# Patient Record
Sex: MALE | Race: WHITE | NOT HISPANIC OR LATINO | Employment: OTHER | URBAN - METROPOLITAN AREA
[De-identification: names, ages, dates, MRNs, and addresses within clinical notes are randomized per-mention and may not be internally consistent; named-entity substitution may affect disease eponyms.]

---

## 2017-06-22 NOTE — TELEPHONE ENCOUNTER
----- Message from Michelle Rosenthal sent at 6/22/2017 11:58 AM CDT -----  Patient is requesting a medication refill.     RX name: simvastatin (ZOCOR) 20 MG tablet  Strength:   Quantity: 90 day with refills   Directions: Take 1 tablet (20 mg total) by mouth every evening    RX name: lisinopril (PRINIVIL,ZESTRIL) 20 MG tablet  Strength:   Quantity: 90 day with refills  Directions: Take 1 tablet (20 mg total) by mouth once daily     RX name: triamterene-hydrochlorothiazide 37.5-25 mg (DYAZIDE) 37.5-25 mg per capsule  Strength:   Quantity: 90 day with refills   Directions:     RX name: triamterene-hydrochlorothiazide 37.5-25 mg (DYAZIDE) 37.5-25 mg per capsule  Strength:   Quantity: 90 day with refills   Directions: Take 1 capsule by mouth every morning. - Oral    RX name: diltiazem (CARDIZEM CD) 300 MG 24 hr capsule  Strength:   Quantity: 90 day supply  Directions:Take 1 capsule (300 mg total) by mouth once daily. - Oral      Pharmacy name: [facility]

## 2017-06-25 RX ORDER — SIMVASTATIN 20 MG/1
20 TABLET, FILM COATED ORAL NIGHTLY
Qty: 90 TABLET | Refills: 3 | Status: SHIPPED | OUTPATIENT
Start: 2017-06-25 | End: 2017-11-07 | Stop reason: SDUPTHER

## 2017-06-25 RX ORDER — LISINOPRIL 20 MG/1
20 TABLET ORAL DAILY
Qty: 90 TABLET | Refills: 3 | Status: SHIPPED | OUTPATIENT
Start: 2017-06-25 | End: 2017-11-07 | Stop reason: SDUPTHER

## 2017-06-25 RX ORDER — DILTIAZEM HYDROCHLORIDE 300 MG/1
300 CAPSULE, COATED, EXTENDED RELEASE ORAL DAILY
Qty: 90 CAPSULE | Refills: 3 | Status: SHIPPED | OUTPATIENT
Start: 2017-06-25 | End: 2017-11-07 | Stop reason: SDUPTHER

## 2017-06-25 RX ORDER — TRIAMTERENE AND HYDROCHLOROTHIAZIDE 37.5; 25 MG/1; MG/1
1 CAPSULE ORAL EVERY MORNING
Qty: 90 CAPSULE | Refills: 3 | Status: SHIPPED | OUTPATIENT
Start: 2017-06-25 | End: 2017-11-07 | Stop reason: SDUPTHER

## 2017-11-13 RX ORDER — TRIAMTERENE AND HYDROCHLOROTHIAZIDE 37.5; 25 MG/1; MG/1
1 CAPSULE ORAL EVERY MORNING
Qty: 90 CAPSULE | Refills: 3 | Status: SHIPPED | OUTPATIENT
Start: 2017-11-13 | End: 2018-05-06 | Stop reason: SDUPTHER

## 2017-11-13 RX ORDER — OMEGA-3-ACID ETHYL ESTERS 1 G/1
2 CAPSULE, LIQUID FILLED ORAL 2 TIMES DAILY
Qty: 360 CAPSULE | Refills: 3 | Status: SHIPPED | OUTPATIENT
Start: 2017-11-13 | End: 2018-12-06 | Stop reason: SDUPTHER

## 2017-11-13 RX ORDER — DILTIAZEM HYDROCHLORIDE 300 MG/1
300 CAPSULE, COATED, EXTENDED RELEASE ORAL DAILY
Qty: 90 CAPSULE | Refills: 3 | Status: SHIPPED | OUTPATIENT
Start: 2017-11-13 | End: 2018-12-03 | Stop reason: SDUPTHER

## 2017-11-13 RX ORDER — SIMVASTATIN 20 MG/1
20 TABLET, FILM COATED ORAL NIGHTLY
Qty: 90 TABLET | Refills: 3 | Status: SHIPPED | OUTPATIENT
Start: 2017-11-13 | End: 2018-05-18 | Stop reason: ALTCHOICE

## 2017-11-13 RX ORDER — LISINOPRIL 20 MG/1
20 TABLET ORAL DAILY
Qty: 90 TABLET | Refills: 3 | Status: SHIPPED | OUTPATIENT
Start: 2017-11-13 | End: 2018-05-06 | Stop reason: SDUPTHER

## 2017-11-15 ENCOUNTER — TELEPHONE (OUTPATIENT)
Dept: FAMILY MEDICINE | Facility: CLINIC | Age: 72
End: 2017-11-15

## 2017-11-15 NOTE — TELEPHONE ENCOUNTER
----- Message from Michelle Rosenthal sent at 11/15/2017 11:03 AM CST -----  Contact: NEL Booy  Needs okay from  Physician to fill Simvastatin. Has interaction alert with Diltizem      Call [phone]

## 2018-04-02 ENCOUNTER — TELEPHONE (OUTPATIENT)
Dept: FAMILY MEDICINE | Facility: CLINIC | Age: 73
End: 2018-04-02

## 2018-04-02 DIAGNOSIS — M54.9 BACK PAIN, UNSPECIFIED BACK LOCATION, UNSPECIFIED BACK PAIN LATERALITY, UNSPECIFIED CHRONICITY: Primary | ICD-10-CM

## 2018-04-06 DIAGNOSIS — Z12.11 COLON CANCER SCREENING: ICD-10-CM

## 2018-04-12 ENCOUNTER — HOSPITAL ENCOUNTER (OUTPATIENT)
Dept: RADIOLOGY | Facility: HOSPITAL | Age: 73
Discharge: HOME | End: 2018-04-12
Attending: FAMILY MEDICINE
Payer: MEDICARE

## 2018-04-12 PROCEDURE — 72148 MRI LUMBAR SPINE W/O DYE: CPT | Mod: TC,PO

## 2018-04-15 ENCOUNTER — TELEPHONE (OUTPATIENT)
Dept: FAMILY MEDICINE | Facility: CLINIC | Age: 73
End: 2018-04-15

## 2018-04-15 DIAGNOSIS — R93.7 ABNORMAL MRI, LUMBAR SPINE: Primary | ICD-10-CM

## 2018-04-17 ENCOUNTER — TELEPHONE (OUTPATIENT)
Dept: FAMILY MEDICINE | Facility: CLINIC | Age: 73
End: 2018-04-17

## 2018-04-17 NOTE — TELEPHONE ENCOUNTER
----- Message from Anthony De La Torre MD sent at 4/15/2018  9:04 PM CDT -----  Arthritis, prior surgery, worn discs, pinched nerves and spinal stenosis; needs to see neurosurgeon; referral placed

## 2018-04-17 NOTE — TELEPHONE ENCOUNTER
Mailbox was Full no answer on the other line      ----- Message from Anthony De La Torre MD sent at 4/15/2018  9:04 PM CDT -----  Arthritis, prior surgery, worn discs, pinched nerves and spinal stenosis; needs to see neurosurgeon; referral placed

## 2018-04-18 NOTE — TELEPHONE ENCOUNTER
----- Message from Kathy Schmidt sent at 4/18/2018  2:11 PM CDT -----  Contact: Self / [phone]  Patient would like to get test results.    Name of test:  MRI  Date of test: 4-12-18    The pt can be reached at [phone]

## 2018-04-23 ENCOUNTER — TELEPHONE (OUTPATIENT)
Dept: FAMILY MEDICINE | Facility: CLINIC | Age: 73
End: 2018-04-23

## 2018-04-23 NOTE — TELEPHONE ENCOUNTER
----- Message from Kathy Schmidt sent at 4/23/2018  9:53 AM CDT -----  Contact: Self  The pt is calling to get a referral to Neurosurgery. Has spoken to Dr De La Torre regarding this and he was given the MRI results.  Please refer him to a good neurosurgeon.  He has [employer].  He can be reached at [phone]

## 2018-04-27 ENCOUNTER — TELEPHONE (OUTPATIENT)
Dept: NEUROSURGERY | Facility: CLINIC | Age: 73
End: 2018-04-27

## 2018-05-06 DIAGNOSIS — Z00.00 WELLNESS EXAMINATION: ICD-10-CM

## 2018-05-06 DIAGNOSIS — I10 ESSENTIAL HYPERTENSION: Primary | ICD-10-CM

## 2018-05-07 RX ORDER — TRIAMTERENE AND HYDROCHLOROTHIAZIDE 37.5; 25 MG/1; MG/1
1 CAPSULE ORAL EVERY MORNING
Qty: 90 CAPSULE | Refills: 1 | Status: SHIPPED | OUTPATIENT
Start: 2018-05-07 | End: 2018-12-03 | Stop reason: SDUPTHER

## 2018-05-07 RX ORDER — LISINOPRIL 20 MG/1
20 TABLET ORAL DAILY
Qty: 90 TABLET | Refills: 1 | Status: SHIPPED | OUTPATIENT
Start: 2018-05-07 | End: 2018-12-03 | Stop reason: SDUPTHER

## 2018-05-07 NOTE — TELEPHONE ENCOUNTER
Several items need updated; call pt;      Hep C blood test  U/s for AAA screen,   Colonoscopy  Vaccines    Orders placed for first 3    Office visit for BP, he is on my uncontrolled list

## 2018-05-15 ENCOUNTER — TELEPHONE (OUTPATIENT)
Dept: FAMILY MEDICINE | Facility: CLINIC | Age: 73
End: 2018-05-15

## 2018-05-15 NOTE — TELEPHONE ENCOUNTER
----- Message from Krysta Melo sent at 5/15/2018  2:37 PM CDT -----  Contact: self/ [phone]  Patient has a referral for [place] AAA SCREENING patient states he has not seen Dr. De La Torre in [facility]. Patient wants to know why does he need to be seen?

## 2018-05-16 DIAGNOSIS — Z13.6 ENCOUNTER FOR ABDOMINAL AORTIC ANEURYSM (AAA) SCREENING: Primary | ICD-10-CM

## 2018-05-16 DIAGNOSIS — Z00.00 WELLNESS EXAMINATION: ICD-10-CM

## 2018-05-16 DIAGNOSIS — Z12.11 COLON CANCER SCREENING: ICD-10-CM

## 2018-05-18 RX ORDER — SIMVASTATIN 20 MG/1
20 TABLET, FILM COATED ORAL NIGHTLY
Qty: 90 TABLET | Refills: 0 | OUTPATIENT
Start: 2018-05-18

## 2018-05-18 RX ORDER — ATORVASTATIN CALCIUM 20 MG/1
20 TABLET, FILM COATED ORAL DAILY
Qty: 90 TABLET | Refills: 3 | Status: SHIPPED | OUTPATIENT
Start: 2018-05-18 | End: 2018-10-01

## 2018-05-18 NOTE — TELEPHONE ENCOUNTER
Switching to atrovastatin sdue to drug interaction with diltiazem; call pt    Needs vaccines, AAA u/s, colon, hep C

## 2018-05-22 ENCOUNTER — INITIAL CONSULT (OUTPATIENT)
Dept: NEUROSURGERY | Facility: CLINIC | Age: 73
End: 2018-05-22
Payer: MEDICARE

## 2018-05-22 ENCOUNTER — OFFICE VISIT (OUTPATIENT)
Dept: NEUROSURGERY | Facility: CLINIC | Age: 73
End: 2018-05-22
Payer: MEDICARE

## 2018-05-22 VITALS
HEART RATE: 78 BPM | WEIGHT: 262.13 LBS | SYSTOLIC BLOOD PRESSURE: 128 MMHG | BODY MASS INDEX: 37.61 KG/M2 | DIASTOLIC BLOOD PRESSURE: 76 MMHG

## 2018-05-22 DIAGNOSIS — M43.16 SPONDYLOLISTHESIS AT L4-L5 LEVEL: ICD-10-CM

## 2018-05-22 DIAGNOSIS — S33.5XXA LUMBAR SPRAIN, INITIAL ENCOUNTER: Primary | ICD-10-CM

## 2018-05-22 PROCEDURE — 3074F SYST BP LT 130 MM HG: CPT | Mod: CPTII,S$GLB,, | Performed by: NEUROLOGICAL SURGERY

## 2018-05-22 PROCEDURE — 3078F DIAST BP <80 MM HG: CPT | Mod: CPTII,S$GLB,, | Performed by: NEUROLOGICAL SURGERY

## 2018-05-22 PROCEDURE — 99204 OFFICE O/P NEW MOD 45 MIN: CPT | Mod: S$GLB,,, | Performed by: NEUROLOGICAL SURGERY

## 2018-05-22 PROCEDURE — 99999 PR PBB SHADOW E&M-EST. PATIENT-LVL III: CPT | Mod: PBBFAC,,, | Performed by: NEUROLOGICAL SURGERY

## 2018-05-22 PROCEDURE — 99999 PR PBB SHADOW E&M-EST. PATIENT-LVL II: CPT | Mod: PBBFAC,,, | Performed by: NEUROLOGICAL SURGERY

## 2018-05-22 NOTE — LETTER
May 22, 2018      Anthony De La Torre MD  [address] [facility] [address]           [place] - Neurosurgery  [address], [address]  [place] [address]  Phone: [phone]          Patient: Noah Lopez   MR Number: [id number]   YOB: 1945   Date of Visit: 5/22/2018       Dear Dr. Anthony De La Torre:    Thank you for referring Noah Lopez to me for evaluation. Attached you will find relevant portions of my assessment and plan of care.    If you have questions, please do not hesitate to call me. I look forward to following Noah Lopez along with you.    Sincerely,    Rakesh Wolfe MD    Enclosure  CC:  No Recipients    If you would like to receive this communication electronically, please contact [email or web address] or [phone] to request more information on [employer] Link access.    For providers and/or their staff who would like to refer a patient to [facility], please contact us through our one-stop-shop provider referral line, Christiano Yates, at [phone].    If you feel you have received this communication in error or would no longer like to receive these types of communications, please e-mail [email or web address]
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
## 2018-05-22 NOTE — LETTER
May 24, 2018      Anthony De La Torre MD  [address] [facility] [address]           [place] - Neurosurgery  [address], [address]  [facility] [address]  Phone: [phone]          Patient: Noah Lopez   MR Number: [id number]   YOB: 1945   Date of Visit: 5/22/2018       Dear Dr. Anthony De La Torre:    Thank you for referring Noah Lopez to me for evaluation. Attached you will find relevant portions of my assessment and plan of care.    If you have questions, please do not hesitate to call me. I look forward to following Noah Lopez along with you.    Sincerely,    Rakesh Wolfe MD    Enclosure  CC:  No Recipients    If you would like to receive this communication electronically, please contact [email or web address] or [phone] to request more information on [employer] Link access.    For providers and/or their staff who would like to refer a patient to [facility], please contact us through our one-stop-shop provider referral line, Christiano Yates, at [phone].    If you feel you have received this communication in error or would no longer like to receive these types of communications, please e-mail [email or web address]

## 2018-05-23 ENCOUNTER — TELEPHONE (OUTPATIENT)
Dept: GASTROENTEROLOGY | Facility: CLINIC | Age: 73
End: 2018-05-23

## 2018-05-23 NOTE — TELEPHONE ENCOUNTER
Referral was sent from Dr. De La Torre to schedule an Colonoscopy, tried to leave an voicemail for patient to call the office back in regards to scheduling procedure. Patient voicemail is full.

## 2018-05-30 ENCOUNTER — TELEPHONE (OUTPATIENT)
Dept: GASTROENTEROLOGY | Facility: CLINIC | Age: 73
End: 2018-05-30

## 2018-05-30 DIAGNOSIS — Z00.00 WELLNESS EXAMINATION: Primary | ICD-10-CM

## 2018-05-30 NOTE — TELEPHONE ENCOUNTER
Referral was sent from Dr. De La Torre to schedule patient for an Colonoscopy, patient declines. Patient states that he does not want to have this procedure done.

## 2018-06-21 RX ORDER — SIMVASTATIN 20 MG/1
TABLET, FILM COATED ORAL
Qty: 90 TABLET | Refills: 0 | Status: SHIPPED | OUTPATIENT
Start: 2018-06-21 | End: 2018-09-08 | Stop reason: SDUPTHER

## 2018-06-27 ENCOUNTER — OFFICE VISIT (OUTPATIENT)
Dept: FAMILY MEDICINE | Facility: CLINIC | Age: 73
End: 2018-06-27
Payer: MEDICARE

## 2018-06-27 VITALS
DIASTOLIC BLOOD PRESSURE: 90 MMHG | HEART RATE: 101 BPM | TEMPERATURE: 98 F | SYSTOLIC BLOOD PRESSURE: 140 MMHG | HEIGHT: 70 IN | WEIGHT: 256.63 LBS | OXYGEN SATURATION: 97 % | BODY MASS INDEX: 36.74 KG/M2

## 2018-06-27 DIAGNOSIS — Z12.11 COLON CANCER SCREENING: ICD-10-CM

## 2018-06-27 DIAGNOSIS — E78.5 HYPERLIPIDEMIA, UNSPECIFIED HYPERLIPIDEMIA TYPE: ICD-10-CM

## 2018-06-27 DIAGNOSIS — Z13.6 ENCOUNTER FOR ABDOMINAL AORTIC ANEURYSM (AAA) SCREENING: ICD-10-CM

## 2018-06-27 DIAGNOSIS — Z12.5 ENCOUNTER FOR SCREENING FOR MALIGNANT NEOPLASM OF PROSTATE: ICD-10-CM

## 2018-06-27 DIAGNOSIS — L60.8 TOENAIL DEFORMITY: ICD-10-CM

## 2018-06-27 DIAGNOSIS — I10 ESSENTIAL HYPERTENSION: ICD-10-CM

## 2018-06-27 DIAGNOSIS — Z00.00 WELLNESS EXAMINATION: Primary | ICD-10-CM

## 2018-06-27 PROCEDURE — 3077F SYST BP >= 140 MM HG: CPT | Mod: CPTII,S$GLB,, | Performed by: FAMILY MEDICINE

## 2018-06-27 PROCEDURE — 99214 OFFICE O/P EST MOD 30 MIN: CPT | Mod: S$GLB,,, | Performed by: FAMILY MEDICINE

## 2018-06-27 PROCEDURE — 99499 UNLISTED E&M SERVICE: CPT | Mod: S$GLB,,, | Performed by: FAMILY MEDICINE

## 2018-06-27 PROCEDURE — 3080F DIAST BP >= 90 MM HG: CPT | Mod: CPTII,S$GLB,, | Performed by: FAMILY MEDICINE

## 2018-06-27 NOTE — PROGRESS NOTES
Subjective:      Patient ID: Noah Lopez is a 73 y.o. male.    Chief Complaint: Follow-up (ck/up)      HPI   Wellness exam; will never take flu vaccine; declines shingles vaccine; needs labs; deforemed toenails, wants removed; referral placed to Dr Cevallos    Review of Systems   Constitutional: Negative for appetite change, fatigue, fever and unexpected weight change.   HENT: Negative for congestion, ear pain, sinus pressure and sore throat.    Eyes: Negative for pain and visual disturbance.   Respiratory: Negative for shortness of breath.    Cardiovascular: Negative for chest pain.   Gastrointestinal: Negative for abdominal pain, constipation and diarrhea.   Endocrine: Negative for polyuria.   Genitourinary: Negative for difficulty urinating and frequency.   Musculoskeletal: Negative for arthralgias, back pain and myalgias.   Skin: Negative for color change.   Allergic/Immunologic: Negative.    Neurological: Negative for syncope, weakness and headaches.   Hematological: Does not bruise/bleed easily.   Psychiatric/Behavioral: Negative for dysphoric mood and suicidal ideas. The patient is not nervous/anxious.    All other systems reviewed and are negative.    Objective:     Physical Exam   Constitutional: He is oriented to person, place, and time. He appears well-developed and well-nourished. No distress.   HENT:   Head: Normocephalic and atraumatic.   Right Ear: External ear normal.   Left Ear: External ear normal.   Mouth/Throat: Oropharynx is clear and moist. No oropharyngeal exudate.   Eyes: Conjunctivae and EOM are normal. Pupils are equal, round, and reactive to light. Right eye exhibits no discharge. Left eye exhibits no discharge. No scleral icterus.   Neck: Normal range of motion. Neck supple. No JVD present. No tracheal deviation present. No thyromegaly present.   Cardiovascular: Normal rate and regular rhythm.  Exam reveals no gallop and no friction rub.    No murmur heard.  Pulmonary/Chest: Effort normal  and breath sounds normal. No stridor. No respiratory distress. He has no wheezes. He has no rales. He exhibits no tenderness.   Abdominal: Soft. He exhibits no distension and no mass. There is no tenderness. There is no rebound and no guarding.   Musculoskeletal: Normal range of motion. He exhibits no edema or tenderness.   Lymphadenopathy:     He has no cervical adenopathy.   Neurological: He is alert and oriented to person, place, and time. He has normal reflexes. He displays normal reflexes. No cranial nerve deficit. He exhibits normal muscle tone. Coordination normal.   Skin: Skin is warm and dry. No rash noted. He is not diaphoretic. No erythema. No pallor.   Psychiatric: He has a normal mood and affect. His behavior is normal. Judgment and thought content normal.   Nursing note and vitals reviewed.    Assessment:     1. Wellness examination    2. Essential hypertension    3. Hyperlipidemia, unspecified hyperlipidemia type    4. Encounter for screening for malignant neoplasm of prostate     5. Body mass index (BMI) of 36.0-36.9 in adult     6. Toenail deformity    7. Colon cancer screening    8. Encounter for abdominal aortic aneurysm (AAA) screening      Plan:     New Prescriptions    No medications on file     Discontinued Medications    VARDENAFIL (LEVITRA) 20 MG TABLET    Take 1 tablet (20 mg total) by mouth daily as needed for Erectile Dysfunction.     Modified Medications    No medications on file       Wellness examination  -     CBC auto differential; Future; Expected date: 06/27/2018  -     Comprehensive metabolic panel; Future; Expected date: 06/27/2018  -     Lipid panel; Future  -     PSA, Screening; Future  -     Urinalysis; Future  -     Vitamin D; Future  -     Hepatitis C antibody; Future; Expected date: 06/27/2018    Essential hypertension  -     CBC auto differential; Future; Expected date: 06/27/2018  -     Comprehensive metabolic panel; Future; Expected date: 06/27/2018  -     Lipid panel;  Future  -     PSA, Screening; Future  -     Urinalysis; Future  -     Vitamin D; Future  -     Hepatitis C antibody; Future; Expected date: 06/27/2018    Hyperlipidemia, unspecified hyperlipidemia type  -     CBC auto differential; Future; Expected date: 06/27/2018  -     Comprehensive metabolic panel; Future; Expected date: 06/27/2018  -     Lipid panel; Future  -     PSA, Screening; Future  -     Urinalysis; Future  -     Vitamin D; Future  -     Hepatitis C antibody; Future; Expected date: 06/27/2018    Encounter for screening for malignant neoplasm of prostate   -     PSA, Screening; Future    Body mass index (BMI) of 36.0-36.9 in adult   -     Vitamin D; Future    Toenail deformity  -     Ambulatory referral to Podiatry    Colon cancer screening  -     Case request GI: COLONOSCOPY    Encounter for abdominal aortic aneurysm (AAA) screening  -      AAA Screening; Future; Expected date: 06/27/2018    Other orders  -     Cancel: Tdap Vaccine      Check bp at home over next 2 weeks and call the numbers to us

## 2018-07-02 NOTE — PROGRESS NOTES
Patient, Noah Lopez (MRN #[id number]), presented with a recorded BMI of 36.82 kg/m^2 and a documented comorbidity(s):  - Hypertension  - Hyperlipidemia  to which the severe obesity is a contributing factor. This is consistent with the definition of severe obesity (BMI 35.0-35.9) with comorbidity (ICD-10 E66.01, Z68.35). The patient's severe obesity was monitored, evaluated, addressed and/or treated. This addendum to the medical record is made on 07/02/2018.

## 2018-07-06 ENCOUNTER — HOSPITAL ENCOUNTER (OUTPATIENT)
Dept: RADIOLOGY | Facility: HOSPITAL | Age: 73
Discharge: HOME | End: 2018-07-06
Attending: FAMILY MEDICINE
Payer: MEDICARE

## 2018-07-06 DIAGNOSIS — Z13.6 ENCOUNTER FOR ABDOMINAL AORTIC ANEURYSM (AAA) SCREENING: ICD-10-CM

## 2018-07-06 PROCEDURE — 76706 US ABDL AORTA SCREEN AAA: CPT | Mod: TC,PO

## 2018-07-23 ENCOUNTER — TELEPHONE (OUTPATIENT)
Dept: FAMILY MEDICINE | Facility: CLINIC | Age: 73
End: 2018-07-23

## 2018-07-23 NOTE — TELEPHONE ENCOUNTER
Patient advised to contact scheduling dept to schedule colonoscopy. Patient also requesting a letter stating his mother (Rose Lopez MRN:[id number]) has dementia and can no longer handle her finances. Please advise           ----- Message from Ermias Almanza sent at 7/23/2018 12:38 PM CDT -----  Contact: [phone]  Patient requested to speak with the nurse about a referral to get a colonoscopy since Dr. Jean Baptiste is no longer with [facility]. Please call and advise.

## 2018-08-06 ENCOUNTER — TELEPHONE (OUTPATIENT)
Dept: GASTROENTEROLOGY | Facility: CLINIC | Age: 73
End: 2018-08-06

## 2018-08-06 NOTE — TELEPHONE ENCOUNTER
Colonoscopy Referral  Referring Physician: Dr. De La Torre    Reason for Referral:Screening   Family History of:   Colon polyp: No  Relationship/Age of Onset:   Colon cancer: No  Relationship/Age of Onset:   Patient with:   Hemoccults Done: No  Iron deficient: No  On Blood Thinner: No  Valvular heart disease/valve replacement: No  Anemia Present: No  On NSAID: No  Lung disease: No  Kidney disease: No  Hx of polyps: No  Hx of colon cancer: No  Previous colon evalations: Yes   Colonoscopy  When: Unknown  Where:   Pertinent symptoms:   Current Outpatient Prescriptions:  loperamide (IMODIUM) 1 mg/5 mL solution, Take by mouth every 6 (six) hours as needed for Diarrhea., Disp: , Rfl:   No current facility-administered medications for this visit.   ?  Review of patient's allergies indicates:  No Known Allergies  Patient was scheduled for colonoscopy on 8/28/18 with Dr. Arce at [facility]. Suprep instructions were reviewed with patient.

## 2018-08-13 ENCOUNTER — TELEPHONE (OUTPATIENT)
Dept: ENDOSCOPY | Facility: HOSPITAL | Age: 73
End: 2018-08-13

## 2018-08-14 ENCOUNTER — TELEPHONE (OUTPATIENT)
Dept: ENDOSCOPY | Facility: HOSPITAL | Age: 73
End: 2018-08-14

## 2018-08-27 ENCOUNTER — LAB VISIT (OUTPATIENT)
Dept: LAB | Facility: HOSPITAL | Age: 73
End: 2018-08-27
Attending: FAMILY MEDICINE
Payer: MEDICARE

## 2018-08-27 ENCOUNTER — OFFICE VISIT (OUTPATIENT)
Dept: PODIATRY | Facility: CLINIC | Age: 73
End: 2018-08-27
Payer: MEDICARE

## 2018-08-27 VITALS
HEART RATE: 85 BPM | SYSTOLIC BLOOD PRESSURE: 147 MMHG | HEIGHT: 70 IN | WEIGHT: 256 LBS | DIASTOLIC BLOOD PRESSURE: 85 MMHG | BODY MASS INDEX: 36.65 KG/M2

## 2018-08-27 DIAGNOSIS — M79.676 GREAT TOE PAIN, UNSPECIFIED LATERALITY: ICD-10-CM

## 2018-08-27 DIAGNOSIS — I10 ESSENTIAL HYPERTENSION: ICD-10-CM

## 2018-08-27 DIAGNOSIS — Z00.00 WELLNESS EXAMINATION: ICD-10-CM

## 2018-08-27 DIAGNOSIS — E78.5 HYPERLIPIDEMIA, UNSPECIFIED HYPERLIPIDEMIA TYPE: ICD-10-CM

## 2018-08-27 DIAGNOSIS — L60.8 PINCER NAIL DEFORMITY: Primary | ICD-10-CM

## 2018-08-27 LAB
BILIRUB UR QL STRIP: NEGATIVE
CLARITY UR REFRACT.AUTO: CLEAR
COLOR UR AUTO: YELLOW
GLUCOSE UR QL STRIP: NEGATIVE
HGB UR QL STRIP: NEGATIVE
KETONES UR QL STRIP: NEGATIVE
LEUKOCYTE ESTERASE UR QL STRIP: NEGATIVE
NITRITE UR QL STRIP: NEGATIVE
PH UR STRIP: 6 [PH] (ref 5–8)
PROT UR QL STRIP: NEGATIVE
SP GR UR STRIP: 1.01 (ref 1–1.03)
URN SPEC COLLECT METH UR: NORMAL
UROBILINOGEN UR STRIP-ACNC: NEGATIVE EU/DL

## 2018-08-27 PROCEDURE — 81003 URINALYSIS AUTO W/O SCOPE: CPT

## 2018-08-27 PROCEDURE — 3077F SYST BP >= 140 MM HG: CPT | Mod: CPTII,S$GLB,, | Performed by: PODIATRIST

## 2018-08-27 PROCEDURE — 99203 OFFICE O/P NEW LOW 30 MIN: CPT | Mod: S$GLB,,, | Performed by: PODIATRIST

## 2018-08-27 PROCEDURE — 3079F DIAST BP 80-89 MM HG: CPT | Mod: CPTII,S$GLB,, | Performed by: PODIATRIST

## 2018-08-27 PROCEDURE — 99999 PR PBB SHADOW E&M-EST. PATIENT-LVL III: CPT | Mod: PBBFAC,,, | Performed by: PODIATRIST

## 2018-08-27 NOTE — LETTER
August 28, 2018      Anthony De La Torre MD  [address] [facility] [address]           [facility]iat[facility]  [address] [address]  Phone: [phone]          Patient: Noah Lopez   MR Number: [id number]   YOB: 1945   Date of Visit: 8/27/2018       Dear Dr. Anthony De La Torre:    Thank you for referring Noah Lopez to me for evaluation. Attached you will find relevant portions of my assessment and plan of care.    If you have questions, please do not hesitate to call me. I look forward to following Noah Lopez along with you.    Sincerely,    Don Cevallos, KANDY    Enclosure  CC:  No Recipients    If you would like to receive this communication electronically, please contact [email or web address] or [phone] to request more information on [employer] Link access.    For providers and/or their staff who would like to refer a patient to [facility], please contact us through our one-stop-shop provider referral line, Christiano Yates, at [phone].    If you feel you have received this communication in error or would no longer like to receive these types of communications, please e-mail [email or web address]

## 2018-08-28 ENCOUNTER — TELEPHONE (OUTPATIENT)
Dept: FAMILY MEDICINE | Facility: CLINIC | Age: 73
End: 2018-08-28

## 2018-08-28 DIAGNOSIS — R73.9 HYPERGLYCEMIA: ICD-10-CM

## 2018-08-28 DIAGNOSIS — Z12.11 COLON CANCER SCREENING: Primary | ICD-10-CM

## 2018-08-28 PROBLEM — L60.8 PINCER NAIL DEFORMITY: Status: ACTIVE | Noted: 2018-08-28

## 2018-08-28 PROBLEM — M79.676: Status: ACTIVE | Noted: 2018-08-28

## 2018-08-28 NOTE — PROGRESS NOTES
"Subjective:      Patient ID: Noah Lopez is a 73 y.o. male.    Chief Complaint: Nail Problem (big toe on right and left foot )    Noah is a 73 y.o. male who presents to the clinic complaining of painful ingrown toenail on both feet. Patient has history of aphantasia.    Vitals:    08/27/18 1345   BP: (!) 147/85   Pulse: 85   Weight: 116.1 kg (256 lb)   Height: 5' 10" (1.778 m)   PainSc:   3      Past Medical History:   Diagnosis Date    Hypertension        Past Surgical History:   Procedure Laterality Date    COLONOSCOPY  2010    SPINE SURGERY      l-spine       Family History   Problem Relation Age of Onset    Hypertension Mother     Heart attack Father        Social History     Socioeconomic History    Marital status:      Spouse name: Not on file    Number of children: Not on file    Years of education: Not on file    Highest education level: Not on file   Social Needs    Financial resource strain: Not on file    Food insecurity - worry: Not on file    Food insecurity - inability: Not on file    Transportation needs - medical: Not on file    Transportation needs - non-medical: Not on file   Occupational History    Not on file   Tobacco Use    Smoking status: Former Smoker   Substance and Sexual Activity    Alcohol use: Yes     Comment: occ    Drug use: Not on file    Sexual activity: Not on file   Other Topics Concern    Not on file   Social History Narrative    Not on file       Current Outpatient Medications   Medication Sig Dispense Refill    aspirin (ECOTRIN) 81 MG EC tablet Take 81 mg by mouth once daily.      atorvastatin (LIPITOR) 20 MG tablet Take 1 tablet (20 mg total) by mouth once daily. 90 tablet 3    diltiaZEM (CARDIZEM CD) 300 MG 24 hr capsule Take 1 capsule (300 mg total) by mouth once daily. 90 capsule 3    lisinopril (PRINIVIL,ZESTRIL) 20 MG tablet TAKE 1 TABLET (20 MG TOTAL) BY MOUTH ONCE DAILY. 90 tablet 1    ofloxacin (OCUFLOX) 0.3 % ophthalmic solution 1 " drop once.      omega-3 acid ethyl esters (LOVAZA) 1 gram capsule Take 2 capsules (2 g total) by mouth 2 (two) times daily. 360 capsule 3    simvastatin (ZOCOR) 20 MG tablet TAKE 1 TABLET (20 MG TOTAL) BY MOUTH EVERY EVENING. 90 tablet 0    triamterene-hydrochlorothiazide 37.5-25 mg (DYAZIDE) 37.5-25 mg per capsule TAKE 1 CAPSULE BY MOUTH EVERY MORNING. 90 capsule 1     No current facility-administered medications for this visit.        Review of patient's allergies indicates:  No Known Allergies      Review of Systems   Constitution: Negative for chills, fever, weakness and malaise/fatigue.   Cardiovascular: Negative for chest pain, claudication and leg swelling.   Respiratory: Negative for cough and shortness of breath.    Skin: Positive for color change and nail changes. Negative for itching and rash.   Musculoskeletal: Negative for back pain, joint pain, muscle cramps and muscle weakness.   Gastrointestinal: Negative for nausea and vomiting.   Neurological: Negative for numbness and paresthesias.   Psychiatric/Behavioral: Positive for memory loss. Negative for altered mental status.           Objective:      Physical Exam   Constitutional: He is oriented to person, place, and time. He appears well-developed and well-nourished. No distress.   Cardiovascular: Intact distal pulses.   Pulses:       Dorsalis pedis pulses are 2+ on the right side, and 2+ on the left side.        Posterior tibial pulses are 2+ on the right side, and 2+ on the left side.   CFT< 3 secs all toes bilateral foot, skin temp warm bilateral foot, diminished digital hair growth bilateral foot, mild lower extremity edema with telangiectasias bilateral.     Musculoskeletal:   Non-track bound hallux valgus with mild under lap of second toe bilateral.   Neurological: He is alert and oriented to person, place, and time. He has normal strength. No sensory deficit.   Skin: Skin is warm, dry and intact. Capillary refill takes less than 2 seconds. No  ecchymosis, no lesion, no petechiae and no rash noted. He is not diaphoretic. No cyanosis or erythema. No pallor. Nails show no clubbing.   Hallux with severely incurvated lateral nail border pinching the nail bed and moderate incurvated medial nail border, thickened with yellow discoloration. Remaining toenails normal in appearance bilateral foot.    No open lesions or macerations bilateral lower extremity.    Skin turgor and elasticity WNLs bilateral lower extremity               Assessment:       Encounter Diagnoses   Name Primary?    Pincer nail deformity Yes    Great toe pain, unspecified laterality          Plan:       Noah was seen today for nail problem.    Diagnoses and all orders for this visit:    Pincer nail deformity    Great toe pain, unspecified laterality      I counseled the patient on his conditions, their implications and medical management.    Discussed conservative treatment options for pincer nail deformity such as wearing shoes with wider toe box, toe spacer, cutting nails proximally vs surgical intervention consisting of nail avulsion vs matrixectomy (partial vs total) in detail.    Patient elected to return for total nail P&A matrixectomy after his vacation.    RTC prn.    .

## 2018-09-04 ENCOUNTER — TELEPHONE (OUTPATIENT)
Dept: PODIATRY | Facility: CLINIC | Age: 73
End: 2018-09-04

## 2018-09-04 ENCOUNTER — TELEPHONE (OUTPATIENT)
Dept: ENDOSCOPY | Facility: HOSPITAL | Age: 73
End: 2018-09-04

## 2018-09-04 NOTE — TELEPHONE ENCOUNTER
----- Message from Eden Aguirre sent at 9/4/2018 10:33 AM CDT -----  Contact: [phone]/self  Patient called in requesting to speak with you. Patient prefers to speak with a nurse. Please advise.

## 2018-09-06 ENCOUNTER — HOSPITAL ENCOUNTER (OUTPATIENT)
Facility: HOSPITAL | Age: 73
Discharge: HOME | End: 2018-09-06
Attending: INTERNAL MEDICINE | Admitting: INTERNAL MEDICINE
Payer: MEDICARE

## 2018-09-06 ENCOUNTER — ANESTHESIA EVENT (OUTPATIENT)
Dept: ENDOSCOPY | Facility: HOSPITAL | Age: 73
End: 2018-09-06
Payer: MEDICARE

## 2018-09-06 ENCOUNTER — ANESTHESIA (OUTPATIENT)
Dept: ENDOSCOPY | Facility: HOSPITAL | Age: 73
End: 2018-09-06
Payer: MEDICARE

## 2018-09-06 VITALS
HEIGHT: 73 IN | BODY MASS INDEX: 32.47 KG/M2 | RESPIRATION RATE: 20 BRPM | SYSTOLIC BLOOD PRESSURE: 126 MMHG | WEIGHT: 245 LBS | DIASTOLIC BLOOD PRESSURE: 95 MMHG | HEART RATE: 71 BPM | TEMPERATURE: 98 F | OXYGEN SATURATION: 97 %

## 2018-09-06 DIAGNOSIS — Z12.11 SCREENING FOR MALIGNANT NEOPLASM OF COLON: ICD-10-CM

## 2018-09-06 PROCEDURE — 45385 COLONOSCOPY W/LESION REMOVAL: CPT | Mod: PT,,, | Performed by: INTERNAL MEDICINE

## 2018-09-06 PROCEDURE — 45385 COLONOSCOPY W/LESION REMOVAL: CPT | Performed by: INTERNAL MEDICINE

## 2018-09-06 PROCEDURE — 88305 TISSUE EXAM BY PATHOLOGIST: CPT | Mod: 26,,, | Performed by: PATHOLOGY

## 2018-09-06 PROCEDURE — 88305 TISSUE EXAM BY PATHOLOGIST: CPT | Performed by: PATHOLOGY

## 2018-09-06 PROCEDURE — 63600175 PHARM REV CODE 636 W HCPCS: Performed by: NURSE ANESTHETIST, CERTIFIED REGISTERED

## 2018-09-06 PROCEDURE — 27201089 HC SNARE, DISP (ANY): Performed by: INTERNAL MEDICINE

## 2018-09-06 PROCEDURE — 25000003 PHARM REV CODE 250: Performed by: INTERNAL MEDICINE

## 2018-09-06 PROCEDURE — 37000009 HC ANESTHESIA EA ADD 15 MINS: Performed by: INTERNAL MEDICINE

## 2018-09-06 PROCEDURE — 37000008 HC ANESTHESIA 1ST 15 MINUTES: Performed by: INTERNAL MEDICINE

## 2018-09-06 RX ORDER — SODIUM CHLORIDE 9 MG/ML
INJECTION, SOLUTION INTRAVENOUS CONTINUOUS
Status: DISCONTINUED | OUTPATIENT
Start: 2018-09-06 | End: 2018-09-06 | Stop reason: HOSPADM

## 2018-09-06 RX ORDER — PROPOFOL 10 MG/ML
VIAL (ML) INTRAVENOUS CONTINUOUS PRN
Status: DISCONTINUED | OUTPATIENT
Start: 2018-09-06 | End: 2018-09-06

## 2018-09-06 RX ORDER — PROPOFOL 10 MG/ML
VIAL (ML) INTRAVENOUS
Status: DISCONTINUED | OUTPATIENT
Start: 2018-09-06 | End: 2018-09-06

## 2018-09-06 RX ORDER — SODIUM CHLORIDE 0.9 % (FLUSH) 0.9 %
3 SYRINGE (ML) INJECTION
Status: DISCONTINUED | OUTPATIENT
Start: 2018-09-06 | End: 2018-09-06 | Stop reason: HOSPADM

## 2018-09-06 RX ORDER — LIDOCAINE HCL/PF 100 MG/5ML
SYRINGE (ML) INTRAVENOUS
Status: DISCONTINUED | OUTPATIENT
Start: 2018-09-06 | End: 2018-09-06

## 2018-09-06 RX ADMIN — PROPOFOL 50 MG: 10 INJECTION, EMULSION INTRAVENOUS at 08:09

## 2018-09-06 RX ADMIN — PROPOFOL 150 MCG/KG/MIN: 10 INJECTION, EMULSION INTRAVENOUS at 08:09

## 2018-09-06 RX ADMIN — LIDOCAINE HYDROCHLORIDE 100 MG: 20 INJECTION, SOLUTION INTRAVENOUS at 08:09

## 2018-09-06 RX ADMIN — SODIUM CHLORIDE: 0.9 INJECTION, SOLUTION INTRAVENOUS at 08:09

## 2018-09-06 NOTE — TRANSFER OF CARE
"Anesthesia Transfer of Care Note    Patient: Noah Lopez    Procedure(s) Performed: Procedure(s) (LRB):  COLONOSCOPY Suprep PLEASE TEXT PATIENT WITH ARRIVAL TIME (N/A)    Patient location: PACU    Anesthesia Type: MAC    Transport from OR: Transported from OR on 2-3 L/min O2 by NC with adequate spontaneous ventilation    Post pain: adequate analgesia    Post assessment: no apparent anesthetic complications and tolerated procedure well    Post vital signs: stable    Level of consciousness: awake, alert and oriented    Nausea/Vomiting: no nausea/vomiting    Complications: none    Transfer of care protocol was followed      Last vitals:   Visit Vitals  BP (!) 148/71 (BP Location: Left arm, Patient Position: Lying)   Pulse 79   Temp 36.7 °C (98.1 °F) (Oral)   Resp 16   Ht 6' 1" (1.854 m)   Wt 111.1 kg (245 lb)   SpO2 96%   BMI 32.32 kg/m²     "

## 2018-09-06 NOTE — ANESTHESIA PREPROCEDURE EVALUATION
09/06/2018  Noah Lopez is a 73 y.o., male presents for EGD under MAC.    Anesthesia Evaluation    I have reviewed the Patient Summary Reports.    I have reviewed the Nursing Notes.   I have reviewed the Medications.     Review of Systems  Anesthesia Hx:  No problems with previous Anesthesia    Social:  Former Smoker    Hematology/Oncology:  Hematology Normal   Oncology Normal     EENT/Dental:EENT/Dental Normal   Cardiovascular:   Hypertension    Pulmonary:  Pulmonary Normal    Renal/:  Renal/ Normal     Hepatic/GI:  Hepatic/GI Normal    Musculoskeletal:  Musculoskeletal Normal    Neurological:  Neurology Normal        Physical Exam  General:  Well nourished    Airway/Jaw/Neck:  Airway Findings: Mouth Opening: Normal Tongue: Normal  General Airway Assessment: Adult  Mallampati: II  TM Distance: Normal, at least 6 cm       Chest/Lungs:  Chest/Lungs Findings: Clear to auscultation, Normal Respiratory Rate     Heart/Vascular:  Heart Findings: Rate: Normal  Rhythm: Regular Rhythm  Sounds: Normal        Mental Status:  Mental Status Findings:  Cooperative, Alert and Oriented         Anesthesia Plan  Type of Anesthesia, risks & benefits discussed:  Anesthesia Type:  MAC  Patient's Preference:   Intra-op Monitoring Plan:   Intra-op Monitoring Plan Comments:   Post Op Pain Control Plan: multimodal analgesia  Post Op Pain Control Plan Comments:   Induction:   IV  Beta Blocker:  Patient is not currently on a Beta-Blocker (No further documentation required).       Informed Consent: Patient understands risks and agrees with Anesthesia plan.  Questions answered. Anesthesia consent signed with patient.  ASA Score: 3     Day of Surgery Review of History & Physical:  There are no significant changes.          Ready For Surgery From Anesthesia Perspective.

## 2018-09-06 NOTE — PLAN OF CARE
Past Medical History:   Diagnosis Date    Colon polyp 09/06/2018    Hyperlipidemia     Hypertension      Colonoscopy completed, one poly, diverticulosis and hemorrhoids noted on exam. Dr. Arce visited at bedside, discussed findings and recommendations with patient and family member; all questions asked and answered. Verbalized understanding of information give. Handout provided at time of discharge.

## 2018-09-06 NOTE — [employer] PATIENT INSTRUCTIONS
Discharge Summary/Instructions after an Endoscopic Procedure  Patient Name: Noah Lopez  Patient MRN: [id number]  Patient YOB: 1945 Thursday, September 06, 2018  Niharika Arce MD  RESTRICTIONS:  During your procedure today, you received medications for sedation.  These   medications may affect your judgment, balance and coordination.  Therefore,   for 24 hours, you have the following restrictions:   - DO NOT drive a car, operate machinery, make legal/financial decisions,   sign important papers or drink alcohol.    ACTIVITY:  Today: no heavy lifting, straining or running due to procedural   sedation/anesthesia.  The following day: return to full activity including work.  DIET:  Eat and drink normally unless instructed otherwise.     TREATMENT FOR COMMON SIDE EFFECTS:  - Mild abdominal pain, nausea, belching, bloating or excessive gas:  rest,   eat lightly and use a heating pad.  - Sore Throat: treat with throat lozenges and/or gargle with warm salt   water.  - Because air was used during the procedure, expelling large amounts of air   from your rectum or belching is normal.  - If a bowel prep was taken, you may not have a bowel movement for 1-3 days.    This is normal.  SYMPTOMS TO WATCH FOR AND REPORT TO YOUR PHYSICIAN:  1. Abdominal pain or bloating, other than gas cramps.  2. Chest pain.  3. Back pain.  4. Signs of infection such as: chills or fever occurring within 24 hours   after the procedure.  5. Rectal bleeding, which would show as bright red, maroon, or black stools.   (A tablespoon of blood from the rectum is not serious, especially if   hemorrhoids are present.)  6. Vomiting.  7. Weakness or dizziness.  GO DIRECTLY TO THE NEAREST EMERGENCY ROOM IF YOU HAVE ANY OF THE FOLLOWING:      Difficulty breathing              Chills and/or fever over 101 F   Persistent vomiting and/or vomiting blood   Severe abdominal pain   Severe chest pain   Black, tarry stools   Bleeding- more than one  tablespoon   Any other symptom or condition that you feel may need urgent attention  Your doctor recommends these additional instructions:  If any biopsies were taken, your doctors clinic will contact you in 1 to 2   weeks with any results.  - Discharge patient to home (via wheelchair).   - Patient has a contact number available for emergencies.  The signs and   symptoms of potential delayed complications were discussed with the   patient.  Return to normal activities tomorrow.  Written discharge   instructions were provided to the patient.   - Resume previous diet.   - Continue present medications.   - Await pathology results.   - Repeat colonoscopy in 5 years for surveillance.  For questions, problems or results please call your physician - Niharika Arce MD at Work:  ( [phone].  EMERGENCY PHONE NUMBER: [phone],  LAB RESULTS: [phone]  IF A COMPLICATION OR EMERGENCY SITUATION ARISES AND YOU ARE UNABLE TO REACH   YOUR PHYSICIAN - GO DIRECTLY TO THE EMERGENCY ROOM.  Niharika Arce MD  9/6/2018 9:29:21 AM  This report has been verified and signed electronically.  [employer]

## 2018-09-06 NOTE — DISCHARGE INSTRUCTIONS
D  Diverticulosis    Diverticulosis means that small pouches have formed in the wall of your large intestine (colon). Most often, this problem causes no symptoms and is common as people age. But the pouches in the colon are at risk of becoming infected. When this happens, the condition is called diverticulitis. Although most people with diverticulosis never develop diverticulitis, it is still not uncommon. Rectal bleeding can also occur and in less common situations, a type of colon inflammation called colitis.  While most people do not have symptoms, some people with diverticulosis may have:  · Abdominal cramps and pain  · Bloating  · Constipation  · Change in bowel habits  Causes  The exact cause of diverticulosis (and diverticulitis) has not been proved, but a few things are associated with the condition:  · Low-fiber diet  · Constipation  · Lack of exercise  Your healthcare provider will talk with you about how to manage your condition. Diet changes may be all that are needed to help control diverticulosis and prevent progression to diverticulitis. If you develop diverticulitis, you will likely need other treatments.  Home care  You may be told to take fiber supplements daily. Fiber adds bulk to the stool so that it passes through the colon more easily. Stool softeners may be recommended. You may also be given medications for pain relief. Be sure to take all medications as directed.  In the past, people were told to avoid corn, nuts, and seeds. This is no longer necessary.  Follow these guidelines when caring for yourself at home:  · Eat unprocessed foods that are high in fiber. Whole grains, fruits, and vegetables are good choices.  · Drink 6 to 8 glasses of water every day unless your healthcare provider has you limit how much fluid you should have.  · Watch for changes in your bowel movements. Tell your provider if you notice any changes.  · Begin an exercise program. Ask your provider how to get started.  Generally, walking is the best.  · Get plenty of rest and sleep.  Follow-up care  Follow up with your healthcare provider, or as advised. Regular visits may be needed to check on your health. Sometimes special procedures such as colonoscopy, are needed after an episode of diverticulitis or blooding. Be sure to keep all your appointments.  If a stool sample was taken, or cultures were done, you should be told if they are positive, or if your treatment needs to be changed. You can call as directed for the results.  If X-rays were done, a radiologist will look at them. You will be told if there is a change in your treatment.  If antibiotics were prescribed, be sure to finish them all.  When to seek medical advice  Call your healthcare provider right away if any of these occur:  · Fever of 100.4°F (38°C) or higher, or as directed by your healthcare provider  · Severe cramps in the lower left side of the abdomen or pain that is getting worse  · Tenderness in the lower left side of the abdomen or worsening pain throughout the abdomen  · Diarrhea or constipation that doesn't get better within 24 hours  · Nausea and vomiting  · Bleeding from the rectum  Call 911  Call emergency services if any of the following occur:  · Trouble breathing  · Confusion  · Very drowsy or trouble awakening  · Fainting or loss of consciousness  · Rapid heart rate  · Chest pain  Date Last Reviewed: 12/30/2015 © 2000-2017 [employer]. [address] [place] [address]. All rights reserved. This information is not intended as a substitute for professional medical care. Always follow your healthcare professional's instructions.        Understanding Colon and Rectal Polyps    The colon (also called the large intestine) is a muscular tube that forms the last part of the digestive tract. It absorbs water and stores food waste. The colon is about 4 to 6 feet long. The rectum is the last 6 inches of the colon. The colon and rectum  have a smooth lining composed of millions of cells. Changes in these cells can lead to growths in the colon that can become cancerous and should be removed. Multiple tests are available to screen for colon cancer, but the colonoscopy is the most recommended test. During colonoscopy, these polyps can be removed. How often you need this test depends on many things including your condition, your family history, symptoms, and what the findings were at the previous colonoscopy.   When the colon lining changes  Changes that happen in the cells that line the colon or rectum can lead to growths called polyps. Over a period of years, polyps can turn cancerous. Removing polyps early may prevent cancer from ever forming.  Polyps  Polyps are fleshy clumps of tissue that form on the lining of the colon or rectum. Small polyps are usually benign (not cancerous). However, over time, cells in a polyp can change and become cancerous. Certain types of polyps known as adenomatous polyps are premalignant. The risk for invasive cancer increases with the size of the polyp and certain cell and gene features. This means that they can become cancerous if they're not removed. Hyperplastic polyps are benign. They can grow quite large and not turn cancerous.   Cancer  Almost all colorectal cancers start when polyp cells begin growing abnormally. As a cancerous tumor grows, it may involve more and more of the colon or rectum. In time, cancer can also grow beyond the colon or rectum and spread to nearby organs or to glands called lymph nodes. The cells can also travel to other parts of the body. This is known as metastasis. The earlier a cancerous tumor is removed, the better the chance of preventing its spread.    Date Last Reviewed: 8/1/2016  © 7635-8424 [employer]. [address] [place] [address], [place] [address]. All rights reserved. This information is not intended as a substitute for professional medical care. Always follow your  healthcare professional's instructions.

## 2018-09-06 NOTE — H&P
[facility]  Gastroenterology  H&P    Patient Name: Noah Lopez  MRN: [id number]  Admission Date: 9/6/2018  Code Status: Full Code    Attending Provider: Niharika Arce MD   Primary Care Physician: Anthony De La Torre MD  Principal Problem:<principal problem not specified>    Subjective:     History of Present Illness: Colon cancer screening    Past Medical History:   Diagnosis Date    Hyperlipidemia     Hypertension        Past Surgical History:   Procedure Laterality Date    COLONOSCOPY  2010    KNEE ARTHROSCOPY      SPINE SURGERY      l-spine       Review of patient's allergies indicates:  No Known Allergies  Family History     Problem Relation (Age of Onset)    Heart attack Father    Hypertension Mother        Tobacco Use    Smoking status: Former Smoker    Smokeless tobacco: Never Used   Substance and Sexual Activity    Alcohol use: Yes     Comment: occ    Drug use: Not on file    Sexual activity: Not on file     Review of Systems   Constitutional: Negative for appetite change and unexpected weight change.   Cardiovascular: Negative for chest pain and leg swelling.   Gastrointestinal: Negative for abdominal distention and abdominal pain.     Objective:     Vital Signs (Most Recent):  Temp: 98.1 °F (36.7 °C) (09/06/18 0828)  Pulse: 79 (09/06/18 0828)  Resp: 16 (09/06/18 0828)  BP: (!) 148/71 (09/06/18 0828)  SpO2: 96 % (09/06/18 0828) Vital Signs (24h Range):  Temp:  [98.1 °F (36.7 °C)] 98.1 °F (36.7 °C)  Pulse:  [79] 79  Resp:  [16] 16  SpO2:  [96 %] 96 %  BP: (148)/(71) 148/71     Weight: 111.1 kg (245 lb) (09/06/18 0828)  Body mass index is 32.32 kg/m².    No intake or output data in the 24 hours ending 09/06/18 0844    Lines/Drains/Airways     Peripheral Intravenous Line                 Peripheral IV - Single Lumen 09/06/18 0832 Right Hand less than 1 day                Physical Exam   Constitutional: He is oriented to person, place, and time. He appears well-developed and  well-nourished. No distress.   HENT:   Head: Normocephalic.   Eyes: Conjunctivae are normal. No scleral icterus.   Neck: No tracheal deviation present. No thyromegaly present.   Cardiovascular: Normal rate, regular rhythm and normal heart sounds. Exam reveals no gallop and no friction rub.   No murmur heard.  Pulmonary/Chest: Effort normal and breath sounds normal. He has no wheezes. He has no rales.   Abdominal: Soft. Bowel sounds are normal. He exhibits no distension. There is no tenderness. There is no rebound and no guarding.   Musculoskeletal: Normal range of motion. He exhibits no edema or tenderness.   Neurological: He is alert and oriented to person, place, and time.   Skin: He is not diaphoretic.   Psychiatric: He has a normal mood and affect. His behavior is normal.         Assessment/Plan:     Active Diagnoses:    Diagnosis Date Noted POA    Screening for malignant neoplasm of colon [Z12.11] 09/06/2018 Not Applicable      Problems Resolved During this Admission:       Plan  1. Colonoscopy, risks/benefits explained in detail    Niharika Arce MD  Gastroenterology  [facility]

## 2018-09-06 NOTE — ANESTHESIA POSTPROCEDURE EVALUATION
"Anesthesia Post Evaluation    Patient: Noah Lopez    Procedure(s) Performed: Procedure(s) (LRB):  COLONOSCOPY Suprep PLEASE TEXT PATIENT WITH ARRIVAL TIME (N/A)    Final Anesthesia Type: MAC  Patient location during evaluation: PACU  Patient participation: Yes- Able to Participate  Level of consciousness: awake and alert  Post-procedure vital signs: reviewed and stable  Pain management: adequate  Airway patency: patent  PONV status at discharge: No PONV  Anesthetic complications: no      Cardiovascular status: blood pressure returned to baseline  Respiratory status: unassisted  Hydration status: euvolemic  Follow-up not needed.        Visit Vitals  BP (!) 148/71 (BP Location: Left arm, Patient Position: Lying)   Pulse 79   Temp 36.7 °C (98.1 °F) (Oral)   Resp 16   Ht 6' 1" (1.854 m)   Wt 111.1 kg (245 lb)   SpO2 96%   BMI 32.32 kg/m²       Pain/Barbara Score: Pain Assessment Performed: Yes (9/6/2018  8:32 AM)  Presence of Pain: denies (9/6/2018  8:32 AM)        "

## 2018-09-09 RX ORDER — SIMVASTATIN 20 MG/1
TABLET, FILM COATED ORAL
Qty: 90 TABLET | Refills: 0 | Status: SHIPPED | OUTPATIENT
Start: 2018-09-09 | End: 2018-10-01

## 2018-09-18 ENCOUNTER — TELEPHONE (OUTPATIENT)
Dept: GASTROENTEROLOGY | Facility: CLINIC | Age: 73
End: 2018-09-18

## 2018-09-18 NOTE — TELEPHONE ENCOUNTER
----- Message from Niharika Arce MD sent at 9/16/2018 12:51 PM CDT -----  One tubular adenoma, 5 year colonoscopy recall

## 2018-09-26 ENCOUNTER — TELEPHONE (OUTPATIENT)
Dept: FAMILY MEDICINE | Facility: CLINIC | Age: 73
End: 2018-09-26

## 2018-09-26 NOTE — TELEPHONE ENCOUNTER
----- Message from Ermias Almanza sent at 9/26/2018 12:42 PM CDT -----  Contact: [phone]  Patient called in requesting to speak with you. Patient prefers to speak with a nurse. Please call.

## 2018-09-27 NOTE — TELEPHONE ENCOUNTER
Called pt back and he states that he came by the office and everything is taken care of. He said that he does not like the new system that Dr De La Torre is a small town Dr and now he is with a Big time organization and he can not get in touch with his

## 2018-10-01 ENCOUNTER — OFFICE VISIT (OUTPATIENT)
Dept: FAMILY MEDICINE | Facility: CLINIC | Age: 73
End: 2018-10-01
Payer: MEDICARE

## 2018-10-01 VITALS
HEIGHT: 73 IN | BODY MASS INDEX: 34.24 KG/M2 | SYSTOLIC BLOOD PRESSURE: 104 MMHG | WEIGHT: 258.38 LBS | HEART RATE: 103 BPM | DIASTOLIC BLOOD PRESSURE: 56 MMHG | OXYGEN SATURATION: 95 % | TEMPERATURE: 99 F

## 2018-10-01 DIAGNOSIS — R30.0 DYSURIA: Primary | ICD-10-CM

## 2018-10-01 PROCEDURE — 3074F SYST BP LT 130 MM HG: CPT | Mod: CPTII,S$GLB,, | Performed by: FAMILY MEDICINE

## 2018-10-01 PROCEDURE — 1101F PT FALLS ASSESS-DOCD LE1/YR: CPT | Mod: CPTII,S$GLB,, | Performed by: FAMILY MEDICINE

## 2018-10-01 PROCEDURE — 81002 URINALYSIS NONAUTO W/O SCOPE: CPT | Mod: S$GLB,,, | Performed by: FAMILY MEDICINE

## 2018-10-01 PROCEDURE — 3078F DIAST BP <80 MM HG: CPT | Mod: CPTII,S$GLB,, | Performed by: FAMILY MEDICINE

## 2018-10-01 PROCEDURE — 99213 OFFICE O/P EST LOW 20 MIN: CPT | Mod: S$GLB,,, | Performed by: FAMILY MEDICINE

## 2018-10-01 RX ORDER — CIPROFLOXACIN 250 MG/1
TABLET, FILM COATED ORAL
Qty: 12 TABLET | Refills: 0 | Status: SHIPPED | OUTPATIENT
Start: 2018-10-01 | End: 2019-08-21

## 2018-10-01 RX ORDER — ATORVASTATIN CALCIUM 20 MG/1
20 TABLET, FILM COATED ORAL DAILY
COMMUNITY
End: 2018-10-08 | Stop reason: SDUPTHER

## 2018-10-01 NOTE — PROGRESS NOTES
Patient ID: Noah Lopez is a 73 y.o. male.    Chief Complaint: abdominal pressure (Constant)    HPI      Noah Lopez is a 73 y.o. male with co of bladder infection.  Has decreased sleep. Dysuria with more frequency.  Change in color BC azo and malodorous.  Not drinking BC he does not want to urinate.  Azo is not helping.  No fever or chills.  Feels like he should be running temp.             Review of Symptoms    Constitutional  Neg activity change, No chills /fever   Resp  Neg hemoptysis, stridor, choking  CVS  Neg chest pain, palpitations    Physical Exam    Constitutional:  Oriented to person, place, and time.appears well-developed and well-nourished.  No distress.      HENT  Head: Normocephalic and atraumatic  Right Ear: External ear normal.   Left Ear: External ear normal.   Nose: External nose normal.   Mouth:  Moist mucus membranes.    Eyes:  Conjunctivae are normal. Right eye exhibits no discharge.  Left eye exhibits no discharge. No scleral icterus.  No periorbital edema    Cardiovascular:  Regular rate and rhythm with normal S1 and S2     Pulmonary/Chest:   Clear to auscultation bilaterally without wheezes, rhonchi or rales      Musculoskeletal:  No edema. No obvious deformity No wasting       Neurological:  Alert and oriented to person, place, and time.   Coordination normal.     Skin:   Skin is warm and dry.  No diaphoresis.   No rash noted.     Psychiatric: Normal mood and affect. Behavior is normal.  Judgment and thought content normal.     Complete Blood Count  Lab Results   Component Value Date    RBC 5.21 08/27/2018    HGB 16.0 08/27/2018    HCT 48.8 08/27/2018    MCV 94 08/27/2018    MCH 30.7 08/27/2018    MCHC 32.8 08/27/2018    RDW 13.2 08/27/2018     08/27/2018    MPV 10.0 08/27/2018    GRAN 4.7 08/27/2018    GRAN 67.8 08/27/2018    LYMPH 1.3 08/27/2018    LYMPH 18.1 08/27/2018    MONO 0.8 08/27/2018    MONO 12.0 08/27/2018    EOS 0.1 08/27/2018    BASO 0.03 08/27/2018     EOSINOPHIL 1.6 08/27/2018    BASOPHIL 0.4 08/27/2018    DIFFMETHOD Automated 08/27/2018       Comprehensive Metabolic Panel  Lab Results   Component Value Date    GLU 86 08/27/2018    BUN 22 08/27/2018    CREATININE 1.3 08/27/2018     08/27/2018    K 4.7 08/27/2018     08/27/2018    PROT 7.8 08/27/2018    ALBUMIN 3.9 08/27/2018    BILITOT 0.5 08/27/2018    AST 40 08/27/2018    ALKPHOS 99 08/27/2018    CO2 25 08/27/2018    ALT 71 (H) 08/27/2018    ANIONGAP 9 08/27/2018    EGFRNONAA 54.5 (A) 08/27/2018    ESTGFRAFRICA >60.0 08/27/2018       TSH  No results found for: TSH    Assessment / Plan:      ICD-10-CM ICD-9-CM   1. Dysuria R30.0 788.1     Dysuria  -     POCT URINE DIPSTICK WITHOUT MICROSCOPE      tx with abx assumed uti

## 2018-10-02 LAB
BILIRUB SERPL-MCNC: ABNORMAL MG/DL
BLOOD URINE, POC: 250
COLOR, POC UA: ABNORMAL
GLUCOSE UR QL STRIP: NORMAL
KETONES UR QL STRIP: ABNORMAL
LEUKOCYTE ESTERASE URINE, POC: ABNORMAL
NITRITE, POC UA: ABNORMAL
PH, POC UA: 7
PROTEIN, POC: ABNORMAL
SPECIFIC GRAVITY, POC UA: 1.01
UROBILINOGEN, POC UA: NORMAL

## 2018-10-05 ENCOUNTER — OFFICE VISIT (OUTPATIENT)
Dept: FAMILY MEDICINE | Facility: CLINIC | Age: 73
End: 2018-10-05
Payer: MEDICARE

## 2018-10-05 VITALS
OXYGEN SATURATION: 95 % | SYSTOLIC BLOOD PRESSURE: 130 MMHG | TEMPERATURE: 99 F | HEIGHT: 73 IN | BODY MASS INDEX: 34.52 KG/M2 | WEIGHT: 260.5 LBS | HEART RATE: 106 BPM | DIASTOLIC BLOOD PRESSURE: 60 MMHG

## 2018-10-05 DIAGNOSIS — R30.0 DYSURIA: Primary | ICD-10-CM

## 2018-10-05 DIAGNOSIS — N39.0 URINARY TRACT INFECTION WITHOUT HEMATURIA, SITE UNSPECIFIED: ICD-10-CM

## 2018-10-05 PROCEDURE — 96372 THER/PROPH/DIAG INJ SC/IM: CPT | Mod: S$GLB,,, | Performed by: FAMILY MEDICINE

## 2018-10-05 PROCEDURE — 99213 OFFICE O/P EST LOW 20 MIN: CPT | Mod: 25,S$GLB,, | Performed by: FAMILY MEDICINE

## 2018-10-05 PROCEDURE — 3075F SYST BP GE 130 - 139MM HG: CPT | Mod: CPTII,S$GLB,, | Performed by: FAMILY MEDICINE

## 2018-10-05 PROCEDURE — 1101F PT FALLS ASSESS-DOCD LE1/YR: CPT | Mod: CPTII,S$GLB,, | Performed by: FAMILY MEDICINE

## 2018-10-05 PROCEDURE — 3078F DIAST BP <80 MM HG: CPT | Mod: CPTII,S$GLB,, | Performed by: FAMILY MEDICINE

## 2018-10-05 PROCEDURE — 87086 URINE CULTURE/COLONY COUNT: CPT

## 2018-10-05 RX ORDER — MINOCYCLINE HYDROCHLORIDE 100 MG/1
100 CAPSULE ORAL EVERY 12 HOURS
Qty: 28 CAPSULE | Refills: 0 | Status: SHIPPED | OUTPATIENT
Start: 2018-10-05 | End: 2019-08-21

## 2018-10-05 RX ORDER — CEFTRIAXONE 500 MG/1
500 INJECTION, POWDER, FOR SOLUTION INTRAMUSCULAR; INTRAVENOUS ONCE
Status: COMPLETED | OUTPATIENT
Start: 2018-10-05 | End: 2018-10-05

## 2018-10-05 RX ORDER — TAMSULOSIN HYDROCHLORIDE 0.4 MG/1
0.4 CAPSULE ORAL DAILY
Qty: 30 CAPSULE | Refills: 0 | Status: SHIPPED | OUTPATIENT
Start: 2018-10-05 | End: 2021-12-14

## 2018-10-05 RX ADMIN — CEFTRIAXONE 500 MG: 500 INJECTION, POWDER, FOR SOLUTION INTRAMUSCULAR; INTRAVENOUS at 04:10

## 2018-10-07 LAB — BACTERIA UR CULT: NORMAL

## 2018-10-08 ENCOUNTER — TELEPHONE (OUTPATIENT)
Dept: FAMILY MEDICINE | Facility: CLINIC | Age: 73
End: 2018-10-08

## 2018-10-08 RX ORDER — ATORVASTATIN CALCIUM 20 MG/1
20 TABLET, FILM COATED ORAL DAILY
Qty: 90 TABLET | Refills: 1 | Status: SHIPPED | OUTPATIENT
Start: 2018-10-08 | End: 2018-10-10

## 2018-10-08 NOTE — PROGRESS NOTES
Patient ID: Noah Lopez is a 73 y.o. male.    Chief Complaint: Bladder infection    HPI      Noah Lopez is a 73 y.o. male patient with continued urinary problems. A little hesitancy before he starts but not consistent.  a feels that there may be little outlet obstruction with may be the 1st urine of the day.  Once urinating a few times does not have the same experience  No recent fever chills nausea vomiting diarrhea.  No constipation            Review of Symptoms    Constitutional  Neg activity change, No chills /fever   Resp  Neg hemoptysis, stridor, choking  CVS  Neg chest pain, palpitations    Physical Exam    Constitutional:  Oriented to person, place, and time.appears well-developed and well-nourished.  No distress.      HENT  Head: Normocephalic and atraumatic  Right Ear: External ear normal.   Left Ear: External ear normal.   Nose: External nose normal.   Mouth:  Moist mucus membranes.    Eyes:  Conjunctivae are normal. Right eye exhibits no discharge.  Left eye exhibits no discharge. No scleral icterus.  No periorbital edema    Cardiovascular:  Regular rate and rhythm with normal S1 and S2     Pulmonary/Chest:   Clear to auscultation bilaterally without wheezes, rhonchi or rales      Musculoskeletal:  No edema. No obvious deformity No wasting  No CVA tenderness        Neurological:  Alert and oriented to person, place, and time.   Coordination normal.     Skin:   Skin is warm and dry.  No diaphoresis.   No rash noted.     Psychiatric: Normal mood and affect. Behavior is normal.  Judgment and thought content normal.     Complete Blood Count  Lab Results   Component Value Date    RBC 5.21 08/27/2018    HGB 16.0 08/27/2018    HCT 48.8 08/27/2018    MCV 94 08/27/2018    MCH 30.7 08/27/2018    MCHC 32.8 08/27/2018    RDW 13.2 08/27/2018     08/27/2018    MPV 10.0 08/27/2018    GRAN 4.7 08/27/2018    GRAN 67.8 08/27/2018    LYMPH 1.3 08/27/2018    LYMPH 18.1 08/27/2018    MONO 0.8  08/27/2018    MONO 12.0 08/27/2018    EOS 0.1 08/27/2018    BASO 0.03 08/27/2018    EOSINOPHIL 1.6 08/27/2018    BASOPHIL 0.4 08/27/2018    DIFFMETHOD Automated 08/27/2018       Comprehensive Metabolic Panel  Lab Results   Component Value Date    GLU 86 08/27/2018    BUN 22 08/27/2018    CREATININE 1.3 08/27/2018     08/27/2018    K 4.7 08/27/2018     08/27/2018    PROT 7.8 08/27/2018    ALBUMIN 3.9 08/27/2018    BILITOT 0.5 08/27/2018    AST 40 08/27/2018    ALKPHOS 99 08/27/2018    CO2 25 08/27/2018    ALT 71 (H) 08/27/2018    ANIONGAP 9 08/27/2018    EGFRNONAA 54.5 (A) 08/27/2018    ESTGFRAFRICA >60.0 08/27/2018       TSH  No results found for: TSH    Assessment / Plan:      ICD-10-CM ICD-9-CM   1. Dysuria R30.0 788.1   2. Urinary tract infection without hematuria, site unspecified N39.0 599.0     Dysuria  -     Cancel: POCT URINE DIPSTICK WITHOUT MICROSCOPE  -     Urine culture; Future; Expected date: 10/05/2019    Urinary tract infection without hematuria, site unspecified  -     Urine culture; Future; Expected date: 10/05/2019    Other orders  -     cefTRIAXone injection 500 mg; Inject 0.5 g (500 mg total) into the muscle once.  -     minocycline (MINOCIN,DYNACIN) 100 MG capsule; Take 1 capsule (100 mg total) by mouth every 12 (twelve) hours.  Dispense: 28 capsule; Refill: 0  -     tamsulosin (FLOMAX) 0.4 mg Cap; Take 1 capsule (0.4 mg total) by mouth once daily.  Dispense: 30 capsule; Refill: 0

## 2018-10-10 RX ORDER — SIMVASTATIN 20 MG/1
TABLET, FILM COATED ORAL
Qty: 90 TABLET | Refills: 3 | Status: SHIPPED | OUTPATIENT
Start: 2018-10-10 | End: 2018-12-06 | Stop reason: SDUPTHER

## 2018-10-10 NOTE — TELEPHONE ENCOUNTER
----- Message from Joyce Iqbal sent at 10/10/2018  1:31 PM CDT -----  Contact: [phone]/self  Patient requesting to speak with you regarding medication simvastatin (ZOCOR) 20 MG tablet.

## 2018-10-10 NOTE — TELEPHONE ENCOUNTER
----- Message from Joyce Iqbal sent at 10/10/2018  1:33 PM CDT -----  Contact: [phone]/self  Patient states everything is fine with him.

## 2018-12-04 RX ORDER — TRIAMTERENE AND HYDROCHLOROTHIAZIDE 37.5; 25 MG/1; MG/1
1 CAPSULE ORAL EVERY MORNING
Qty: 90 CAPSULE | Refills: 1 | Status: SHIPPED | OUTPATIENT
Start: 2018-12-04 | End: 2018-12-06 | Stop reason: SDUPTHER

## 2018-12-04 RX ORDER — LISINOPRIL 20 MG/1
20 TABLET ORAL DAILY
Qty: 90 TABLET | Refills: 1 | Status: SHIPPED | OUTPATIENT
Start: 2018-12-04 | End: 2018-12-06 | Stop reason: SDUPTHER

## 2018-12-04 RX ORDER — DILTIAZEM HYDROCHLORIDE 300 MG/1
300 CAPSULE, COATED, EXTENDED RELEASE ORAL DAILY
Qty: 90 CAPSULE | Refills: 3 | Status: SHIPPED | OUTPATIENT
Start: 2018-12-04 | End: 2018-12-06 | Stop reason: SDUPTHER

## 2018-12-06 NOTE — TELEPHONE ENCOUNTER
----- Message from Michelle Rosenthal sent at 12/6/2018  2:22 PM CST -----  Patient stopped by office. Is requesting all medications (4) to go to [facility] mail order.   90 day supply with refills  Wants to keep the [place] 3 at local pharmacy.    diltiaZEM (CARDIZEM CD) 300 MG 24 hr capsule  lisinopril (PRINIVIL,ZESTRIL) 20 MG tablet  simvastatin (ZOCOR) 20 MG tablet  triamterene-hydrochlorothiazide 37.5-25 mg (DYAZIDE)

## 2018-12-07 RX ORDER — SIMVASTATIN 20 MG/1
20 TABLET, FILM COATED ORAL NIGHTLY
Qty: 90 TABLET | Refills: 1 | Status: SHIPPED | OUTPATIENT
Start: 2018-12-07 | End: 2019-07-04 | Stop reason: SDUPTHER

## 2018-12-07 RX ORDER — TRIAMTERENE AND HYDROCHLOROTHIAZIDE 37.5; 25 MG/1; MG/1
1 CAPSULE ORAL EVERY MORNING
Qty: 90 CAPSULE | Refills: 1 | Status: SHIPPED | OUTPATIENT
Start: 2018-12-07 | End: 2019-08-11 | Stop reason: SDUPTHER

## 2018-12-07 RX ORDER — LISINOPRIL 20 MG/1
20 TABLET ORAL DAILY
Qty: 90 TABLET | Refills: 1 | Status: SHIPPED | OUTPATIENT
Start: 2018-12-07 | End: 2019-08-11 | Stop reason: SDUPTHER

## 2018-12-07 RX ORDER — DILTIAZEM HYDROCHLORIDE 300 MG/1
300 CAPSULE, COATED, EXTENDED RELEASE ORAL DAILY
Qty: 90 CAPSULE | Refills: 1 | Status: SHIPPED | OUTPATIENT
Start: 2018-12-07 | End: 2018-12-15 | Stop reason: SDUPTHER

## 2018-12-07 RX ORDER — OMEGA-3-ACID ETHYL ESTERS 1 G/1
2 CAPSULE, LIQUID FILLED ORAL 2 TIMES DAILY
Qty: 360 CAPSULE | Refills: 3 | Status: SHIPPED | OUTPATIENT
Start: 2018-12-07 | End: 2018-12-21 | Stop reason: SDUPTHER

## 2018-12-15 RX ORDER — DILTIAZEM HYDROCHLORIDE 300 MG/1
300 CAPSULE, COATED, EXTENDED RELEASE ORAL DAILY
Qty: 90 CAPSULE | Refills: 1 | Status: SHIPPED | OUTPATIENT
Start: 2018-12-15 | End: 2019-10-10 | Stop reason: SDUPTHER

## 2018-12-22 RX ORDER — OMEGA-3-ACID ETHYL ESTERS 1 G/1
2 CAPSULE, LIQUID FILLED ORAL 2 TIMES DAILY
Qty: 360 CAPSULE | Refills: 2 | Status: SHIPPED | OUTPATIENT
Start: 2018-12-22 | End: 2019-10-10 | Stop reason: SDUPTHER

## 2019-07-06 RX ORDER — SIMVASTATIN 20 MG/1
TABLET, FILM COATED ORAL
Qty: 90 TABLET | Refills: 1 | Status: SHIPPED | OUTPATIENT
Start: 2019-07-06 | End: 2019-10-10 | Stop reason: SDUPTHER

## 2019-08-11 RX ORDER — DILTIAZEM HYDROCHLORIDE 300 MG/1
CAPSULE, COATED, EXTENDED RELEASE ORAL
Qty: 90 CAPSULE | Refills: 0 | Status: SHIPPED | OUTPATIENT
Start: 2019-08-11 | End: 2019-10-10

## 2019-08-11 RX ORDER — LISINOPRIL 20 MG/1
TABLET ORAL
Qty: 90 TABLET | Refills: 0 | Status: SHIPPED | OUTPATIENT
Start: 2019-08-11 | End: 2019-10-10 | Stop reason: SDUPTHER

## 2019-08-11 RX ORDER — TRIAMTERENE AND HYDROCHLOROTHIAZIDE 37.5; 25 MG/1; MG/1
CAPSULE ORAL
Qty: 90 CAPSULE | Refills: 0 | Status: SHIPPED | OUTPATIENT
Start: 2019-08-11 | End: 2019-10-10 | Stop reason: SDUPTHER

## 2019-08-21 ENCOUNTER — TELEPHONE (OUTPATIENT)
Dept: FAMILY MEDICINE | Facility: CLINIC | Age: 74
End: 2019-08-21

## 2019-08-21 DIAGNOSIS — F32.9 CURRENT EPISODE OF MAJOR DEPRESSIVE DISORDER WITHOUT PRIOR EPISODE, UNSPECIFIED DEPRESSION EPISODE SEVERITY: Primary | ICD-10-CM

## 2019-08-21 NOTE — TELEPHONE ENCOUNTER
Spoke to pt and he would like to know if you could diagnosis Post Traumatic Stress disorder if not can you refer him to someone?

## 2019-08-21 NOTE — TELEPHONE ENCOUNTER
----- Message from Bhavani Conde sent at 8/21/2019  8:18 AM CDT -----  Contact: pt  Pt would like to be called back regarding about pst    Pt can be reached at [phone]
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
## 2019-09-30 ENCOUNTER — TELEPHONE (OUTPATIENT)
Dept: FAMILY MEDICINE | Facility: CLINIC | Age: 74
End: 2019-09-30

## 2019-09-30 NOTE — TELEPHONE ENCOUNTER
----- Message from Mihir Martins sent at 9/30/2019  9:30 AM CDT -----  Contact: Pt   Pt would like to be called back regarding health concerns.     Pt can be reached at [phone]

## 2019-09-30 NOTE — TELEPHONE ENCOUNTER
Spoke to pt and he was scheduled an appointment with  for some health concerns. The appointment slip was mailed to pt home. Pt was pleased.

## 2019-10-09 ENCOUNTER — OFFICE VISIT (OUTPATIENT)
Dept: FAMILY MEDICINE | Facility: CLINIC | Age: 74
End: 2019-10-09
Payer: MEDICARE

## 2019-10-09 VITALS
HEART RATE: 87 BPM | BODY MASS INDEX: 34.01 KG/M2 | DIASTOLIC BLOOD PRESSURE: 62 MMHG | HEIGHT: 74 IN | SYSTOLIC BLOOD PRESSURE: 120 MMHG | WEIGHT: 265 LBS | TEMPERATURE: 99 F | OXYGEN SATURATION: 95 %

## 2019-10-09 DIAGNOSIS — E78.5 HYPERLIPIDEMIA, UNSPECIFIED HYPERLIPIDEMIA TYPE: ICD-10-CM

## 2019-10-09 DIAGNOSIS — Z13.6 ENCOUNTER FOR ABDOMINAL AORTIC ANEURYSM (AAA) SCREENING: ICD-10-CM

## 2019-10-09 DIAGNOSIS — R73.9 HYPERGLYCEMIA: ICD-10-CM

## 2019-10-09 DIAGNOSIS — M54.9 BACK PAIN, UNSPECIFIED BACK LOCATION, UNSPECIFIED BACK PAIN LATERALITY, UNSPECIFIED CHRONICITY: ICD-10-CM

## 2019-10-09 DIAGNOSIS — R93.7 ABNORMAL MRI, LUMBAR SPINE: ICD-10-CM

## 2019-10-09 DIAGNOSIS — Z12.5 ENCOUNTER FOR SCREENING FOR MALIGNANT NEOPLASM OF PROSTATE: ICD-10-CM

## 2019-10-09 DIAGNOSIS — I10 ESSENTIAL HYPERTENSION: ICD-10-CM

## 2019-10-09 DIAGNOSIS — Z12.11 COLON CANCER SCREENING: ICD-10-CM

## 2019-10-09 DIAGNOSIS — Z12.11 SCREENING FOR MALIGNANT NEOPLASM OF COLON: ICD-10-CM

## 2019-10-09 DIAGNOSIS — Z86.010 HISTORY OF COLON POLYPS: ICD-10-CM

## 2019-10-09 DIAGNOSIS — Z00.00 WELLNESS EXAMINATION: Primary | ICD-10-CM

## 2019-10-09 PROCEDURE — 99214 OFFICE O/P EST MOD 30 MIN: CPT | Mod: S$GLB,,, | Performed by: FAMILY MEDICINE

## 2019-10-09 PROCEDURE — 3074F SYST BP LT 130 MM HG: CPT | Mod: CPTII,S$GLB,, | Performed by: FAMILY MEDICINE

## 2019-10-09 PROCEDURE — 99499 UNLISTED E&M SERVICE: CPT | Mod: S$GLB,,, | Performed by: FAMILY MEDICINE

## 2019-10-09 PROCEDURE — 99214 PR OFFICE/OUTPT VISIT, EST, LEVL IV, 30-39 MIN: ICD-10-PCS | Mod: S$GLB,,, | Performed by: FAMILY MEDICINE

## 2019-10-09 PROCEDURE — 3074F PR MOST RECENT SYSTOLIC BLOOD PRESSURE < 130 MM HG: ICD-10-PCS | Mod: CPTII,S$GLB,, | Performed by: FAMILY MEDICINE

## 2019-10-09 PROCEDURE — 99499 RISK ADDL DX/OHS AUDIT: ICD-10-PCS | Mod: S$GLB,,, | Performed by: FAMILY MEDICINE

## 2019-10-09 PROCEDURE — 3078F PR MOST RECENT DIASTOLIC BLOOD PRESSURE < 80 MM HG: ICD-10-PCS | Mod: CPTII,S$GLB,, | Performed by: FAMILY MEDICINE

## 2019-10-09 PROCEDURE — 3078F DIAST BP <80 MM HG: CPT | Mod: CPTII,S$GLB,, | Performed by: FAMILY MEDICINE

## 2019-10-09 RX ORDER — OFLOXACIN 3 MG/ML
SOLUTION AURICULAR (OTIC)
COMMUNITY
Start: 2016-06-20

## 2019-10-09 NOTE — PROGRESS NOTES
"Subjective:      Patient ID: Noah Lopez is a 74 y.o. male.    Chief Complaint: health concerns; cancer screening; and melanoma check      Vitals:    10/09/19 1638   BP: 120/62   Pulse: 87   Temp: 98.8 °F (37.1 °C)   SpO2: 95%   Weight: 120.2 kg (264 lb 15.9 oz)   Height: 6' 2" (1.88 m)        HPI   Not sure why he is here, that is screening for cancer at insurance request; girlfriend took call; Anju    Review of Systems   Constitutional: Negative for appetite change, fatigue, fever and unexpected weight change.   HENT: Negative for congestion, ear pain, sinus pressure and sore throat.    Eyes: Negative for pain and visual disturbance.   Respiratory: Negative for shortness of breath.    Cardiovascular: Negative for chest pain.   Gastrointestinal: Negative for abdominal pain, constipation and diarrhea.   Endocrine: Negative for polyuria.   Genitourinary: Negative for difficulty urinating and frequency.   Musculoskeletal: Negative for arthralgias, back pain and myalgias.   Skin: Negative for color change.   Allergic/Immunologic: Negative.    Neurological: Negative for syncope, weakness and headaches.   Hematological: Does not bruise/bleed easily.   Psychiatric/Behavioral: Negative for dysphoric mood and suicidal ideas. The patient is not nervous/anxious.    All other systems reviewed and are negative.    Objective:     Physical Exam   Constitutional: He is oriented to person, place, and time. He appears well-developed and well-nourished. No distress.   obese   HENT:   Head: Normocephalic and atraumatic.   Right Ear: External ear normal.   Left Ear: External ear normal.   Mouth/Throat: Oropharynx is clear and moist. No oropharyngeal exudate.   Eyes: Pupils are equal, round, and reactive to light. Conjunctivae and EOM are normal. Right eye exhibits no discharge. Left eye exhibits no discharge. No scleral icterus.   Neck: Normal range of motion. Neck supple. No JVD present. No tracheal deviation present. No " thyromegaly present.   Cardiovascular: Normal rate and regular rhythm. Exam reveals no gallop and no friction rub.   No murmur heard.  Pulmonary/Chest: Effort normal and breath sounds normal. No stridor. No respiratory distress. He has no wheezes. He has no rales. He exhibits no tenderness.   Abdominal: Soft. He exhibits no distension and no mass. There is no tenderness. There is no rebound and no guarding.   Musculoskeletal: Normal range of motion. He exhibits no edema or tenderness.   Lymphadenopathy:     He has no cervical adenopathy.   Neurological: He is alert and oriented to person, place, and time. He has normal reflexes. He displays normal reflexes. No cranial nerve deficit. He exhibits normal muscle tone. Coordination normal.   Skin: Skin is warm and dry. No rash noted. He is not diaphoretic. No erythema. No pallor.   No melanoma   Psychiatric: He has a normal mood and affect. His behavior is normal. Judgment and thought content normal.   Nursing note and vitals reviewed.    Assessment:     1. Wellness examination    2. Screening for malignant neoplasm of colon    3. Essential hypertension    4. Colon cancer screening    5. Hyperglycemia    6. Hyperlipidemia, unspecified hyperlipidemia type    7. Encounter for screening for malignant neoplasm of prostate     8. Body mass index (BMI) of 36.0-36.9 in adult     9. Encounter for abdominal aortic aneurysm (AAA) screening    10. Abnormal MRI, lumbar spine    11. Back pain, unspecified back location, unspecified back pain laterality, unspecified chronicity    12. History of colon polyps      Plan:        Medication List           Accurate as of October 9, 2019 11:59 PM. If you have any questions, ask your nurse or doctor.               CONTINUE taking these medications    * diltiaZEM 300 MG 24 hr capsule  Commonly known as:  CARDIZEM CD  TAKE 1 CAPSULE (300 MG TOTAL) BY MOUTH ONCE DAILY.     * diltiaZEM 300 MG 24 hr capsule  Commonly known as:  CARDIZEM CD  TAKE 1  CAPSULE ONCE DAILY     lisinopril 20 MG tablet  Commonly known as:  PRINIVIL,ZESTRIL  TAKE 1 TABLET ONCE DAILY     ofloxacin 0.3 % otic solution  Commonly known as:  FLOXIN     omega-3 acid ethyl esters 1 gram capsule  Commonly known as:  LOVAZA  TAKE 2 CAPSULES (2 G TOTAL) BY MOUTH 2 (TWO) TIMES DAILY.     simvastatin 20 MG tablet  Commonly known as:  ZOCOR  TAKE 1 TABLET EVERY EVENING     tamsulosin 0.4 mg Cap  Commonly known as:  FLOMAX  Take 1 capsule (0.4 mg total) by mouth once daily.     triamterene-hydrochlorothiazide 37.5-25 mg 37.5-25 mg per capsule  Commonly known as:  DYAZIDE  TAKE 1 CAPSULE EVERY       MORNING         * This list has 2 medication(s) that are the same as other medications prescribed for you. Read the directions carefully, and ask your doctor or other care provider to review them with you.              Wellness examination  -     CBC auto differential; Future; Expected date: 10/09/2019  -     Comprehensive metabolic panel; Future; Expected date: 10/09/2019  -     Lipid panel; Future  -     Urinalysis; Future  -     Vitamin D; Future  -     TSH; Future  -     PSA, Screening; Future    Screening for malignant neoplasm of colon  -     CBC auto differential; Future; Expected date: 10/09/2019  -     Comprehensive metabolic panel; Future; Expected date: 10/09/2019  -     Lipid panel; Future  -     Urinalysis; Future  -     Vitamin D; Future  -     TSH; Future  -     PSA, Screening; Future    Essential hypertension  -     CBC auto differential; Future; Expected date: 10/09/2019  -     Comprehensive metabolic panel; Future; Expected date: 10/09/2019  -     Lipid panel; Future  -     Urinalysis; Future  -     Vitamin D; Future  -     TSH; Future  -     PSA, Screening; Future    Colon cancer screening  -     CBC auto differential; Future; Expected date: 10/09/2019  -     Comprehensive metabolic panel; Future; Expected date: 10/09/2019  -     Lipid panel; Future  -     Urinalysis; Future  -      Vitamin D; Future  -     TSH; Future  -     PSA, Screening; Future    Hyperglycemia  -     CBC auto differential; Future; Expected date: 10/09/2019  -     Comprehensive metabolic panel; Future; Expected date: 10/09/2019  -     Lipid panel; Future  -     Urinalysis; Future  -     Vitamin D; Future  -     TSH; Future  -     PSA, Screening; Future    Hyperlipidemia, unspecified hyperlipidemia type  -     CBC auto differential; Future; Expected date: 10/09/2019  -     Comprehensive metabolic panel; Future; Expected date: 10/09/2019  -     Lipid panel; Future  -     Urinalysis; Future  -     Vitamin D; Future  -     TSH; Future  -     PSA, Screening; Future    Encounter for screening for malignant neoplasm of prostate   -     CBC auto differential; Future; Expected date: 10/09/2019  -     Comprehensive metabolic panel; Future; Expected date: 10/09/2019  -     Lipid panel; Future  -     Urinalysis; Future  -     Vitamin D; Future  -     TSH; Future  -     PSA, Screening; Future    Body mass index (BMI) of 36.0-36.9 in adult   -     CBC auto differential; Future; Expected date: 10/09/2019  -     Comprehensive metabolic panel; Future; Expected date: 10/09/2019  -     Lipid panel; Future  -     Urinalysis; Future  -     Vitamin D; Future  -     TSH; Future  -     PSA, Screening; Future    Encounter for abdominal aortic aneurysm (AAA) screening  -     CBC auto differential; Future; Expected date: 10/09/2019  -     Comprehensive metabolic panel; Future; Expected date: 10/09/2019  -     Lipid panel; Future  -     Urinalysis; Future  -     Vitamin D; Future  -     TSH; Future  -     PSA, Screening; Future    Abnormal MRI, lumbar spine  -     CBC auto differential; Future; Expected date: 10/09/2019  -     Comprehensive metabolic panel; Future; Expected date: 10/09/2019  -     Lipid panel; Future  -     Urinalysis; Future  -     Vitamin D; Future  -     TSH; Future  -     PSA, Screening; Future    Back pain, unspecified back  location, unspecified back pain laterality, unspecified chronicity  -     CBC auto differential; Future; Expected date: 10/09/2019  -     Comprehensive metabolic panel; Future; Expected date: 10/09/2019  -     Lipid panel; Future  -     Urinalysis; Future  -     Vitamin D; Future  -     TSH; Future  -     PSA, Screening; Future    History of colon polyps

## 2019-10-10 ENCOUNTER — TELEPHONE (OUTPATIENT)
Dept: FAMILY MEDICINE | Facility: CLINIC | Age: 74
End: 2019-10-10

## 2019-10-10 RX ORDER — LISINOPRIL 20 MG/1
20 TABLET ORAL DAILY
Qty: 90 TABLET | Refills: 3 | Status: SHIPPED | OUTPATIENT
Start: 2019-10-10 | End: 2020-03-13 | Stop reason: SDUPTHER

## 2019-10-10 RX ORDER — DILTIAZEM HYDROCHLORIDE 300 MG/1
300 CAPSULE, COATED, EXTENDED RELEASE ORAL DAILY
Qty: 90 CAPSULE | Refills: 3 | Status: SHIPPED | OUTPATIENT
Start: 2019-10-10 | End: 2020-03-13 | Stop reason: SDUPTHER

## 2019-10-10 RX ORDER — TRIAMTERENE AND HYDROCHLOROTHIAZIDE 37.5; 25 MG/1; MG/1
1 CAPSULE ORAL EVERY MORNING
Qty: 90 CAPSULE | Refills: 3 | Status: SHIPPED | OUTPATIENT
Start: 2019-10-10 | End: 2020-03-13 | Stop reason: SDUPTHER

## 2019-10-10 RX ORDER — OMEGA-3-ACID ETHYL ESTERS 1 G/1
2 CAPSULE, LIQUID FILLED ORAL 2 TIMES DAILY
Qty: 360 CAPSULE | Refills: 3 | Status: SHIPPED | OUTPATIENT
Start: 2019-10-10 | End: 2019-10-14

## 2019-10-10 RX ORDER — SIMVASTATIN 20 MG/1
20 TABLET, FILM COATED ORAL NIGHTLY
Qty: 90 TABLET | Refills: 3 | Status: SHIPPED | OUTPATIENT
Start: 2019-10-10 | End: 2020-03-13 | Stop reason: SDUPTHER

## 2019-10-14 ENCOUNTER — TELEPHONE (OUTPATIENT)
Dept: FAMILY MEDICINE | Facility: CLINIC | Age: 74
End: 2019-10-14

## 2019-10-14 RX ORDER — ICOSAPENT ETHYL 1000 MG/1
2 CAPSULE ORAL 2 TIMES DAILY
Qty: 360 CAPSULE | Refills: 3 | Status: SHIPPED | OUTPATIENT
Start: 2019-10-14 | End: 2020-08-21

## 2019-10-14 NOTE — TELEPHONE ENCOUNTER
[facility] is requesting that you change pt's Lovaza to something that is on his formulary. Vascepa 1 gm is recommended.

## 2020-03-13 NOTE — TELEPHONE ENCOUNTER
----- Message from Genet Whelan sent at 3/13/2020 10:08 AM CDT -----  Contact: [phone]  Type:  RX Refill Request    Who Called:  [facility]  Refill or New Rx: NEW  RX Name and Strength:  lisinopril (PRINIVIL,ZESTRIL) 20 MG tablet  How is the patient currently taking it? (ex. 1XDay): Take 1 tablet (20 mg total) by mouth once daily. - Oral  Is this a 30 day or 90 day RX: 90/3 refills    RX Name and Strength: triamterene-hydrochlorothiazide 37.5-25 mg (DYAZIDE) 37.5-25 mg per capsule  How is the patient currently taking it? (ex. 1XDay):  Take 1 capsule by mouth every morning. - Oral  Is this a 30 day or 90 day RX: 90/3 refills    RX Name and Strength: diltiaZEM (CARDIZEM CD) 300 MG 24 hr capsule  How is the patient currently taking it? (ex. 1XDay): Take 1 capsule (300 mg total) by mouth once daily. - Oral  Is this a 30 day or 90 day RX: Take 1 capsule (300 mg total) by mouth once daily. - Oral    RX Name and Strength: simvastatin (ZOCOR) 20 MG tablet  How is the patient currently taking it? (ex. 1XDay): Take 1 tablet (20 mg total) by mouth every evening. - Oral  Is this a 30 day or 90 day RX: 90/3 refills    Preferred Pharmacy with phone number: [facility] - [address]  Local or Mail Order: mo  Ordering Provider:   Would the patient rather a call back or a response via [facility]?  call  Best Call Back Number: [phone]  Additional Information:

## 2020-03-15 RX ORDER — SIMVASTATIN 20 MG/1
20 TABLET, FILM COATED ORAL NIGHTLY
Qty: 90 TABLET | Refills: 3 | Status: SHIPPED | OUTPATIENT
Start: 2020-03-15 | End: 2020-05-21 | Stop reason: SDUPTHER

## 2020-03-15 RX ORDER — DILTIAZEM HYDROCHLORIDE 300 MG/1
300 CAPSULE, COATED, EXTENDED RELEASE ORAL DAILY
Qty: 90 CAPSULE | Refills: 3 | Status: SHIPPED | OUTPATIENT
Start: 2020-03-15 | End: 2020-05-21 | Stop reason: SDUPTHER

## 2020-03-15 RX ORDER — LISINOPRIL 20 MG/1
20 TABLET ORAL DAILY
Qty: 90 TABLET | Refills: 3 | Status: SHIPPED | OUTPATIENT
Start: 2020-03-15 | End: 2020-05-21 | Stop reason: SDUPTHER

## 2020-03-15 RX ORDER — TRIAMTERENE AND HYDROCHLOROTHIAZIDE 37.5; 25 MG/1; MG/1
1 CAPSULE ORAL EVERY MORNING
Qty: 90 CAPSULE | Refills: 3 | Status: SHIPPED | OUTPATIENT
Start: 2020-03-15 | End: 2020-05-21 | Stop reason: SDUPTHER

## 2020-05-12 ENCOUNTER — OFFICE VISIT (OUTPATIENT)
Dept: FAMILY MEDICINE | Facility: CLINIC | Age: 75
End: 2020-05-12
Payer: MEDICARE

## 2020-05-12 VITALS
TEMPERATURE: 99 F | BODY MASS INDEX: 34.69 KG/M2 | WEIGHT: 270.31 LBS | HEIGHT: 74 IN | SYSTOLIC BLOOD PRESSURE: 138 MMHG | OXYGEN SATURATION: 96 % | DIASTOLIC BLOOD PRESSURE: 68 MMHG | HEART RATE: 97 BPM

## 2020-05-12 DIAGNOSIS — E66.09 OBESITY DUE TO EXCESS CALORIES, UNSPECIFIED CLASSIFICATION, UNSPECIFIED WHETHER SERIOUS COMORBIDITY PRESENT: ICD-10-CM

## 2020-05-12 DIAGNOSIS — I78.1 HEMANGIOMA, CAPILLARY: ICD-10-CM

## 2020-05-12 DIAGNOSIS — E78.5 HYPERLIPIDEMIA, UNSPECIFIED HYPERLIPIDEMIA TYPE: ICD-10-CM

## 2020-05-12 DIAGNOSIS — I10 ESSENTIAL HYPERTENSION: ICD-10-CM

## 2020-05-12 DIAGNOSIS — L82.1 SEBORRHEIC KERATOSIS: ICD-10-CM

## 2020-05-12 DIAGNOSIS — R27.0 ATAXIA: Primary | ICD-10-CM

## 2020-05-12 DIAGNOSIS — M48.061 SPINAL STENOSIS OF LUMBAR REGION, UNSPECIFIED WHETHER NEUROGENIC CLAUDICATION PRESENT: ICD-10-CM

## 2020-05-12 DIAGNOSIS — F43.10 PTSD (POST-TRAUMATIC STRESS DISORDER): ICD-10-CM

## 2020-05-12 DIAGNOSIS — M54.9 BACK PAIN, UNSPECIFIED BACK LOCATION, UNSPECIFIED BACK PAIN LATERALITY, UNSPECIFIED CHRONICITY: ICD-10-CM

## 2020-05-12 PROCEDURE — 3075F SYST BP GE 130 - 139MM HG: CPT | Mod: CPTII,S$GLB,, | Performed by: FAMILY MEDICINE

## 2020-05-12 PROCEDURE — 3078F DIAST BP <80 MM HG: CPT | Mod: CPTII,S$GLB,, | Performed by: FAMILY MEDICINE

## 2020-05-12 PROCEDURE — 3078F PR MOST RECENT DIASTOLIC BLOOD PRESSURE < 80 MM HG: ICD-10-PCS | Mod: CPTII,S$GLB,, | Performed by: FAMILY MEDICINE

## 2020-05-12 PROCEDURE — 99214 PR OFFICE/OUTPT VISIT, EST, LEVL IV, 30-39 MIN: ICD-10-PCS | Mod: S$GLB,,, | Performed by: FAMILY MEDICINE

## 2020-05-12 PROCEDURE — 99214 OFFICE O/P EST MOD 30 MIN: CPT | Mod: S$GLB,,, | Performed by: FAMILY MEDICINE

## 2020-05-12 PROCEDURE — 3075F PR MOST RECENT SYSTOLIC BLOOD PRESS GE 130-139MM HG: ICD-10-PCS | Mod: CPTII,S$GLB,, | Performed by: FAMILY MEDICINE

## 2020-05-12 PROCEDURE — 99499 RISK ADDL DX/OHS AUDIT: ICD-10-PCS | Mod: S$GLB,,, | Performed by: FAMILY MEDICINE

## 2020-05-12 PROCEDURE — 99499 UNLISTED E&M SERVICE: CPT | Mod: S$GLB,,, | Performed by: FAMILY MEDICINE

## 2020-05-12 RX ORDER — TRAZODONE HYDROCHLORIDE 100 MG/1
TABLET ORAL
COMMUNITY
Start: 2020-05-03 | End: 2020-07-15 | Stop reason: SDUPTHER

## 2020-05-12 NOTE — PROGRESS NOTES
"Subjective:      Patient ID: Noah Lopez is a 75 y.o. male.    Chief Complaint: Rash (skin lesion (left side) )      Vitals:    05/12/20 1607   BP: 138/68   Pulse: 97   Temp: 98.5 °F (36.9 °C)   TempSrc: Oral   SpO2: 96%   Weight: 122.6 kg (270 lb 4.5 oz)   Height: 6' 2" (1.88 m)        HPI   Skin lesion, dark spot to recehck;  Also, balance is bad, not steady, fell in shower, no injuries, close to falling caught by shower curtain  Feels wike;  Back hurts and has hisotry of gback surgery  Voids ok except for overactive   Able to stand up unaided  No exercises  Gaining weight  Goes to [place] for PTSD      Problem List  Patient Active Problem List   Diagnosis    Hypertension    Pincer nail deformity    Great toe pain, unspecified laterality    Screening for malignant neoplasm of colon    Hyperlipidemia        ALLERGIES: Review of patient's allergies indicates:  No Known Allergies    MEDS:   Current Outpatient Medications:     diltiaZEM (CARDIZEM CD) 300 MG 24 hr capsule, Take 1 capsule (300 mg total) by mouth once daily., Disp: 90 capsule, Rfl: 3    lisinopriL (PRINIVIL,ZESTRIL) 20 MG tablet, Take 1 tablet (20 mg total) by mouth once daily., Disp: 90 tablet, Rfl: 3    simvastatin (ZOCOR) 20 MG tablet, Take 1 tablet (20 mg total) by mouth every evening., Disp: 90 tablet, Rfl: 3    traZODone (DESYREL) 100 MG tablet, TAKE 2 TABLETS BY MOUTH EVERY DAY AT BEDTIME AS NEEDED FOR INSOMNIA, Disp: , Rfl:     triamterene-hydrochlorothiazide 37.5-25 mg (DYAZIDE) 37.5-25 mg per capsule, Take 1 capsule by mouth every morning., Disp: 90 capsule, Rfl: 3    ofloxacin (FLOXIN) 0.3 % otic solution, Apply 10 drops into right ear once a day for 10 days.  Please dispense generic., Disp: , Rfl:     tamsulosin (FLOMAX) 0.4 mg Cap, Take 1 capsule (0.4 mg total) by mouth once daily., Disp: 30 capsule, Rfl: 0    VASCEPA 1 gram Cap, Take 2 g by mouth 2 (two) times daily. (Patient not taking: Reported on 5/12/2020), Disp: " 360 capsule, Rfl: 3      History:  Current Providers as of 5/12/2020  PCP: Anthony De La Torre MD  Care Team Provider: Richard García LPN  Care Team Provider: Aiden Wilson MD  Encounter Provider: Anthony De La Torre MD, starting on Tue May 12, 2020 12:00 AM  Referring Provider: not found, starting on Tue May 12, 2020 12:00 AM  Consulting Physician: Anthony De La Torre MD, starting on Tue May 12, 2020  4:04 PM (Active)   Past Medical History:   Diagnosis Date    Colon polyp 09/06/2018    Hyperlipidemia     Hypertension      Past Surgical History:   Procedure Laterality Date    COLONOSCOPY  2010    COLONOSCOPY N/A 9/6/2018    Procedure: COLONOSCOPY Suprep PLEASE TEXT PATIENT WITH ARRIVAL TIME;  Surgeon: Niharika Arce MD;  Location[facility];  Service: Endoscopy;  Laterality: N/A;    KNEE ARTHROSCOPY      SPINE SURGERY      l-spine     Social History     Tobacco Use    Smoking status: Former Smoker    Smokeless tobacco: Never Used   Substance Use Topics    Alcohol use: Yes     Comment: occ    Drug use: Not on file         Review of Systems   Constitutional: Positive for unexpected weight change. Negative for appetite change, fatigue and fever.   HENT: Negative for congestion, ear pain, sinus pressure and sore throat.    Eyes: Negative for pain and visual disturbance.   Respiratory: Negative for shortness of breath.    Cardiovascular: Negative for chest pain.   Gastrointestinal: Negative for abdominal pain, constipation and diarrhea.   Endocrine: Negative for polyuria.   Genitourinary: Positive for urgency. Negative for difficulty urinating and frequency.   Musculoskeletal: Positive for back pain, gait problem and neck pain. Negative for arthralgias and myalgias.   Skin: Negative for color change.   Allergic/Immunologic: Negative.    Neurological: Positive for headaches. Negative for syncope and weakness.   Hematological: Does not bruise/bleed easily.   Psychiatric/Behavioral: Negative for dysphoric mood and  suicidal ideas. The patient is not nervous/anxious.    All other systems reviewed and are negative.    Objective:     Physical Exam   Constitutional: He is oriented to person, place, and time. He appears well-developed and well-nourished. No distress.   obese   HENT:   Head: Normocephalic and atraumatic.   Right Ear: External ear normal.   Left Ear: External ear normal.   Mouth/Throat: Oropharynx is clear and moist. No oropharyngeal exudate.   Eyes: Pupils are equal, round, and reactive to light. Conjunctivae and EOM are normal. Right eye exhibits no discharge. Left eye exhibits no discharge. No scleral icterus.   Neck: Normal range of motion. Neck supple. No JVD present. No tracheal deviation present. No thyromegaly present.   Cardiovascular: Normal rate and regular rhythm. Exam reveals no gallop and no friction rub.   No murmur heard.  Pulmonary/Chest: Effort normal and breath sounds normal. No stridor. No respiratory distress. He has no wheezes. He has no rales. He exhibits no tenderness.   Abdominal: Soft. He exhibits no distension and no mass. There is no tenderness. There is no rebound and no guarding.   Musculoskeletal: Normal range of motion. He exhibits no edema or tenderness.   Lymphadenopathy:     He has no cervical adenopathy.   Neurological: He is alert and oriented to person, place, and time. He has normal reflexes. He displays normal reflexes. No cranial nerve deficit. He exhibits normal muscle tone. Coordination normal.   Skin: Skin is warm and dry. No rash noted. He is not diaphoretic. No erythema. No pallor.   Capillary hemangiomas  seb keratoses   Psychiatric: He has a normal mood and affect. His behavior is normal. Judgment and thought content normal.   Nursing note and vitals reviewed.          Assessment:     1. Ataxia    2. Obesity due to excess calories, unspecified classification, unspecified whether serious comorbidity present    3. PTSD (post-traumatic stress disorder)    4. Seborrheic  keratosis    5. Hemangioma, capillary    6. Essential hypertension    7. Hyperlipidemia, unspecified hyperlipidemia type    8. Spinal stenosis of lumbar region, unspecified whether neurogenic claudication present    9. Back pain, unspecified back location, unspecified back pain laterality, unspecified chronicity      Plan:        Medication List           Accurate as of May 12, 2020  4:36 PM. If you have any questions, ask your nurse or doctor.               CONTINUE taking these medications    diltiaZEM 300 MG 24 hr capsule  Commonly known as:  CARDIZEM CD  Take 1 capsule (300 mg total) by mouth once daily.     lisinopriL 20 MG tablet  Commonly known as:  PRINIVIL,ZESTRIL  Take 1 tablet (20 mg total) by mouth once daily.     ofloxacin 0.3 % otic solution  Commonly known as:  FLOXIN     simvastatin 20 MG tablet  Commonly known as:  ZOCOR  Take 1 tablet (20 mg total) by mouth every evening.     tamsulosin 0.4 mg Cap  Commonly known as:  FLOMAX  Take 1 capsule (0.4 mg total) by mouth once daily.     traZODone 100 MG tablet  Commonly known as:  DESYREL     triamterene-hydrochlorothiazide 37.5-25 mg 37.5-25 mg per capsule  Commonly known as:  DYAZIDE  Take 1 capsule by mouth every morning.     VASCEPA 1 gram Cap  Generic drug:  icosapent ethyL  Take 2 g by mouth 2 (two) times daily.          Ataxia    Obesity due to excess calories, unspecified classification, unspecified whether serious comorbidity present    PTSD (post-traumatic stress disorder)    Seborrheic keratosis    Hemangioma, capillary    Essential hypertension    Hyperlipidemia, unspecified hyperlipidemia type    Spinal stenosis of lumbar region, unspecified whether neurogenic claudication present    Back pain, unspecified back location, unspecified back pain laterality, unspecified chronicity

## 2020-05-22 RX ORDER — DILTIAZEM HYDROCHLORIDE 300 MG/1
300 CAPSULE, COATED, EXTENDED RELEASE ORAL DAILY
Qty: 90 CAPSULE | Refills: 3 | Status: SHIPPED | OUTPATIENT
Start: 2020-05-22 | End: 2021-03-21

## 2020-05-22 RX ORDER — LISINOPRIL 20 MG/1
20 TABLET ORAL DAILY
Qty: 90 TABLET | Refills: 3 | Status: SHIPPED | OUTPATIENT
Start: 2020-05-22 | End: 2021-03-21

## 2020-05-22 RX ORDER — TRIAMTERENE AND HYDROCHLOROTHIAZIDE 37.5; 25 MG/1; MG/1
1 CAPSULE ORAL EVERY MORNING
Qty: 90 CAPSULE | Refills: 3 | Status: SHIPPED | OUTPATIENT
Start: 2020-05-22 | End: 2021-03-21

## 2020-05-22 RX ORDER — SIMVASTATIN 20 MG/1
20 TABLET, FILM COATED ORAL NIGHTLY
Qty: 90 TABLET | Refills: 3 | Status: SHIPPED | OUTPATIENT
Start: 2020-05-22 | End: 2021-03-21

## 2020-07-14 ENCOUNTER — TELEPHONE (OUTPATIENT)
Dept: FAMILY MEDICINE | Facility: CLINIC | Age: 75
End: 2020-07-14

## 2020-07-14 NOTE — TELEPHONE ENCOUNTER
Pt called to see if you could refill his Trazodone rx. He is scheduled to see you on 8/21/2020. Pt stated that he also wanted to speak with you directly about prescribing THC as of 8/1/2020. If you agree to refill Trazodone, pt is requesting a 90 day supply to be sent to [facility].

## 2020-07-14 NOTE — TELEPHONE ENCOUNTER
----- Message from Leeann Santoro sent at 7/14/2020  8:31 AM CDT -----  Contact: [phone]/Self  Type:  Sooner Appointment Request     Caller is requesting a sooner appointment.  Caller declined first available appointment listed below.  Caller will not accept being placed on the waitlist and is requesting a message be sent to doctor.  Name of Caller: pt  When is the first available appointment? 08-21-20  Symptoms or Reason: Check up , medication refills   Would the patient rather a call back or a response via [facility]? Call back  Best Call Back Number: [phone]  Additional Information:

## 2020-07-15 RX ORDER — TRAZODONE HYDROCHLORIDE 100 MG/1
TABLET ORAL
Qty: 180 TABLET | Refills: 3 | Status: SHIPPED | OUTPATIENT
Start: 2020-07-15 | End: 2021-12-14

## 2020-07-20 ENCOUNTER — NURSE TRIAGE (OUTPATIENT)
Dept: ADMINISTRATIVE | Facility: CLINIC | Age: 75
End: 2020-07-20

## 2020-07-20 NOTE — TELEPHONE ENCOUNTER
Spoke with patient he states that he would like to get information in regards to getting a prescription for medical Marijuana.  He states that on August 1st all PCPs should be able to write this type of prescription and he would like to know if Dr. De La Torre will be one of them.  He states that if Dr. De La Torre is not going to write those prescriptions can he be referred to one that will.    He reports that he has PTSD. Will send message to PCPs office for follow up.      Reason for Disposition   [1] Caller requesting NON-URGENT health information AND [2] PCP's office is the best resource    Protocols used: INFORMATION ONLY CALL-A-AH

## 2020-09-30 ENCOUNTER — TELEPHONE (OUTPATIENT)
Dept: FAMILY MEDICINE | Facility: CLINIC | Age: 75
End: 2020-09-30

## 2020-09-30 NOTE — TELEPHONE ENCOUNTER
----- Message from Talya Sheffield sent at 9/30/2020  9:30 AM CDT -----  Regarding: call back  Contact: [phone]  Patient is calling to talk to nurse in regards to see if he can be seen tomorrow due to a bone spur on his left foot and is having severe pain.

## 2020-10-01 ENCOUNTER — OFFICE VISIT (OUTPATIENT)
Dept: FAMILY MEDICINE | Facility: CLINIC | Age: 75
End: 2020-10-01
Payer: MEDICARE

## 2020-10-01 VITALS
DIASTOLIC BLOOD PRESSURE: 60 MMHG | OXYGEN SATURATION: 95 % | TEMPERATURE: 98 F | HEIGHT: 73 IN | SYSTOLIC BLOOD PRESSURE: 130 MMHG | WEIGHT: 262.88 LBS | HEART RATE: 99 BPM | BODY MASS INDEX: 34.84 KG/M2

## 2020-10-01 DIAGNOSIS — E66.09 OBESITY DUE TO EXCESS CALORIES, UNSPECIFIED CLASSIFICATION, UNSPECIFIED WHETHER SERIOUS COMORBIDITY PRESENT: ICD-10-CM

## 2020-10-01 DIAGNOSIS — M72.2 PLANTAR FASCIITIS OF LEFT FOOT: Primary | ICD-10-CM

## 2020-10-01 DIAGNOSIS — E78.5 HYPERLIPIDEMIA, UNSPECIFIED HYPERLIPIDEMIA TYPE: ICD-10-CM

## 2020-10-01 DIAGNOSIS — I10 ESSENTIAL HYPERTENSION: ICD-10-CM

## 2020-10-01 PROCEDURE — 1159F MED LIST DOCD IN RCRD: CPT | Mod: S$GLB,,, | Performed by: FAMILY MEDICINE

## 2020-10-01 PROCEDURE — 99213 PR OFFICE/OUTPT VISIT, EST, LEVL III, 20-29 MIN: ICD-10-PCS | Mod: S$GLB,,, | Performed by: FAMILY MEDICINE

## 2020-10-01 PROCEDURE — 3008F BODY MASS INDEX DOCD: CPT | Mod: CPTII,S$GLB,, | Performed by: FAMILY MEDICINE

## 2020-10-01 PROCEDURE — 1101F PR PT FALLS ASSESS DOC 0-1 FALLS W/OUT INJ PAST YR: ICD-10-PCS | Mod: CPTII,S$GLB,, | Performed by: FAMILY MEDICINE

## 2020-10-01 PROCEDURE — 1101F PT FALLS ASSESS-DOCD LE1/YR: CPT | Mod: CPTII,S$GLB,, | Performed by: FAMILY MEDICINE

## 2020-10-01 PROCEDURE — 1126F AMNT PAIN NOTED NONE PRSNT: CPT | Mod: S$GLB,,, | Performed by: FAMILY MEDICINE

## 2020-10-01 PROCEDURE — 3008F PR BODY MASS INDEX (BMI) DOCUMENTED: ICD-10-PCS | Mod: CPTII,S$GLB,, | Performed by: FAMILY MEDICINE

## 2020-10-01 PROCEDURE — 3078F PR MOST RECENT DIASTOLIC BLOOD PRESSURE < 80 MM HG: ICD-10-PCS | Mod: CPTII,S$GLB,, | Performed by: FAMILY MEDICINE

## 2020-10-01 PROCEDURE — 99213 OFFICE O/P EST LOW 20 MIN: CPT | Mod: S$GLB,,, | Performed by: FAMILY MEDICINE

## 2020-10-01 PROCEDURE — 3075F PR MOST RECENT SYSTOLIC BLOOD PRESS GE 130-139MM HG: ICD-10-PCS | Mod: CPTII,S$GLB,, | Performed by: FAMILY MEDICINE

## 2020-10-01 PROCEDURE — 1159F PR MEDICATION LIST DOCUMENTED IN MEDICAL RECORD: ICD-10-PCS | Mod: S$GLB,,, | Performed by: FAMILY MEDICINE

## 2020-10-01 PROCEDURE — 3078F DIAST BP <80 MM HG: CPT | Mod: CPTII,S$GLB,, | Performed by: FAMILY MEDICINE

## 2020-10-01 PROCEDURE — 1126F PR PAIN SEVERITY QUANTIFIED, NO PAIN PRESENT: ICD-10-PCS | Mod: S$GLB,,, | Performed by: FAMILY MEDICINE

## 2020-10-01 PROCEDURE — 3075F SYST BP GE 130 - 139MM HG: CPT | Mod: CPTII,S$GLB,, | Performed by: FAMILY MEDICINE

## 2020-10-01 RX ORDER — ZINC GLUCONATE 50 MG
50 TABLET ORAL DAILY
COMMUNITY
End: 2023-03-16 | Stop reason: ALTCHOICE

## 2020-10-01 RX ORDER — IBUPROFEN 100 MG/5ML
1000 SUSPENSION, ORAL (FINAL DOSE FORM) ORAL DAILY
COMMUNITY

## 2020-10-01 NOTE — PROGRESS NOTES
"Subjective:      Patient ID: Noah Lopez is a 75 y.o. male.    Chief Complaint: Foot Pain      Vitals:    10/01/20 1518   BP: 130/60   Pulse: 99   Temp: 97.8 °F (36.6 °C)   TempSrc: Oral   SpO2: 95%   Weight: 119.3 kg (262 lb 14.4 oz)   Height: 6' 1" (1.854 m)        HPI   Left heel pain for 2 months, right OK; he has discussed with his daughter, NP; wants me to write marijuana, or recommend it.  Told him I can't    Problem List  Patient Active Problem List   Diagnosis    Hypertension    Pincer nail deformity    Great toe pain, unspecified laterality    Screening for malignant neoplasm of colon    Hyperlipidemia        ALLERGIES: Review of patient's allergies indicates:  No Known Allergies    MEDS:   Current Outpatient Medications:     ascorbic acid, vitamin C, (VITAMIN C) 1000 MG tablet, Take 1,000 mg by mouth once daily., Disp: , Rfl:     cholecalciferol, vitamin D3, (VITAMIN D3 ORAL), Take 125 mcg by mouth., Disp: , Rfl:     diltiaZEM (CARDIZEM CD) 300 MG 24 hr capsule, Take 1 capsule (300 mg total) by mouth once daily., Disp: 90 capsule, Rfl: 3    lisinopriL (PRINIVIL,ZESTRIL) 20 MG tablet, Take 1 tablet (20 mg total) by mouth once daily., Disp: 90 tablet, Rfl: 3    simvastatin (ZOCOR) 20 MG tablet, Take 1 tablet (20 mg total) by mouth every evening., Disp: 90 tablet, Rfl: 3    traZODone (DESYREL) 100 MG tablet, TAKE 2 TABLETS BY MOUTH EVERY DAY AT BEDTIME AS NEEDED FOR INSOMNIA, Disp: 180 tablet, Rfl: 3    triamterene-hydrochlorothiazide 37.5-25 mg (DYAZIDE) 37.5-25 mg per capsule, Take 1 capsule by mouth every morning., Disp: 90 capsule, Rfl: 3    TURMERIC ORAL, Take by mouth., Disp: , Rfl:     zinc gluconate 50 mg tablet, Take 50 mg by mouth once daily., Disp: , Rfl:     ofloxacin (FLOXIN) 0.3 % otic solution, Apply 10 drops into right ear once a day for 10 days.  Please dispense generic., Disp: , Rfl:     tamsulosin (FLOMAX) 0.4 mg Cap, Take 1 capsule (0.4 mg total) by mouth once " daily., Disp: 30 capsule, Rfl: 0      History:  Current Providers as of 10/1/2020  PCP: Anthony De La Torre MD  Care Team Provider: Richard García LPN  Care Team Provider: Aiden Wilson MD  Encounter Provider: Anthony De La Torre MD, starting on Thu Oct 1, 2020 12:00 AM  Referring Provider: not found, starting on Thu Oct 1, 2020 12:00 AM  Consulting Physician: Anthony De La Torre MD, starting on Thu Oct 1, 2020  3:13 PM (Active)   Past Medical History:   Diagnosis Date    Colon polyp 09/06/2018    Hyperlipidemia     Hypertension      Past Surgical History:   Procedure Laterality Date    COLONOSCOPY  2010    COLONOSCOPY N/A 9/6/2018    Procedure: COLONOSCOPY Suprep PLEASE TEXT PATIENT WITH ARRIVAL TIME;  Surgeon: Niharika Arce MD;  Location[facility];  Service: Endoscopy;  Laterality: N/A;    KNEE ARTHROSCOPY      SPINE SURGERY      l-spine     Social History     Tobacco Use    Smoking status: Former Smoker    Smokeless tobacco: Never Used   Substance Use Topics    Alcohol use: Yes     Comment: occ    Drug use: Not on file         Review of Systems   Constitutional: Negative for appetite change, fatigue, fever and unexpected weight change.   HENT: Negative for congestion, ear pain, sinus pressure and sore throat.    Eyes: Negative for pain and visual disturbance.   Respiratory: Negative for shortness of breath.    Cardiovascular: Negative for chest pain.   Gastrointestinal: Negative for abdominal pain, constipation and diarrhea.   Endocrine: Negative for polyuria.   Genitourinary: Negative for difficulty urinating and frequency.   Musculoskeletal: Negative for arthralgias, back pain and myalgias.   Skin: Negative for color change.   Allergic/Immunologic: Negative.    Neurological: Negative for syncope, weakness and headaches.   Hematological: Does not bruise/bleed easily.   Psychiatric/Behavioral: Negative for dysphoric mood and suicidal ideas. The patient is not nervous/anxious.    All other systems  reviewed and are negative.    Objective:     Physical Exam  Vitals signs and nursing note reviewed.   Constitutional:       General: He is not in acute distress.     Appearance: He is well-developed. He is obese. He is not diaphoretic.   HENT:      Head: Normocephalic and atraumatic.      Right Ear: External ear normal.      Left Ear: External ear normal.      Mouth/Throat:      Pharynx: No oropharyngeal exudate.   Eyes:      General: No scleral icterus.        Right eye: No discharge.         Left eye: No discharge.      Conjunctiva/sclera: Conjunctivae normal.      Pupils: Pupils are equal, round, and reactive to light.   Neck:      Musculoskeletal: Normal range of motion and neck supple.      Thyroid: No thyromegaly.      Vascular: No JVD.      Trachea: No tracheal deviation.   Cardiovascular:      Rate and Rhythm: Normal rate and regular rhythm.      Heart sounds: No murmur. No friction rub. No gallop.    Pulmonary:      Effort: Pulmonary effort is normal. No respiratory distress.      Breath sounds: Normal breath sounds. No stridor. No wheezing or rales.   Chest:      Chest wall: No tenderness.   Abdominal:      General: There is no distension.      Palpations: Abdomen is soft. There is no mass.      Tenderness: There is no abdominal tenderness. There is no guarding or rebound.   Musculoskeletal: Normal range of motion.         General: No tenderness.   Lymphadenopathy:      Cervical: No cervical adenopathy.   Skin:     General: Skin is warm and dry.      Coloration: Skin is not pale.      Findings: No erythema or rash.   Neurological:      Mental Status: He is alert and oriented to person, place, and time.      Cranial Nerves: No cranial nerve deficit.      Motor: No abnormal muscle tone.      Coordination: Coordination normal.      Deep Tendon Reflexes: Reflexes are normal and symmetric. Reflexes normal.   Psychiatric:         Behavior: Behavior normal.         Thought Content: Thought content normal.          Judgment: Judgment normal.             Assessment:     1. Plantar fasciitis of left foot    2. Hyperlipidemia, unspecified hyperlipidemia type    3. Essential hypertension    4. Obesity due to excess calories, unspecified classification, unspecified whether serious comorbidity present      Plan:        Medication List          Accurate as of October 1, 2020 11:59 PM. If you have any questions, ask your nurse or doctor.            CONTINUE taking these medications    diltiaZEM 300 MG 24 hr capsule  Commonly known as: CARDIZEM CD  Take 1 capsule (300 mg total) by mouth once daily.     lisinopriL 20 MG tablet  Commonly known as: PRINIVIL,ZESTRIL  Take 1 tablet (20 mg total) by mouth once daily.     ofloxacin 0.3 % otic solution  Commonly known as: FLOXIN     simvastatin 20 MG tablet  Commonly known as: ZOCOR  Take 1 tablet (20 mg total) by mouth every evening.     tamsulosin 0.4 mg Cap  Commonly known as: FLOMAX  Take 1 capsule (0.4 mg total) by mouth once daily.     traZODone 100 MG tablet  Commonly known as: DESYREL  TAKE 2 TABLETS BY MOUTH EVERY DAY AT BEDTIME AS NEEDED FOR INSOMNIA     triamterene-hydrochlorothiazide 37.5-25 mg 37.5-25 mg per capsule  Commonly known as: DYAZIDE  Take 1 capsule by mouth every morning.     TURMERIC ORAL     VITAMIN C 1000 MG tablet  Generic drug: ascorbic acid (vitamin C)     VITAMIN D3 ORAL     zinc gluconate 50 mg tablet          Plantar fasciitis of left foot    Hyperlipidemia, unspecified hyperlipidemia type    Essential hypertension    Obesity due to excess calories, unspecified classification, unspecified whether serious comorbidity present      Taught to stretch calf

## 2020-10-14 ENCOUNTER — TELEPHONE (OUTPATIENT)
Dept: FAMILY MEDICINE | Facility: CLINIC | Age: 75
End: 2020-10-14

## 2020-10-14 NOTE — TELEPHONE ENCOUNTER
----- Message from Marla Elkins sent at 10/14/2020 11:30 AM CDT -----  Contact: [phone]  Pt  Noah Lopez calling regarding needing to follow-up on previous conversation with the Doctor regarding referring the pt to a Doctor that prescribe Medical Marijuana>  pt would like to know the name of the Doctor.      Please call and advise

## 2020-10-26 ENCOUNTER — TELEPHONE (OUTPATIENT)
Dept: FAMILY MEDICINE | Facility: CLINIC | Age: 75
End: 2020-10-26

## 2020-10-26 NOTE — TELEPHONE ENCOUNTER
Patient has been scheduled   ----- Message from Dayanara Dietrich sent at 10/26/2020  3:38 PM CDT -----  Contact: SELF [phone]  Patient would like to speak with you about being seen today for heel pain and can not walk

## 2020-10-27 ENCOUNTER — OFFICE VISIT (OUTPATIENT)
Dept: FAMILY MEDICINE | Facility: CLINIC | Age: 75
End: 2020-10-27
Payer: MEDICARE

## 2020-10-27 VITALS
BODY MASS INDEX: 34.68 KG/M2 | SYSTOLIC BLOOD PRESSURE: 134 MMHG | OXYGEN SATURATION: 95 % | HEART RATE: 97 BPM | TEMPERATURE: 97 F | HEIGHT: 73 IN | DIASTOLIC BLOOD PRESSURE: 62 MMHG | WEIGHT: 261.69 LBS

## 2020-10-27 DIAGNOSIS — M77.32 HEEL SPUR, LEFT: Primary | ICD-10-CM

## 2020-10-27 PROCEDURE — 3075F PR MOST RECENT SYSTOLIC BLOOD PRESS GE 130-139MM HG: ICD-10-PCS | Mod: CPTII,S$GLB,, | Performed by: FAMILY MEDICINE

## 2020-10-27 PROCEDURE — 99213 PR OFFICE/OUTPT VISIT, EST, LEVL III, 20-29 MIN: ICD-10-PCS | Mod: S$GLB,,, | Performed by: FAMILY MEDICINE

## 2020-10-27 PROCEDURE — 3078F PR MOST RECENT DIASTOLIC BLOOD PRESSURE < 80 MM HG: ICD-10-PCS | Mod: CPTII,S$GLB,, | Performed by: FAMILY MEDICINE

## 2020-10-27 PROCEDURE — 1159F MED LIST DOCD IN RCRD: CPT | Mod: S$GLB,,, | Performed by: FAMILY MEDICINE

## 2020-10-27 PROCEDURE — 1101F PT FALLS ASSESS-DOCD LE1/YR: CPT | Mod: CPTII,S$GLB,, | Performed by: FAMILY MEDICINE

## 2020-10-27 PROCEDURE — 1101F PR PT FALLS ASSESS DOC 0-1 FALLS W/OUT INJ PAST YR: ICD-10-PCS | Mod: CPTII,S$GLB,, | Performed by: FAMILY MEDICINE

## 2020-10-27 PROCEDURE — 3008F PR BODY MASS INDEX (BMI) DOCUMENTED: ICD-10-PCS | Mod: CPTII,S$GLB,, | Performed by: FAMILY MEDICINE

## 2020-10-27 PROCEDURE — 1159F PR MEDICATION LIST DOCUMENTED IN MEDICAL RECORD: ICD-10-PCS | Mod: S$GLB,,, | Performed by: FAMILY MEDICINE

## 2020-10-27 PROCEDURE — 3008F BODY MASS INDEX DOCD: CPT | Mod: CPTII,S$GLB,, | Performed by: FAMILY MEDICINE

## 2020-10-27 PROCEDURE — 3075F SYST BP GE 130 - 139MM HG: CPT | Mod: CPTII,S$GLB,, | Performed by: FAMILY MEDICINE

## 2020-10-27 PROCEDURE — 3078F DIAST BP <80 MM HG: CPT | Mod: CPTII,S$GLB,, | Performed by: FAMILY MEDICINE

## 2020-10-27 PROCEDURE — 99213 OFFICE O/P EST LOW 20 MIN: CPT | Mod: S$GLB,,, | Performed by: FAMILY MEDICINE

## 2020-10-27 NOTE — PROGRESS NOTES
Chief Complaint  Chief Complaint   Patient presents with    Foot Pain     left heel       HPI  Noah Lopez is a 75 y.o. male with multiple medical diagnoses as listed in the medical history and problem list that presents for left heel pain.  He was diagnosed with plantar fasciitis and given exercises to do.  This helped with the plantar fasciitis, but now he has developed pain in the heel.  No fevers.  No redness.  No open sores.  Painful to walk on.  Improves with rest.       PAST MEDICAL HISTORY:  Past Medical History:   Diagnosis Date    Colon polyp 09/06/2018    Hyperlipidemia     Hypertension        PAST SURGICAL HISTORY:  Past Surgical History:   Procedure Laterality Date    COLONOSCOPY  2010    COLONOSCOPY N/A 9/6/2018    Procedure: COLONOSCOPY Suprep PLEASE TEXT PATIENT WITH ARRIVAL TIME;  Surgeon: Niharika Arce MD;  Location: [facility];  Service: Endoscopy;  Laterality: N/A;    KNEE ARTHROSCOPY      SPINE SURGERY      l-spine       SOCIAL HISTORY:  Social History     Socioeconomic History    Marital status:      Spouse name: Not on file    Number of children: Not on file    Years of education: Not on file    Highest education level: Not on file   Occupational History     Comment: retried    Social Needs    Financial resource strain: Not on file    Food insecurity     Worry: Not on file     Inability: Not on file    Transportation needs     Medical: Not on file     Non-medical: Not on file   Tobacco Use    Smoking status: Former Smoker    Smokeless tobacco: Never Used   Substance and Sexual Activity    Alcohol use: Yes     Comment: occ    Drug use: Not on file    Sexual activity: Not on file   Lifestyle    Physical activity     Days per week: Not on file     Minutes per session: Not on file    Stress: Not on file   Relationships    Social connections     Talks on phone: Not on file     Gets together: Not on file     Attends Restoration service: Not on file     Active  member of club or organization: Not on file     Attends meetings of clubs or organizations: Not on file     Relationship status: Not on file   Other Topics Concern    Not on file   Social History Narrative    Not on file       FAMILY HISTORY:  Family History   Problem Relation Age of Onset    Hypertension Mother     Heart attack Father     Cirrhosis Father     No Known Problems Brother     Seizures Daughter     No Known Problems Son     No Known Problems Brother     Liver disease Daughter        ALLERGIES AND MEDICATIONS: updated and reviewed.  Review of patient's allergies indicates:  No Known Allergies  Current Outpatient Medications   Medication Sig Dispense Refill    ascorbic acid, vitamin C, (VITAMIN C) 1000 MG tablet Take 1,000 mg by mouth once daily.      cholecalciferol, vitamin D3, (VITAMIN D3 ORAL) Take 125 mcg by mouth.      diltiaZEM (CARDIZEM CD) 300 MG 24 hr capsule Take 1 capsule (300 mg total) by mouth once daily. 90 capsule 3    lisinopriL (PRINIVIL,ZESTRIL) 20 MG tablet Take 1 tablet (20 mg total) by mouth once daily. 90 tablet 3    ofloxacin (FLOXIN) 0.3 % otic solution Apply 10 drops into right ear once a day for 10 days.  Please dispense generic.      simvastatin (ZOCOR) 20 MG tablet Take 1 tablet (20 mg total) by mouth every evening. 90 tablet 3    tamsulosin (FLOMAX) 0.4 mg Cap Take 1 capsule (0.4 mg total) by mouth once daily. 30 capsule 0    traZODone (DESYREL) 100 MG tablet TAKE 2 TABLETS BY MOUTH EVERY DAY AT BEDTIME AS NEEDED FOR INSOMNIA 180 tablet 3    triamterene-hydrochlorothiazide 37.5-25 mg (DYAZIDE) 37.5-25 mg per capsule Take 1 capsule by mouth every morning. 90 capsule 3    TURMERIC ORAL Take by mouth.      zinc gluconate 50 mg tablet Take 50 mg by mouth once daily.       No current facility-administered medications for this visit.          ROS  Review of Systems   Constitutional: Negative for fatigue and fever.   HENT: Negative for congestion, ear pain,  "postnasal drip, rhinorrhea, sinus pressure, sore throat and voice change.    Eyes: Negative for discharge.   Respiratory: Negative for cough, chest tightness, shortness of breath and wheezing.    Cardiovascular: Negative for chest pain.   Gastrointestinal: Negative for abdominal pain and diarrhea.   Skin: Negative for rash.           PHYSICAL EXAM  Vitals:    10/27/20 1602   BP: 134/62   BP Location: Left arm   Patient Position: Sitting   Pulse: 97   Temp: 97.3 °F (36.3 °C)   TempSrc: Temporal   SpO2: 95%   Weight: 118.7 kg (261 lb 11 oz)   Height: 6' 1" (1.854 m)    Body mass index is 34.53 kg/m².  Weight: 118.7 kg (261 lb 11 oz)   Height: 6' 1" (185.4 cm)     Physical Exam  Vitals signs and nursing note reviewed.   Constitutional:       Appearance: He is well-developed.   HENT:      Head: Normocephalic.   Eyes:      Conjunctiva/sclera: Conjunctivae normal.   Neck:      Musculoskeletal: Normal range of motion and neck supple.   Cardiovascular:      Rate and Rhythm: Normal rate and regular rhythm.      Pulses:           Dorsalis pedis pulses are 2+ on the right side and 2+ on the left side.        Posterior tibial pulses are 2+ on the right side and 2+ on the left side.   Pulmonary:      Effort: Pulmonary effort is normal.      Breath sounds: Normal breath sounds.   Musculoskeletal:      Right foot: Normal range of motion. No deformity, bunion, Charcot foot, foot drop or prominent metatarsal heads.      Left foot: Normal range of motion. No deformity, bunion, Charcot foot, foot drop or prominent metatarsal heads.        Feet:    Feet:      Right foot:      Skin integrity: Skin integrity normal.      Left foot:      Skin integrity: Skin integrity normal.   Skin:     General: Skin is warm.   Neurological:      Mental Status: He is alert.           Health Maintenance       Date Due Completion Date    Shingles Vaccine (1 of 2) 01/20/1996 ---    Influenza Vaccine (1) 08/01/2020 ---    Pneumococcal Vaccine (65+ Low/Medium " Risk) (1 of 2 - PCV13) 05/12/2021 (Originally 1/20/2011) ---    Colorectal Cancer Screening 09/06/2023 9/6/2018    Lipid Panel 02/28/2025 2/28/2020    Override on 4/15/2016: Done    TETANUS VACCINE 08/21/2030 8/21/2020    Override on 6/27/2018: Done            Assessment & Plan      Noah was seen today for foot pain.    Diagnoses and all orders for this visit:    Heel spur, left  -     X-Ray Foot Complete Left; Future  - Heel spur injected with 20mg of kenalog          Follow-up: No follow-ups on file.

## 2020-10-28 ENCOUNTER — HOSPITAL ENCOUNTER (OUTPATIENT)
Dept: RADIOLOGY | Facility: HOSPITAL | Age: 75
Discharge: HOME | End: 2020-10-28
Attending: FAMILY MEDICINE
Payer: MEDICARE

## 2020-10-28 DIAGNOSIS — M77.32 HEEL SPUR, LEFT: ICD-10-CM

## 2020-10-28 PROCEDURE — 73630 X-RAY EXAM OF FOOT: CPT | Mod: TC,FY,PO,LT

## 2021-11-03 ENCOUNTER — TELEPHONE (OUTPATIENT)
Dept: FAMILY MEDICINE | Facility: CLINIC | Age: 76
End: 2021-11-03
Payer: MEDICARE

## 2021-12-06 ENCOUNTER — PATIENT OUTREACH (OUTPATIENT)
Dept: ADMINISTRATIVE | Facility: HOSPITAL | Age: 76
End: 2021-12-06
Payer: MEDICARE

## 2021-12-06 ENCOUNTER — TELEPHONE (OUTPATIENT)
Dept: FAMILY MEDICINE | Facility: CLINIC | Age: 76
End: 2021-12-06
Payer: MEDICARE

## 2021-12-06 DIAGNOSIS — M94.9 DISORDER OF CARTILAGE, UNSPECIFIED: ICD-10-CM

## 2021-12-06 DIAGNOSIS — E78.5 HYPERLIPIDEMIA, UNSPECIFIED HYPERLIPIDEMIA TYPE: ICD-10-CM

## 2021-12-06 DIAGNOSIS — Z00.00 WELLNESS EXAMINATION: Primary | ICD-10-CM

## 2021-12-06 DIAGNOSIS — Z12.5 ENCOUNTER FOR SCREENING FOR MALIGNANT NEOPLASM OF PROSTATE: ICD-10-CM

## 2021-12-06 DIAGNOSIS — I10 ESSENTIAL HYPERTENSION: ICD-10-CM

## 2021-12-08 ENCOUNTER — LAB VISIT (OUTPATIENT)
Dept: LAB | Facility: HOSPITAL | Age: 76
End: 2021-12-08
Attending: FAMILY MEDICINE
Payer: MEDICARE

## 2021-12-08 DIAGNOSIS — I10 ESSENTIAL HYPERTENSION: ICD-10-CM

## 2021-12-08 DIAGNOSIS — E78.5 HYPERLIPIDEMIA, UNSPECIFIED HYPERLIPIDEMIA TYPE: ICD-10-CM

## 2021-12-08 DIAGNOSIS — Z12.5 ENCOUNTER FOR SCREENING FOR MALIGNANT NEOPLASM OF PROSTATE: ICD-10-CM

## 2021-12-08 DIAGNOSIS — Z00.00 WELLNESS EXAMINATION: ICD-10-CM

## 2021-12-08 DIAGNOSIS — M94.9 DISORDER OF CARTILAGE, UNSPECIFIED: ICD-10-CM

## 2021-12-08 LAB
25(OH)D3+25(OH)D2 SERPL-MCNC: 90 NG/ML (ref 30–96)
ALBUMIN SERPL BCP-MCNC: 4.6 G/DL (ref 3.5–5.2)
ALP SERPL-CCNC: 110 U/L (ref 38–126)
ALT SERPL W/O P-5'-P-CCNC: 28 U/L (ref 10–44)
ANION GAP SERPL CALC-SCNC: 14 MMOL/L (ref 8–16)
AST SERPL-CCNC: 28 U/L (ref 15–46)
BASOPHILS # BLD AUTO: 0.05 K/UL (ref 0–0.2)
BASOPHILS NFR BLD: 0.5 % (ref 0–1.9)
BILIRUB SERPL-MCNC: 0.6 MG/DL (ref 0.1–1)
CALCIUM SERPL-MCNC: 9.3 MG/DL (ref 8.7–10.5)
CHLORIDE SERPL-SCNC: 103 MMOL/L (ref 95–110)
CHOLEST SERPL-MCNC: 145 MG/DL (ref 120–199)
CHOLEST/HDLC SERPL: 3.2 {RATIO} (ref 2–5)
CO2 SERPL-SCNC: 27 MMOL/L (ref 23–29)
COMPLEXED PSA SERPL-MCNC: 11.7 NG/ML (ref 0–4)
CREAT SERPL-MCNC: 1.85 MG/DL (ref 0.5–1.4)
DIFFERENTIAL METHOD: ABNORMAL
EOSINOPHIL # BLD AUTO: 0.1 K/UL (ref 0–0.5)
EOSINOPHIL NFR BLD: 0.7 % (ref 0–8)
ERYTHROCYTE [DISTWIDTH] IN BLOOD BY AUTOMATED COUNT: 13.3 % (ref 11.5–14.5)
EST. GFR  (AFRICAN AMERICAN): 40.3 ML/MIN/1.73 M^2
EST. GFR  (NON AFRICAN AMERICAN): 34.8 ML/MIN/1.73 M^2
GLUCOSE SERPL-MCNC: 113 MG/DL (ref 70–110)
HCT VFR BLD AUTO: 44.8 % (ref 40–54)
HDLC SERPL-MCNC: 45 MG/DL (ref 40–75)
HDLC SERPL: 31 % (ref 20–50)
HGB BLD-MCNC: 14.7 G/DL (ref 14–18)
IMM GRANULOCYTES # BLD AUTO: 0.03 K/UL (ref 0–0.04)
IMM GRANULOCYTES NFR BLD AUTO: 0.3 % (ref 0–0.5)
LDLC SERPL CALC-MCNC: 83.2 MG/DL (ref 63–159)
LYMPHOCYTES # BLD AUTO: 1.2 K/UL (ref 1–4.8)
LYMPHOCYTES NFR BLD: 12.7 % (ref 18–48)
MCH RBC QN AUTO: 30.9 PG (ref 27–31)
MCHC RBC AUTO-ENTMCNC: 32.8 G/DL (ref 32–36)
MCV RBC AUTO: 94 FL (ref 82–98)
MONOCYTES # BLD AUTO: 0.6 K/UL (ref 0.3–1)
MONOCYTES NFR BLD: 6.6 % (ref 4–15)
NEUTROPHILS # BLD AUTO: 7.3 K/UL (ref 1.8–7.7)
NEUTROPHILS NFR BLD: 79.2 % (ref 38–73)
NONHDLC SERPL-MCNC: 100 MG/DL
NRBC BLD-RTO: 0 /100 WBC
PLATELET # BLD AUTO: 338 K/UL (ref 150–450)
PMV BLD AUTO: 9.4 FL (ref 9.2–12.9)
POTASSIUM SERPL-SCNC: 4.1 MMOL/L (ref 3.5–5.1)
PROT SERPL-MCNC: 8.4 G/DL (ref 6–8.4)
RBC # BLD AUTO: 4.76 M/UL (ref 4.6–6.2)
SODIUM SERPL-SCNC: 144 MMOL/L (ref 136–145)
TRIGL SERPL-MCNC: 84 MG/DL (ref 30–150)
TSH SERPL DL<=0.005 MIU/L-ACNC: 1.53 UIU/ML (ref 0.4–4)
UUN UR-MCNC: 28 MG/DL (ref 2–20)
WBC # BLD AUTO: 9.2 K/UL (ref 3.9–12.7)

## 2021-12-08 PROCEDURE — 80053 COMPREHEN METABOLIC PANEL: CPT | Mod: PO | Performed by: FAMILY MEDICINE

## 2021-12-08 PROCEDURE — 80061 LIPID PANEL: CPT | Performed by: FAMILY MEDICINE

## 2021-12-08 PROCEDURE — 84153 ASSAY OF PSA TOTAL: CPT | Performed by: FAMILY MEDICINE

## 2021-12-08 PROCEDURE — 84443 ASSAY THYROID STIM HORMONE: CPT | Mod: PO | Performed by: FAMILY MEDICINE

## 2021-12-08 PROCEDURE — 82306 VITAMIN D 25 HYDROXY: CPT | Mod: PO | Performed by: FAMILY MEDICINE

## 2021-12-08 PROCEDURE — 36415 COLL VENOUS BLD VENIPUNCTURE: CPT | Mod: PO | Performed by: FAMILY MEDICINE

## 2021-12-08 PROCEDURE — 85025 COMPLETE CBC W/AUTO DIFF WBC: CPT | Mod: PO | Performed by: FAMILY MEDICINE

## 2021-12-14 ENCOUNTER — TELEPHONE (OUTPATIENT)
Dept: FAMILY MEDICINE | Facility: CLINIC | Age: 76
End: 2021-12-14
Payer: MEDICARE

## 2021-12-14 ENCOUNTER — OFFICE VISIT (OUTPATIENT)
Dept: FAMILY MEDICINE | Facility: CLINIC | Age: 76
End: 2021-12-14
Payer: MEDICARE

## 2021-12-14 ENCOUNTER — TELEPHONE (OUTPATIENT)
Dept: FAMILY MEDICINE | Facility: CLINIC | Age: 76
End: 2021-12-14

## 2021-12-14 VITALS
OXYGEN SATURATION: 96 % | SYSTOLIC BLOOD PRESSURE: 128 MMHG | HEART RATE: 103 BPM | WEIGHT: 250.69 LBS | DIASTOLIC BLOOD PRESSURE: 60 MMHG | TEMPERATURE: 98 F | BODY MASS INDEX: 33.22 KG/M2 | HEIGHT: 73 IN

## 2021-12-14 DIAGNOSIS — K59.00 CONSTIPATION, UNSPECIFIED CONSTIPATION TYPE: ICD-10-CM

## 2021-12-14 DIAGNOSIS — D12.6 ADENOMATOUS POLYP OF COLON, UNSPECIFIED PART OF COLON: ICD-10-CM

## 2021-12-14 DIAGNOSIS — R79.89 CREATININE ELEVATION: ICD-10-CM

## 2021-12-14 DIAGNOSIS — R97.20 ELEVATED PSA: ICD-10-CM

## 2021-12-14 DIAGNOSIS — Z00.00 WELLNESS EXAMINATION: Primary | ICD-10-CM

## 2021-12-14 PROBLEM — K63.5 COLON POLYP: Status: ACTIVE | Noted: 2018-09-06

## 2021-12-14 PROCEDURE — 99499 UNLISTED E&M SERVICE: CPT | Mod: S$GLB,,, | Performed by: FAMILY MEDICINE

## 2021-12-14 PROCEDURE — 4010F PR ACE/ARB THEARPY RXD/TAKEN: ICD-10-PCS | Mod: CPTII,S$GLB,, | Performed by: FAMILY MEDICINE

## 2021-12-14 PROCEDURE — 4010F ACE/ARB THERAPY RXD/TAKEN: CPT | Mod: CPTII,S$GLB,, | Performed by: FAMILY MEDICINE

## 2021-12-14 PROCEDURE — 99214 PR OFFICE/OUTPT VISIT, EST, LEVL IV, 30-39 MIN: ICD-10-PCS | Mod: S$GLB,,, | Performed by: FAMILY MEDICINE

## 2021-12-14 PROCEDURE — 99214 OFFICE O/P EST MOD 30 MIN: CPT | Mod: S$GLB,,, | Performed by: FAMILY MEDICINE

## 2021-12-14 PROCEDURE — 99499 RISK ADDL DX/OHS AUDIT: ICD-10-PCS | Mod: S$GLB,,, | Performed by: FAMILY MEDICINE

## 2021-12-14 RX ORDER — CLOTRIMAZOLE AND BETAMETHASONE DIPROPIONATE 10; .64 MG/G; MG/G
CREAM TOPICAL 2 TIMES DAILY
COMMUNITY
Start: 2021-08-25 | End: 2022-04-04 | Stop reason: SDUPTHER

## 2022-01-04 ENCOUNTER — TELEPHONE (OUTPATIENT)
Dept: FAMILY MEDICINE | Facility: CLINIC | Age: 77
End: 2022-01-04
Payer: MEDICARE

## 2022-01-04 ENCOUNTER — HOSPITAL ENCOUNTER (EMERGENCY)
Facility: HOSPITAL | Age: 77
Discharge: HOME | End: 2022-01-04
Attending: EMERGENCY MEDICINE
Payer: MEDICARE

## 2022-01-04 ENCOUNTER — HOSPITAL ENCOUNTER (OUTPATIENT)
Dept: RADIOLOGY | Facility: HOSPITAL | Age: 77
Discharge: HOME | End: 2022-01-04
Attending: FAMILY MEDICINE
Payer: MEDICARE

## 2022-01-04 VITALS
OXYGEN SATURATION: 97 % | TEMPERATURE: 98 F | DIASTOLIC BLOOD PRESSURE: 64 MMHG | SYSTOLIC BLOOD PRESSURE: 128 MMHG | HEIGHT: 73 IN | HEART RATE: 93 BPM | WEIGHT: 250 LBS | BODY MASS INDEX: 33.13 KG/M2 | RESPIRATION RATE: 20 BRPM

## 2022-01-04 DIAGNOSIS — R79.89 CREATININE ELEVATION: Primary | ICD-10-CM

## 2022-01-04 DIAGNOSIS — R33.9 URINARY RETENTION: Primary | ICD-10-CM

## 2022-01-04 DIAGNOSIS — R79.89 CREATININE ELEVATION: ICD-10-CM

## 2022-01-04 LAB
BACTERIA #/AREA URNS AUTO: NORMAL /HPF
BILIRUB UR QL STRIP: NEGATIVE
CLARITY UR REFRACT.AUTO: CLEAR
COLOR UR AUTO: ABNORMAL
GLUCOSE UR QL STRIP: NEGATIVE
HGB UR QL STRIP: NEGATIVE
HYALINE CASTS UR QL AUTO: 0 /LPF
KETONES UR QL STRIP: NEGATIVE
LEUKOCYTE ESTERASE UR QL STRIP: NEGATIVE
MICROSCOPIC COMMENT: NORMAL
NITRITE UR QL STRIP: NEGATIVE
PH UR STRIP: 7 [PH] (ref 5–8)
PROT UR QL STRIP: ABNORMAL
RBC #/AREA URNS AUTO: 3 /HPF (ref 0–4)
SP GR UR STRIP: 1.01 (ref 1–1.03)
URN SPEC COLLECT METH UR: ABNORMAL
UROBILINOGEN UR STRIP-ACNC: NEGATIVE EU/DL
WBC #/AREA URNS AUTO: 2 /HPF (ref 0–5)

## 2022-01-04 PROCEDURE — 76770 US EXAM ABDO BACK WALL COMP: CPT | Mod: TC,PO

## 2022-01-04 PROCEDURE — 99282 EMERGENCY DEPT VISIT SF MDM: CPT | Mod: 25,ER

## 2022-01-04 PROCEDURE — 81000 URINALYSIS NONAUTO W/SCOPE: CPT | Mod: ER | Performed by: EMERGENCY MEDICINE

## 2022-01-04 RX ORDER — SODIUM CHLORIDE 9 MG/ML
INJECTION, SOLUTION INTRAVENOUS
Status: DISCONTINUED | OUTPATIENT
Start: 2022-01-04 | End: 2022-01-04 | Stop reason: HOSPADM

## 2022-01-04 NOTE — PROGRESS NOTES
Pt notified of result and need for Jones; will go [place] [facility] to [place] [facility], left in and we will help get sooner appt wioth urology; has one with Dr Luis March 4th.

## 2022-01-04 NOTE — TELEPHONE ENCOUNTER
Dr. De La Torre would like for pt to be seen by Dr. Luis sooner than March. Can you assist in scheduling a sooner appt? If so, please contact pt to arrange. Thanks

## 2022-01-04 NOTE — TELEPHONE ENCOUNTER
Spoke with patient, informed of appointment (date/time convenient for him) and location.  He mentioned he was a  and held urine for long periods of time.  He said he was surprised he had that amount of urine in his bladder because his urinary pattern had not changed in years.   Informed Dr Luis will discuss his concerns with him on next week.  Mailing appointment to residence and he will receive text notification for appointment.  He voiced understanding.

## 2022-01-04 NOTE — TELEPHONE ENCOUNTER
----- Message from Anthony De La Torre MD sent at 1/4/2022 10:09 AM CST -----  Pt notified of result and need for Jones; will [facility] to [facility] it inserted, left in and we will help get sooner appt wioth urology; has one with Dr Luis March 4th.

## 2022-01-04 NOTE — ED PROVIDER NOTES
Encounter Date: 1/4/2022 76-year-old male presents for Jones placement.  Patient states he was sent here by his PCP as he was told he an extremely large bladder.  Patient states always have a large bladder I used to be a .  I explained to the him that it was dangerous to have urinary retention, that he could permanently damaged his bladder, as well as cause permanent kidney damage.  He denies any weakness fever chills cough or sputum production denies chest pain shortness of breath.  Denies recent illness.       History     Chief Complaint   Patient presents with    Urinary Retention     Pt reports urinary retention. States he had an ultrasound done today and was told to come to ED for catheter placement.      HPI  Review of patient's allergies indicates:  No Known Allergies  Past Medical History:   Diagnosis Date    Colon polyp 09/06/2018    Hyperlipidemia     Hypertension      Past Surgical History:   Procedure Laterality Date    COLONOSCOPY  2010    COLONOSCOPY N/A 9/6/2018    Procedure: COLONOSCOPY Suprep PLEASE TEXT PATIENT WITH ARRIVAL TIME;  Surgeon: Niharika Arce MD;  Location: [facility];  Service: Endoscopy;  Laterality: N/A;    KNEE ARTHROSCOPY      SPINE SURGERY      l-spine     Family History   Problem Relation Age of Onset    Hypertension Mother     Heart attack Father     Cirrhosis Father     No Known Problems Brother     Seizures Daughter     No Known Problems Son     No Known Problems Brother     Liver disease Daughter      Social History     Tobacco Use    Smoking status: Former Smoker    Smokeless tobacco: Never Used   Substance Use Topics    Alcohol use: Yes     Comment: occ    Drug use: Yes     Types: Marijuana     Review of Systems   Constitutional: Negative for fever.   HENT: Negative for sore throat.    Respiratory: Negative for shortness of breath.    Cardiovascular: Negative for chest pain.   Gastrointestinal: Positive for abdominal distention and abdominal  pain. Negative for nausea.   Genitourinary: Negative for dysuria.   Musculoskeletal: Negative for back pain.   Skin: Negative for rash.   Neurological: Negative for weakness.   Hematological: Does not bruise/bleed easily.       Physical Exam     Initial Vitals [01/04/22 1119]   BP Pulse Resp Temp SpO2   128/64 93 20 98 °F (36.7 °C) 97 %      MAP       --         Physical Exam    Nursing note and vitals reviewed.  Constitutional: He appears well-developed and well-nourished. He is not diaphoretic. No distress.   HENT:   Head: Normocephalic and atraumatic.   Mouth/Throat: Oropharynx is clear and moist.   Eyes: Conjunctivae and EOM are normal. Pupils are equal, round, and reactive to light.   Neck: Neck supple.   Normal range of motion.  Cardiovascular: Normal rate, regular rhythm, normal heart sounds and intact distal pulses. Exam reveals no gallop and no friction rub.    No murmur heard.  Pulmonary/Chest: Breath sounds normal. He has no wheezes. He has no rhonchi. He has no rales.   Abdominal: Abdomen is soft. He exhibits no distension. There is no abdominal tenderness.   Urinary retention There is no rebound and no guarding.   Musculoskeletal:         General: Normal range of motion.      Cervical back: Normal range of motion and neck supple.     Neurological: He is alert and oriented to person, place, and time. He has normal strength.   Skin: Skin is warm and dry. No rash noted. No erythema.   Psychiatric: He has a normal mood and affect.         ED Course   Procedures  Labs Reviewed   CBC W/ AUTO DIFFERENTIAL   COMPREHENSIVE METABOLIC PANEL   URINALYSIS, REFLEX TO URINE CULTURE          Imaging Results    None          Medications   0.9%  NaCl infusion (has no administration in time range)     Medical Decision Making:   ED Management:  76-year-old male presents for evaluation of urinary retention.  The patient refused any further evaluation after having his Jones placed.  The patient at 3600 cc of urine from his  Jones.  He elected to sign out AMA and will follow-up with his PCP.  He states he already has a urologist appointment.                      Clinical Impression:   Final diagnoses:  [R33.9] Urinary retention (Primary)          ED Disposition Condition    TERESE Calderón MD  01/04/22 [id number]

## 2022-01-11 ENCOUNTER — TELEPHONE (OUTPATIENT)
Dept: FAMILY MEDICINE | Facility: CLINIC | Age: 77
End: 2022-01-11
Payer: MEDICARE

## 2022-01-11 ENCOUNTER — TELEPHONE (OUTPATIENT)
Dept: FAMILY MEDICINE | Facility: CLINIC | Age: 77
End: 2022-01-11

## 2022-01-11 ENCOUNTER — LAB VISIT (OUTPATIENT)
Dept: LAB | Facility: HOSPITAL | Age: 77
End: 2022-01-11
Attending: FAMILY MEDICINE
Payer: MEDICARE

## 2022-01-11 ENCOUNTER — CLINICAL SUPPORT (OUTPATIENT)
Dept: FAMILY MEDICINE | Facility: CLINIC | Age: 77
End: 2022-01-11
Payer: MEDICARE

## 2022-01-11 DIAGNOSIS — N39.0 URINARY TRACT INFECTION ASSOCIATED WITH INDWELLING URETHRAL CATHETER, INITIAL ENCOUNTER: ICD-10-CM

## 2022-01-11 DIAGNOSIS — N39.0 URINARY TRACT INFECTION ASSOCIATED WITH INDWELLING URETHRAL CATHETER, INITIAL ENCOUNTER: Primary | ICD-10-CM

## 2022-01-11 DIAGNOSIS — T83.511A URINARY TRACT INFECTION ASSOCIATED WITH INDWELLING URETHRAL CATHETER, INITIAL ENCOUNTER: Primary | ICD-10-CM

## 2022-01-11 DIAGNOSIS — R79.89 CREATININE ELEVATION: ICD-10-CM

## 2022-01-11 DIAGNOSIS — T83.511A URINARY TRACT INFECTION ASSOCIATED WITH INDWELLING URETHRAL CATHETER, INITIAL ENCOUNTER: ICD-10-CM

## 2022-01-11 LAB
ANION GAP SERPL CALC-SCNC: 10 MMOL/L (ref 8–16)
BACTERIA #/AREA URNS AUTO: ABNORMAL /HPF
BILIRUB UR QL STRIP: NEGATIVE
CALCIUM SERPL-MCNC: 9 MG/DL (ref 8.7–10.5)
CHLORIDE SERPL-SCNC: 103 MMOL/L (ref 95–110)
CLARITY UR REFRACT.AUTO: ABNORMAL
CO2 SERPL-SCNC: 29 MMOL/L (ref 23–29)
COLOR UR AUTO: YELLOW
CREAT SERPL-MCNC: 1.21 MG/DL (ref 0.5–1.4)
EST. GFR  (AFRICAN AMERICAN): >60 ML/MIN/1.73 M^2
EST. GFR  (NON AFRICAN AMERICAN): 58.2 ML/MIN/1.73 M^2
GLUCOSE SERPL-MCNC: 167 MG/DL (ref 70–110)
GLUCOSE UR QL STRIP: NEGATIVE
HGB UR QL STRIP: ABNORMAL
HYALINE CASTS UR QL AUTO: 0 /LPF
KETONES UR QL STRIP: NEGATIVE
LEUKOCYTE ESTERASE UR QL STRIP: ABNORMAL
MICROSCOPIC COMMENT: ABNORMAL
NITRITE UR QL STRIP: NEGATIVE
PH UR STRIP: 5 [PH] (ref 5–8)
POTASSIUM SERPL-SCNC: 4 MMOL/L (ref 3.5–5.1)
PROT UR QL STRIP: ABNORMAL
RBC #/AREA URNS AUTO: >100 /HPF (ref 0–4)
SODIUM SERPL-SCNC: 142 MMOL/L (ref 136–145)
SP GR UR STRIP: 1.01 (ref 1–1.03)
URN SPEC COLLECT METH UR: ABNORMAL
UUN UR-MCNC: 17 MG/DL (ref 2–20)
WBC #/AREA URNS AUTO: >100 /HPF (ref 0–5)
WBC CLUMPS UR QL AUTO: ABNORMAL

## 2022-01-11 PROCEDURE — 87086 URINE CULTURE/COLONY COUNT: CPT | Performed by: FAMILY MEDICINE

## 2022-01-11 PROCEDURE — 80048 BASIC METABOLIC PNL TOTAL CA: CPT | Mod: PO | Performed by: FAMILY MEDICINE

## 2022-01-11 PROCEDURE — 81001 URINALYSIS AUTO W/SCOPE: CPT | Performed by: FAMILY MEDICINE

## 2022-01-11 PROCEDURE — 36415 COLL VENOUS BLD VENIPUNCTURE: CPT | Mod: PO | Performed by: FAMILY MEDICINE

## 2022-01-11 RX ORDER — CIPROFLOXACIN 500 MG/1
500 TABLET ORAL 2 TIMES DAILY
Qty: 20 TABLET | Refills: 0 | Status: SHIPPED | OUTPATIENT
Start: 2022-01-11 | End: 2022-01-21

## 2022-01-11 NOTE — TELEPHONE ENCOUNTER
----- Message from Audelia Lackey sent at 1/11/2022  9:56 AM CST -----  Contact: [phone]  Who Called: PT  Regarding: would like something called in for bladder infection (cloudy urine, has a smell,in a little pain )   Would the patient rather a call back or a response via [facility]? Call back  Best Call Back Number: [phone]  Additional Information:

## 2022-01-11 NOTE — TELEPHONE ENCOUNTER
Pt coming in soon for us to get urine sample from his Jones for probably uti since it was inserted; orders placed.

## 2022-01-12 ENCOUNTER — TELEPHONE (OUTPATIENT)
Dept: FAMILY MEDICINE | Facility: CLINIC | Age: 77
End: 2022-01-12
Payer: MEDICARE

## 2022-01-12 DIAGNOSIS — R73.9 HYPERGLYCEMIA: Primary | ICD-10-CM

## 2022-01-12 LAB
BACTERIA UR CULT: NORMAL
BACTERIA UR CULT: NORMAL

## 2022-01-14 ENCOUNTER — OFFICE VISIT (OUTPATIENT)
Dept: UROLOGY | Facility: CLINIC | Age: 77
End: 2022-01-14
Payer: MEDICARE

## 2022-01-14 ENCOUNTER — TELEPHONE (OUTPATIENT)
Dept: FAMILY MEDICINE | Facility: CLINIC | Age: 77
End: 2022-01-14
Payer: MEDICARE

## 2022-01-14 VITALS
DIASTOLIC BLOOD PRESSURE: 72 MMHG | HEART RATE: 91 BPM | BODY MASS INDEX: 32.17 KG/M2 | SYSTOLIC BLOOD PRESSURE: 139 MMHG | WEIGHT: 243.81 LBS

## 2022-01-14 DIAGNOSIS — N13.8 BPH WITH OBSTRUCTION/LOWER URINARY TRACT SYMPTOMS: ICD-10-CM

## 2022-01-14 DIAGNOSIS — R33.9 URINARY RETENTION: Primary | ICD-10-CM

## 2022-01-14 DIAGNOSIS — R97.20 ELEVATED PSA: ICD-10-CM

## 2022-01-14 DIAGNOSIS — N40.1 BPH WITH OBSTRUCTION/LOWER URINARY TRACT SYMPTOMS: ICD-10-CM

## 2022-01-14 PROCEDURE — 99204 PR OFFICE/OUTPT VISIT, NEW, LEVL IV, 45-59 MIN: ICD-10-PCS | Mod: S$GLB,,, | Performed by: STUDENT IN AN ORGANIZED HEALTH CARE EDUCATION/TRAINING PROGRAM

## 2022-01-14 PROCEDURE — 1159F PR MEDICATION LIST DOCUMENTED IN MEDICAL RECORD: ICD-10-PCS | Mod: CPTII,S$GLB,, | Performed by: STUDENT IN AN ORGANIZED HEALTH CARE EDUCATION/TRAINING PROGRAM

## 2022-01-14 PROCEDURE — 1160F PR REVIEW ALL MEDS BY PRESCRIBER/CLIN PHARMACIST DOCUMENTED: ICD-10-PCS | Mod: CPTII,S$GLB,, | Performed by: STUDENT IN AN ORGANIZED HEALTH CARE EDUCATION/TRAINING PROGRAM

## 2022-01-14 PROCEDURE — 99999 PR PBB SHADOW E&M-EST. PATIENT-LVL IV: ICD-10-PCS | Mod: PBBFAC,,, | Performed by: STUDENT IN AN ORGANIZED HEALTH CARE EDUCATION/TRAINING PROGRAM

## 2022-01-14 PROCEDURE — 1126F PR PAIN SEVERITY QUANTIFIED, NO PAIN PRESENT: ICD-10-PCS | Mod: CPTII,S$GLB,, | Performed by: STUDENT IN AN ORGANIZED HEALTH CARE EDUCATION/TRAINING PROGRAM

## 2022-01-14 PROCEDURE — 3075F PR MOST RECENT SYSTOLIC BLOOD PRESS GE 130-139MM HG: ICD-10-PCS | Mod: CPTII,S$GLB,, | Performed by: STUDENT IN AN ORGANIZED HEALTH CARE EDUCATION/TRAINING PROGRAM

## 2022-01-14 PROCEDURE — 1159F MED LIST DOCD IN RCRD: CPT | Mod: CPTII,S$GLB,, | Performed by: STUDENT IN AN ORGANIZED HEALTH CARE EDUCATION/TRAINING PROGRAM

## 2022-01-14 PROCEDURE — 3078F PR MOST RECENT DIASTOLIC BLOOD PRESSURE < 80 MM HG: ICD-10-PCS | Mod: CPTII,S$GLB,, | Performed by: STUDENT IN AN ORGANIZED HEALTH CARE EDUCATION/TRAINING PROGRAM

## 2022-01-14 PROCEDURE — 99204 OFFICE O/P NEW MOD 45 MIN: CPT | Mod: S$GLB,,, | Performed by: STUDENT IN AN ORGANIZED HEALTH CARE EDUCATION/TRAINING PROGRAM

## 2022-01-14 PROCEDURE — 3078F DIAST BP <80 MM HG: CPT | Mod: CPTII,S$GLB,, | Performed by: STUDENT IN AN ORGANIZED HEALTH CARE EDUCATION/TRAINING PROGRAM

## 2022-01-14 PROCEDURE — 99999 PR PBB SHADOW E&M-EST. PATIENT-LVL IV: CPT | Mod: PBBFAC,,, | Performed by: STUDENT IN AN ORGANIZED HEALTH CARE EDUCATION/TRAINING PROGRAM

## 2022-01-14 PROCEDURE — 1126F AMNT PAIN NOTED NONE PRSNT: CPT | Mod: CPTII,S$GLB,, | Performed by: STUDENT IN AN ORGANIZED HEALTH CARE EDUCATION/TRAINING PROGRAM

## 2022-01-14 PROCEDURE — 3075F SYST BP GE 130 - 139MM HG: CPT | Mod: CPTII,S$GLB,, | Performed by: STUDENT IN AN ORGANIZED HEALTH CARE EDUCATION/TRAINING PROGRAM

## 2022-01-14 PROCEDURE — 1160F RVW MEDS BY RX/DR IN RCRD: CPT | Mod: CPTII,S$GLB,, | Performed by: STUDENT IN AN ORGANIZED HEALTH CARE EDUCATION/TRAINING PROGRAM

## 2022-01-14 RX ORDER — TAMSULOSIN HYDROCHLORIDE 0.4 MG/1
0.4 CAPSULE ORAL DAILY
Qty: 90 CAPSULE | Refills: 3 | Status: SHIPPED | OUTPATIENT
Start: 2022-01-14 | End: 2022-12-27

## 2022-01-14 RX ORDER — FINASTERIDE 5 MG/1
5 TABLET, FILM COATED ORAL DAILY
Qty: 90 TABLET | Refills: 3 | Status: SHIPPED | OUTPATIENT
Start: 2022-01-14 | End: 2023-03-27 | Stop reason: SDUPTHER

## 2022-01-14 NOTE — TELEPHONE ENCOUNTER
----- Message from Anthony De La Torre MD sent at 1/12/2022  6:19 AM CST -----  Kidneys back to normal

## 2022-01-14 NOTE — PROGRESS NOTES
"Subjective:       Patient ID: Noah Lopez is a 76 y.o. male.    Chief Complaint: Elevated PSA (Urinary retention/bedoya in place )  This is a 76 y.o.  male patient that is new to me.  The patient was referred to me by Dr. De La Torre for urinary retention. Dr. De La Torre obtained routine bloodwork in 12/2021. His Cr was elevated from baseline. He then ordered a renal US 1/4/22 - marked distention of bladder, no hydronephrosis bilaterally, enlarged prostate vol 197g, and pt presented to ER per his recommendation on for a bedoya placement.   ER visit 1/4/22 - 3600 cc of urine from his Bedoya. Was not started on medications.      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 He feels that his "sphincter" has relaxed more.       LAST PSA  Lab Results   Component Value Date    PSA 11.7 (H) 12/08/2021       Lab Results   Component Value Date    CREATININE 1.21 01/11/2022       ---  Past Medical History:   Diagnosis Date    Colon polyp 09/06/2018    Hyperlipidemia     Hypertension        Past Surgical History:   Procedure Laterality Date    COLONOSCOPY  2010    COLONOSCOPY N/A 9/6/2018    Procedure: COLONOSCOPY Suprep PLEASE TEXT PATIENT WITH ARRIVAL TIME;  Surgeon: Niharika Arce MD;  Location: [facility];  Service: Endoscopy;  Laterality: N/A;    KNEE ARTHROSCOPY      SPINE SURGERY      l-spine       Family History   Problem Relation Age of Onset    Hypertension Mother     Heart attack Father     Cirrhosis Father     No Known Problems Brother     Seizures Daughter     No Known Problems Son     No Known Problems Brother     Liver disease Daughter        Social History     Tobacco Use    Smoking status: Former Smoker    Smokeless tobacco: Never Used   Substance Use Topics    Alcohol use: Yes     Comment: occ    Drug use: Yes     Types: Marijuana       Current Outpatient Medications on File Prior to Visit   Medication Sig Dispense Refill    " ascorbic acid, vitamin C, (VITAMIN C) 1000 MG tablet Take 1,000 mg by mouth once daily.      cholecalciferol, vitamin D3, (VITAMIN D3 ORAL) Take 125 mcg by mouth.      ciprofloxacin HCl (CIPRO) 500 MG tablet Take 1 tablet (500 mg total) by mouth 2 (two) times daily. for 10 days 20 tablet 0    clotrimazole-betamethasone 1-0.05% (LOTRISONE) cream Apply topically 2 (two) times daily.      diltiaZEM (CARDIZEM CD) 300 MG 24 hr capsule TAKE 1 CAPSULE BY MOUTH  ONCE DAILY 90 capsule 3    lisinopriL (PRINIVIL,ZESTRIL) 20 MG tablet TAKE 1 TABLET BY MOUTH ONCE DAILY 90 tablet 3    ofloxacin (FLOXIN) 0.3 % otic solution Apply 10 drops into right ear once a day for 10 days.  Please dispense generic.      simvastatin (ZOCOR) 20 MG tablet TAKE 1 TABLET BY MOUTH IN  THE EVENING 90 tablet 3    TURMERIC ORAL Take by mouth.      zinc gluconate 50 mg tablet Take 50 mg by mouth once daily.       No current facility-administered medications on file prior to visit.       Review of patient's allergies indicates:  No Known Allergies    Review of Systems   Constitutional: Negative for chills.   HENT: Negative for congestion.    Eyes: Negative for visual disturbance.   Respiratory: Negative for shortness of breath.    Cardiovascular: Negative for chest pain.   Gastrointestinal: Negative for abdominal distention.   Musculoskeletal: Negative for gait problem.   Skin: Negative for color change.   Neurological: Negative for dizziness.   Psychiatric/Behavioral: Negative for agitation.       Objective:      Physical Exam  Constitutional:       Appearance: He is well-developed and well-nourished.   HENT:      Head: Normocephalic.   Eyes:      Pupils: Pupils are equal, round, and reactive to light.   Cardiovascular:      Pulses: Intact distal pulses.   Pulmonary:      Effort: Pulmonary effort is normal.   Abdominal:      Palpations: Abdomen is soft.   Musculoskeletal:         General: Normal range of motion.      Cervical back: Normal range  of motion.   Skin:     General: Skin is warm and dry.   Neurological:      Mental Status: He is alert.   Psychiatric:         Mood and Affect: Mood and affect normal.         Assessment:       1. Urinary retention    2. Elevated PSA    3. BPH with obstruction/lower urinary tract symptoms        Plan:         1. Pt understands there is a possibility of a neurogenic bladder and that he might fail a voiding trial. His UOP from the bedoya was very high 3600cc.   2. Patient will start flomax/finasteride.   3. RTC in 1 week for voiding trial. If passes, f/u in 1 week for PVR check. If fails, cystoscopy and UDS scheduling will be needed.   4. Will recheck PSA in a few months once we determine bedoya disposition.  5. Provided new leg bag and statlock today.      Urinary retention    Elevated PSA  -     Ambulatory referral/consult to Urology  -     tamsulosin (FLOMAX) 0.4 mg Cap; Take 1 capsule (0.4 mg total) by mouth once daily.  Dispense: 90 capsule; Refill: 3  -     finasteride (PROSCAR) 5 mg tablet; Take 1 tablet (5 mg total) by mouth once daily.  Dispense: 90 tablet; Refill: 3    BPH with obstruction/lower urinary tract symptoms

## 2022-01-15 ENCOUNTER — TELEPHONE (OUTPATIENT)
Dept: FAMILY MEDICINE | Facility: CLINIC | Age: 77
End: 2022-01-15
Payer: MEDICARE

## 2022-01-15 NOTE — TELEPHONE ENCOUNTER
Attempted to call pt.  No answer.  Unable to leave a message.     ----- Message from Anthony De La Torre MD sent at 1/12/2022  6:20 AM CST -----  Needs a1c due to high glucose    Urine is infected; take antibiotic; culture pending

## 2022-01-20 ENCOUNTER — OFFICE VISIT (OUTPATIENT)
Dept: UROLOGY | Facility: CLINIC | Age: 77
End: 2022-01-20
Payer: MEDICARE

## 2022-01-20 VITALS
SYSTOLIC BLOOD PRESSURE: 144 MMHG | HEIGHT: 73 IN | BODY MASS INDEX: 32.17 KG/M2 | HEART RATE: 87 BPM | DIASTOLIC BLOOD PRESSURE: 77 MMHG

## 2022-01-20 DIAGNOSIS — R33.9 URINARY RETENTION: Primary | ICD-10-CM

## 2022-01-20 DIAGNOSIS — N17.9 AKI (ACUTE KIDNEY INJURY): ICD-10-CM

## 2022-01-20 DIAGNOSIS — R33.9 INCOMPLETE EMPTYING OF BLADDER: ICD-10-CM

## 2022-01-20 PROCEDURE — 99499 NO LOS: ICD-10-PCS | Mod: S$GLB,,, | Performed by: STUDENT IN AN ORGANIZED HEALTH CARE EDUCATION/TRAINING PROGRAM

## 2022-01-20 PROCEDURE — 51700 IRRIGATION OF BLADDER: CPT | Mod: S$GLB,,, | Performed by: STUDENT IN AN ORGANIZED HEALTH CARE EDUCATION/TRAINING PROGRAM

## 2022-01-20 PROCEDURE — 1126F AMNT PAIN NOTED NONE PRSNT: CPT | Mod: CPTII,S$GLB,, | Performed by: STUDENT IN AN ORGANIZED HEALTH CARE EDUCATION/TRAINING PROGRAM

## 2022-01-20 PROCEDURE — 1101F PT FALLS ASSESS-DOCD LE1/YR: CPT | Mod: CPTII,S$GLB,, | Performed by: STUDENT IN AN ORGANIZED HEALTH CARE EDUCATION/TRAINING PROGRAM

## 2022-01-20 PROCEDURE — 1159F PR MEDICATION LIST DOCUMENTED IN MEDICAL RECORD: ICD-10-PCS | Mod: CPTII,S$GLB,, | Performed by: STUDENT IN AN ORGANIZED HEALTH CARE EDUCATION/TRAINING PROGRAM

## 2022-01-20 PROCEDURE — 99999 PR PBB SHADOW E&M-EST. PATIENT-LVL III: CPT | Mod: PBBFAC,,, | Performed by: STUDENT IN AN ORGANIZED HEALTH CARE EDUCATION/TRAINING PROGRAM

## 2022-01-20 PROCEDURE — 3077F SYST BP >= 140 MM HG: CPT | Mod: CPTII,S$GLB,, | Performed by: STUDENT IN AN ORGANIZED HEALTH CARE EDUCATION/TRAINING PROGRAM

## 2022-01-20 PROCEDURE — 3078F DIAST BP <80 MM HG: CPT | Mod: CPTII,S$GLB,, | Performed by: STUDENT IN AN ORGANIZED HEALTH CARE EDUCATION/TRAINING PROGRAM

## 2022-01-20 PROCEDURE — 99499 UNLISTED E&M SERVICE: CPT | Mod: S$GLB,,, | Performed by: STUDENT IN AN ORGANIZED HEALTH CARE EDUCATION/TRAINING PROGRAM

## 2022-01-20 PROCEDURE — 99999 PR PBB SHADOW E&M-EST. PATIENT-LVL III: ICD-10-PCS | Mod: PBBFAC,,, | Performed by: STUDENT IN AN ORGANIZED HEALTH CARE EDUCATION/TRAINING PROGRAM

## 2022-01-20 PROCEDURE — 1126F PR PAIN SEVERITY QUANTIFIED, NO PAIN PRESENT: ICD-10-PCS | Mod: CPTII,S$GLB,, | Performed by: STUDENT IN AN ORGANIZED HEALTH CARE EDUCATION/TRAINING PROGRAM

## 2022-01-20 PROCEDURE — 1101F PR PT FALLS ASSESS DOC 0-1 FALLS W/OUT INJ PAST YR: ICD-10-PCS | Mod: CPTII,S$GLB,, | Performed by: STUDENT IN AN ORGANIZED HEALTH CARE EDUCATION/TRAINING PROGRAM

## 2022-01-20 PROCEDURE — 1159F MED LIST DOCD IN RCRD: CPT | Mod: CPTII,S$GLB,, | Performed by: STUDENT IN AN ORGANIZED HEALTH CARE EDUCATION/TRAINING PROGRAM

## 2022-01-20 PROCEDURE — 1160F RVW MEDS BY RX/DR IN RCRD: CPT | Mod: CPTII,S$GLB,, | Performed by: STUDENT IN AN ORGANIZED HEALTH CARE EDUCATION/TRAINING PROGRAM

## 2022-01-20 PROCEDURE — 51700 PR IRRIGATION, BLADDER: ICD-10-PCS | Mod: S$GLB,,, | Performed by: STUDENT IN AN ORGANIZED HEALTH CARE EDUCATION/TRAINING PROGRAM

## 2022-01-20 PROCEDURE — 1160F PR REVIEW ALL MEDS BY PRESCRIBER/CLIN PHARMACIST DOCUMENTED: ICD-10-PCS | Mod: CPTII,S$GLB,, | Performed by: STUDENT IN AN ORGANIZED HEALTH CARE EDUCATION/TRAINING PROGRAM

## 2022-01-20 PROCEDURE — 3288F PR FALLS RISK ASSESSMENT DOCUMENTED: ICD-10-PCS | Mod: CPTII,S$GLB,, | Performed by: STUDENT IN AN ORGANIZED HEALTH CARE EDUCATION/TRAINING PROGRAM

## 2022-01-20 PROCEDURE — 3077F PR MOST RECENT SYSTOLIC BLOOD PRESSURE >= 140 MM HG: ICD-10-PCS | Mod: CPTII,S$GLB,, | Performed by: STUDENT IN AN ORGANIZED HEALTH CARE EDUCATION/TRAINING PROGRAM

## 2022-01-20 PROCEDURE — 3078F PR MOST RECENT DIASTOLIC BLOOD PRESSURE < 80 MM HG: ICD-10-PCS | Mod: CPTII,S$GLB,, | Performed by: STUDENT IN AN ORGANIZED HEALTH CARE EDUCATION/TRAINING PROGRAM

## 2022-01-20 PROCEDURE — 3288F FALL RISK ASSESSMENT DOCD: CPT | Mod: CPTII,S$GLB,, | Performed by: STUDENT IN AN ORGANIZED HEALTH CARE EDUCATION/TRAINING PROGRAM

## 2022-01-20 RX ORDER — TRIAMTERENE AND HYDROCHLOROTHIAZIDE 37.5; 25 MG/1; MG/1
CAPSULE ORAL
Status: ON HOLD | COMMUNITY
Start: 2021-12-23 | End: 2022-01-31

## 2022-01-20 NOTE — PROGRESS NOTES
Under sterile technique, 16 Fr inserted into urinary bladder with yellow, clear urine output.  Balloon inflated with 10 cc of NS.  Stat-lock applied to right thigh, catheter connected to leg bag.  Urine scmglz=3675 ml.  Patient teaching provided. Large urinary bag given.  Tolerated well.

## 2022-01-20 NOTE — PROGRESS NOTES
"Subjective:       Patient ID: Noah Lopze is a 77 y.o. male.    Chief Complaint:  Voiding trial  This is a 77 y.o.  male patient that is an established patient of mine.      The patient was referred to me by Dr. De La Torre for urinary retention. Dr. De La Torre obtained routine bloodwork in 12/2021. His Cr was elevated from baseline. He then ordered a renal US 1/4/22 - marked distention of bladder, no hydronephrosis bilaterally, enlarged prostate vol 197g, and pt presented to ER per his recommendation on for a bedoya placement.   ER visit 1/4/22 - 3600 cc of urine from his Bedoya. Was not started on medications.      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 He feels that his "sphincter" has relaxed more.     Started him on flomax and finasteride 1 week ago. Pt wanted a voiding trial this week.     1/20/22  Instilled 1000cc in bladder, removed bedoya - 0850. Pt will try to urinate at home.     LAST PSA  Lab Results   Component Value Date    PSA 11.7 (H) 12/08/2021       Lab Results   Component Value Date    CREATININE 1.21 01/11/2022       ---  Past Medical History:   Diagnosis Date    Colon polyp 09/06/2018    Hyperlipidemia     Hypertension        Past Surgical History:   Procedure Laterality Date    COLONOSCOPY  2010    COLONOSCOPY N/A 9/6/2018    Procedure: COLONOSCOPY Suprep PLEASE TEXT PATIENT WITH ARRIVAL TIME;  Surgeon: Niharika Arce MD;  Location: [facility];  Service: Endoscopy;  Laterality: N/A;    KNEE ARTHROSCOPY      SPINE SURGERY      l-spine       Family History   Problem Relation Age of Onset    Hypertension Mother     Heart attack Father     Cirrhosis Father     No Known Problems Brother     Seizures Daughter     No Known Problems Son     No Known Problems Brother     Liver disease Daughter        Social History     Tobacco Use    Smoking status: Former Smoker    Smokeless tobacco: Never Used   Substance Use " Topics    Alcohol use: Yes     Comment: occ    Drug use: Yes     Types: Marijuana       Current Outpatient Medications on File Prior to Visit   Medication Sig Dispense Refill    ascorbic acid, vitamin C, (VITAMIN C) 1000 MG tablet Take 1,000 mg by mouth once daily.      cholecalciferol, vitamin D3, (VITAMIN D3 ORAL) Take 125 mcg by mouth.      ciprofloxacin HCl (CIPRO) 500 MG tablet Take 1 tablet (500 mg total) by mouth 2 (two) times daily. for 10 days 20 tablet 0    clotrimazole-betamethasone 1-0.05% (LOTRISONE) cream Apply topically 2 (two) times daily.      diltiaZEM (CARDIZEM CD) 300 MG 24 hr capsule TAKE 1 CAPSULE BY MOUTH  ONCE DAILY 90 capsule 3    finasteride (PROSCAR) 5 mg tablet Take 1 tablet (5 mg total) by mouth once daily. 90 tablet 3    lisinopriL (PRINIVIL,ZESTRIL) 20 MG tablet TAKE 1 TABLET BY MOUTH ONCE DAILY 90 tablet 3    ofloxacin (FLOXIN) 0.3 % otic solution Apply 10 drops into right ear once a day for 10 days.  Please dispense generic.      simvastatin (ZOCOR) 20 MG tablet TAKE 1 TABLET BY MOUTH IN  THE EVENING 90 tablet 3    tamsulosin (FLOMAX) 0.4 mg Cap Take 1 capsule (0.4 mg total) by mouth once daily. 90 capsule 3    TURMERIC ORAL Take by mouth.      zinc gluconate 50 mg tablet Take 50 mg by mouth once daily.      triamterene-hydrochlorothiazide 37.5-25 mg (DYAZIDE) 37.5-25 mg per capsule        No current facility-administered medications on file prior to visit.       Review of patient's allergies indicates:  No Known Allergies    Review of Systems   Constitutional: Negative for chills.   HENT: Negative for congestion.    Eyes: Negative for visual disturbance.   Respiratory: Negative for shortness of breath.    Cardiovascular: Negative for chest pain.   Gastrointestinal: Negative for abdominal distention.   Musculoskeletal: Negative for gait problem.   Skin: Negative for color change.   Neurological: Negative for dizziness.   Psychiatric/Behavioral: Negative for agitation.        Objective:      Physical Exam  Constitutional:       Appearance: He is well-developed and well-nourished.   HENT:      Head: Normocephalic.   Eyes:      Pupils: Pupils are equal, round, and reactive to light.   Cardiovascular:      Pulses: Intact distal pulses.   Pulmonary:      Effort: Pulmonary effort is normal.   Abdominal:      Palpations: Abdomen is soft.   Genitourinary:     Comments: Instilled 1000cc of irrigation via bedoya catheter, removed bedoya at 0850  Musculoskeletal:         General: Normal range of motion.      Cervical back: Normal range of motion.   Skin:     General: Skin is warm and dry.   Neurological:      Mental Status: He is alert.   Psychiatric:         Mood and Affect: Mood and affect normal.         Assessment:       1. Urinary retention    2. Incomplete emptying of bladder    3. KIKE (acute kidney injury)        Plan:         1. Patient will hydrate.   2. If no urination in 6 hours he will start making his way back to clinic for reinsertion of a 16 [place]ian straight catheter and will refer for UDS and schedule cystoscopy.      ADDENDUM  It has been over 6 hours and patient has not urinated at all. He returned to clinic as instructed for bedoya reinsertion. 16 Lithuanian bedoya inserted by my staff.  Will schedule cystoscopy with me.  I will reach out to Dr. 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    Urinary retention    Incomplete emptying of bladder    KIKE (acute kidney injury)

## 2022-01-20 NOTE — H&P (VIEW-ONLY)
"Subjective:       Patient ID: Noah Lopez is a 77 y.o. male.    Chief Complaint:  Voiding trial  This is a 77 y.o.  male patient that is an established patient of mine.      The patient was referred to me by Dr. De La Torre for urinary retention. Dr. De La Torre obtained routine bloodwork in 12/2021. His Cr was elevated from baseline. He then ordered a renal US 1/4/22 - marked distention of bladder, no hydronephrosis bilaterally, enlarged prostate vol 197g, and pt presented to ER per his recommendation on for a bedoya placement.   ER visit 1/4/22 - 3600 cc of urine from his Bedoya. Was not started on medications.      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 He feels that his "sphincter" has relaxed more.     Started him on flomax and finasteride 1 week ago. Pt wanted a voiding trial this week.     1/20/22  Instilled 1000cc in bladder, removed bedoya - 0850. Pt will try to urinate at home.     LAST PSA  Lab Results   Component Value Date    PSA 11.7 (H) 12/08/2021       Lab Results   Component Value Date    CREATININE 1.21 01/11/2022       ---  Past Medical History:   Diagnosis Date    Colon polyp 09/06/2018    Hyperlipidemia     Hypertension        Past Surgical History:   Procedure Laterality Date    COLONOSCOPY  2010    COLONOSCOPY N/A 9/6/2018    Procedure: COLONOSCOPY Suprep PLEASE TEXT PATIENT WITH ARRIVAL TIME;  Surgeon: Niharika Arce MD;  Location: [facility];  Service: Endoscopy;  Laterality: N/A;    KNEE ARTHROSCOPY      SPINE SURGERY      l-spine       Family History   Problem Relation Age of Onset    Hypertension Mother     Heart attack Father     Cirrhosis Father     No Known Problems Brother     Seizures Daughter     No Known Problems Son     No Known Problems Brother     Liver disease Daughter        Social History     Tobacco Use    Smoking status: Former Smoker    Smokeless tobacco: Never Used   Substance Use " Topics    Alcohol use: Yes     Comment: occ    Drug use: Yes     Types: Marijuana       Current Outpatient Medications on File Prior to Visit   Medication Sig Dispense Refill    ascorbic acid, vitamin C, (VITAMIN C) 1000 MG tablet Take 1,000 mg by mouth once daily.      cholecalciferol, vitamin D3, (VITAMIN D3 ORAL) Take 125 mcg by mouth.      ciprofloxacin HCl (CIPRO) 500 MG tablet Take 1 tablet (500 mg total) by mouth 2 (two) times daily. for 10 days 20 tablet 0    clotrimazole-betamethasone 1-0.05% (LOTRISONE) cream Apply topically 2 (two) times daily.      diltiaZEM (CARDIZEM CD) 300 MG 24 hr capsule TAKE 1 CAPSULE BY MOUTH  ONCE DAILY 90 capsule 3    finasteride (PROSCAR) 5 mg tablet Take 1 tablet (5 mg total) by mouth once daily. 90 tablet 3    lisinopriL (PRINIVIL,ZESTRIL) 20 MG tablet TAKE 1 TABLET BY MOUTH ONCE DAILY 90 tablet 3    ofloxacin (FLOXIN) 0.3 % otic solution Apply 10 drops into right ear once a day for 10 days.  Please dispense generic.      simvastatin (ZOCOR) 20 MG tablet TAKE 1 TABLET BY MOUTH IN  THE EVENING 90 tablet 3    tamsulosin (FLOMAX) 0.4 mg Cap Take 1 capsule (0.4 mg total) by mouth once daily. 90 capsule 3    TURMERIC ORAL Take by mouth.      zinc gluconate 50 mg tablet Take 50 mg by mouth once daily.      triamterene-hydrochlorothiazide 37.5-25 mg (DYAZIDE) 37.5-25 mg per capsule        No current facility-administered medications on file prior to visit.       Review of patient's allergies indicates:  No Known Allergies    Review of Systems   Constitutional: Negative for chills.   HENT: Negative for congestion.    Eyes: Negative for visual disturbance.   Respiratory: Negative for shortness of breath.    Cardiovascular: Negative for chest pain.   Gastrointestinal: Negative for abdominal distention.   Musculoskeletal: Negative for gait problem.   Skin: Negative for color change.   Neurological: Negative for dizziness.   Psychiatric/Behavioral: Negative for agitation.        Objective:      Physical Exam  Constitutional:       Appearance: He is well-developed and well-nourished.   HENT:      Head: Normocephalic.   Eyes:      Pupils: Pupils are equal, round, and reactive to light.   Cardiovascular:      Pulses: Intact distal pulses.   Pulmonary:      Effort: Pulmonary effort is normal.   Abdominal:      Palpations: Abdomen is soft.   Genitourinary:     Comments: Instilled 1000cc of irrigation via bedoya catheter, removed bedoya at 0850  Musculoskeletal:         General: Normal range of motion.      Cervical back: Normal range of motion.   Skin:     General: Skin is warm and dry.   Neurological:      Mental Status: He is alert.   Psychiatric:         Mood and Affect: Mood and affect normal.         Assessment:       1. Urinary retention    2. Incomplete emptying of bladder    3. KIKE (acute kidney injury)        Plan:         1. Patient will hydrate.   2. If no urination in 6 hours he will start making his way back to clinic for reinsertion of a 16 Upper [place] straight catheter and will refer for UDS and schedule cystoscopy.      ADDENDUM  It has been over 6 hours and patient has not urinated at all. He returned to clinic as instructed for bedoya reinsertion. 16 Upper sor[place] bedoya inserted by my staff.  Will schedule cystoscopy with me.  I will reach out to Dr. 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    Urinary retention    Incomplete emptying of bladder    KIKE (acute kidney injury)

## 2022-01-25 ENCOUNTER — TELEPHONE (OUTPATIENT)
Dept: UROLOGY | Facility: CLINIC | Age: 77
End: 2022-01-25
Payer: MEDICARE

## 2022-01-25 DIAGNOSIS — R33.9 URINARY RETENTION: Primary | ICD-10-CM

## 2022-01-25 NOTE — TELEPHONE ENCOUNTER
I spoke with the pt   He will have a1c drawn this week      He is feeling better and completed abx

## 2022-01-26 ENCOUNTER — LAB VISIT (OUTPATIENT)
Dept: LAB | Facility: HOSPITAL | Age: 77
End: 2022-01-26
Attending: FAMILY MEDICINE
Payer: MEDICARE

## 2022-01-26 DIAGNOSIS — R79.89 CREATININE ELEVATION: ICD-10-CM

## 2022-01-26 DIAGNOSIS — R73.9 HYPERGLYCEMIA: ICD-10-CM

## 2022-01-26 LAB
ANION GAP SERPL CALC-SCNC: 7 MMOL/L (ref 8–16)
CALCIUM SERPL-MCNC: 9.3 MG/DL (ref 8.7–10.5)
CHLORIDE SERPL-SCNC: 102 MMOL/L (ref 95–110)
CO2 SERPL-SCNC: 34 MMOL/L (ref 23–29)
CREAT SERPL-MCNC: 1.36 MG/DL (ref 0.5–1.4)
EST. GFR  (AFRICAN AMERICAN): 58 ML/MIN/1.73 M^2
EST. GFR  (NON AFRICAN AMERICAN): 50.2 ML/MIN/1.73 M^2
ESTIMATED AVG GLUCOSE: 114 MG/DL (ref 68–131)
GLUCOSE SERPL-MCNC: 117 MG/DL (ref 70–110)
HBA1C MFR BLD: 5.6 % (ref 4–5.6)
POTASSIUM SERPL-SCNC: 4.2 MMOL/L (ref 3.5–5.1)
SODIUM SERPL-SCNC: 143 MMOL/L (ref 136–145)
UUN UR-MCNC: 17 MG/DL (ref 2–20)

## 2022-01-26 PROCEDURE — 83036 HEMOGLOBIN GLYCOSYLATED A1C: CPT | Performed by: FAMILY MEDICINE

## 2022-01-26 PROCEDURE — 36415 COLL VENOUS BLD VENIPUNCTURE: CPT | Mod: PO | Performed by: FAMILY MEDICINE

## 2022-01-26 PROCEDURE — 80048 BASIC METABOLIC PNL TOTAL CA: CPT | Mod: PO | Performed by: FAMILY MEDICINE

## 2022-01-28 ENCOUNTER — TELEPHONE (OUTPATIENT)
Dept: UROLOGY | Facility: CLINIC | Age: 77
End: 2022-01-28
Payer: MEDICARE

## 2022-01-28 ENCOUNTER — PROCEDURE VISIT (OUTPATIENT)
Dept: UROLOGY | Facility: CLINIC | Age: 77
End: 2022-01-28
Payer: MEDICARE

## 2022-01-28 VITALS
DIASTOLIC BLOOD PRESSURE: 69 MMHG | BODY MASS INDEX: 32.87 KG/M2 | WEIGHT: 249.13 LBS | SYSTOLIC BLOOD PRESSURE: 147 MMHG | HEART RATE: 101 BPM

## 2022-01-28 DIAGNOSIS — R33.9 URINARY RETENTION: ICD-10-CM

## 2022-01-28 PROCEDURE — 87086 URINE CULTURE/COLONY COUNT: CPT | Performed by: STUDENT IN AN ORGANIZED HEALTH CARE EDUCATION/TRAINING PROGRAM

## 2022-01-28 PROCEDURE — 52000 CYSTOURETHROSCOPY: CPT | Mod: S$GLB,,, | Performed by: STUDENT IN AN ORGANIZED HEALTH CARE EDUCATION/TRAINING PROGRAM

## 2022-01-28 PROCEDURE — 52000 PR CYSTOURETHROSCOPY: ICD-10-PCS | Mod: S$GLB,,, | Performed by: STUDENT IN AN ORGANIZED HEALTH CARE EDUCATION/TRAINING PROGRAM

## 2022-01-28 NOTE — PROCEDURES
Procedures   Procedure:   1. Flexible cysto-uretheroscopy    Pre Procedure Diagnosis:  1. Urinary retention  2. Enlarged prostate    Post Procedure Diagnosis:  1. Urinary retention  2. Enlarged prostate    Surgeon: Helena Luis MD    Anesthesia: 2% uro-jet lidocaine jelly for local analgesia    Procedure note:  A flexible cysto-urethroscopy was performed after consent was obtained.  The risks and benefits were explained. 2% lidocaine urojet was used for local analgesia. The genitalia was prepped and draped in the sterile fashion.     The flexible scope was advanced into the urethra and into the bladder. A urethral stricture was not seen during scope passage.     Once the cystoscope was in the bladder, we systematically surveyed the entire bladder. I first obtained urine via the cystoscope with a 10cc luer lock syringe.  The bilateral ureteral orifices were identified in their normal orthotopic locations. clear bilateral ureteral efflux was identified.     The bladder was completely surveyed in a systematic fashion. No bladder tumors or lesions were seen.  Posterior bladder wall irritation noted - likely from bedoya catheter. Upon retroflexion a median lobe was mildly enlarged.     As the flexible cystoscope was being withdrawn, the prostate was evaluated carefully. The lateral lobes of the prostate were noted to be significant enlarged - almost in contact with each other but not quite kissing lobes.    The patient tolerated the procedure well without complication.     Findings in summary:  Trilobar hypertrophy of the prostate, mildly enlarged median lobe, significantly enlarged lateral lobes      Assessment: 77 y.o. male with urinary retention, enlarged prostate.     Plan:  1. Catheterized urine for culture.  2. Cystoscopy demonstrated enlarged lobes and there is definitely tissue that I could remove with a TURP but I would like the data from the UDS specifically a detrusor pressure to determine if a TURP would be  reasonable to offer.

## 2022-01-31 ENCOUNTER — HOSPITAL ENCOUNTER (OUTPATIENT)
Facility: HOSPITAL | Age: 77
Discharge: HOME | End: 2022-01-31
Attending: UROLOGY | Admitting: UROLOGY
Payer: MEDICARE

## 2022-01-31 VITALS
RESPIRATION RATE: 18 BRPM | OXYGEN SATURATION: 96 % | DIASTOLIC BLOOD PRESSURE: 74 MMHG | SYSTOLIC BLOOD PRESSURE: 171 MMHG | TEMPERATURE: 98 F | HEART RATE: 88 BPM

## 2022-01-31 DIAGNOSIS — N32.9 BLADDER DISORDER: ICD-10-CM

## 2022-01-31 LAB
BACTERIA UR CULT: NORMAL
BACTERIA UR CULT: NORMAL

## 2022-01-31 PROCEDURE — 36000706: Performed by: UROLOGY

## 2022-01-31 PROCEDURE — 51728 CYSTOMETROGRAM W/VP: CPT | Mod: 26,,, | Performed by: UROLOGY

## 2022-01-31 PROCEDURE — 74430 CONTRAST X-RAY BLADDER: CPT | Mod: 26,,, | Performed by: UROLOGY

## 2022-01-31 PROCEDURE — 51784 ANAL/URINARY MUSCLE STUDY: CPT | Mod: 26,51,, | Performed by: UROLOGY

## 2022-01-31 PROCEDURE — 74430 PR  X-RAY CYSTOGRAM, MIN 3 VIEW: ICD-10-PCS | Mod: 26,,, | Performed by: UROLOGY

## 2022-01-31 PROCEDURE — 51600 INJECTION FOR BLADDER X-RAY: CPT | Mod: 51,,, | Performed by: UROLOGY

## 2022-01-31 PROCEDURE — 51728 PR COMPLEX CYSTOMETROGRAM VOIDING PRESSURE STUDIES: ICD-10-PCS | Mod: 26,,, | Performed by: UROLOGY

## 2022-01-31 PROCEDURE — 51600 PR INJECTION FOR BLADDER X-RAY: ICD-10-PCS | Mod: 51,,, | Performed by: UROLOGY

## 2022-01-31 PROCEDURE — 27200973 HC CYSTO SUPPLY IV (URODYNAMICS): Performed by: UROLOGY

## 2022-01-31 PROCEDURE — 36000707: Performed by: UROLOGY

## 2022-01-31 PROCEDURE — 51797 PR VOIDING PRESS STUDY INTRA-ABDOMINAL VOID: ICD-10-PCS | Mod: 26,,, | Performed by: UROLOGY

## 2022-01-31 PROCEDURE — 51784 PR ANAL/URINARY MUSCLE STUDY: ICD-10-PCS | Mod: 26,51,, | Performed by: UROLOGY

## 2022-01-31 PROCEDURE — 51797 INTRAABDOMINAL PRESSURE TEST: CPT | Mod: 26,,, | Performed by: UROLOGY

## 2022-01-31 NOTE — OP NOTE
"Urodynamic Report    [facility]       Referring Physician:  Anthony Maloney MD    YOB: 1945  Date of Exam: 1/31/2022    This is a 77 y.o.  male patient that is an established patient of mine.       The patient was referred for urodynamic evaluation for urinary retention. Dr. De La Torre obtained routine bloodwork in 12/2021. His Cr was elevated from baseline. He then ordered a renal US 1/4/22 - marked distention of bladder, no hydronephrosis bilaterally, enlarged prostate vol 197g, and pt presented to ER per his recommendation on for a bedoya placement.   ER visit 1/4/22 - 3600 cc of urine from his Bedoya. Was not started on medications.      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 He feels that his "sphincter" has relaxed more.      Started him on flomax and finasteride 1 week ago. Pt wanted a voiding trial this week.      Cystometrogram:    Position: Sitting  Filling Rate: 60   Catheter: 7F  Fluid:Conray       Cath PVR prior: N/A Voiding Diary Capacity: Not Applicable   First Sensation: 15 mL First Desire: 85 mL   Strong Desire: 165 mL Cystometric Capacity: 750 mL       Pdet at FPC: 0 cm H2O Compliance: Normal       Detrusor Overactivity (DO): Absent  Volume first DO: No DO   Max DO Pressure: No DO Urgency Incontinence: Absent        Leak Point Pressure Testing:        Volume Tested: 460 mL  Abdominal Leak Point Pressure: No Leak   Stress Induced DO: Absent          Voiding Study:        Voided Volume: 0 mL PVR: 750 mL   Maximum Flow: 0 mL/sec Average Flow 0 mL/sec   Max Pdet: 0 cmH2O  Pdet at Max Flow: 0 cmH2O   EMG Storage: Normal Recruitment  EMG Voiding: Flaring with valsalva       Fluroscopic Imaging:        Bladder Contour: Smooth Vesicoureteral Reflux:No VUR   Bladder Neck at Rest: closed Bladder Neck Voiding:Poorly seen/low flow       Impression:        Sensation:Normal    Capacity: Large  "   Compliance:Normal    Detrusor Overactivity:Absent    Continence: No Incontinence    Contractility: Hypocontractility    Emptying:Unsatisfactory - Hypocontractility    Coordination:Dysfunctional (Valsalva) Voiding          Summary:  This study was performed in accordance with the current AUA/SUFU Guideline on Adult Urodynamics. On filling phase he demonstrates normal first and strong desire with a large bladder capacity. There is no detrusor overactivity (DO) demonstrated on this exam (though absence of DO on urodynamic evaluation does not exclude it as causative agent of urgency symptoms). Bladder compliance at capacity is normal. On fluoroscopy, the bladder contour is smooth. There is no VUR demonstrated on this exam.     On stress testing, with adequate cough and valsalva effort, there is no MERLINE demonstrated and patient reports no clinical history of MERLINE.    On voiding phase, dysfunctional voiding is present with no underlying detrusor contraction demonstrated. Voiding is with valsalva voiding only. Even with valsalva voiding, he is unable to generate substantial flow. The patient is unable to void to completion during the study or on independent flow rate which is consistent with the history of voiding difficulty. The patient reports this is the normal voiding pattern.     With detrusor hypocontractility, the definitive evaluation of bladder outlet obstruction is very difficult.  However, the patient demonstrates evidence of anatomic obstruction on previous cystourethroscopy by Dr. Luis.  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  If this fails to result in improvement in emptying, consideration may then be made for an adjunctive measures including sacral neuromodulation for nonobstructive urinary retention.

## 2022-02-02 ENCOUNTER — TELEPHONE (OUTPATIENT)
Dept: FAMILY MEDICINE | Facility: CLINIC | Age: 77
End: 2022-02-02
Payer: MEDICARE

## 2022-02-02 DIAGNOSIS — T83.511A URINARY TRACT INFECTION ASSOCIATED WITH INDWELLING URETHRAL CATHETER, INITIAL ENCOUNTER: Primary | ICD-10-CM

## 2022-02-02 DIAGNOSIS — N39.0 URINARY TRACT INFECTION ASSOCIATED WITH INDWELLING URETHRAL CATHETER, INITIAL ENCOUNTER: Primary | ICD-10-CM

## 2022-02-02 NOTE — TELEPHONE ENCOUNTER
----- Message from Catrina Cherry sent at 2/2/2022  4:40 PM CST -----  Contact: Noah [phone]  Type:  Needs Medical Advice    Who Called:  Noah   Symptoms (please be specific):  bladder infection, cloudy urine     How long has patient had these symptoms:   2 days   Pharmacy name and phone #:   [facility] [address] [place] [address] [facility]  Phone:  [phone]  Fax:  [phone]  Would the patient rather a call back or a response via [facility]?  Call back   Best Call Back Number:  [phone]  Additional Information:  does not want to be prescribed same antibiotic as last time, pt does not know the name

## 2022-02-03 ENCOUNTER — TELEPHONE (OUTPATIENT)
Dept: FAMILY MEDICINE | Facility: CLINIC | Age: 77
End: 2022-02-03
Payer: MEDICARE

## 2022-02-03 RX ORDER — SULFAMETHOXAZOLE AND TRIMETHOPRIM 800; 160 MG/1; MG/1
1 TABLET ORAL 2 TIMES DAILY
Qty: 20 TABLET | Refills: 0 | Status: SHIPPED | OUTPATIENT
Start: 2022-02-03 | End: 2022-02-13

## 2022-02-03 RX ORDER — SULFAMETHOXAZOLE AND TRIMETHOPRIM 800; 160 MG/1; MG/1
1 TABLET ORAL 2 TIMES DAILY
Qty: 20 TABLET | Refills: 0 | Status: SHIPPED | OUTPATIENT
Start: 2022-02-03 | End: 2022-02-03 | Stop reason: SDUPTHER

## 2022-02-03 NOTE — TELEPHONE ENCOUNTER
----- Message from Genet Whelan sent at 2/3/2022  9:22 AM CST -----  Regarding: returning a call  Contact: [phone]  Type:  Patient Returning Call    Who Called: self   Who Left Message for Patient: Keysha Martins MA   Does the patient know what this is regarding?: UTI   Would the patient rather a call back or a response via [facility]?  call  Best Call Back Number: [phone]  Additional Information:

## 2022-02-03 NOTE — TELEPHONE ENCOUNTER
Pt will provide urine specimen today. He's requesting that you send antibiotic to [facility] pharmacy instead of mail order pharmacy.

## 2022-02-04 ENCOUNTER — LAB VISIT (OUTPATIENT)
Dept: LAB | Facility: HOSPITAL | Age: 77
End: 2022-02-04
Attending: FAMILY MEDICINE
Payer: MEDICARE

## 2022-02-04 DIAGNOSIS — T83.511A URINARY TRACT INFECTION ASSOCIATED WITH INDWELLING URETHRAL CATHETER, INITIAL ENCOUNTER: ICD-10-CM

## 2022-02-04 DIAGNOSIS — N39.0 URINARY TRACT INFECTION ASSOCIATED WITH INDWELLING URETHRAL CATHETER, INITIAL ENCOUNTER: ICD-10-CM

## 2022-02-04 LAB
BACTERIA #/AREA URNS AUTO: ABNORMAL /HPF
BILIRUB UR QL STRIP: NEGATIVE
CLARITY UR REFRACT.AUTO: CLEAR
COLOR UR AUTO: YELLOW
GLUCOSE UR QL STRIP: NEGATIVE
HGB UR QL STRIP: ABNORMAL
HYALINE CASTS UR QL AUTO: 0 /LPF
KETONES UR QL STRIP: NEGATIVE
LEUKOCYTE ESTERASE UR QL STRIP: ABNORMAL
MICROSCOPIC COMMENT: ABNORMAL
NITRITE UR QL STRIP: POSITIVE
PH UR STRIP: 7 [PH] (ref 5–8)
PROT UR QL STRIP: ABNORMAL
RBC #/AREA URNS AUTO: 25 /HPF (ref 0–4)
SP GR UR STRIP: 1.01 (ref 1–1.03)
URN SPEC COLLECT METH UR: ABNORMAL
UROBILINOGEN UR STRIP-ACNC: NEGATIVE EU/DL
WBC #/AREA URNS AUTO: 50 /HPF (ref 0–5)
WBC CLUMPS UR QL AUTO: ABNORMAL

## 2022-02-04 PROCEDURE — 87077 CULTURE AEROBIC IDENTIFY: CPT | Mod: PO | Performed by: FAMILY MEDICINE

## 2022-02-04 PROCEDURE — 87088 URINE BACTERIA CULTURE: CPT | Mod: PO | Performed by: FAMILY MEDICINE

## 2022-02-04 PROCEDURE — 87186 SC STD MICRODIL/AGAR DIL: CPT | Mod: PO | Performed by: FAMILY MEDICINE

## 2022-02-04 PROCEDURE — 81000 URINALYSIS NONAUTO W/SCOPE: CPT | Mod: PO | Performed by: FAMILY MEDICINE

## 2022-02-04 PROCEDURE — 87086 URINE CULTURE/COLONY COUNT: CPT | Mod: PO | Performed by: FAMILY MEDICINE

## 2022-02-06 LAB — BACTERIA UR CULT: ABNORMAL

## 2022-02-08 ENCOUNTER — TELEPHONE (OUTPATIENT)
Dept: FAMILY MEDICINE | Facility: CLINIC | Age: 77
End: 2022-02-08
Payer: MEDICARE

## 2022-02-08 NOTE — TELEPHONE ENCOUNTER
----- Message from Ruth Whitfield sent at 2/8/2022  9:21 AM CST -----  Needs advice from nurse:      Who Called:pt  Regarding:patient just started on Bactrim and stated he is not sleeping and stomach pain and vomiting/diarrhea at first and now can't go to the bathroom  Would the patient rather a call back or VIA [facility]?  Best Call Back number:[phone]  Additional Info:[facility] - [place], [place] - [address] (Ph: [phone])

## 2022-02-09 ENCOUNTER — TELEPHONE (OUTPATIENT)
Dept: FAMILY MEDICINE | Facility: CLINIC | Age: 77
End: 2022-02-09
Payer: MEDICARE

## 2022-02-09 NOTE — TELEPHONE ENCOUNTER
Spoke with pt in regards to result note. Pt states that Dr. De La Torre called him this morning. Pt verbalized understanding

## 2022-02-09 NOTE — TELEPHONE ENCOUNTER
----- Message from Anthony De La Torre MD sent at 2/8/2022  8:39 AM CST -----  Current antibiotic should clear infection

## 2022-02-15 ENCOUNTER — OFFICE VISIT (OUTPATIENT)
Dept: UROLOGY | Facility: CLINIC | Age: 77
End: 2022-02-15
Payer: MEDICARE

## 2022-02-15 VITALS
DIASTOLIC BLOOD PRESSURE: 66 MMHG | HEART RATE: 99 BPM | SYSTOLIC BLOOD PRESSURE: 124 MMHG | BODY MASS INDEX: 32.73 KG/M2 | HEIGHT: 73 IN | WEIGHT: 246.94 LBS

## 2022-02-15 DIAGNOSIS — N39.0 URINARY TRACT INFECTION WITHOUT HEMATURIA, SITE UNSPECIFIED: ICD-10-CM

## 2022-02-15 DIAGNOSIS — R33.9 URINARY RETENTION: Primary | ICD-10-CM

## 2022-02-15 PROCEDURE — 87086 URINE CULTURE/COLONY COUNT: CPT | Performed by: STUDENT IN AN ORGANIZED HEALTH CARE EDUCATION/TRAINING PROGRAM

## 2022-02-15 PROCEDURE — 99999 PR PBB SHADOW E&M-EST. PATIENT-LVL III: CPT | Mod: PBBFAC,,, | Performed by: STUDENT IN AN ORGANIZED HEALTH CARE EDUCATION/TRAINING PROGRAM

## 2022-02-15 PROCEDURE — 1160F PR REVIEW ALL MEDS BY PRESCRIBER/CLIN PHARMACIST DOCUMENTED: ICD-10-PCS | Mod: CPTII,S$GLB,, | Performed by: STUDENT IN AN ORGANIZED HEALTH CARE EDUCATION/TRAINING PROGRAM

## 2022-02-15 PROCEDURE — 1126F PR PAIN SEVERITY QUANTIFIED, NO PAIN PRESENT: ICD-10-PCS | Mod: CPTII,S$GLB,, | Performed by: STUDENT IN AN ORGANIZED HEALTH CARE EDUCATION/TRAINING PROGRAM

## 2022-02-15 PROCEDURE — 1126F AMNT PAIN NOTED NONE PRSNT: CPT | Mod: CPTII,S$GLB,, | Performed by: STUDENT IN AN ORGANIZED HEALTH CARE EDUCATION/TRAINING PROGRAM

## 2022-02-15 PROCEDURE — 1159F PR MEDICATION LIST DOCUMENTED IN MEDICAL RECORD: ICD-10-PCS | Mod: CPTII,S$GLB,, | Performed by: STUDENT IN AN ORGANIZED HEALTH CARE EDUCATION/TRAINING PROGRAM

## 2022-02-15 PROCEDURE — 3078F PR MOST RECENT DIASTOLIC BLOOD PRESSURE < 80 MM HG: ICD-10-PCS | Mod: CPTII,S$GLB,, | Performed by: STUDENT IN AN ORGANIZED HEALTH CARE EDUCATION/TRAINING PROGRAM

## 2022-02-15 PROCEDURE — 1159F MED LIST DOCD IN RCRD: CPT | Mod: CPTII,S$GLB,, | Performed by: STUDENT IN AN ORGANIZED HEALTH CARE EDUCATION/TRAINING PROGRAM

## 2022-02-15 PROCEDURE — 3074F PR MOST RECENT SYSTOLIC BLOOD PRESSURE < 130 MM HG: ICD-10-PCS | Mod: CPTII,S$GLB,, | Performed by: STUDENT IN AN ORGANIZED HEALTH CARE EDUCATION/TRAINING PROGRAM

## 2022-02-15 PROCEDURE — 99999 PR PBB SHADOW E&M-EST. PATIENT-LVL III: ICD-10-PCS | Mod: PBBFAC,,, | Performed by: STUDENT IN AN ORGANIZED HEALTH CARE EDUCATION/TRAINING PROGRAM

## 2022-02-15 PROCEDURE — 87077 CULTURE AEROBIC IDENTIFY: CPT | Performed by: STUDENT IN AN ORGANIZED HEALTH CARE EDUCATION/TRAINING PROGRAM

## 2022-02-15 PROCEDURE — 3074F SYST BP LT 130 MM HG: CPT | Mod: CPTII,S$GLB,, | Performed by: STUDENT IN AN ORGANIZED HEALTH CARE EDUCATION/TRAINING PROGRAM

## 2022-02-15 PROCEDURE — 3078F DIAST BP <80 MM HG: CPT | Mod: CPTII,S$GLB,, | Performed by: STUDENT IN AN ORGANIZED HEALTH CARE EDUCATION/TRAINING PROGRAM

## 2022-02-15 PROCEDURE — 99215 PR OFFICE/OUTPT VISIT, EST, LEVL V, 40-54 MIN: ICD-10-PCS | Mod: S$GLB,,, | Performed by: STUDENT IN AN ORGANIZED HEALTH CARE EDUCATION/TRAINING PROGRAM

## 2022-02-15 PROCEDURE — 87088 URINE BACTERIA CULTURE: CPT | Performed by: STUDENT IN AN ORGANIZED HEALTH CARE EDUCATION/TRAINING PROGRAM

## 2022-02-15 PROCEDURE — 87186 SC STD MICRODIL/AGAR DIL: CPT | Performed by: STUDENT IN AN ORGANIZED HEALTH CARE EDUCATION/TRAINING PROGRAM

## 2022-02-15 PROCEDURE — 1160F RVW MEDS BY RX/DR IN RCRD: CPT | Mod: CPTII,S$GLB,, | Performed by: STUDENT IN AN ORGANIZED HEALTH CARE EDUCATION/TRAINING PROGRAM

## 2022-02-15 PROCEDURE — 99215 OFFICE O/P EST HI 40 MIN: CPT | Mod: S$GLB,,, | Performed by: STUDENT IN AN ORGANIZED HEALTH CARE EDUCATION/TRAINING PROGRAM

## 2022-02-15 NOTE — PROGRESS NOTES
"Subjective:       Patient ID: Noah Lopez is a 77 y.o. male.    Chief Complaint: Follow-up (Urodynamic study)  This is a 77 y.o.  male patient that is an established patient of mine.  The patient was referred to me by Dr. De La Torre for urinary retention. Dr. De La Torre obtained routine bloodwork in 12/2021. His Cr was elevated from baseline. He then ordered a renal US 1/4/22 - marked distention of bladder, no hydronephrosis bilaterally, enlarged prostate vol 197g, and pt presented to ER per his recommendation on for a bedoya placement.   ER visit 1/4/22 - 3600 cc of urine from his Bedoya. Was not started on medications.      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 He feels that his "sphincter" has relaxed more.     Started him on flomax and finasteride 1 week ago. Pt wanted a voiding trial this week.     He failed a voiding trial. Worked up with cysto and UDS.  Cysto -Trilobar hypertrophy of the prostate, mildly enlarged median lobe, significantly enlarged lateral lobes  UDs with Dr. Maloney on 1/31/22 - see his report for full details -   Pdet at Max Flow: 0 cmH2O    EMG Storage: Normal Recruitment             EMG Voiding: Flaring with valsalva     Impression:           Sensation:Normal     Capacity: Large     Compliance:Normal     Detrusor Overactivity:Absent     Continence: No Incontinence     Contractility: Hypocontractility     Emptying:Unsatisfactory - Hypocontractility     Coordination:Dysfunctional (Valsalva) Voiding      He was having a bad reaction to Bactrim sent by Dr. De La Torre. Did not finish course.  Pt was not interested in a surgery. He felt that he "did this to myself." and with the results of the urodynamics with no detrusor pressure and valsalva voiding, an outlet reducing procedure would have a low chance of success. The patient states he is mostly interested in bedoya management and he does not want a surgery at this time. "     LAST PSA  Lab Results   Component Value Date    PSA 11.7 (H) 12/08/2021       Lab Results   Component Value Date    CREATININE 1.36 01/26/2022       ---  Past Medical History:   Diagnosis Date    Colon polyp 09/06/2018    Hyperlipidemia     Hypertension        Past Surgical History:   Procedure Laterality Date    COLONOSCOPY  2010    COLONOSCOPY N/A 9/6/2018    Procedure: COLONOSCOPY Suprep PLEASE TEXT PATIENT WITH ARRIVAL TIME;  Surgeon: Niharika Arce MD;  Location: [facility];  Service: Endoscopy;  Laterality: N/A;    CYSTOSCOPY WITH URODYNAMIC TESTING N/A 1/31/2022    Procedure: CYSTOSCOPY, WITH URODYNAMIC TESTING FLOUROSCOPIC;  Surgeon: Anthony Maloney MD;  Location: [facility] OR [address] [place] [address];  Service: Urology;  Laterality: N/A;  1hr    KNEE ARTHROSCOPY      SPINE SURGERY      l-spine       Family History   Problem Relation Age of Onset    Hypertension Mother     Heart attack Father     Cirrhosis Father     No Known Problems Brother     Seizures Daughter     No Known Problems Son     No Known Problems Brother     Liver disease Daughter        Social History     Tobacco Use    Smoking status: Former Smoker    Smokeless tobacco: Never Used   Substance Use Topics    Alcohol use: Yes     Comment: occ    Drug use: Yes     Types: Marijuana       Current Outpatient Medications on File Prior to Visit   Medication Sig Dispense Refill    ascorbic acid, vitamin C, (VITAMIN C) 1000 MG tablet Take 1,000 mg by mouth once daily.      clotrimazole-betamethasone 1-0.05% (LOTRISONE) cream Apply topically 2 (two) times daily.      diltiaZEM (CARDIZEM CD) 300 MG 24 hr capsule TAKE 1 CAPSULE BY MOUTH  ONCE DAILY 90 capsule 3    finasteride (PROSCAR) 5 mg tablet Take 1 tablet (5 mg total) by mouth once daily. 90 tablet 3    lisinopriL (PRINIVIL,ZESTRIL) 20 MG tablet TAKE 1 TABLET BY MOUTH ONCE DAILY 90 tablet 3    ofloxacin (FLOXIN) 0.3 % otic solution Apply 10 drops into right ear once a day for 10 days.   Please dispense generic.      simvastatin (ZOCOR) 20 MG tablet TAKE 1 TABLET BY MOUTH IN  THE EVENING 90 tablet 3    tamsulosin (FLOMAX) 0.4 mg Cap Take 1 capsule (0.4 mg total) by mouth once daily. 90 capsule 3    TURMERIC ORAL Take by mouth.      cholecalciferol, vitamin D3, (VITAMIN D3 ORAL) Take 125 mcg by mouth.      triamterene-hydrochlorothiazide 37.5-25 mg (DYAZIDE) 37.5-25 mg per capsule TAKE 1 CAPSULE BY MOUTH IN  THE MORNING 90 capsule 3    zinc gluconate 50 mg tablet Take 50 mg by mouth once daily.      [DISCONTINUED] diltiaZEM (CARDIZEM CD) 300 MG 24 hr capsule TAKE 1 CAPSULE BY MOUTH  ONCE DAILY 90 capsule 3    [DISCONTINUED] lisinopriL (PRINIVIL,ZESTRIL) 20 MG tablet TAKE 1 TABLET BY MOUTH ONCE DAILY 90 tablet 3    [DISCONTINUED] simvastatin (ZOCOR) 20 MG tablet TAKE 1 TABLET BY MOUTH IN  THE EVENING 90 tablet 3     No current facility-administered medications on file prior to visit.       Review of patient's allergies indicates:   Allergen Reactions    Ciprofloxacin Other (See Comments)     tendonitis    Sulfa (sulfonamide antibiotics) Nausea And Vomiting       Review of Systems   Constitutional: Negative for chills.   HENT: Negative for congestion.    Eyes: Negative for visual disturbance.   Respiratory: Negative for shortness of breath.    Cardiovascular: Negative for chest pain.   Gastrointestinal: Negative for abdominal distention.   Musculoskeletal: Negative for gait problem.   Skin: Negative for color change.   Neurological: Negative for dizziness.   Psychiatric/Behavioral: Negative for agitation.       Objective:      Physical Exam  Constitutional:       Appearance: He is well-developed and well-nourished.   HENT:      Head: Normocephalic.   Eyes:      Pupils: Pupils are equal, round, and reactive to light.   Cardiovascular:      Pulses: Intact distal pulses.   Pulmonary:      Effort: Pulmonary effort is normal.   Abdominal:      General: There is no distension.      Palpations:  Abdomen is soft.   Genitourinary:     Comments: 16 fr bedoya  Musculoskeletal:         General: Normal range of motion.      Cervical back: Normal range of motion.   Skin:     General: Skin is warm and dry.   Neurological:      Mental Status: He is alert.   Psychiatric:         Mood and Affect: Mood and affect normal.         Assessment:       1. Urinary retention    2. Urinary tract infection without hematuria, site unspecified        Plan:         1. Attempted to discuss TURP surgery but patient after being counseled by Dr. mccracken at UDS visit is not interested as it would have a low chance of rendering him catheter free.  2. At this time patient maintained his interests were to try to limit visits to my clinic (cited co-pay costs), and to be taught how to maintain and care for the catheter.  3. My nurse taught him how to change the bedoya catheter himself per his request, and he will perform this monthly. Will fill out catheter order form for 16 Citizen of [place] bedoya catheter (straight), to be inflated with 10cc of fluid, and to be connected to a bag. Also provided bedoya caps that he can reuse with alcohol wipes to plug the catheter during the day.   4. Urine for culture, if still +UTI, will call in abx. Pt does not tolerate bactrim or cipro.    Visit encompassed 60 min    Urinary retention  -     Urine culture    Urinary tract infection without hematuria, site unspecified  -     Urine culture

## 2022-02-15 NOTE — PROGRESS NOTES
Patient teaching provided on removal of catheter as well as placement of catheter.  Deflation process understood.  Removal of catheter understood.  Changing gloves after removal understood.  Sterile technique understood.  Under sterile technique, 16 fr inserted into urinary bladder until you cannot see tubing and visible urine expelled.  Inflate balloon with 10cc of NS, disconnect syringe from catheter.  Catheter will gently slide out into position.  Capping process explained to patient.  Voiced understanding.  Connect with cap, leg bag, or large urinary bag.  Advised to contact office if needed.  [facility] for DME supplies.  Patient had no further questions.  Leg/Large urinary bag provided with caps to patient.

## 2022-02-17 LAB — BACTERIA UR CULT: ABNORMAL

## 2022-02-21 ENCOUNTER — TELEPHONE (OUTPATIENT)
Dept: UROLOGY | Facility: CLINIC | Age: 77
End: 2022-02-21
Payer: MEDICARE

## 2022-02-21 DIAGNOSIS — N39.0 URINARY TRACT INFECTION WITHOUT HEMATURIA, SITE UNSPECIFIED: Primary | ICD-10-CM

## 2022-02-21 RX ORDER — AMOXICILLIN AND CLAVULANATE POTASSIUM 875; 125 MG/1; MG/1
1 TABLET, FILM COATED ORAL EVERY 12 HOURS
Qty: 14 TABLET | Refills: 0 | Status: SHIPPED | OUTPATIENT
Start: 2022-02-21 | End: 2022-02-28

## 2022-02-21 NOTE — TELEPHONE ENCOUNTER
----- Message from Helena Luis MD sent at 2/21/2022  2:49 PM CST -----   The urine culture from the new catheter grew out bacteria concerning for a urinary tract infection. Will call in a new antibiotic - augmentin for him to take twice a day for 7 days. Sent to [facility].

## 2022-02-23 ENCOUNTER — TELEPHONE (OUTPATIENT)
Dept: UROLOGY | Facility: CLINIC | Age: 77
End: 2022-02-23
Payer: MEDICARE

## 2022-02-23 NOTE — TELEPHONE ENCOUNTER
----- Message from Lisette Sethi sent at 2/23/2022  2:12 PM CST -----  Name Of Caller: Noah     Provider Name: Helena Luis,    Does patient feel the need to be seen today? No     Relationship to the Pt?: pt    Contact Preference?: [phone]    What is the nature of the call?:Pt called and would like to speak to office regarding Bacteria he has and was told that if they don't change the capture out the bacteria will come back. So wanted to know about that like should he be replacing it please call back for medical advice

## 2022-02-23 NOTE — TELEPHONE ENCOUNTER
Spoke with patient he would like to come back to the office for an monthly bedoya exchange, he don't feel comfortable changing the bedoya. He don't want to chance getting an infection again.

## 2022-02-24 ENCOUNTER — PES CALL (OUTPATIENT)
Dept: ADMINISTRATIVE | Facility: CLINIC | Age: 77
End: 2022-02-24
Payer: MEDICARE

## 2022-03-07 ENCOUNTER — TELEPHONE (OUTPATIENT)
Dept: UROLOGY | Facility: CLINIC | Age: 77
End: 2022-03-07
Payer: MEDICARE

## 2022-03-07 NOTE — TELEPHONE ENCOUNTER
----- Message from Any Langston sent at 3/7/2022  8:54 AM CST -----  Type:  Same Day Appointment Request    Caller is requesting a same day appointment.  Caller declined first available appointment listed below.    Name of Caller: Pt   When is the first available appointment? 04/05   Symptoms: bladder infection   Best Call Back Number: [phone]  Additional Information:

## 2022-03-08 ENCOUNTER — CLINICAL SUPPORT (OUTPATIENT)
Dept: UROLOGY | Facility: CLINIC | Age: 77
End: 2022-03-08
Payer: MEDICARE

## 2022-03-08 ENCOUNTER — TELEPHONE (OUTPATIENT)
Dept: UROLOGY | Facility: CLINIC | Age: 77
End: 2022-03-08

## 2022-03-08 DIAGNOSIS — R82.90 URINE FINDINGS ABNORMAL: Primary | ICD-10-CM

## 2022-03-08 PROCEDURE — 87086 URINE CULTURE/COLONY COUNT: CPT | Performed by: STUDENT IN AN ORGANIZED HEALTH CARE EDUCATION/TRAINING PROGRAM

## 2022-03-08 PROCEDURE — 87088 URINE BACTERIA CULTURE: CPT | Performed by: STUDENT IN AN ORGANIZED HEALTH CARE EDUCATION/TRAINING PROGRAM

## 2022-03-08 PROCEDURE — 99999 PR PBB SHADOW E&M-EST. PATIENT-LVL II: CPT | Mod: PBBFAC,,,

## 2022-03-08 PROCEDURE — 87186 SC STD MICRODIL/AGAR DIL: CPT | Performed by: STUDENT IN AN ORGANIZED HEALTH CARE EDUCATION/TRAINING PROGRAM

## 2022-03-08 PROCEDURE — 99999 PR PBB SHADOW E&M-EST. PATIENT-LVL II: ICD-10-PCS | Mod: PBBFAC,,,

## 2022-03-08 PROCEDURE — 87077 CULTURE AEROBIC IDENTIFY: CPT | Performed by: STUDENT IN AN ORGANIZED HEALTH CARE EDUCATION/TRAINING PROGRAM

## 2022-03-08 NOTE — TELEPHONE ENCOUNTER
----- Message from Leeann Santoro sent at 3/8/2022  4:00 PM CST -----  Contact: [phone]/ Self  Type: Requesting to speak with nurse    Who Called: Pt   Regarding: update on antibiotics being called in to  [facility]   Would the patient rather a call back or a response via [facility]? Call back  Best Call Back Number: [phone]  Additional Information: n/a

## 2022-03-08 NOTE — TELEPHONE ENCOUNTER
Confirmed with patient, he would now like to come into office for catheter changes, but would like extra supplies to have on hand (bags, leg straps) he does not want stat-lock stickers.  Informed a prescription can be written for extra bags, leg straps.

## 2022-03-08 NOTE — PROGRESS NOTES
"Bedoya exchanged.  Under sterile technique, 16 fr [place] cath inserted into urinary bladder.  Balloon inflated with 10 cc NS.  Urine output yellow and cloudy.  Sample obtained and labeled in front of patient.   Patient requested catheter be "capped off" Large urinary bag given as he requested to use at night.  Patient tolerated well.   Patient understands he is to up cap periodically to release urine.  Next bedoya change in one month.      "

## 2022-03-08 NOTE — TELEPHONE ENCOUNTER
Obtained verbal from Dr Luis for prescription.  Allergies and previous culture reviewed.   Augmentin 875/125 mg one tablet every 12 hours, disp # 20 with 0 refills.  Called in prescription to [facility] pharmacy and notified patient.

## 2022-03-10 LAB — BACTERIA UR CULT: ABNORMAL

## 2022-03-14 ENCOUNTER — TELEPHONE (OUTPATIENT)
Dept: UROLOGY | Facility: CLINIC | Age: 77
End: 2022-03-14
Payer: MEDICARE

## 2022-03-14 NOTE — TELEPHONE ENCOUNTER
----- Message from Helena Luis MD sent at 3/14/2022  2:43 PM CDT -----  Please let pt know he did have a UTI and it should be treated appropriately with the Augmentin that was called in.

## 2022-03-22 ENCOUNTER — HOSPITAL ENCOUNTER (EMERGENCY)
Facility: HOSPITAL | Age: 77
Discharge: HOME | End: 2022-03-22
Attending: EMERGENCY MEDICINE
Payer: MEDICARE

## 2022-03-22 ENCOUNTER — TELEPHONE (OUTPATIENT)
Dept: UROLOGY | Facility: CLINIC | Age: 77
End: 2022-03-22
Payer: MEDICARE

## 2022-03-22 ENCOUNTER — NURSE TRIAGE (OUTPATIENT)
Dept: ADMINISTRATIVE | Facility: CLINIC | Age: 77
End: 2022-03-22
Payer: MEDICARE

## 2022-03-22 VITALS
HEART RATE: 85 BPM | SYSTOLIC BLOOD PRESSURE: 165 MMHG | RESPIRATION RATE: 16 BRPM | OXYGEN SATURATION: 96 % | HEIGHT: 73 IN | DIASTOLIC BLOOD PRESSURE: 74 MMHG | BODY MASS INDEX: 33.13 KG/M2 | WEIGHT: 250 LBS | TEMPERATURE: 98 F

## 2022-03-22 DIAGNOSIS — T83.511D URINARY TRACT INFECTION ASSOCIATED WITH INDWELLING URETHRAL CATHETER, SUBSEQUENT ENCOUNTER: Primary | ICD-10-CM

## 2022-03-22 DIAGNOSIS — N39.0 URINARY TRACT INFECTION ASSOCIATED WITH INDWELLING URETHRAL CATHETER, SUBSEQUENT ENCOUNTER: Primary | ICD-10-CM

## 2022-03-22 LAB
BACTERIA #/AREA URNS AUTO: ABNORMAL /HPF
BILIRUB UR QL STRIP: NEGATIVE
CLARITY UR REFRACT.AUTO: ABNORMAL
COLOR UR AUTO: ABNORMAL
GLUCOSE UR QL STRIP: NEGATIVE
HGB UR QL STRIP: ABNORMAL
HYALINE CASTS UR QL AUTO: 0 /LPF
KETONES UR QL STRIP: NEGATIVE
LEUKOCYTE ESTERASE UR QL STRIP: ABNORMAL
MICROSCOPIC COMMENT: ABNORMAL
NITRITE UR QL STRIP: POSITIVE
PH UR STRIP: 6 [PH] (ref 5–8)
PROT UR QL STRIP: ABNORMAL
RBC #/AREA URNS AUTO: 50 /HPF (ref 0–4)
SP GR UR STRIP: 1.01 (ref 1–1.03)
URN SPEC COLLECT METH UR: ABNORMAL
UROBILINOGEN UR STRIP-ACNC: NEGATIVE EU/DL
WBC #/AREA URNS AUTO: >100 /HPF (ref 0–5)
WBC CLUMPS UR QL AUTO: ABNORMAL

## 2022-03-22 PROCEDURE — 87077 CULTURE AEROBIC IDENTIFY: CPT | Mod: ER | Performed by: EMERGENCY MEDICINE

## 2022-03-22 PROCEDURE — 63600175 PHARM REV CODE 636 W HCPCS: Mod: ER | Performed by: EMERGENCY MEDICINE

## 2022-03-22 PROCEDURE — 51702 INSERT TEMP BLADDER CATH: CPT | Mod: ER

## 2022-03-22 PROCEDURE — 99284 EMERGENCY DEPT VISIT MOD MDM: CPT | Mod: 25,ER

## 2022-03-22 PROCEDURE — 87088 URINE BACTERIA CULTURE: CPT | Mod: ER | Performed by: EMERGENCY MEDICINE

## 2022-03-22 PROCEDURE — 87086 URINE CULTURE/COLONY COUNT: CPT | Mod: ER | Performed by: EMERGENCY MEDICINE

## 2022-03-22 PROCEDURE — 96372 THER/PROPH/DIAG INJ SC/IM: CPT | Mod: 59 | Performed by: EMERGENCY MEDICINE

## 2022-03-22 PROCEDURE — 87186 SC STD MICRODIL/AGAR DIL: CPT | Mod: ER | Performed by: EMERGENCY MEDICINE

## 2022-03-22 PROCEDURE — 81000 URINALYSIS NONAUTO W/SCOPE: CPT | Mod: ER | Performed by: EMERGENCY MEDICINE

## 2022-03-22 RX ORDER — AMOXICILLIN AND CLAVULANATE POTASSIUM 875; 125 MG/1; MG/1
TABLET, FILM COATED ORAL
COMMUNITY
Start: 2022-03-08 | End: 2022-03-22

## 2022-03-22 RX ORDER — CEPHALEXIN 500 MG/1
500 CAPSULE ORAL EVERY 12 HOURS
Qty: 20 CAPSULE | Refills: 0 | Status: SHIPPED | OUTPATIENT
Start: 2022-03-22 | End: 2022-03-22 | Stop reason: SDUPTHER

## 2022-03-22 RX ORDER — CEFTRIAXONE 1 G/1
1 INJECTION, POWDER, FOR SOLUTION INTRAMUSCULAR; INTRAVENOUS
Status: COMPLETED | OUTPATIENT
Start: 2022-03-22 | End: 2022-03-22

## 2022-03-22 RX ORDER — CEPHALEXIN 500 MG/1
500 CAPSULE ORAL EVERY 12 HOURS
Qty: 20 CAPSULE | Refills: 0 | Status: SHIPPED | OUTPATIENT
Start: 2022-03-22 | End: 2022-04-01

## 2022-03-22 RX ADMIN — CEFTRIAXONE SODIUM 1 G: 1 INJECTION, POWDER, FOR SOLUTION INTRAMUSCULAR; INTRAVENOUS at 03:03

## 2022-03-22 NOTE — TELEPHONE ENCOUNTER
"Pt calling with complaint of lower abdominal pain he rates as a 4-5 out of 10. States that he has a high pain threshold. Reports has an indwelling catheter and notes urine is darker than normal and looks " thicker than normal and cloudy" pt also says " I just don't feel myself"Protocol reviewed and care advice given. Pt agrees with advice and verbalizes understanding. Instructed to call for questions, concerns or changes.  Reason for Disposition   SEVERE abdominal pain    Additional Information   Negative: Shock suspected (e.g., cold/pale/clammy skin, too weak to stand, low BP, rapid pulse)   Negative: Sounds like a life-threatening emergency to the triager   Negative: [1] Catheter was accidentally pulled-out AND [2] bright red continuous bleeding    Protocols used: ST URINARY CATHETER SYMPTOMS AND [id number]    "

## 2022-03-22 NOTE — TELEPHONE ENCOUNTER
----- Message from Johan Hawthorne sent at 3/22/2022  9:06 AM CDT -----  Contact: [phone]/self  Who Called: PT    Regarding: infection came back, concerns about catheter    Would the patient rather a call back or a response via [facility]? Call back    Best Call Back Number:[phone]    Additional Information: call back requested

## 2022-03-22 NOTE — ED NOTES
Pts bedoya catheter changed per order.   TRU Mercer and PAVAN Darnell at bedside to change catheter.  Pt tolerated well.

## 2022-03-22 NOTE — TELEPHONE ENCOUNTER
Spoke with patient.  He stated he has a catheter that was placed by ED and the bag cannot be changed unless catheter changed.  Patient stated he cannot live with this bag for one month or until next bedoya exchange.  Please advise.

## 2022-03-23 NOTE — ED PROVIDER NOTES
"Encounter Date: 3/22/2022       History     Chief Complaint   Patient presents with    Jones Catheter Problem     "I feel like I'm bloated, my output doesn't equal my input, and my urine is cloudy." Pt just finished antibiotics 3 days ago to tx previous UTI. Pt first noticed symptoms 1-2 days ago.     The patient currently presents with concern regarding suspected urinary tract infection.  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  Patient notes that he has had a number of urinary tract infections over the past several months and only recently completed a course of Augmentin.  He notes that the urine in his catheter has become somewhat cloudy.  He denies associated fever or chills at this time.        Review of patient's allergies indicates:   Allergen Reactions    Ciprofloxacin Other (See Comments)     tendonitis    Sulfa (sulfonamide antibiotics) Nausea And Vomiting     Past Medical History:   Diagnosis Date    Colon polyp 09/06/2018    Hyperlipidemia     Hypertension      Past Surgical History:   Procedure Laterality Date    COLONOSCOPY  2010    COLONOSCOPY N/A 9/6/2018    Procedure: COLONOSCOPY Suprep PLEASE TEXT PATIENT WITH ARRIVAL TIME;  Surgeon: Niharika Arce MD;  Location: [facility];  Service: Endoscopy;  Laterality: N/A;    CYSTOSCOPY WITH URODYNAMIC TESTING N/A 1/31/2022    Procedure: CYSTOSCOPY, WITH URODYNAMIC TESTING FLOUROSCOPIC;  Surgeon: Anthony Maloney MD;  Location: [address];  Service: Urology;  Laterality: N/A;  1hr    KNEE ARTHROSCOPY      SPINE SURGERY      l-spine     Family History   Problem Relation Age of Onset    Hypertension Mother     Heart attack Father     Cirrhosis Father     No Known Problems Brother     Seizures Daughter     No Known Problems Son     No Known Problems Brother     Liver disease Daughter      Social History     Tobacco Use    Smoking status: Former Smoker    Smokeless " "tobacco: Never Used   Substance Use Topics    Alcohol use: Yes     Comment: occ    Drug use: Yes     Types: Marijuana     Review of Systems   Constitutional: Negative for chills and fever.   HENT: Negative for congestion and sore throat.    Respiratory: Negative for chest tightness and shortness of breath.    Cardiovascular: Negative for chest pain and palpitations.   Gastrointestinal: Negative for abdominal pain and vomiting.   Genitourinary: Negative for hematuria and scrotal swelling.   Skin: Negative for color change and rash.   Neurological: Negative for weakness and numbness.   Hematological: Negative for adenopathy.   All other systems reviewed and are negative.    Physical Exam     Initial Vitals [03/22/22 0141]   BP Pulse Resp Temp SpO2   (!) 173/73 85 16 98.3 °F (36.8 °C) 96 %      MAP       --         Vitals:    03/22/22 0141 03/22/22 0320   BP: (!) 173/73 (!) 165/74   Pulse: 85    Resp: 16    Temp: 98.3 °F (36.8 °C)    TempSrc: Oral    SpO2: 96%    Weight: 113.4 kg (250 lb)    Height: 6' 1" (1.854 m)          Physical Exam    Constitutional: He appears well-developed and well-nourished. He is not diaphoretic. No distress.   HENT:   Head: Normocephalic and atraumatic.   Cardiovascular: Normal rate, regular rhythm, normal heart sounds and intact distal pulses.   Pulmonary/Chest: Breath sounds normal. No respiratory distress.   Abdominal: Abdomen is soft. He exhibits no distension. There is no abdominal tenderness.     Neurological: He is alert and oriented to person, place, and time.   Skin: Skin is warm and dry.         ED Course   Procedures  Labs Reviewed   URINALYSIS, REFLEX TO URINE CULTURE - Abnormal; Notable for the following components:       Result Value    Appearance, UA Cloudy (*)     Protein, UA 3+ (*)     Occult Blood UA 3+ (*)     Nitrite, UA Positive (*)     Leukocytes, UA 3+ (*)     All other components within normal limits    Narrative:     Preferred Collection Type->Urine, Clean " Catch  Specimen Source->Urine  Collection Type->Urine, Clean Catch   URINALYSIS MICROSCOPIC - Abnormal; Notable for the following components:    RBC, UA 50 (*)     WBC, UA >100 (*)     WBC Clumps, UA Moderate (*)     Bacteria Few (*)     All other components within normal limits    Narrative:     Preferred Collection Type->Urine, Clean Catch  Specimen Source->Urine  Collection Type->Urine, Clean Catch   CULTURE, URINE          Imaging Results    None          Medications   cefTRIAXone injection 1 g (1 g Intramuscular Given 3/22/22 0312)     Medical Decision Making:   ED Management:  All findings were reviewed with the patient/family in detail.  We will place the patient on a different course of antibiotics as it appears he continues to grow out a Klebsiella over the past few months.  The patient's catheter was exchanged during the encounter here today.  I see no indication of an emergent process beyond that addressed during our encounter but have duly counseled the patient/family regarding the need for prompt follow-up as well as the indications that should prompt immediate return to the emergency room should new or worrisome developments occur.  The patient has additionally been provided with printed information regarding diagnosis as well as instructions regarding follow up and any medications intended to manage the patient's aforementioned conditions.  The patient/family communicates understanding of all this information and all remaining questions and concerns were addressed at this time.                            Clinical Impression:   Final diagnoses:  [T83.511D, N39.0] Urinary tract infection associated with indwelling urethral catheter, subsequent encounter (Primary)          ED Disposition Condition    Discharge Stable        ED Prescriptions     Medication Sig Dispense Start Date End Date Auth. Provider    cephALEXin (KEFLEX) 500 MG capsule  (Status: Discontinued) Take 1 capsule (500 mg total) by mouth  every 12 (twelve) hours. for 10 days 20 capsule 3/22/2022 3/22/2022 Shayne Mercado MD    cephALEXin (KEFLEX) 500 MG capsule Take 1 capsule (500 mg total) by mouth every 12 (twelve) hours. for 10 days 20 capsule 3/22/2022 4/1/2022 Shayne Mercado MD        Follow-up Information     Follow up With Specialties Details Why Contact [facility] - Emergency Dept Emergency Medicine Go to  As needed, If symptoms worsen [address] [facility] [facility] [address] [phone]    Helena Luis MD Urology Schedule an appointment as soon as possible for a visit  for reassessment [address]  [address]  [facility] [address] [phone]             Shayne Mercado MD  03/22/22 5762

## 2022-03-24 ENCOUNTER — OFFICE VISIT (OUTPATIENT)
Dept: UROLOGY | Facility: CLINIC | Age: 77
End: 2022-03-24
Payer: MEDICARE

## 2022-03-24 VITALS
DIASTOLIC BLOOD PRESSURE: 80 MMHG | HEART RATE: 87 BPM | WEIGHT: 250.75 LBS | HEIGHT: 73 IN | SYSTOLIC BLOOD PRESSURE: 145 MMHG | BODY MASS INDEX: 33.23 KG/M2

## 2022-03-24 DIAGNOSIS — R33.9 URINARY RETENTION: Primary | ICD-10-CM

## 2022-03-24 DIAGNOSIS — N31.2 HYPOCONTRACTILE BLADDER: ICD-10-CM

## 2022-03-24 LAB — BACTERIA UR CULT: ABNORMAL

## 2022-03-24 PROCEDURE — 3288F FALL RISK ASSESSMENT DOCD: CPT | Mod: CPTII,S$GLB,, | Performed by: STUDENT IN AN ORGANIZED HEALTH CARE EDUCATION/TRAINING PROGRAM

## 2022-03-24 PROCEDURE — 1160F RVW MEDS BY RX/DR IN RCRD: CPT | Mod: CPTII,S$GLB,, | Performed by: STUDENT IN AN ORGANIZED HEALTH CARE EDUCATION/TRAINING PROGRAM

## 2022-03-24 PROCEDURE — 1126F AMNT PAIN NOTED NONE PRSNT: CPT | Mod: CPTII,S$GLB,, | Performed by: STUDENT IN AN ORGANIZED HEALTH CARE EDUCATION/TRAINING PROGRAM

## 2022-03-24 PROCEDURE — 99214 OFFICE O/P EST MOD 30 MIN: CPT | Mod: S$GLB,,, | Performed by: STUDENT IN AN ORGANIZED HEALTH CARE EDUCATION/TRAINING PROGRAM

## 2022-03-24 PROCEDURE — 3077F SYST BP >= 140 MM HG: CPT | Mod: CPTII,S$GLB,, | Performed by: STUDENT IN AN ORGANIZED HEALTH CARE EDUCATION/TRAINING PROGRAM

## 2022-03-24 PROCEDURE — 99999 PR PBB SHADOW E&M-EST. PATIENT-LVL III: CPT | Mod: PBBFAC,,, | Performed by: STUDENT IN AN ORGANIZED HEALTH CARE EDUCATION/TRAINING PROGRAM

## 2022-03-24 PROCEDURE — 99999 PR PBB SHADOW E&M-EST. PATIENT-LVL III: ICD-10-PCS | Mod: PBBFAC,,, | Performed by: STUDENT IN AN ORGANIZED HEALTH CARE EDUCATION/TRAINING PROGRAM

## 2022-03-24 PROCEDURE — 3077F PR MOST RECENT SYSTOLIC BLOOD PRESSURE >= 140 MM HG: ICD-10-PCS | Mod: CPTII,S$GLB,, | Performed by: STUDENT IN AN ORGANIZED HEALTH CARE EDUCATION/TRAINING PROGRAM

## 2022-03-24 PROCEDURE — 1101F PR PT FALLS ASSESS DOC 0-1 FALLS W/OUT INJ PAST YR: ICD-10-PCS | Mod: CPTII,S$GLB,, | Performed by: STUDENT IN AN ORGANIZED HEALTH CARE EDUCATION/TRAINING PROGRAM

## 2022-03-24 PROCEDURE — 3079F PR MOST RECENT DIASTOLIC BLOOD PRESSURE 80-89 MM HG: ICD-10-PCS | Mod: CPTII,S$GLB,, | Performed by: STUDENT IN AN ORGANIZED HEALTH CARE EDUCATION/TRAINING PROGRAM

## 2022-03-24 PROCEDURE — 3079F DIAST BP 80-89 MM HG: CPT | Mod: CPTII,S$GLB,, | Performed by: STUDENT IN AN ORGANIZED HEALTH CARE EDUCATION/TRAINING PROGRAM

## 2022-03-24 PROCEDURE — 1160F PR REVIEW ALL MEDS BY PRESCRIBER/CLIN PHARMACIST DOCUMENTED: ICD-10-PCS | Mod: CPTII,S$GLB,, | Performed by: STUDENT IN AN ORGANIZED HEALTH CARE EDUCATION/TRAINING PROGRAM

## 2022-03-24 PROCEDURE — 3288F PR FALLS RISK ASSESSMENT DOCUMENTED: ICD-10-PCS | Mod: CPTII,S$GLB,, | Performed by: STUDENT IN AN ORGANIZED HEALTH CARE EDUCATION/TRAINING PROGRAM

## 2022-03-24 PROCEDURE — 1126F PR PAIN SEVERITY QUANTIFIED, NO PAIN PRESENT: ICD-10-PCS | Mod: CPTII,S$GLB,, | Performed by: STUDENT IN AN ORGANIZED HEALTH CARE EDUCATION/TRAINING PROGRAM

## 2022-03-24 PROCEDURE — 1101F PT FALLS ASSESS-DOCD LE1/YR: CPT | Mod: CPTII,S$GLB,, | Performed by: STUDENT IN AN ORGANIZED HEALTH CARE EDUCATION/TRAINING PROGRAM

## 2022-03-24 PROCEDURE — 99214 PR OFFICE/OUTPT VISIT, EST, LEVL IV, 30-39 MIN: ICD-10-PCS | Mod: S$GLB,,, | Performed by: STUDENT IN AN ORGANIZED HEALTH CARE EDUCATION/TRAINING PROGRAM

## 2022-03-24 PROCEDURE — 1159F PR MEDICATION LIST DOCUMENTED IN MEDICAL RECORD: ICD-10-PCS | Mod: CPTII,S$GLB,, | Performed by: STUDENT IN AN ORGANIZED HEALTH CARE EDUCATION/TRAINING PROGRAM

## 2022-03-24 PROCEDURE — 1159F MED LIST DOCD IN RCRD: CPT | Mod: CPTII,S$GLB,, | Performed by: STUDENT IN AN ORGANIZED HEALTH CARE EDUCATION/TRAINING PROGRAM

## 2022-03-24 NOTE — PROGRESS NOTES
"Subjective:       Patient ID: Noah Lopez is a 77 y.o. male.    Chief Complaint: Other (Cath tubing)  This is a 77 y.o.  male patient that is an established patient of mine.    The patient was referred to me by Dr. De La Torre for urinary retention. Dr. De La Torre obtained routine bloodwork in 12/2021. His Cr was elevated from baseline. He then ordered a renal US 1/4/22 - marked distention of bladder, no hydronephrosis bilaterally, enlarged prostate vol 197g, and pt presented to ER per his recommendation on for a bedoya placement.   ER visit 1/4/22 - 3600 cc of urine from his Bedoya. Was not started on medications.      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 He feels that his "sphincter" has relaxed more.     Started him on flomax and finasteride 1 week ago. Pt wanted a voiding trial this week.     He failed a voiding trial. Worked up with cysto and UDS.  Cysto -Trilobar hypertrophy of the prostate, mildly enlarged median lobe, significantly enlarged lateral lobes  UDs with Dr. Maloney on 1/31/22 - see his report for full details -   Pdet at Max Flow: 0 cmH2O    EMG Storage: Normal Recruitment             EMG Voiding: Flaring with valsalva     Impression:           Sensation:Normal     Capacity: Large     Compliance:Normal     Detrusor Overactivity:Absent     Continence: No Incontinence     Contractility: Hypocontractility     Emptying:Unsatisfactory - Hypocontractility     Coordination:Dysfunctional (Valsalva) Voiding      He was having a bad reaction to Bactrim sent by Dr. De La Torre. Did not finish course.  Pt was not interested in a surgery. He felt that he "did this to myself." and with the results of the urodynamics with no detrusor pressure and valsalva voiding, an outlet reducing procedure would have a low chance of success. The patient states he is mostly interested in bedoya management and he does not want a surgery at this time. "     3/24/22  Here today for bedoya assistance. Pt went to ER and did not know how to disconnect bag    LAST PSA  Lab Results   Component Value Date    PSA 11.7 (H) 12/08/2021       Lab Results   Component Value Date    CREATININE 1.36 01/26/2022       ---  Past Medical History:   Diagnosis Date    Colon polyp 09/06/2018    Hyperlipidemia     Hypertension        Past Surgical History:   Procedure Laterality Date    COLONOSCOPY  2010    COLONOSCOPY N/A 9/6/2018    Procedure: COLONOSCOPY Suprep PLEASE TEXT PATIENT WITH ARRIVAL TIME;  Surgeon: Niharika Arce MD;  Location: [facility];  Service: Endoscopy;  Laterality: N/A;    CYSTOSCOPY WITH URODYNAMIC TESTING N/A 1/31/2022    Procedure: CYSTOSCOPY, WITH URODYNAMIC TESTING FLOUROSCOPIC;  Surgeon: Atnhony Mlaoney MD;  Location: [facility] OR [address] [place] [address];  Service: Urology;  Laterality: N/A;  1hr    KNEE ARTHROSCOPY      SPINE SURGERY      l-spine       Family History   Problem Relation Age of Onset    Hypertension Mother     Heart attack Father     Cirrhosis Father     No Known Problems Brother     Seizures Daughter     No Known Problems Son     No Known Problems Brother     Liver disease Daughter        Social History     Tobacco Use    Smoking status: Former Smoker    Smokeless tobacco: Never Used   Substance Use Topics    Alcohol use: Yes     Comment: occ    Drug use: Yes     Types: Marijuana       Current Outpatient Medications on File Prior to Visit   Medication Sig Dispense Refill    ascorbic acid, vitamin C, (VITAMIN C) 1000 MG tablet Take 1,000 mg by mouth once daily.      cephALEXin (KEFLEX) 500 MG capsule Take 1 capsule (500 mg total) by mouth every 12 (twelve) hours. for 10 days 20 capsule 0    cholecalciferol, vitamin D3, (VITAMIN D3 ORAL) Take 125 mcg by mouth.      clotrimazole-betamethasone 1-0.05% (LOTRISONE) cream Apply topically 2 (two) times daily.      diltiaZEM (CARDIZEM CD) 300 MG 24 hr capsule TAKE 1 CAPSULE BY MOUTH  ONCE DAILY  90 capsule 3    finasteride (PROSCAR) 5 mg tablet Take 1 tablet (5 mg total) by mouth once daily. 90 tablet 3    lisinopriL (PRINIVIL,ZESTRIL) 20 MG tablet TAKE 1 TABLET BY MOUTH ONCE DAILY 90 tablet 3    ofloxacin (FLOXIN) 0.3 % otic solution Apply 10 drops into right ear once a day for 10 days.  Please dispense generic.      simvastatin (ZOCOR) 20 MG tablet TAKE 1 TABLET BY MOUTH IN  THE EVENING 90 tablet 3    tamsulosin (FLOMAX) 0.4 mg Cap Take 1 capsule (0.4 mg total) by mouth once daily. 90 capsule 3    triamterene-hydrochlorothiazide 37.5-25 mg (DYAZIDE) 37.5-25 mg per capsule TAKE 1 CAPSULE BY MOUTH IN  THE MORNING 90 capsule 3    TURMERIC ORAL Take by mouth.      zinc gluconate 50 mg tablet Take 50 mg by mouth once daily.       No current facility-administered medications on file prior to visit.       Review of patient's allergies indicates:   Allergen Reactions    Ciprofloxacin Other (See Comments)     tendonitis    Sulfa (sulfonamide antibiotics) Nausea And Vomiting       Review of Systems   Constitutional: Negative for chills.   HENT: Negative for congestion.    Eyes: Negative for visual disturbance.   Respiratory: Negative for shortness of breath.    Cardiovascular: Negative for chest pain.   Gastrointestinal: Negative for abdominal distention.   Musculoskeletal: Negative for gait problem.   Skin: Negative for color change.   Neurological: Negative for dizziness.   Psychiatric/Behavioral: Negative for agitation.       Objective:      Physical Exam  Constitutional:       Appearance: He is well-developed.   HENT:      Head: Normocephalic.   Eyes:      Pupils: Pupils are equal, round, and reactive to light.   Pulmonary:      Effort: Pulmonary effort is normal.   Abdominal:      Palpations: Abdomen is soft.   Genitourinary:     Comments: Removed tape and disconnected bag and applied bedoya plug per his request  Musculoskeletal:         General: Normal range of motion.      Cervical back: Normal  "range of motion.   Skin:     General: Skin is warm and dry.   Neurological:      Mental Status: He is alert.         Assessment:       1. Urinary retention    2. Hypocontractile bladder        Plan:       1. Pt informed me at last visit that he does not want any procedures and that "I did this to myself." he only wanted bedoya catheter instructions.   2. Applied plug to bedoya today after I disconnected bag per his request.   3. F/u for monthly nursing bedoya exchanges as he does not qualify for  with his insurance plan.    Urinary retention    Hypocontractile bladder      "

## 2022-03-28 ENCOUNTER — HOSPITAL ENCOUNTER (EMERGENCY)
Facility: HOSPITAL | Age: 77
Discharge: HOME | End: 2022-03-28
Attending: EMERGENCY MEDICINE
Payer: MEDICARE

## 2022-03-28 VITALS
DIASTOLIC BLOOD PRESSURE: 77 MMHG | RESPIRATION RATE: 18 BRPM | WEIGHT: 250 LBS | HEART RATE: 86 BPM | OXYGEN SATURATION: 98 % | BODY MASS INDEX: 33.13 KG/M2 | HEIGHT: 73 IN | SYSTOLIC BLOOD PRESSURE: 161 MMHG | TEMPERATURE: 98 F

## 2022-03-28 DIAGNOSIS — R93.89 ABNORMAL CT SCAN: Primary | ICD-10-CM
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
## 2022-03-28 DIAGNOSIS — S01.01XA LACERATION OF SCALP, INITIAL ENCOUNTER: ICD-10-CM

## 2022-03-28 PROCEDURE — 25000003 PHARM REV CODE 250: Mod: ER | Performed by: PHYSICIAN ASSISTANT

## 2022-03-28 PROCEDURE — 12001 RPR S/N/AX/GEN/TRNK 2.5CM/<: CPT | Mod: ER

## 2022-03-28 PROCEDURE — 99284 EMERGENCY DEPT VISIT MOD MDM: CPT | Mod: 25,ER

## 2022-03-28 RX ORDER — ACETAMINOPHEN 500 MG
1000 TABLET ORAL
Status: COMPLETED | OUTPATIENT
Start: 2022-03-28 | End: 2022-03-28

## 2022-03-28 RX ORDER — LIDOCAINE HYDROCHLORIDE 10 MG/ML
10 INJECTION INFILTRATION; PERINEURAL
Status: COMPLETED | OUTPATIENT
Start: 2022-03-28 | End: 2022-03-28

## 2022-03-28 RX ADMIN — ACETAMINOPHEN 1000 MG: 500 TABLET ORAL at 08:03

## 2022-03-28 RX ADMIN — LIDOCAINE HYDROCHLORIDE 10 ML: 10 INJECTION, SOLUTION INFILTRATION; PERINEURAL at 08:03

## 2022-03-29 ENCOUNTER — TELEPHONE (OUTPATIENT)
Dept: FAMILY MEDICINE | Facility: CLINIC | Age: 77
End: 2022-03-29
Payer: MEDICARE

## 2022-03-29 NOTE — TELEPHONE ENCOUNTER
----- Message from Ruth Whitfield sent at 3/29/2022 11:23 AM CDT -----  Needs advice from nurse:      Who Called:pt  Regarding:needs appt follow up from fall/remove staples/found spot on xray on neck for 4/5  Would the patient rather a call back or [facility]?  Best Call Back number:[phone]  Additional Info:

## 2022-03-29 NOTE — ED PROVIDER NOTES
Encounter Date: 3/28/2022       History     Chief Complaint   Patient presents with    Head Injury     Loss balance in bathtub, fell over, hit back of head on toilet, shower rub struck him in the forehead, denies loc or blood thinner use      HPI: Noah Lopez, a 77 y.o. male  has a past medical history of Colon polyp (09/06/2018), Hyperlipidemia, and Hypertension.     He presents to the ED for evaluation after slip and fall while trying to exit the bathtub.  Denies any dizziness or prodrome prior.  States that he reached for his shower curtain judith, that he brought down with him during the fall.  Has an abrasion to his forehead as well as a laceration to the posterior aspect of his head.  Denies any loss of consciousness.  No nausea or vomiting.  Unsure of tetanus status.  Event happened about 30 minutes prior to arrival.      The history is provided by the patient.     Review of patient's allergies indicates:   Allergen Reactions    Ciprofloxacin Other (See Comments)     tendonitis    Sulfa (sulfonamide antibiotics) Nausea And Vomiting     Past Medical History:   Diagnosis Date    Colon polyp 09/06/2018    Hyperlipidemia     Hypertension      Past Surgical History:   Procedure Laterality Date    COLONOSCOPY  2010    COLONOSCOPY N/A 9/6/2018    Procedure: COLONOSCOPY Suprep PLEASE TEXT PATIENT WITH ARRIVAL TIME;  Surgeon: Niharika Arce MD;  Location: [facility];  Service: Endoscopy;  Laterality: N/A;    CYSTOSCOPY WITH URODYNAMIC TESTING N/A 1/31/2022    Procedure: CYSTOSCOPY, WITH URODYNAMIC TESTING FLOUROSCOPIC;  Surgeon: Anthony Maloney MD;  Location: [address];  Service: Urology;  Laterality: N/A;  1hr    KNEE ARTHROSCOPY      SPINE SURGERY      l-spine     Family History   Problem Relation Age of Onset    Hypertension Mother     Heart attack Father     Cirrhosis Father     No Known Problems Brother     Seizures Daughter     No Known Problems Son     No Known Problems  Brother     Liver disease Daughter      Social History     Tobacco Use    Smoking status: Former Smoker    Smokeless tobacco: Never Used   Substance Use Topics    Alcohol use: Yes     Comment: occ    Drug use: Yes     Types: Marijuana     Review of Systems   Constitutional: Negative for fever.   Gastrointestinal: Negative for nausea and vomiting.   Musculoskeletal: Negative for neck pain and neck stiffness.   Skin: Positive for wound. Negative for color change.   Allergic/Immunologic: Negative for immunocompromised state.   Neurological: Negative for weakness and numbness.   Hematological: Negative for adenopathy.   Psychiatric/Behavioral: Negative for agitation.   All other systems reviewed and are negative.      Physical Exam     Initial Vitals [03/28/22 1834]   BP Pulse Resp Temp SpO2   (!) 165/75 86 18 98 °F (36.7 °C) 98 %      MAP       --         Physical Exam    Nursing note and vitals reviewed.  Constitutional: He appears well-developed and well-nourished. He is not diaphoretic. No distress.   HENT:   Head: Normocephalic. Head is with laceration.       Right Ear: External ear normal.   Left Ear: External ear normal.   Eyes: Conjunctivae are normal.   Cardiovascular: Normal rate and regular rhythm.   Pulmonary/Chest: No respiratory distress.   Musculoskeletal:         General: Normal range of motion.     Neurological: He is alert and oriented to person, place, and time. He has normal strength. No cranial nerve deficit or sensory deficit. GCS score is 15. GCS eye subscore is 4. GCS verbal subscore is 5. GCS motor subscore is 6.   Skin: Capillary refill takes less than 2 seconds.   Psychiatric: He has a normal mood and affect. Thought content normal.         ED Course   Lac Repair    Date/Time: 3/28/2022 9:33 PM  Performed by: Maki Park PA-C  Authorized by: Rasheed Nieves III, MD     Consent:     Consent obtained:  Verbal    Consent given by:  Patient    Risks, benefits, and alternatives were  discussed: yes      Risks discussed:  Infection  Universal protocol:     Patient identity confirmed:  Verbally with patient  Anesthesia:     Anesthesia method:  Local infiltration    Local anesthetic:  Lidocaine 1% w/o epi  Laceration details:     Length (cm):  2    Depth (mm):  2  Exploration:     Hemostasis achieved with:  Direct pressure    Wound exploration: wound explored through full range of motion and entire depth of wound visualized    Treatment:     Irrigation volume:  250    Irrigation method:  Syringe    Visualized foreign bodies/material removed: no      Debridement:  None    Undermining:  None    Scar revision: no    Skin repair:     Repair method:  Staples    Number of staples:  4  Approximation:     Approximation:  Close  Repair type:     Repair type:  Simple  Post-procedure details:     Dressing:  Open (no dressing)    Procedure completion:  Tolerated      Labs Reviewed - No data to display       Imaging Results          CT Cervical Spine Without Contrast (Final result)  Result time 03/28/22 19:52:12    Final result by Jaida Venegas MD (03/28/22 19:52:12)                 Impression:      No acute fracture or dislocation.    Moderate multilevel degenerative disc disease    2.2 x 2.4 cm hypodense lesion inferior to the left thyroid gland may relate to a thyroid lesion.  Recommend follow-up non emergent thyroid ultrasound for further evaluation.    All CT scans   are performed using dose optimization techniques including the following: automated exposure control; adjustment of the mA and/or kV; use of iterative reconstruction technique.  Dose modulation was employed for ALARA by means of: Automated exposure control; adjustment of the mA and/or kV according to patient size (this includes techniques or standardized protocols for targeted exams where dose is matched to indication/reason for exam; i.e. extremities or head); and/or use of iterative reconstructive technique.      Electronically signed  by: Theo Sena  Date:    03/28/2022  Time:    19:52             Narrative:    EXAMINATION:  CT CERVICAL SPINE WITHOUT CONTRAST    CLINICAL HISTORY:  Neck trauma (Age >= 65y);    TECHNIQUE:  Low dose axial images, sagittal and coronal reformations were performed though the cervical spine.  Contrast was not administered.    COMPARISON:  None    FINDINGS:  Normal vertebral body heights without evidence for spondylolisthesis.  Moderate multilevel degenerative disc disease.  No prevertebral soft tissue swelling.  Facet joints are congruent.  Normal bone mineral density.    Sub sternal hypodense lesion involving the presumed left thyroid gland measuring 2.2 x 2.4 cm                               CT Head Without Contrast (Final result)  Result time 03/28/22 19:43:00    Final result by Jaida Venegas MD (03/28/22 19:43:00)                 Impression:      No acute abnormality.    Atrophy and chronic white matter changes    All CT scans   are performed using dose optimization techniques including the following: automated exposure control; adjustment of the mA and/or kV; use of iterative reconstruction technique.  Dose modulation was employed for ALARA by means of: Automated exposure control; adjustment of the mA and/or kV according to patient size (this includes techniques or standardized protocols for targeted exams where dose is matched to indication/reason for exam; i.e. extremities or head); and/or use of iterative reconstructive technique.      Electronically signed by: Theo Sena  Date:    03/28/2022  Time:    19:43             Narrative:    EXAMINATION:  CT HEAD WITHOUT CONTRAST    CLINICAL HISTORY:  Head trauma, minor (Age >= 65y);    TECHNIQUE:  Low dose axial CT images obtained throughout the head without intravenous contrast. Sagittal and coronal reconstructions were performed.    COMPARISON:  None.    FINDINGS:  Atrophy and chronic white matter changes    Ventricles and sulci are normal in size for age  without evidence of hydrocephalus. No extra-axial blood or fluid collections.    No parenchymal mass, hemorrhage, edema or major vascular distribution infarct.    Skull/extracranial contents (limited evaluation): No fracture. Mastoid air cells and paranasal sinuses are essentially clear.                                 Medications   LIDOcaine HCL 10 mg/ml (1%) injection 10 mL (10 mLs Infiltration Given by Other 3/28/22 2003)   acetaminophen tablet 1,000 mg (1,000 mg Oral Given 3/28/22 2003)     Medical Decision Making:   Initial Assessment:   Fall with scalp laceration   Differential Diagnosis:   Laceration, simple versus complex, fracture, SDH  Clinical Tests:   Radiological Study: Ordered and Reviewed  ED Management:  Patient presents to the ER for evaluation of fall after slip in bathroom.  Abrasion noted to forehead and irregular, jagged laceration to posterior scalp.  No acute findings on CT of head and neck.  Patient informed of abnormal finding of thyroid and instructed to have ultrasound done on an outpatient basis.  Staples placed to posterior scalp.  Information given on wound care reasons to return were given to patient.  He verbalized understanding and agreement plan.                      Clinical Impression:   Final diagnoses:  [R93.89] Abnormal CT scan (Primary)  [W19.XXXA] Fall, initial encounter  [S01.01XA] Laceration of scalp, initial encounter          ED Disposition Condition    Discharge Stable        ED Prescriptions     None        Follow-up Information     Follow up With Specialties Details Why Contact Info    Anthony De La Torre MD Family Medicine In 1 week For suture removal [address] [place] [id number]  [phone]             Maki Park PA-C  03/28/22 [id number]

## 2022-03-30 NOTE — TELEPHONE ENCOUNTER
Spoke with pt. Informed him that appt is scheduled for Monday 4/4 at 2 pm with Dr. De La Torre. Pt verbalized understanding of appt details.

## 2022-04-04 ENCOUNTER — OFFICE VISIT (OUTPATIENT)
Dept: FAMILY MEDICINE | Facility: CLINIC | Age: 77
End: 2022-04-04
Payer: MEDICARE

## 2022-04-04 VITALS
TEMPERATURE: 98 F | OXYGEN SATURATION: 95 % | SYSTOLIC BLOOD PRESSURE: 130 MMHG | HEART RATE: 94 BPM | WEIGHT: 244.94 LBS | BODY MASS INDEX: 32.46 KG/M2 | RESPIRATION RATE: 16 BRPM | HEIGHT: 73 IN | DIASTOLIC BLOOD PRESSURE: 68 MMHG

## 2022-04-04 DIAGNOSIS — G47.00 INSOMNIA, UNSPECIFIED TYPE: ICD-10-CM

## 2022-04-04 DIAGNOSIS — I10 PRIMARY HYPERTENSION: ICD-10-CM

## 2022-04-04 DIAGNOSIS — E78.5 HYPERLIPIDEMIA, UNSPECIFIED HYPERLIPIDEMIA TYPE: ICD-10-CM

## 2022-04-04 DIAGNOSIS — Z91.81 HISTORY OF RECENT FALL: Primary | ICD-10-CM

## 2022-04-04 DIAGNOSIS — S01.01XS SCALP LACERATION, SEQUELA: ICD-10-CM

## 2022-04-04 DIAGNOSIS — M47.812 NECK ARTHRITIS: ICD-10-CM

## 2022-04-04 DIAGNOSIS — E04.1 THYROID NODULE: ICD-10-CM

## 2022-04-04 DIAGNOSIS — N31.2 HYPOCONTRACTILE BLADDER: ICD-10-CM

## 2022-04-04 PROCEDURE — 3075F PR MOST RECENT SYSTOLIC BLOOD PRESS GE 130-139MM HG: ICD-10-PCS | Mod: CPTII,S$GLB,, | Performed by: FAMILY MEDICINE

## 2022-04-04 PROCEDURE — 3288F FALL RISK ASSESSMENT DOCD: CPT | Mod: CPTII,S$GLB,, | Performed by: FAMILY MEDICINE

## 2022-04-04 PROCEDURE — 99499 RISK ADDL DX/OHS AUDIT: ICD-10-PCS | Mod: S$GLB,,, | Performed by: FAMILY MEDICINE

## 2022-04-04 PROCEDURE — 3078F DIAST BP <80 MM HG: CPT | Mod: CPTII,S$GLB,, | Performed by: FAMILY MEDICINE

## 2022-04-04 PROCEDURE — 1125F PR PAIN SEVERITY QUANTIFIED, PAIN PRESENT: ICD-10-PCS | Mod: CPTII,S$GLB,, | Performed by: FAMILY MEDICINE

## 2022-04-04 PROCEDURE — 1159F MED LIST DOCD IN RCRD: CPT | Mod: CPTII,S$GLB,, | Performed by: FAMILY MEDICINE

## 2022-04-04 PROCEDURE — 99214 OFFICE O/P EST MOD 30 MIN: CPT | Mod: S$GLB,,, | Performed by: FAMILY MEDICINE

## 2022-04-04 PROCEDURE — 3288F PR FALLS RISK ASSESSMENT DOCUMENTED: ICD-10-PCS | Mod: CPTII,S$GLB,, | Performed by: FAMILY MEDICINE

## 2022-04-04 PROCEDURE — 99499 UNLISTED E&M SERVICE: CPT | Mod: S$GLB,,, | Performed by: FAMILY MEDICINE

## 2022-04-04 PROCEDURE — 1100F PTFALLS ASSESS-DOCD GE2>/YR: CPT | Mod: CPTII,S$GLB,, | Performed by: FAMILY MEDICINE

## 2022-04-04 PROCEDURE — 1159F PR MEDICATION LIST DOCUMENTED IN MEDICAL RECORD: ICD-10-PCS | Mod: CPTII,S$GLB,, | Performed by: FAMILY MEDICINE

## 2022-04-04 PROCEDURE — 3075F SYST BP GE 130 - 139MM HG: CPT | Mod: CPTII,S$GLB,, | Performed by: FAMILY MEDICINE

## 2022-04-04 PROCEDURE — 3078F PR MOST RECENT DIASTOLIC BLOOD PRESSURE < 80 MM HG: ICD-10-PCS | Mod: CPTII,S$GLB,, | Performed by: FAMILY MEDICINE

## 2022-04-04 PROCEDURE — 99214 PR OFFICE/OUTPT VISIT, EST, LEVL IV, 30-39 MIN: ICD-10-PCS | Mod: S$GLB,,, | Performed by: FAMILY MEDICINE

## 2022-04-04 PROCEDURE — 1100F PR PT FALLS ASSESS DOC 2+ FALLS/FALL W/INJURY/YR: ICD-10-PCS | Mod: CPTII,S$GLB,, | Performed by: FAMILY MEDICINE

## 2022-04-04 PROCEDURE — 1125F AMNT PAIN NOTED PAIN PRSNT: CPT | Mod: CPTII,S$GLB,, | Performed by: FAMILY MEDICINE

## 2022-04-04 RX ORDER — TRAZODONE HYDROCHLORIDE 100 MG/1
TABLET ORAL
Qty: 180 TABLET | Refills: 3 | Status: SHIPPED | OUTPATIENT
Start: 2022-04-04 | End: 2023-06-09 | Stop reason: SDUPTHER

## 2022-04-04 RX ORDER — CALCIUM CARBONATE 300MG(750)
TABLET,CHEWABLE ORAL
COMMUNITY
End: 2023-12-22

## 2022-04-04 RX ORDER — CLOTRIMAZOLE AND BETAMETHASONE DIPROPIONATE 10; .64 MG/G; MG/G
CREAM TOPICAL 2 TIMES DAILY
Qty: 60 G | Refills: 3 | Status: SHIPPED | OUTPATIENT
Start: 2022-04-04

## 2022-04-04 NOTE — PROGRESS NOTES
"Subjective:      Patient ID: Noah Lopez is a 77 y.o. male.    Chief Complaint: ER follow up (Visit encounter 03/28/2022, patient fell in bathroom. Staples in scalp;back of head.)      Vitals:    04/04/22 1407 04/04/22 1447   BP: (!) 142/62 130/68   Pulse: 94    Resp: 16    Temp: 98.2 °F (36.8 °C)    TempSrc: Oral    SpO2: 95%    Weight: 111.1 kg (244 lb 15.3 oz)    Height: 6' 1" (1.854 m)         HPI   F/u ER for head trauma and staple removal  Uses a catheter for LANDA  Staples back of scalp to remove from ER  Hit toilet back of head    Problem List  Patient Active Problem List   Diagnosis    Hypertension    Pincer nail deformity    Great toe pain, unspecified laterality    Screening for malignant neoplasm of colon    Hyperlipidemia    Colon polyp    Hypocontractile bladder    Neck arthritis    History of recent fall    Thyroid nodule    Insomnia        ALLERGIES:   Review of patient's allergies indicates:   Allergen Reactions    Ciprofloxacin Other (See Comments)     tendonitis    Sulfa (sulfonamide antibiotics) Nausea And Vomiting       MEDS:   Current Outpatient Medications:     ascorbic acid, vitamin C, (VITAMIN C) 1000 MG tablet, Take 1,000 mg by mouth once daily., Disp: , Rfl:     cholecalciferol, vitamin D3, (VITAMIN D3 ORAL), Take 125 mcg by mouth., Disp: , Rfl:     diltiaZEM (CARDIZEM CD) 300 MG 24 hr capsule, TAKE 1 CAPSULE BY MOUTH  ONCE DAILY, Disp: 90 capsule, Rfl: 3    diphenhydramine HCl (BENADRYL ALLERGY ORAL), Take by mouth., Disp: , Rfl:     finasteride (PROSCAR) 5 mg tablet, Take 1 tablet (5 mg total) by mouth once daily., Disp: 90 tablet, Rfl: 3    GAS RELIEF 80, SIMETHICONE, ORAL, Take by mouth., Disp: , Rfl:     lisinopriL (PRINIVIL,ZESTRIL) 20 MG tablet, TAKE 1 TABLET BY MOUTH ONCE DAILY, Disp: 90 tablet, Rfl: 3    melatonin 10 mg TbDL, Take by mouth., Disp: , Rfl:     ofloxacin (FLOXIN) 0.3 % otic solution, Apply 10 drops into right ear once a day for 10 days.  " Please dispense generic., Disp: , Rfl:     simvastatin (ZOCOR) 20 MG tablet, TAKE 1 TABLET BY MOUTH IN  THE EVENING, Disp: 90 tablet, Rfl: 3    tamsulosin (FLOMAX) 0.4 mg Cap, Take 1 capsule (0.4 mg total) by mouth once daily., Disp: 90 capsule, Rfl: 3    TURMERIC ORAL, Take by mouth., Disp: , Rfl:     zinc gluconate 50 mg tablet, Take 50 mg by mouth once daily., Disp: , Rfl:     clotrimazole-betamethasone 1-0.05% (LOTRISONE) cream, Apply topically 2 (two) times daily., Disp: 60 g, Rfl: 3    traZODone (DESYREL) 100 MG tablet, TAKE 2 TABLETS BY MOUTH EVERY DAY AT BEDTIME AS NEEDED FOR INSOMNIA, Disp: 180 tablet, Rfl: 3    triamterene-hydrochlorothiazide 37.5-25 mg (DYAZIDE) 37.5-25 mg per capsule, TAKE 1 CAPSULE BY MOUTH IN  THE MORNING (Patient not taking: Reported on 4/4/2022), Disp: 90 capsule, Rfl: 3      History:  Current Providers as of 4/4/2022  PCP: Anthony De La Torre MD  Care Team Provider: Yasmeen Hubbard LPN  Encounter Provider: Anthony De La Torre MD, starting on Mon Apr 4, 2022 12:00 AM  Referring Provider: not found, starting on Mon Apr 4, 2022 12:00 AM  Consulting Physician: Anthony De La Torre MD, starting on Mon Apr 4, 2022  2:07 PM (Active)   Past Medical History:   Diagnosis Date    Colon polyp 09/06/2018    Hyperlipidemia     Hypertension      Past Surgical History:   Procedure Laterality Date    COLONOSCOPY  2010    COLONOSCOPY N/A 9/6/2018    Procedure: COLONOSCOPY Suprep PLEASE TEXT PATIENT WITH ARRIVAL TIME;  Surgeon: Niharika Arce MD;  Location: [facility];  Service: Endoscopy;  Laterality: N/A;    CYSTOSCOPY WITH URODYNAMIC TESTING N/A 1/31/2022    Procedure: CYSTOSCOPY, WITH URODYNAMIC TESTING FLOUROSCOPIC;  Surgeon: Anthony Maloney MD;  Location: [facility] OR [address] [place] [address];  Service: Urology;  Laterality: N/A;  1hr    KNEE ARTHROSCOPY      SPINE SURGERY      l-spine     Social History     Tobacco Use    Smoking status: Former Smoker    Smokeless tobacco: Never Used   Substance Use Topics     Alcohol use: Yes     Comment: occ    Drug use: Yes     Types: Marijuana         Review of Systems   Constitutional: Negative for appetite change, fatigue, fever and unexpected weight change.   HENT: Negative for congestion, ear pain, sinus pressure and sore throat.    Eyes: Negative for pain and visual disturbance.   Respiratory: Negative for shortness of breath.    Cardiovascular: Negative for chest pain.   Gastrointestinal: Negative for abdominal pain, constipation and diarrhea.   Endocrine: Negative for polyuria.   Genitourinary: Negative for difficulty urinating and frequency.   Musculoskeletal: Negative for arthralgias, back pain and myalgias.   Skin: Positive for wound. Negative for color change.   Allergic/Immunologic: Negative.    Neurological: Negative for syncope, weakness and headaches.   Hematological: Does not bruise/bleed easily.   Psychiatric/Behavioral: Negative for dysphoric mood and suicidal ideas. The patient is not nervous/anxious.    All other systems reviewed and are negative.    Objective:     Physical Exam  Vitals and nursing note reviewed.   Constitutional:       General: He is not in acute distress.     Appearance: He is well-developed. He is obese. He is not diaphoretic.   HENT:      Head: Normocephalic.      Right Ear: External ear normal.      Left Ear: External ear normal.      Mouth/Throat:      Pharynx: No oropharyngeal exudate.   Eyes:      General: No scleral icterus.        Right eye: No discharge.         Left eye: No discharge.      Conjunctiva/sclera: Conjunctivae normal.      Pupils: Pupils are equal, round, and reactive to light.   Neck:      Thyroid: No thyromegaly.      Vascular: No JVD.      Trachea: No tracheal deviation.   Cardiovascular:      Rate and Rhythm: Normal rate and regular rhythm.      Heart sounds: No murmur heard.    No friction rub. No gallop.   Pulmonary:      Effort: Pulmonary effort is normal. No respiratory distress.      Breath sounds: Normal  breath sounds. No stridor. No wheezing or rales.   Chest:      Chest wall: No tenderness.   Abdominal:      General: There is no distension.      Palpations: Abdomen is soft. There is no mass.      Tenderness: There is no abdominal tenderness. There is no guarding or rebound.   Musculoskeletal:         General: No tenderness. Normal range of motion.      Cervical back: Normal range of motion and neck supple.   Lymphadenopathy:      Cervical: No cervical adenopathy.   Skin:     General: Skin is warm and dry.      Coloration: Skin is not pale.      Findings: No erythema or rash.      Comments: Scalp lac healed, stapled removed   Neurological:      Mental Status: He is alert and oriented to person, place, and time.      Cranial Nerves: No cranial nerve deficit.      Motor: No abnormal muscle tone.      Coordination: Coordination normal.      Deep Tendon Reflexes: Reflexes are normal and symmetric. Reflexes normal.   Psychiatric:         Behavior: Behavior normal.         Thought Content: Thought content normal.         Judgment: Judgment normal.             Assessment:     1. History of recent fall    2. Scalp laceration, sequela    3. Neck arthritis    4. Primary hypertension    5. Hyperlipidemia, unspecified hyperlipidemia type    6. Hypocontractile bladder    7. Insomnia, unspecified type    8. Thyroid nodule      Plan:        Medication List          Accurate as of April 4, 2022 11:59 PM. If you have any questions, ask your nurse or doctor.            START taking these medications    traZODone 100 MG tablet  Commonly known as: DESYREL  TAKE 2 TABLETS BY MOUTH EVERY DAY AT BEDTIME AS NEEDED FOR INSOMNIA  Started by: Anthony De La Torre MD        CONTINUE taking these medications    ascorbic acid (vitamin C) 1000 MG tablet  Commonly known as: VITAMIN C     BENADRYL ALLERGY ORAL     clotrimazole-betamethasone 1-0.05% cream  Commonly known as: LOTRISONE  Apply topically 2 (two) times daily.     diltiaZEM 300 MG 24 hr  capsule  Commonly known as: CARDIZEM CD  TAKE 1 CAPSULE BY MOUTH  ONCE DAILY     finasteride 5 mg tablet  Commonly known as: PROSCAR  Take 1 tablet (5 mg total) by mouth once daily.     GAS RELIEF 80 (SIMETHICONE) ORAL     lisinopriL 20 MG tablet  Commonly known as: PRINIVIL,ZESTRIL  TAKE 1 TABLET BY MOUTH ONCE DAILY     melatonin 10 mg Tbdl     ofloxacin 0.3 % otic solution  Commonly known as: FLOXIN     simvastatin 20 MG tablet  Commonly known as: ZOCOR  TAKE 1 TABLET BY MOUTH IN  THE EVENING     tamsulosin 0.4 mg Cap  Commonly known as: FLOMAX  Take 1 capsule (0.4 mg total) by mouth once daily.     triamterene-hydrochlorothiazide 37.5-25 mg 37.5-25 mg per capsule  Commonly known as: DYAZIDE  TAKE 1 CAPSULE BY MOUTH IN  THE MORNING     TURMERIC ORAL     VITAMIN D3 ORAL     zinc gluconate 50 mg tablet           Where to Get Your Medications      These medications were sent to [facility] [address] [place] [address] [place] [place] [id number]  [facility] [address]    Phone: [phone]   · clotrimazole-betamethasone 1-0.05% cream  · traZODone 100 MG tablet       History of recent fall    Scalp laceration, sequela    Neck arthritis    Primary hypertension    Hyperlipidemia, unspecified hyperlipidemia type    Hypocontractile bladder    Insomnia, unspecified type    Thyroid nodule  -     US Soft Tissue Head Neck Thyroid; Future; Expected date: 04/04/2022    Other orders  -     traZODone (DESYREL) 100 MG tablet; TAKE 2 TABLETS BY MOUTH EVERY DAY AT BEDTIME AS NEEDED FOR INSOMNIA  Dispense: 180 tablet; Refill: 3  -     clotrimazole-betamethasone 1-0.05% (LOTRISONE) cream; Apply topically 2 (two) times daily.  Dispense: 60 g; Refill: 3        Staples removed from scalp

## 2022-04-04 NOTE — TELEPHONE ENCOUNTER
No new care gaps identified.  Powered by [employer]. Reference number: [id number].   4/04/2022 5:31:24 AM CDT

## 2022-04-06 RX ORDER — TRAZODONE HYDROCHLORIDE 100 MG/1
TABLET ORAL
Qty: 180 TABLET | Refills: 0 | OUTPATIENT
Start: 2022-04-06

## 2022-04-06 NOTE — TELEPHONE ENCOUNTER
Jody DC. Request already responded to by other means (e.g. phone or fax)   Refill Authorization Note   Noah Jessica  is requesting a refill authorization.  Brief Assessment and Rationale for Refill:  Quick Discontinue  Medication Therapy Plan:       Medication Reconciliation Completed:  No        Note composed:6:56 AM 04/06/2022

## 2022-04-08 ENCOUNTER — HOSPITAL ENCOUNTER (OUTPATIENT)
Dept: RADIOLOGY | Facility: HOSPITAL | Age: 77
Discharge: HOME | End: 2022-04-08
Attending: FAMILY MEDICINE
Payer: MEDICARE

## 2022-04-08 DIAGNOSIS — E04.1 THYROID NODULE: ICD-10-CM

## 2022-04-08 PROCEDURE — 76536 US EXAM OF HEAD AND NECK: CPT | Mod: TC,PO

## 2022-04-11 ENCOUNTER — TELEPHONE (OUTPATIENT)
Dept: FAMILY MEDICINE | Facility: CLINIC | Age: 77
End: 2022-04-11
Payer: MEDICARE

## 2022-04-11 DIAGNOSIS — E04.1 THYROID NODULE: Primary | ICD-10-CM

## 2022-04-12 ENCOUNTER — TELEPHONE (OUTPATIENT)
Dept: FAMILY MEDICINE | Facility: CLINIC | Age: 77
End: 2022-04-12
Payer: MEDICARE

## 2022-04-12 NOTE — TELEPHONE ENCOUNTER
----- Message from Victor Manuel Harrell sent at 4/12/2022  8:27 AM CDT -----  Contact: Pt  Type:  Test Results    Who Called: Pt  Name of Test (Lab/Mammo/Etc): ultrasound  Date of Test: 04/04  Ordering Provider: Britt  Where the test was performed: [facility]  Would the patient rather a call back or a response via [facility]? Call   Best Call Back Number: [phone]  Additional Information:      Pt would like a call back   He stated he is worried

## 2022-04-12 NOTE — TELEPHONE ENCOUNTER
"----- Message from Carola Gurrola sent at 4/12/2022  9:12 AM CDT -----  [facility] Team,    Please see the below.               [9:11 AM] Saima Villafana GM can you you please place a referral [id number] "ambulatory referral consult to Interventional RAD" Thanks once the providers review a  will call and schedule pt. [facility] campus does not do FNA "[facility]"        Thank you,  Carola              "

## 2022-04-12 NOTE — TELEPHONE ENCOUNTER
Pt has been notified and verbalized understanding that a referral has been placed for bx and that referral coordinator will be contacting him to schedule appt.

## 2022-04-19 ENCOUNTER — TELEPHONE (OUTPATIENT)
Dept: UROLOGY | Facility: CLINIC | Age: 77
End: 2022-04-19
Payer: MEDICARE

## 2022-04-19 DIAGNOSIS — N31.2 HYPOCONTRACTILE BLADDER: Primary | ICD-10-CM

## 2022-04-19 NOTE — TELEPHONE ENCOUNTER
----- Message from Any Hawthorne sent at 4/19/2022  4:19 PM CDT -----  Regarding: RX medical  Name of Who is Calling: Noah           What is the request in detail: Patient is requesting to have a Rx sent over to [facility] so he can get his Hilary Lian Reflex Chamber and Cathter Leg strap. He stated that they need to know why he need it and to send his last visit summary. The office number is [phone].           Can the clinic reply by [facility]: No           What Number to Call Back if not in [facility]: [phone]

## 2022-04-19 NOTE — TELEPHONE ENCOUNTER
Contacted [facility] for clarification.  Send prescription for drainage bag and leg strap to [facility] with 12 refills (if he need a supply at anytime)  Spoke with patient to inform script to be sent in tomorrow.  He voiced understanding.

## 2022-04-20 ENCOUNTER — HOSPITAL ENCOUNTER (OUTPATIENT)
Dept: RADIOLOGY | Facility: OTHER | Age: 77
Discharge: HOME | End: 2022-04-20
Attending: FAMILY MEDICINE
Payer: MEDICARE

## 2022-04-20 ENCOUNTER — TELEPHONE (OUTPATIENT)
Dept: FAMILY MEDICINE | Facility: CLINIC | Age: 77
End: 2022-04-20
Payer: MEDICARE

## 2022-04-20 DIAGNOSIS — E04.1 THYROID NODULE: ICD-10-CM

## 2022-04-20 DIAGNOSIS — R91.1 SOLITARY PULMONARY NODULE: ICD-10-CM

## 2022-04-20 PROCEDURE — 10005 FNA BX W/US GDN 1ST LES: CPT | Mod: ,,, | Performed by: RADIOLOGY

## 2022-04-20 PROCEDURE — 88177 PR  EVALUATION OF FNA SMEAR TO DETERMINE ADEQUACY, EA ADD EVAL: ICD-10-PCS | Mod: 26,,, | Performed by: PATHOLOGY

## 2022-04-20 PROCEDURE — 10005 FNA BX W/US GDN 1ST LES: CPT

## 2022-04-20 PROCEDURE — 88173 CYTOPATH EVAL FNA REPORT: CPT | Performed by: PATHOLOGY

## 2022-04-20 PROCEDURE — 88173 PR  INTERPRETATION OF FNA SMEAR: ICD-10-PCS | Mod: 26,,, | Performed by: PATHOLOGY

## 2022-04-20 PROCEDURE — 88172 CYTP DX EVAL FNA 1ST EA SITE: CPT | Mod: 26,,, | Performed by: PATHOLOGY

## 2022-04-20 PROCEDURE — 88177 CYTP FNA EVAL EA ADDL: CPT | Performed by: PATHOLOGY

## 2022-04-20 PROCEDURE — 88177 CYTP FNA EVAL EA ADDL: CPT | Mod: 26,,, | Performed by: PATHOLOGY

## 2022-04-20 PROCEDURE — 88173 CYTOPATH EVAL FNA REPORT: CPT | Mod: 26,,, | Performed by: PATHOLOGY

## 2022-04-20 PROCEDURE — 10005 IR US FINE NEEDLE ASPIRATION BIOPSY, FIRST LESION: ICD-10-PCS | Mod: ,,, | Performed by: RADIOLOGY

## 2022-04-20 PROCEDURE — 25000003 PHARM REV CODE 250: Performed by: FAMILY MEDICINE

## 2022-04-20 PROCEDURE — 88172 CYTP DX EVAL FNA 1ST EA SITE: CPT | Performed by: PATHOLOGY

## 2022-04-20 PROCEDURE — 88172 PR  EVALUATION OF FNA SMEAR TO DETERMINE ADEQUACY, FIRST EVAL: ICD-10-PCS | Mod: 26,,, | Performed by: PATHOLOGY

## 2022-04-20 RX ORDER — LIDOCAINE HYDROCHLORIDE 10 MG/ML
5 INJECTION INFILTRATION; PERINEURAL ONCE
Status: COMPLETED | OUTPATIENT
Start: 2022-04-20 | End: 2022-04-20

## 2022-04-20 RX ADMIN — LIDOCAINE HYDROCHLORIDE 5 ML: 10 INJECTION, SOLUTION EPIDURAL; INFILTRATION; INTRACAUDAL; PERINEURAL at 09:04

## 2022-04-20 NOTE — PROCEDURES
Radiology Post-Procedure Note    Pre Op Diagnosis: L neck lesion  Post Op Diagnosis: Same    Procedure: FNA    Procedure performed by: Kareem Busch MD    Written Informed Consent Obtained: Yes  Specimen Removed: YES 1 cc serosanguinous fluid, 3 FNA specimens  Estimated Blood Loss: Minimal    Findings:   Aspiration of L neck nodule    Patient tolerated procedure well.    @SIG@

## 2022-04-21 NOTE — TELEPHONE ENCOUNTER
Patient informed order faxed.  Will call him back on tomorrow to set up bedoya exchange (coordinating with other appointment in [place])  Call by 1500 hours.

## 2022-04-22 ENCOUNTER — TELEPHONE (OUTPATIENT)
Dept: FAMILY MEDICINE | Facility: CLINIC | Age: 77
End: 2022-04-22
Payer: MEDICARE

## 2022-04-22 ENCOUNTER — HOSPITAL ENCOUNTER (OUTPATIENT)
Dept: RADIOLOGY | Facility: HOSPITAL | Age: 77
Discharge: HOME | End: 2022-04-22
Attending: FAMILY MEDICINE
Payer: MEDICARE

## 2022-04-22 DIAGNOSIS — R91.8 LUNG NODULES: Primary | ICD-10-CM

## 2022-04-22 DIAGNOSIS — R91.1 SOLITARY PULMONARY NODULE: ICD-10-CM

## 2022-04-22 PROCEDURE — 71250 CT THORAX DX C-: CPT | Mod: TC,PO

## 2022-04-22 NOTE — TELEPHONE ENCOUNTER
Call pt to wait on endocrinology appt until pathology comes back from his bx of his thyroid    He called yesterday and didn't know about endo referral I placed.    No answer when I called back this AM.

## 2022-04-22 NOTE — TELEPHONE ENCOUNTER
Follow up CT on lung ordered for 3 months due to findings of multiple small left lung nodules; pt notified;  Still await thyroid bx FNA results

## 2022-04-22 NOTE — TELEPHONE ENCOUNTER
Victor Manuel Cruz Staff  Caller: Unspecified (Today, 12:31 PM)  Type:  Patient Returning Call     Who Called:pt   Who Left Message for Patient:sademelodie   Does the patient know what this is regarding?:no   Would the patient rather a call back or a response via [facility]? Call   Best Call Back Number: [phone]   Additional Information:     Returning a call

## 2022-04-25 ENCOUNTER — CLINICAL SUPPORT (OUTPATIENT)
Dept: UROLOGY | Facility: CLINIC | Age: 77
End: 2022-04-25
Payer: MEDICARE

## 2022-04-25 VITALS
WEIGHT: 242.31 LBS | DIASTOLIC BLOOD PRESSURE: 72 MMHG | HEIGHT: 73 IN | HEART RATE: 88 BPM | SYSTOLIC BLOOD PRESSURE: 125 MMHG | BODY MASS INDEX: 32.11 KG/M2

## 2022-04-25 LAB
ADEQUACY: ABNORMAL
FINAL PATHOLOGIC DIAGNOSIS: ABNORMAL
Lab: ABNORMAL

## 2022-04-25 PROCEDURE — 99999 PR PBB SHADOW E&M-EST. PATIENT-LVL III: CPT | Mod: PBBFAC,,,

## 2022-04-25 PROCEDURE — 99999 PR PBB SHADOW E&M-EST. PATIENT-LVL III: ICD-10-PCS | Mod: PBBFAC,,,

## 2022-04-25 NOTE — PROGRESS NOTES
Jones exchange.  Catheter deflated and removed.  Under sterile technique, 16 Fr [place] inserted into urinary bladder.  Balloon inflated with 10 cc of NaCl.  No urine return (patient had minimal amount of liquid-he consumed cereal this morning with milk-nothing thereafter)  Supplied patient with water, clear yellow urine returned.  Patient requested capping as he has some errands to run (applied after urine return)  Large urinary/leg bag supplied to patient.   Jones exchange scheduled for one month.

## 2022-04-29 ENCOUNTER — TELEPHONE (OUTPATIENT)
Dept: FAMILY MEDICINE | Facility: CLINIC | Age: 77
End: 2022-04-29
Payer: MEDICARE

## 2022-04-29 DIAGNOSIS — R89.9 ABNORMAL THYROID BIOPSY: ICD-10-CM

## 2022-04-29 DIAGNOSIS — E04.1 THYROID NODULE: Primary | ICD-10-CM

## 2022-05-02 ENCOUNTER — TELEPHONE (OUTPATIENT)
Dept: FAMILY MEDICINE | Facility: CLINIC | Age: 77
End: 2022-05-02
Payer: MEDICARE

## 2022-05-02 DIAGNOSIS — R91.8 LUNG NODULES: Primary | ICD-10-CM

## 2022-05-04 ENCOUNTER — TELEPHONE (OUTPATIENT)
Dept: UROLOGY | Facility: CLINIC | Age: 77
End: 2022-05-04
Payer: MEDICARE

## 2022-05-04 ENCOUNTER — TELEPHONE (OUTPATIENT)
Dept: FAMILY MEDICINE | Facility: CLINIC | Age: 77
End: 2022-05-04
Payer: MEDICARE

## 2022-05-04 DIAGNOSIS — R89.9 ABNORMAL THYROID BIOPSY: Primary | ICD-10-CM

## 2022-05-04 DIAGNOSIS — R30.0 DYSURIA: Primary | ICD-10-CM

## 2022-05-04 NOTE — TELEPHONE ENCOUNTER
----- Message from Genet Whelan sent at 5/4/2022  9:16 AM CDT -----  Regarding: advice  Contact: [phone]  Type:  Needs Medical Advice    Who Called:  self   Symptoms (please be specific):  UTI   How long has patient had these symptoms:   a week   Pharmacy name and phone #:    [facility] - [place], [place] - [address] [facility];  Would the patient rather a call back or a response via [facility]?  call  Best Call Back Number:   [phone]  Additional Information:

## 2022-05-04 NOTE — TELEPHONE ENCOUNTER
----- Message from Genet Whelan sent at 5/4/2022  9:23 AM CDT -----  Regarding: test results  Contact: [phone]  Type:  Test Results    Who Called: self   Name of Test (Lab/Mammo/Etc):  biopsy of Thyroid  Date of Test:  04/20  Ordering Provider:    Where the test was performed:  [facility]  Would the patient rather a call back or a response via [facility]?  call  Best Call Back Number:  [phone]  Additional Information:

## 2022-05-05 RX ORDER — AMOXICILLIN AND CLAVULANATE POTASSIUM 875; 125 MG/1; MG/1
1 TABLET, FILM COATED ORAL EVERY 12 HOURS
Qty: 20 TABLET | Refills: 0 | Status: SHIPPED | OUTPATIENT
Start: 2022-05-05 | End: 2022-05-15

## 2022-05-05 NOTE — TELEPHONE ENCOUNTER
Not diagnostic, not normal, but no definite cancer, so needs to see endocrinologist; referral place.

## 2022-05-05 NOTE — TELEPHONE ENCOUNTER
Spoke with pt and informed him of lab results pt was upset due to how long he had to wait for response and wanted to see  in office to discuss scheduled pt appointment next week to see

## 2022-05-06 ENCOUNTER — TELEPHONE (OUTPATIENT)
Dept: FAMILY MEDICINE | Facility: CLINIC | Age: 77
End: 2022-05-06
Payer: MEDICARE

## 2022-05-06 NOTE — TELEPHONE ENCOUNTER
----- Message from Anthony De La Torre MD sent at 5/2/2022 10:35 AM CDT -----  Left upper lung nodules; needs to see oncologist

## 2022-05-06 NOTE — TELEPHONE ENCOUNTER
----- Message from Anthony De La Torre MD sent at 4/29/2022  8:09 AM CDT -----  Abnormal appearing cells of thyroid nodule; needs to see endocrinologist; referral placed again

## 2022-05-10 ENCOUNTER — OFFICE VISIT (OUTPATIENT)
Dept: FAMILY MEDICINE | Facility: CLINIC | Age: 77
End: 2022-05-10
Payer: MEDICARE

## 2022-05-10 VITALS
OXYGEN SATURATION: 96 % | WEIGHT: 241.88 LBS | HEIGHT: 73 IN | SYSTOLIC BLOOD PRESSURE: 138 MMHG | HEART RATE: 100 BPM | DIASTOLIC BLOOD PRESSURE: 66 MMHG | TEMPERATURE: 98 F | BODY MASS INDEX: 32.06 KG/M2

## 2022-05-10 DIAGNOSIS — R91.1 SOLITARY PULMONARY NODULE: ICD-10-CM

## 2022-05-10 DIAGNOSIS — E04.1 THYROID NODULE: Primary | ICD-10-CM

## 2022-05-10 PROCEDURE — 1159F MED LIST DOCD IN RCRD: CPT | Mod: CPTII,S$GLB,, | Performed by: FAMILY MEDICINE

## 2022-05-10 PROCEDURE — 1160F PR REVIEW ALL MEDS BY PRESCRIBER/CLIN PHARMACIST DOCUMENTED: ICD-10-PCS | Mod: CPTII,S$GLB,, | Performed by: FAMILY MEDICINE

## 2022-05-10 PROCEDURE — 1159F PR MEDICATION LIST DOCUMENTED IN MEDICAL RECORD: ICD-10-PCS | Mod: CPTII,S$GLB,, | Performed by: FAMILY MEDICINE

## 2022-05-10 PROCEDURE — 3075F SYST BP GE 130 - 139MM HG: CPT | Mod: CPTII,S$GLB,, | Performed by: FAMILY MEDICINE

## 2022-05-10 PROCEDURE — 1160F RVW MEDS BY RX/DR IN RCRD: CPT | Mod: CPTII,S$GLB,, | Performed by: FAMILY MEDICINE

## 2022-05-10 PROCEDURE — 99214 OFFICE O/P EST MOD 30 MIN: CPT | Mod: S$GLB,,, | Performed by: FAMILY MEDICINE

## 2022-05-10 PROCEDURE — 1126F AMNT PAIN NOTED NONE PRSNT: CPT | Mod: CPTII,S$GLB,, | Performed by: FAMILY MEDICINE

## 2022-05-10 PROCEDURE — 3288F FALL RISK ASSESSMENT DOCD: CPT | Mod: CPTII,S$GLB,, | Performed by: FAMILY MEDICINE

## 2022-05-10 PROCEDURE — 99214 PR OFFICE/OUTPT VISIT, EST, LEVL IV, 30-39 MIN: ICD-10-PCS | Mod: S$GLB,,, | Performed by: FAMILY MEDICINE

## 2022-05-10 PROCEDURE — 1101F PT FALLS ASSESS-DOCD LE1/YR: CPT | Mod: CPTII,S$GLB,, | Performed by: FAMILY MEDICINE

## 2022-05-10 PROCEDURE — 3078F PR MOST RECENT DIASTOLIC BLOOD PRESSURE < 80 MM HG: ICD-10-PCS | Mod: CPTII,S$GLB,, | Performed by: FAMILY MEDICINE

## 2022-05-10 PROCEDURE — 1126F PR PAIN SEVERITY QUANTIFIED, NO PAIN PRESENT: ICD-10-PCS | Mod: CPTII,S$GLB,, | Performed by: FAMILY MEDICINE

## 2022-05-10 PROCEDURE — 3288F PR FALLS RISK ASSESSMENT DOCUMENTED: ICD-10-PCS | Mod: CPTII,S$GLB,, | Performed by: FAMILY MEDICINE

## 2022-05-10 PROCEDURE — 3075F PR MOST RECENT SYSTOLIC BLOOD PRESS GE 130-139MM HG: ICD-10-PCS | Mod: CPTII,S$GLB,, | Performed by: FAMILY MEDICINE

## 2022-05-10 PROCEDURE — 1101F PR PT FALLS ASSESS DOC 0-1 FALLS W/OUT INJ PAST YR: ICD-10-PCS | Mod: CPTII,S$GLB,, | Performed by: FAMILY MEDICINE

## 2022-05-10 PROCEDURE — 3078F DIAST BP <80 MM HG: CPT | Mod: CPTII,S$GLB,, | Performed by: FAMILY MEDICINE

## 2022-05-10 RX ORDER — DOCUSATE SODIUM 250 MG
250 CAPSULE ORAL DAILY
COMMUNITY

## 2022-05-10 RX ORDER — AMOXICILLIN 500 MG
1 CAPSULE ORAL DAILY
COMMUNITY

## 2022-05-10 NOTE — PROGRESS NOTES
"Subjective:      Patient ID: Noah Lopez is a 77 y.o. male.    Chief Complaint: Results (Discuss test results)      Vitals:    05/10/22 1603   BP: 138/66   Pulse: 100   Temp: 98.3 °F (36.8 °C)   TempSrc: Oral   SpO2: 96%   Weight: 109.7 kg (241 lb 13.5 oz)   Height: 6' 1" (1.854 m)        HPI   Follow up thyroid; needle bx atypical cells  Also, lung nodules  Wants a PET scan    Has lung nodule  Problem List  Patient Active Problem List   Diagnosis    Hypertension    Pincer nail deformity    Great toe pain, unspecified laterality    Screening for malignant neoplasm of colon    Hyperlipidemia    Colon polyp    Hypocontractile bladder    Neck arthritis    History of recent fall    Thyroid nodule    Insomnia    Solitary pulmonary nodule        ALLERGIES:   Review of patient's allergies indicates:   Allergen Reactions    Ciprofloxacin Other (See Comments)     tendonitis    Sulfa (sulfonamide antibiotics) Nausea And Vomiting       MEDS:   Current Outpatient Medications:     amoxicillin-clavulanate 875-125mg (AUGMENTIN) 875-125 mg per tablet, Take 1 tablet by mouth every 12 (twelve) hours. for 10 days, Disp: 20 tablet, Rfl: 0    ascorbic acid, vitamin C, (VITAMIN C) 1000 MG tablet, Take 1,000 mg by mouth once daily., Disp: , Rfl:     clotrimazole-betamethasone 1-0.05% (LOTRISONE) cream, Apply topically 2 (two) times daily., Disp: 60 g, Rfl: 3    diltiaZEM (CARDIZEM CD) 300 MG 24 hr capsule, TAKE 1 CAPSULE BY MOUTH  ONCE DAILY, Disp: 90 capsule, Rfl: 3    diphenhydramine HCl (BENADRYL ALLERGY ORAL), Take by mouth., Disp: , Rfl:     docusate sodium (COLACE) 250 MG capsule, Take 250 mg by mouth once daily., Disp: , Rfl:     finasteride (PROSCAR) 5 mg tablet, Take 1 tablet (5 mg total) by mouth once daily., Disp: 90 tablet, Rfl: 3    lisinopriL (PRINIVIL,ZESTRIL) 20 MG tablet, TAKE 1 TABLET BY MOUTH ONCE DAILY, Disp: 90 tablet, Rfl: 3    melatonin 10 mg TbDL, Take by mouth., Disp: , Rfl:     " ofloxacin (FLOXIN) 0.3 % otic solution, Apply 10 drops into right ear once a day for 10 days.  Please dispense generic., Disp: , Rfl:     omega-3 fatty acids/fish oil (FISH OIL-OMEGA-3 FATTY ACIDS) 300-1,000 mg capsule, Take 1 capsule by mouth once daily., Disp: , Rfl:     simvastatin (ZOCOR) 20 MG tablet, TAKE 1 TABLET BY MOUTH IN  THE EVENING, Disp: 90 tablet, Rfl: 3    tamsulosin (FLOMAX) 0.4 mg Cap, Take 1 capsule (0.4 mg total) by mouth once daily., Disp: 90 capsule, Rfl: 3    traZODone (DESYREL) 100 MG tablet, TAKE 2 TABLETS BY MOUTH EVERY DAY AT BEDTIME AS NEEDED FOR INSOMNIA, Disp: 180 tablet, Rfl: 3    triamterene-hydrochlorothiazide 37.5-25 mg (DYAZIDE) 37.5-25 mg per capsule, TAKE 1 CAPSULE BY MOUTH IN  THE MORNING, Disp: 90 capsule, Rfl: 3    TURMERIC ORAL, Take by mouth., Disp: , Rfl:     cholecalciferol, vitamin D3, (VITAMIN D3 ORAL), Take 125 mcg by mouth., Disp: , Rfl:     GAS RELIEF 80, SIMETHICONE, ORAL, Take by mouth., Disp: , Rfl:     zinc gluconate 50 mg tablet, Take 50 mg by mouth once daily., Disp: , Rfl:       History:  Current Providers as of 5/10/2022  PCP: Anthony De La Torre MD  Care Team Provider: Yasmeen Hubbard LPN  Encounter Provider: Anthony De La Torre MD, starting on Tue May 10, 2022 12:00 AM  Referring Provider: not found, starting on Tue May 10, 2022 12:00 AM  Consulting Physician: Anthony De La Torre MD, starting on Tue May 10, 2022  3:50 PM (Active)   Past Medical History:   Diagnosis Date    Colon polyp 09/06/2018    Hyperlipidemia     Hypertension      Past Surgical History:   Procedure Laterality Date    COLONOSCOPY  2010    COLONOSCOPY N/A 9/6/2018    Procedure: COLONOSCOPY Suprep PLEASE TEXT PATIENT WITH ARRIVAL TIME;  Surgeon: Niharika Arce MD;  Location: [facility];  Service: Endoscopy;  Laterality: N/A;    CYSTOSCOPY WITH URODYNAMIC TESTING N/A 1/31/2022    Procedure: CYSTOSCOPY, WITH URODYNAMIC TESTING FLOUROSCOPIC;  Surgeon: Anthony Maloney MD;  Location:  [facility] OR 1ST FLR;  Service: Urology;  Laterality: N/A;  1hr    KNEE ARTHROSCOPY      SPINE SURGERY      l-spine     Social History     Tobacco Use    Smoking status: Former Smoker    Smokeless tobacco: Never Used   Substance Use Topics    Alcohol use: Yes     Comment: occ    Drug use: Yes     Types: Marijuana         Review of Systems   Constitutional: Negative for appetite change, fatigue, fever and unexpected weight change.   HENT: Negative for congestion, ear pain, sinus pressure and sore throat.    Eyes: Negative for pain and visual disturbance.   Respiratory: Negative for shortness of breath.    Cardiovascular: Negative for chest pain.   Gastrointestinal: Negative for abdominal pain, constipation and diarrhea.   Endocrine: Negative for polyuria.   Genitourinary: Negative for difficulty urinating and frequency.   Musculoskeletal: Negative for arthralgias, back pain and myalgias.   Skin: Negative for color change.   Allergic/Immunologic: Negative.    Neurological: Negative for syncope, weakness and headaches.   Hematological: Does not bruise/bleed easily.   Psychiatric/Behavioral: Negative for dysphoric mood and suicidal ideas. The patient is not nervous/anxious.    All other systems reviewed and are negative.    Objective:     Physical Exam  Vitals and nursing note reviewed.   Constitutional:       General: He is not in acute distress.     Appearance: He is well-developed. He is not diaphoretic.   HENT:      Head: Normocephalic and atraumatic.      Right Ear: External ear normal.      Left Ear: External ear normal.      Mouth/Throat:      Pharynx: No oropharyngeal exudate.   Eyes:      General: No scleral icterus.        Right eye: No discharge.         Left eye: No discharge.      Conjunctiva/sclera: Conjunctivae normal.      Pupils: Pupils are equal, round, and reactive to light.   Neck:      Thyroid: No thyromegaly.      Vascular: No JVD.      Trachea: No tracheal deviation.   Cardiovascular:      Rate  and Rhythm: Normal rate and regular rhythm.      Heart sounds: No murmur heard.    No friction rub. No gallop.   Pulmonary:      Effort: Pulmonary effort is normal. No respiratory distress.      Breath sounds: Normal breath sounds. No stridor. No wheezing or rales.   Chest:      Chest wall: No tenderness.   Abdominal:      General: There is no distension.      Palpations: Abdomen is soft. There is no mass.      Tenderness: There is no abdominal tenderness. There is no guarding or rebound.   Musculoskeletal:         General: No tenderness. Normal range of motion.      Cervical back: Normal range of motion and neck supple.   Lymphadenopathy:      Cervical: No cervical adenopathy.   Skin:     General: Skin is warm and dry.      Coloration: Skin is not pale.      Findings: No erythema or rash.   Neurological:      Mental Status: He is alert and oriented to person, place, and time.      Cranial Nerves: No cranial nerve deficit.      Motor: No abnormal muscle tone.      Coordination: Coordination normal.      Deep Tendon Reflexes: Reflexes are normal and symmetric. Reflexes normal.   Psychiatric:         Behavior: Behavior normal.         Thought Content: Thought content normal.         Judgment: Judgment normal.             Assessment:     1. Thyroid nodule    2. Solitary pulmonary nodule      Plan:        Medication List          Accurate as of May 10, 2022  4:51 PM. If you have any questions, ask your nurse or doctor.            CONTINUE taking these medications    amoxicillin-clavulanate 875-125mg 875-125 mg per tablet  Commonly known as: AUGMENTIN  Take 1 tablet by mouth every 12 (twelve) hours. for 10 days     ascorbic acid (vitamin C) 1000 MG tablet  Commonly known as: VITAMIN C     BENADRYL ALLERGY ORAL     clotrimazole-betamethasone 1-0.05% cream  Commonly known as: LOTRISONE  Apply topically 2 (two) times daily.     diltiaZEM 300 MG 24 hr capsule  Commonly known as: CARDIZEM CD  TAKE 1 CAPSULE BY MOUTH  ONCE  DAILY     docusate sodium 250 MG capsule  Commonly known as: COLACE     finasteride 5 mg tablet  Commonly known as: PROSCAR  Take 1 tablet (5 mg total) by mouth once daily.     fish oil-omega-3 fatty acids 300-1,000 mg capsule     GAS RELIEF 80 (SIMETHICONE) ORAL     lisinopriL 20 MG tablet  Commonly known as: PRINIVIL,ZESTRIL  TAKE 1 TABLET BY MOUTH ONCE DAILY     melatonin 10 mg Tbdl     ofloxacin 0.3 % otic solution  Commonly known as: FLOXIN     simvastatin 20 MG tablet  Commonly known as: ZOCOR  TAKE 1 TABLET BY MOUTH IN  THE EVENING     tamsulosin 0.4 mg Cap  Commonly known as: FLOMAX  Take 1 capsule (0.4 mg total) by mouth once daily.     traZODone 100 MG tablet  Commonly known as: DESYREL  TAKE 2 TABLETS BY MOUTH EVERY DAY AT BEDTIME AS NEEDED FOR INSOMNIA     triamterene-hydrochlorothiazide 37.5-25 mg 37.5-25 mg per capsule  Commonly known as: DYAZIDE  TAKE 1 CAPSULE BY MOUTH IN  THE MORNING     TURMERIC ORAL     VITAMIN D3 ORAL     zinc gluconate 50 mg tablet          Thyroid nodule    Solitary pulmonary nodule  -     NM PET CT Whole Body; Future; Expected date: 05/10/2022

## 2022-05-12 ENCOUNTER — PATIENT OUTREACH (OUTPATIENT)
Dept: ADMINISTRATIVE | Facility: OTHER | Age: 77
End: 2022-05-12
Payer: MEDICARE

## 2022-05-12 NOTE — PROGRESS NOTES
Health Maintenance Due   Topic Date Due    COVID-19 Vaccine (4 - Booster for Moderna series) 04/08/2022     Updates were requested from care everywhere.  Chart was reviewed for overdue [facility]active [facility] Encounters (JOHN) topics (CRS, Breast Cancer Screening, Eye exam)  Health Maintenance has been updated.  LINKS immunization registry triggered.  Immunizations were reconciled.

## 2022-05-13 ENCOUNTER — OFFICE VISIT (OUTPATIENT)
Dept: ENDOCRINOLOGY | Facility: CLINIC | Age: 77
End: 2022-05-13
Payer: MEDICARE

## 2022-05-13 VITALS
WEIGHT: 242.94 LBS | SYSTOLIC BLOOD PRESSURE: 150 MMHG | HEART RATE: 72 BPM | HEIGHT: 73 IN | BODY MASS INDEX: 32.2 KG/M2 | DIASTOLIC BLOOD PRESSURE: 77 MMHG

## 2022-05-13 DIAGNOSIS — E04.1 THYROID NODULE: Primary | ICD-10-CM

## 2022-05-13 DIAGNOSIS — R89.9 ABNORMAL THYROID BIOPSY: ICD-10-CM

## 2022-05-13 PROCEDURE — 99204 PR OFFICE/OUTPT VISIT, NEW, LEVL IV, 45-59 MIN: ICD-10-PCS | Mod: S$GLB,,, | Performed by: GENERAL ACUTE CARE HOSPITAL

## 2022-05-13 PROCEDURE — 3077F PR MOST RECENT SYSTOLIC BLOOD PRESSURE >= 140 MM HG: ICD-10-PCS | Mod: CPTII,S$GLB,, | Performed by: GENERAL ACUTE CARE HOSPITAL

## 2022-05-13 PROCEDURE — 1126F AMNT PAIN NOTED NONE PRSNT: CPT | Mod: CPTII,S$GLB,, | Performed by: GENERAL ACUTE CARE HOSPITAL

## 2022-05-13 PROCEDURE — 1160F PR REVIEW ALL MEDS BY PRESCRIBER/CLIN PHARMACIST DOCUMENTED: ICD-10-PCS | Mod: CPTII,S$GLB,, | Performed by: GENERAL ACUTE CARE HOSPITAL

## 2022-05-13 PROCEDURE — 3288F FALL RISK ASSESSMENT DOCD: CPT | Mod: CPTII,S$GLB,, | Performed by: GENERAL ACUTE CARE HOSPITAL

## 2022-05-13 PROCEDURE — 99999 PR PBB SHADOW E&M-EST. PATIENT-LVL V: CPT | Mod: PBBFAC,,, | Performed by: GENERAL ACUTE CARE HOSPITAL

## 2022-05-13 PROCEDURE — 1126F PR PAIN SEVERITY QUANTIFIED, NO PAIN PRESENT: ICD-10-PCS | Mod: CPTII,S$GLB,, | Performed by: GENERAL ACUTE CARE HOSPITAL

## 2022-05-13 PROCEDURE — 3078F PR MOST RECENT DIASTOLIC BLOOD PRESSURE < 80 MM HG: ICD-10-PCS | Mod: CPTII,S$GLB,, | Performed by: GENERAL ACUTE CARE HOSPITAL

## 2022-05-13 PROCEDURE — 1159F MED LIST DOCD IN RCRD: CPT | Mod: CPTII,S$GLB,, | Performed by: GENERAL ACUTE CARE HOSPITAL

## 2022-05-13 PROCEDURE — 99204 OFFICE O/P NEW MOD 45 MIN: CPT | Mod: S$GLB,,, | Performed by: GENERAL ACUTE CARE HOSPITAL

## 2022-05-13 PROCEDURE — 3078F DIAST BP <80 MM HG: CPT | Mod: CPTII,S$GLB,, | Performed by: GENERAL ACUTE CARE HOSPITAL

## 2022-05-13 PROCEDURE — 1100F PTFALLS ASSESS-DOCD GE2>/YR: CPT | Mod: CPTII,S$GLB,, | Performed by: GENERAL ACUTE CARE HOSPITAL

## 2022-05-13 PROCEDURE — 1159F PR MEDICATION LIST DOCUMENTED IN MEDICAL RECORD: ICD-10-PCS | Mod: CPTII,S$GLB,, | Performed by: GENERAL ACUTE CARE HOSPITAL

## 2022-05-13 PROCEDURE — 3077F SYST BP >= 140 MM HG: CPT | Mod: CPTII,S$GLB,, | Performed by: GENERAL ACUTE CARE HOSPITAL

## 2022-05-13 PROCEDURE — 1100F PR PT FALLS ASSESS DOC 2+ FALLS/FALL W/INJURY/YR: ICD-10-PCS | Mod: CPTII,S$GLB,, | Performed by: GENERAL ACUTE CARE HOSPITAL

## 2022-05-13 PROCEDURE — 1160F RVW MEDS BY RX/DR IN RCRD: CPT | Mod: CPTII,S$GLB,, | Performed by: GENERAL ACUTE CARE HOSPITAL

## 2022-05-13 PROCEDURE — 99999 PR PBB SHADOW E&M-EST. PATIENT-LVL V: ICD-10-PCS | Mod: PBBFAC,,, | Performed by: GENERAL ACUTE CARE HOSPITAL

## 2022-05-13 PROCEDURE — 3288F PR FALLS RISK ASSESSMENT DOCUMENTED: ICD-10-PCS | Mod: CPTII,S$GLB,, | Performed by: GENERAL ACUTE CARE HOSPITAL

## 2022-05-13 NOTE — PROGRESS NOTES
Subjective:      Patient ID: Noah Lopez is a 77 y.o. male.    Chief Complaint:  Thyroid Nodule; Initial visit     Patient Active Problem List   Diagnosis    Hypertension    Pincer nail deformity    Great toe pain, unspecified laterality    Screening for malignant neoplasm of colon    Hyperlipidemia    Colon polyp    Hypocontractile bladder    Neck arthritis    History of recent fall    Thyroid nodule    Insomnia    Solitary pulmonary nodule       History of Present Illness  78 YO Male w/ above PMH that presents to the endocrine clinic for initial evaluation of thyroid nodule with Atypia noted on FNA.  Pt today reports feels well and denies any complaints.        With regards to thyroid nodules:   -TSH  WNL       Thyroid nodule was incidentally found on CT scan along with b/l lung nodules for evaluation after a slip and fall with head trauma       -Thyroid US 4/2022:  Impression:     There is a 22 mm hypoechoic nodule in the inferior pole of the left lobe of the thyroid.  This is characteristic of a TI-RADS Level 5, highly suspicious.  Hence, I recommend consideration of a fine-needle aspiration.     -FNA of the Left 2.2 cm nodule on 4/2022:   Atypical cells present. There are atypical cells present in a background of anucleate squamous cells and foamy macrophages. While these may represent reactive changes in a cyst, a more significant process cannot be entirely excluded. Clinical and radiographic correlation are recommended    -Compressive symptoms?  DENIES     -History of radiation?  DENIES   -Family history of thyroid CA?  DENIES     ROS:   As above    Objective:     There were no vitals taken for this visit.  BP Readings from Last 3 Encounters:   05/10/22 138/66   04/25/22 125/72   04/04/22 130/68     Wt Readings from Last 1 Encounters:   05/10/22 1603 109.7 kg (241 lb 13.5 oz)     There is no height or weight on file to calculate BMI.      Physical Exam  Vitals reviewed.   Constitutional:        General: He is not in acute distress.     Appearance: Normal appearance. He is well-developed. He is not ill-appearing.   HENT:      Nose: Nose normal. No rhinorrhea.      Mouth/Throat:      Mouth: Mucous membranes are moist.   Eyes:      Extraocular Movements: Extraocular movements intact.      Pupils: Pupils are equal, round, and reactive to light.      Comments: No proptosis, No lid lag, No conjunctival erythema    Neck:      Thyroid: No thyromegaly.      Trachea: No tracheal deviation.      Comments: Left thyroid nodule palpated on exam   Cardiovascular:      Rate and Rhythm: Normal rate and regular rhythm.      Pulses: Normal pulses.   Pulmonary:      Effort: Pulmonary effort is normal.      Breath sounds: Normal breath sounds.   Abdominal:      Palpations: Abdomen is soft. There is no mass.      Tenderness: There is no abdominal tenderness.      Hernia: No hernia is present.      Comments: No scar tissue, No Violaceous striae    Musculoskeletal:         General: No swelling.      Cervical back: Neck supple. No tenderness.      Right lower leg: No edema.      Left lower leg: No edema.   Skin:     General: Skin is warm.      Findings: No rash.   Neurological:      General: No focal deficit present.      Mental Status: He is alert and oriented to person, place, and time.   Psychiatric:         Mood and Affect: Mood normal.         Judgment: Judgment normal.                Lab Review:   Lab Results   Component Value Date    HGBA1C 5.6 01/26/2022     Lab Results   Component Value Date    CHOL 145 12/08/2021    HDL 45 12/08/2021    LDLCALC 83.2 12/08/2021    TRIG 84 12/08/2021    CHOLHDL 31.0 12/08/2021     Lab Results   Component Value Date     01/26/2022    K 4.2 01/26/2022     01/26/2022    CO2 34 (H) 01/26/2022     (H) 01/26/2022    BUN 17 01/26/2022    CREATININE 1.36 01/26/2022    CALCIUM 9.3 01/26/2022    PROT 8.4 12/08/2021    ALBUMIN 4.6 12/08/2021    BILITOT 0.6 12/08/2021     ALKPHOS 110 12/08/2021    AST 28 12/08/2021    ALT 28 12/08/2021    ANIONGAP 7 (L) 01/26/2022    ESTGFRAFRICA 58.0 (A) 01/26/2022    EGFRNONAA 50.2 (A) 01/26/2022    TSH 1.535 12/08/2021     Vit D, 25-Hydroxy   Date Value Ref Range Status   12/08/2021 90 30 - 96 ng/mL Final     Comment:     Vitamin D deficiency.........<10 ng/mL                              Vitamin D insufficiency......10-29 ng/mL       Vitamin D sufficiency........> or equal to 30 ng/mL  Vitamin D toxicity............>100 ng/mL         Assessment and Plan       Thyroid nodule    Left 2.2 cm thyroid nodule showing Atypia on FNA but no evidence of cancer     Pt does have b/l Lung nodules on CT scan and going for PET CT scan on 5/16/2022     Will repeat FNA and save samples for molecular markers if similar results of FNA     Will follow PET CT scan results     Will monitor and treat accordingly

## 2022-05-13 NOTE — PATIENT INSTRUCTIONS
Due to your recently found thyroid nodule that was biopsied and showed atypical cells.  I will recommend the following.     We will repeat your thyroid biopsy in 3 weeks from today for further investigation.     When we repeat the biopsy we will save samples to send for genetic testing in case we can not get a definitive results.     I will follow along your PET scan results as it will give me more information.      Once I have results I will contact you to go over next steps to follow.      If any questions feel free to contact me.     Have a nice day.     Sincerely,       Brayan Van MD   Endocrinology   5/13/2022 4:04 PM

## 2022-05-16 ENCOUNTER — HOSPITAL ENCOUNTER (OUTPATIENT)
Dept: RADIOLOGY | Facility: HOSPITAL | Age: 77
Discharge: HOME | End: 2022-05-16
Attending: FAMILY MEDICINE
Payer: MEDICARE

## 2022-05-16 DIAGNOSIS — R91.1 SOLITARY PULMONARY NODULE: ICD-10-CM

## 2022-05-16 PROCEDURE — 78815 PET IMAGE W/CT SKULL-THIGH: CPT | Mod: 26,PI,, | Performed by: RADIOLOGY

## 2022-05-16 PROCEDURE — 78815 PET IMAGE W/CT SKULL-THIGH: CPT | Mod: TC,PI

## 2022-05-16 PROCEDURE — 78815 NM PET CT ROUTINE: ICD-10-PCS | Mod: 26,PI,, | Performed by: RADIOLOGY

## 2022-05-18 ENCOUNTER — TELEPHONE (OUTPATIENT)
Dept: FAMILY MEDICINE | Facility: CLINIC | Age: 77
End: 2022-05-18
Payer: MEDICARE

## 2022-05-18 ENCOUNTER — PES CALL (OUTPATIENT)
Dept: ADMINISTRATIVE | Facility: CLINIC | Age: 77
End: 2022-05-18
Payer: MEDICARE

## 2022-05-18 DIAGNOSIS — S09.90XA HEAD TRAUMA, INITIAL ENCOUNTER: ICD-10-CM

## 2022-05-18 DIAGNOSIS — R91.1 SOLITARY PULMONARY NODULE: ICD-10-CM

## 2022-05-18 NOTE — TELEPHONE ENCOUNTER
----- Message from Any Langston sent at 5/18/2022  8:49 AM CDT -----  Type:  Test Results    Who Called:  Pt   Name of Test (Lab/Mammo/Etc):  Pet Scan   Date of Test:  05/16   Ordering Provider:  Britt   Where the test was performed:  [facility]   Would the patient rather a call back or a response via [facility]? Call back   Best Call Back Number:  [phone]  Additional Information:

## 2022-05-23 ENCOUNTER — CLINICAL SUPPORT (OUTPATIENT)
Dept: UROLOGY | Facility: CLINIC | Age: 77
End: 2022-05-23
Payer: MEDICARE

## 2022-05-23 VITALS — SYSTOLIC BLOOD PRESSURE: 137 MMHG | HEART RATE: 76 BPM | DIASTOLIC BLOOD PRESSURE: 81 MMHG

## 2022-05-23 DIAGNOSIS — R39.9 UTI SYMPTOMS: Primary | ICD-10-CM

## 2022-05-23 DIAGNOSIS — R82.90 CLOUDY URINE: ICD-10-CM

## 2022-05-23 PROCEDURE — 87088 URINE BACTERIA CULTURE: CPT | Performed by: STUDENT IN AN ORGANIZED HEALTH CARE EDUCATION/TRAINING PROGRAM

## 2022-05-23 PROCEDURE — 87077 CULTURE AEROBIC IDENTIFY: CPT | Performed by: STUDENT IN AN ORGANIZED HEALTH CARE EDUCATION/TRAINING PROGRAM

## 2022-05-23 PROCEDURE — 87186 SC STD MICRODIL/AGAR DIL: CPT | Performed by: STUDENT IN AN ORGANIZED HEALTH CARE EDUCATION/TRAINING PROGRAM

## 2022-05-23 PROCEDURE — 99999 PR PBB SHADOW E&M-EST. PATIENT-LVL III: CPT | Mod: PBBFAC,,,

## 2022-05-23 PROCEDURE — 87086 URINE CULTURE/COLONY COUNT: CPT | Performed by: STUDENT IN AN ORGANIZED HEALTH CARE EDUCATION/TRAINING PROGRAM

## 2022-05-23 PROCEDURE — 99999 PR PBB SHADOW E&M-EST. PATIENT-LVL III: ICD-10-PCS | Mod: PBBFAC,,,

## 2022-05-23 NOTE — PROGRESS NOTES
Bedoya exchanged.  Under sterile technique, 16 Fr bedoya inserted into urinary bladder, drained yellow, cloudy urine.  Placed without difficulty.  Inflated balloon with 10 cc of NaCl.  Catheter capped at patient's request.  He will be going home to uncap and drain urine as he hydrated in office with water.  Informed I will contact him with further direction of appointments and recommendations after speaking with Dr Luis in regards to new information.  He voiced understanding.

## 2022-05-24 ENCOUNTER — TELEPHONE (OUTPATIENT)
Dept: UROLOGY | Facility: CLINIC | Age: 77
End: 2022-05-24
Payer: MEDICARE

## 2022-05-24 DIAGNOSIS — Z12.5 ENCOUNTER FOR PROSTATE CANCER SCREENING: ICD-10-CM

## 2022-05-24 DIAGNOSIS — R97.20 ELEVATED PSA: Primary | ICD-10-CM

## 2022-05-25 ENCOUNTER — TELEPHONE (OUTPATIENT)
Dept: UROLOGY | Facility: CLINIC | Age: 77
End: 2022-05-25
Payer: MEDICARE

## 2022-05-25 ENCOUNTER — LAB VISIT (OUTPATIENT)
Dept: LAB | Facility: HOSPITAL | Age: 77
End: 2022-05-25
Attending: STUDENT IN AN ORGANIZED HEALTH CARE EDUCATION/TRAINING PROGRAM
Payer: MEDICARE

## 2022-05-25 DIAGNOSIS — R97.20 ELEVATED PSA: ICD-10-CM

## 2022-05-25 DIAGNOSIS — Z12.5 ENCOUNTER FOR PROSTATE CANCER SCREENING: ICD-10-CM

## 2022-05-25 LAB — COMPLEXED PSA SERPL-MCNC: 17.3 NG/ML (ref 0–4)

## 2022-05-25 PROCEDURE — 84153 ASSAY OF PSA TOTAL: CPT | Performed by: STUDENT IN AN ORGANIZED HEALTH CARE EDUCATION/TRAINING PROGRAM

## 2022-05-25 PROCEDURE — 36415 COLL VENOUS BLD VENIPUNCTURE: CPT | Mod: PO | Performed by: STUDENT IN AN ORGANIZED HEALTH CARE EDUCATION/TRAINING PROGRAM

## 2022-05-25 NOTE — TELEPHONE ENCOUNTER
Spoke with patient.      MD Orin Villalba, LPN  Caller: Unspecified (2 days ago, 12:22 PM)  Urine culture and 30 min visit in about 2-3 weeks. Have him obtain a PSA blood test a few days prior to the 30 min visit thank you.     Apologized for contacting him so late with recommendations.  He did PSA this morning.  Informed may need to be repeated.  Scheduled appointment with Dr Luis 6/16/22 for next step or plan regarding PSA.  He is ok with repeating if needed.  Informed we'll contact him with urine culture results by tomorrow.  He voiced understanding.    Patient didn't have any issues, he called as he stated he was contacted to have lab done.

## 2022-05-25 NOTE — TELEPHONE ENCOUNTER
----- Message from Maki Ramos sent at 5/25/2022  9:09 AM CDT -----  Type: Requesting to speak with nurse         Who Called: PT  Regarding: requesting a call back please advise   Would the patient rather a call back or a response via [facility]? Call back  Best Call Back Number: [phone]  Additional Information: n/a

## 2022-05-26 ENCOUNTER — PATIENT MESSAGE (OUTPATIENT)
Dept: UROLOGY | Facility: CLINIC | Age: 77
End: 2022-05-26
Payer: MEDICARE

## 2022-05-26 LAB — BACTERIA UR CULT: ABNORMAL

## 2022-05-27 DIAGNOSIS — R39.9 UTI SYMPTOMS: Primary | ICD-10-CM

## 2022-05-27 RX ORDER — AMOXICILLIN AND CLAVULANATE POTASSIUM 875; 125 MG/1; MG/1
1 TABLET, FILM COATED ORAL 2 TIMES DAILY
Qty: 14 TABLET | Refills: 0 | Status: SHIPPED | OUTPATIENT
Start: 2022-05-27 | End: 2022-06-03

## 2022-05-30 ENCOUNTER — TELEPHONE (OUTPATIENT)
Dept: UROLOGY | Facility: CLINIC | Age: 77
End: 2022-05-30
Payer: MEDICARE

## 2022-05-30 DIAGNOSIS — R30.0 DYSURIA: Primary | ICD-10-CM

## 2022-05-30 RX ORDER — AMOXICILLIN AND CLAVULANATE POTASSIUM 875; 125 MG/1; MG/1
1 TABLET, FILM COATED ORAL EVERY 12 HOURS
Qty: 6 TABLET | Refills: 11 | Status: SHIPPED | OUTPATIENT
Start: 2022-05-30 | End: 2022-06-02

## 2022-05-30 NOTE — TELEPHONE ENCOUNTER
Do you want to proceed with Augmentin RX sent in today as Dr Campbell sent in last week.  Please advise.

## 2022-05-30 NOTE — TELEPHONE ENCOUNTER
----- Message from Taran Caballero sent at 5/30/2022 12:41 PM CDT -----  Type:  Needs Medical Advice    Who Called: [facility] pharmacy   Reason:dr sullivan sent in amoxicillin-clavulanate 875-125mg (AUGMENTIN) 875-125 mg per tablet on 5/2. Were you aware, he should still have some left   Would the patient rather a call back or a response via [facility]? ca  Best Call Back Number:[facility] [address] [place] [address] [address] [facility]   Phone:  [phone]  Fax:  [phone]        Additional Information: aaliyah

## 2022-05-30 NOTE — TELEPHONE ENCOUNTER
Pharmacy notified.  Dr Luis will reply to portal message with instruction for future usage of ABX.

## 2022-05-31 ENCOUNTER — OFFICE VISIT (OUTPATIENT)
Dept: HEMATOLOGY/ONCOLOGY | Facility: CLINIC | Age: 77
End: 2022-05-31
Payer: MEDICARE

## 2022-05-31 VITALS
OXYGEN SATURATION: 97 % | BODY MASS INDEX: 31.04 KG/M2 | HEIGHT: 74 IN | SYSTOLIC BLOOD PRESSURE: 134 MMHG | DIASTOLIC BLOOD PRESSURE: 73 MMHG | TEMPERATURE: 98 F | WEIGHT: 241.88 LBS | HEART RATE: 83 BPM | RESPIRATION RATE: 19 BRPM

## 2022-05-31 DIAGNOSIS — R97.20 ELEVATED PSA: Primary | ICD-10-CM

## 2022-05-31 DIAGNOSIS — R91.8 LUNG NODULES: ICD-10-CM

## 2022-05-31 DIAGNOSIS — Q04.9: ICD-10-CM

## 2022-05-31 PROCEDURE — 3075F PR MOST RECENT SYSTOLIC BLOOD PRESS GE 130-139MM HG: ICD-10-PCS | Mod: CPTII,S$GLB,, | Performed by: INTERNAL MEDICINE

## 2022-05-31 PROCEDURE — 99999 PR PBB SHADOW E&M-EST. PATIENT-LVL III: CPT | Mod: PBBFAC,,, | Performed by: INTERNAL MEDICINE

## 2022-05-31 PROCEDURE — 1126F AMNT PAIN NOTED NONE PRSNT: CPT | Mod: CPTII,S$GLB,, | Performed by: INTERNAL MEDICINE

## 2022-05-31 PROCEDURE — 99999 PR PBB SHADOW E&M-EST. PATIENT-LVL III: ICD-10-PCS | Mod: PBBFAC,,, | Performed by: INTERNAL MEDICINE

## 2022-05-31 PROCEDURE — 3288F FALL RISK ASSESSMENT DOCD: CPT | Mod: CPTII,S$GLB,, | Performed by: INTERNAL MEDICINE

## 2022-05-31 PROCEDURE — 1126F PR PAIN SEVERITY QUANTIFIED, NO PAIN PRESENT: ICD-10-PCS | Mod: CPTII,S$GLB,, | Performed by: INTERNAL MEDICINE

## 2022-05-31 PROCEDURE — 1159F MED LIST DOCD IN RCRD: CPT | Mod: CPTII,S$GLB,, | Performed by: INTERNAL MEDICINE

## 2022-05-31 PROCEDURE — 3075F SYST BP GE 130 - 139MM HG: CPT | Mod: CPTII,S$GLB,, | Performed by: INTERNAL MEDICINE

## 2022-05-31 PROCEDURE — 1160F RVW MEDS BY RX/DR IN RCRD: CPT | Mod: CPTII,S$GLB,, | Performed by: INTERNAL MEDICINE

## 2022-05-31 PROCEDURE — 1100F PR PT FALLS ASSESS DOC 2+ FALLS/FALL W/INJURY/YR: ICD-10-PCS | Mod: CPTII,S$GLB,, | Performed by: INTERNAL MEDICINE

## 2022-05-31 PROCEDURE — 3078F PR MOST RECENT DIASTOLIC BLOOD PRESSURE < 80 MM HG: ICD-10-PCS | Mod: CPTII,S$GLB,, | Performed by: INTERNAL MEDICINE

## 2022-05-31 PROCEDURE — 3078F DIAST BP <80 MM HG: CPT | Mod: CPTII,S$GLB,, | Performed by: INTERNAL MEDICINE

## 2022-05-31 PROCEDURE — 1159F PR MEDICATION LIST DOCUMENTED IN MEDICAL RECORD: ICD-10-PCS | Mod: CPTII,S$GLB,, | Performed by: INTERNAL MEDICINE

## 2022-05-31 PROCEDURE — 1100F PTFALLS ASSESS-DOCD GE2>/YR: CPT | Mod: CPTII,S$GLB,, | Performed by: INTERNAL MEDICINE

## 2022-05-31 PROCEDURE — 99205 OFFICE O/P NEW HI 60 MIN: CPT | Mod: S$GLB,,, | Performed by: INTERNAL MEDICINE

## 2022-05-31 PROCEDURE — 1160F PR REVIEW ALL MEDS BY PRESCRIBER/CLIN PHARMACIST DOCUMENTED: ICD-10-PCS | Mod: CPTII,S$GLB,, | Performed by: INTERNAL MEDICINE

## 2022-05-31 PROCEDURE — 3288F PR FALLS RISK ASSESSMENT DOCUMENTED: ICD-10-PCS | Mod: CPTII,S$GLB,, | Performed by: INTERNAL MEDICINE

## 2022-05-31 PROCEDURE — 99205 PR OFFICE/OUTPT VISIT, NEW, LEVL V, 60-74 MIN: ICD-10-PCS | Mod: S$GLB,,, | Performed by: INTERNAL MEDICINE

## 2022-06-02 NOTE — PROGRESS NOTES
"PATIENT: Noah Lopez  MRN: [id number]  DATE: 6/2/2022    Chief Complaint: lung nodules    Subjective:     History of Present Illness:     3/28/22-ER visit after a fall in the bathroom.  CT C-spine showed 2.4 cm hypodense lesion inferior to the left thyroid.    04/22/2022, CT chest without contrast-several noncalcified pulmonary nodules noted in the left lung.  Largest 10 mm, left upper lobe.    05/16/2022, PET scan-2 solid left upper lobe pulmonary nodules, no FDG uptake, malignancy not excluded, larger nodule amenable to percutaneous biopsy.  Focal FDG uptake in the left prostate gland with extracapsular extension, findings concerning for malignancy.  Incidental focal uptake in the right cerebellum, recommend brain MRI.  2.2 cm left inferior thyroid lobe nodule.    12/08/2021, PSA 11.7  05/25/2022, PSA 17.3    His daughter is a nurse and so is his granddaughter.    Past Medical, Surgical, Family and Social History Reviewed.    Medications and Allergies reviewed    Review of Systems   Constitutional: Negative for fever and unexpected weight change.   Musculoskeletal: Positive for arthralgias.       Objective:     Vitals:    05/31/22 1032   BP: 134/73   BP Location: Right arm   Patient Position: Sitting   BP Method: Medium (Automatic)   Pulse: 83   Resp: 19   Temp: 98.3 °F (36.8 °C)   TempSrc: Oral   SpO2: 97%   Weight: 109.7 kg (241 lb 13.5 oz)   Height: 6' 1.5" (1.867 m)       BMI: Body mass index is 31.47 kg/m².    Physical Exam  Vitals and nursing note reviewed.   Constitutional:       General: He is not in acute distress.  Pulmonary:      Effort: Pulmonary effort is normal.      Breath sounds: Normal breath sounds.   Neurological:      Mental Status: He is alert. Mental status is at baseline.         Assessment:       1. Elevated PSA    2. Lung nodules    3. Anomaly of cerebellum        Plan:   New pulmonary nodules in the setting of elevated PSA  -even though they are not FDG avid on PET scan, malignancy " not excluded  -will ask IR to review and pursue biopsy    Right cerebellum, focal uptake  -further evaluation wanted  -scheduled for MRI brain, 06/03/2022    Elevated PSA  -follows with Dr. Luis

## 2022-06-03 ENCOUNTER — HOSPITAL ENCOUNTER (OUTPATIENT)
Dept: RADIOLOGY | Facility: HOSPITAL | Age: 77
Discharge: HOME | End: 2022-06-03
Attending: FAMILY MEDICINE
Payer: MEDICARE

## 2022-06-03 DIAGNOSIS — S09.90XA HEAD TRAUMA, INITIAL ENCOUNTER: ICD-10-CM

## 2022-06-03 PROCEDURE — 70551 MRI BRAIN STEM W/O DYE: CPT | Mod: TC,PO

## 2022-06-09 ENCOUNTER — TELEPHONE (OUTPATIENT)
Dept: UROLOGY | Facility: CLINIC | Age: 77
End: 2022-06-09
Payer: MEDICARE

## 2022-06-09 NOTE — TELEPHONE ENCOUNTER
----- Message from Ruth Whitfield sent at 6/9/2022  9:21 AM CDT -----  Needs advice from nurse:      Who Called:pt  Regarding:states infection came back again  Would the patient rather a call back or [facility]IA [facility]?  Best Call Back number:[phone]  Additional Info:[facility] - [place], [place] - [address] (Ph: [phone])

## 2022-06-09 NOTE — TELEPHONE ENCOUNTER
Spoke with patient, he believes he has another UTI.  His urine is cloudy and thick.  Patient also has a jock rash that has flared up.  His cultures consistently grows out bacteria- KLEBSIELLA PNEUMONIAE.  Patient due for bedoya exchange on 6/16/22.  Dr Luis has also prescribed ABX pro phylactically the day of exchange and the next day.   Please advise.

## 2022-06-09 NOTE — TELEPHONE ENCOUNTER
Spoke with patient, appointment moved up to Monday.  He will bring ABX with him (not to take before)  He voiced understanding.

## 2022-06-13 ENCOUNTER — OFFICE VISIT (OUTPATIENT)
Dept: UROLOGY | Facility: CLINIC | Age: 77
End: 2022-06-13
Payer: MEDICARE

## 2022-06-13 VITALS
SYSTOLIC BLOOD PRESSURE: 137 MMHG | HEART RATE: 80 BPM | DIASTOLIC BLOOD PRESSURE: 69 MMHG | HEIGHT: 74 IN | WEIGHT: 240.5 LBS | BODY MASS INDEX: 30.87 KG/M2

## 2022-06-13 DIAGNOSIS — N39.0 RECURRENT UTI: ICD-10-CM

## 2022-06-13 DIAGNOSIS — R33.9 URINARY RETENTION: ICD-10-CM

## 2022-06-13 DIAGNOSIS — R97.20 ELEVATED PSA: Primary | ICD-10-CM

## 2022-06-13 PROCEDURE — 1159F MED LIST DOCD IN RCRD: CPT | Mod: CPTII,S$GLB,, | Performed by: STUDENT IN AN ORGANIZED HEALTH CARE EDUCATION/TRAINING PROGRAM

## 2022-06-13 PROCEDURE — 87077 CULTURE AEROBIC IDENTIFY: CPT | Performed by: STUDENT IN AN ORGANIZED HEALTH CARE EDUCATION/TRAINING PROGRAM

## 2022-06-13 PROCEDURE — 3078F DIAST BP <80 MM HG: CPT | Mod: CPTII,S$GLB,, | Performed by: STUDENT IN AN ORGANIZED HEALTH CARE EDUCATION/TRAINING PROGRAM

## 2022-06-13 PROCEDURE — 51702 PR INSERTION OF TEMPORARY INDWELLING BLADDER CATHETER, SIMPLE: ICD-10-PCS | Mod: S$GLB,,, | Performed by: STUDENT IN AN ORGANIZED HEALTH CARE EDUCATION/TRAINING PROGRAM

## 2022-06-13 PROCEDURE — 3078F PR MOST RECENT DIASTOLIC BLOOD PRESSURE < 80 MM HG: ICD-10-PCS | Mod: CPTII,S$GLB,, | Performed by: STUDENT IN AN ORGANIZED HEALTH CARE EDUCATION/TRAINING PROGRAM

## 2022-06-13 PROCEDURE — 1160F PR REVIEW ALL MEDS BY PRESCRIBER/CLIN PHARMACIST DOCUMENTED: ICD-10-PCS | Mod: CPTII,S$GLB,, | Performed by: STUDENT IN AN ORGANIZED HEALTH CARE EDUCATION/TRAINING PROGRAM

## 2022-06-13 PROCEDURE — 87186 SC STD MICRODIL/AGAR DIL: CPT | Performed by: STUDENT IN AN ORGANIZED HEALTH CARE EDUCATION/TRAINING PROGRAM

## 2022-06-13 PROCEDURE — 87088 URINE BACTERIA CULTURE: CPT | Performed by: STUDENT IN AN ORGANIZED HEALTH CARE EDUCATION/TRAINING PROGRAM

## 2022-06-13 PROCEDURE — 1159F PR MEDICATION LIST DOCUMENTED IN MEDICAL RECORD: ICD-10-PCS | Mod: CPTII,S$GLB,, | Performed by: STUDENT IN AN ORGANIZED HEALTH CARE EDUCATION/TRAINING PROGRAM

## 2022-06-13 PROCEDURE — 99215 PR OFFICE/OUTPT VISIT, EST, LEVL V, 40-54 MIN: ICD-10-PCS | Mod: 25,S$GLB,, | Performed by: STUDENT IN AN ORGANIZED HEALTH CARE EDUCATION/TRAINING PROGRAM

## 2022-06-13 PROCEDURE — 99999 PR PBB SHADOW E&M-EST. PATIENT-LVL IV: ICD-10-PCS | Mod: PBBFAC,,, | Performed by: STUDENT IN AN ORGANIZED HEALTH CARE EDUCATION/TRAINING PROGRAM

## 2022-06-13 PROCEDURE — 87086 URINE CULTURE/COLONY COUNT: CPT | Performed by: STUDENT IN AN ORGANIZED HEALTH CARE EDUCATION/TRAINING PROGRAM

## 2022-06-13 PROCEDURE — 1160F RVW MEDS BY RX/DR IN RCRD: CPT | Mod: CPTII,S$GLB,, | Performed by: STUDENT IN AN ORGANIZED HEALTH CARE EDUCATION/TRAINING PROGRAM

## 2022-06-13 PROCEDURE — 3075F PR MOST RECENT SYSTOLIC BLOOD PRESS GE 130-139MM HG: ICD-10-PCS | Mod: CPTII,S$GLB,, | Performed by: STUDENT IN AN ORGANIZED HEALTH CARE EDUCATION/TRAINING PROGRAM

## 2022-06-13 PROCEDURE — 99999 PR PBB SHADOW E&M-EST. PATIENT-LVL IV: CPT | Mod: PBBFAC,,, | Performed by: STUDENT IN AN ORGANIZED HEALTH CARE EDUCATION/TRAINING PROGRAM

## 2022-06-13 PROCEDURE — 1126F PR PAIN SEVERITY QUANTIFIED, NO PAIN PRESENT: ICD-10-PCS | Mod: CPTII,S$GLB,, | Performed by: STUDENT IN AN ORGANIZED HEALTH CARE EDUCATION/TRAINING PROGRAM

## 2022-06-13 PROCEDURE — 3075F SYST BP GE 130 - 139MM HG: CPT | Mod: CPTII,S$GLB,, | Performed by: STUDENT IN AN ORGANIZED HEALTH CARE EDUCATION/TRAINING PROGRAM

## 2022-06-13 PROCEDURE — 1126F AMNT PAIN NOTED NONE PRSNT: CPT | Mod: CPTII,S$GLB,, | Performed by: STUDENT IN AN ORGANIZED HEALTH CARE EDUCATION/TRAINING PROGRAM

## 2022-06-13 PROCEDURE — 99215 OFFICE O/P EST HI 40 MIN: CPT | Mod: 25,S$GLB,, | Performed by: STUDENT IN AN ORGANIZED HEALTH CARE EDUCATION/TRAINING PROGRAM

## 2022-06-13 PROCEDURE — 51702 INSERT TEMP BLADDER CATH: CPT | Mod: S$GLB,,, | Performed by: STUDENT IN AN ORGANIZED HEALTH CARE EDUCATION/TRAINING PROGRAM

## 2022-06-13 NOTE — PATIENT INSTRUCTIONS
Bedoya catheter will be exchanged today in clinic.  New urine from the new catheter will be collected and sent for culture.  If +UTI then will call in antibiotic course.  Will time out prostate biopsy likely in about 3-4 weeks as he will need a bedoya catheter exchange at the same visit for the prostate biopsy.

## 2022-06-13 NOTE — PROGRESS NOTES
"Subjective:       Patient ID: Noah Lopez is a 77 y.o. male.    Chief Complaint: f/u PSA / concerns about bedoya  This is a 77 y.o.  male patient that is an established patient of mine.    The patient was referred to me by Dr. De La Torre for urinary retention. Dr. De La Torre obtained routine bloodwork in 12/2021. His Cr was elevated from baseline. He then ordered a renal US 1/4/22 - marked distention of bladder, no hydronephrosis bilaterally, enlarged prostate vol 197g, and pt presented to ER per his recommendation on for a bedoya placement.   ER visit 1/4/22 - 3600 cc of urine from his Bedoya. Was not started on medications.      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 He feels that his "sphincter" has relaxed more.     Started him on flomax and finasteride 1 week ago. Pt wanted a voiding trial this week.     He failed a voiding trial. Worked up with cysto and UDS.  Cysto -Trilobar hypertrophy of the prostate, mildly enlarged median lobe, significantly enlarged lateral lobes  UDs with Dr. Maloney on 1/31/22 - see his report for full details -   Pdet at Max Flow: 0 cmH2O    EMG Storage: Normal Recruitment             EMG Voiding: Flaring with valsalva     Impression:           Sensation:Normal     Capacity: Large     Compliance:Normal     Detrusor Overactivity:Absent     Continence: No Incontinence     Contractility: Hypocontractility     Emptying:Unsatisfactory - Hypocontractility     Coordination:Dysfunctional (Valsalva) Voiding      He was having a bad reaction to Bactrim sent by Dr. De La Torre. Did not finish course.  Pt was not interested in a surgery. He felt that he "did this to myself." and with the results of the urodynamics with no detrusor pressure and valsalva voiding, an outlet reducing procedure would have a low chance of success. The patient states he is mostly interested in bedoya management and he does not want a surgery at this " time.     3/24/22  Here today for bedoya assistance. Pt went to ER and did not know how to disconnect bag    6/13/22  Pt notes cloudy urine and suprapubic discomfort. Also constipated. PSA was 17 5/25/22 and checked 2 days after a bedoya exchange. Patient notes he does not really drink many fluids from 9am-9pm.        LAST PSA  Lab Results   Component Value Date    PSA 11.7 (H) 12/08/2021    PSADIAG 17.3 (H) 05/25/2022       Lab Results   Component Value Date    CREATININE 1.36 01/26/2022       ---  Past Medical History:   Diagnosis Date    Colon polyp 09/06/2018    Hyperlipidemia     Hypertension        Past Surgical History:   Procedure Laterality Date    COLONOSCOPY  2010    COLONOSCOPY N/A 9/6/2018    Procedure: COLONOSCOPY Suprep PLEASE TEXT PATIENT WITH ARRIVAL TIME;  Surgeon: Niharika Arce MD;  Location: [facility];  Service: Endoscopy;  Laterality: N/A;    CYSTOSCOPY WITH URODYNAMIC TESTING N/A 1/31/2022    Procedure: CYSTOSCOPY, WITH URODYNAMIC TESTING FLOUROSCOPIC;  Surgeon: Anthony Maloney MD;  [facility] OR [address] [place] [address];  Service: Urology;  Laterality: N/A;  1hr    KNEE ARTHROSCOPY      SPINE SURGERY      l-spine       Family History   Problem Relation Age of Onset    Hypertension Mother     Heart attack Father     Cirrhosis Father     No Known Problems Brother     Seizures Daughter     No Known Problems Son     No Known Problems Brother     Liver disease Daughter        Social History     Tobacco Use    Smoking status: Former Smoker    Smokeless tobacco: Never Used   Substance Use Topics    Alcohol use: Yes     Comment: occ    Drug use: Yes     Types: Marijuana       Current Outpatient Medications on File Prior to Visit   Medication Sig Dispense Refill    ascorbic acid, vitamin C, (VITAMIN C) 1000 MG tablet Take 1,000 mg by mouth once daily.      clotrimazole-betamethasone 1-0.05% (LOTRISONE) cream Apply topically 2 (two) times daily. 60 g 3    diltiaZEM (CARDIZEM CD) 300 MG  24 hr capsule TAKE 1 CAPSULE BY MOUTH  ONCE DAILY 90 capsule 3    diphenhydramine HCl (BENADRYL ALLERGY ORAL) Take by mouth.      docusate sodium (COLACE) 250 MG capsule Take 250 mg by mouth once daily.      finasteride (PROSCAR) 5 mg tablet Take 1 tablet (5 mg total) by mouth once daily. 90 tablet 3    lisinopriL (PRINIVIL,ZESTRIL) 20 MG tablet TAKE 1 TABLET BY MOUTH ONCE DAILY 90 tablet 3    melatonin 10 mg TbDL Take by mouth.      ofloxacin (FLOXIN) 0.3 % otic solution Apply 10 drops into right ear once a day for 10 days.  Please dispense generic.      omega-3 fatty acids/fish oil (FISH OIL-OMEGA-3 FATTY ACIDS) 300-1,000 mg capsule Take 1 capsule by mouth once daily.      simvastatin (ZOCOR) 20 MG tablet TAKE 1 TABLET BY MOUTH IN  THE EVENING 90 tablet 3    tamsulosin (FLOMAX) 0.4 mg Cap Take 1 capsule (0.4 mg total) by mouth once daily. 90 capsule 3    traZODone (DESYREL) 100 MG tablet TAKE 2 TABLETS BY MOUTH EVERY DAY AT BEDTIME AS NEEDED FOR INSOMNIA 180 tablet 3    TURMERIC ORAL Take by mouth.      cholecalciferol, vitamin D3, (VITAMIN D3 ORAL) Take 125 mcg by mouth.      GAS RELIEF 80, SIMETHICONE, ORAL Take by mouth.      triamterene-hydrochlorothiazide 37.5-25 mg (DYAZIDE) 37.5-25 mg per capsule TAKE 1 CAPSULE BY MOUTH IN  THE MORNING 90 capsule 3    zinc gluconate 50 mg tablet Take 50 mg by mouth once daily.       No current facility-administered medications on file prior to visit.       Review of patient's allergies indicates:   Allergen Reactions    Ciprofloxacin Other (See Comments)     tendonitis    Sulfa (sulfonamide antibiotics) Nausea And Vomiting       Review of Systems   Constitutional: Negative for chills.   HENT: Negative for congestion.    Eyes: Negative for visual disturbance.   Respiratory: Negative for shortness of breath.    Cardiovascular: Negative for chest pain.   Gastrointestinal: Negative for abdominal distention.   Musculoskeletal: Negative for gait problem.   Skin:  Negative for color change.   Neurological: Negative for dizziness.   Psychiatric/Behavioral: Negative for agitation.       Objective:      Physical Exam  Constitutional:       Appearance: He is well-developed.   HENT:      Head: Normocephalic.   Eyes:      Pupils: Pupils are equal, round, and reactive to light.   Pulmonary:      Effort: Pulmonary effort is normal.   Abdominal:      Palpations: Abdomen is soft.   Musculoskeletal:         General: Normal range of motion.      Cervical back: Normal range of motion.   Skin:     General: Skin is warm and dry.   Neurological:      Mental Status: He is alert.         Assessment:       1. Elevated PSA    2. Urinary retention    3. Recurrent UTI        Plan:           Plan:  1. Offered to recheck a PSA in 4 weeks as his last PSA was checked 2 days after a bedoya exchange. He does not want to recheck.   2. Bedoya exchange today and sent urine for culture. Patient will hold off on abx. When urine culture results, I need to call in the full course of abx for the pt.  3. Discussed that he does not hydrate appropriate - does not drink fluids (or drinks very little fluids) per the pt from 9am-9pm. This definitely could contribute to cloudy urine. I do not recommend routine checking of urine culture as patients with indwelling foleys often could be colonized. He will work on improving his hydrating habits.  4. Pt would like to proceed with prostate bx. Discussed the psa on 5/25/22 was checked 2 days after a bedoya change and this could falsely drive a psa high but he would like to proceed with a biopsy to further investigate as he states he had an elevated PSA in 12/2021. After we appropriately treat his presumed UTI, will send in keflex, fleets and have staff schedule prostate biopsy.     Visit 40 min  Elevated PSA    Urinary retention    Recurrent UTI

## 2022-06-16 ENCOUNTER — TELEPHONE (OUTPATIENT)
Dept: UROLOGY | Facility: CLINIC | Age: 77
End: 2022-06-16
Payer: MEDICARE

## 2022-06-16 ENCOUNTER — PATIENT MESSAGE (OUTPATIENT)
Dept: UROLOGY | Facility: CLINIC | Age: 77
End: 2022-06-16
Payer: MEDICARE

## 2022-06-16 DIAGNOSIS — N39.0 URINARY TRACT INFECTION WITHOUT HEMATURIA, SITE UNSPECIFIED: Primary | ICD-10-CM

## 2022-06-16 LAB — BACTERIA UR CULT: ABNORMAL

## 2022-06-16 RX ORDER — CEFUROXIME AXETIL 500 MG/1
500 TABLET ORAL EVERY 12 HOURS
Qty: 20 TABLET | Refills: 0 | Status: SHIPPED | OUTPATIENT
Start: 2022-06-16 | End: 2022-06-26

## 2022-06-16 NOTE — TELEPHONE ENCOUNTER
Spoke with patient's daughter, Lorena.  Pleasant conversation, she wants to speak with Dr Luis as the text or emails sent was using the wrong verbiage.  Informed Dr Luis is unfortunately gone for the day, but she will contact her at some point tomorrow.  She voiced understanding stating she is off from work so anytime is fine.  Daughter is listed as alternate contact, who patient has given permission to use portal.  Will have patient sign an involvement of care in the future.  To note, urine sensitivities aren't listed in portal as patient reports.

## 2022-06-16 NOTE — TELEPHONE ENCOUNTER
----- Message from Alicia Jade sent at 6/16/2022  4:17 PM CDT -----  Type:  Needs Medical Advice    Who Called: pt's Lorena Ortez  Symptoms (please be specific):    How long has patient had these symptoms:    Pharmacy name and phone #:    Would the patient rather a call back or a response via [facility]? call  Best Call Back Number: [phone]  Additional Information: wants to speak to Dr. Luis in person to introduce herself and any misunderstand in the communication.  She doesn't want to talk about dad.

## 2022-06-27 ENCOUNTER — TELEPHONE (OUTPATIENT)
Dept: HEMATOLOGY/ONCOLOGY | Facility: CLINIC | Age: 77
End: 2022-06-27
Payer: MEDICARE

## 2022-06-27 NOTE — TELEPHONE ENCOUNTER
----- Message from Any Langston sent at 6/27/2022 10:26 AM CDT -----  Type:  Patient  Call    Who Called: Pt   Would the patient rather a call back or a response via [facility]? Call back   Best Call Back Number: [phone]  Additional Information:  pt states he was waiting on a call back from the nurse @ Dr Dinero office to see if he can have both biopsy done on the same day at the same location ... lung and Thyroid

## 2022-06-27 NOTE — TELEPHONE ENCOUNTER
Notified the patient that lung biopsy and thyroid biopsy are scheduled with different providers and different locations.

## 2022-06-28 DIAGNOSIS — R97.20 ELEVATED PSA: Primary | ICD-10-CM

## 2022-06-29 DIAGNOSIS — R91.8 LUNG NODULES: Primary | ICD-10-CM

## 2022-07-05 ENCOUNTER — OFFICE VISIT (OUTPATIENT)
Dept: FAMILY MEDICINE | Facility: CLINIC | Age: 77
End: 2022-07-05
Payer: MEDICARE

## 2022-07-05 ENCOUNTER — LAB VISIT (OUTPATIENT)
Dept: LAB | Facility: HOSPITAL | Age: 77
End: 2022-07-05
Attending: FAMILY MEDICINE
Payer: MEDICARE

## 2022-07-05 ENCOUNTER — TELEPHONE (OUTPATIENT)
Dept: INTERVENTIONAL RADIOLOGY/VASCULAR | Facility: HOSPITAL | Age: 77
End: 2022-07-05
Payer: MEDICARE

## 2022-07-05 VITALS
TEMPERATURE: 98 F | HEART RATE: 82 BPM | DIASTOLIC BLOOD PRESSURE: 62 MMHG | HEIGHT: 74 IN | WEIGHT: 240.44 LBS | BODY MASS INDEX: 30.86 KG/M2 | OXYGEN SATURATION: 95 % | SYSTOLIC BLOOD PRESSURE: 136 MMHG

## 2022-07-05 DIAGNOSIS — K59.04 CHRONIC IDIOPATHIC CONSTIPATION: ICD-10-CM

## 2022-07-05 DIAGNOSIS — E04.1 THYROID NODULE: ICD-10-CM

## 2022-07-05 DIAGNOSIS — R97.20 ELEVATED PSA: Primary | ICD-10-CM

## 2022-07-05 DIAGNOSIS — R97.20 ELEVATED PSA: ICD-10-CM

## 2022-07-05 DIAGNOSIS — R91.1 SOLITARY PULMONARY NODULE: ICD-10-CM

## 2022-07-05 PROCEDURE — 1126F AMNT PAIN NOTED NONE PRSNT: CPT | Mod: CPTII,S$GLB,, | Performed by: FAMILY MEDICINE

## 2022-07-05 PROCEDURE — 3075F PR MOST RECENT SYSTOLIC BLOOD PRESS GE 130-139MM HG: ICD-10-PCS | Mod: CPTII,S$GLB,, | Performed by: FAMILY MEDICINE

## 2022-07-05 PROCEDURE — 1159F MED LIST DOCD IN RCRD: CPT | Mod: CPTII,S$GLB,, | Performed by: FAMILY MEDICINE

## 2022-07-05 PROCEDURE — 1101F PT FALLS ASSESS-DOCD LE1/YR: CPT | Mod: CPTII,S$GLB,, | Performed by: FAMILY MEDICINE

## 2022-07-05 PROCEDURE — 3075F SYST BP GE 130 - 139MM HG: CPT | Mod: CPTII,S$GLB,, | Performed by: FAMILY MEDICINE

## 2022-07-05 PROCEDURE — 1126F PR PAIN SEVERITY QUANTIFIED, NO PAIN PRESENT: ICD-10-PCS | Mod: CPTII,S$GLB,, | Performed by: FAMILY MEDICINE

## 2022-07-05 PROCEDURE — 1159F PR MEDICATION LIST DOCUMENTED IN MEDICAL RECORD: ICD-10-PCS | Mod: CPTII,S$GLB,, | Performed by: FAMILY MEDICINE

## 2022-07-05 PROCEDURE — 3078F DIAST BP <80 MM HG: CPT | Mod: CPTII,S$GLB,, | Performed by: FAMILY MEDICINE

## 2022-07-05 PROCEDURE — 3078F PR MOST RECENT DIASTOLIC BLOOD PRESSURE < 80 MM HG: ICD-10-PCS | Mod: CPTII,S$GLB,, | Performed by: FAMILY MEDICINE

## 2022-07-05 PROCEDURE — 1101F PR PT FALLS ASSESS DOC 0-1 FALLS W/OUT INJ PAST YR: ICD-10-PCS | Mod: CPTII,S$GLB,, | Performed by: FAMILY MEDICINE

## 2022-07-05 PROCEDURE — 36415 COLL VENOUS BLD VENIPUNCTURE: CPT | Mod: PO | Performed by: FAMILY MEDICINE

## 2022-07-05 PROCEDURE — 3288F FALL RISK ASSESSMENT DOCD: CPT | Mod: CPTII,S$GLB,, | Performed by: FAMILY MEDICINE

## 2022-07-05 PROCEDURE — 84153 ASSAY OF PSA TOTAL: CPT | Mod: PO | Performed by: FAMILY MEDICINE

## 2022-07-05 PROCEDURE — 3288F PR FALLS RISK ASSESSMENT DOCUMENTED: ICD-10-PCS | Mod: CPTII,S$GLB,, | Performed by: FAMILY MEDICINE

## 2022-07-05 PROCEDURE — 99214 PR OFFICE/OUTPT VISIT, EST, LEVL IV, 30-39 MIN: ICD-10-PCS | Mod: S$GLB,,, | Performed by: FAMILY MEDICINE

## 2022-07-05 PROCEDURE — 84154 ASSAY OF PSA FREE: CPT | Mod: 59,PO | Performed by: FAMILY MEDICINE

## 2022-07-05 PROCEDURE — 99214 OFFICE O/P EST MOD 30 MIN: CPT | Mod: S$GLB,,, | Performed by: FAMILY MEDICINE

## 2022-07-05 NOTE — NURSING
Patient advised to arrive at 8:30, to have nothing to eat/drink after midnight, to have a ride to/from procedure, and to NOT take fish oil tonight (last dose 7/4/22). Allergies in chart verified with patient. Patient given direct number to IR desk.

## 2022-07-05 NOTE — PROGRESS NOTES
"Subjective:      Patient ID: Noah Lopez is a 77 y.o. male.    Chief Complaint: Follow-up (3 mon)      Vitals:    07/05/22 1416   BP: 136/62   Pulse: 82   Temp: 97.9 °F (36.6 °C)   TempSrc: Oral   SpO2: 95%   Weight: 109.1 kg (240 lb 6.6 oz)   Height: 6' 1.5" (1.867 m)        HPI   He is not sure why he is here; is getting lung and thyroid bx; wants a prostate bx  Still has a bedoya  Clamps it off  Has a high PSA  Lung bx tomorrow  Thyroid next week    Problem List  Patient Active Problem List   Diagnosis    Hypertension    Pincer nail deformity    Great toe pain, unspecified laterality    Screening for malignant neoplasm of colon    Hyperlipidemia    Colon polyp    Hypocontractile bladder    Neck arthritis    History of recent fall    Thyroid nodule    Insomnia    Solitary pulmonary nodule        ALLERGIES:   Review of patient's allergies indicates:   Allergen Reactions    Ciprofloxacin Other (See Comments)     tendonitis    Sulfa (sulfonamide antibiotics) Nausea And Vomiting       MEDS:   Current Outpatient Medications:     ascorbic acid, vitamin C, (VITAMIN C) 1000 MG tablet, Take 1,000 mg by mouth once daily., Disp: , Rfl:     cholecalciferol, vitamin D3, (VITAMIN D3 ORAL), Take 125 mcg by mouth., Disp: , Rfl:     clotrimazole-betamethasone 1-0.05% (LOTRISONE) cream, Apply topically 2 (two) times daily., Disp: 60 g, Rfl: 3    diltiaZEM (CARDIZEM CD) 300 MG 24 hr capsule, TAKE 1 CAPSULE BY MOUTH  ONCE DAILY, Disp: 90 capsule, Rfl: 3    diphenhydramine HCl (BENADRYL ALLERGY ORAL), Take by mouth., Disp: , Rfl:     docusate sodium (COLACE) 250 MG capsule, Take 250 mg by mouth once daily., Disp: , Rfl:     finasteride (PROSCAR) 5 mg tablet, Take 1 tablet (5 mg total) by mouth once daily., Disp: 90 tablet, Rfl: 3    GAS RELIEF 80, SIMETHICONE, ORAL, Take by mouth., Disp: , Rfl:     lisinopriL (PRINIVIL,ZESTRIL) 20 MG tablet, TAKE 1 TABLET BY MOUTH ONCE DAILY, Disp: 90 tablet, Rfl: 3    " melatonin 10 mg TbDL, Take by mouth., Disp: , Rfl:     ofloxacin (FLOXIN) 0.3 % otic solution, Apply 10 drops into right ear once a day for 10 days.  Please dispense generic., Disp: , Rfl:     omega-3 fatty acids/fish oil (FISH OIL-OMEGA-3 FATTY ACIDS) 300-1,000 mg capsule, Take 1 capsule by mouth once daily., Disp: , Rfl:     simvastatin (ZOCOR) 20 MG tablet, TAKE 1 TABLET BY MOUTH IN  THE EVENING, Disp: 90 tablet, Rfl: 3    tamsulosin (FLOMAX) 0.4 mg Cap, Take 1 capsule (0.4 mg total) by mouth once daily., Disp: 90 capsule, Rfl: 3    traZODone (DESYREL) 100 MG tablet, TAKE 2 TABLETS BY MOUTH EVERY DAY AT BEDTIME AS NEEDED FOR INSOMNIA, Disp: 180 tablet, Rfl: 3    triamterene-hydrochlorothiazide 37.5-25 mg (DYAZIDE) 37.5-25 mg per capsule, TAKE 1 CAPSULE BY MOUTH IN  THE MORNING, Disp: 90 capsule, Rfl: 3    TURMERIC ORAL, Take by mouth., Disp: , Rfl:     zinc gluconate 50 mg tablet, Take 50 mg by mouth once daily., Disp: , Rfl:       History:  Current Providers as of 7/5/2022  PCP: Anthony De La Torre MD  Care Team Provider: Yasmeen Hubbard LPN  Care Team Provider: Helena Luis MD  Encounter Provider: Anthony De La Torre MD, starting on Tue Jul 5, 2022 12:00 AM  Referring Provider: not found, starting on Tue Jul 5, 2022 12:00 AM  Consulting Physician: Anthony De La Torre MD, starting on Tue Jul 5, 2022  2:09 PM (Active)   Past Medical History:   Diagnosis Date    Colon polyp 09/06/2018    Hyperlipidemia     Hypertension      Past Surgical History:   Procedure Laterality Date    COLONOSCOPY  2010    COLONOSCOPY N/A 9/6/2018    Procedure: COLONOSCOPY Suprep PLEASE TEXT PATIENT WITH ARRIVAL TIME;  Surgeon: Niharika Arce MD;  Location: [facility];  Service: Endoscopy;  Laterality: N/A;    CYSTOSCOPY WITH URODYNAMIC TESTING N/A 1/31/2022    Procedure: CYSTOSCOPY, WITH URODYNAMIC TESTING FLOUROSCOPIC;  Surgeon: Anthony Maloney MD;  Location: [address] [facility];  Service: Urology;  Laterality: N/A;  1hr    KNEE  ARTHROSCOPY      SPINE SURGERY      l-spine     Social History     Tobacco Use    Smoking status: Former Smoker    Smokeless tobacco: Never Used   Substance Use Topics    Alcohol use: Yes     Comment: occ    Drug use: Yes     Types: Marijuana         Review of Systems   Constitutional: Negative for appetite change, fatigue, fever and unexpected weight change.   HENT: Negative for congestion, ear pain, sinus pressure and sore throat.    Eyes: Negative for pain and visual disturbance.   Respiratory: Negative for shortness of breath.    Cardiovascular: Negative for chest pain.   Gastrointestinal: Negative for abdominal pain, constipation and diarrhea.   Endocrine: Negative for polyuria.   Genitourinary: Negative for difficulty urinating and frequency.   Musculoskeletal: Negative for arthralgias, back pain and myalgias.   Skin: Negative for color change.   Allergic/Immunologic: Negative.    Neurological: Negative for syncope, weakness and headaches.   Hematological: Does not bruise/bleed easily.   Psychiatric/Behavioral: Negative for dysphoric mood and suicidal ideas. The patient is not nervous/anxious.    All other systems reviewed and are negative.    Objective:     Physical Exam  Vitals and nursing note reviewed.   Constitutional:       General: He is not in acute distress.     Appearance: He is well-developed. He is not diaphoretic.   HENT:      Head: Normocephalic and atraumatic.      Right Ear: External ear normal.      Left Ear: External ear normal.      Mouth/Throat:      Pharynx: No oropharyngeal exudate.   Eyes:      General: No scleral icterus.        Right eye: No discharge.         Left eye: No discharge.      Conjunctiva/sclera: Conjunctivae normal.      Pupils: Pupils are equal, round, and reactive to light.   Neck:      Thyroid: No thyromegaly.      Vascular: No JVD.      Trachea: No tracheal deviation.   Cardiovascular:      Rate and Rhythm: Normal rate and regular rhythm.      Heart sounds: No  murmur heard.    No friction rub. No gallop.   Pulmonary:      Effort: Pulmonary effort is normal. No respiratory distress.      Breath sounds: Normal breath sounds. No stridor. No wheezing or rales.   Chest:      Chest wall: No tenderness.   Abdominal:      General: There is no distension.      Palpations: Abdomen is soft. There is no mass.      Tenderness: There is no abdominal tenderness. There is no guarding or rebound.   Musculoskeletal:         General: No tenderness. Normal range of motion.      Cervical back: Normal range of motion and neck supple.   Lymphadenopathy:      Cervical: No cervical adenopathy.   Skin:     General: Skin is warm and dry.      Coloration: Skin is not pale.      Findings: No erythema or rash.   Neurological:      Mental Status: He is alert and oriented to person, place, and time.      Cranial Nerves: No cranial nerve deficit.      Motor: No abnormal muscle tone.      Coordination: Coordination normal.      Deep Tendon Reflexes: Reflexes are normal and symmetric. Reflexes normal.   Psychiatric:         Behavior: Behavior normal.         Thought Content: Thought content normal.         Judgment: Judgment normal.             Assessment:     1. Elevated PSA    2. Solitary pulmonary nodule    3. Thyroid nodule    4. Chronic idiopathic constipation      Plan:        Medication List          Accurate as of July 5, 2022  3:02 PM. If you have any questions, ask your nurse or doctor.            CONTINUE taking these medications    ascorbic acid (vitamin C) 1000 MG tablet  Commonly known as: VITAMIN C     BENADRYL ALLERGY ORAL     clotrimazole-betamethasone 1-0.05% cream  Commonly known as: LOTRISONE  Apply topically 2 (two) times daily.     diltiaZEM 300 MG 24 hr capsule  Commonly known as: CARDIZEM CD  TAKE 1 CAPSULE BY MOUTH  ONCE DAILY     docusate sodium 250 MG capsule  Commonly known as: COLACE     finasteride 5 mg tablet  Commonly known as: PROSCAR  Take 1 tablet (5 mg total) by mouth  once daily.     fish oil-omega-3 fatty acids 300-1,000 mg capsule     GAS RELIEF 80 (SIMETHICONE) ORAL     lisinopriL 20 MG tablet  Commonly known as: PRINIVIL,ZESTRIL  TAKE 1 TABLET BY MOUTH ONCE DAILY     melatonin 10 mg Tbdl     ofloxacin 0.3 % otic solution  Commonly known as: FLOXIN     simvastatin 20 MG tablet  Commonly known as: ZOCOR  TAKE 1 TABLET BY MOUTH IN  THE EVENING     tamsulosin 0.4 mg Cap  Commonly known as: FLOMAX  Take 1 capsule (0.4 mg total) by mouth once daily.     traZODone 100 MG tablet  Commonly known as: DESYREL  TAKE 2 TABLETS BY MOUTH EVERY DAY AT BEDTIME AS NEEDED FOR INSOMNIA     triamterene-hydrochlorothiazide 37.5-25 mg 37.5-25 mg per capsule  Commonly known as: DYAZIDE  TAKE 1 CAPSULE BY MOUTH IN  THE MORNING     TURMERIC ORAL     VITAMIN D3 ORAL     zinc gluconate 50 mg tablet          Elevated PSA  -     PSA, TOTAL AND FREE; Future; Expected date: 07/05/2022    Solitary pulmonary nodule    Thyroid nodule    Chronic idiopathic constipation

## 2022-07-06 ENCOUNTER — HOSPITAL ENCOUNTER (OUTPATIENT)
Dept: RADIOLOGY | Facility: HOSPITAL | Age: 77
Discharge: HOME | End: 2022-07-06
Attending: INTERNAL MEDICINE
Payer: MEDICARE

## 2022-07-06 VITALS
WEIGHT: 240 LBS | DIASTOLIC BLOOD PRESSURE: 80 MMHG | BODY MASS INDEX: 31.81 KG/M2 | SYSTOLIC BLOOD PRESSURE: 165 MMHG | HEART RATE: 80 BPM | OXYGEN SATURATION: 95 % | HEIGHT: 73 IN | TEMPERATURE: 97 F | RESPIRATION RATE: 18 BRPM

## 2022-07-06 DIAGNOSIS — R97.20 ELEVATED PSA: ICD-10-CM

## 2022-07-06 DIAGNOSIS — R91.1 LUNG NODULE: ICD-10-CM

## 2022-07-06 DIAGNOSIS — R91.8 LUNG NODULES: ICD-10-CM

## 2022-07-06 LAB
ALBUMIN SERPL BCP-MCNC: 3.8 G/DL (ref 3.5–5.2)
ALP SERPL-CCNC: 80 U/L (ref 55–135)
ALT SERPL W/O P-5'-P-CCNC: 22 U/L (ref 10–44)
ANION GAP SERPL CALC-SCNC: 13 MMOL/L (ref 8–16)
AST SERPL-CCNC: 20 U/L (ref 10–40)
BASOPHILS # BLD AUTO: 0.03 K/UL (ref 0–0.2)
BASOPHILS NFR BLD: 0.4 % (ref 0–1.9)
BILIRUB SERPL-MCNC: 0.4 MG/DL (ref 0.1–1)
BUN SERPL-MCNC: 21 MG/DL (ref 8–23)
CALCIUM SERPL-MCNC: 9.8 MG/DL (ref 8.7–10.5)
CHLORIDE SERPL-SCNC: 105 MMOL/L (ref 95–110)
CO2 SERPL-SCNC: 23 MMOL/L (ref 23–29)
CREAT SERPL-MCNC: 1.3 MG/DL (ref 0.5–1.4)
DIFFERENTIAL METHOD: NORMAL
EOSINOPHIL # BLD AUTO: 0.2 K/UL (ref 0–0.5)
EOSINOPHIL NFR BLD: 2.1 % (ref 0–8)
ERYTHROCYTE [DISTWIDTH] IN BLOOD BY AUTOMATED COUNT: 13.5 % (ref 11.5–14.5)
EST. GFR  (AFRICAN AMERICAN): >60 ML/MIN/1.73 M^2
EST. GFR  (NON AFRICAN AMERICAN): 53 ML/MIN/1.73 M^2
GLUCOSE SERPL-MCNC: 114 MG/DL (ref 70–110)
HCT VFR BLD AUTO: 46.3 % (ref 40–54)
HGB BLD-MCNC: 15.3 G/DL (ref 14–18)
IMM GRANULOCYTES # BLD AUTO: 0.02 K/UL (ref 0–0.04)
IMM GRANULOCYTES NFR BLD AUTO: 0.3 % (ref 0–0.5)
INR PPP: 0.9 (ref 0.8–1.2)
LYMPHOCYTES # BLD AUTO: 1.4 K/UL (ref 1–4.8)
LYMPHOCYTES NFR BLD: 19.4 % (ref 18–48)
MCH RBC QN AUTO: 30.7 PG (ref 27–31)
MCHC RBC AUTO-ENTMCNC: 33 G/DL (ref 32–36)
MCV RBC AUTO: 93 FL (ref 82–98)
MONOCYTES # BLD AUTO: 0.6 K/UL (ref 0.3–1)
MONOCYTES NFR BLD: 8.4 % (ref 4–15)
NEUTROPHILS # BLD AUTO: 5 K/UL (ref 1.8–7.7)
NEUTROPHILS NFR BLD: 69.4 % (ref 38–73)
NRBC BLD-RTO: 0 /100 WBC
PLATELET # BLD AUTO: 273 K/UL (ref 150–450)
PMV BLD AUTO: 9.5 FL (ref 9.2–12.9)
POTASSIUM SERPL-SCNC: 3.9 MMOL/L (ref 3.5–5.1)
PROSTATE SPECIFIC ANTIGEN, TOTAL: 15.8 NG/ML (ref 0–4)
PROT SERPL-MCNC: 7.6 G/DL (ref 6–8.4)
PROTHROMBIN TIME: 9.8 SEC (ref 9–12.5)
PSA FREE MFR SERPL: 17.91 %
PSA FREE SERPL-MCNC: 2.83 NG/ML (ref 0–1.5)
RBC # BLD AUTO: 4.98 M/UL (ref 4.6–6.2)
SODIUM SERPL-SCNC: 141 MMOL/L (ref 136–145)
WBC # BLD AUTO: 7.13 K/UL (ref 3.9–12.7)

## 2022-07-06 PROCEDURE — 80053 COMPREHEN METABOLIC PANEL: CPT | Performed by: INTERNAL MEDICINE

## 2022-07-06 PROCEDURE — 76380 CT LIMITED: ICD-10-PCS | Mod: 26,,, | Performed by: RADIOLOGY

## 2022-07-06 PROCEDURE — 76380 CAT SCAN FOLLOW-UP STUDY: CPT | Mod: 26,,, | Performed by: RADIOLOGY

## 2022-07-06 PROCEDURE — 36415 COLL VENOUS BLD VENIPUNCTURE: CPT | Performed by: INTERNAL MEDICINE

## 2022-07-06 PROCEDURE — 76380 CAT SCAN FOLLOW-UP STUDY: CPT | Mod: TC

## 2022-07-06 PROCEDURE — 85025 COMPLETE CBC W/AUTO DIFF WBC: CPT | Performed by: INTERNAL MEDICINE

## 2022-07-06 PROCEDURE — 85610 PROTHROMBIN TIME: CPT | Performed by: INTERNAL MEDICINE

## 2022-07-06 NOTE — H&P
Interventional Radiology Pre-Procedure History & Physical      Chief Complaint/Reason for Referral: Lung nodules    History of Present Illness:  Noah Lopez is a 77 y.o. male who presents with multiple SHANNAN nodules measuring up to 0.9 cm, new since 2011, in the setting of rising PSA. Has not been formally diagnosed with prostate ca. Prostate bx is planned. Nodules were not FDG-avid on recent PET/CT. Referred to IR for perc lung nodule bx.    Past Medical History:   Diagnosis Date    Colon polyp 09/06/2018    Hyperlipidemia     Hypertension      Past Surgical History:   Procedure Laterality Date    COLONOSCOPY  2010    COLONOSCOPY N/A 9/6/2018    Procedure: COLONOSCOPY Suprep PLEASE TEXT PATIENT WITH ARRIVAL TIME;  Surgeon: Niharika Arce MD;  Location: [facility];  Service: Endoscopy;  Laterality: N/A;    CYSTOSCOPY WITH URODYNAMIC TESTING N/A 1/31/2022    Procedure: CYSTOSCOPY, WITH URODYNAMIC TESTING FLOUROSCOPIC;  Surgeon: Anthony Maloney MD;  Location: [facility] OR [address] [place] [address];  Service: Urology;  Laterality: N/A;  1hr    KNEE ARTHROSCOPY      SPINE SURGERY      l-spine       Allergies:   Review of patient's allergies indicates:   Allergen Reactions    Ciprofloxacin Other (See Comments)     tendonitis    Sulfa (sulfonamide antibiotics) Nausea And Vomiting        Home Meds:   Prior to Admission medications    Medication Sig Start Date End Date Taking? Authorizing Provider   ascorbic acid, vitamin C, (VITAMIN C) 1000 MG tablet Take 1,000 mg by mouth once daily.   Yes Historical Provider   clotrimazole-betamethasone 1-0.05% (LOTRISONE) cream Apply topically 2 (two) times daily. 4/4/22  Yes Anthony De La Torre MD   diltiaZEM (CARDIZEM CD) 300 MG 24 hr capsule TAKE 1 CAPSULE BY MOUTH  ONCE DAILY 2/15/22  Yes Anthony De La Torre MD   diphenhydramine HCl (BENADRYL ALLERGY ORAL) Take by mouth.   Yes Historical Provider   finasteride (PROSCAR) 5 mg tablet Take 1 tablet (5 mg total) by mouth once daily. 1/14/22  1/14/23 Yes Helena Luis MD   lisinopriL (PRINIVIL,ZESTRIL) 20 MG tablet TAKE 1 TABLET BY MOUTH ONCE DAILY 2/15/22  Yes Anthony De La Torre MD   melatonin 10 mg TbDL Take by mouth.   Yes Historical Provider   omega-3 fatty acids/fish oil (FISH OIL-OMEGA-3 FATTY ACIDS) 300-1,000 mg capsule Take 1 capsule by mouth once daily.   Yes Historical Provider   simvastatin (ZOCOR) 20 MG tablet TAKE 1 TABLET BY MOUTH IN  THE EVENING 2/15/22  Yes Anthony De La Torre MD   tamsulosin (FLOMAX) 0.4 mg Cap Take 1 capsule (0.4 mg total) by mouth once daily. 1/14/22 1/14/23 Yes Helena Luis MD   traZODone (DESYREL) 100 MG tablet TAKE 2 TABLETS BY MOUTH EVERY DAY AT BEDTIME AS NEEDED FOR INSOMNIA 4/4/22  Yes Anthony De La Torre MD   TURMERIC ORAL Take by mouth.   Yes Historical Provider   cholecalciferol, vitamin D3, (VITAMIN D3 ORAL) Take 125 mcg by mouth.    Historical Provider   docusate sodium (COLACE) 250 MG capsule Take 250 mg by mouth once daily.    Historical Provider   GAS RELIEF 80, SIMETHICONE, ORAL Take by mouth.    Historical Provider   ofloxacin (FLOXIN) 0.3 % otic solution Apply 10 drops into right ear once a day for 10 days.  Please dispense generic. 6/20/16   Historical Provider   triamterene-hydrochlorothiazide 37.5-25 mg (DYAZIDE) 37.5-25 mg per capsule TAKE 1 CAPSULE BY MOUTH IN  THE MORNING 2/15/22   Anthony De La Torre MD   zinc gluconate 50 mg tablet Take 50 mg by mouth once daily.    Historical Provider       Anticoagulation/Antiplatelet Meds: no anticoagulation    Review of Systems:   Hematological: no known coagulopathies  Respiratory: no shortness of breath  Cardiovascular: no chest pain  Gastrointestinal: no abdominal pain  Genitourinary: no dysuria  Musculoskeletal: negative  Neurological: no TIA or stroke symptoms     Physical Exam:  Temp: 97.2 °F (36.2 °C) (07/06/22 0801)  Pulse: 80 (07/06/22 0801)  Resp: 18 (07/06/22 0801)  BP: (!) 165/80 (07/06/22 0801)  SpO2: 95 % (07/06/22 0801)    General: NAD  HEENT:  Normocephalic, sclera anicteric, oropharynx clear  Neck: Supple, no palpable lymphadenopathy  Heart: RRR  Lungs: Symmetric excursions, breathing unlabored  Abd: NTND, soft  Extremities: ARMANDO  Neuro: Gross nonfocal    Laboratory:  Lab Results   Component Value Date    INR 0.9 07/06/2022       Lab Results   Component Value Date    WBC 7.13 07/06/2022    HGB 15.3 07/06/2022    HCT 46.3 07/06/2022    MCV 93 07/06/2022     07/06/2022      Lab Results   Component Value Date     (H) 01/26/2022     01/26/2022    K 4.2 01/26/2022     01/26/2022    CO2 34 (H) 01/26/2022    BUN 17 01/26/2022    CREATININE 1.36 01/26/2022    CALCIUM 9.3 01/26/2022    ALT 28 12/08/2021    AST 28 12/08/2021    ALBUMIN 4.6 12/08/2021    BILITOT 0.6 12/08/2021       Imaging:  CT chest 4/22/22 and 4/15/22, PET/CT 5/16/22 reviewed.    Assessment/Plan:  77 y.o. male with lung nodules and a rising PSA. Will re-scan chest today and will biopsy if felt indicated. If stable, a case can be made to continue watching these nodules instead bx.    Sedation:  Sedation history: have not been any systemic reactions  ASA: 3 / Mallampati: 3  Sedation plan: Up to moderate (Versed, fentanyl)     Risks (including, but not limited to, pain, bleeding, infection, damage to nearby structures, procedure failture, the need for additional procedures, air embolus, cardiac arrest, stroke, and death), potential benefits, and alternatives were discussed with the patient. All questions were answered to the best of my abilities. The patient wishes to proceed. Written informed consent was obtained.      [facility]  Pager [phone]

## 2022-07-06 NOTE — SEDATION DOCUMENTATION
Per Dr. De Leon, procedure will not be done. Patient will return to pre post to discuss with Dr. De Leon

## 2022-07-06 NOTE — PROCEDURES
Two solid SHANNAN nodules re-identified on limited CT chest performed today. These are unchanged in size over approximately 3 months. Given their small size, negative appearance on PET/CT and anticipated low yield, recommend continued observation with repeat CT in 3-6 months and, if stable, 18 months from now.      James De Leon MD  [facility]  Pager [phone]

## 2022-07-06 NOTE — DISCHARGE SUMMARY
Interventional Radiology Short Stay Discharge Summary      Admit Date: 7/6/2022  Discharge Date: 07/06/2022     Hospital Course: Uneventful; see procedure note.    Discharge Diagnosis: Lung nodules    Discharge Condition: Stable    Discharge Disposition: Home    Diet: Resume prior diet    Activity: Activity as tolerated    Follow-up: With referring provider      James De Leon MD  [facility]  Pager [phone]

## 2022-07-12 ENCOUNTER — TELEPHONE (OUTPATIENT)
Dept: UROLOGY | Facility: CLINIC | Age: 77
End: 2022-07-12

## 2022-07-12 ENCOUNTER — OFFICE VISIT (OUTPATIENT)
Dept: UROLOGY | Facility: CLINIC | Age: 77
End: 2022-07-12
Payer: MEDICARE

## 2022-07-12 VITALS
DIASTOLIC BLOOD PRESSURE: 73 MMHG | SYSTOLIC BLOOD PRESSURE: 149 MMHG | WEIGHT: 241.31 LBS | HEIGHT: 73 IN | HEART RATE: 86 BPM | BODY MASS INDEX: 31.98 KG/M2

## 2022-07-12 DIAGNOSIS — N39.0 RECURRENT UTI: Primary | ICD-10-CM

## 2022-07-12 PROCEDURE — 87077 CULTURE AEROBIC IDENTIFY: CPT | Mod: 59 | Performed by: STUDENT IN AN ORGANIZED HEALTH CARE EDUCATION/TRAINING PROGRAM

## 2022-07-12 PROCEDURE — 3077F PR MOST RECENT SYSTOLIC BLOOD PRESSURE >= 140 MM HG: ICD-10-PCS | Mod: CPTII,S$GLB,, | Performed by: STUDENT IN AN ORGANIZED HEALTH CARE EDUCATION/TRAINING PROGRAM

## 2022-07-12 PROCEDURE — 1159F PR MEDICATION LIST DOCUMENTED IN MEDICAL RECORD: ICD-10-PCS | Mod: CPTII,S$GLB,, | Performed by: STUDENT IN AN ORGANIZED HEALTH CARE EDUCATION/TRAINING PROGRAM

## 2022-07-12 PROCEDURE — 99999 PR PBB SHADOW E&M-EST. PATIENT-LVL IV: ICD-10-PCS | Mod: PBBFAC,,, | Performed by: STUDENT IN AN ORGANIZED HEALTH CARE EDUCATION/TRAINING PROGRAM

## 2022-07-12 PROCEDURE — 1126F PR PAIN SEVERITY QUANTIFIED, NO PAIN PRESENT: ICD-10-PCS | Mod: CPTII,S$GLB,, | Performed by: STUDENT IN AN ORGANIZED HEALTH CARE EDUCATION/TRAINING PROGRAM

## 2022-07-12 PROCEDURE — 99214 OFFICE O/P EST MOD 30 MIN: CPT | Mod: S$GLB,,, | Performed by: STUDENT IN AN ORGANIZED HEALTH CARE EDUCATION/TRAINING PROGRAM

## 2022-07-12 PROCEDURE — 3078F DIAST BP <80 MM HG: CPT | Mod: CPTII,S$GLB,, | Performed by: STUDENT IN AN ORGANIZED HEALTH CARE EDUCATION/TRAINING PROGRAM

## 2022-07-12 PROCEDURE — 3077F SYST BP >= 140 MM HG: CPT | Mod: CPTII,S$GLB,, | Performed by: STUDENT IN AN ORGANIZED HEALTH CARE EDUCATION/TRAINING PROGRAM

## 2022-07-12 PROCEDURE — 1126F AMNT PAIN NOTED NONE PRSNT: CPT | Mod: CPTII,S$GLB,, | Performed by: STUDENT IN AN ORGANIZED HEALTH CARE EDUCATION/TRAINING PROGRAM

## 2022-07-12 PROCEDURE — 3078F PR MOST RECENT DIASTOLIC BLOOD PRESSURE < 80 MM HG: ICD-10-PCS | Mod: CPTII,S$GLB,, | Performed by: STUDENT IN AN ORGANIZED HEALTH CARE EDUCATION/TRAINING PROGRAM

## 2022-07-12 PROCEDURE — 99214 PR OFFICE/OUTPT VISIT, EST, LEVL IV, 30-39 MIN: ICD-10-PCS | Mod: S$GLB,,, | Performed by: STUDENT IN AN ORGANIZED HEALTH CARE EDUCATION/TRAINING PROGRAM

## 2022-07-12 PROCEDURE — 99999 PR PBB SHADOW E&M-EST. PATIENT-LVL IV: CPT | Mod: PBBFAC,,, | Performed by: STUDENT IN AN ORGANIZED HEALTH CARE EDUCATION/TRAINING PROGRAM

## 2022-07-12 PROCEDURE — 1160F RVW MEDS BY RX/DR IN RCRD: CPT | Mod: CPTII,S$GLB,, | Performed by: STUDENT IN AN ORGANIZED HEALTH CARE EDUCATION/TRAINING PROGRAM

## 2022-07-12 PROCEDURE — 87088 URINE BACTERIA CULTURE: CPT | Performed by: STUDENT IN AN ORGANIZED HEALTH CARE EDUCATION/TRAINING PROGRAM

## 2022-07-12 PROCEDURE — 87086 URINE CULTURE/COLONY COUNT: CPT | Performed by: STUDENT IN AN ORGANIZED HEALTH CARE EDUCATION/TRAINING PROGRAM

## 2022-07-12 PROCEDURE — 87186 SC STD MICRODIL/AGAR DIL: CPT | Performed by: STUDENT IN AN ORGANIZED HEALTH CARE EDUCATION/TRAINING PROGRAM

## 2022-07-12 PROCEDURE — 1159F MED LIST DOCD IN RCRD: CPT | Mod: CPTII,S$GLB,, | Performed by: STUDENT IN AN ORGANIZED HEALTH CARE EDUCATION/TRAINING PROGRAM

## 2022-07-12 PROCEDURE — 1160F PR REVIEW ALL MEDS BY PRESCRIBER/CLIN PHARMACIST DOCUMENTED: ICD-10-PCS | Mod: CPTII,S$GLB,, | Performed by: STUDENT IN AN ORGANIZED HEALTH CARE EDUCATION/TRAINING PROGRAM

## 2022-07-12 NOTE — TELEPHONE ENCOUNTER
----- Message from Kalyani Connor sent at 7/12/2022  3:25 PM CDT -----  Contact: /[phone]  .Type:  Needs Medical Advice    Who Called: Patient  Would the patient rather a call back or a response via [employer]? Call back  Best Call Back Number: [phone]   Additional Information: Patient needs to speak with doctor urgently.

## 2022-07-12 NOTE — PROGRESS NOTES
Jones exchange.  Existing catheter deflated and expelled from urinary bladder.  Under sterile technique, 16 Fr [place] inserted into urinary bladder, emmie, cloudy urine output.  Sample obtained.  Balloon inflated with 10 cc of normal saline.  Per patient request, catheter capped off.  Patient understands he is to uncap catheter periodically during the day.  No further questions.

## 2022-07-13 ENCOUNTER — CLINICAL SUPPORT (OUTPATIENT)
Dept: UROLOGY | Facility: CLINIC | Age: 77
End: 2022-07-13
Payer: MEDICARE

## 2022-07-13 NOTE — PROGRESS NOTES
"Patient here for bedoya inspection and exchange as he complains of leakage yesterday evening.  Due to defect of "pin hole" in catheter, bedoya exchanged.  Under sterile technique, 16 Fr bedoya catheter inserted into urinary bladder with dark yellow, cloudy urine output.  Balloon inflated with 10 cc of NS.  Catheter was capped as requested.  Patient tolerated well.  Follow up appointment scheduled with patient on yesterday.  "

## 2022-07-15 ENCOUNTER — TELEPHONE (OUTPATIENT)
Dept: UROLOGY | Facility: CLINIC | Age: 77
End: 2022-07-15
Payer: MEDICARE

## 2022-07-15 DIAGNOSIS — R97.20 ELEVATED PSA: ICD-10-CM

## 2022-07-15 DIAGNOSIS — N39.0 URINARY TRACT INFECTION WITHOUT HEMATURIA, SITE UNSPECIFIED: Primary | ICD-10-CM

## 2022-07-15 RX ORDER — AMOXICILLIN AND CLAVULANATE POTASSIUM 875; 125 MG/1; MG/1
1 TABLET, FILM COATED ORAL EVERY 12 HOURS
Qty: 28 TABLET | Refills: 0 | Status: SHIPPED | OUTPATIENT
Start: 2022-07-15 | End: 2022-07-29 | Stop reason: SDUPTHER

## 2022-07-15 NOTE — TELEPHONE ENCOUNTER
Spoke with patient, informed of results and recommendations. Informed of the plan moving forward, he voiced understanding.  He is currently working on his home and asked that if I can mail to him.  Will mail out biopsy instructions and enclose Dr Luis recommendations.  Informed patient of fish oil interruption, he stated he will stop taking now as he started taking it on his own.  He will  ABX in the morning from pharmacy.

## 2022-07-15 NOTE — TELEPHONE ENCOUNTER
----- Message from Taran Caballero sent at 7/15/2022  3:13 PM CDT -----  Type:  Needs Medical Advice    Who Called: self  Reason:returnig call  Would the patient rather a call back or a response via [employer]? call  Best Call Back Number: [phone]  Additional Information: maegan

## 2022-07-15 NOTE — TELEPHONE ENCOUNTER
----- Message from Helena Luis MD sent at 7/15/2022  8:20 AM CDT -----  Please let patient know that there is a large growth of bacteria on the urine culture. I am going to call in augmentin for the patient to take for 2 weeks. I would like to schedule the prostate biopsy within that 2 weeks while he is on augmentin. He needs to stop taking fish oil which is the only blood thinner that I see on his list 7 days before the biopsy.   He needs to  the fleets enema also while he is picking up the augmentin and follow the prostate biopsy instructions which he needs to review.

## 2022-07-16 LAB — BACTERIA UR CULT: ABNORMAL

## 2022-07-25 ENCOUNTER — TELEPHONE (OUTPATIENT)
Dept: FAMILY MEDICINE | Facility: CLINIC | Age: 77
End: 2022-07-25
Payer: MEDICARE

## 2022-07-26 NOTE — TELEPHONE ENCOUNTER
Mr Jessica called me this pm BC he is having fever and sinus congestion.  I told him to call first thing in the AM to let you and I would not expect him to have the Bx done tomorrow.

## 2022-07-28 ENCOUNTER — TELEPHONE (OUTPATIENT)
Dept: UROLOGY | Facility: CLINIC | Age: 77
End: 2022-07-28
Payer: MEDICARE

## 2022-07-28 NOTE — TELEPHONE ENCOUNTER
----- Message from Zehra Jimenez sent at 7/28/2022  8:08 AM CDT -----  Contact: [phone]  Type:  Needs Medical Advice    Who Called: Pt   Reason for call; regarding pt has Covid and would like to reschedule his Procedure on Tomorrow 7/29  Would the patient rather a call back or a response via [facility]? Call back  Best Call Back Number:[phone]

## 2022-07-28 NOTE — TELEPHONE ENCOUNTER
Return call no answer and  voicemail full, was calling to reschedule patient procedure. I went and reschedule and will mail appointment letter out.

## 2022-07-29 ENCOUNTER — TELEPHONE (OUTPATIENT)
Dept: UROLOGY | Facility: CLINIC | Age: 77
End: 2022-07-29
Payer: MEDICARE

## 2022-07-29 DIAGNOSIS — N39.0 URINARY TRACT INFECTION WITHOUT HEMATURIA, SITE UNSPECIFIED: ICD-10-CM

## 2022-07-29 DIAGNOSIS — R97.20 ELEVATED PSA: ICD-10-CM

## 2022-07-29 RX ORDER — AMOXICILLIN AND CLAVULANATE POTASSIUM 875; 125 MG/1; MG/1
1 TABLET, FILM COATED ORAL EVERY 12 HOURS
Qty: 28 TABLET | Refills: 0 | Status: SHIPPED | OUTPATIENT
Start: 2022-07-29 | End: 2022-08-12

## 2022-07-29 NOTE — TELEPHONE ENCOUNTER
If patient still on ABX, now with COVID dx, when should we reschedule biopsy.  I think you wanted him on extended ABX pro phylactically.  Can you provide recommendations on or off ABX?  Thanks

## 2022-07-29 NOTE — TELEPHONE ENCOUNTER
----- Message from Taran Caballero sent at 7/29/2022 10:28 AM CDT -----  Type:  Needs Medical Advice    Who Called: self  Reason:returning call   Would the patient rather a call back or a response via [employer]? call  Best Call Back Number: [phone]  Additional Information: nonw

## 2022-08-11 ENCOUNTER — PROCEDURE VISIT (OUTPATIENT)
Dept: UROLOGY | Facility: CLINIC | Age: 77
End: 2022-08-11
Payer: MEDICARE

## 2022-08-11 VITALS — DIASTOLIC BLOOD PRESSURE: 81 MMHG | HEART RATE: 80 BPM | SYSTOLIC BLOOD PRESSURE: 144 MMHG

## 2022-08-11 DIAGNOSIS — N39.0 URINARY TRACT INFECTION WITHOUT HEMATURIA, SITE UNSPECIFIED: ICD-10-CM

## 2022-08-11 DIAGNOSIS — R97.20 ELEVATED PSA: ICD-10-CM

## 2022-08-11 PROCEDURE — 88305 TISSUE EXAM BY PATHOLOGIST: ICD-10-PCS | Mod: 26,,, | Performed by: PATHOLOGY

## 2022-08-11 PROCEDURE — 55700 PR BIOPSY OF PROSTATE,NEEDLE/PUNCH: CPT | Mod: S$GLB,,, | Performed by: STUDENT IN AN ORGANIZED HEALTH CARE EDUCATION/TRAINING PROGRAM

## 2022-08-11 PROCEDURE — 55700 PR BIOPSY OF PROSTATE,NEEDLE/PUNCH: ICD-10-PCS | Mod: S$GLB,,, | Performed by: STUDENT IN AN ORGANIZED HEALTH CARE EDUCATION/TRAINING PROGRAM

## 2022-08-11 PROCEDURE — 76872 US TRANSRECTAL: CPT | Mod: 26,S$GLB,, | Performed by: STUDENT IN AN ORGANIZED HEALTH CARE EDUCATION/TRAINING PROGRAM

## 2022-08-11 PROCEDURE — 88344 IMHCHEM/IMCYTCHM EA MLT ANTB: CPT | Performed by: PATHOLOGY

## 2022-08-11 PROCEDURE — 88344 IMHCHEM/IMCYTCHM EA MLT ANTB: CPT | Mod: 26,,, | Performed by: PATHOLOGY

## 2022-08-11 PROCEDURE — 88305 TISSUE EXAM BY PATHOLOGIST: CPT | Mod: 26,,, | Performed by: PATHOLOGY

## 2022-08-11 PROCEDURE — 76872 PR US TRANSRECTAL: ICD-10-PCS | Mod: 26,S$GLB,, | Performed by: STUDENT IN AN ORGANIZED HEALTH CARE EDUCATION/TRAINING PROGRAM

## 2022-08-11 PROCEDURE — 88344 PR IHC OR ICC EACH MULTIPLEX ANTIBODY STAIN PROCEDURE: ICD-10-PCS | Mod: 26,,, | Performed by: PATHOLOGY

## 2022-08-11 PROCEDURE — 88305 TISSUE EXAM BY PATHOLOGIST: CPT | Mod: 59 | Performed by: PATHOLOGY

## 2022-08-11 NOTE — PROCEDURES
Procedures     Prostate Biopsy Procedure Note  Date of procedure:  08/11/2022    Pre-procedure diagnosis: Elevated PSA  Lab Results   Component Value Date    PSA 11.7 (H) 12/08/2021    PSADIAG 17.3 (H) 05/25/2022    PSATOTAL 15.8 (H) 07/05/2022    PSAFREE 2.83 (H) 07/05/2022       Post-procedure diagnosis: same    Procedure: Transrectal ultrasound of the prostate, pudendal nerve injection for numbing, ultrasound guided needle biopsy of prostate      Complications: none    Blood loss: minimal    Surgeon: Helena Luis MD    Anesthesia: 10cc lidocaine jelly, 10cc 1% lidocaine for prostatic block    AKASH findings: enlarged gland, no hard nodules  TRUS size: 60.4cc    Procedure: The risks and benefits of the procedure were explained to the patient and consent was obtained.  The patient laid in the lateral decubitus position.  10cc of lidocaine jelly was used for local analgesia in the rectum.  To perform the transrectal ultrasound, the ultrasound probe was advanced into the rectum. The bladder was noted to be not completely empty.  The prostate was visualized. A median lobe was present.     10cc of 1% lidocaine without epi was used for a prostatic nerve block by injecting the numbing medication into the pudendal nerve. Using the ultrasound image as guidance, 14 biopsies were then taken, 2 from the lateral and mid apex, mid, base, and transition zones from the right and left side using a needle punch to perform the biopsies. Only 1 from each transition zone, 2 from the usual sextant biopsy zones.    The patient tolerated the procedure well. He will finish up the antibiotics today and tomorrow. He will follow-up in 2 weeks for a discussion of the pathology results.     Nurse to reinsert 16 Yi bedoya

## 2022-08-23 LAB
FINAL PATHOLOGIC DIAGNOSIS: NORMAL
Lab: NORMAL

## 2022-08-25 ENCOUNTER — TELEPHONE (OUTPATIENT)
Dept: UROLOGY | Facility: CLINIC | Age: 77
End: 2022-08-25

## 2022-08-25 ENCOUNTER — PATIENT MESSAGE (OUTPATIENT)
Dept: UROLOGY | Facility: CLINIC | Age: 77
End: 2022-08-25

## 2022-08-25 ENCOUNTER — OFFICE VISIT (OUTPATIENT)
Dept: UROLOGY | Facility: CLINIC | Age: 77
End: 2022-08-25
Payer: MEDICARE

## 2022-08-25 VITALS
DIASTOLIC BLOOD PRESSURE: 83 MMHG | HEART RATE: 77 BPM | BODY MASS INDEX: 30.53 KG/M2 | SYSTOLIC BLOOD PRESSURE: 133 MMHG | WEIGHT: 231.38 LBS

## 2022-08-25 DIAGNOSIS — C61 MALIGNANT NEOPLASM OF PROSTATE: ICD-10-CM

## 2022-08-25 DIAGNOSIS — N39.0 URINARY TRACT INFECTION WITHOUT HEMATURIA, SITE UNSPECIFIED: Primary | ICD-10-CM

## 2022-08-25 PROCEDURE — 99215 OFFICE O/P EST HI 40 MIN: CPT | Mod: S$GLB,,, | Performed by: STUDENT IN AN ORGANIZED HEALTH CARE EDUCATION/TRAINING PROGRAM

## 2022-08-25 PROCEDURE — 3079F DIAST BP 80-89 MM HG: CPT | Mod: CPTII,S$GLB,, | Performed by: STUDENT IN AN ORGANIZED HEALTH CARE EDUCATION/TRAINING PROGRAM

## 2022-08-25 PROCEDURE — 1159F MED LIST DOCD IN RCRD: CPT | Mod: CPTII,S$GLB,, | Performed by: STUDENT IN AN ORGANIZED HEALTH CARE EDUCATION/TRAINING PROGRAM

## 2022-08-25 PROCEDURE — 99215 PR OFFICE/OUTPT VISIT, EST, LEVL V, 40-54 MIN: ICD-10-PCS | Mod: S$GLB,,, | Performed by: STUDENT IN AN ORGANIZED HEALTH CARE EDUCATION/TRAINING PROGRAM

## 2022-08-25 PROCEDURE — 1159F PR MEDICATION LIST DOCUMENTED IN MEDICAL RECORD: ICD-10-PCS | Mod: CPTII,S$GLB,, | Performed by: STUDENT IN AN ORGANIZED HEALTH CARE EDUCATION/TRAINING PROGRAM

## 2022-08-25 PROCEDURE — 1160F RVW MEDS BY RX/DR IN RCRD: CPT | Mod: CPTII,S$GLB,, | Performed by: STUDENT IN AN ORGANIZED HEALTH CARE EDUCATION/TRAINING PROGRAM

## 2022-08-25 PROCEDURE — 1101F PR PT FALLS ASSESS DOC 0-1 FALLS W/OUT INJ PAST YR: ICD-10-PCS | Mod: CPTII,S$GLB,, | Performed by: STUDENT IN AN ORGANIZED HEALTH CARE EDUCATION/TRAINING PROGRAM

## 2022-08-25 PROCEDURE — 3288F PR FALLS RISK ASSESSMENT DOCUMENTED: ICD-10-PCS | Mod: CPTII,S$GLB,, | Performed by: STUDENT IN AN ORGANIZED HEALTH CARE EDUCATION/TRAINING PROGRAM

## 2022-08-25 PROCEDURE — 3075F PR MOST RECENT SYSTOLIC BLOOD PRESS GE 130-139MM HG: ICD-10-PCS | Mod: CPTII,S$GLB,, | Performed by: STUDENT IN AN ORGANIZED HEALTH CARE EDUCATION/TRAINING PROGRAM

## 2022-08-25 PROCEDURE — 3075F SYST BP GE 130 - 139MM HG: CPT | Mod: CPTII,S$GLB,, | Performed by: STUDENT IN AN ORGANIZED HEALTH CARE EDUCATION/TRAINING PROGRAM

## 2022-08-25 PROCEDURE — 99499 UNLISTED E&M SERVICE: CPT | Mod: S$GLB,,, | Performed by: STUDENT IN AN ORGANIZED HEALTH CARE EDUCATION/TRAINING PROGRAM

## 2022-08-25 PROCEDURE — 3079F PR MOST RECENT DIASTOLIC BLOOD PRESSURE 80-89 MM HG: ICD-10-PCS | Mod: CPTII,S$GLB,, | Performed by: STUDENT IN AN ORGANIZED HEALTH CARE EDUCATION/TRAINING PROGRAM

## 2022-08-25 PROCEDURE — 99499 RISK ADDL DX/OHS AUDIT: ICD-10-PCS | Mod: S$GLB,,, | Performed by: STUDENT IN AN ORGANIZED HEALTH CARE EDUCATION/TRAINING PROGRAM

## 2022-08-25 PROCEDURE — 1101F PT FALLS ASSESS-DOCD LE1/YR: CPT | Mod: CPTII,S$GLB,, | Performed by: STUDENT IN AN ORGANIZED HEALTH CARE EDUCATION/TRAINING PROGRAM

## 2022-08-25 PROCEDURE — 1126F AMNT PAIN NOTED NONE PRSNT: CPT | Mod: CPTII,S$GLB,, | Performed by: STUDENT IN AN ORGANIZED HEALTH CARE EDUCATION/TRAINING PROGRAM

## 2022-08-25 PROCEDURE — 3288F FALL RISK ASSESSMENT DOCD: CPT | Mod: CPTII,S$GLB,, | Performed by: STUDENT IN AN ORGANIZED HEALTH CARE EDUCATION/TRAINING PROGRAM

## 2022-08-25 PROCEDURE — 1160F PR REVIEW ALL MEDS BY PRESCRIBER/CLIN PHARMACIST DOCUMENTED: ICD-10-PCS | Mod: CPTII,S$GLB,, | Performed by: STUDENT IN AN ORGANIZED HEALTH CARE EDUCATION/TRAINING PROGRAM

## 2022-08-25 PROCEDURE — 99999 PR PBB SHADOW E&M-EST. PATIENT-LVL V: ICD-10-PCS | Mod: PBBFAC,,, | Performed by: STUDENT IN AN ORGANIZED HEALTH CARE EDUCATION/TRAINING PROGRAM

## 2022-08-25 PROCEDURE — 99999 PR PBB SHADOW E&M-EST. PATIENT-LVL V: CPT | Mod: PBBFAC,,, | Performed by: STUDENT IN AN ORGANIZED HEALTH CARE EDUCATION/TRAINING PROGRAM

## 2022-08-25 PROCEDURE — 1126F PR PAIN SEVERITY QUANTIFIED, NO PAIN PRESENT: ICD-10-PCS | Mod: CPTII,S$GLB,, | Performed by: STUDENT IN AN ORGANIZED HEALTH CARE EDUCATION/TRAINING PROGRAM

## 2022-08-25 NOTE — TELEPHONE ENCOUNTER
Spoke with patient. He stated he forgot to mention:   Need RX for large urinary bags to be sent to [facility].    Also complains of bladder spasms, cloudy urine, and foul smell.  Last bedoya change was 8/11/22.  Please advise.

## 2022-08-25 NOTE — TELEPHONE ENCOUNTER
----- Message from Alicia Jade sent at 8/25/2022  4:11 PM CDT -----  Type:  Needs Medical Advice    Who Called: pt  Symptoms (please be specific):    How long has patient had these symptoms:    Pharmacy name and phone #:    Would the patient rather a call back or a response via [facility]? call  Best Call Back Number: [phone]  Additional Information: pt was there this afternoon and forgot to ask 2 questions

## 2022-08-25 NOTE — PROGRESS NOTES
"Subjective:       Patient ID: Noah Lopez is a 77 y.o. male.    Chief Complaint:  Prostate biopsy results  This is a 77 y.o.  male patient that is an established patient of mine.      The patient was referred to me by Dr. De La Torre for urinary retention. Dr. De La Torre obtained routine bloodwork in 12/2021. His Cr was elevated from baseline. He then ordered a renal US 1/4/22 - marked distention of bladder, no hydronephrosis bilaterally, enlarged prostate vol 197g, and pt presented to ER per his recommendation on for a bedoya placement.   ER visit 1/4/22 - 3600 cc of urine from his Bedoya. Was not started on medications.      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 He feels that his "sphincter" has relaxed more.     Started him on flomax and finasteride 1 week ago. Pt wanted a voiding trial this week.     He failed a voiding trial. Worked up with cysto and UDS.  Cysto -Trilobar hypertrophy of the prostate, mildly enlarged median lobe, significantly enlarged lateral lobes  UDs with Dr. Maloney on 1/31/22 - see his report for full details -   Pdet at Max Flow: 0 cmH2O    EMG Storage: Normal Recruitment             EMG Voiding: Flaring with valsalva     Impression:           Sensation:Normal     Capacity: Large     Compliance:Normal     Detrusor Overactivity:Absent     Continence: No Incontinence     Contractility: Hypocontractility     Emptying:Unsatisfactory - Hypocontractility     Coordination:Dysfunctional (Valsalva) Voiding      He was having a bad reaction to Bactrim sent by Dr. De La Torre. Did not finish course.  Pt was not interested in a surgery. He felt that he "did this to myself." and with the results of the urodynamics with no detrusor pressure and valsalva voiding, an outlet reducing procedure would have a low chance of success. The patient states he is mostly interested in bedoya management and he does not want a surgery at this time. "     3/24/22  Here today for bedoya assistance. Pt went to ER and did not know how to disconnect bag    6/13/22  Pt notes cloudy urine and suprapubic discomfort. Also constipated. PSA was 17 5/25/22 and checked 2 days after a bedoya exchange. Patient notes he does not really drink many fluids from 9am-9pm.     7/12/22  I treated last UTI with ceftin x 10 days. I also recommended that he start augmentin the day before, the day of and the day after bedoya exchanges to guard against infections. He informed me that he did not do that.     8/25/22  Had patient try to call his daughter Lorena to include her in the conversation. She did not , pt stated likely is working. I told the patient I will type up a detailed note so she can follow along remotely.     He is s/p TRUS bx on 8/11/22; TRUS bx 60.4g. PSA 15.8 7/5/22. Full path report below.  Prostate biopsy demonstrated prostate cancer - Baraga 4+3, grade group 3 present in 10/18 cores. Prostate biopsy cores were seen in all cores taken from left side.    RELIAPATH DIAGNOSIS:   A.   PROSTATE, LEFT APEX, NEEDLE BIOPSY:          HISTOLOGIC TYPE:  Acinar adenocarcinoma.          HISTOLOGIC GRADE:          Grade Group 3 (Vikash Score 4+3=7)            Percentage of Pattern 4:  70%          CRIBRIFORM GLANDS:  Present.          TUMOR QUANTITATION:            Number of Cores Positive:  2            Total Number of Cores:  2            Measurement of Tumor:  19 mm.            Measurement of Core Tissue:  31 mm.            Percentage of Prostatic Tissue Involved by Tumor:  60%          LYMPHOVASCULAR INVASION:  Not identified.          PERINEURAL INVASION:  Present.          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    PROSTATE, LEFT MID, NEEDLE BIOPSY:          HISTOLOGIC TYPE:  Acinar adenocarcinoma.          HISTOLOGIC GRADE:          Grade Group 3 (Baraga Score 4+3=7)            Percentage of Pattern 4:  70%           CRIBRIFORM GLANDS:  Present.          TUMOR QUANTITATION:            Number of Cores Positive:  2            Total Number of Cores:  2            Measurement of Tumor:  16 mm.            Measurement of Core Tissue:  24 mm.            Percentage of Prostatic Tissue Involved by Tumor:  70%          LYMPHOVASCULAR INVASION:  Not identified.          PERINEURAL INVASION:  Present.  (C61.)   C.    PROSTATE, LEFT BASE, NEEDLE BIOPSY:          HISTOLOGIC TYPE:  Acinar adenocarcinoma.          HISTOLOGIC GRADE:          Grade Group 3 (Vikash Score 4+3=7)            Percentage of Pattern 4:  90%          CRIBRIFORM GLANDS:  Present.          TUMOR QUANTITATION:            Number of Cores Positive:  4            Total Number of Cores:  4            Measurement of Tumor:  17 mm.            Measurement of Core Tissue:  27 mm.            Percentage of Prostatic Tissue Involved by Tumor:  80%          LYMPHOVASCULAR INVASION:  Not identified.          PERINEURAL INVASION:  Present.  (C61.)   D.   TISSUE1           ROSTATE, LEFT TRANSITION ZONE,  BIOPSY:          HISTOLOGIC TYPE:  Acinar adenocarcinoma.          HISTOLOGIC GRADE:          Grade Group 3 (Vikash Score 4+3=7)            Percentage of Pattern 4:  70%          CRIBRIFORM GLANDS:  Present.          TUMOR QUANTITATION:            Number of Cores Positive:  2            Total Number of Cores:  2            Measurement of Tumor:  7 mm.            Measurement of Core Tissue:  18 mm.            Percentage of Prostatic Tissue Involved by Tumor:  40%          LYMPHOVASCULAR INVASION:  Not identified.          PERINEURAL INVASION:  Not identified.  (C61.)   E.    PROSTATE, RIGHT APEX, NEEDLE BIOPSY:          Benign prostatic tissue with atrophy.  (R97.20)   F.   PROSTATE, RIGHT MID, NEEDLE BIOPSY:          Benign prostatic tissue with chronic inflammation and atrophy, see   comment.  (R97.20)   G.   PROSTATE, RIGHT BASE, NEEDLE BIOPSY:          Benign prostatic tissue with  chronic inflammation with atrophy.   (R97.20)   H.   TISSUE2          PROSTATE, RIGHT TRANSITION ZONE, BIOPSY:          -Benign prostatic tissue with atrophy.  (R97.20)       LAST PSA  Lab Results   Component Value Date    PSA 11.7 (H) 12/08/2021    PSADIAG 17.3 (H) 05/25/2022    PSATOTAL 15.8 (H) 07/05/2022    PSAFREE 2.83 (H) 07/05/2022       Lab Results   Component Value Date    CREATININE 1.3 07/06/2022       ---  Past Medical History:   Diagnosis Date    Colon polyp 09/06/2018    Hyperlipidemia     Hypertension        Past Surgical History:   Procedure Laterality Date    COLONOSCOPY  2010    COLONOSCOPY N/A 9/6/2018    Procedure: COLONOSCOPY Suprep PLEASE TEXT PATIENT WITH ARRIVAL TIME;  Surgeon: Niharika Arce MD;  Location: [facility];  Service: Endoscopy;  Laterality: N/A;    CYSTOSCOPY WITH URODYNAMIC TESTING N/A 1/31/2022    Procedure: CYSTOSCOPY, WITH URODYNAMIC TESTING FLOUROSCOPIC;  Surgeon: Anthony Maloney MD;  Location: [address];  Service: Urology;  Laterality: N/A;  1hr    KNEE ARTHROSCOPY      SPINE SURGERY      l-spine       Family History   Problem Relation Age of Onset    Hypertension Mother     Heart attack Father     Cirrhosis Father     No Known Problems Brother     Seizures Daughter     No Known Problems Son     No Known Problems Brother     Liver disease Daughter        Social History     Tobacco Use    Smoking status: Former Smoker    Smokeless tobacco: Never Used   Substance Use Topics    Alcohol use: Yes     Comment: occ    Drug use: Yes     Types: Marijuana       Current Outpatient Medications on File Prior to Visit   Medication Sig Dispense Refill    ascorbic acid, vitamin C, (VITAMIN C) 1000 MG tablet Take 1,000 mg by mouth once daily.      clotrimazole-betamethasone 1-0.05% (LOTRISONE) cream Apply topically 2 (two) times daily. 60 g 3    diltiaZEM (CARDIZEM CD) 300 MG 24 hr capsule TAKE 1 CAPSULE BY MOUTH  ONCE DAILY 90 capsule 3    diphenhydramine  HCl (BENADRYL ALLERGY ORAL) Take by mouth.      finasteride (PROSCAR) 5 mg tablet Take 1 tablet (5 mg total) by mouth once daily. 90 tablet 3    GAS RELIEF 80, SIMETHICONE, ORAL Take by mouth.      lisinopriL (PRINIVIL,ZESTRIL) 20 MG tablet TAKE 1 TABLET BY MOUTH ONCE DAILY 90 tablet 3    melatonin 10 mg TbDL Take by mouth.      multivitamin capsule Take 1 capsule by mouth once daily.      ofloxacin (FLOXIN) 0.3 % otic solution Apply 10 drops into right ear once a day for 10 days.  Please dispense generic.      omega-3 fatty acids/fish oil (FISH OIL-OMEGA-3 FATTY ACIDS) 300-1,000 mg capsule Take 1 capsule by mouth once daily.      simvastatin (ZOCOR) 20 MG tablet TAKE 1 TABLET BY MOUTH IN  THE EVENING 90 tablet 3    tamsulosin (FLOMAX) 0.4 mg Cap Take 1 capsule (0.4 mg total) by mouth once daily. 90 capsule 3    traZODone (DESYREL) 100 MG tablet TAKE 2 TABLETS BY MOUTH EVERY DAY AT BEDTIME AS NEEDED FOR INSOMNIA 180 tablet 3    TURMERIC ORAL Take by mouth.      wheat dextrin (BENEFIBER CLEAR SF, DEXTRIN, ORAL) Take by mouth.      cholecalciferol, vitamin D3, (VITAMIN D3 ORAL) Take 125 mcg by mouth.      docusate sodium (COLACE) 250 MG capsule Take 250 mg by mouth once daily.      triamterene-hydrochlorothiazide 37.5-25 mg (DYAZIDE) 37.5-25 mg per capsule TAKE 1 CAPSULE BY MOUTH IN  THE MORNING (Patient not taking: Reported on 8/25/2022) 90 capsule 3    zinc gluconate 50 mg tablet Take 50 mg by mouth once daily.       No current facility-administered medications on file prior to visit.       Review of patient's allergies indicates:   Allergen Reactions    Ciprofloxacin Other (See Comments)     tendonitis    Sulfa (sulfonamide antibiotics) Nausea And Vomiting       Review of Systems    Objective:      Physical Exam    Assessment:       1. Malignant neoplasm of prostate        Plan:         Today's visit was spent almost entirely on prostate cancer counseling. 30 minutes of counseling time was spent.   We reviewed his diagnosis, stage, grade, and prognosis.          We reviewed the [facility] nomograms.  Primary treatment outcomes:  His probability of cancer specific survival after radical prostatectomy is  94% at 15 years.  His progression free probability after radical prostatectomy is  40% at 5 years and   26% at 10 years.      Extent of disease probability:  His likelihood of organ confined disease is 15%, extracapsular extension is 83%, lymph node involvement is 26%, and seminal vesicle invasion is 29%.     We discussed the concept of risk stratification: low risk, intermediate risk, and high risk and very high risk disease. The patient falls into the category of unfavorable intermediate risk based off of clinical/pathologic features. He has a Vikahs 4+ 3 prostate cancer, Grade group 3 in 10/18 cores, all on left side.       Per the guidelines for unfavorable intermediate prostate cancer it is recommended a bone scan and CT scan be performed. He already had a PET CT scan for other reasons (5/16/22) and it did not detect any enlarged pelvic lymph nodes.   I will put in an order for a bone scan to rule out metastatic disease to the bones.     The patient as well as I am leaning towards offering a robotic assisted radical prostatectomy as treatment for his cancer. Removal of the prostate may also possibly get rid of his need for the catheter.  He has my surgery handout with full details and understands that it is major surgery. I believe working towards a radical prostatectomy would be a good treatment for his prostate cancer (given the above of high cancer specific survival). He understands based off of the progression free probabilities if he does have a recurrence of his prostate cancer either early or later on in life, he could undergo salvage radiation. He also understands that based off of the pathology report from the surgery if there is the presence of adverse features he could be offered adjuvant  radiation therapy. Given the above information from the prostate cancer [facility] nomograms, there is a high survival rate. He would like to work towards a robotic assisted radical prostatectomy. If no metastatic disease seen on MRI and bone scan, patient would like to aim for beginning of October 2022.   Will place a cardiology referral for preoperative clearance.   Patient states if no metastatic disease, no visit needed and he would like me to set up the surgery if that is the case.    Malignant neoplasm of prostate  -     Comprehensive Metabolic Panel; Future; Expected date: 08/25/2022  -     NM Bone Scan Whole Body; Future; Expected date: 08/25/2022  -     MRI Prostate W W/O Contrast; Future; Expected date: 08/25/2022  -     Ambulatory referral/consult to Cardiology; Future; Expected date: 09/01/2022

## 2022-08-26 RX ORDER — AMOXICILLIN AND CLAVULANATE POTASSIUM 875; 125 MG/1; MG/1
1 TABLET, FILM COATED ORAL EVERY 12 HOURS
Qty: 20 TABLET | Refills: 0 | Status: SHIPPED | OUTPATIENT
Start: 2022-08-26 | End: 2022-09-05

## 2022-08-26 NOTE — TELEPHONE ENCOUNTER
Patient informed and voiced understanding.  Nurse visit scheduled for bedoya exchange 9/6/22 at 1045 hours.  Will send script to [facility].

## 2022-08-30 ENCOUNTER — LAB VISIT (OUTPATIENT)
Dept: LAB | Facility: HOSPITAL | Age: 77
End: 2022-08-30
Attending: STUDENT IN AN ORGANIZED HEALTH CARE EDUCATION/TRAINING PROGRAM
Payer: MEDICARE

## 2022-08-30 DIAGNOSIS — C61 MALIGNANT NEOPLASM OF PROSTATE: ICD-10-CM

## 2022-08-30 LAB
ALBUMIN SERPL BCP-MCNC: 4.2 G/DL (ref 3.5–5.2)
ALP SERPL-CCNC: 96 U/L (ref 38–126)
ALT SERPL W/O P-5'-P-CCNC: 27 U/L (ref 10–44)
ANION GAP SERPL CALC-SCNC: 7 MMOL/L (ref 8–16)
AST SERPL-CCNC: 31 U/L (ref 15–46)
BILIRUB SERPL-MCNC: 0.5 MG/DL (ref 0.1–1)
CALCIUM SERPL-MCNC: 9.2 MG/DL (ref 8.7–10.5)
CHLORIDE SERPL-SCNC: 104 MMOL/L (ref 95–110)
CO2 SERPL-SCNC: 30 MMOL/L (ref 23–29)
CREAT SERPL-MCNC: 1.25 MG/DL (ref 0.5–1.4)
EST. GFR  (NO RACE VARIABLE): 59.7 ML/MIN/1.73 M^2
GLUCOSE SERPL-MCNC: 97 MG/DL (ref 70–110)
POTASSIUM SERPL-SCNC: 4.4 MMOL/L (ref 3.5–5.1)
PROT SERPL-MCNC: 7.4 G/DL (ref 6–8.4)
SODIUM SERPL-SCNC: 141 MMOL/L (ref 136–145)
UUN UR-MCNC: 16 MG/DL (ref 2–20)

## 2022-08-30 PROCEDURE — 80053 COMPREHEN METABOLIC PANEL: CPT | Mod: PO | Performed by: STUDENT IN AN ORGANIZED HEALTH CARE EDUCATION/TRAINING PROGRAM

## 2022-08-30 PROCEDURE — 36415 COLL VENOUS BLD VENIPUNCTURE: CPT | Mod: PO | Performed by: STUDENT IN AN ORGANIZED HEALTH CARE EDUCATION/TRAINING PROGRAM

## 2022-08-31 ENCOUNTER — HOSPITAL ENCOUNTER (OUTPATIENT)
Dept: RADIOLOGY | Facility: HOSPITAL | Age: 77
Discharge: HOME | End: 2022-08-31
Attending: STUDENT IN AN ORGANIZED HEALTH CARE EDUCATION/TRAINING PROGRAM
Payer: MEDICARE

## 2022-08-31 ENCOUNTER — PATIENT MESSAGE (OUTPATIENT)
Dept: UROLOGY | Facility: CLINIC | Age: 77
End: 2022-08-31
Payer: MEDICARE

## 2022-08-31 DIAGNOSIS — C61 MALIGNANT NEOPLASM OF PROSTATE: ICD-10-CM

## 2022-08-31 PROCEDURE — 78306 BONE IMAGING WHOLE BODY: CPT | Mod: TC,PO

## 2022-08-31 PROCEDURE — A9503 TC99M MEDRONATE: HCPCS | Mod: PO

## 2022-09-01 ENCOUNTER — TELEPHONE (OUTPATIENT)
Dept: UROLOGY | Facility: CLINIC | Age: 77
End: 2022-09-01
Payer: MEDICARE

## 2022-09-01 DIAGNOSIS — R94.8 ABNORMAL RADIONUCLIDE BONE SCAN: ICD-10-CM

## 2022-09-01 DIAGNOSIS — C61 PROSTATE CANCER: Primary | ICD-10-CM

## 2022-09-02 ENCOUNTER — TELEPHONE (OUTPATIENT)
Dept: UROLOGY | Facility: CLINIC | Age: 77
End: 2022-09-02
Payer: MEDICARE

## 2022-09-02 DIAGNOSIS — C61 PROSTATE CANCER: Primary | ICD-10-CM

## 2022-09-02 DIAGNOSIS — R33.9 URINARY RETENTION: ICD-10-CM

## 2022-09-02 RX ORDER — SODIUM CHLORIDE 9 MG/ML
INJECTION, SOLUTION INTRAVENOUS CONTINUOUS
Status: CANCELLED | OUTPATIENT
Start: 2022-09-02

## 2022-09-02 RX ORDER — CEFAZOLIN SODIUM 2 G/50ML
2 SOLUTION INTRAVENOUS
Status: CANCELLED | OUTPATIENT
Start: 2022-09-02

## 2022-09-02 NOTE — PROGRESS NOTES
RARP 10/26/22  Can change to TURP if MRI imaging demonstrated RARP not able to be accomplished.   Being scheduled for bone biopsy, per the patient's discussion with his daughter, even if metastatic disease they would like to proceed with RARP.

## 2022-09-02 NOTE — TELEPHONE ENCOUNTER
Call patient no answer, and voice mail is full couldn't leave a message, will try to call patient later.

## 2022-09-02 NOTE — TELEPHONE ENCOUNTER
----- Message from Helena Luis MD sent at 9/2/2022 10:01 AM CDT -----  Please let pt know that his daughter Lorena had requested a time frame for me to schedule his surgery so I am going to put in a case request for 10/26/22 which is within that time frame.

## 2022-09-06 ENCOUNTER — CLINICAL SUPPORT (OUTPATIENT)
Dept: UROLOGY | Facility: CLINIC | Age: 77
End: 2022-09-06
Payer: MEDICARE

## 2022-09-06 VITALS
SYSTOLIC BLOOD PRESSURE: 122 MMHG | HEART RATE: 80 BPM | DIASTOLIC BLOOD PRESSURE: 71 MMHG | HEIGHT: 73 IN | WEIGHT: 233.44 LBS | BODY MASS INDEX: 30.94 KG/M2

## 2022-09-06 PROCEDURE — 99999 PR PBB SHADOW E&M-EST. PATIENT-LVL IV: CPT | Mod: PBBFAC,,,

## 2022-09-06 PROCEDURE — 99999 PR PBB SHADOW E&M-EST. PATIENT-LVL IV: ICD-10-PCS | Mod: PBBFAC,,,

## 2022-09-07 NOTE — PROGRESS NOTES
Late entry.  Bedoya exchange.  Under sterile technique, 16 Fr bedoya inserted into urinary bladder, balloon inflated with 10 cc of NS.  No urine output.  Patient informed me he didn't drink liquids prior to appointment.  Monitored and ultimately yellow urine drops expelled from catheter tube.  Connected to plug per patient request.  Leg bag and large bag given.  Encouraged to drink liquids.  Patient tolerated well.  Will contact office if needed.

## 2022-09-12 ENCOUNTER — HOSPITAL ENCOUNTER (EMERGENCY)
Facility: HOSPITAL | Age: 77
Discharge: HOME | End: 2022-09-12
Attending: EMERGENCY MEDICINE
Payer: MEDICARE

## 2022-09-12 ENCOUNTER — TELEPHONE (OUTPATIENT)
Dept: UROLOGY | Facility: CLINIC | Age: 77
End: 2022-09-12
Payer: MEDICARE

## 2022-09-12 VITALS
TEMPERATURE: 98 F | SYSTOLIC BLOOD PRESSURE: 140 MMHG | DIASTOLIC BLOOD PRESSURE: 64 MMHG | OXYGEN SATURATION: 98 % | BODY MASS INDEX: 32.62 KG/M2 | RESPIRATION RATE: 18 BRPM | WEIGHT: 233 LBS | HEART RATE: 95 BPM | HEIGHT: 71 IN

## 2022-09-12 DIAGNOSIS — N39.0 URINARY TRACT INFECTION ASSOCIATED WITH INDWELLING URETHRAL CATHETER, SUBSEQUENT ENCOUNTER: Primary | ICD-10-CM

## 2022-09-12 DIAGNOSIS — R53.1 WEAKNESS: ICD-10-CM

## 2022-09-12 DIAGNOSIS — R30.0 DYSURIA: Primary | ICD-10-CM

## 2022-09-12 DIAGNOSIS — T83.511D URINARY TRACT INFECTION ASSOCIATED WITH INDWELLING URETHRAL CATHETER, SUBSEQUENT ENCOUNTER: Primary | ICD-10-CM

## 2022-09-12 LAB
ALBUMIN SERPL BCP-MCNC: 3.9 G/DL (ref 3.5–5.2)
ALP SERPL-CCNC: 96 U/L (ref 38–126)
ALT SERPL W/O P-5'-P-CCNC: 55 U/L (ref 10–44)
ANION GAP SERPL CALC-SCNC: 10 MMOL/L (ref 8–16)
AST SERPL-CCNC: 72 U/L (ref 15–46)
BACTERIA #/AREA URNS AUTO: ABNORMAL /HPF
BASOPHILS # BLD AUTO: 0.03 K/UL (ref 0–0.2)
BASOPHILS NFR BLD: 0.5 % (ref 0–1.9)
BILIRUB SERPL-MCNC: 0.5 MG/DL (ref 0.1–1)
BILIRUB UR QL STRIP: NEGATIVE
CALCIUM SERPL-MCNC: 8.5 MG/DL (ref 8.7–10.5)
CHLORIDE SERPL-SCNC: 101 MMOL/L (ref 95–110)
CLARITY UR REFRACT.AUTO: ABNORMAL
CO2 SERPL-SCNC: 29 MMOL/L (ref 23–29)
COLOR UR AUTO: YELLOW
CREAT SERPL-MCNC: 1.31 MG/DL (ref 0.5–1.4)
DIFFERENTIAL METHOD: ABNORMAL
EOSINOPHIL # BLD AUTO: 0.1 K/UL (ref 0–0.5)
EOSINOPHIL NFR BLD: 0.8 % (ref 0–8)
ERYTHROCYTE [DISTWIDTH] IN BLOOD BY AUTOMATED COUNT: 13.4 % (ref 11.5–14.5)
EST. GFR  (NO RACE VARIABLE): 56.4 ML/MIN/1.73 M^2
GLUCOSE SERPL-MCNC: 140 MG/DL (ref 70–110)
GLUCOSE UR QL STRIP: NEGATIVE
HCT VFR BLD AUTO: 45.6 % (ref 40–54)
HGB BLD-MCNC: 15.4 G/DL (ref 14–18)
HGB UR QL STRIP: ABNORMAL
HYALINE CASTS UR QL AUTO: 0 /LPF
IMM GRANULOCYTES # BLD AUTO: 0.02 K/UL (ref 0–0.04)
IMM GRANULOCYTES NFR BLD AUTO: 0.3 % (ref 0–0.5)
INFLUENZA A, MOLECULAR: NEGATIVE
INFLUENZA B, MOLECULAR: NEGATIVE
KETONES UR QL STRIP: NEGATIVE
LACTATE SERPL-SCNC: 0.9 MMOL/L (ref 0.5–2.2)
LEUKOCYTE ESTERASE UR QL STRIP: ABNORMAL
LYMPHOCYTES # BLD AUTO: 0.8 K/UL (ref 1–4.8)
LYMPHOCYTES NFR BLD: 13 % (ref 18–48)
MCH RBC QN AUTO: 31.2 PG (ref 27–31)
MCHC RBC AUTO-ENTMCNC: 33.8 G/DL (ref 32–36)
MCV RBC AUTO: 92 FL (ref 82–98)
MICROSCOPIC COMMENT: ABNORMAL
MONOCYTES # BLD AUTO: 0.7 K/UL (ref 0.3–1)
MONOCYTES NFR BLD: 11.3 % (ref 4–15)
NEUTROPHILS # BLD AUTO: 4.6 K/UL (ref 1.8–7.7)
NEUTROPHILS NFR BLD: 74.1 % (ref 38–73)
NITRITE UR QL STRIP: NEGATIVE
NRBC BLD-RTO: 0 /100 WBC
PH UR STRIP: 6 [PH] (ref 5–8)
PLATELET # BLD AUTO: 247 K/UL (ref 150–450)
PMV BLD AUTO: 9.3 FL (ref 9.2–12.9)
POTASSIUM SERPL-SCNC: 3.8 MMOL/L (ref 3.5–5.1)
PROT SERPL-MCNC: 7.2 G/DL (ref 6–8.4)
PROT UR QL STRIP: ABNORMAL
RBC # BLD AUTO: 4.94 M/UL (ref 4.6–6.2)
RBC #/AREA URNS AUTO: 5 /HPF (ref 0–4)
SARS-COV-2 RDRP RESP QL NAA+PROBE: NEGATIVE
SODIUM SERPL-SCNC: 140 MMOL/L (ref 136–145)
SP GR UR STRIP: 1.02 (ref 1–1.03)
SPECIMEN SOURCE: NORMAL
URN SPEC COLLECT METH UR: ABNORMAL
UROBILINOGEN UR STRIP-ACNC: 1 EU/DL
UUN UR-MCNC: 18 MG/DL (ref 2–20)
WBC # BLD AUTO: 6.21 K/UL (ref 3.9–12.7)
WBC #/AREA URNS AUTO: 50 /HPF (ref 0–5)

## 2022-09-12 PROCEDURE — 99285 EMERGENCY DEPT VISIT HI MDM: CPT | Mod: 25,ER

## 2022-09-12 PROCEDURE — 93010 EKG 12-LEAD: ICD-10-PCS | Mod: ,,, | Performed by: INTERNAL MEDICINE

## 2022-09-12 PROCEDURE — 81000 URINALYSIS NONAUTO W/SCOPE: CPT | Mod: ER | Performed by: EMERGENCY MEDICINE

## 2022-09-12 PROCEDURE — 93010 ELECTROCARDIOGRAM REPORT: CPT | Mod: ,,, | Performed by: INTERNAL MEDICINE

## 2022-09-12 PROCEDURE — 87088 URINE BACTERIA CULTURE: CPT | Mod: ER | Performed by: EMERGENCY MEDICINE

## 2022-09-12 PROCEDURE — 83605 ASSAY OF LACTIC ACID: CPT | Mod: ER | Performed by: EMERGENCY MEDICINE

## 2022-09-12 PROCEDURE — 99900035 HC TECH TIME PER 15 MIN (STAT): Mod: ER

## 2022-09-12 PROCEDURE — 87502 INFLUENZA DNA AMP PROBE: CPT | Mod: ER

## 2022-09-12 PROCEDURE — 87186 SC STD MICRODIL/AGAR DIL: CPT | Mod: ER | Performed by: EMERGENCY MEDICINE

## 2022-09-12 PROCEDURE — 96365 THER/PROPH/DIAG IV INF INIT: CPT | Mod: ER

## 2022-09-12 PROCEDURE — 94760 N-INVAS EAR/PLS OXIMETRY 1: CPT | Mod: ER

## 2022-09-12 PROCEDURE — 96361 HYDRATE IV INFUSION ADD-ON: CPT | Mod: ER

## 2022-09-12 PROCEDURE — 87077 CULTURE AEROBIC IDENTIFY: CPT | Mod: ER | Performed by: EMERGENCY MEDICINE

## 2022-09-12 PROCEDURE — U0002 COVID-19 LAB TEST NON-CDC: HCPCS | Mod: ER | Performed by: EMERGENCY MEDICINE

## 2022-09-12 PROCEDURE — 87086 URINE CULTURE/COLONY COUNT: CPT | Mod: ER | Performed by: EMERGENCY MEDICINE

## 2022-09-12 PROCEDURE — 87502 INFLUENZA DNA AMP PROBE: CPT | Mod: ER | Performed by: EMERGENCY MEDICINE

## 2022-09-12 PROCEDURE — 80053 COMPREHEN METABOLIC PANEL: CPT | Mod: ER | Performed by: EMERGENCY MEDICINE

## 2022-09-12 PROCEDURE — 63600175 PHARM REV CODE 636 W HCPCS: Mod: ER | Performed by: EMERGENCY MEDICINE

## 2022-09-12 PROCEDURE — 93005 ELECTROCARDIOGRAM TRACING: CPT | Mod: ER

## 2022-09-12 PROCEDURE — 85025 COMPLETE CBC W/AUTO DIFF WBC: CPT | Mod: ER | Performed by: EMERGENCY MEDICINE

## 2022-09-12 PROCEDURE — 87040 BLOOD CULTURE FOR BACTERIA: CPT | Mod: ER | Performed by: EMERGENCY MEDICINE

## 2022-09-12 RX ORDER — CEPHALEXIN 500 MG/1
500 CAPSULE ORAL EVERY 8 HOURS
Qty: 42 CAPSULE | Refills: 0 | Status: SHIPPED | OUTPATIENT
Start: 2022-09-12 | End: 2022-09-26

## 2022-09-12 RX ORDER — AMOXICILLIN AND CLAVULANATE POTASSIUM 875; 125 MG/1; MG/1
1 TABLET, FILM COATED ORAL EVERY 12 HOURS
Qty: 20 TABLET | Refills: 0 | Status: SHIPPED | OUTPATIENT
Start: 2022-09-12 | End: 2022-09-22

## 2022-09-12 RX ORDER — CEFAZOLIN SODIUM 2 G/50ML
2 SOLUTION INTRAVENOUS
Status: COMPLETED | OUTPATIENT
Start: 2022-09-12 | End: 2022-09-12

## 2022-09-12 RX ORDER — CEPHALEXIN 500 MG/1
500 CAPSULE ORAL EVERY 8 HOURS
Qty: 42 CAPSULE | Refills: 0 | Status: SHIPPED | OUTPATIENT
Start: 2022-09-12 | End: 2022-09-12 | Stop reason: SDUPTHER

## 2022-09-12 RX ADMIN — CEFAZOLIN SODIUM 2 G: 2 SOLUTION INTRAVENOUS at 06:09

## 2022-09-12 RX ADMIN — SODIUM CHLORIDE, SODIUM LACTATE, POTASSIUM CHLORIDE, AND CALCIUM CHLORIDE 2259 ML: .6; .31; .03; .02 INJECTION, SOLUTION INTRAVENOUS at 06:09

## 2022-09-12 NOTE — TELEPHONE ENCOUNTER
----- Message from Ruth Whitfield sent at 9/12/2022  8:52 AM CDT -----  Needs advice from nurse:      Who Called:pt  Regarding:patient has another bladder infection and needs antibiotics  Would the patient rather a call back or [facility]IA [facility]?  Best Call Back number:[phone]  Additional Info:[facility] - [place], [place] - [address] (Ph: [phone])

## 2022-09-12 NOTE — TELEPHONE ENCOUNTER
Contacted patient, on his way to ED due to fever greater than 101.4, spiked to 102, then down to low grade at 99.  Cloudy, murky urine.  Patient apologized as he stated he started experiencing the cloudiness and feeling that he may have had a UTI prior to bedoya exchange (thanked him for being honest) Although Dr Luis sent Augmentin prescription, agreed with his decision.  Nurse visit to be scheduled for bedoya exchange (pending ED visit today) and MD visit for pre-op later in October.  Patient voiced understanding.

## 2022-09-12 NOTE — ED PROVIDER NOTES
"Encounter Date: 9/12/2022       History     Chief Complaint   Patient presents with    Dizziness     Pt states has been running fever since yesterday and had "swaying" from side to side.  States "I have a bladder infection."  Pt states currently has bedoya catheter in place.  Pt states generalized weakness, denies weakness to extremities.       77-year-old male history hypertension hyperlipidemia chronic indwelling Bedoya with recurrent UTIs presents for suspected UTI.  Patient states he has had thhick cloudy malodorous urine for at least a week, also experiencing disequilibrium balance issues.  Febrile with T-max of 102° today.  No back pain.  No nausea vomiting.  No cough shortness of breath or sore throat.    The history is provided by the patient and medical records.   Review of patient's allergies indicates:   Allergen Reactions    Ciprofloxacin Other (See Comments)     tendonitis    Sulfa (sulfonamide antibiotics) Nausea And Vomiting     Past Medical History:   Diagnosis Date    Colon polyp 09/06/2018    Hyperlipidemia     Hypertension      Past Surgical History:   Procedure Laterality Date    COLONOSCOPY  2010    COLONOSCOPY N/A 9/6/2018    Procedure: COLONOSCOPY Suprep PLEASE TEXT PATIENT WITH ARRIVAL TIME;  Surgeon: Niharika Arce MD;  Location: [facility];  Service: Endoscopy;  Laterality: N/A;    CYSTOSCOPY WITH URODYNAMIC TESTING N/A 1/31/2022    Procedure: CYSTOSCOPY, WITH URODYNAMIC TESTING FLOUROSCOPIC;  Surgeon: Anthony Maloney MD;  Location: [facility] OR [address] [place] [address];  Service: Urology;  Laterality: N/A;  1hr    KNEE ARTHROSCOPY      SPINE SURGERY      l-spine     Family History   Problem Relation Age of Onset    Hypertension Mother     Heart attack Father     Cirrhosis Father     No Known Problems Brother     Seizures Daughter     No Known Problems Son     No Known Problems Brother     Liver disease Daughter      Social History     Tobacco Use    Smoking status: Former    Smokeless tobacco: Never "   Substance Use Topics    Alcohol use: Yes     Comment: occ    Drug use: Yes     Types: Marijuana     Review of Systems   Constitutional:  Positive for fever. Negative for chills.   HENT:  Negative for congestion, rhinorrhea and sore throat.    Eyes:  Negative for visual disturbance.   Respiratory:  Negative for cough and shortness of breath.    Cardiovascular:  Negative for chest pain.   Gastrointestinal:  Negative for abdominal pain, diarrhea, nausea and vomiting.   Genitourinary:  Negative for dysuria and hematuria.   Musculoskeletal:  Negative for back pain and myalgias.   Skin:  Negative for pallor and rash.   Neurological:  Positive for dizziness. Negative for weakness.   All other systems reviewed and are negative.    Physical Exam     Initial Vitals [09/12/22 1654]   BP Pulse Resp Temp SpO2   (!) 140/64 95 18 98.3 °F (36.8 °C) 96 %      MAP       --         Physical Exam    Nursing note and vitals reviewed.  Constitutional: He appears well-developed and well-nourished. No distress.   HENT:   Head: Normocephalic and atraumatic.   Eyes: Conjunctivae and EOM are normal. Pupils are equal, round, and reactive to light.   Neck: Neck supple.   Normal range of motion.  Cardiovascular:  Normal rate, regular rhythm and intact distal pulses.           Distal pulses equal bilaterally   Pulmonary/Chest: Breath sounds normal. No stridor. No respiratory distress.   Abdominal: Abdomen is soft. He exhibits no distension. There is abdominal tenderness.   Mild suprapubic tenderness   Musculoskeletal:         General: Normal range of motion.      Cervical back: Normal range of motion and neck supple.      Comments: Moving all extremities with grossly normal strength     Neurological: He is alert and oriented to person, place, and time.   CN 2-12 appear grossly intact.  Normal finger to nose and heel to shin bilaterally   Skin: Skin is warm and dry.   Psychiatric: He has a normal mood and affect.       ED Course    Procedures  Labs Reviewed   CBC W/ AUTO DIFFERENTIAL - Abnormal; Notable for the following components:       Result Value    MCH 31.2 (*)     Lymph # 0.8 (*)     Gran % 74.1 (*)     Lymph % 13.0 (*)     All other components within normal limits   COMPREHENSIVE METABOLIC PANEL - Abnormal; Notable for the following components:    Glucose 140 (*)     Calcium 8.5 (*)     AST 72 (*)     ALT 55 (*)     eGFR 56.4 (*)     All other components within normal limits   URINALYSIS, REFLEX TO URINE CULTURE - Abnormal; Notable for the following components:    Appearance, UA Cloudy (*)     Protein, UA 2+ (*)     Occult Blood UA 2+ (*)     Leukocytes, UA 3+ (*)     All other components within normal limits    Narrative:     Preferred Collection Type->Urine, Clean Catch  Specimen Source->Urine  Collection Type->Urine, Catheterized   URINALYSIS MICROSCOPIC - Abnormal; Notable for the following components:    RBC, UA 5 (*)     WBC, UA 50 (*)     Bacteria Moderate (*)     All other components within normal limits    Narrative:     Preferred Collection Type->Urine, Clean Catch  Specimen Source->Urine  Collection Type->Urine, Catheterized   INFLUENZA A & B BY MOLECULAR   CULTURE, BLOOD   CULTURE, BLOOD   CULTURE, URINE   LACTIC ACID, PLASMA   SARS-COV-2 RNA AMPLIFICATION, QUAL    Narrative:     Is the patient symptomatic?->Yes   LACTIC ACID, PLASMA     EKG Readings: (Independently Interpreted)   Initial Reading: No STEMI. Rhythm: Normal Sinus Rhythm. Heart Rate: 90. Ectopy: No Ectopy. Conduction: Normal. ST Segments: Normal ST Segments. T Waves: Normal. Axis: Normal. Clinical Impression: Normal Sinus Rhythm     Imaging Results              CT Head Without Contrast (Final result)  Result time 09/12/22 19:14:48      Final result by Ricky Zaldivar MD (09/12/22 19:14:48)                   Impression:      Chronic microvascular ischemic changes. If there is additional clinical concern for acute infarct, MRI with diffusion weighted imaging  recommended.    All CT scans at this facility use dose modulation, iterative reconstruction, and/or weight based dosing when appropriate to reduce radiation dose to as low as reasonable achievable.      Electronically signed by: Ricky Zaldivar MD  Date:    09/12/2022  Time:    19:14               Narrative:    EXAMINATION:  CT HEAD WITHOUT CONTRAST    CLINICAL HISTORY:  Dizziness, persistent/recurrent, cardiac or vascular cause suspected;    TECHNIQUE:  Low dose axial CT images obtained throughout the head without intravenous contrast. Sagittal and coronal reconstructions were performed.    COMPARISON:  03/28/2022    FINDINGS:  Intracranial compartment:    The brain parenchyma demonstrates areas of decreased attenuation with mild to moderate periventricular white matter consistent with chronic microvascular ischemic changes..  No parenchymal mass, hemorrhage, edema or major vascular distribution infarct.  Vascular calcifications are noted.    Mild prominence of the sulci and ventricles are consistent with age-related involutional changes.    No extra-axial blood or fluid collections.    Skull/extracranial contents (limited evaluation): No fracture.  Polyp or retention cyst with mucosal thickening inferior maxillary antra.  Mastoid air cells and paranasal sinuses are essentially clear.                                       X-Ray Chest AP Portable (Final result)  Result time 09/12/22 19:12:36      Final result by Ricky Zaldivar MD (09/12/22 19:12:36)                   Impression:      No acute process seen.      Electronically signed by: Ricky Zaldivar MD  Date:    09/12/2022  Time:    19:12               Narrative:    EXAMINATION:  XR CHEST AP PORTABLE    CLINICAL HISTORY:  Sepsis;    FINDINGS:  Single view of the chest.  No comparison    Cardiac silhouette is normal.  The lungs demonstrate no evidence of active disease.  No evidence of pleural effusion or pneumothorax.  Bones appear intact.  Aorta demonstrates  atherosclerotic disease.                                    X-Rays:   Independently Interpreted Readings:   Other Readings:  Imaging Results          CT Head Without Contrast (Final result)  Result time 09/12/22 19:14:48    Final result by Ricky Zaldivar MD (09/12/22 19:14:48)                 Impression:      Chronic microvascular ischemic changes. If there is additional clinical   concern for acute infarct, MRI with diffusion weighted imaging   recommended.    All CT scans at this facility use dose modulation, iterative   reconstruction, and/or weight based dosing when appropriate to reduce   radiation dose to as low as reasonable achievable.      Electronically signed by: Ricky Zaldivar MD  Date:    09/12/2022  Time:    19:14             Narrative:    EXAMINATION:  CT HEAD WITHOUT CONTRAST    CLINICAL HISTORY:  Dizziness, persistent/recurrent, cardiac or vascular cause suspected;    TECHNIQUE:  Low dose axial CT images obtained throughout the head without intravenous   contrast. Sagittal and coronal reconstructions were performed.    COMPARISON:  03/28/2022    FINDINGS:  Intracranial compartment:    The brain parenchyma demonstrates areas of decreased attenuation with mild   to moderate periventricular white matter consistent with chronic   microvascular ischemic changes..  No parenchymal mass, hemorrhage, edema   or major vascular distribution infarct.  Vascular calcifications are   noted.    Mild prominence of the sulci and ventricles are consistent with   age-related involutional changes.    No extra-axial blood or fluid collections.    Skull/extracranial contents (limited evaluation): No fracture.  Polyp or   retention cyst with mucosal thickening inferior maxillary antra.  Mastoid   air cells and paranasal sinuses are essentially clear.                               X-Ray Chest AP Portable (Final result)  Result time 09/12/22 19:12:36    Final result by Ricky Zaldivar MD (09/12/22 19:12:36)                  Impression:      No acute process seen.      Electronically signed by: Ricky Zaldivar MD  Date:    09/12/2022  Time:    19:12             Narrative:    EXAMINATION:  XR CHEST AP PORTABLE    CLINICAL HISTORY:  Sepsis;    FINDINGS:  Single view of the chest.  No comparison    Cardiac silhouette is normal.  The lungs demonstrate no evidence of active   disease.  No evidence of pleural effusion or pneumothorax.  Bones appear   intact.  Aorta demonstrates atherosclerotic disease.                              Medications   lactated ringers bolus 2,259 mL (2,259 mLs Intravenous New Bag 9/12/22 1818)   cefazolin (ANCEF) 2 gram in dextrose 5% 50 mL IVPB (premix) (2 g Intravenous New Bag 9/12/22 1826)     Medical Decision Making:   History:   Old Medical Records: I decided to obtain old medical records.  Old Records Summarized: other records.       <> Summary of Records: Recent urine culture from July of this year shows near pan-sensitive Klebsiella.  Initial Assessment:   Well-appearing nontoxic normal vitals  Differential Diagnosis:   UTI, sepsis.  Disequilibrium likely due to infectious process.  Normal neuro exam.  Low suspicion for central cause  Clinical Tests:   Lab Tests: Ordered and Reviewed  The following lab test(s) were unremarkable: CBC and CMP       <> Summary of Lab: Urinalysis suspicious for UTI although likely chronically colonized  Radiological Study: Ordered and Reviewed  Medical Tests: Ordered and Reviewed  ED Management:  Patient's recent micro shows sensitivity to Ancef.  Patient received 2 g IV.  Vital signs stable no evidence of sepsis.  Patient ambulatory without difficulty or assistance.  No evidence of ataxia.  CT negative for stroke or mass.  Patient concerned about mildly elevated LFTs.  No right upper quadrant pain.  I possibly elevated due to his UTI or due to dietary or lifestyle factors.  No concern for cholecystitis or other emergent biliary pathology.  Recommend follow-up with PCP for repeat  labs and possible right upper quadrant ultrasound as needed.  Patient instructed to follow-up in the next few days with his urologist for culture results and further management.                        Clinical Impression:   Final diagnoses:  [R53.1] Weakness  [T83.511D, N39.0] Urinary tract infection associated with indwelling urethral catheter, subsequent encounter (Primary)        ED Disposition Condition    Discharge Stable          ED Prescriptions       Medication Sig Dispense Start Date End Date Auth. Provider    cephALEXin (KEFLEX) 500 MG capsule Take 1 capsule (500 mg total) by mouth every 8 (eight) hours. for 14 days 42 capsule 9/12/2022 9/26/2022 Wilian Manley,           Follow-up Information       Follow up With Specialties Details Why Contact Info    Helena Luis MD Urology Call in 1 day  [address] [facility]  [address]  [facility] [address] [phone]      [facility] - Emergency Dept Emergency Medicine  If symptoms worsen [address] [facility] [address] [phone]             Wilian Manley DO  09/12/22 1933

## 2022-09-15 LAB — BACTERIA UR CULT: ABNORMAL

## 2022-09-17 LAB
BACTERIA BLD CULT: NORMAL
BACTERIA BLD CULT: NORMAL

## 2022-09-26 ENCOUNTER — TELEPHONE (OUTPATIENT)
Dept: INTERVENTIONAL RADIOLOGY/VASCULAR | Facility: HOSPITAL | Age: 77
End: 2022-09-26
Payer: MEDICARE

## 2022-09-26 NOTE — NURSING
Called patient to discuss scheduled procedure today. He was unaware of the appointment and will need to reschedule. IR  will reachout to patient for new appointment time.

## 2022-09-29 ENCOUNTER — HOSPITAL ENCOUNTER (OUTPATIENT)
Dept: RADIOLOGY | Facility: HOSPITAL | Age: 77
Discharge: HOME | End: 2022-09-29
Attending: STUDENT IN AN ORGANIZED HEALTH CARE EDUCATION/TRAINING PROGRAM
Payer: MEDICARE

## 2022-09-29 DIAGNOSIS — C61 MALIGNANT NEOPLASM OF PROSTATE: ICD-10-CM

## 2022-09-29 PROCEDURE — 72197 MRI PELVIS W/O & W/DYE: CPT | Mod: TC

## 2022-09-29 PROCEDURE — 72197 MRI PELVIS W/O & W/DYE: CPT | Mod: 26,,, | Performed by: RADIOLOGY

## 2022-09-29 PROCEDURE — A9585 GADOBUTROL INJECTION: HCPCS | Performed by: STUDENT IN AN ORGANIZED HEALTH CARE EDUCATION/TRAINING PROGRAM

## 2022-09-29 PROCEDURE — 72197 MRI PROSTATE W W/O CONTRAST: ICD-10-PCS | Mod: 26,,, | Performed by: RADIOLOGY

## 2022-09-29 PROCEDURE — 25500020 PHARM REV CODE 255: Performed by: STUDENT IN AN ORGANIZED HEALTH CARE EDUCATION/TRAINING PROGRAM

## 2022-09-29 RX ORDER — GADOBUTROL 604.72 MG/ML
10 INJECTION INTRAVENOUS
Status: COMPLETED | OUTPATIENT
Start: 2022-09-29 | End: 2022-09-29

## 2022-09-29 RX ADMIN — GADOBUTROL 10 ML: 604.72 INJECTION INTRAVENOUS at 04:09

## 2022-09-30 ENCOUNTER — PATIENT MESSAGE (OUTPATIENT)
Dept: UROLOGY | Facility: CLINIC | Age: 77
End: 2022-09-30
Payer: MEDICARE

## 2022-09-30 DIAGNOSIS — C61 PROSTATE CANCER: Primary | ICD-10-CM

## 2022-10-03 ENCOUNTER — TELEPHONE (OUTPATIENT)
Dept: UROLOGY | Facility: CLINIC | Age: 77
End: 2022-10-03
Payer: MEDICARE

## 2022-10-03 DIAGNOSIS — R33.9 URINARY RETENTION: Primary | ICD-10-CM

## 2022-10-03 DIAGNOSIS — C61 PROSTATE CANCER: ICD-10-CM

## 2022-10-03 RX ORDER — SODIUM CHLORIDE 9 MG/ML
INJECTION, SOLUTION INTRAVENOUS CONTINUOUS
Status: CANCELLED | OUTPATIENT
Start: 2022-10-03

## 2022-10-03 RX ORDER — CEFAZOLIN SODIUM 2 G/50ML
2 SOLUTION INTRAVENOUS
Status: CANCELLED | OUTPATIENT
Start: 2022-10-03

## 2022-10-03 NOTE — TELEPHONE ENCOUNTER
Pt with bone scan and MRI demonstrating findings very suspicious for metastatic disease to pubic bone. Pt would like to cancel bone biopsy and just go ahead and treat like it is metastatic disease. Agree with that.

## 2022-10-03 NOTE — TELEPHONE ENCOUNTER
----- Message from Mina Clarke sent at 10/3/2022  8:54 AM CDT -----  Contact: pt  .Type:  Needs Medical Advice    Who Called: pt  Would the patient rather a call back or a response via [facility]? Call back  Best Call Back Number: [phone]  Additional Information: Pt is requesting a call back from Dr. Luis regarding his upcoming bone biospy  appt.

## 2022-10-03 NOTE — PROGRESS NOTES
Due to MRI findings of locally advanced disease, will change to a TURP on 10/26/22 instead of RARP.

## 2022-10-04 ENCOUNTER — OFFICE VISIT (OUTPATIENT)
Dept: CARDIOLOGY | Facility: CLINIC | Age: 77
End: 2022-10-04
Payer: MEDICARE

## 2022-10-04 VITALS
SYSTOLIC BLOOD PRESSURE: 132 MMHG | BODY MASS INDEX: 31.67 KG/M2 | OXYGEN SATURATION: 96 % | HEIGHT: 71 IN | DIASTOLIC BLOOD PRESSURE: 78 MMHG | HEART RATE: 85 BPM | WEIGHT: 226.19 LBS

## 2022-10-04 DIAGNOSIS — I10 HYPERTENSION, UNSPECIFIED TYPE: ICD-10-CM

## 2022-10-04 DIAGNOSIS — C61 MALIGNANT NEOPLASM OF PROSTATE: ICD-10-CM

## 2022-10-04 DIAGNOSIS — Z01.810 ENCOUNTER FOR PRE-OPERATIVE CARDIOVASCULAR CLEARANCE: Primary | ICD-10-CM

## 2022-10-04 DIAGNOSIS — E78.5 HYPERLIPIDEMIA, UNSPECIFIED HYPERLIPIDEMIA TYPE: ICD-10-CM

## 2022-10-04 PROCEDURE — 3078F DIAST BP <80 MM HG: CPT | Mod: CPTII,S$GLB,, | Performed by: INTERNAL MEDICINE

## 2022-10-04 PROCEDURE — 1126F PR PAIN SEVERITY QUANTIFIED, NO PAIN PRESENT: ICD-10-PCS | Mod: CPTII,S$GLB,, | Performed by: INTERNAL MEDICINE

## 2022-10-04 PROCEDURE — 1159F PR MEDICATION LIST DOCUMENTED IN MEDICAL RECORD: ICD-10-PCS | Mod: CPTII,S$GLB,, | Performed by: INTERNAL MEDICINE

## 2022-10-04 PROCEDURE — 1126F AMNT PAIN NOTED NONE PRSNT: CPT | Mod: CPTII,S$GLB,, | Performed by: INTERNAL MEDICINE

## 2022-10-04 PROCEDURE — 99999 PR PBB SHADOW E&M-EST. PATIENT-LVL IV: ICD-10-PCS | Mod: PBBFAC,,, | Performed by: INTERNAL MEDICINE

## 2022-10-04 PROCEDURE — 3075F PR MOST RECENT SYSTOLIC BLOOD PRESS GE 130-139MM HG: ICD-10-PCS | Mod: CPTII,S$GLB,, | Performed by: INTERNAL MEDICINE

## 2022-10-04 PROCEDURE — 3078F PR MOST RECENT DIASTOLIC BLOOD PRESSURE < 80 MM HG: ICD-10-PCS | Mod: CPTII,S$GLB,, | Performed by: INTERNAL MEDICINE

## 2022-10-04 PROCEDURE — 99499 UNLISTED E&M SERVICE: CPT | Mod: S$GLB,,, | Performed by: INTERNAL MEDICINE

## 2022-10-04 PROCEDURE — 1101F PT FALLS ASSESS-DOCD LE1/YR: CPT | Mod: CPTII,S$GLB,, | Performed by: INTERNAL MEDICINE

## 2022-10-04 PROCEDURE — 1159F MED LIST DOCD IN RCRD: CPT | Mod: CPTII,S$GLB,, | Performed by: INTERNAL MEDICINE

## 2022-10-04 PROCEDURE — 99204 PR OFFICE/OUTPT VISIT, NEW, LEVL IV, 45-59 MIN: ICD-10-PCS | Mod: S$GLB,,, | Performed by: INTERNAL MEDICINE

## 2022-10-04 PROCEDURE — 3075F SYST BP GE 130 - 139MM HG: CPT | Mod: CPTII,S$GLB,, | Performed by: INTERNAL MEDICINE

## 2022-10-04 PROCEDURE — 1101F PR PT FALLS ASSESS DOC 0-1 FALLS W/OUT INJ PAST YR: ICD-10-PCS | Mod: CPTII,S$GLB,, | Performed by: INTERNAL MEDICINE

## 2022-10-04 PROCEDURE — 99999 PR PBB SHADOW E&M-EST. PATIENT-LVL IV: CPT | Mod: PBBFAC,,, | Performed by: INTERNAL MEDICINE

## 2022-10-04 PROCEDURE — 99499 RISK ADDL DX/OHS AUDIT: ICD-10-PCS | Mod: S$GLB,,, | Performed by: INTERNAL MEDICINE

## 2022-10-04 PROCEDURE — 1160F RVW MEDS BY RX/DR IN RCRD: CPT | Mod: CPTII,S$GLB,, | Performed by: INTERNAL MEDICINE

## 2022-10-04 PROCEDURE — 3288F PR FALLS RISK ASSESSMENT DOCUMENTED: ICD-10-PCS | Mod: CPTII,S$GLB,, | Performed by: INTERNAL MEDICINE

## 2022-10-04 PROCEDURE — 99204 OFFICE O/P NEW MOD 45 MIN: CPT | Mod: S$GLB,,, | Performed by: INTERNAL MEDICINE

## 2022-10-04 PROCEDURE — 93000 ELECTROCARDIOGRAM COMPLETE: CPT | Mod: S$GLB,,, | Performed by: INTERNAL MEDICINE

## 2022-10-04 PROCEDURE — 1160F PR REVIEW ALL MEDS BY PRESCRIBER/CLIN PHARMACIST DOCUMENTED: ICD-10-PCS | Mod: CPTII,S$GLB,, | Performed by: INTERNAL MEDICINE

## 2022-10-04 PROCEDURE — 3288F FALL RISK ASSESSMENT DOCD: CPT | Mod: CPTII,S$GLB,, | Performed by: INTERNAL MEDICINE

## 2022-10-04 PROCEDURE — 93000 EKG 12-LEAD: ICD-10-PCS | Mod: S$GLB,,, | Performed by: INTERNAL MEDICINE

## 2022-10-04 NOTE — PROGRESS NOTES
Cardiology Clinic note    Subjective:   Patient ID:  Noah Lopez is a 77 y.o. 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    CHOLECALCIFEROL, VITAMIN D3, (VITAMIN D3 ORAL)    Take 125 mcg by mouth.    CLOTRIMAZOLE-BETAMETHASONE 1-0.05% (LOTRISONE) CREAM    Apply topically 2 (two) times daily.    DILTIAZEM (CARDIZEM CD) 300 MG 24 HR CAPSULE    TAKE 1 CAPSULE BY MOUTH  ONCE DAILY    DIPHENHYDRAMINE HCL (BENADRYL ALLERGY ORAL)    Take by mouth.    DOCUSATE SODIUM (COLACE) 250 MG CAPSULE    Take 250 mg by mouth once daily.    FINASTERIDE (PROSCAR) 5 MG TABLET    Take 1 tablet (5 mg total) by mouth once daily.    GAS RELIEF 80, SIMETHICONE, ORAL    Take by mouth.    LISINOPRIL (PRINIVIL,ZESTRIL) 20 MG TABLET    TAKE 1 TABLET BY MOUTH ONCE DAILY    MELATONIN 10 MG TBDL    Take by mouth.    MULTIVITAMIN CAPSULE    Take 1 capsule by mouth once daily.    OFLOXACIN (FLOXIN) 0.3 % OTIC SOLUTION    Apply 10 drops into right ear once a day for 10 days.  Please dispense generic.    OMEGA-3 FATTY ACIDS/FISH OIL (FISH  "OIL-OMEGA-3 FATTY ACIDS) 300-1,000 MG CAPSULE    Take 1 capsule by mouth once daily.    SIMVASTATIN (ZOCOR) 20 MG TABLET    TAKE 1 TABLET BY MOUTH IN  THE EVENING    TAMSULOSIN (FLOMAX) 0.4 MG CAP    Take 1 capsule (0.4 mg total) by mouth once daily.    TRAZODONE (DESYREL) 100 MG TABLET    TAKE 2 TABLETS BY MOUTH EVERY DAY AT BEDTIME AS NEEDED FOR INSOMNIA    TRIAMTERENE-HYDROCHLOROTHIAZIDE 37.5-25 MG (DYAZIDE) 37.5-25 MG PER CAPSULE    TAKE 1 CAPSULE BY MOUTH IN  THE MORNING    TURMERIC ORAL    Take by mouth.    WHEAT DEXTRIN (BENEFIBER CLEAR SF, DEXTRIN, ORAL)    Take by mouth.    ZINC GLUCONATE 50 MG TABLET    Take 50 mg by mouth once daily.   Modified Medications    No medications on file   Discontinued Medications    No medications on file        Review of Systems   Constitutional: Negative for fever.   HENT:  Negative for nosebleeds.    Cardiovascular:  Negative for chest pain, dyspnea on exertion, irregular heartbeat, leg swelling, near-syncope, orthopnea, palpitations, paroxysmal nocturnal dyspnea and syncope.        As above   Respiratory:  Negative for hemoptysis.    Hematologic/Lymphatic: Negative for bleeding problem.   Skin:  Negative for poor wound healing.   Gastrointestinal:  Negative for hematochezia.   Genitourinary:  Negative for hematuria.   Neurological:  Negative for light-headedness.   Allergic/Immunologic: Negative for persistent infections.       Objective:   Vitals  Vitals:    10/04/22 1325   BP: 132/78   Pulse: 85   SpO2: 96%   Weight: 102.6 kg (226 lb 3.2 oz)   Height: 5' 11" (1.803 m)          Physical Exam  Constitutional:       General: He is not in acute distress.     Appearance: He is well-developed. He is not diaphoretic.   HENT:      Head: Normocephalic.   Neck:      Vascular: No JVD.   Cardiovascular:      Rate and Rhythm: Normal rate and regular rhythm.      Heart sounds: No murmur heard.    No friction rub. No gallop.   Pulmonary:      Effort: Pulmonary effort is normal. No " respiratory distress.      Breath sounds: Normal breath sounds.   Abdominal:      Palpations: Abdomen is soft.      Tenderness: There is no abdominal tenderness.   Musculoskeletal:         General: No swelling.      Cervical back: Normal range of motion.   Skin:     General: Skin is warm.   Neurological:      Mental Status: He is alert.   Psychiatric:         Mood and Affect: Mood normal.           Assessment:     1. Encounter for pre-operative cardiovascular clearance    2. Malignant neoplasm of prostate    3. Hypertension, unspecified type    4. Hyperlipidemia, unspecified hyperlipidemia type        Plan:   Noah Lopez is a 77 y.o. male h/o HTN, HLD    - EKG personally reviewed. My interpretation:  10/4/22: SR 80s, normal axis. Normal EKG. Qtc 435    Pre-Op Cardiac Risk Assessment  Procedure: TURP (malignant neoplasm of prostate)  RCRI 0  METs >4  Low ash-operative risk for cardiac events    HTN  BP well controlled  Diltiazem, lisinopril, triamterene-HCTZ      Continue with current medical plan and lifestyle changes.    Orders Placed This Encounter   Procedures    EKG 12-lead         Follow up as scheduled  Return sooner for concerns or questions. If symptoms persist go to the ED    He expressed verbal understanding and agreed with the plan      Gracy Kovacs MD  Interventional Cardiology  [facility]  Phone: [phone]

## 2022-10-17 ENCOUNTER — OFFICE VISIT (OUTPATIENT)
Dept: UROLOGY | Facility: CLINIC | Age: 77
End: 2022-10-17
Payer: MEDICARE

## 2022-10-17 VITALS
HEART RATE: 87 BPM | SYSTOLIC BLOOD PRESSURE: 148 MMHG | WEIGHT: 231.06 LBS | DIASTOLIC BLOOD PRESSURE: 75 MMHG | BODY MASS INDEX: 32.35 KG/M2 | HEIGHT: 71 IN

## 2022-10-17 DIAGNOSIS — R33.9 URINARY RETENTION: Primary | ICD-10-CM

## 2022-10-17 DIAGNOSIS — N31.2 HYPOCONTRACTILE BLADDER: ICD-10-CM

## 2022-10-17 DIAGNOSIS — C61 PROSTATE CANCER: ICD-10-CM

## 2022-10-17 PROCEDURE — 51702 PR INSERTION OF TEMPORARY INDWELLING BLADDER CATHETER, SIMPLE: ICD-10-PCS | Mod: S$GLB,,, | Performed by: STUDENT IN AN ORGANIZED HEALTH CARE EDUCATION/TRAINING PROGRAM

## 2022-10-17 PROCEDURE — 1160F RVW MEDS BY RX/DR IN RCRD: CPT | Mod: CPTII,S$GLB,, | Performed by: STUDENT IN AN ORGANIZED HEALTH CARE EDUCATION/TRAINING PROGRAM

## 2022-10-17 PROCEDURE — 87077 CULTURE AEROBIC IDENTIFY: CPT | Performed by: STUDENT IN AN ORGANIZED HEALTH CARE EDUCATION/TRAINING PROGRAM

## 2022-10-17 PROCEDURE — 1126F PR PAIN SEVERITY QUANTIFIED, NO PAIN PRESENT: ICD-10-PCS | Mod: CPTII,S$GLB,, | Performed by: STUDENT IN AN ORGANIZED HEALTH CARE EDUCATION/TRAINING PROGRAM

## 2022-10-17 PROCEDURE — 3077F PR MOST RECENT SYSTOLIC BLOOD PRESSURE >= 140 MM HG: ICD-10-PCS | Mod: CPTII,S$GLB,, | Performed by: STUDENT IN AN ORGANIZED HEALTH CARE EDUCATION/TRAINING PROGRAM

## 2022-10-17 PROCEDURE — 87088 URINE BACTERIA CULTURE: CPT | Performed by: STUDENT IN AN ORGANIZED HEALTH CARE EDUCATION/TRAINING PROGRAM

## 2022-10-17 PROCEDURE — 1159F MED LIST DOCD IN RCRD: CPT | Mod: CPTII,S$GLB,, | Performed by: STUDENT IN AN ORGANIZED HEALTH CARE EDUCATION/TRAINING PROGRAM

## 2022-10-17 PROCEDURE — 1160F PR REVIEW ALL MEDS BY PRESCRIBER/CLIN PHARMACIST DOCUMENTED: ICD-10-PCS | Mod: CPTII,S$GLB,, | Performed by: STUDENT IN AN ORGANIZED HEALTH CARE EDUCATION/TRAINING PROGRAM

## 2022-10-17 PROCEDURE — 99499 UNLISTED E&M SERVICE: CPT | Mod: S$GLB,,, | Performed by: STUDENT IN AN ORGANIZED HEALTH CARE EDUCATION/TRAINING PROGRAM

## 2022-10-17 PROCEDURE — 99499 RISK ADDL DX/OHS AUDIT: ICD-10-PCS | Mod: S$GLB,,, | Performed by: STUDENT IN AN ORGANIZED HEALTH CARE EDUCATION/TRAINING PROGRAM

## 2022-10-17 PROCEDURE — 99215 OFFICE O/P EST HI 40 MIN: CPT | Mod: 25,S$GLB,, | Performed by: STUDENT IN AN ORGANIZED HEALTH CARE EDUCATION/TRAINING PROGRAM

## 2022-10-17 PROCEDURE — 87086 URINE CULTURE/COLONY COUNT: CPT | Performed by: STUDENT IN AN ORGANIZED HEALTH CARE EDUCATION/TRAINING PROGRAM

## 2022-10-17 PROCEDURE — 1159F PR MEDICATION LIST DOCUMENTED IN MEDICAL RECORD: ICD-10-PCS | Mod: CPTII,S$GLB,, | Performed by: STUDENT IN AN ORGANIZED HEALTH CARE EDUCATION/TRAINING PROGRAM

## 2022-10-17 PROCEDURE — 51702 INSERT TEMP BLADDER CATH: CPT | Mod: S$GLB,,, | Performed by: STUDENT IN AN ORGANIZED HEALTH CARE EDUCATION/TRAINING PROGRAM

## 2022-10-17 PROCEDURE — 3078F DIAST BP <80 MM HG: CPT | Mod: CPTII,S$GLB,, | Performed by: STUDENT IN AN ORGANIZED HEALTH CARE EDUCATION/TRAINING PROGRAM

## 2022-10-17 PROCEDURE — 3077F SYST BP >= 140 MM HG: CPT | Mod: CPTII,S$GLB,, | Performed by: STUDENT IN AN ORGANIZED HEALTH CARE EDUCATION/TRAINING PROGRAM

## 2022-10-17 PROCEDURE — 3078F PR MOST RECENT DIASTOLIC BLOOD PRESSURE < 80 MM HG: ICD-10-PCS | Mod: CPTII,S$GLB,, | Performed by: STUDENT IN AN ORGANIZED HEALTH CARE EDUCATION/TRAINING PROGRAM

## 2022-10-17 PROCEDURE — 87186 SC STD MICRODIL/AGAR DIL: CPT | Performed by: STUDENT IN AN ORGANIZED HEALTH CARE EDUCATION/TRAINING PROGRAM

## 2022-10-17 PROCEDURE — 99999 PR PBB SHADOW E&M-EST. PATIENT-LVL IV: CPT | Mod: PBBFAC,,, | Performed by: STUDENT IN AN ORGANIZED HEALTH CARE EDUCATION/TRAINING PROGRAM

## 2022-10-17 PROCEDURE — 1126F AMNT PAIN NOTED NONE PRSNT: CPT | Mod: CPTII,S$GLB,, | Performed by: STUDENT IN AN ORGANIZED HEALTH CARE EDUCATION/TRAINING PROGRAM

## 2022-10-17 PROCEDURE — 99999 PR PBB SHADOW E&M-EST. PATIENT-LVL IV: ICD-10-PCS | Mod: PBBFAC,,, | Performed by: STUDENT IN AN ORGANIZED HEALTH CARE EDUCATION/TRAINING PROGRAM

## 2022-10-17 PROCEDURE — 99215 PR OFFICE/OUTPT VISIT, EST, LEVL V, 40-54 MIN: ICD-10-PCS | Mod: 25,S$GLB,, | Performed by: STUDENT IN AN ORGANIZED HEALTH CARE EDUCATION/TRAINING PROGRAM

## 2022-10-17 RX ORDER — METHOCARBAMOL 750 MG/1
750-1500 TABLET, FILM COATED ORAL 3 TIMES DAILY PRN
COMMUNITY
Start: 2022-10-04

## 2022-10-17 RX ORDER — FLUCONAZOLE 150 MG/1
TABLET ORAL
COMMUNITY
Start: 2022-10-13 | End: 2022-10-27

## 2022-10-17 NOTE — PROGRESS NOTES
"Bedoya exchange.  Under sterile technique, 16 Fr bedoya inserted into urinary bladder with straw color urine output and one small blood clot.  Collected urine sample with specimen cup using two patient identifiers.  Inflated balloon with 10 cc of NS.  Patient request catheter "capped"  Patient tolerated well.  Patient given large urinary bag.  AVS printed and handed to patient.  No further questions.  "

## 2022-10-17 NOTE — PROGRESS NOTES
"Subjective:       Patient ID: Noah Lopez is a 77 y.o. male.    Chief Complaint:  consents/bedoya exchange to obtain urine for culture  This is a 77 y.o.  male patient that is an established patient of mine.  The patient was referred to me by Dr. De La Torre for urinary retention. Dr. De La Torre obtained routine bloodwork in 12/2021. His Cr was elevated from baseline. He then ordered a renal US 1/4/22 - marked distention of bladder, no hydronephrosis bilaterally, enlarged prostate vol 197g, and pt presented to ER per his recommendation on for a bedoya placement.   ER visit 1/4/22 - 3600 cc of urine from his Bedoya. Was not started on medications.      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 He feels that his "sphincter" has relaxed more.     Started him on flomax and finasteride 1 week ago. Pt wanted a voiding trial this week.     He failed a voiding trial. Worked up with cysto and UDS.  Cysto -Trilobar hypertrophy of the prostate, mildly enlarged median lobe, significantly enlarged lateral lobes  UDs with Dr. Maloney on 1/31/22 - see his report for full details -   Pdet at Max Flow: 0 cmH2O    EMG Storage: Normal Recruitment             EMG Voiding: Flaring with valsalva     Impression:           Sensation:Normal     Capacity: Large     Compliance:Normal     Detrusor Overactivity:Absent     Continence: No Incontinence     Contractility: Hypocontractility     Emptying:Unsatisfactory - Hypocontractility     Coordination:Dysfunctional (Valsalva) Voiding      He was having a bad reaction to Bactrim sent by Dr. De La Torre. Did not finish course.  Pt was not interested in a surgery. He felt that he "did this to myself." and with the results of the urodynamics with no detrusor pressure and valsalva voiding, an outlet reducing procedure would have a low chance of success. The patient states he is mostly interested in bedoya management and he does not want " a surgery at this time.     3/24/22  Here today for bedoya assistance. Pt went to ER and did not know how to disconnect bag    6/13/22  Pt notes cloudy urine and suprapubic discomfort. Also constipated. PSA was 17 5/25/22 and checked 2 days after a bedoya exchange. Patient notes he does not really drink many fluids from 9am-9pm.     7/12/22  I treated last UTI with ceftin x 10 days. I also recommended that he start augmentin the day before, the day of and the day after bedoya exchanges to guard against infections. He informed me that he did not do that.     8/25/22  Had patient try to call his daughter Lorena to include her in the conversation. She did not , pt stated likely is working. I told the patient I will type up a detailed note so she can follow along remotely.     He is s/p TRUS bx on 8/11/22; TRUS bx 60.4g. PSA 15.8 7/5/22. Full path report below.  Prostate biopsy demonstrated prostate cancer - Vikash 4+3, grade group 3 present in 10/18 cores. Prostate biopsy cores were seen in all cores taken from left side.    RELIAPATH DIAGNOSIS:   A.   PROSTATE, LEFT APEX, NEEDLE BIOPSY:          HISTOLOGIC TYPE:  Acinar adenocarcinoma.          HISTOLOGIC GRADE:          Grade Group 3 (Vikash Score 4+3=7)            Percentage of Pattern 4:  70%          CRIBRIFORM GLANDS:  Present.          TUMOR QUANTITATION:            Number of Cores Positive:  2            Total Number of Cores:  2            Measurement of Tumor:  19 mm.            Measurement of Core Tissue:  31 mm.            Percentage of Prostatic Tissue Involved by Tumor:  60%          LYMPHOVASCULAR INVASION:  Not identified.          PERINEURAL INVASION:  Present.          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    PROSTATE, LEFT MID, NEEDLE BIOPSY:          HISTOLOGIC TYPE:  Acinar adenocarcinoma.          HISTOLOGIC GRADE:          Grade Group 3 (Vikash Score 4+3=7)            Percentage of  Pattern 4:  70%          CRIBRIFORM GLANDS:  Present.          TUMOR QUANTITATION:            Number of Cores Positive:  2            Total Number of Cores:  2            Measurement of Tumor:  16 mm.            Measurement of Core Tissue:  24 mm.            Percentage of Prostatic Tissue Involved by Tumor:  70%          LYMPHOVASCULAR INVASION:  Not identified.          PERINEURAL INVASION:  Present.  (C61.)   C.    PROSTATE, LEFT BASE, NEEDLE BIOPSY:          HISTOLOGIC TYPE:  Acinar adenocarcinoma.          HISTOLOGIC GRADE:          Grade Group 3 (Vikash Score 4+3=7)            Percentage of Pattern 4:  90%          CRIBRIFORM GLANDS:  Present.          TUMOR QUANTITATION:            Number of Cores Positive:  4            Total Number of Cores:  4            Measurement of Tumor:  17 mm.            Measurement of Core Tissue:  27 mm.            Percentage of Prostatic Tissue Involved by Tumor:  80%          LYMPHOVASCULAR INVASION:  Not identified.          PERINEURAL INVASION:  Present.  (C61.)   D.   TISSUE1           ROSTATE, LEFT TRANSITION ZONE,  BIOPSY:          HISTOLOGIC TYPE:  Acinar adenocarcinoma.          HISTOLOGIC GRADE:          Grade Group 3 ([place] Score 4+3=7)            Percentage of Pattern 4:  70%          CRIBRIFORM GLANDS:  Present.          TUMOR QUANTITATION:            Number of Cores Positive:  2            Total Number of Cores:  2            Measurement of Tumor:  7 mm.            Measurement of Core Tissue:  18 mm.            Percentage of Prostatic Tissue Involved by Tumor:  40%          LYMPHOVASCULAR INVASION:  Not identified.          PERINEURAL INVASION:  Not identified.  (C61.)   E.    PROSTATE, RIGHT APEX, NEEDLE BIOPSY:          Benign prostatic tissue with atrophy.  (R97.20)   F.   PROSTATE, RIGHT MID, NEEDLE BIOPSY:          Benign prostatic tissue with chronic inflammation and atrophy, see   comment.  (R97.20)   G.   PROSTATE, RIGHT BASE, NEEDLE BIOPSY:          Benign  prostatic tissue with chronic inflammation with atrophy.   (R97.20)   H.   TISSUE2          PROSTATE, RIGHT TRANSITION ZONE, BIOPSY:          -Benign prostatic tissue with atrophy.  (R97.20)     10/17/22  In the interim, pt voiced that he would like to cancel the bone biopsy due to risks and adverse events and pain. He is in agreement with proceeding with prostate cancer treatment with oncologist as metastatic prostate cancer.   MRI 9/29/22 demonstrated vol of 63.35cc, pubic lesion concerning for metastatic disease, and left pirads 5 lesion with EPE involving left neurovascular bundles, seminal vesicles, and extension involving the left levator ani in close proximity with anterior rectal wall. MRI suggestive of cT4 disease,N0,M1b(pubic bone involvement).   Since he is still in urinary retention will proceed with channel TURP instead of robotic prostatectomy.     Here today for consents, bedoya exchange to obtain urine from new urethral bedoya catheter for culture.    LAST PSA  Lab Results   Component Value Date    PSA 11.7 (H) 12/08/2021    PSADIAG 17.3 (H) 05/25/2022    PSATOTAL 15.8 (H) 07/05/2022    PSAFREE 2.83 (H) 07/05/2022       Lab Results   Component Value Date    CREATININE 1.31 09/12/2022       ---  Past Medical History:   Diagnosis Date    Colon polyp 09/06/2018    Hyperlipidemia     Hypertension        Past Surgical History:   Procedure Laterality Date    COLONOSCOPY  2010    COLONOSCOPY N/A 9/6/2018    Procedure: COLONOSCOPY Suprep PLEASE TEXT PATIENT WITH ARRIVAL TIME;  Surgeon: Niharika Arce MD;  Location: [facility];  Service: Endoscopy;  Laterality: N/A;    CYSTOSCOPY WITH URODYNAMIC TESTING N/A 1/31/2022    Procedure: CYSTOSCOPY, WITH URODYNAMIC TESTING FLOUROSCOPIC;  Surgeon: Anthony Maloney MD;  Location: [facility] OR [address] [place] [address];  Service: Urology;  Laterality: N/A;  1hr    KNEE ARTHROSCOPY      SPINE SURGERY      l-spine       Family History   Problem Relation Age of Onset    Hypertension Mother      Heart attack Father     Cirrhosis Father     No Known Problems Brother     Seizures Daughter     No Known Problems Son     No Known Problems Brother     Liver disease Daughter        Social History     Tobacco Use    Smoking status: Former    Smokeless tobacco: Never   Substance Use Topics    Alcohol use: Yes     Comment: occ    Drug use: Yes     Types: Marijuana       Current Outpatient Medications on File Prior to Visit   Medication Sig Dispense Refill    ascorbic acid, vitamin C, (VITAMIN C) 1000 MG tablet Take 1,000 mg by mouth once daily.      cholecalciferol, vitamin D3, (VITAMIN D3 ORAL) Take 125 mcg by mouth.      clotrimazole-betamethasone 1-0.05% (LOTRISONE) cream Apply topically 2 (two) times daily. 60 g 3    diltiaZEM (CARDIZEM CD) 300 MG 24 hr capsule TAKE 1 CAPSULE BY MOUTH  ONCE DAILY 90 capsule 3    diphenhydramine HCl (BENADRYL ALLERGY ORAL) Take by mouth.      docusate sodium (COLACE) 250 MG capsule Take 250 mg by mouth once daily.      finasteride (PROSCAR) 5 mg tablet Take 1 tablet (5 mg total) by mouth once daily. 90 tablet 3    GAS RELIEF 80, SIMETHICONE, ORAL Take by mouth.      lisinopriL (PRINIVIL,ZESTRIL) 20 MG tablet TAKE 1 TABLET BY MOUTH ONCE DAILY 90 tablet 3    melatonin 10 mg TbDL Take by mouth.      multivitamin capsule Take 1 capsule by mouth once daily.      ofloxacin (FLOXIN) 0.3 % otic solution Apply 10 drops into right ear once a day for 10 days.  Please dispense generic.      omega-3 fatty acids/fish oil (FISH OIL-OMEGA-3 FATTY ACIDS) 300-1,000 mg capsule Take 1 capsule by mouth once daily.      simvastatin (ZOCOR) 20 MG tablet TAKE 1 TABLET BY MOUTH IN  THE EVENING 90 tablet 3    tamsulosin (FLOMAX) 0.4 mg Cap Take 1 capsule (0.4 mg total) by mouth once daily. 90 capsule 3    traZODone (DESYREL) 100 MG tablet TAKE 2 TABLETS BY MOUTH EVERY DAY AT BEDTIME AS NEEDED FOR INSOMNIA 180 tablet 3    triamterene-hydrochlorothiazide 37.5-25 mg (DYAZIDE) 37.5-25 mg per capsule TAKE 1  CAPSULE BY MOUTH IN  THE MORNING (Patient not taking: Reported on 10/4/2022) 90 capsule 3    TURMERIC ORAL Take by mouth.      wheat dextrin (BENEFIBER CLEAR SF, DEXTRIN, ORAL) Take by mouth.      zinc gluconate 50 mg tablet Take 50 mg by mouth once daily.       No current facility-administered medications on file prior to visit.       Review of patient's allergies indicates:   Allergen Reactions    Ciprofloxacin Other (See Comments)     tendonitis    Sulfa (sulfonamide antibiotics) Nausea And Vomiting       Review of Systems   Constitutional:  Negative for chills.   HENT:  Negative for congestion.    Eyes:  Negative for visual disturbance.   Respiratory:  Negative for shortness of breath.    Cardiovascular:  Negative for chest pain.   Gastrointestinal:  Negative for abdominal distention.   Musculoskeletal:  Negative for gait problem.   Skin:  Negative for color change.   Neurological:  Negative for dizziness.   Psychiatric/Behavioral:  Negative for agitation.      Objective:      Physical Exam  Constitutional:       Appearance: He is well-developed.   HENT:      Head: Normocephalic.   Eyes:      Pupils: Pupils are equal, round, and reactive to light.   Pulmonary:      Effort: Pulmonary effort is normal.   Abdominal:      Palpations: Abdomen is soft.   Musculoskeletal:         General: Normal range of motion.      Cervical back: Normal range of motion.   Skin:     General: Skin is warm and dry.   Neurological:      Mental Status: He is alert.       Assessment:       1. Urinary retention    2. Prostate cancer    3. Hypocontractile bladder        Plan:       I have explained the indication(s) for and benefits of a bipolar transurethral resection of the prostate as well as the alternatives to the procedure including continuing current medical therapy, or indwelling bedoya catheter or clean intermittent catheterizations if in urinary retention. The risks discussed included but were not limited to pain, infection (urinary  tract infection), bleeding (hematuria), injury to urethra, bladder, ureters, and rectum, possible bladder neck contracture, possible urethral stricture, clot retention, need for additional procedures, need for indwelling bedoya catheter, need for another transurethral resection of prostate procedure in the future, erectile dysfunction, urinary incontinence, and failure to improve on lower urinary tract symptoms, and failure to relieve urinary retention. There is also a risk of decreased ejaculate or complete lack of ejaculate during orgasm, known as a dry ejaculation. It is also possible that prostate cancer may be incidentally detected on the prostate chip specimens. Depending on the pathology results, treatments may range from active surveillance, radiation, or rarely more surgery. It is possible that a traditional transrectal ultrasound prostate biopsy may be recommended based on the results.  It is also common in my practice to leave an indwelling urethral bedoya catheter for a few days and the patient will either remove the bedoya at home or return to the clinic for removal and a voiding trial. The patient voiced understanding and all questions have been answered and informed surgical consents were signed.  He understands that since he has evidence of a hypocontractile bladder on UDS and his voiding primarily was achieved with valsalve it is possible that a TURP procedure might not render him catheter free. My aim is to open the prostatic urethra and create an open channel    Urine for culture taken from new urethral bedoya catheter after exchange today performed by my nurse. Likely will demonstrate UTI and will cover with abx pre/post TURP.  Consents signed 10/17/22 for surgery 10/26/22.  Pt is vaccinated.     Urinary retention  -     Urine culture    Prostate cancer    Hypocontractile bladder

## 2022-10-17 NOTE — H&P (VIEW-ONLY)
"Subjective:       Patient ID: Noah Lopez is a 77 y.o. male.    Chief Complaint:  consents/bedoya exchange to obtain urine for culture  This is a 77 y.o.  male patient that is an established patient of mine.  The patient was referred to me by Dr. De La Torre for urinary retention. Dr. De La Torre obtained routine bloodwork in 12/2021. His Cr was elevated from baseline. He then ordered a renal US 1/4/22 - marked distention of bladder, no hydronephrosis bilaterally, enlarged prostate vol 197g, and pt presented to ER per his recommendation on for a bedoya placement.   ER visit 1/4/22 - 3600 cc of urine from his Bedoya. Was not started on medications.      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 He feels that his "sphincter" has relaxed more.     Started him on flomax and finasteride 1 week ago. Pt wanted a voiding trial this week.     He failed a voiding trial. Worked up with cysto and UDS.  Cysto -Trilobar hypertrophy of the prostate, mildly enlarged median lobe, significantly enlarged lateral lobes  UDs with Dr. Maloney on 1/31/22 - see his report for full details -   Pdet at Max Flow: 0 cmH2O    EMG Storage: Normal Recruitment             EMG Voiding: Flaring with valsalva     Impression:           Sensation:Normal     Capacity: Large     Compliance:Normal     Detrusor Overactivity:Absent     Continence: No Incontinence     Contractility: Hypocontractility     Emptying:Unsatisfactory - Hypocontractility     Coordination:Dysfunctional (Valsalva) Voiding      He was having a bad reaction to Bactrim sent by Dr. De La Torre. Did not finish course.  Pt was not interested in a surgery. He felt that he "did this to myself." and with the results of the urodynamics with no detrusor pressure and valsalva voiding, an outlet reducing procedure would have a low chance of success. The patient states he is mostly interested in bedoya management and he does not want " a surgery at this time.     3/24/22  Here today for bedoya assistance. Pt went to ER and did not know how to disconnect bag    6/13/22  Pt notes cloudy urine and suprapubic discomfort. Also constipated. PSA was 17 5/25/22 and checked 2 days after a bedoya exchange. Patient notes he does not really drink many fluids from 9am-9pm.     7/12/22  I treated last UTI with ceftin x 10 days. I also recommended that he start augmentin the day before, the day of and the day after bedoya exchanges to guard against infections. He informed me that he did not do that.     8/25/22  Had patient try to call his daughter Lorena to include her in the conversation. She did not , pt stated likely is working. I told the patient I will type up a detailed note so she can follow along remotely.     He is s/p TRUS bx on 8/11/22; TRUS bx 60.4g. PSA 15.8 7/5/22. Full path report below.  Prostate biopsy demonstrated prostate cancer - Vikash 4+3, grade group 3 present in 10/18 cores. Prostate biopsy cores were seen in all cores taken from left side.    RELIAPATH DIAGNOSIS:   A.   PROSTATE, LEFT APEX, NEEDLE BIOPSY:          HISTOLOGIC TYPE:  Acinar adenocarcinoma.          HISTOLOGIC GRADE:          Grade Group 3 (Vikash Score 4+3=7)            Percentage of Pattern 4:  70%          CRIBRIFORM GLANDS:  Present.          TUMOR QUANTITATION:            Number of Cores Positive:  2            Total Number of Cores:  2            Measurement of Tumor:  19 mm.            Measurement of Core Tissue:  31 mm.            Percentage of Prostatic Tissue Involved by Tumor:  60%          LYMPHOVASCULAR INVASION:  Not identified.          PERINEURAL INVASION:  Present.          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    PROSTATE, LEFT MID, NEEDLE BIOPSY:          HISTOLOGIC TYPE:  Acinar adenocarcinoma.          HISTOLOGIC GRADE:          Grade Group 3 (Vikash Score 4+3=7)            Percentage of  Pattern 4:  70%          CRIBRIFORM GLANDS:  Present.          TUMOR QUANTITATION:            Number of Cores Positive:  2            Total Number of Cores:  2            Measurement of Tumor:  16 mm.            Measurement of Core Tissue:  24 mm.            Percentage of Prostatic Tissue Involved by Tumor:  70%          LYMPHOVASCULAR INVASION:  Not identified.          PERINEURAL INVASION:  Present.  (C61.)   C.    PROSTATE, LEFT BASE, NEEDLE BIOPSY:          HISTOLOGIC TYPE:  Acinar adenocarcinoma.          HISTOLOGIC GRADE:          Grade Group 3 (Vikash Score 4+3=7)            Percentage of Pattern 4:  90%          CRIBRIFORM GLANDS:  Present.          TUMOR QUANTITATION:            Number of Cores Positive:  4            Total Number of Cores:  4            Measurement of Tumor:  17 mm.            Measurement of Core Tissue:  27 mm.            Percentage of Prostatic Tissue Involved by Tumor:  80%          LYMPHOVASCULAR INVASION:  Not identified.          PERINEURAL INVASION:  Present.  (C61.)   D.   TISSUE1           ROSTATE, LEFT TRANSITION ZONE,  BIOPSY:          HISTOLOGIC TYPE:  Acinar adenocarcinoma.          HISTOLOGIC GRADE:          Grade Group 3 (Laona Score 4+3=7)            Percentage of Pattern 4:  70%          CRIBRIFORM GLANDS:  Present.          TUMOR QUANTITATION:            Number of Cores Positive:  2            Total Number of Cores:  2            Measurement of Tumor:  7 mm.            Measurement of Core Tissue:  18 mm.            Percentage of Prostatic Tissue Involved by Tumor:  40%          LYMPHOVASCULAR INVASION:  Not identified.          PERINEURAL INVASION:  Not identified.  (C61.)   E.    PROSTATE, RIGHT APEX, NEEDLE BIOPSY:          Benign prostatic tissue with atrophy.  (R97.20)   F.   PROSTATE, RIGHT MID, NEEDLE BIOPSY:          Benign prostatic tissue with chronic inflammation and atrophy, see   comment.  (R97.20)   G.   PROSTATE, RIGHT BASE, NEEDLE BIOPSY:          Benign  prostatic tissue with chronic inflammation with atrophy.   (R97.20)   H.   TISSUE2          PROSTATE, RIGHT TRANSITION ZONE, BIOPSY:          -Benign prostatic tissue with atrophy.  (R97.20)     10/17/22  In the interim, pt voiced that he would like to cancel the bone biopsy due to risks and adverse events and pain. He is in agreement with proceeding with prostate cancer treatment with oncologist as metastatic prostate cancer.   MRI 9/29/22 demonstrated vol of 63.35cc, pubic lesion concerning for metastatic disease, and left pirads 5 lesion with EPE involving left neurovascular bundles, seminal vesicles, and extension involving the left levator ani in close proximity with anterior rectal wall. MRI suggestive of cT4 disease,N0,M1b(pubic bone involvement).   Since he is still in urinary retention will proceed with channel TURP instead of robotic prostatectomy.     Here today for consents, bedoya exchange to obtain urine from new urethral bedoya catheter for culture.    LAST PSA  Lab Results   Component Value Date    PSA 11.7 (H) 12/08/2021    PSADIAG 17.3 (H) 05/25/2022    PSATOTAL 15.8 (H) 07/05/2022    PSAFREE 2.83 (H) 07/05/2022       Lab Results   Component Value Date    CREATININE 1.31 09/12/2022       ---  Past Medical History:   Diagnosis Date    Colon polyp 09/06/2018    Hyperlipidemia     Hypertension        Past Surgical History:   Procedure Laterality Date    COLONOSCOPY  2010    COLONOSCOPY N/A 9/6/2018    Procedure: COLONOSCOPY Suprep PLEASE TEXT PATIENT WITH ARRIVAL TIME;  Surgeon: Niharika Arce MD;  Location: [facility];  Service: Endoscopy;  Laterality: N/A;    CYSTOSCOPY WITH URODYNAMIC TESTING N/A 1/31/2022    Procedure: CYSTOSCOPY, WITH URODYNAMIC TESTING FLOUROSCOPIC;  Surgeon: Anthony Maloney MD;  [place] [facility] [place] [address] [place] [address];  Service: Urology;  Laterality: N/A;  1hr    KNEE ARTHROSCOPY      SPINE SURGERY      l-spine       Family History   Problem Relation Age of Onset    Hypertension Mother      Heart attack Father     Cirrhosis Father     No Known Problems Brother     Seizures Daughter     No Known Problems Son     No Known Problems Brother     Liver disease Daughter        Social History     Tobacco Use    Smoking status: Former    Smokeless tobacco: Never   Substance Use Topics    Alcohol use: Yes     Comment: occ    Drug use: Yes     Types: Marijuana       Current Outpatient Medications on File Prior to Visit   Medication Sig Dispense Refill    ascorbic acid, vitamin C, (VITAMIN C) 1000 MG tablet Take 1,000 mg by mouth once daily.      cholecalciferol, vitamin D3, (VITAMIN D3 ORAL) Take 125 mcg by mouth.      clotrimazole-betamethasone 1-0.05% (LOTRISONE) cream Apply topically 2 (two) times daily. 60 g 3    diltiaZEM (CARDIZEM CD) 300 MG 24 hr capsule TAKE 1 CAPSULE BY MOUTH  ONCE DAILY 90 capsule 3    diphenhydramine HCl (BENADRYL ALLERGY ORAL) Take by mouth.      docusate sodium (COLACE) 250 MG capsule Take 250 mg by mouth once daily.      finasteride (PROSCAR) 5 mg tablet Take 1 tablet (5 mg total) by mouth once daily. 90 tablet 3    GAS RELIEF 80, SIMETHICONE, ORAL Take by mouth.      lisinopriL (PRINIVIL,ZESTRIL) 20 MG tablet TAKE 1 TABLET BY MOUTH ONCE DAILY 90 tablet 3    melatonin 10 mg TbDL Take by mouth.      multivitamin capsule Take 1 capsule by mouth once daily.      ofloxacin (FLOXIN) 0.3 % otic solution Apply 10 drops into right ear once a day for 10 days.  Please dispense generic.      omega-3 fatty acids/fish oil (FISH OIL-OMEGA-3 FATTY ACIDS) 300-1,000 mg capsule Take 1 capsule by mouth once daily.      simvastatin (ZOCOR) 20 MG tablet TAKE 1 TABLET BY MOUTH IN  THE EVENING 90 tablet 3    tamsulosin (FLOMAX) 0.4 mg Cap Take 1 capsule (0.4 mg total) by mouth once daily. 90 capsule 3    traZODone (DESYREL) 100 MG tablet TAKE 2 TABLETS BY MOUTH EVERY DAY AT BEDTIME AS NEEDED FOR INSOMNIA 180 tablet 3    triamterene-hydrochlorothiazide 37.5-25 mg (DYAZIDE) 37.5-25 mg per capsule TAKE 1  CAPSULE BY MOUTH IN  THE MORNING (Patient not taking: Reported on 10/4/2022) 90 capsule 3    TURMERIC ORAL Take by mouth.      wheat dextrin (BENEFIBER CLEAR SF, DEXTRIN, ORAL) Take by mouth.      zinc gluconate 50 mg tablet Take 50 mg by mouth once daily.       No current facility-administered medications on file prior to visit.       Review of patient's allergies indicates:   Allergen Reactions    Ciprofloxacin Other (See Comments)     tendonitis    Sulfa (sulfonamide antibiotics) Nausea And Vomiting       Review of Systems   Constitutional:  Negative for chills.   HENT:  Negative for congestion.    Eyes:  Negative for visual disturbance.   Respiratory:  Negative for shortness of breath.    Cardiovascular:  Negative for chest pain.   Gastrointestinal:  Negative for abdominal distention.   Musculoskeletal:  Negative for gait problem.   Skin:  Negative for color change.   Neurological:  Negative for dizziness.   Psychiatric/Behavioral:  Negative for agitation.      Objective:      Physical Exam  Constitutional:       Appearance: He is well-developed.   HENT:      Head: Normocephalic.   Eyes:      Pupils: Pupils are equal, round, and reactive to light.   Pulmonary:      Effort: Pulmonary effort is normal.   Abdominal:      Palpations: Abdomen is soft.   Musculoskeletal:         General: Normal range of motion.      Cervical back: Normal range of motion.   Skin:     General: Skin is warm and dry.   Neurological:      Mental Status: He is alert.       Assessment:       1. Urinary retention    2. Prostate cancer    3. Hypocontractile bladder        Plan:       I have explained the indication(s) for and benefits of a bipolar transurethral resection of the prostate as well as the alternatives to the procedure including continuing current medical therapy, or indwelling bedoya catheter or clean intermittent catheterizations if in urinary retention. The risks discussed included but were not limited to pain, infection (urinary  tract infection), bleeding (hematuria), injury to urethra, bladder, ureters, and rectum, possible bladder neck contracture, possible urethral stricture, clot retention, need for additional procedures, need for indwelling bedoya catheter, need for another transurethral resection of prostate procedure in the future, erectile dysfunction, urinary incontinence, and failure to improve on lower urinary tract symptoms, and failure to relieve urinary retention. There is also a risk of decreased ejaculate or complete lack of ejaculate during orgasm, known as a dry ejaculation. It is also possible that prostate cancer may be incidentally detected on the prostate chip specimens. Depending on the pathology results, treatments may range from active surveillance, radiation, or rarely more surgery. It is possible that a traditional transrectal ultrasound prostate biopsy may be recommended based on the results.  It is also common in my practice to leave an indwelling urethral bedoya catheter for a few days and the patient will either remove the bedoya at home or return to the clinic for removal and a voiding trial. The patient voiced understanding and all questions have been answered and informed surgical consents were signed.  He understands that since he has evidence of a hypocontractile bladder on UDS and his voiding primarily was achieved with valsalve it is possible that a TURP procedure might not render him catheter free. My aim is to open the prostatic urethra and create an open channel    Urine for culture taken from new urethral bedoya catheter after exchange today performed by my nurse. Likely will demonstrate UTI and will cover with abx pre/post TURP.  Consents signed 10/17/22 for surgery 10/26/22.  Pt is vaccinated.     Urinary retention  -     Urine culture    Prostate cancer    Hypocontractile bladder

## 2022-10-19 ENCOUNTER — HOSPITAL ENCOUNTER (OUTPATIENT)
Dept: PREADMISSION TESTING | Facility: HOSPITAL | Age: 77
Discharge: HOME | End: 2022-10-19
Attending: STUDENT IN AN ORGANIZED HEALTH CARE EDUCATION/TRAINING PROGRAM
Payer: MEDICARE

## 2022-10-19 ENCOUNTER — ANESTHESIA EVENT (OUTPATIENT)
Dept: SURGERY | Facility: HOSPITAL | Age: 77
End: 2022-10-19
Payer: MEDICARE

## 2022-10-19 VITALS
DIASTOLIC BLOOD PRESSURE: 62 MMHG | SYSTOLIC BLOOD PRESSURE: 138 MMHG | WEIGHT: 229.06 LBS | HEART RATE: 75 BPM | OXYGEN SATURATION: 97 % | TEMPERATURE: 99 F | BODY MASS INDEX: 32.07 KG/M2 | HEIGHT: 71 IN | RESPIRATION RATE: 16 BRPM

## 2022-10-19 LAB
BACTERIA UR CULT: ABNORMAL
BACTERIA UR CULT: ABNORMAL

## 2022-10-19 RX ORDER — LIDOCAINE HYDROCHLORIDE 10 MG/ML
1 INJECTION, SOLUTION EPIDURAL; INFILTRATION; INTRACAUDAL; PERINEURAL ONCE
Status: CANCELLED | OUTPATIENT
Start: 2022-10-19 | End: 2022-10-19

## 2022-10-19 RX ORDER — SODIUM CHLORIDE, SODIUM LACTATE, POTASSIUM CHLORIDE, CALCIUM CHLORIDE 600; 310; 30; 20 MG/100ML; MG/100ML; MG/100ML; MG/100ML
INJECTION, SOLUTION INTRAVENOUS CONTINUOUS
Status: CANCELLED | OUTPATIENT
Start: 2022-10-19

## 2022-10-19 NOTE — ANESTHESIA PREPROCEDURE EVALUATION
"                                                                                                             10/19/2022  Noah Lopez is a 77 y.o., male scheduled for TURP (TRANSURETHRAL RESECTION OF PROSTATE) (Bladder) 10/26/22.    Per cardiology Dr. Kovacs, "Low ash-operative risk for cardiac events".    Past Medical History:   Diagnosis Date    Colon polyp 09/06/2018    Hyperlipidemia     Hypertension      Past Surgical History:   Procedure Laterality Date    COLONOSCOPY  2010    COLONOSCOPY N/A 9/6/2018    Procedure: COLONOSCOPY Suprep PLEASE TEXT PATIENT WITH ARRIVAL TIME;  Surgeon: Niharika Arce MD;  Location: [facility];  Service: Endoscopy;  Laterality: N/A;    CYSTOSCOPY WITH URODYNAMIC TESTING N/A 1/31/2022    Procedure: CYSTOSCOPY, WITH URODYNAMIC TESTING FLOUROSCOPIC;  Surgeon: Anthony Maloney MD;  Location: [facility] OR [address] [place] [address];  Service: Urology;  Laterality: N/A;  1hr    KNEE ARTHROSCOPY      SPINE SURGERY      l-spine       Pre-op Assessment    I have reviewed the Patient Summary Reports.     I have reviewed the Nursing Notes. I have reviewed the NPO Status.   I have reviewed the Medications.     Review of Systems  Anesthesia Hx:  Personal Hx of Anesthesia complications  Unintended Unpleasent Intraoperative Awareness (Patient reports waking up prior to surgery starting) and during MAC / sedation only   Social:  Alcohol Use, Former Smoker    Hematology/Oncology:        Current/Recent Cancer. (prostate cancer)   EENT/Dental:EENT/Dental Normal   Cardiovascular:   Exercise tolerance: good Hypertension, well controlled hyperlipidemia    Pulmonary:  Pulmonary Normal  Denies Shortness of breath.    Renal/:  Renal/ Normal     Hepatic/GI:  Hepatic/GI Normal    Musculoskeletal:   Arthritis   Spine Disorders: cervical    Neurological:  Neurology Normal Denies TIA.  Denies CVA. Denies Seizures.    Endocrine:  Obesity / BMI > 30  Psych:   Psychiatric History (PTSD)          Physical " Exam  General: Well nourished and Cooperative    Airway:  Mallampati: II / I  Mouth Opening: Normal  TM Distance: Normal  Tongue: Normal  Neck ROM: Normal ROM    Dental:  Intact    Chest/Lungs:  Clear to auscultation, Normal Respiratory Rate    Heart:  Rate: Normal  Rhythm: Regular Rhythm  Sounds: Normal      Lab Results   Component Value Date    WBC 6.21 09/12/2022    HGB 15.4 09/12/2022    HCT 45.6 09/12/2022     09/12/2022    CHOL 145 12/08/2021    TRIG 84 12/08/2021    HDL 45 12/08/2021    ALT 55 (H) 09/12/2022    AST 72 (H) 09/12/2022     09/12/2022    K 3.8 09/12/2022     09/12/2022    CREATININE 1.31 09/12/2022    BUN 18 09/12/2022    CO2 29 09/12/2022    TSH 1.535 12/08/2021    PSA 11.7 (H) 12/08/2021    INR 0.9 07/06/2022    HGBA1C 5.6 01/26/2022     Results for orders placed or performed in visit on 10/04/22   EKG 12-lead    Collection Time: 10/04/22 10:51 AM    Narrative    Test Reason : Z01.810,    Vent. Rate : 087 BPM     Atrial Rate : 087 BPM     P-R Int : 166 ms          QRS Dur : 080 ms      QT Int : 362 ms       P-R-T Axes : 061 039 042 degrees     QTc Int : 435 ms    Normal sinus rhythm  Normal ECG  When compared with ECG of 12-SEP-2022 17:34,  No significant change was found  Confirmed by Oswaldo AVILA, Arie NATION ([id number]) on 10/6/2022 7:32:16 AM    Referred By: HSIRA VAZQUEZ           Confirmed By:Arie Chacon MD     CXR 9/12/22  Single view of the chest.  No comparison  Cardiac silhouette is normal.  The lungs demonstrate no evidence of active disease.  No evidence of pleural effusion or pneumothorax.  Bones appear intact.  Aorta demonstrates atherosclerotic disease.  Impression:No acute process seen.      Anesthesia Plan  Type of Anesthesia, risks & benefits discussed:    Anesthesia Type: Gen ETT  Intra-op Monitoring Plan: Standard ASA Monitors  Post Op Pain Control Plan: multimodal analgesia  Induction:  IV  Airway Plan: Direct  Informed Consent: Informed consent signed with the  Patient and all parties understand the risks and agree with anesthesia plan.  All questions answered.   ASA Score: 2  Anesthesia Plan Notes: Anesthesia consent to be signed prior to surgery 10/26/22      Ready For Surgery From Anesthesia Perspective.     .

## 2022-10-19 NOTE — PRE-PROCEDURE INSTRUCTIONS
Lorena or Mara does not know numbers    Allergies, medical, surgical, family and psychosocial histories reviewed with patient. Periop plan of care reviewed. Preop instructions given, including medications to take and to hold. Hibiclens soap and instructions on use given. Time allotted for questions to be addressed.  Patient verbalized understanding.

## 2022-10-19 NOTE — DISCHARGE INSTRUCTIONS
Your surgery is scheduled for 10/26/22.    Please report to [facility] on the 1st Floor at 0600 a.m.    THIS TIME IS SUBJECT TO CHANGE.  YOU WILL RECEIVE A PHONE CALL THE DAY BEFORE SURGERY BY 3:30 PM TO CONFIRM YOUR TIME OF ARRIVAL.  IF YOU HAVE NOT RECEIVED A PHONE CALL BY 3:30 PM THE DAY BEFORE YOUR SURGERY PLEASE CALL [phone]     INSTRUCTIONS IMPORTANT!!!  ¨ Do not eat or drink after 12 midnight-including water, candy, gum, & mints. OK to brush teeth.      ____  Proceed to [facility] on *** for additional testing.        ____  Do not wear makeup, including mascara.  ____  No powder, lotions or creams to surgical area.  ____  Please remove all jewelry, including piercings and leave at home.  ____  No money or valuables needed. Please leave at home.  ____  Please bring any documents given by your doctor.  ____  If going home the same day, arrange for a ride home. You will not be able to             drive if Anesthesia was used.  ____  Children under 18 years require a parent / guardian present the entire time             they are in surgery / recovery.  ____  Wear loose fitting clothing. Allow for dressings, bandages.  ____  Stop Aspirin, Ibuprofen, Motrin, Aleve, Goody's/BC powders, Excedrine and Naproxen (NSAIDS) at least 3-5 days before surgery, unless otherwise instructed by your doctor, or the nurse.   You MAY use Tylenol/acetaminophen until day of surgery.  ____  Wash the surgical area with Hibiclens the night before surgery, and again the             morning of surgery.  Be sure to rinse hibiclens off completely (if instructed by   nurse).  ____  If you take diabetic medication, do not take am of surgery unless instructed by Doctor.  ____  Call MD for temperature above 101 degrees or any other signs of infection such as Urinary (bladder) infection, Upper respiratory infection, skin boils, etc.  ____ Stop taking any Fish Oil supplement or any Vitamins that contain Vitamin E at  least 5 days prior to surgery.  ____ Do Not wear your contact lenses the day of your procedure.  You may wear your glasses.      ____Do not shave surgical site for 3 days prior to surgery.  ____ Practice Good hand washing before, during, and after procedure.      I have read or had read and explained to me, and understand the above information.  Additional comments or instructions:  For additional questions call [phone]      ANESTHESIA SIDE EFFECTS  -For the first 24 hours after surgery:  Do not drive, use heavy equipment, make important decisions, or drink alcohol  -It is normal to feel sleepy for several hours.  Rest until you are more awake.  -Have someone stay with you, if needed.  They can watch for problems and help keep you safe.  -Some possible post anesthesia side effects include: nausea and vomiting, sore throat and hoarseness, sleepiness, and dizziness.        Pre-Op Bathing Instructions    Before surgery, you can play an important role in your own health.    Because skin is not sterile, we need to be sure that your skin is as free of germs as possible. By following the instructions below, you can reduce the number of germs on your skin before surgery.    IMPORTANT: You will need to shower with a special soap called Hibiclens*, available at any pharmacy.  If you are allergic to Chlorhexidine (the antiseptic in Hibiclens), use an antibacterial soap such as Dial Soap for your preoperative shower.  You will shower with Hibiclens both the night before your surgery and the morning of your surgery.  Do not use Hibiclens on the head, face or genitals to avoid injury to those areas.    STEP #1: THE NIGHT BEFORE YOUR SURGERY     Do not shave the area of your body where your surgery will be performed.  Shower and wash your hair and body as usual with your normal soap and shampoo.  Rinse your hair and body thoroughly after you shower to remove all soap residue.  With your hand, apply one packet of Hibiclens soap to  the surgical site.   Wash the site gently for five (5) minutes. Do not scrub your skin too hard.   Do not wash with your regular soap after Hibiclens is used.  Rinse your body thoroughly.  Pat yourself dry with a clean, soft towel.  Do not use lotion, cream, or powder.  Wear clean clothes.    STEP #2: THE MORNING OF YOUR SURGERY     Repeat Step #1.    * Not to be used by people allergic to Chlorhexidine.

## 2022-10-20 ENCOUNTER — TELEPHONE (OUTPATIENT)
Dept: UROLOGY | Facility: CLINIC | Age: 77
End: 2022-10-20
Payer: MEDICARE

## 2022-10-20 DIAGNOSIS — R30.0 DYSURIA: ICD-10-CM

## 2022-10-20 DIAGNOSIS — R30.0 DYSURIA: Primary | ICD-10-CM

## 2022-10-20 RX ORDER — CEFDINIR 300 MG/1
300 CAPSULE ORAL 2 TIMES DAILY
Qty: 28 CAPSULE | Refills: 0 | Status: SHIPPED | OUTPATIENT
Start: 2022-10-20 | End: 2022-11-03

## 2022-10-20 RX ORDER — CEFDINIR 300 MG/1
300 CAPSULE ORAL 2 TIMES DAILY
Qty: 28 CAPSULE | Refills: 0 | Status: SHIPPED | OUTPATIENT
Start: 2022-10-20 | End: 2022-10-20 | Stop reason: SDUPTHER

## 2022-10-20 NOTE — TELEPHONE ENCOUNTER
----- Message from Helena Luis MD sent at 10/20/2022  2:05 PM CDT -----  Please have pt start cefdinir tomorrow 10/21. Will call in 2 weeks of abx so he can take antibiotics leading up to his surgery and after.

## 2022-10-20 NOTE — TELEPHONE ENCOUNTER
Spoke with patient, informed of urine results and ABX therapy to start on tomorrow.  He request prescription be sent to [facility] in [place],  [phone].  He went to visit his daughter.

## 2022-10-26 ENCOUNTER — ANESTHESIA (OUTPATIENT)
Dept: SURGERY | Facility: HOSPITAL | Age: 77
End: 2022-10-26
Payer: MEDICARE

## 2022-10-26 ENCOUNTER — HOSPITAL ENCOUNTER (OUTPATIENT)
Facility: HOSPITAL | Age: 77
Discharge: HOME | End: 2022-10-26
Attending: STUDENT IN AN ORGANIZED HEALTH CARE EDUCATION/TRAINING PROGRAM | Admitting: STUDENT IN AN ORGANIZED HEALTH CARE EDUCATION/TRAINING PROGRAM
Payer: MEDICARE

## 2022-10-26 VITALS
HEART RATE: 66 BPM | DIASTOLIC BLOOD PRESSURE: 63 MMHG | WEIGHT: 229 LBS | TEMPERATURE: 98 F | HEIGHT: 71 IN | BODY MASS INDEX: 32.06 KG/M2 | SYSTOLIC BLOOD PRESSURE: 145 MMHG | RESPIRATION RATE: 15 BRPM | OXYGEN SATURATION: 98 %

## 2022-10-26 DIAGNOSIS — R33.9 URINARY RETENTION: ICD-10-CM

## 2022-10-26 DIAGNOSIS — C61 PROSTATE CANCER: ICD-10-CM

## 2022-10-26 PROCEDURE — 27201423 OPTIME MED/SURG SUP & DEVICES STERILE SUPPLY: Performed by: STUDENT IN AN ORGANIZED HEALTH CARE EDUCATION/TRAINING PROGRAM

## 2022-10-26 PROCEDURE — 37000009 HC ANESTHESIA EA ADD 15 MINS: Performed by: STUDENT IN AN ORGANIZED HEALTH CARE EDUCATION/TRAINING PROGRAM

## 2022-10-26 PROCEDURE — 63600175 PHARM REV CODE 636 W HCPCS: Performed by: ANESTHESIOLOGY

## 2022-10-26 PROCEDURE — 71000016 HC POSTOP RECOV ADDL HR: Performed by: STUDENT IN AN ORGANIZED HEALTH CARE EDUCATION/TRAINING PROGRAM

## 2022-10-26 PROCEDURE — 37000008 HC ANESTHESIA 1ST 15 MINUTES: Performed by: STUDENT IN AN ORGANIZED HEALTH CARE EDUCATION/TRAINING PROGRAM

## 2022-10-26 PROCEDURE — 52601 PR TRANSURETHRAL ELEC-SURG PROSTATECTOM: ICD-10-PCS | Mod: 22,,, | Performed by: STUDENT IN AN ORGANIZED HEALTH CARE EDUCATION/TRAINING PROGRAM

## 2022-10-26 PROCEDURE — 36000706: Performed by: STUDENT IN AN ORGANIZED HEALTH CARE EDUCATION/TRAINING PROGRAM

## 2022-10-26 PROCEDURE — 71000033 HC RECOVERY, INTIAL HOUR: Performed by: STUDENT IN AN ORGANIZED HEALTH CARE EDUCATION/TRAINING PROGRAM

## 2022-10-26 PROCEDURE — 25000003 PHARM REV CODE 250: Performed by: STUDENT IN AN ORGANIZED HEALTH CARE EDUCATION/TRAINING PROGRAM

## 2022-10-26 PROCEDURE — 88305 TISSUE EXAM BY PATHOLOGIST: CPT | Performed by: PATHOLOGY

## 2022-10-26 PROCEDURE — 36000707: Performed by: STUDENT IN AN ORGANIZED HEALTH CARE EDUCATION/TRAINING PROGRAM

## 2022-10-26 PROCEDURE — 63600175 PHARM REV CODE 636 W HCPCS: Performed by: NURSE PRACTITIONER

## 2022-10-26 PROCEDURE — 25000003 PHARM REV CODE 250: Performed by: NURSE ANESTHETIST, CERTIFIED REGISTERED

## 2022-10-26 PROCEDURE — 71000015 HC POSTOP RECOV 1ST HR: Performed by: STUDENT IN AN ORGANIZED HEALTH CARE EDUCATION/TRAINING PROGRAM

## 2022-10-26 PROCEDURE — 63600175 PHARM REV CODE 636 W HCPCS: Performed by: NURSE ANESTHETIST, CERTIFIED REGISTERED

## 2022-10-26 PROCEDURE — 52601 PROSTATECTOMY (TURP): CPT | Mod: 22,,, | Performed by: STUDENT IN AN ORGANIZED HEALTH CARE EDUCATION/TRAINING PROGRAM

## 2022-10-26 RX ORDER — DEXAMETHASONE SODIUM PHOSPHATE 4 MG/ML
INJECTION, SOLUTION INTRA-ARTICULAR; INTRALESIONAL; INTRAMUSCULAR; INTRAVENOUS; SOFT TISSUE
Status: DISCONTINUED | OUTPATIENT
Start: 2022-10-26 | End: 2022-10-26

## 2022-10-26 RX ORDER — SODIUM CHLORIDE, SODIUM LACTATE, POTASSIUM CHLORIDE, CALCIUM CHLORIDE 600; 310; 30; 20 MG/100ML; MG/100ML; MG/100ML; MG/100ML
INJECTION, SOLUTION INTRAVENOUS CONTINUOUS
Status: DISCONTINUED | OUTPATIENT
Start: 2022-10-26 | End: 2022-10-26 | Stop reason: HOSPADM

## 2022-10-26 RX ORDER — HYDROMORPHONE HYDROCHLORIDE 2 MG/ML
0.5 INJECTION, SOLUTION INTRAMUSCULAR; INTRAVENOUS; SUBCUTANEOUS EVERY 5 MIN PRN
Status: DISCONTINUED | OUTPATIENT
Start: 2022-10-26 | End: 2022-10-26 | Stop reason: HOSPADM

## 2022-10-26 RX ORDER — PROPOFOL 10 MG/ML
VIAL (ML) INTRAVENOUS
Status: DISCONTINUED | OUTPATIENT
Start: 2022-10-26 | End: 2022-10-26

## 2022-10-26 RX ORDER — CEFAZOLIN SODIUM 1 G/3ML
INJECTION, POWDER, FOR SOLUTION INTRAMUSCULAR; INTRAVENOUS
Status: DISCONTINUED | OUTPATIENT
Start: 2022-10-26 | End: 2022-10-26

## 2022-10-26 RX ORDER — ACETAMINOPHEN 10 MG/ML
INJECTION, SOLUTION INTRAVENOUS
Status: DISCONTINUED | OUTPATIENT
Start: 2022-10-26 | End: 2022-10-26

## 2022-10-26 RX ORDER — OXYCODONE AND ACETAMINOPHEN 5; 325 MG/1; MG/1
1 TABLET ORAL EVERY 6 HOURS PRN
Qty: 20 TABLET | Refills: 0 | Status: SHIPPED | OUTPATIENT
Start: 2022-10-26 | End: 2022-10-27

## 2022-10-26 RX ORDER — ROCURONIUM BROMIDE 10 MG/ML
INJECTION, SOLUTION INTRAVENOUS
Status: DISCONTINUED | OUTPATIENT
Start: 2022-10-26 | End: 2022-10-26

## 2022-10-26 RX ORDER — LIDOCAINE HYDROCHLORIDE 20 MG/ML
JELLY TOPICAL
Status: DISCONTINUED | OUTPATIENT
Start: 2022-10-26 | End: 2022-10-26 | Stop reason: HOSPADM

## 2022-10-26 RX ORDER — ONDANSETRON 2 MG/ML
4 INJECTION INTRAMUSCULAR; INTRAVENOUS DAILY PRN
Status: DISCONTINUED | OUTPATIENT
Start: 2022-10-26 | End: 2022-10-26 | Stop reason: HOSPADM

## 2022-10-26 RX ORDER — SODIUM CHLORIDE 0.9 % (FLUSH) 0.9 %
3 SYRINGE (ML) INJECTION
Status: DISCONTINUED | OUTPATIENT
Start: 2022-10-26 | End: 2022-10-26 | Stop reason: HOSPADM

## 2022-10-26 RX ORDER — LIDOCAINE HYDROCHLORIDE 20 MG/ML
INJECTION, SOLUTION EPIDURAL; INFILTRATION; INTRACAUDAL; PERINEURAL
Status: DISCONTINUED | OUTPATIENT
Start: 2022-10-26 | End: 2022-10-26

## 2022-10-26 RX ORDER — DIPHENHYDRAMINE HYDROCHLORIDE 50 MG/ML
12.5 INJECTION INTRAMUSCULAR; INTRAVENOUS EVERY 6 HOURS PRN
Status: DISCONTINUED | OUTPATIENT
Start: 2022-10-26 | End: 2022-10-26 | Stop reason: HOSPADM

## 2022-10-26 RX ORDER — CEFAZOLIN SODIUM 2 G/50ML
2 SOLUTION INTRAVENOUS
Status: DISCONTINUED | OUTPATIENT
Start: 2022-10-26 | End: 2022-10-26 | Stop reason: HOSPADM

## 2022-10-26 RX ORDER — SUCCINYLCHOLINE CHLORIDE 20 MG/ML INJECTION SOLUTION
SOLUTION
Status: DISCONTINUED | OUTPATIENT
Start: 2022-10-26 | End: 2022-10-26

## 2022-10-26 RX ORDER — SODIUM CHLORIDE 9 MG/ML
INJECTION, SOLUTION INTRAVENOUS CONTINUOUS
Status: DISCONTINUED | OUTPATIENT
Start: 2022-10-26 | End: 2022-10-26 | Stop reason: HOSPADM

## 2022-10-26 RX ORDER — NEOSTIGMINE METHYLSULFATE 0.5 MG/ML
INJECTION, SOLUTION INTRAVENOUS
Status: DISCONTINUED | OUTPATIENT
Start: 2022-10-26 | End: 2022-10-26

## 2022-10-26 RX ORDER — ONDANSETRON 2 MG/ML
INJECTION INTRAMUSCULAR; INTRAVENOUS
Status: DISCONTINUED | OUTPATIENT
Start: 2022-10-26 | End: 2022-10-26

## 2022-10-26 RX ORDER — LIDOCAINE HYDROCHLORIDE 10 MG/ML
1 INJECTION, SOLUTION EPIDURAL; INFILTRATION; INTRACAUDAL; PERINEURAL ONCE
Status: DISCONTINUED | OUTPATIENT
Start: 2022-10-26 | End: 2022-10-26 | Stop reason: HOSPADM

## 2022-10-26 RX ORDER — FENTANYL CITRATE 50 UG/ML
INJECTION, SOLUTION INTRAMUSCULAR; INTRAVENOUS
Status: DISCONTINUED | OUTPATIENT
Start: 2022-10-26 | End: 2022-10-26

## 2022-10-26 RX ADMIN — FENTANYL CITRATE 50 MCG: 50 INJECTION, SOLUTION INTRAMUSCULAR; INTRAVENOUS at 09:10

## 2022-10-26 RX ADMIN — ROCURONIUM BROMIDE 20 MG: 10 INJECTION, SOLUTION INTRAVENOUS at 08:10

## 2022-10-26 RX ADMIN — ONDANSETRON 4 MG: 2 INJECTION, SOLUTION INTRAMUSCULAR; INTRAVENOUS at 09:10

## 2022-10-26 RX ADMIN — LIDOCAINE HYDROCHLORIDE 100 MG: 20 INJECTION, SOLUTION INTRAVENOUS at 08:10

## 2022-10-26 RX ADMIN — FENTANYL CITRATE 50 MCG: 50 INJECTION, SOLUTION INTRAMUSCULAR; INTRAVENOUS at 07:10

## 2022-10-26 RX ADMIN — ROCURONIUM BROMIDE 30 MG: 10 INJECTION, SOLUTION INTRAVENOUS at 08:10

## 2022-10-26 RX ADMIN — PROPOFOL 150 MG: 10 INJECTION, EMULSION INTRAVENOUS at 08:10

## 2022-10-26 RX ADMIN — Medication 120 MG: at 08:10

## 2022-10-26 RX ADMIN — DEXAMETHASONE SODIUM PHOSPHATE 4 MG: 4 INJECTION, SOLUTION INTRA-ARTICULAR; INTRALESIONAL; INTRAMUSCULAR; INTRAVENOUS; SOFT TISSUE at 08:10

## 2022-10-26 RX ADMIN — ONDANSETRON 4 MG: 2 INJECTION, SOLUTION INTRAMUSCULAR; INTRAVENOUS at 10:10

## 2022-10-26 RX ADMIN — NEOSTIGMINE METHYLSULFATE 3 MG: 0.5 INJECTION, SOLUTION INTRAVENOUS at 09:10

## 2022-10-26 RX ADMIN — GLYCOPYRROLATE 0.3 MG: 0.2 INJECTION, SOLUTION INTRAMUSCULAR; INTRAVITREAL at 09:10

## 2022-10-26 RX ADMIN — ACETAMINOPHEN 1000 MG: 10 INJECTION INTRAVENOUS at 08:10

## 2022-10-26 RX ADMIN — FENTANYL CITRATE 50 MCG: 50 INJECTION, SOLUTION INTRAMUSCULAR; INTRAVENOUS at 08:10

## 2022-10-26 RX ADMIN — SODIUM CHLORIDE, SODIUM LACTATE, POTASSIUM CHLORIDE, AND CALCIUM CHLORIDE: .6; .31; .03; .02 INJECTION, SOLUTION INTRAVENOUS at 08:10

## 2022-10-26 RX ADMIN — CEFAZOLIN 2 G: 330 INJECTION, POWDER, FOR SOLUTION INTRAMUSCULAR; INTRAVENOUS at 08:10

## 2022-10-26 NOTE — PLAN OF CARE
VSS  NAD  Discharge instructions given with claimed understanding by pt and family. Patient has ride home with family or friend. Claims pain level is __2____ at this time.  Has voided without difficulty if required by surgical type.

## 2022-10-26 NOTE — OP NOTE
OPERATIVE DICTATION  DATE OF OPERATION: 10/26/2022    SERVICE: Urology    SURGEONS:  1. Helena Luis MD    ANESTHESIA:  Anesthesiologist: Liu Dominguez MD  CRNA: Medina Das CRNA    STAFF:  Circulator: Manda King, LIZZY; Shellie Carlton RN  Scrub Person: Darby Lama  Technician: Evangelist Anne, RT    ANESTHESIA: General    PREOPERATIVE DIAGNOSIS: Pre-Op Diagnosis Codes:     * Urinary retention [R33.9]     * Prostate cancer [C61]    POSTOPERATIVE DIAGNOSIS: Post-Op Diagnosis Codes:     * Urinary retention [R33.9]     * Prostate cancer [C61]    PROCEDURES:   1. Transurethral bipolar resection of prostate (modifier 22)   2. Bedoya catheter placement by MD    COMPLICATIONS: * No complications entered in OR log *    DRAINS: none    TUBES: 22 Czech bedoya on traction, 20cc in balloon    IMPLANTS: * No implants in log *    FLUIDS: see anesthesia record     ESTIMATED BLOOD LOSS: * No values recorded between 10/26/2022 12:00 AM and 10/26/2022  8:00 AM *    FINDINGS:   1. Significantly enlarged lateral lobes, mildly enlarged median lobe - friable tissue with inflammation, calcifications encountered - more bleeding than typical due to friable tissue.     SPECIMEN(S):   Prostate chips    CONDITION: stable    INDICATIONS FOR THE PROCEDURE:  See H&P    PROCEDURE IN DETAIL:  After appropriate informed consent was obtained, the patient was taken to the operating room and placed in the supine position. After induction of General, he was placed in the dorsal lithotomy position. he was prepped and draped in the usual sterile fashion.     Thereafter a WHO timeout was performed and the procedure was initiated. To perform the bladder biopsy, the rigid cystoscope was inserted into the bladder via the urethra. I performed a formal cystoscopy and identified Significantly enlarged lateral lobes, mildly enlarged median lobe.     The rigid cystoscope was removed and I inserted the rigid resectoscope with the bipolar  loop.The bipolar resectoscope was brought out to the area of the bladder neck to begin the transurethral resection portion of the procedure. We began by resecting the median lobe at the 6 o'clock position of the prostate. The entire prostate tissue that was resected was noted to be very friable with inflammation and calcifications -  - more bleeding than typical due to friable tissue - requiring greater than 75% more time and effort than norm justifying modifier 22.The prostate was resected at the 6 o'clock position no further distal than the verumontanum. The verumontanum was visualized and used as our distal border of resection. The resection was carried out to address the lateral lobes with the bipolar loop.     The Tomfoolery evacuator was connected to the rigid resectoscope to remove all of the prostate chip tissues and this was sent off for pathologic evaluation as well. At the end of the resection portion of the procedure the scope was positioned at the veru and a large channel was created to the bladder. The rest of the procedure was spent cauterizing all of the prostatic bed and bleeding vessels. At the end of the procedure no active bleeding was seen.     A 22 Fr catheter with 20cc in the balloon was inserted into the bladder, placed on traction, and irrigated to clear.     He was awoken from anesthesia and brought to PACU for further observation. This concluded the procedure.     ATTENDING ATTESTATION  I was present and scrubbed for the entire duration  of the procedure.      CASE DURATION:  * Missing case tracking time(s) *    DISPOSITION:  The patient was extubated without any adverse event. He will be transferred to the PACU in stable condition. After he convalesces, he will be discharged from same day surgery with a bedoya catheter in place. He will return in 7 days for a voiding trial in clinic.     Helena Luis MD

## 2022-10-26 NOTE — TRANSFER OF CARE
"Anesthesia Transfer of Care Note    Patient: Noah Lopez    Procedure(s) Performed: Procedure(s) (LRB):  TURP (TRANSURETHRAL RESECTION OF PROSTATE) (N/A)    Patient location: PACU    Anesthesia Type: general    Transport from OR: Transported from OR on room air with adequate spontaneous ventilation    Post pain: adequate analgesia    Post assessment: no apparent anesthetic complications and tolerated procedure well    Post vital signs: stable    Level of consciousness: awake, alert and oriented    Nausea/Vomiting: no nausea/vomiting    Complications: none    Transfer of care protocol was followed      Last vitals:   Visit Vitals  BP (!) 142/67 (BP Location: Left arm, Patient Position: Lying)   Pulse 82   Temp 36.7 °C (98.1 °F) (Skin)   Resp 16   Ht 5' 11" (1.803 m)   Wt 103.9 kg (229 lb)   SpO2 97%   BMI 31.94 kg/m²     "

## 2022-10-26 NOTE — ANESTHESIA POSTPROCEDURE EVALUATION
Anesthesia Post Evaluation    Patient: Noah Lopez    Procedure(s) Performed: Procedure(s) (LRB):  TURP (TRANSURETHRAL RESECTION OF PROSTATE) (N/A)    Final Anesthesia Type: general      Patient location during evaluation: PACU  Patient participation: Yes- Able to Participate  Level of consciousness: awake and alert, oriented and awake  Post-procedure vital signs: reviewed and stable  Pain management: adequate  Airway patency: patent    PONV status at discharge: No PONV  Anesthetic complications: no      Cardiovascular status: blood pressure returned to baseline  Respiratory status: unassisted and room air  Hydration status: euvolemic  Follow-up not needed.          Vitals Value Taken Time   /63 10/26/22 1110   Temp 36.6 °C (97.9 °F) 10/26/22 1050   Pulse 66 10/26/22 1110   Resp 15 10/26/22 1110   SpO2 98 % 10/26/22 1110         Event Time   Out of Recovery 10:50:00         Pain/Barbara Score: Barbara Score: 10 (10/26/2022 11:13 AM)

## 2022-10-26 NOTE — PLAN OF CARE
Signed out from pacu per Dr Dominguez. Report called to Cosme Rn/ops. Transported to ops via stretcher,sr upx2.

## 2022-10-26 NOTE — DISCHARGE SUMMARY
[facility])  Urology  Discharge Summary      Patient Name: Noah Lopez  MRN: [id number]  Admission Date: 10/26/2022  Hospital Length of Stay: 0 days  Discharge Date: 10/26/2022  Attending Physician: Helena Luis MD   Discharging Provider: Helena Luis MD  Primary Care Physician: Anthony De La Torre MD    HPI: The patient is a 77 y.o. male with past medical history (listed below) urinary retention.    The patient elected to proceed with the procedure(s) below. Please see H&P and/or clinic progress note(s) for full details.     Procedure(s) (LRB):  TURP (TRANSURETHRAL RESECTION OF PROSTATE) (N/A)     Past Medical History:   Diagnosis Date    Colon polyp 09/06/2018    Hyperlipidemia     Hypertension        Hospital Course (synopsis of major diagnoses, care, treatment, and services provided during the course of the hospital stay): pt tolerated procedure well, was transferred to pacu and back to OPS. Met with the pt and his 2 daughters and all felt comfortable for discharge with bedoya catheter.      Consults:     Significant Diagnostic Studies:      Pending Diagnostic Studies:       Procedure Component Value Units Date/Time    Specimen to Pathology, Surgery Urology [[id number]] Collected: 10/26/22 0933    Order Status: Sent Lab Status: In process Updated: 10/26/22 0933    Specimen: Tissue             There are no hospital problems to display for this patient.      Discharged Condition: good    Disposition: Home or Self Care    Follow Up:      Patient Instructions:      Diet Adult Regular     No dressing needed   Order Comments: Pt is going home with bedoya; need leg bag and large gu drainage bag     Activity as tolerated       Medications:  Reconciled Home Medications:      Medication List        START taking these medications      oxyCODONE-acetaminophen 5-325 mg per tablet  Commonly known as: PERCOCET  Take 1 tablet by mouth every 6 (six) hours as needed for Pain.            CONTINUE taking these  medications      ascorbic acid (vitamin C) 1000 MG tablet  Commonly known as: VITAMIN C  Take 1,000 mg by mouth once daily.     BENADRYL ALLERGY ORAL  Take by mouth.     BENEFIBER CLEAR SF (DEXTRIN) ORAL  Take by mouth.     cefdinir 300 MG capsule  Commonly known as: OMNICEF  Take 1 capsule (300 mg total) by mouth 2 (two) times daily. for 14 days     clotrimazole-betamethasone 1-0.05% cream  Commonly known as: LOTRISONE  Apply topically 2 (two) times daily.     diltiaZEM 300 MG 24 hr capsule  Commonly known as: CARDIZEM CD  TAKE 1 CAPSULE BY MOUTH  ONCE DAILY     docusate sodium 250 MG capsule  Commonly known as: COLACE  Take 250 mg by mouth once daily.     finasteride 5 mg tablet  Commonly known as: PROSCAR  Take 1 tablet (5 mg total) by mouth once daily.     fish oil-omega-3 fatty acids 300-1,000 mg capsule  Take 1 capsule by mouth once daily.     fluconazole 150 MG Tab  Commonly known as: DIFLUCAN  Take by mouth.     GAS RELIEF 80 (SIMETHICONE) ORAL  Take by mouth.     lisinopriL 20 MG tablet  Commonly known as: PRINIVIL,ZESTRIL  TAKE 1 TABLET BY MOUTH ONCE DAILY     melatonin 10 mg Tbdl  Take by mouth.     methocarbamoL 750 MG Tab  Commonly known as: ROBAXIN  Take 750-1,500 mg by mouth 3 (three) times daily as needed.     multivitamin capsule  Take 1 capsule by mouth once daily.     ofloxacin 0.3 % otic solution  Commonly known as: FLOXIN  Apply 10 drops into right ear once a day for 10 days.  Please dispense generic.     simvastatin 20 MG tablet  Commonly known as: ZOCOR  TAKE 1 TABLET BY MOUTH IN  THE EVENING     tamsulosin 0.4 mg Cap  Commonly known as: FLOMAX  Take 1 capsule (0.4 mg total) by mouth once daily.     traZODone 100 MG tablet  Commonly known as: DESYREL  TAKE 2 TABLETS BY MOUTH EVERY DAY AT BEDTIME AS NEEDED FOR INSOMNIA     triamterene-hydrochlorothiazide 37.5-25 mg 37.5-25 mg per capsule  Commonly known as: DYAZIDE  TAKE 1 CAPSULE BY MOUTH IN  THE MORNING     TURMERIC ORAL  Take by mouth.      VITAMIN D3 ORAL  Take 125 mcg by mouth.     zinc gluconate 50 mg tablet  Take 50 mg by mouth once daily.              Time spent on the discharge of patient: 15 minutes    Helena Luis MD  Urology  [facility] ([facility])

## 2022-10-26 NOTE — ANESTHESIA PROCEDURE NOTES
Intubation    Date/Time: 10/26/2022 8:04 AM  Performed by: Medina Das CRNA  Authorized by: Liu Dominguez MD     Intubation:     Induction:  Intravenous    Intubated:  Postinduction    Mask Ventilation:  Easy mask    Attempts:  1    Attempted By:  CRNA    Method of Intubation:  Direct    Blade:  Resendez 2    Laryngeal View Grade: Grade I - full view of cords      Difficult Airway Encountered?: No      Complications:  None    Airway Device:  Oral endotracheal tube    Airway Device Size:  7.5    Style/Cuff Inflation:  Cuffed    Inflation Amount (mL):  8    Tube secured:  23    Secured at:  The lips    Placement Verified By:  Capnometry    Complicating Factors:  None    Findings Post-Intubation:  BS equal bilateral and atraumatic/condition of teeth unchanged

## 2022-10-27 ENCOUNTER — OFFICE VISIT (OUTPATIENT)
Dept: HEMATOLOGY/ONCOLOGY | Facility: CLINIC | Age: 77
End: 2022-10-27
Payer: MEDICARE

## 2022-10-27 VITALS
BODY MASS INDEX: 32.05 KG/M2 | WEIGHT: 229.81 LBS | HEART RATE: 75 BPM | SYSTOLIC BLOOD PRESSURE: 103 MMHG | RESPIRATION RATE: 16 BRPM | DIASTOLIC BLOOD PRESSURE: 57 MMHG | OXYGEN SATURATION: 97 % | TEMPERATURE: 97 F

## 2022-10-27 DIAGNOSIS — R91.1 SOLITARY PULMONARY NODULE: ICD-10-CM

## 2022-10-27 DIAGNOSIS — I10 HYPERTENSION, UNSPECIFIED TYPE: ICD-10-CM

## 2022-10-27 DIAGNOSIS — E78.5 HYPERLIPIDEMIA, UNSPECIFIED HYPERLIPIDEMIA TYPE: ICD-10-CM

## 2022-10-27 DIAGNOSIS — E04.1 THYROID NODULE: ICD-10-CM

## 2022-10-27 DIAGNOSIS — C61 PROSTATE CANCER: Primary | ICD-10-CM

## 2022-10-27 PROBLEM — L60.8 PINCER NAIL DEFORMITY: Status: RESOLVED | Noted: 2018-08-28 | Resolved: 2022-10-27

## 2022-10-27 PROCEDURE — 99999 PR PBB SHADOW E&M-EST. PATIENT-LVL V: ICD-10-PCS | Mod: PBBFAC,,, | Performed by: HOSPITALIST

## 2022-10-27 PROCEDURE — 3078F PR MOST RECENT DIASTOLIC BLOOD PRESSURE < 80 MM HG: ICD-10-PCS | Mod: CPTII,S$GLB,, | Performed by: HOSPITALIST

## 2022-10-27 PROCEDURE — 3288F FALL RISK ASSESSMENT DOCD: CPT | Mod: CPTII,S$GLB,, | Performed by: HOSPITALIST

## 2022-10-27 PROCEDURE — 99215 OFFICE O/P EST HI 40 MIN: CPT | Mod: S$GLB,,, | Performed by: HOSPITALIST

## 2022-10-27 PROCEDURE — 99499 UNLISTED E&M SERVICE: CPT | Mod: S$GLB,,, | Performed by: HOSPITALIST

## 2022-10-27 PROCEDURE — 1101F PR PT FALLS ASSESS DOC 0-1 FALLS W/OUT INJ PAST YR: ICD-10-PCS | Mod: CPTII,S$GLB,, | Performed by: HOSPITALIST

## 2022-10-27 PROCEDURE — 99215 PR OFFICE/OUTPT VISIT, EST, LEVL V, 40-54 MIN: ICD-10-PCS | Mod: S$GLB,,, | Performed by: HOSPITALIST

## 2022-10-27 PROCEDURE — 1126F AMNT PAIN NOTED NONE PRSNT: CPT | Mod: CPTII,S$GLB,, | Performed by: HOSPITALIST

## 2022-10-27 PROCEDURE — 3074F PR MOST RECENT SYSTOLIC BLOOD PRESSURE < 130 MM HG: ICD-10-PCS | Mod: CPTII,S$GLB,, | Performed by: HOSPITALIST

## 2022-10-27 PROCEDURE — 3074F SYST BP LT 130 MM HG: CPT | Mod: CPTII,S$GLB,, | Performed by: HOSPITALIST

## 2022-10-27 PROCEDURE — 1159F PR MEDICATION LIST DOCUMENTED IN MEDICAL RECORD: ICD-10-PCS | Mod: CPTII,S$GLB,, | Performed by: HOSPITALIST

## 2022-10-27 PROCEDURE — 3288F PR FALLS RISK ASSESSMENT DOCUMENTED: ICD-10-PCS | Mod: CPTII,S$GLB,, | Performed by: HOSPITALIST

## 2022-10-27 PROCEDURE — 99499 RISK ADDL DX/OHS AUDIT: ICD-10-PCS | Mod: S$GLB,,, | Performed by: HOSPITALIST

## 2022-10-27 PROCEDURE — 1126F PR PAIN SEVERITY QUANTIFIED, NO PAIN PRESENT: ICD-10-PCS | Mod: CPTII,S$GLB,, | Performed by: HOSPITALIST

## 2022-10-27 PROCEDURE — 3078F DIAST BP <80 MM HG: CPT | Mod: CPTII,S$GLB,, | Performed by: HOSPITALIST

## 2022-10-27 PROCEDURE — 1101F PT FALLS ASSESS-DOCD LE1/YR: CPT | Mod: CPTII,S$GLB,, | Performed by: HOSPITALIST

## 2022-10-27 PROCEDURE — 99999 PR PBB SHADOW E&M-EST. PATIENT-LVL V: CPT | Mod: PBBFAC,,, | Performed by: HOSPITALIST

## 2022-10-27 PROCEDURE — 1159F MED LIST DOCD IN RCRD: CPT | Mod: CPTII,S$GLB,, | Performed by: HOSPITALIST

## 2022-10-27 RX ORDER — CALCIUM CARBONATE 500(1250)
2 TABLET ORAL DAILY
Qty: 60 TABLET | Refills: 11 | Status: SHIPPED | OUTPATIENT
Start: 2022-10-27 | End: 2022-12-22 | Stop reason: SDUPTHER

## 2022-10-27 RX ORDER — VIT C/E/ZN/COPPR/LUTEIN/ZEAXAN 250MG-90MG
1000 CAPSULE ORAL DAILY
Qty: 30 CAPSULE | Refills: 11 | Status: SHIPPED | OUTPATIENT
Start: 2022-10-27 | End: 2022-12-22 | Stop reason: SDUPTHER

## 2022-10-27 NOTE — ASSESSMENT & PLAN NOTE
- PSMA PET CT to further characterize bone lesion  - Pt to follow up with radiation oncology on 11/8/22  - Plan to start ADT on 11/8/22  - If clear metastatic disease, would consider adding novel ARSI at that time  - If not clearly metastatic, may consider single agent prolonged but finite ADT; and then resuming intensified ADT + ARSI if he then progresses

## 2022-10-27 NOTE — PATIENT INSTRUCTIONS
We are treating at least an intermediate risk prostate cancer. As such you will benefit from hormone therapy and may also benefit from radiation treatment. There is some suspicion you may have metastatic disease. We will check a PSMA-PET scan to help clarify if the cancer has spread. You will also follow up with radiation oncology team.     We will plan to start hormone shots on 11/8/22. We can then repeat imaging in a few months to determine if the spot in bone is truly metastatic disease. If it is we would likely add on additional hormone pills. If not, we may consider coming off treatment after a finite course of hormone therapy.    - Scheduled PSMA-PET scan  - FU with Dr. Carter and Quita on 11/8/22  - Start Lupron on 11/8/22  - Start Ca/D supplementation  - Will schedule bone mineral test and genetics team follow up at a future visit

## 2022-10-27 NOTE — PROGRESS NOTES
MEDICAL ONCOLOGY - NEW PATIENT VISIT    Best Contact Phone Number(s): [phone] (home)      Cancer/Stage/TNM:    Cancer Staging   No matching staging information was found for the patient.     Reason for visit: Mr. Lopez is a 77 year old man with recent diagnosis of 4+3 prostate adenocarcinoma found in the setting of ongoing urinary retention. Recent course notable for thyroid and pulmonary nodules and left pubic symphisis mass of unclear clinical significance.    History has been obtained by chart review and discussion with the patient.    HPI:   PSA was first noted to be elevated to 11.7 in 12/2021 with associated elevated creatinine. He then developed urinary retention requiring bedoya catheterization in 01/2022.     Subsequently seen in the ED in 03/2022 following a fall. CT imaging of the head revealed new thyroid nodules for which he underwent non-diagnostic FNA in 04/2022. He was also found to have new pulmonary nodules in the left lung up to 1.0 cm. Subsequent PET imaging on 5/16/22 to workup lung and thyroid nodules showed non-FDG avid SHANNAN nodules and FDG avid 2.2 cm thyroid nodule. Prostate had focal FDG avidity in the left lobe, and also incidentally found to hypermetabolic activity in the left pubic symphisis. Abnormal uptake in the right cerebellum later exonerated by MR brain 6/3/22. He later was scheduled for IR guided biopsy of the SHANNAN nodule on 07/06/2022 but elected against going through with the procedure.    Throughout this time, he had recurrent UTI associated with his indwelling bedoya requiring frequent antibiotic courses. He ultimately underwent TRUS biopsy on 8/11/22 showing up to 4+3 disease in 10/18 cores with associated PNI. Initially he has hoping for definitive prostatectomy, but bone scan and MR prostate again showed concerning lesion in left pubic symphisis. He elected against undergoing bone biopsy and proceeded with TURP for his urinary retention on 10/26/22.    Currently patient  feels generally well. He has moderate low back muscular pain for which he takes muscle relaxants. Has bedoya in place; no issues from recent procedure. No current fevers or chills. He is on Omnicef currently. Has some right sided facial pain. No HA. No CP, SOB or cough. No N/V/D. Appetite is good. No long bone pain. No other concerns.    Oncology History   Prostate cancer   12/8/2021 Tumor Markers    PSA 11.7     1/2022 Notable Event    ED visit for urinary retention found on abodminal US. Bedoya placed.     3/2022 Notable Event    Seen in ED for fall. Incidentally found to have suspicious thyroid nodule     4/20/2022 Biopsy    FNA of thyroid nodule nondiagnostic. Also found to have new SHANNAN lung nodules.     5/16/2022 Imaging Significant Findings    FDG PET-CT in setting of pulmonary nodules:  1.  Two solid left upper lobe pulmonary nodules with no appreciable FDG uptake.  Malignancy is not excluded, and the larger nodule is likely amenable to percutaneous biopsy. Alternatively, continued surveillance by dedicated chest CT alone, i.e., without FDG PET, may be considered.     2.  Focal FDG uptake in the left prostate gland with extracapsular extension.  These findings are concerning for malignancy.  Correlation with PSA and urology evaluation recommended.  MRI of the prostate may also be helpful for further evaluation.     3.  Abnormal appearance of the left symphysis pubis with hypermetabolic activity.  Differential considerations include fibrous dysplasia, Paget's disease, and metastasis.   MRI or bone algorithm CT may be informative.     4.  Incidental focal uptake in the right cerebellum on at least 2 PET slices.  Recommend brain MRI for further evaluation.     5.  2.2 cm left inferior thyroid lobe nodule.  This nodule was recently biopsied with abnormal results.  Please refer to pathology report.     5/25/2022 Tumor Markers    PSA 17.3     7/6/2022 Biopsy    Attempted SHANNAN nodule biopsy     8/11/2022 Biopsy    TRUS  biopsy: Prostate adenocarcinoma up to 4+3 disease in 10/18 cores all from the left side. Associated cribriform glands and PNI.       8/31/2022 Imaging Significant Findings    Bone scan:  1. Uptake within the anterior right 5th and 6th ribs is most likely related to degenerative changes.  Correlation with plain film or chest CT scan recommended.     2.  Intense uptake within the medial left pubic bone corresponds to an area of sclerosis seen on the comparison PET-CT scan.  Differential considerations remain to be fibrous dysplasia, Paget's disease or metastasis.     3.  Negative for metastatic pattern of uptake otherwise.  Degenerative pattern of uptake and other incidental findings as noted above.     9/29/2022 Imaging Significant Findings    MRI prostate: Left-sided PI-RADS 5 lesion with extraprostatic extension involving the left-sided neurovascular bundles and seminal vesicles.  Extension of abnormal signal adjacent to the left levator ani, cannot rule out involvement.  Abnormal signal also closely approximates with the anterior rectal wall without definite involvement.     Left pubic lesion concerning for metastasis.     10/17/2022 Notable Event    Declined pubic bone biopsy to potentially confirm metastatic prostate cancer     10/26/2022 Procedure    TURPT          Review of Systems   Constitutional:  Negative for appetite change, chills and fever.   HENT:  Negative for mouth sores, sore throat and trouble swallowing.    Eyes:  Negative for visual disturbance.   Respiratory:  Negative for cough and shortness of breath.    Cardiovascular:  Negative for chest pain, palpitations and leg swelling.   Gastrointestinal:  Negative for abdominal pain, blood in stool, constipation, diarrhea, nausea and vomiting.   Genitourinary:  Positive for difficulty urinating.   Musculoskeletal:  Positive for back pain. Negative for arthralgias and joint swelling.   Skin:  Negative for rash.   Neurological:  Negative for dizziness,  weakness, light-headedness and headaches.      Past Medical History:   Diagnosis Date    Colon polyp 2018    Hyperlipidemia     Hypertension         Past Surgical History:   Procedure Laterality Date    COLONOSCOPY      COLONOSCOPY N/A 2018    Procedure: COLONOSCOPY Suprep PLEASE TEXT PATIENT WITH ARRIVAL TIME;  Surgeon: Niharika Arce MD;  Location: [facility];  Service: Endoscopy;  Laterality: N/A;    CYSTOSCOPY WITH URODYNAMIC TESTING N/A 2022    Procedure: CYSTOSCOPY, WITH URODYNAMIC TESTING FLOUROSCOPIC;  Surgeon: Anthony Maloney MD;  [address];  Service: Urology;  Laterality: N/A;  1hr    KNEE ARTHROSCOPY      SPINE SURGERY      l-spine    TRANSURETHRAL RESECTION OF PROSTATE N/A 10/26/2022    Procedure: TURP (TRANSURETHRAL RESECTION OF PROSTATE);  Surgeon: Helena Luis MD;  Location: [facility];  Service: Urology;  Laterality: N/A;        Family History   Problem Relation Age of Onset    Hypertension Mother     Heart attack Father     Cirrhosis Father     No Known Problems Brother     No Known Problems Brother     Seizures Daughter     Liver disease Daughter     No Known Problems Son     Prostate cancer Neg Hx     Breast cancer Neg Hx     Pancreatic cancer Neg Hx         Social History     Tobacco Use    Smoking status: Former     Packs/day: 1.50     Types: Cigarettes     Start date:      Quit date:      Years since quittin.8    Smokeless tobacco: Never    Tobacco comments:     Age 17 - 37. Up to 1.5 ppd.   Substance Use Topics    Alcohol use: Yes     Comment: Occaisional. Few drinks a week.        I have reviewed and updated the patient's past medical, surgical, family and social histories.    Review of patient's allergies indicates:   Allergen Reactions    Ciprofloxacin Other (See Comments)     tendonitis    Sulfa (sulfonamide antibiotics) Nausea And Vomiting        Current Outpatient Medications   Medication Sig Dispense Refill    cefdinir (OMNICEF) 300  MG capsule Take 1 capsule (300 mg total) by mouth 2 (two) times daily. for 14 days 28 capsule 0    clotrimazole-betamethasone 1-0.05% (LOTRISONE) cream Apply topically 2 (two) times daily. 60 g 3    ascorbic acid, vitamin C, (VITAMIN C) 1000 MG tablet Take 1,000 mg by mouth once daily.      calcium carbonate (OS-TYREE) 500 mg calcium (1,250 mg) tablet Take 2 tablets (1,000 mg total) by mouth once daily. 60 tablet 11    cholecalciferol, vitamin D3, (VITAMIN D3) 25 mcg (1,000 unit) capsule Take 1 capsule (1,000 Units total) by mouth once daily. 30 capsule 11    diltiaZEM (CARDIZEM CD) 300 MG 24 hr capsule TAKE 1 CAPSULE BY MOUTH  ONCE DAILY 90 capsule 3    diphenhydramine HCl (BENADRYL ALLERGY ORAL) Take by mouth.      docusate sodium (COLACE) 250 MG capsule Take 250 mg by mouth once daily.      finasteride (PROSCAR) 5 mg tablet Take 1 tablet (5 mg total) by mouth once daily. 90 tablet 3    GAS RELIEF 80, SIMETHICONE, ORAL Take by mouth.      lisinopriL (PRINIVIL,ZESTRIL) 20 MG tablet TAKE 1 TABLET BY MOUTH ONCE DAILY 90 tablet 3    melatonin 10 mg TbDL Take by mouth.      methocarbamoL (ROBAXIN) 750 MG Tab Take 750-1,500 mg by mouth 3 (three) times daily as needed.      multivitamin capsule Take 1 capsule by mouth once daily.      ofloxacin (FLOXIN) 0.3 % otic solution Apply 10 drops into right ear once a day for 10 days.  Please dispense generic.      omega-3 fatty acids/fish oil (FISH OIL-OMEGA-3 FATTY ACIDS) 300-1,000 mg capsule Take 1 capsule by mouth once daily.      simvastatin (ZOCOR) 20 MG tablet TAKE 1 TABLET BY MOUTH IN  THE EVENING 90 tablet 3    tamsulosin (FLOMAX) 0.4 mg Cap Take 1 capsule (0.4 mg total) by mouth once daily. 90 capsule 3    traZODone (DESYREL) 100 MG tablet TAKE 2 TABLETS BY MOUTH EVERY DAY AT BEDTIME AS NEEDED FOR INSOMNIA 180 tablet 3    triamterene-hydrochlorothiazide 37.5-25 mg (DYAZIDE) 37.5-25 mg per capsule TAKE 1 CAPSULE BY MOUTH IN  THE MORNING 90 capsule 3    TURMERIC ORAL Take  by mouth.      wheat dextrin (BENEFIBER CLEAR SF, DEXTRIN, ORAL) Take by mouth.      zinc gluconate 50 mg tablet Take 50 mg by mouth once daily.       No current facility-administered medications for this visit.        Physical Exam:   BP (!) 103/57 (BP Location: Right arm, Patient Position: Sitting, BP Method: Medium (Automatic))   Pulse 75   Temp 96.6 °F (35.9 °C) (Oral)   Resp 16   Wt 104.2 kg (229 lb 13.3 oz)   SpO2 97%   BMI 32.05 kg/m²      ECOG Performance status: 0            Physical Exam  Constitutional:       General: He is not in acute distress.     Appearance: Normal appearance.   HENT:      Head: Normocephalic.      Mouth/Throat:      Mouth: Mucous membranes are moist.      Pharynx: Oropharynx is clear. No oropharyngeal exudate or posterior oropharyngeal erythema.   Eyes:      General: No scleral icterus.     Extraocular Movements: Extraocular movements intact.      Conjunctiva/sclera: Conjunctivae normal.      Pupils: Pupils are equal, round, and reactive to light.   Cardiovascular:      Rate and Rhythm: Normal rate and regular rhythm.      Heart sounds: No murmur heard.    No friction rub. No gallop.   Pulmonary:      Effort: Pulmonary effort is normal. No respiratory distress.      Breath sounds: Normal breath sounds. No wheezing, rhonchi or rales.   Abdominal:      General: There is no distension.      Palpations: Abdomen is soft.      Tenderness: There is no abdominal tenderness. There is no guarding or rebound.   Musculoskeletal:         General: No swelling. Normal range of motion.      Cervical back: Normal range of motion and neck supple.      Right lower leg: No edema.      Left lower leg: No edema.   Lymphadenopathy:      Cervical: No cervical adenopathy.   Skin:     General: Skin is warm and dry.      Coloration: Skin is not jaundiced.      Findings: No lesion or rash.   Neurological:      General: No focal deficit present.      Mental Status: He is alert and oriented to person,  place, and time.      Cranial Nerves: No cranial nerve deficit.      Motor: No weakness.   Psychiatric:         Mood and Affect: Mood normal.         Behavior: Behavior normal.         Thought Content: Thought content normal.         Labs:   Office Visit on 10/17/2022   Component Date Value Ref Range Status    Urine Culture, Routine 10/17/2022  (A)   Final                    Value:ESCHERICHIA COLI  > 100,000 cfu/ml      Urine Culture, Routine 10/17/2022  (A)   Final                    Value:MORGANELLA MORGANII  > 100,000 cfu/ml         Lab Results   Component Value Date     09/12/2022     08/30/2022     07/06/2022    K 3.8 09/12/2022    K 4.4 08/30/2022    K 3.9 07/06/2022    CREATININE 1.31 09/12/2022    CREATININE 1.25 08/30/2022    CREATININE 1.3 07/06/2022    WBC 6.21 09/12/2022    WBC 7.13 07/06/2022    WBC 9.20 12/08/2021    HGB 15.4 09/12/2022    HGB 15.3 07/06/2022    HGB 14.7 12/08/2021     09/12/2022     07/06/2022     12/08/2021    AST 72 (H) 09/12/2022    AST 31 08/30/2022    AST 20 07/06/2022    ALT 55 (H) 09/12/2022    ALT 27 08/30/2022    ALT 22 07/06/2022    ALKPHOS 96 09/12/2022    ALKPHOS 96 08/30/2022    ALKPHOS 80 07/06/2022          Imaging:         I have personally reviewed the imaging as summarized in oncology history    Path:   Reviewed pathology as documented in oncology history      Assessment and Plan:   1. Prostate cancer  Overview:  Patient with minimum unfavorable risk prostate cancer (given GG3 and PSA>10). Imaging is suspicious, but not definitive for metastatic spread, particiularly to the pubic symphisis. Patient declined bone biopsy. Regardless, he will benefit from initiation of hormonal therapy and likely would benefit from radiation treatment (for either localized disease or for low volume metastatic disease).    Assessment & Plan:  - PSMA PET CT to further characterize bone lesion  - Pt to follow up with radiation oncology on 11/8/22  -  Plan to start ADT on 11/8/22  - If clear metastatic disease, would consider adding novel ARSI at that time  - If not clearly metastatic, may consider single agent prolonged but finite ADT; and then resuming intensified ADT + ARSI if he then progresses    Orders:  -     Ambulatory referral/consult to Hematology / Oncology  -     NM PET CT F 18 PYL PSMA, [place] to Vertex; Future; Expected date: 10/27/2022  -     calcium carbonate (OS-TYREE) 500 mg calcium (1,250 mg) tablet; Take 2 tablets (1,000 mg total) by mouth once daily.  Dispense: 60 tablet; Refill: 11  -     cholecalciferol, vitamin D3, (VITAMIN D3) 25 mcg (1,000 unit) capsule; Take 1 capsule (1,000 Units total) by mouth once daily.  Dispense: 30 capsule; Refill: 11  -     CBC Oncology; Standing  -     Comprehensive Metabolic Panel; Standing  -     Prostate Specific Antigen, Diagnostic; Standing  -     Testosterone; Standing    2. Thyroid nodule  Overview:  TI-RADS 5 lesion. Non-diagnostic biopsy 04/2022.    Assessment & Plan:  - Needs repeat non urgent thyroid biopsy      3. Solitary pulmonary nodule  Assessment & Plan:  - Plan to monitor with serial imaging      4. Hypertension, unspecified type  Overview:  Patient not entirely sure of current regimen.  Appears to be on diltiazem 300mg and lisinopril 20mg po daily.     Assessment & Plan:  - Patient's daughter, Lorena, to verify his regimen      5. Hyperlipidemia, unspecified hyperlipidemia type  Overview:  On simvastatin 20mg daily    Assessment & Plan:  - Continue home meds             Follow up:   Route Chart for Scheduling    Med Onc Chart Routing      Follow up with physician Debra Carter 11/8/22   Follow up with JENIFFER    Infusion scheduling note Lupron 11/8/22   Injection scheduling note    Labs CBC, CMP and PSA   Lab interval:     Imaging PET scan   PSMA-PET priro to 11/8/22 appt   Pharmacy appointment    Other referrals          Supportive Plan Information  OP PROSTATE LEUPROLIDE Q3MO   Darren JARA  MD Raul   Upcoming Treatment Dates - OP PROSTATE LEUPROLIDE Q3MO    11/8/2022       Chemotherapy       leuprolide (LUPRON) injection 22.5 mg  1/31/2023       Chemotherapy       leuprolide (LUPRON) injection 22.5 mg  4/25/2023       Chemotherapy       leuprolide (LUPRON) injection 22.5 mg  7/18/2023       Chemotherapy       leuprolide (LUPRON) injection 22.5 mg      The above information has been reviewed with the patient and all questions have been answered to their apparent satisfaction.  They understand that they can call the clinic with any questions.    Darren Carter MD  Hematology/Oncology  [facility]

## 2022-10-31 LAB
FINAL PATHOLOGIC DIAGNOSIS: NORMAL
GROSS: NORMAL
Lab: NORMAL

## 2022-11-03 ENCOUNTER — PATIENT MESSAGE (OUTPATIENT)
Dept: UROLOGY | Facility: CLINIC | Age: 77
End: 2022-11-03

## 2022-11-03 ENCOUNTER — PATIENT MESSAGE (OUTPATIENT)
Dept: HEMATOLOGY/ONCOLOGY | Facility: CLINIC | Age: 77
End: 2022-11-03
Payer: MEDICARE

## 2022-11-03 ENCOUNTER — OFFICE VISIT (OUTPATIENT)
Dept: UROLOGY | Facility: CLINIC | Age: 77
End: 2022-11-03
Payer: MEDICARE

## 2022-11-03 ENCOUNTER — TELEPHONE (OUTPATIENT)
Dept: UROLOGY | Facility: CLINIC | Age: 77
End: 2022-11-03

## 2022-11-03 VITALS
DIASTOLIC BLOOD PRESSURE: 73 MMHG | WEIGHT: 226.44 LBS | BODY MASS INDEX: 31.7 KG/M2 | HEIGHT: 71 IN | HEART RATE: 89 BPM | SYSTOLIC BLOOD PRESSURE: 130 MMHG

## 2022-11-03 DIAGNOSIS — N31.2 HYPOCONTRACTILE BLADDER: ICD-10-CM

## 2022-11-03 DIAGNOSIS — R33.9 URINARY RETENTION: Primary | ICD-10-CM

## 2022-11-03 DIAGNOSIS — C61 PROSTATE CANCER: ICD-10-CM

## 2022-11-03 PROCEDURE — 1159F PR MEDICATION LIST DOCUMENTED IN MEDICAL RECORD: ICD-10-PCS | Mod: CPTII,S$GLB,, | Performed by: STUDENT IN AN ORGANIZED HEALTH CARE EDUCATION/TRAINING PROGRAM

## 2022-11-03 PROCEDURE — 3078F PR MOST RECENT DIASTOLIC BLOOD PRESSURE < 80 MM HG: ICD-10-PCS | Mod: CPTII,S$GLB,, | Performed by: STUDENT IN AN ORGANIZED HEALTH CARE EDUCATION/TRAINING PROGRAM

## 2022-11-03 PROCEDURE — 1126F AMNT PAIN NOTED NONE PRSNT: CPT | Mod: CPTII,S$GLB,, | Performed by: STUDENT IN AN ORGANIZED HEALTH CARE EDUCATION/TRAINING PROGRAM

## 2022-11-03 PROCEDURE — 1160F RVW MEDS BY RX/DR IN RCRD: CPT | Mod: CPTII,S$GLB,, | Performed by: STUDENT IN AN ORGANIZED HEALTH CARE EDUCATION/TRAINING PROGRAM

## 2022-11-03 PROCEDURE — 1126F PR PAIN SEVERITY QUANTIFIED, NO PAIN PRESENT: ICD-10-PCS | Mod: CPTII,S$GLB,, | Performed by: STUDENT IN AN ORGANIZED HEALTH CARE EDUCATION/TRAINING PROGRAM

## 2022-11-03 PROCEDURE — 1159F MED LIST DOCD IN RCRD: CPT | Mod: CPTII,S$GLB,, | Performed by: STUDENT IN AN ORGANIZED HEALTH CARE EDUCATION/TRAINING PROGRAM

## 2022-11-03 PROCEDURE — 3075F SYST BP GE 130 - 139MM HG: CPT | Mod: CPTII,S$GLB,, | Performed by: STUDENT IN AN ORGANIZED HEALTH CARE EDUCATION/TRAINING PROGRAM

## 2022-11-03 PROCEDURE — 3078F DIAST BP <80 MM HG: CPT | Mod: CPTII,S$GLB,, | Performed by: STUDENT IN AN ORGANIZED HEALTH CARE EDUCATION/TRAINING PROGRAM

## 2022-11-03 PROCEDURE — 99999 PR PBB SHADOW E&M-EST. PATIENT-LVL IV: ICD-10-PCS | Mod: PBBFAC,,, | Performed by: STUDENT IN AN ORGANIZED HEALTH CARE EDUCATION/TRAINING PROGRAM

## 2022-11-03 PROCEDURE — 3075F PR MOST RECENT SYSTOLIC BLOOD PRESS GE 130-139MM HG: ICD-10-PCS | Mod: CPTII,S$GLB,, | Performed by: STUDENT IN AN ORGANIZED HEALTH CARE EDUCATION/TRAINING PROGRAM

## 2022-11-03 PROCEDURE — 99215 PR OFFICE/OUTPT VISIT, EST, LEVL V, 40-54 MIN: ICD-10-PCS | Mod: 25,S$GLB,, | Performed by: STUDENT IN AN ORGANIZED HEALTH CARE EDUCATION/TRAINING PROGRAM

## 2022-11-03 PROCEDURE — 1160F PR REVIEW ALL MEDS BY PRESCRIBER/CLIN PHARMACIST DOCUMENTED: ICD-10-PCS | Mod: CPTII,S$GLB,, | Performed by: STUDENT IN AN ORGANIZED HEALTH CARE EDUCATION/TRAINING PROGRAM

## 2022-11-03 PROCEDURE — 99999 PR PBB SHADOW E&M-EST. PATIENT-LVL IV: CPT | Mod: PBBFAC,,, | Performed by: STUDENT IN AN ORGANIZED HEALTH CARE EDUCATION/TRAINING PROGRAM

## 2022-11-03 PROCEDURE — 99215 OFFICE O/P EST HI 40 MIN: CPT | Mod: 25,S$GLB,, | Performed by: STUDENT IN AN ORGANIZED HEALTH CARE EDUCATION/TRAINING PROGRAM

## 2022-11-03 NOTE — TELEPHONE ENCOUNTER
----- Message from Genet Whelan sent at 11/3/2022  2:28 PM CDT -----  Regarding: update  Contact: [phone]  Patient is calling to report he is fine and going to the bathroom normally. Thanks

## 2022-11-03 NOTE — PROGRESS NOTES
"Subjective:       Patient ID: Noah Lopez is a 77 y.o. male.    Chief Complaint:  void trial  This is a 77 y.o.  male patient that is an established patient of mine.  77 y.o.  male patient that is an established patient of mine.  The patient was referred to me by Dr. De La Torre for urinary retention. Dr. De La Torre obtained routine bloodwork in 12/2021. His Cr was elevated from baseline. He then ordered a renal US 1/4/22 - marked distention of bladder, no hydronephrosis bilaterally, enlarged prostate vol 197g, and pt presented to ER per his recommendation on for a bedoya placement.   ER visit 1/4/22 - 3600 cc of urine from his Beodya. Was not started on medications.      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 He feels that his "sphincter" has relaxed more.     Started him on flomax and finasteride 1 week ago. Pt wanted a voiding trial this week.     He failed a voiding trial. Worked up with cysto and UDS.  Cysto -Trilobar hypertrophy of the prostate, mildly enlarged median lobe, significantly enlarged lateral lobes  UDs with Dr. Maloney on 1/31/22 - see his report for full details -   Pdet at Max Flow: 0 cmH2O    EMG Storage: Normal Recruitment             EMG Voiding: Flaring with valsalva     Impression:           Sensation:Normal     Capacity: Large     Compliance:Normal     Detrusor Overactivity:Absent     Continence: No Incontinence     Contractility: Hypocontractility     Emptying:Unsatisfactory - Hypocontractility     Coordination:Dysfunctional (Valsalva) Voiding      He was having a bad reaction to Bactrim sent by Dr. De La Torre. Did not finish course.  Pt was not interested in a surgery. He felt that he "did this to myself." and with the results of the urodynamics with no detrusor pressure and valsalva voiding, an outlet reducing procedure would have a low chance of success. The patient states he is mostly interested in bedoya management " and he does not want a surgery at this time.     3/24/22  Here today for bedoya assistance. Pt went to ER and did not know how to disconnect bag    6/13/22  Pt notes cloudy urine and suprapubic discomfort. Also constipated. PSA was 17 5/25/22 and checked 2 days after a bedoya exchange. Patient notes he does not really drink many fluids from 9am-9pm.     7/12/22  I treated last UTI with ceftin x 10 days. I also recommended that he start augmentin the day before, the day of and the day after bedoya exchanges to guard against infections. He informed me that he did not do that.     8/25/22  Had patient try to call his daughter Loerna to include her in the conversation. She did not , pt stated likely is working. I told the patient I will type up a detailed note so she can follow along remotely.     He is s/p TRUS bx on 8/11/22; TRUS bx 60.4g. PSA 15.8 7/5/22. Full path report below.  Prostate biopsy demonstrated prostate cancer - [place] 4+3, grade group 3 present in 10/18 cores. Prostate biopsy cores were seen in all cores taken from left side.    RELIAPATH DIAGNOSIS:   A.   PROSTATE, LEFT APEX, NEEDLE BIOPSY:          HISTOLOGIC TYPE:  Acinar adenocarcinoma.          HISTOLOGIC GRADE:          Grade Group 3 (Vikash Score 4+3=7)            Percentage of Pattern 4:  70%          CRIBRIFORM GLANDS:  Present.          TUMOR QUANTITATION:            Number of Cores Positive:  2            Total Number of Cores:  2            Measurement of Tumor:  19 mm.            Measurement of Core Tissue:  31 mm.            Percentage of Prostatic Tissue Involved by Tumor:  60%          LYMPHOVASCULAR INVASION:  Not identified.          PERINEURAL INVASION:  Present.          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    PROSTATE, LEFT MID, NEEDLE BIOPSY:          HISTOLOGIC TYPE:  Acinar adenocarcinoma.          HISTOLOGIC GRADE:          Grade Group 3 ([place] Score 4+3=7)             Percentage of Pattern 4:  70%          CRIBRIFORM GLANDS:  Present.          TUMOR QUANTITATION:            Number of Cores Positive:  2            Total Number of Cores:  2            Measurement of Tumor:  16 mm.            Measurement of Core Tissue:  24 mm.            Percentage of Prostatic Tissue Involved by Tumor:  70%          LYMPHOVASCULAR INVASION:  Not identified.          PERINEURAL INVASION:  Present.  (C61.)   C.    PROSTATE, LEFT BASE, NEEDLE BIOPSY:          HISTOLOGIC TYPE:  Acinar adenocarcinoma.          HISTOLOGIC GRADE:          Grade Group 3 (Vikash Score 4+3=7)            Percentage of Pattern 4:  90%          CRIBRIFORM GLANDS:  Present.          TUMOR QUANTITATION:            Number of Cores Positive:  4            Total Number of Cores:  4            Measurement of Tumor:  17 mm.            Measurement of Core Tissue:  27 mm.            Percentage of Prostatic Tissue Involved by Tumor:  80%          LYMPHOVASCULAR INVASION:  Not identified.          PERINEURAL INVASION:  Present.  (C61.)   D.   TISSUE1           ROSTATE, LEFT TRANSITION ZONE,  BIOPSY:          HISTOLOGIC TYPE:  Acinar adenocarcinoma.          HISTOLOGIC GRADE:          Grade Group 3 ([place] Score 4+3=7)            Percentage of Pattern 4:  70%          CRIBRIFORM GLANDS:  Present.          TUMOR QUANTITATION:            Number of Cores Positive:  2            Total Number of Cores:  2            Measurement of Tumor:  7 mm.            Measurement of Core Tissue:  18 mm.            Percentage of Prostatic Tissue Involved by Tumor:  40%          LYMPHOVASCULAR INVASION:  Not identified.          PERINEURAL INVASION:  Not identified.  (C61.)   E.    PROSTATE, RIGHT APEX, NEEDLE BIOPSY:          Benign prostatic tissue with atrophy.  (R97.20)   F.   PROSTATE, RIGHT MID, NEEDLE BIOPSY:          Benign prostatic tissue with chronic inflammation and atrophy, see   comment.  (R97.20)   G.   PROSTATE, RIGHT BASE, NEEDLE BIOPSY:           Benign prostatic tissue with chronic inflammation with atrophy.   (R97.20)   H.   TISSUE2          PROSTATE, RIGHT TRANSITION ZONE, BIOPSY:          -Benign prostatic tissue with atrophy.  (R97.20)     10/17/22  In the interim, pt voiced that he would like to cancel the bone biopsy due to risks and adverse events and pain. He is in agreement with proceeding with prostate cancer treatment with oncologist as metastatic prostate cancer.   MRI 9/29/22 demonstrated vol of 63.35cc, pubic lesion concerning for metastatic disease, and left pirads 5 lesion with EPE involving left neurovascular bundles, seminal vesicles, and extension involving the left levator ani in close proximity with anterior rectal wall. MRI suggestive of cT4 disease,N0,M1b(pubic bone involvement).   Since he was still in urinary retention he elected to proceed with channel TURP to try to get rid of the catheter.     11/3/22  He is s/p TURP 10/26/22 - path demonstrated Vikash 4+5 prostate cancer, intraductal carcinoma identified, 50% of TURP chips. He is proceeding with ADT + radiation. Saw Dr. Carter with heme/onc on 10/27/22. He has an upcoming consultation with Dr. Devlin on 11/8/22.        LAST PSA  Lab Results   Component Value Date    PSA 11.7 (H) 12/08/2021    PSADIAG 17.3 (H) 05/25/2022    PSATOTAL 15.8 (H) 07/05/2022    PSAFREE 2.83 (H) 07/05/2022       Lab Results   Component Value Date    CREATININE 1.31 09/12/2022       ---  Past Medical History:   Diagnosis Date    Colon polyp 09/06/2018    Hyperlipidemia     Hypertension        Past Surgical History:   Procedure Laterality Date    COLONOSCOPY  2010    COLONOSCOPY N/A 9/6/2018    Procedure: COLONOSCOPY Suprep PLEASE TEXT PATIENT WITH ARRIVAL TIME;  Surgeon: Niharika Arce MD;  Location: [facility];  Service: Endoscopy;  Laterality: N/A;    CYSTOSCOPY WITH URODYNAMIC TESTING N/A 1/31/2022    Procedure: CYSTOSCOPY, WITH URODYNAMIC TESTING FLOUROSCOPIC;  Surgeon: Anthony BLISS  MD Amara;  Location: [facility] OR 1ST FLR;  Service: Urology;  Laterality: N/A;  1hr    KNEE ARTHROSCOPY      SPINE SURGERY      l-spine    TRANSURETHRAL RESECTION OF PROSTATE N/A 10/26/2022    Procedure: TURP (TRANSURETHRAL RESECTION OF PROSTATE);  Surgeon: Helena Luis MD;  Location: [facility] OR;  Service: Urology;  Laterality: N/A;       Family History   Problem Relation Age of Onset    Hypertension Mother     Heart attack Father     Cirrhosis Father     No Known Problems Brother     No Known Problems Brother     Seizures Daughter     Liver disease Daughter     No Known Problems Son     Prostate cancer Neg Hx     Breast cancer Neg Hx     Pancreatic cancer Neg Hx        Social History     Tobacco Use    Smoking status: Former     Packs/day: 1.50     Types: Cigarettes     Start date:      Quit date:      Years since quittin.8    Smokeless tobacco: Never    Tobacco comments:     Age 17 - 37. Up to 1.5 ppd.   Substance Use Topics    Alcohol use: Yes     Comment: Occaisional. Few drinks a week.    Drug use: Yes     Types: Marijuana       Current Outpatient Medications on File Prior to Visit   Medication Sig Dispense Refill    ascorbic acid, vitamin C, (VITAMIN C) 1000 MG tablet Take 1,000 mg by mouth once daily.      calcium carbonate (OS-TYREE) 500 mg calcium (1,250 mg) tablet Take 2 tablets (1,000 mg total) by mouth once daily. 60 tablet 11    cefdinir (OMNICEF) 300 MG capsule Take 1 capsule (300 mg total) by mouth 2 (two) times daily. for 14 days 28 capsule 0    cholecalciferol, vitamin D3, (VITAMIN D3) 25 mcg (1,000 unit) capsule Take 1 capsule (1,000 Units total) by mouth once daily. 30 capsule 11    clotrimazole-betamethasone 1-0.05% (LOTRISONE) cream Apply topically 2 (two) times daily. 60 g 3    diltiaZEM (CARDIZEM CD) 300 MG 24 hr capsule TAKE 1 CAPSULE BY MOUTH  ONCE DAILY 90 capsule 3    diphenhydramine HCl (BENADRYL ALLERGY ORAL) Take by mouth.      docusate sodium (COLACE) 250 MG capsule Take  250 mg by mouth once daily.      finasteride (PROSCAR) 5 mg tablet Take 1 tablet (5 mg total) by mouth once daily. 90 tablet 3    GAS RELIEF 80, SIMETHICONE, ORAL Take by mouth.      lisinopriL (PRINIVIL,ZESTRIL) 20 MG tablet TAKE 1 TABLET BY MOUTH ONCE DAILY 90 tablet 3    melatonin 10 mg TbDL Take by mouth.      methocarbamoL (ROBAXIN) 750 MG Tab Take 750-1,500 mg by mouth 3 (three) times daily as needed.      multivitamin capsule Take 1 capsule by mouth once daily.      ofloxacin (FLOXIN) 0.3 % otic solution Apply 10 drops into right ear once a day for 10 days.  Please dispense generic.      omega-3 fatty acids/fish oil (FISH OIL-OMEGA-3 FATTY ACIDS) 300-1,000 mg capsule Take 1 capsule by mouth once daily.      simvastatin (ZOCOR) 20 MG tablet TAKE 1 TABLET BY MOUTH IN  THE EVENING 90 tablet 3    tamsulosin (FLOMAX) 0.4 mg Cap Take 1 capsule (0.4 mg total) by mouth once daily. 90 capsule 3    traZODone (DESYREL) 100 MG tablet TAKE 2 TABLETS BY MOUTH EVERY DAY AT BEDTIME AS NEEDED FOR INSOMNIA 180 tablet 3    triamterene-hydrochlorothiazide 37.5-25 mg (DYAZIDE) 37.5-25 mg per capsule TAKE 1 CAPSULE BY MOUTH IN  THE MORNING 90 capsule 3    TURMERIC ORAL Take by mouth.      wheat dextrin (BENEFIBER CLEAR SF, DEXTRIN, ORAL) Take by mouth.      zinc gluconate 50 mg tablet Take 50 mg by mouth once daily.       No current facility-administered medications on file prior to visit.       Review of patient's allergies indicates:   Allergen Reactions    Ciprofloxacin Other (See Comments)     tendonitis    Sulfa (sulfonamide antibiotics) Nausea And Vomiting       Review of Systems   Constitutional:  Negative for chills.   HENT:  Negative for congestion.    Eyes:  Negative for visual disturbance.   Respiratory:  Negative for shortness of breath.    Cardiovascular:  Negative for chest pain.   Gastrointestinal:  Negative for abdominal distention.   Musculoskeletal:  Negative for gait problem.   Skin:  Negative for color change.    Neurological:  Negative for dizziness.   Psychiatric/Behavioral:  Negative for agitation.      Objective:      Physical Exam  Constitutional:       Appearance: He is well-developed.   HENT:      Head: Normocephalic.   Eyes:      Pupils: Pupils are equal, round, and reactive to light.   Pulmonary:      Effort: Pulmonary effort is normal.   Abdominal:      Palpations: Abdomen is soft.   Musculoskeletal:         General: Normal range of motion.      Cervical back: Normal range of motion.   Skin:     General: Skin is warm and dry.   Neurological:      Mental Status: He is alert.       Assessment:       1. Urinary retention    2. Hypocontractile bladder    3. Prostate cancer        Plan:       Urinary retention - s/p channel TURP. Alerted heme/onc and rad/onc to Gl 4+5 prostate cancer on specimen. They have a plan for ADT and likely radiation (has upcoming consultation with Dr. Devlin).  Voiding trial today.         Urinary retention    Hypocontractile bladder    Prostate cancer

## 2022-11-03 NOTE — PROGRESS NOTES
VOIDING TRIAL    INPUT 600 ML (Patient expressed urge to urinate, balloon deflated and bedoya removed from urinary bladder)    OUTPUT 650 ML    Patient advised to contact office by 1400 hours with an update on voiding.  He voiced understanding.

## 2022-11-07 DIAGNOSIS — C61 PROSTATE CANCER: Primary | ICD-10-CM

## 2022-11-07 RX ORDER — BICALUTAMIDE 50 MG/1
50 TABLET, FILM COATED ORAL DAILY
Qty: 30 TABLET | Refills: 0 | Status: SHIPPED | OUTPATIENT
Start: 2022-11-07 | End: 2022-12-05 | Stop reason: SDUPTHER

## 2022-11-07 NOTE — PROGRESS NOTES
PSMA PET delayed to 11/9 and first Lupron not until 11/18. Will go ahead and start bicalutamide 50mg daily after PET on 11/9 x30 days while starting the Lupron on 11/18 as planned.

## 2022-11-08 ENCOUNTER — OFFICE VISIT (OUTPATIENT)
Dept: RADIATION ONCOLOGY | Facility: CLINIC | Age: 77
End: 2022-11-08
Payer: MEDICARE

## 2022-11-08 VITALS
DIASTOLIC BLOOD PRESSURE: 65 MMHG | WEIGHT: 229.13 LBS | BODY MASS INDEX: 30.37 KG/M2 | HEIGHT: 73 IN | SYSTOLIC BLOOD PRESSURE: 136 MMHG | RESPIRATION RATE: 16 BRPM | HEART RATE: 91 BPM

## 2022-11-08 DIAGNOSIS — C61 PROSTATE CANCER: ICD-10-CM

## 2022-11-08 PROCEDURE — 3075F PR MOST RECENT SYSTOLIC BLOOD PRESS GE 130-139MM HG: ICD-10-PCS | Mod: CPTII,S$GLB,, | Performed by: RADIOLOGY

## 2022-11-08 PROCEDURE — 99999 PR PBB SHADOW E&M-EST. PATIENT-LVL V: ICD-10-PCS | Mod: PBBFAC,,, | Performed by: RADIOLOGY

## 2022-11-08 PROCEDURE — 1126F AMNT PAIN NOTED NONE PRSNT: CPT | Mod: CPTII,S$GLB,, | Performed by: RADIOLOGY

## 2022-11-08 PROCEDURE — 1101F PT FALLS ASSESS-DOCD LE1/YR: CPT | Mod: CPTII,S$GLB,, | Performed by: RADIOLOGY

## 2022-11-08 PROCEDURE — 99999 PR PBB SHADOW E&M-EST. PATIENT-LVL V: CPT | Mod: PBBFAC,,, | Performed by: RADIOLOGY

## 2022-11-08 PROCEDURE — 3288F FALL RISK ASSESSMENT DOCD: CPT | Mod: CPTII,S$GLB,, | Performed by: RADIOLOGY

## 2022-11-08 PROCEDURE — 1159F MED LIST DOCD IN RCRD: CPT | Mod: CPTII,S$GLB,, | Performed by: RADIOLOGY

## 2022-11-08 PROCEDURE — 99203 PR OFFICE/OUTPT VISIT, NEW, LEVL III, 30-44 MIN: ICD-10-PCS | Mod: S$GLB,,, | Performed by: RADIOLOGY

## 2022-11-08 PROCEDURE — 1160F PR REVIEW ALL MEDS BY PRESCRIBER/CLIN PHARMACIST DOCUMENTED: ICD-10-PCS | Mod: CPTII,S$GLB,, | Performed by: RADIOLOGY

## 2022-11-08 PROCEDURE — 3078F PR MOST RECENT DIASTOLIC BLOOD PRESSURE < 80 MM HG: ICD-10-PCS | Mod: CPTII,S$GLB,, | Performed by: RADIOLOGY

## 2022-11-08 PROCEDURE — 1159F PR MEDICATION LIST DOCUMENTED IN MEDICAL RECORD: ICD-10-PCS | Mod: CPTII,S$GLB,, | Performed by: RADIOLOGY

## 2022-11-08 PROCEDURE — 3078F DIAST BP <80 MM HG: CPT | Mod: CPTII,S$GLB,, | Performed by: RADIOLOGY

## 2022-11-08 PROCEDURE — 3288F PR FALLS RISK ASSESSMENT DOCUMENTED: ICD-10-PCS | Mod: CPTII,S$GLB,, | Performed by: RADIOLOGY

## 2022-11-08 PROCEDURE — 99203 OFFICE O/P NEW LOW 30 MIN: CPT | Mod: S$GLB,,, | Performed by: RADIOLOGY

## 2022-11-08 PROCEDURE — 3075F SYST BP GE 130 - 139MM HG: CPT | Mod: CPTII,S$GLB,, | Performed by: RADIOLOGY

## 2022-11-08 PROCEDURE — 1126F PR PAIN SEVERITY QUANTIFIED, NO PAIN PRESENT: ICD-10-PCS | Mod: CPTII,S$GLB,, | Performed by: RADIOLOGY

## 2022-11-08 PROCEDURE — 1160F RVW MEDS BY RX/DR IN RCRD: CPT | Mod: CPTII,S$GLB,, | Performed by: RADIOLOGY

## 2022-11-08 PROCEDURE — 1101F PR PT FALLS ASSESS DOC 0-1 FALLS W/OUT INJ PAST YR: ICD-10-PCS | Mod: CPTII,S$GLB,, | Performed by: RADIOLOGY

## 2022-11-08 RX ORDER — NITROFURANTOIN 25; 75 MG/1; MG/1
100 CAPSULE ORAL EVERY 12 HOURS
COMMUNITY
Start: 2022-11-05 | End: 2023-04-10 | Stop reason: ALTCHOICE

## 2022-11-08 NOTE — PATIENT INSTRUCTIONS
76 yo gentleman with recently diagnosed high risk prostate cancer.  Recent TURP revealed Vikash 9 disease.  ? metastatic disease in Lt. pubic symphysis.      Explained we are awaiting PSMA scan.    Will consider radiotherapy depending on the results.  Plan follow up PRN.

## 2022-11-09 ENCOUNTER — PATIENT MESSAGE (OUTPATIENT)
Dept: RADIATION ONCOLOGY | Facility: CLINIC | Age: 77
End: 2022-11-09
Payer: MEDICARE

## 2022-11-09 ENCOUNTER — HOSPITAL ENCOUNTER (OUTPATIENT)
Dept: RADIOLOGY | Facility: HOSPITAL | Age: 77
Discharge: HOME | End: 2022-11-09
Attending: HOSPITALIST
Payer: MEDICARE

## 2022-11-09 DIAGNOSIS — C61 PROSTATE CANCER: ICD-10-CM

## 2022-11-09 PROCEDURE — 78815 PET IMAGE W/CT SKULL-THIGH: CPT | Mod: TC,PS,PN

## 2022-11-09 PROCEDURE — 78815 PET IMAGE W/CT SKULL-THIGH: CPT | Mod: 26,PS,, | Performed by: RADIOLOGY

## 2022-11-09 PROCEDURE — 78815 NM PET CT F 18 PYL PSMA, MIDTHIGH TO VERTEX: ICD-10-PCS | Mod: 26,PS,, | Performed by: RADIOLOGY

## 2022-11-09 NOTE — PROGRESS NOTES
Multidisciplinary Uro-Oncology Clinic    HISTORY OF PRESENT ILLNESS:   This patient presents for discussion of possible radiotherapy for his prostate cancer.      was initially noted to have an elevated PSA on 11.7 ng/ml in December of 2021.  He presented in January with urinary retention requiring Jones placement. He initially denied TURP and continued with self cath.  He developed a number of UTIs.  CT during this time revealed lung and thyroid nodules.  Further work up with PET scan on 5/16/22 revealed 2 pulmonary nodules without FDG uptake.  There was uptake in the prostate with extraprostatic extension.  There was a hypermetabolic lesion in the Lt. symphysis pubis.  Repeat PSA in July returned at 15.8 ng/ml. MRI revealed a 63 cc prostate with a 4.6 cm T2 hypointense lesion encompassing the Lt. peripheral and transition zone from apex to base, PI-RADS 5 with extraprostatic extension and invasion of the Lt. neurovascular bundles.  There was ? involvement of the seminal vesicles. There was no adenopathy.  The Lt. pubic symphysis lesion was suspicious for metastatic disease.  The patient declined biopsy of the pelvic lesion or FNA of the lung nodules.  Biopsy of the prostate on 8/11/22 revealed [place] 7 (4+3) adenocarcinoma involving 10 of 10 cores from the Lt. base, Lt. mid gland, Lt. apex and target lesion.  There was perineural invasion.  The [place] pattern 4 accounted for 70 - 90% of the tumor.  The patient recently underwent TURP on 10/26/22.  Pathology revealed [place] 9 (4+5) adenocarcinoma involving 50% of the submitted tissue.  Jones removed on 11/3/22.  Voiding well but notes puss.  Antibiotics started.        REVIEW OF SYSTEMS:   Review of Systems   Constitutional:  Negative for chills, fever, malaise/fatigue and weight loss.   Respiratory:  Negative for cough and shortness of breath.    Cardiovascular:  Negative for chest pain and palpitations.   Gastrointestinal:  Negative for abdominal pain,  constipation and diarrhea.   Genitourinary:  Negative for dysuria, frequency, hematuria and urgency.       PAST MEDICAL HISTORY:  Past Medical History:   Diagnosis Date    Colon polyp 09/06/2018    Hyperlipidemia     Hypertension     Prostate cancer        PAST SURGICAL HISTORY:  Past Surgical History:   Procedure Laterality Date    COLONOSCOPY  2010    COLONOSCOPY N/A 9/6/2018    Procedure: COLONOSCOPY Suprep PLEASE TEXT PATIENT WITH ARRIVAL TIME;  Surgeon: Niharika Arce MD;  Location: [facility];  Service: Endoscopy;  Laterality: N/A;    CYSTOSCOPY WITH URODYNAMIC TESTING N/A 1/31/2022    Procedure: CYSTOSCOPY, WITH URODYNAMIC TESTING FLOUROSCOPIC;  Surgeon: Anthony Maloney MD;  Location: [facility] OR [facility] FLR;  Service: Urology;  Laterality: N/A;  1hr    KNEE ARTHROSCOPY      SPINE SURGERY      l-spine    TRANSURETHRAL RESECTION OF PROSTATE N/A 10/26/2022    Procedure: TURP (TRANSURETHRAL RESECTION OF PROSTATE);  Surgeon: Helena Luis MD;  Location: [facility] OR;  Service: Urology;  Laterality: N/A;       ALLERGIES:   Review of patient's allergies indicates:   Allergen Reactions    Ciprofloxacin Other (See Comments)     tendonitis    Sulfa (sulfonamide antibiotics) Nausea And Vomiting       MEDICATIONS:  Current Outpatient Medications   Medication Sig    ascorbic acid, vitamin C, (VITAMIN C) 1000 MG tablet Take 1,000 mg by mouth once daily.    bicalutamide (CASODEX) 50 MG Tab Take 1 tablet (50 mg total) by mouth once daily.    calcium carbonate (OS-TYREE) 500 mg calcium (1,250 mg) tablet Take 2 tablets (1,000 mg total) by mouth once daily.    cholecalciferol, vitamin D3, (VITAMIN D3) 25 mcg (1,000 unit) capsule Take 1 capsule (1,000 Units total) by mouth once daily.    clotrimazole-betamethasone 1-0.05% (LOTRISONE) cream Apply topically 2 (two) times daily.    diltiaZEM (CARDIZEM CD) 300 MG 24 hr capsule TAKE 1 CAPSULE BY MOUTH  ONCE DAILY    diphenhydramine HCl (BENADRYL ALLERGY ORAL) Take by mouth.    docusate  sodium (COLACE) 250 MG capsule Take 250 mg by mouth once daily.    finasteride (PROSCAR) 5 mg tablet Take 1 tablet (5 mg total) by mouth once daily.    GAS RELIEF 80, SIMETHICONE, ORAL Take by mouth.    lisinopriL (PRINIVIL,ZESTRIL) 20 MG tablet TAKE 1 TABLET BY MOUTH ONCE DAILY    melatonin 10 mg TbDL Take by mouth.    methocarbamoL (ROBAXIN) 750 MG Tab Take 750-1,500 mg by mouth 3 (three) times daily as needed.    multivitamin capsule Take 1 capsule by mouth once daily.    nitrofurantoin, macrocrystal-monohydrate, (MACROBID) 100 MG capsule Take 100 mg by mouth every 12 (twelve) hours.    ofloxacin (FLOXIN) 0.3 % otic solution Apply 10 drops into right ear once a day for 10 days.  Please dispense generic.    omega-3 fatty acids/fish oil (FISH OIL-OMEGA-3 FATTY ACIDS) 300-1,000 mg capsule Take 1 capsule by mouth once daily.    simvastatin (ZOCOR) 20 MG tablet TAKE 1 TABLET BY MOUTH IN  THE EVENING    tamsulosin (FLOMAX) 0.4 mg Cap Take 1 capsule (0.4 mg total) by mouth once daily.    traZODone (DESYREL) 100 MG tablet TAKE 2 TABLETS BY MOUTH EVERY DAY AT BEDTIME AS NEEDED FOR INSOMNIA    triamterene-hydrochlorothiazide 37.5-25 mg (DYAZIDE) 37.5-25 mg per capsule TAKE 1 CAPSULE BY MOUTH IN  THE MORNING    TURMERIC ORAL Take by mouth.    wheat dextrin (BENEFIBER CLEAR SF, DEXTRIN, ORAL) Take by mouth.    zinc gluconate 50 mg tablet Take 50 mg by mouth once daily.     No current facility-administered medications for this visit.       SOCIAL HISTORY:  Social History     Socioeconomic History    Marital status:    Occupational History     Comment: retried    Tobacco Use    Smoking status: Former     Packs/day: 1.50     Types: Cigarettes     Start date:      Quit date:      Years since quittin.8    Smokeless tobacco: Never    Tobacco comments:     Age 17 - 37. Up to 1.5 ppd.   Substance and Sexual Activity    Alcohol use: Yes     Comment: Occaisional. Few drinks a week.    Drug use: Yes     Types:  Marijuana   Social History Narrative    Lives in [place], [place]. Lives with long term partner, Anju. Accompanied by daughter LIZZY Carrillo. Daughter, Mara. Lorena is main point of medical contact.        FAMILY HISTORY:  Family History   Problem Relation Age of Onset    Hypertension Mother     Heart attack Father     Cirrhosis Father     No Known Problems Brother     No Known Problems Brother     Seizures Daughter     Liver disease Daughter     No Known Problems Son     Prostate cancer Neg Hx     Breast cancer Neg Hx     Pancreatic cancer Neg Hx          PHYSICAL EXAMINATION:  Vitals:    22 1028   BP: 136/65   Pulse: 91   Resp: 16     Physical Exam  Constitutional:       General: He is not in acute distress.     Appearance: Normal appearance.   Pulmonary:      Effort: Pulmonary effort is normal. No respiratory distress.   Abdominal:      General: Abdomen is flat. There is no distension.   Neurological:      Mental Status: He is alert and oriented to person, place, and time.   Psychiatric:         Mood and Affect: Mood normal.         Judgment: Judgment normal.       ASSESSMENT/PLAN:  High Risk prostate cancer, staging pending    ECO    I had a long discussion with the patient.  Explained the rational for continued staging and the significance of Stage III vs Stage IV disease.  Discussed the role of local therapy with definitive radiotherapy vs palliative local therapy.  Discussed the procedures, risks and benefits of radiotherapy.  Explained that if proves to have Stage III disease we would offer local therapy beginning in January.  The patient was agreeable to proceed with radiotherapy depending on the results of his PSMA scan.  He is scheduled to start bicalutamide and Lupron therapy following his scan.  Thank you for allowing us to participate in the care of this patient.      Psychosocial Distress screening score of Distress Score: 2 noted and reviewed. No intervention indicated.     I spent approximately 45  minutes reviewing the available records and evaluating the patient, out of which over 50% of the time was spent face to face with the patient in counseling and coordinating this patient's care.

## 2022-11-09 NOTE — PROGRESS NOTES
PET Imaging Questionnaire    Are you a Diabetic? Recent Blood Sugar level? No    Are you anemic? Bone Marrow Stimulation Meds? No    Have you had a CT Scan, if so when & where was your last one? Yes -     Have you had a PET Scan, if so when & where was your last one? Yes -     Chemotherapy or currently on Chemotherapy? No    Radiation therapy? No    Surgical History:   Past Surgical History:   Procedure Laterality Date    COLONOSCOPY  2010    COLONOSCOPY N/A 9/6/2018    Procedure: COLONOSCOPY Suprep PLEASE TEXT PATIENT WITH ARRIVAL TIME;  Surgeon: Niharika Arce MD;  Location: [facility];  Service: Endoscopy;  Laterality: N/A;    CYSTOSCOPY WITH URODYNAMIC TESTING N/A 1/31/2022    Procedure: CYSTOSCOPY, WITH URODYNAMIC TESTING FLOUROSCOPIC;  Surgeon: Anthony Maloney MD;  Location: [address];  Service: Urology;  Laterality: N/A;  1hr    KNEE ARTHROSCOPY      SPINE SURGERY      l-spine    TRANSURETHRAL RESECTION OF PROSTATE N/A 10/26/2022    Procedure: TURP (TRANSURETHRAL RESECTION OF PROSTATE);  Surgeon: Helena Luis MD;  Location: [facility];  Service: Urology;  Laterality: N/A;        Have you been fasting for at least 6 hours? Yes    Is there any chance you may be pregnant or breastfeeding? No    Assay: 12.14 MCi@:12:40   Injection Site:RT Arm    Residual: 2.33 mCi@: 12:42   Technologist: Alec Sarkar Injected:9.81mCi

## 2022-11-10 ENCOUNTER — TELEPHONE (OUTPATIENT)
Dept: HEMATOLOGY/ONCOLOGY | Facility: CLINIC | Age: 77
End: 2022-11-10
Payer: MEDICARE

## 2022-11-10 ENCOUNTER — OFFICE VISIT (OUTPATIENT)
Dept: UROLOGY | Facility: CLINIC | Age: 77
End: 2022-11-10
Payer: MEDICARE

## 2022-11-10 VITALS
DIASTOLIC BLOOD PRESSURE: 78 MMHG | SYSTOLIC BLOOD PRESSURE: 138 MMHG | HEART RATE: 101 BPM | HEIGHT: 73 IN | BODY MASS INDEX: 30.23 KG/M2

## 2022-11-10 DIAGNOSIS — C61 PROSTATE CANCER: Primary | ICD-10-CM

## 2022-11-10 DIAGNOSIS — R33.9 URINARY RETENTION: ICD-10-CM

## 2022-11-10 DIAGNOSIS — N31.2 HYPOCONTRACTILE BLADDER: ICD-10-CM

## 2022-11-10 LAB
POC RESIDUAL URINE VOLUME: 1038 ML (ref 0–100)
POC RESIDUAL URINE VOLUME: 481 ML (ref 0–100)

## 2022-11-10 PROCEDURE — 99215 OFFICE O/P EST HI 40 MIN: CPT | Mod: 24,S$GLB,, | Performed by: STUDENT IN AN ORGANIZED HEALTH CARE EDUCATION/TRAINING PROGRAM

## 2022-11-10 PROCEDURE — 3078F DIAST BP <80 MM HG: CPT | Mod: CPTII,S$GLB,, | Performed by: STUDENT IN AN ORGANIZED HEALTH CARE EDUCATION/TRAINING PROGRAM

## 2022-11-10 PROCEDURE — 99999 PR PBB SHADOW E&M-EST. PATIENT-LVL IV: ICD-10-PCS | Mod: PBBFAC,,, | Performed by: STUDENT IN AN ORGANIZED HEALTH CARE EDUCATION/TRAINING PROGRAM

## 2022-11-10 PROCEDURE — 3078F PR MOST RECENT DIASTOLIC BLOOD PRESSURE < 80 MM HG: ICD-10-PCS | Mod: CPTII,S$GLB,, | Performed by: STUDENT IN AN ORGANIZED HEALTH CARE EDUCATION/TRAINING PROGRAM

## 2022-11-10 PROCEDURE — 1160F PR REVIEW ALL MEDS BY PRESCRIBER/CLIN PHARMACIST DOCUMENTED: ICD-10-PCS | Mod: CPTII,S$GLB,, | Performed by: STUDENT IN AN ORGANIZED HEALTH CARE EDUCATION/TRAINING PROGRAM

## 2022-11-10 PROCEDURE — 99215 PR OFFICE/OUTPT VISIT, EST, LEVL V, 40-54 MIN: ICD-10-PCS | Mod: 24,S$GLB,, | Performed by: STUDENT IN AN ORGANIZED HEALTH CARE EDUCATION/TRAINING PROGRAM

## 2022-11-10 PROCEDURE — 1159F PR MEDICATION LIST DOCUMENTED IN MEDICAL RECORD: ICD-10-PCS | Mod: CPTII,S$GLB,, | Performed by: STUDENT IN AN ORGANIZED HEALTH CARE EDUCATION/TRAINING PROGRAM

## 2022-11-10 PROCEDURE — 1126F AMNT PAIN NOTED NONE PRSNT: CPT | Mod: CPTII,S$GLB,, | Performed by: STUDENT IN AN ORGANIZED HEALTH CARE EDUCATION/TRAINING PROGRAM

## 2022-11-10 PROCEDURE — 3075F SYST BP GE 130 - 139MM HG: CPT | Mod: CPTII,S$GLB,, | Performed by: STUDENT IN AN ORGANIZED HEALTH CARE EDUCATION/TRAINING PROGRAM

## 2022-11-10 PROCEDURE — 51798 POCT BLADDER SCAN: ICD-10-PCS | Mod: S$GLB,,, | Performed by: STUDENT IN AN ORGANIZED HEALTH CARE EDUCATION/TRAINING PROGRAM

## 2022-11-10 PROCEDURE — 3075F PR MOST RECENT SYSTOLIC BLOOD PRESS GE 130-139MM HG: ICD-10-PCS | Mod: CPTII,S$GLB,, | Performed by: STUDENT IN AN ORGANIZED HEALTH CARE EDUCATION/TRAINING PROGRAM

## 2022-11-10 PROCEDURE — 1126F PR PAIN SEVERITY QUANTIFIED, NO PAIN PRESENT: ICD-10-PCS | Mod: CPTII,S$GLB,, | Performed by: STUDENT IN AN ORGANIZED HEALTH CARE EDUCATION/TRAINING PROGRAM

## 2022-11-10 PROCEDURE — 1160F RVW MEDS BY RX/DR IN RCRD: CPT | Mod: CPTII,S$GLB,, | Performed by: STUDENT IN AN ORGANIZED HEALTH CARE EDUCATION/TRAINING PROGRAM

## 2022-11-10 PROCEDURE — 1159F MED LIST DOCD IN RCRD: CPT | Mod: CPTII,S$GLB,, | Performed by: STUDENT IN AN ORGANIZED HEALTH CARE EDUCATION/TRAINING PROGRAM

## 2022-11-10 PROCEDURE — 51798 US URINE CAPACITY MEASURE: CPT | Mod: S$GLB,,, | Performed by: STUDENT IN AN ORGANIZED HEALTH CARE EDUCATION/TRAINING PROGRAM

## 2022-11-10 PROCEDURE — 99999 PR PBB SHADOW E&M-EST. PATIENT-LVL IV: CPT | Mod: PBBFAC,,, | Performed by: STUDENT IN AN ORGANIZED HEALTH CARE EDUCATION/TRAINING PROGRAM

## 2022-11-10 RX ORDER — MULTIVIT-MINS/IRON/FOLIC/LYCOP 8-200-600
TABLET ORAL
COMMUNITY

## 2022-11-10 RX ORDER — GLUCOSAM/CHONDRO/HERB 149/HYAL 750-100 MG
1 TABLET ORAL
COMMUNITY
End: 2023-04-10 | Stop reason: ALTCHOICE

## 2022-11-10 NOTE — PROGRESS NOTES
"Subjective:       Patient ID: Noah Lopez is a 77 y.o. male.    Chief Complaint:  pvr check  This is a 77 y.o.  male patient that is an established patient of mine.   The patient was referred to me by Dr. De La Torre for urinary retention. Dr. De La Torre obtained routine bloodwork in 12/2021. His Cr was elevated from baseline. He then ordered a renal US 1/4/22 - marked distention of bladder, no hydronephrosis bilaterally, enlarged prostate vol 197g, and pt presented to ER per his recommendation on for a bedoya placement.   ER visit 1/4/22 - 3600 cc of urine from his Bedoya. Was not started on medications.      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 He feels that his "sphincter" has relaxed more.     Started him on flomax and finasteride 1 week ago. Pt wanted a voiding trial this week.     He failed a voiding trial. Worked up with cysto and UDS.  Cysto -Trilobar hypertrophy of the prostate, mildly enlarged median lobe, significantly enlarged lateral lobes  UDs with Dr. Maloney on 1/31/22 - see his report for full details -   Pdet at Max Flow: 0 cmH2O    EMG Storage: Normal Recruitment             EMG Voiding: Flaring with valsalva     Impression:           Sensation:Normal     Capacity: Large     Compliance:Normal     Detrusor Overactivity:Absent     Continence: No Incontinence     Contractility: Hypocontractility     Emptying:Unsatisfactory - Hypocontractility     Coordination:Dysfunctional (Valsalva) Voiding      He was having a bad reaction to Bactrim sent by Dr. De La Torre. Did not finish course.  Pt was not interested in a surgery. He felt that he "did this to myself." and with the results of the urodynamics with no detrusor pressure and valsalva voiding, an outlet reducing procedure would have a low chance of success. The patient states he is mostly interested in bedoya management and he does not want a surgery at this time.     3/24/22  Here " today for bedoya assistance. Pt went to ER and did not know how to disconnect bag    6/13/22  Pt notes cloudy urine and suprapubic discomfort. Also constipated. PSA was 17 5/25/22 and checked 2 days after a bedoya exchange. Patient notes he does not really drink many fluids from 9am-9pm.     7/12/22  I treated last UTI with ceftin x 10 days. I also recommended that he start augmentin the day before, the day of and the day after bedoya exchanges to guard against infections. He informed me that he did not do that.     8/25/22  Had patient try to call his daughter Lorena to include her in the conversation. She did not , pt stated likely is working. I told the patient I will type up a detailed note so she can follow along remotely.     He is s/p TRUS bx on 8/11/22; TRUS bx 60.4g. PSA 15.8 7/5/22. Full path report below.  Prostate biopsy demonstrated prostate cancer - [place] 4+3, grade group 3 present in 10/18 cores. Prostate biopsy cores were seen in all cores taken from left side.    RELIAPATH DIAGNOSIS:   A.   PROSTATE, LEFT APEX, NEEDLE BIOPSY:          HISTOLOGIC TYPE:  Acinar adenocarcinoma.          HISTOLOGIC GRADE:          Grade Group 3 ([place] Score 4+3=7)            Percentage of Pattern 4:  70%          CRIBRIFORM GLANDS:  Present.          TUMOR QUANTITATION:            Number of Cores Positive:  2            Total Number of Cores:  2            Measurement of Tumor:  19 mm.            Measurement of Core Tissue:  31 mm.            Percentage of Prostatic Tissue Involved by Tumor:  60%          LYMPHOVASCULAR INVASION:  Not identified.          PERINEURAL INVASION:  Present.          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    PROSTATE, LEFT MID, NEEDLE BIOPSY:          HISTOLOGIC TYPE:  Acinar adenocarcinoma.          HISTOLOGIC GRADE:          Grade Group 3 (Vikash Score 4+3=7)            Percentage of Pattern 4:  70%          CRIBRIFORM GLANDS:   Present.          TUMOR QUANTITATION:            Number of Cores Positive:  2            Total Number of Cores:  2            Measurement of Tumor:  16 mm.            Measurement of Core Tissue:  24 mm.            Percentage of Prostatic Tissue Involved by Tumor:  70%          LYMPHOVASCULAR INVASION:  Not identified.          PERINEURAL INVASION:  Present.  (C61.)   C.    PROSTATE, LEFT BASE, NEEDLE BIOPSY:          HISTOLOGIC TYPE:  Acinar adenocarcinoma.          HISTOLOGIC GRADE:          Grade Group 3 ([place] Score 4+3=7)            Percentage of Pattern 4:  90%          CRIBRIFORM GLANDS:  Present.          TUMOR QUANTITATION:            Number of Cores Positive:  4            Total Number of Cores:  4            Measurement of Tumor:  17 mm.            Measurement of Core Tissue:  27 mm.            Percentage of Prostatic Tissue Involved by Tumor:  80%          LYMPHOVASCULAR INVASION:  Not identified.          PERINEURAL INVASION:  Present.  (C61.)   D.   TISSUE1           ROSTATE, LEFT TRANSITION ZONE,  BIOPSY:          HISTOLOGIC TYPE:  Acinar adenocarcinoma.          HISTOLOGIC GRADE:          Grade Group 3 (Vikash Score 4+3=7)            Percentage of Pattern 4:  70%          CRIBRIFORM GLANDS:  Present.          TUMOR QUANTITATION:            Number of Cores Positive:  2            Total Number of Cores:  2            Measurement of Tumor:  7 mm.            Measurement of Core Tissue:  18 mm.            Percentage of Prostatic Tissue Involved by Tumor:  40%          LYMPHOVASCULAR INVASION:  Not identified.          PERINEURAL INVASION:  Not identified.  (C61.)   E.    PROSTATE, RIGHT APEX, NEEDLE BIOPSY:          Benign prostatic tissue with atrophy.  (R97.20)   F.   PROSTATE, RIGHT MID, NEEDLE BIOPSY:          Benign prostatic tissue with chronic inflammation and atrophy, see   comment.  (R97.20)   G.   PROSTATE, RIGHT BASE, NEEDLE BIOPSY:          Benign prostatic tissue with chronic inflammation  with atrophy.   (R97.20)   H.   TISSUE2          PROSTATE, RIGHT TRANSITION ZONE, BIOPSY:          -Benign prostatic tissue with atrophy.  (R97.20)     10/17/22  In the interim, pt voiced that he would like to cancel the bone biopsy due to risks and adverse events and pain. He is in agreement with proceeding with prostate cancer treatment with oncologist as metastatic prostate cancer.   MRI 9/29/22 demonstrated vol of 63.35cc, pubic lesion concerning for metastatic disease, and left pirads 5 lesion with EPE involving left neurovascular bundles, seminal vesicles, and extension involving the left levator ani in close proximity with anterior rectal wall. MRI suggestive of cT4 disease,N0,M1b(pubic bone involvement).   Since he was still in urinary retention he elected to proceed with channel TURP to try to get rid of the catheter.     11/3/22  He is s/p TURP 10/26/22 - path demonstrated [place] 4+5 prostate cancer, intraductal carcinoma identified, 50% of TURP chips. He is proceeding with ADT + radiation. Saw Dr. Carter with heme/onc on 10/27/22. He has an upcoming consultation with Dr. Devlin on 11/8/22.     11/10/22  Underwent voiding trial last week, pt able to void. Today when called to come in for his appt, he declined to come in with my MA, stated he was talking to another person in the lobby.       LAST PSA  Lab Results   Component Value Date    PSA 11.7 (H) 12/08/2021    PSADIAG 17.3 (H) 05/25/2022    PSATOTAL 15.8 (H) 07/05/2022    PSAFREE 2.83 (H) 07/05/2022       Lab Results   Component Value Date    CREATININE 1.31 09/12/2022       ---  Past Medical History:   Diagnosis Date    Colon polyp 09/06/2018    Hyperlipidemia     Hypertension     Prostate cancer        Past Surgical History:   Procedure Laterality Date    COLONOSCOPY  2010    COLONOSCOPY N/A 9/6/2018    Procedure: COLONOSCOPY Suprep PLEASE TEXT PATIENT WITH ARRIVAL TIME;  Surgeon: Niharika Arce MD;  Location: [facility];  Service: Endoscopy;   Laterality: N/A;    CYSTOSCOPY WITH URODYNAMIC TESTING N/A 2022    Procedure: CYSTOSCOPY, WITH URODYNAMIC TESTING FLOUROSCOPIC;  Surgeon: Anthony Maloney MD;  Location: [address];  Service: Urology;  Laterality: N/A;  1hr    KNEE ARTHROSCOPY      SPINE SURGERY      l-spine    TRANSURETHRAL RESECTION OF PROSTATE N/A 10/26/2022    Procedure: TURP (TRANSURETHRAL RESECTION OF PROSTATE);  Surgeon: Helena Luis MD;  Location: [facility];  Service: Urology;  Laterality: N/A;       Family History   Problem Relation Age of Onset    Hypertension Mother     Heart attack Father     Cirrhosis Father     No Known Problems Brother     No Known Problems Brother     Seizures Daughter     Liver disease Daughter     No Known Problems Son     Prostate cancer Neg Hx     Breast cancer Neg Hx     Pancreatic cancer Neg Hx        Social History     Tobacco Use    Smoking status: Former     Packs/day: 1.50     Types: Cigarettes     Start date:      Quit date:      Years since quittin.8    Smokeless tobacco: Never    Tobacco comments:     Age 17 - 37. Up to 1.5 ppd.   Substance Use Topics    Alcohol use: Yes     Comment: Occaisional. Few drinks a week.    Drug use: Yes     Types: Marijuana       Current Outpatient Medications on File Prior to Visit   Medication Sig Dispense Refill    ascorbic acid, vitamin C, (VITAMIN C) 1000 MG tablet Take 1,000 mg by mouth once daily.      bicalutamide (CASODEX) 50 MG Tab Take 1 tablet (50 mg total) by mouth once daily. 30 tablet 0    calcium carbonate (OS-TYREE) 500 mg calcium (1,250 mg) tablet Take 2 tablets (1,000 mg total) by mouth once daily. 60 tablet 11    cholecalciferol, vitamin D3, (VITAMIN D3) 25 mcg (1,000 unit) capsule Take 1 capsule (1,000 Units total) by mouth once daily. 30 capsule 11    clotrimazole-betamethasone 1-0.05% (LOTRISONE) cream Apply topically 2 (two) times daily. 60 g 3    diltiaZEM (CARDIZEM CD) 300 MG 24 hr capsule TAKE 1 CAPSULE BY MOUTH  ONCE DAILY  90 capsule 3    diphenhydramine HCl (BENADRYL ALLERGY ORAL) Take by mouth.      docusate sodium (COLACE) 250 MG capsule Take 250 mg by mouth once daily.      finasteride (PROSCAR) 5 mg tablet Take 1 tablet (5 mg total) by mouth once daily. 90 tablet 3    GAS RELIEF 80, SIMETHICONE, ORAL Take by mouth.      lisinopriL (PRINIVIL,ZESTRIL) 20 MG tablet TAKE 1 TABLET BY MOUTH ONCE DAILY 90 tablet 3    melatonin 10 mg TbDL Take by mouth.      methocarbamoL (ROBAXIN) 750 MG Tab Take 750-1,500 mg by mouth 3 (three) times daily as needed.      multivit-min-FA-lycopen-lutein (MEN 50 PLUS MULTIVITAMIN) 300-600-300 mcg Tab as directed Orally      multivitamin capsule Take 1 capsule by mouth once daily.      nitrofurantoin, macrocrystal-monohydrate, (MACROBID) 100 MG capsule Take 100 mg by mouth every 12 (twelve) hours.      ofloxacin (FLOXIN) 0.3 % otic solution Apply 10 drops into right ear once a day for 10 days.  Please dispense generic.      omega 3-dha-epa-fish oil 1,000 mg (120 mg-180 mg) Cap 1 capsule.      omega-3 fatty acids/fish oil (FISH OIL-OMEGA-3 FATTY ACIDS) 300-1,000 mg capsule Take 1 capsule by mouth once daily.      simvastatin (ZOCOR) 20 MG tablet TAKE 1 TABLET BY MOUTH IN  THE EVENING 90 tablet 3    tamsulosin (FLOMAX) 0.4 mg Cap Take 1 capsule (0.4 mg total) by mouth once daily. 90 capsule 3    traZODone (DESYREL) 100 MG tablet TAKE 2 TABLETS BY MOUTH EVERY DAY AT BEDTIME AS NEEDED FOR INSOMNIA 180 tablet 3    triamterene-hydrochlorothiazide 37.5-25 mg (DYAZIDE) 37.5-25 mg per capsule TAKE 1 CAPSULE BY MOUTH IN  THE MORNING 90 capsule 3    TURMERIC ORAL Take by mouth.      wheat dextrin (BENEFIBER CLEAR SF, DEXTRIN, ORAL) Take by mouth.      zinc gluconate 50 mg tablet Take 50 mg by mouth once daily.       No current facility-administered medications on file prior to visit.       Review of patient's allergies indicates:   Allergen Reactions    Ciprofloxacin Other (See Comments)     tendonitis    Sulfa  (sulfonamide antibiotics) Nausea And Vomiting       Review of Systems   Constitutional:  Negative for chills.   HENT:  Negative for congestion.    Eyes:  Negative for visual disturbance.   Respiratory:  Negative for shortness of breath.    Cardiovascular:  Negative for chest pain.   Gastrointestinal:  Negative for abdominal distention.   Musculoskeletal:  Negative for gait problem.   Skin:  Negative for color change.   Neurological:  Negative for dizziness.   Psychiatric/Behavioral:  Negative for agitation.      Objective:      Physical Exam  Constitutional:       Appearance: He is well-developed.   HENT:      Head: Normocephalic.   Eyes:      Pupils: Pupils are equal, round, and reactive to light.   Pulmonary:      Effort: Pulmonary effort is normal.   Abdominal:      Palpations: Abdomen is soft.   Musculoskeletal:         General: Normal range of motion.      Cervical back: Normal range of motion.   Skin:     General: Skin is warm and dry.   Neurological:      Mental Status: He is alert.       Assessment:       1. Urinary retention    2. Hypocontractile bladder    3. Prostate cancer        Plan:              Urinary retention - s/p channel TURP. PVR check today - initial bladder scan was 1000cc. Had pt void again in the bathroom and repeat bladder scan demonstrated around 480cc which is more reassuring.   Reminded pt he needs to urinate frequently, time the voids if needed and take your time to take care to empty as much as you can.   Alerted heme/onc and rad/onc to [place] 4+5 prostate cancer on specimen. They have a plan for ADT and likely radiation (has upcoming consultation with Dr. Devlin).  F/u with heme/onc and radiation oncology.   F/u with me in 1 month for another pvr check.        Urinary retention  -     POCT Bladder Scan    Hypocontractile bladder  -     POCT Bladder Scan    Prostate cancer  -     POCT Bladder Scan

## 2022-11-10 NOTE — TELEPHONE ENCOUNTER
I spoke to daughter, Lorena. She explained that she doesn't live here and she would like to know what's going on with her father. She's a nurse and she can read the notes, but she just wanted more information on staging and prognosis, etc. She said she did speak to another one of his physicians who told her more about the prognosis/staging, so she doesn't really need to talk to Dr. Carter now. She would like for him to touch base with her after the next appointment or write a detailed note so that she knows what is going on.

## 2022-11-10 NOTE — TELEPHONE ENCOUNTER
"----- Message from Dominik Go sent at 11/10/2022  8:37 AM CST -----  Consult/Advisory:          Name Of Caller: Lorena Ortez (Daughter)         Contact Preference?:  [phone]      Provider Name: Raul      Does patient feel the need to be seen today? No      What is the nature of the call?: Following up w/ Deb about portal message. Stating she won't be able to make pt's appts due to the fact that she lives in [place]          Additional Notes:  "Thank you for all that you do for our patients"      "

## 2022-11-14 ENCOUNTER — TELEPHONE (OUTPATIENT)
Dept: UROLOGY | Facility: CLINIC | Age: 77
End: 2022-11-14
Payer: MEDICARE

## 2022-11-14 ENCOUNTER — LAB VISIT (OUTPATIENT)
Dept: LAB | Facility: HOSPITAL | Age: 77
End: 2022-11-14
Attending: STUDENT IN AN ORGANIZED HEALTH CARE EDUCATION/TRAINING PROGRAM
Payer: MEDICARE

## 2022-11-14 DIAGNOSIS — R30.0 DYSURIA: ICD-10-CM

## 2022-11-14 DIAGNOSIS — R30.0 DYSURIA: Primary | ICD-10-CM

## 2022-11-14 PROCEDURE — 87186 SC STD MICRODIL/AGAR DIL: CPT | Mod: PO | Performed by: STUDENT IN AN ORGANIZED HEALTH CARE EDUCATION/TRAINING PROGRAM

## 2022-11-14 PROCEDURE — 87077 CULTURE AEROBIC IDENTIFY: CPT | Mod: PO | Performed by: STUDENT IN AN ORGANIZED HEALTH CARE EDUCATION/TRAINING PROGRAM

## 2022-11-14 PROCEDURE — 87086 URINE CULTURE/COLONY COUNT: CPT | Mod: PO | Performed by: STUDENT IN AN ORGANIZED HEALTH CARE EDUCATION/TRAINING PROGRAM

## 2022-11-14 PROCEDURE — 87088 URINE BACTERIA CULTURE: CPT | Mod: PO | Performed by: STUDENT IN AN ORGANIZED HEALTH CARE EDUCATION/TRAINING PROGRAM

## 2022-11-14 RX ORDER — DOXYLAMINE SUCCINATE 25 MG
TABLET ORAL 2 TIMES DAILY
Qty: 28 G | Refills: 0 | Status: SHIPPED | OUTPATIENT
Start: 2022-11-14

## 2022-11-14 RX ORDER — CEFDINIR 300 MG/1
300 CAPSULE ORAL 2 TIMES DAILY
Qty: 20 CAPSULE | Refills: 0 | Status: SHIPPED | OUTPATIENT
Start: 2022-11-14 | End: 2022-11-24

## 2022-11-14 NOTE — TELEPHONE ENCOUNTER
----- Message from Joe Padron sent at 11/14/2022 10:37 AM CST -----  Contact: pt  Type: Requesting to speak with nurse        Who Called: PT  Regarding: PT has a bladder infection, tip of penis itches and cloudy urine. Can't sit down more than 5 minutes. Would like a urine culture done close to his location and would like antibiotics sent to pharmacy listed below.  Would the patient rather a call back or a response via [facility]? Call back  Best Call Back Number: [phone]  Additional Information: [facility] [address] [place] [address] [address] [facility]   Phone: [phone]  Fax:  [phone]

## 2022-11-15 NOTE — PROGRESS NOTES
MEDICAL ONCOLOGY FOLLOW-UP VISIT.     Best Contact Phone Number(s): [phone] (home)      Cancer/Stage/TNM:    Cancer Staging   Prostate cancer  Staging form: Prostate, AJCC 8th Edition  - Clinical: Stage IVB (cT3, cNX, cM1c, Grade Group: 5) - Signed by Darren Carter MD on 11/18/2022     Reason for visit:  Mr. 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     Interval History:   TURP 10/26/22. PSMA PET 11/9/22 with evidence of metastatic disease. Started bicalutamide 11/9/22.    Still has some purulence with urination, mild hematuria without clots. Started cefdinir 3 days ago for positive Ucx serratia. Is allergic to cipro and sulfa. No fevers, but does have some urinary hesitancy.    Very mild left groin pain. No analgesics. Not particularly limited in any way.    Review of Systems   Constitutional:  Negative for appetite change, chills and fever.   HENT:  Negative for hearing loss, mouth sores, nosebleeds, sore throat and trouble swallowing.    Eyes:  Negative for visual disturbance.   Respiratory:  Negative for cough and shortness of breath.    Cardiovascular:  Negative for chest pain, palpitations and leg swelling.   Gastrointestinal:  Negative for abdominal pain, blood in stool, constipation, diarrhea, nausea and vomiting.   Genitourinary:  Positive for difficulty urinating, dysuria and hematuria.   Musculoskeletal:  Negative for arthralgias and joint swelling.   Skin:  Negative for rash.   Neurological:  Negative for dizziness, weakness, light-headedness and headaches.      Oncology History   Prostate cancer   12/8/2021 Tumor Markers    PSA 11.7     1/2022 Notable Event    ED visit for urinary retention found on abodminal US. Jones placed.     3/2022 Notable Event    Seen in ED for fall. Incidentally found to have suspicious thyroid nodule     4/20/2022 Biopsy    FNA of  thyroid nodule nondiagnostic. Also found to have new SHANNAN lung nodules.     5/16/2022 Imaging Significant Findings    FDG PET-CT in setting of pulmonary nodules:  1.  Two solid left upper lobe pulmonary nodules with no appreciable FDG uptake.  Malignancy is not excluded, and the larger nodule is likely amenable to percutaneous biopsy. Alternatively, continued surveillance by dedicated chest CT alone, i.e., without FDG PET, may be considered.     2.  Focal FDG uptake in the left prostate gland with extracapsular extension.  These findings are concerning for malignancy.  Correlation with PSA and urology evaluation recommended.  MRI of the prostate may also be helpful for further evaluation.     3.  Abnormal appearance of the left symphysis pubis with hypermetabolic activity.  Differential considerations include fibrous dysplasia, Paget's disease, and metastasis.   MRI or bone algorithm CT may be informative.     4.  Incidental focal uptake in the right cerebellum on at least 2 PET slices.  Recommend brain MRI for further evaluation.     5.  2.2 cm left inferior thyroid lobe nodule.  This nodule was recently biopsied with abnormal results.  Please refer to pathology report.     5/25/2022 Tumor Markers    PSA 17.3     7/6/2022 Biopsy    Attempted SHANNAN nodule biopsy     8/11/2022 Biopsy    TRUS biopsy: Prostate adenocarcinoma up to 4+3 disease in 10/18 cores all from the left side. Associated cribriform glands and PNI.       8/31/2022 Imaging Significant Findings    Bone scan:  1. Uptake within the anterior right 5th and 6th ribs is most likely related to degenerative changes.  Correlation with plain film or chest CT scan recommended.     2.  Intense uptake within the medial left pubic bone corresponds to an area of sclerosis seen on the comparison PET-CT scan.  Differential considerations remain to be fibrous dysplasia, Paget's disease or metastasis.     3.  Negative for metastatic pattern of uptake otherwise.   "Degenerative pattern of uptake and other incidental findings as noted above.     9/29/2022 Imaging Significant Findings    MRI prostate: Left-sided PI-RADS 5 lesion with extraprostatic extension involving the left-sided neurovascular bundles and seminal vesicles.  Extension of abnormal signal adjacent to the left levator ani, cannot rule out involvement.  Abnormal signal also closely approximates with the anterior rectal wall without definite involvement.     Left pubic lesion concerning for metastasis.     10/17/2022 Notable Event    Declined pubic bone biopsy to potentially confirm metastatic prostate cancer     10/26/2022 Procedure    TURPT - pathology review with up to 4+5 disease     11/9/2022 Imaging Significant Findings    PSMA PET-CT shows evidence of osseous and pulmonary metastatic disease     11/18/2022 -  Hormone Therapy    Start Lupron 22.5mg IM; plan to start apalutamide            Physical Exam:   BP (!) 111/55 (BP Location: Left arm, Patient Position: Sitting, BP Method: Medium (Automatic))   Pulse 95   Temp 98.1 °F (36.7 °C) (Oral)   Resp 18   Ht 6' 1" (1.854 m)   Wt 102.3 kg (225 lb 8.5 oz)   SpO2 95%   BMI 29.76 kg/m²      ECOG Performance Status: (foot note - ECOG PS provided by [employer] Co[employer]cology Group) 1 - Symptomatic but completely ambulatory    Physical Exam  Constitutional:       General: He is not in acute distress.     Appearance: Normal appearance.   HENT:      Head: Normocephalic.      Nose: Nose normal.      Mouth/Throat:      Mouth: Mucous membranes are moist.      Pharynx: Oropharynx is clear. No oropharyngeal exudate or posterior oropharyngeal erythema.   Eyes:      General: No scleral icterus.     Extraocular Movements: Extraocular movements intact.      Conjunctiva/sclera: Conjunctivae normal.      Pupils: Pupils are equal, round, and reactive to light.   Cardiovascular:      Rate and Rhythm: Normal rate and regular rhythm.      Heart sounds: No murmur heard.    No " friction rub. No gallop.   Pulmonary:      Effort: Pulmonary effort is normal. No respiratory distress.      Breath sounds: Normal breath sounds. No wheezing, rhonchi or rales.   Abdominal:      General: There is no distension.      Palpations: Abdomen is soft.      Tenderness: There is no abdominal tenderness. There is no guarding or rebound.   Musculoskeletal:         General: No swelling. Normal range of motion.      Cervical back: Normal range of motion and neck supple.      Right lower leg: No edema.      Left lower leg: No edema.   Lymphadenopathy:      Cervical: No cervical adenopathy.   Skin:     General: Skin is warm and dry.      Coloration: Skin is not jaundiced.      Findings: No lesion or rash.   Neurological:      General: No focal deficit present.      Mental Status: He is alert and oriented to person, place, and time.      Cranial Nerves: No cranial nerve deficit.      Motor: No weakness.   Psychiatric:         Mood and Affect: Mood normal.         Behavior: Behavior normal.         Thought Content: Thought content normal.         Labs:   Recent Results (from the past 48 hour(s))   CBC Oncology    Collection Time: 11/18/22  8:14 AM   Result Value Ref Range    WBC 8.61 3.90 - 12.70 K/uL    RBC 4.77 4.60 - 6.20 M/uL    Hemoglobin 14.9 14.0 - 18.0 g/dL    Hematocrit 45.1 40.0 - 54.0 %    MCV 95 82 - 98 fL    MCH 31.2 (H) 27.0 - 31.0 pg    MCHC 33.0 32.0 - 36.0 g/dL    RDW 13.2 11.5 - 14.5 %    Platelets 313 150 - 450 K/uL    MPV 9.7 9.2 - 12.9 fL    Gran # (ANC) 6.9 1.8 - 7.7 K/uL    Immature Grans (Abs) 0.03 0.00 - 0.04 K/uL   Comprehensive Metabolic Panel    Collection Time: 11/18/22  8:14 AM   Result Value Ref Range    Sodium 141 136 - 145 mmol/L    Potassium 4.1 3.5 - 5.1 mmol/L    Chloride 104 95 - 110 mmol/L    CO2 27 23 - 29 mmol/L    Glucose 118 (H) 70 - 110 mg/dL    BUN 26 (H) 8 - 23 mg/dL    Creatinine 1.5 (H) 0.5 - 1.4 mg/dL    Calcium 9.8 8.7 - 10.5 mg/dL    Total Protein 7.3 6.0 - 8.4 g/dL     Albumin 3.2 (L) 3.5 - 5.2 g/dL    Total Bilirubin 0.8 0.1 - 1.0 mg/dL    Alkaline Phosphatase 107 55 - 135 U/L    AST 16 10 - 40 U/L    ALT 15 10 - 44 U/L    Anion Gap 10 8 - 16 mmol/L    eGFR 48.0 (A) >60 mL/min/1.73 m^2   Prostate Specific Antigen, Diagnostic    Collection Time: 11/18/22  8:14 AM   Result Value Ref Range    PSA Diagnostic 19.6 (H) 0.00 - 4.00 ng/mL        Imaging: I have personally reviewed patient's  imaging including PSMA PET CT 11/9/22.    NM PET CT F 18 PYL PSMA, Midthigh to Vertex  Narrative: EXAMINATION:  F 18 PLYPSMA    CLINICAL HISTORY:  prostate cancer    TECHNIQUE :  Imaging was obtained with reformats from the mid skull to the proximal thigh with utilization of 9.1 mCi of F 18 PLYPSMA    COMPARISON:  PET-CT of 05/16/2022    FINDINGS:  Head neck:    Nasal septal deviation is noted.  Mild mucous membrane thickening is noted at the floor of the left maxillary sinus.    Extensive activity is noted within the region of the salivary glands and or ducts.    Inferior to the left lobe of the thyroid gland a mass is noted with a hypodense central region 2.5 by 1.8 cm with increased uptake image 79 with an SUV of 2.7 maximal.  Could this relate to a necrotic metastasis or to a thyroid nodule. Thyroid ultrasound with FNA may be of use. This appears similar in size when measured in a similar manner on 05/16/2022    Thorax:    Moderate coronary calcifications are noted increasing the patient's coronary risk.    Gynecomastia is noted.    Multiple pulmonary nodules are noted with increased tracer uptake compared to background concerning for prostate metastasis.    One is noted within the left upper lobe that measures 14 mm on today's examination versus 10 mm on the prior of 05/16/2022 with an associated band of consolidation also new since prior.  This demonstrates uptake of 3.3 on this examination.    Inferior to this a mass is noted within the left lobe of the thyroid gland that demonstrates  increased uptake compared to background of 14 mm enlarged from 10 mm on 05/16/2022 with increased uptake to 2.7 concerning for prostate metastasis.    A small pulmonary nodule is noted of 4-5 mm not obviously seen on the prior PET-CT fusion image 100 sequence 2 concerning for prostate metastasis too small to categorize on this exam.    A small 4 mm nodule is noted in the right upper lobe image 110 sequence 3 new since the prior worrisome for possible prostate metastasis too small to accurately categorize    Abdomen and pelvis:    Cholelithiasis is noted with a sub center stone.    Renal hypodensities are noted bilaterally without significant change since the prior statistically favored relate to cysts without worrisome change or characteristics.    A retroaortic left renal vein is noted.  Extensive atherosclerotic aortic calcifications are noted.  Renal calcifications are noted bilaterally proximally.    A distended bladder is noted with a thickened rind of soft tissue along the inferior bladder at the level of the prostate.  This rind of soft tissue along the posterior bladder and along the expected location of the prostate on the left demonstrates uptake on this examination suggestive of prostate neoplasm.  Given the unusual shape a measurement is difficult.  The hypermetabolic right-sided border of the prostate will be measured as an index lesion and demonstrates an SUV maximal of 5.5 axial image 259    A nodule is noted anterior to the spleen of similar size to on the prior with increased uptake likely relating to an accessory splenule, a metastasis would seem less likely.  This is of 1.6 cm    Musculoskeletal:    A moth eaten appearance to the pubis is noted to the left of the pubic symphysis involving both the superior and inferior pubic ramus in this region suggestive of osseous metastatic disease.  This is also somewhat difficult to measure in size an SUV of 32.6 maximal is noted.  A focus of increased  uptake is also noted within the inferior pubic ramus on the left posteriorly near the biceps femoral insertion suggestive a small metastatic focus  Impression: 1. Evidence of prostate neoplasm with osseous and pulmonary metastasis.  Detrimental changes noted when comparison is made to 05/16/2022 in particular with progression of several pulmonary nodules and with development of 1 or 2 small pulmonary nodules.  2. A mass is noted inferior to the left lobe of the thyroid gland entirely specific but demonstrating increased uptake similar since the prior  3. A prostate based mass appears to be present with involvement of the bladder and a distended bladder suggestive outlet obstruction    Electronically signed by: John Steele MD  Date:    11/09/2022  Time:    15:31            Diagnoses:       1. Prostate cancer    2. Urinary tract infection with hematuria, site unspecified    3. Solitary pulmonary nodule    4. Thyroid nodule    5. Hypertension, unspecified type          Assessment and Plan:     1. Prostate cancer  Overview:  Patient with de david high-volume metastatic prosatate adenocarcinoma including lung and osseous mets.     Assessment & Plan:  - Start ADT today with Lupron 22.5mg - patient inquiring about surgical castration; will refer to his urologist  - Start apalutamide 240mg po daily per TITAN trial  - Will need to schedule  DEXA and genetics FU at future visit  - FU 6 weeks    Orders:  -     Cancel: leuprolide (LUPRON) injection 22.5 mg  -     apalutamide (ERLEADA) 60 mg Tab; Take 240 mg by mouth once daily.  Dispense: 120 tablet; Refill: 11    2. Urinary tract infection with hematuria, site unspecified  Overview:        Assessment & Plan:  Culture 11/14/22 positive for serratia marcescens. Resistant to ceftriaxone and started on cefdinir. Patient is allergic to ciprofloxacin and sulfa drugs. Discussed with ID team - will try to have patient tolerate bactrim (caused N/V) and have him scheduled to see ID  urgently next week; may need IV abx.  - Change cefdinir to bactrim  DS bid x 10 days; FU with ID    Orders:  -     Urinalysis, Reflex to Urine Culture Urine, Clean Catch  -     Ambulatory referral/consult to Infectious Disease; Future; Expected date: 11/25/2022  -     sulfamethoxazole-trimethoprim 800-160mg (BACTRIM DS) 800-160 mg Tab; Take 1 tablet by mouth 2 (two) times daily. for 10 days  Dispense: 20 tablet; Refill: 0    3. Solitary pulmonary nodule  Assessment & Plan:  Presumably metastatic prostate cancer based on PSMA PET 11/9/22.      4. Thyroid nodule  Overview:  TI-RADS 5 lesion. Non-diagnostic biopsy 04/2022.      5. Hypertension, unspecified type  Overview:  Patient not entirely sure of current regimen.  Appears to be on diltiazem 300mg and lisinopril 20mg po daily.     Assessment & Plan:  - Currently well controlled; continue current meds           Route Chart for Scheduling    Med Onc Chart Routing      Follow up with physician 6 weeks.   Follow up with JENIFFER    Infusion scheduling note    Injection scheduling note    Labs CBC, CMP and PSA   Lab interval:  Patient also needs to recheck urinalysis in 7 days   Imaging    Pharmacy appointment    Other referrals          Supportive Plan Information  OP PROSTATE LEUPROLIDE Q3MO   Darren Carter MD   Upcoming Treatment Dates - OP PROSTATE LEUPROLIDE Q3MO    2/10/2023       Chemotherapy       leuprolide (LUPRON) injection 22.5 mg  5/5/2023       Chemotherapy       leuprolide (LUPRON) injection 22.5 mg  7/28/2023       Chemotherapy       leuprolide (LUPRON) injection 22.5 mg  10/20/2023       Chemotherapy       leuprolide (LUPRON) injection 22.5 mg       Darren Carter MD  Hematology/Oncology  [facility]

## 2022-11-17 ENCOUNTER — TELEPHONE (OUTPATIENT)
Dept: HEMATOLOGY/ONCOLOGY | Facility: CLINIC | Age: 77
End: 2022-11-17
Payer: MEDICARE

## 2022-11-18 ENCOUNTER — TELEPHONE (OUTPATIENT)
Dept: INFECTIOUS DISEASES | Facility: CLINIC | Age: 77
End: 2022-11-18
Payer: MEDICARE

## 2022-11-18 ENCOUNTER — OFFICE VISIT (OUTPATIENT)
Dept: HEMATOLOGY/ONCOLOGY | Facility: CLINIC | Age: 77
End: 2022-11-18
Payer: MEDICARE

## 2022-11-18 ENCOUNTER — PATIENT MESSAGE (OUTPATIENT)
Dept: UROLOGY | Facility: CLINIC | Age: 77
End: 2022-11-18
Payer: MEDICARE

## 2022-11-18 ENCOUNTER — INFUSION (OUTPATIENT)
Dept: INFUSION THERAPY | Facility: HOSPITAL | Age: 77
End: 2022-11-18
Attending: HOSPITALIST
Payer: MEDICARE

## 2022-11-18 VITALS
BODY MASS INDEX: 29.88 KG/M2 | DIASTOLIC BLOOD PRESSURE: 55 MMHG | HEIGHT: 73 IN | RESPIRATION RATE: 18 BRPM | SYSTOLIC BLOOD PRESSURE: 111 MMHG | HEART RATE: 95 BPM | WEIGHT: 225.5 LBS | OXYGEN SATURATION: 95 % | TEMPERATURE: 98 F

## 2022-11-18 DIAGNOSIS — R31.9 URINARY TRACT INFECTION WITH HEMATURIA, SITE UNSPECIFIED: ICD-10-CM

## 2022-11-18 DIAGNOSIS — I10 HYPERTENSION, UNSPECIFIED TYPE: ICD-10-CM

## 2022-11-18 DIAGNOSIS — E04.1 THYROID NODULE: ICD-10-CM

## 2022-11-18 DIAGNOSIS — C61 PROSTATE CANCER: Primary | ICD-10-CM

## 2022-11-18 DIAGNOSIS — R91.1 SOLITARY PULMONARY NODULE: ICD-10-CM

## 2022-11-18 DIAGNOSIS — N39.0 URINARY TRACT INFECTION WITH HEMATURIA, SITE UNSPECIFIED: ICD-10-CM

## 2022-11-18 PROBLEM — M79.676: Status: RESOLVED | Noted: 2018-08-28 | Resolved: 2022-11-18

## 2022-11-18 PROBLEM — Z91.81 HISTORY OF RECENT FALL: Status: RESOLVED | Noted: 2022-04-04 | Resolved: 2022-11-18

## 2022-11-18 LAB — BACTERIA UR CULT: ABNORMAL

## 2022-11-18 PROCEDURE — 1101F PR PT FALLS ASSESS DOC 0-1 FALLS W/OUT INJ PAST YR: ICD-10-PCS | Mod: CPTII,S$GLB,, | Performed by: HOSPITALIST

## 2022-11-18 PROCEDURE — 99499 RISK ADDL DX/OHS AUDIT: ICD-10-PCS | Mod: S$GLB,,, | Performed by: HOSPITALIST

## 2022-11-18 PROCEDURE — 99499 UNLISTED E&M SERVICE: CPT | Mod: S$GLB,,, | Performed by: HOSPITALIST

## 2022-11-18 PROCEDURE — 3288F FALL RISK ASSESSMENT DOCD: CPT | Mod: CPTII,S$GLB,, | Performed by: HOSPITALIST

## 2022-11-18 PROCEDURE — 63600175 PHARM REV CODE 636 W HCPCS: Mod: JG | Performed by: HOSPITALIST

## 2022-11-18 PROCEDURE — 99215 OFFICE O/P EST HI 40 MIN: CPT | Mod: S$GLB,,, | Performed by: HOSPITALIST

## 2022-11-18 PROCEDURE — 99999 PR PBB SHADOW E&M-EST. PATIENT-LVL IV: CPT | Mod: PBBFAC,,, | Performed by: HOSPITALIST

## 2022-11-18 PROCEDURE — 3074F SYST BP LT 130 MM HG: CPT | Mod: CPTII,S$GLB,, | Performed by: HOSPITALIST

## 2022-11-18 PROCEDURE — 1126F PR PAIN SEVERITY QUANTIFIED, NO PAIN PRESENT: ICD-10-PCS | Mod: CPTII,S$GLB,, | Performed by: HOSPITALIST

## 2022-11-18 PROCEDURE — 1126F AMNT PAIN NOTED NONE PRSNT: CPT | Mod: CPTII,S$GLB,, | Performed by: HOSPITALIST

## 2022-11-18 PROCEDURE — 3288F PR FALLS RISK ASSESSMENT DOCUMENTED: ICD-10-PCS | Mod: CPTII,S$GLB,, | Performed by: HOSPITALIST

## 2022-11-18 PROCEDURE — 96402 CHEMO HORMON ANTINEOPL SQ/IM: CPT

## 2022-11-18 PROCEDURE — 3074F PR MOST RECENT SYSTOLIC BLOOD PRESSURE < 130 MM HG: ICD-10-PCS | Mod: CPTII,S$GLB,, | Performed by: HOSPITALIST

## 2022-11-18 PROCEDURE — 1159F MED LIST DOCD IN RCRD: CPT | Mod: CPTII,S$GLB,, | Performed by: HOSPITALIST

## 2022-11-18 PROCEDURE — 99999 PR PBB SHADOW E&M-EST. PATIENT-LVL IV: ICD-10-PCS | Mod: PBBFAC,,, | Performed by: HOSPITALIST

## 2022-11-18 PROCEDURE — 99215 PR OFFICE/OUTPT VISIT, EST, LEVL V, 40-54 MIN: ICD-10-PCS | Mod: S$GLB,,, | Performed by: HOSPITALIST

## 2022-11-18 PROCEDURE — 3078F DIAST BP <80 MM HG: CPT | Mod: CPTII,S$GLB,, | Performed by: HOSPITALIST

## 2022-11-18 PROCEDURE — 1159F PR MEDICATION LIST DOCUMENTED IN MEDICAL RECORD: ICD-10-PCS | Mod: CPTII,S$GLB,, | Performed by: HOSPITALIST

## 2022-11-18 PROCEDURE — 3078F PR MOST RECENT DIASTOLIC BLOOD PRESSURE < 80 MM HG: ICD-10-PCS | Mod: CPTII,S$GLB,, | Performed by: HOSPITALIST

## 2022-11-18 PROCEDURE — 1101F PT FALLS ASSESS-DOCD LE1/YR: CPT | Mod: CPTII,S$GLB,, | Performed by: HOSPITALIST

## 2022-11-18 RX ORDER — SULFAMETHOXAZOLE AND TRIMETHOPRIM 800; 160 MG/1; MG/1
1 TABLET ORAL 2 TIMES DAILY
Qty: 20 TABLET | Refills: 0 | Status: SHIPPED | OUTPATIENT
Start: 2022-11-18 | End: 2022-11-28

## 2022-11-18 RX ADMIN — LEUPROLIDE ACETATE 22.5 MG: KIT at 10:11

## 2022-11-18 NOTE — NURSING
Pt here for C1D1 Lupron. Educated pt on Lupron mechanism of action and side effects; issued [facility] handout on Lupron. Administered Lupron in right hip. No questions or concerns. Pt ambulated out of unit unassisted.

## 2022-11-18 NOTE — Clinical Note
[facility] saw Mr. Lopez. I see his UTI is resistant to CTX, but he is allergic to cipro and sulfa. Asked him to get repeat UCX after he completes his abx and I will run by ID.  He also wants to get surgical castration. I told him I would let you know. I started him on Lupron today and will start apalutamide soon.

## 2022-11-18 NOTE — TELEPHONE ENCOUNTER
----- Message from Tereso Kendrick MD sent at 11/18/2022 12:05 PM CST -----  Tough, I reviewed the isolate and if cannot tolerate those he will likely need IV antibiotics.  Sounds symptomatic with purulent urine.  If he gets worse through weekend he needs to go to ER.  Regine can we try to get him in with any ID provider first avvailable? Thanks.  ----- Message -----  From: Darren Carter MD  Sent: 11/18/2022  11:24 AM CST  To: Tereso Kendrick MD    Hi Dr. Kendrick - sorry for the unsolicited email; I put in an ID referral but a quick answer might work just as well.    Mr. Lopez is being treated for metastatic prostate cancer. Also had TURPT on 10/26/22 with recurrent UTI and purulent urine since then. Most recent with serratia marcescens resistant to CTX. His urologist put him on cefdinir. I'm worried about resistance, but he is quite allergic to both cipro (bad tendonitis) and sulfa (severe N/V).    Any thoughts on a better abx for him?     - Darren Carter

## 2022-11-18 NOTE — PATIENT INSTRUCTIONS
You have metastatic prostate cancer that has spread to the bones and into your lungs. We can use hormone therapy to stop the growth of the cancer for a prolonged period of time. In addition, recommend starting a pill called apalutamide 240mg (4 pills).     - Specialty pharmacy to contact you about filling the apalutamide prescription  - Lupron shot today ('chemical castration) - FU with Dr. Luis to discuss surgical castration  - Continue bicalutamide - when you get the apalutamide then STOP bicalutamide and START apalutamide - let us know when this happens  - OK to continue taking tamsulosin - NO NEED TO CONTINUE TAKING FINASTERIDE if you still have it    - Con't antibiotics per Dr. Luis. Will need to recheck urine sample in one week after completing antibiotics and will refer to our ID doctors to ensure appropriate antibiotic treatment.    - FU with Dr. Carter in 6 weeks.

## 2022-11-21 ENCOUNTER — LAB VISIT (OUTPATIENT)
Dept: LAB | Facility: HOSPITAL | Age: 77
End: 2022-11-21
Attending: INTERNAL MEDICINE
Payer: MEDICARE

## 2022-11-21 ENCOUNTER — OFFICE VISIT (OUTPATIENT)
Dept: INFECTIOUS DISEASES | Facility: CLINIC | Age: 77
End: 2022-11-21
Payer: MEDICARE

## 2022-11-21 ENCOUNTER — SPECIALTY PHARMACY (OUTPATIENT)
Dept: PHARMACY | Facility: CLINIC | Age: 77
End: 2022-11-21
Payer: MEDICARE

## 2022-11-21 VITALS
TEMPERATURE: 98 F | HEART RATE: 96 BPM | SYSTOLIC BLOOD PRESSURE: 126 MMHG | BODY MASS INDEX: 30.42 KG/M2 | WEIGHT: 230.63 LBS | DIASTOLIC BLOOD PRESSURE: 63 MMHG

## 2022-11-21 DIAGNOSIS — R31.9 URINARY TRACT INFECTION WITH HEMATURIA, SITE UNSPECIFIED: ICD-10-CM

## 2022-11-21 DIAGNOSIS — N39.0 URINARY TRACT INFECTION WITH HEMATURIA, SITE UNSPECIFIED: ICD-10-CM

## 2022-11-21 LAB
ANION GAP SERPL CALC-SCNC: 6 MMOL/L (ref 8–16)
CALCIUM SERPL-MCNC: 9.3 MG/DL (ref 8.7–10.5)
CHLORIDE SERPL-SCNC: 104 MMOL/L (ref 95–110)
CO2 SERPL-SCNC: 29 MMOL/L (ref 23–29)
CREAT SERPL-MCNC: 1.38 MG/DL (ref 0.5–1.4)
EST. GFR  (NO RACE VARIABLE): 53 ML/MIN/1.73 M^2
GLUCOSE SERPL-MCNC: 108 MG/DL (ref 70–110)
POTASSIUM SERPL-SCNC: 4.4 MMOL/L (ref 3.5–5.1)
SODIUM SERPL-SCNC: 139 MMOL/L (ref 136–145)
UUN UR-MCNC: 20 MG/DL (ref 2–20)

## 2022-11-21 PROCEDURE — 3288F FALL RISK ASSESSMENT DOCD: CPT | Mod: CPTII,S$GLB,, | Performed by: INTERNAL MEDICINE

## 2022-11-21 PROCEDURE — 3078F DIAST BP <80 MM HG: CPT | Mod: CPTII,S$GLB,, | Performed by: INTERNAL MEDICINE

## 2022-11-21 PROCEDURE — 80048 BASIC METABOLIC PNL TOTAL CA: CPT | Mod: PO | Performed by: INTERNAL MEDICINE

## 2022-11-21 PROCEDURE — 3074F PR MOST RECENT SYSTOLIC BLOOD PRESSURE < 130 MM HG: ICD-10-PCS | Mod: CPTII,S$GLB,, | Performed by: INTERNAL MEDICINE

## 2022-11-21 PROCEDURE — 3078F PR MOST RECENT DIASTOLIC BLOOD PRESSURE < 80 MM HG: ICD-10-PCS | Mod: CPTII,S$GLB,, | Performed by: INTERNAL MEDICINE

## 2022-11-21 PROCEDURE — 1101F PT FALLS ASSESS-DOCD LE1/YR: CPT | Mod: CPTII,S$GLB,, | Performed by: INTERNAL MEDICINE

## 2022-11-21 PROCEDURE — 99999 PR PBB SHADOW E&M-EST. PATIENT-LVL V: CPT | Mod: PBBFAC,,, | Performed by: INTERNAL MEDICINE

## 2022-11-21 PROCEDURE — 3074F SYST BP LT 130 MM HG: CPT | Mod: CPTII,S$GLB,, | Performed by: INTERNAL MEDICINE

## 2022-11-21 PROCEDURE — 99999 PR PBB SHADOW E&M-EST. PATIENT-LVL V: ICD-10-PCS | Mod: PBBFAC,,, | Performed by: INTERNAL MEDICINE

## 2022-11-21 PROCEDURE — 3288F PR FALLS RISK ASSESSMENT DOCUMENTED: ICD-10-PCS | Mod: CPTII,S$GLB,, | Performed by: INTERNAL MEDICINE

## 2022-11-21 PROCEDURE — 1159F MED LIST DOCD IN RCRD: CPT | Mod: CPTII,S$GLB,, | Performed by: INTERNAL MEDICINE

## 2022-11-21 PROCEDURE — 99204 PR OFFICE/OUTPT VISIT, NEW, LEVL IV, 45-59 MIN: ICD-10-PCS | Mod: S$GLB,,, | Performed by: INTERNAL MEDICINE

## 2022-11-21 PROCEDURE — 1125F PR PAIN SEVERITY QUANTIFIED, PAIN PRESENT: ICD-10-PCS | Mod: CPTII,S$GLB,, | Performed by: INTERNAL MEDICINE

## 2022-11-21 PROCEDURE — 1125F AMNT PAIN NOTED PAIN PRSNT: CPT | Mod: CPTII,S$GLB,, | Performed by: INTERNAL MEDICINE

## 2022-11-21 PROCEDURE — 99204 OFFICE O/P NEW MOD 45 MIN: CPT | Mod: S$GLB,,, | Performed by: INTERNAL MEDICINE

## 2022-11-21 PROCEDURE — 1159F PR MEDICATION LIST DOCUMENTED IN MEDICAL RECORD: ICD-10-PCS | Mod: CPTII,S$GLB,, | Performed by: INTERNAL MEDICINE

## 2022-11-21 PROCEDURE — 1101F PR PT FALLS ASSESS DOC 0-1 FALLS W/OUT INJ PAST YR: ICD-10-PCS | Mod: CPTII,S$GLB,, | Performed by: INTERNAL MEDICINE

## 2022-11-21 PROCEDURE — 36415 COLL VENOUS BLD VENIPUNCTURE: CPT | Mod: PO | Performed by: INTERNAL MEDICINE

## 2022-11-21 NOTE — TELEPHONE ENCOUNTER
PA approved until 12/31/2023  Ref: [id number]    BI: COMPLETE     MEDICARE PLAN: [employer]   Estimated copay: $2821.38  Benefits Stage: initial phase -> push to catastrophic with fill  In Network: yes  PA approval on file: approved until 12/31/2023  LIS level: no

## 2022-11-21 NOTE — PROGRESS NOTES
"INFECTIOUS DISEASE CLINIC  11/21/2022 9:39 AM    Subjective:      Chief Complaint: positive urine culture      History of Present Illness:    Patient Noah Lopez is a 77 y.o. male with h/o metastatic prostate cancer who presents today for concern for uti. Started bicalutamide 11/9/22.  Reports allergies to cipro (tendinitis in foot- b/l) and bactrim (nausea in past; taking it now, tolerating. Worried about side effects of bactrim. X 3 days)    Ucx 11/14 with serratia. No UA appears to have been done    Reports stomach/bladder pain    Taking bactrim    Reports freq uti    Had catheter for a while, but not since "roter rooter" procedure    Sometimes urine with puss and cloudy. Had bloody urine that cleared up. Smelly. Not much dysuria, but does have some tenderness at bladder    Denies freq, cva tenderness, f/c.     Trouble emptying. Doesn't want catheter    Worried about side effects of bactrim    Starting to clear up        Component 7 d ago    Urine Culture, Routine  Abnormal   SERRATIA MARCESCENS   10,000 - 49,999 cfu/ml   No other significant isolate     Resulting Agency [facility]        Susceptibility     Serratia marcescens     CULTURE, URINE     Cefepime <=2 mcg/mL Sensitive     Ceftriaxone 32 mcg/mL Resistant     Ciprofloxacin <=1 mcg/mL Sensitive     Ertapenem <=0.5 mcg/mL Sensitive     Gentamicin <=4 mcg/mL Sensitive     Levofloxacin <=2 mcg/mL Sensitive     Meropenem <=1 mcg/mL Sensitive     Piperacillin/Tazo 64 mcg/mL Intermediate     Tobramycin <=4 mcg/mL Sensitive     Trimeth/Sulfa <=2/38 mcg/mL Sensitive                   Review of Symptoms:  Constitutional: Denies fevers, chills, or weakness.  ENT: Denies dysphagia, nasal discharge, ear pain or discharge.  Cardiovascular: Denies chest pain, palpitations, orthopnea, or claudication.  Respiratory: Denies shortness of breath, cough, hemoptysis, or wheezing.  GI: Denies nausea/vomitting, hematochezia, melena, abd pain, or changes in appetite.  : as " per hpi  Musculoskeletal: Denies joint pain or myalgias.  Skin/breast: Denies rashes, lumps, lesions, or discharge.  Neurologic: Denies headache, dizziness, vertigo, or paresthesias.    Past Medical History:   Diagnosis Date    Colon polyp 2018    Hyperlipidemia     Hypertension     Prostate cancer     Urinary tract infection with hematuria 2022       Past Surgical History:   Procedure Laterality Date    COLONOSCOPY      COLONOSCOPY N/A 2018    Procedure: COLONOSCOPY Suprep PLEASE TEXT PATIENT WITH ARRIVAL TIME;  Surgeon: Niharika Arce MD;  Location: [facility];  Service: Endoscopy;  Laterality: N/A;    CYSTOSCOPY WITH URODYNAMIC TESTING N/A 2022    Procedure: CYSTOSCOPY, WITH URODYNAMIC TESTING FLOUROSCOPIC;  Surgeon: Anthony Maloney MD;  Location: [address];  Service: Urology;  Laterality: N/A;  1hr    KNEE ARTHROSCOPY      SPINE SURGERY      l-spine    TRANSURETHRAL RESECTION OF PROSTATE N/A 10/26/2022    Procedure: TURP (TRANSURETHRAL RESECTION OF PROSTATE);  Surgeon: Helena Luis MD;  Location: [facility];  Service: Urology;  Laterality: N/A;       Family History   Problem Relation Age of Onset    Hypertension Mother     Heart attack Father     Cirrhosis Father     No Known Problems Brother     No Known Problems Brother     Seizures Daughter     Liver disease Daughter     No Known Problems Son     Prostate cancer Neg Hx     Breast cancer Neg Hx     Pancreatic cancer Neg Hx        Social History     Socioeconomic History    Marital status:    Occupational History     Comment: retried    Tobacco Use    Smoking status: Former     Packs/day: 1.50     Types: Cigarettes     Start date:      Quit date:      Years since quittin.9    Smokeless tobacco: Never    Tobacco comments:     Age 17 - 37. Up to 1.5 ppd.   Substance and Sexual Activity    Alcohol use: Yes     Comment: Occaisional. Few drinks a week.    Drug use: Yes     Types: Marijuana    Sexual activity:  Not Currently   Social History Narrative    Lives in [place], [place]. Lives with long term partner, Anju. Accompanied by daughter LIZZY Carrillo. Daughter, Mara. Lorena is main point of medical contact.        Review of patient's allergies indicates:   Allergen Reactions    Ciprofloxacin Other (See Comments)     tendonitis    Sulfa (sulfonamide antibiotics) Nausea And Vomiting         Objective:   /63 (BP Location: Right arm, Patient Position: Sitting)   Pulse 96   Temp 98.1 °F (36.7 °C) (Oral)   Wt 104.6 kg (230 lb 9.6 oz)   BMI 30.42 kg/m²     General: Afebrile, alert, comfortable, no acute distress.   HEENT: ADRIANA. EOMI, no scleral icterus.   Pulmonary: Non labored,clear to auscultation A/P/L. No wheezing, crackles, or rhonchi.  Cardiac: normal S1 & S2 w/o rubs/murmurs/gallops.   Abdominal: mild suprapubic tenderness. No bedoya. No cva tenderness  Extremities: Moves all extremities x 4. No peripheral edema.   Skin: No jaundice, rashes, or visible lesions.   Neurological:  Alert and oriented x 4.     Labs:    Glucose   Date Value Ref Range Status   11/18/2022 118 (H) 70 - 110 mg/dL Final   09/12/2022 140 (H) 70 - 110 mg/dL Final   08/30/2022 97 70 - 110 mg/dL Final       Calcium   Date Value Ref Range Status   11/18/2022 9.8 8.7 - 10.5 mg/dL Final   09/12/2022 8.5 (L) 8.7 - 10.5 mg/dL Final   08/30/2022 9.2 8.7 - 10.5 mg/dL Final       Albumin   Date Value Ref Range Status   11/18/2022 3.2 (L) 3.5 - 5.2 g/dL Final   09/12/2022 3.9 3.5 - 5.2 g/dL Final   08/30/2022 4.2 3.5 - 5.2 g/dL Final       Total Protein   Date Value Ref Range Status   11/18/2022 7.3 6.0 - 8.4 g/dL Final   09/12/2022 7.2 6.0 - 8.4 g/dL Final   08/30/2022 7.4 6.0 - 8.4 g/dL Final       Sodium   Date Value Ref Range Status   11/18/2022 141 136 - 145 mmol/L Final   09/12/2022 140 136 - 145 mmol/L Final   08/30/2022 141 136 - 145 mmol/L Final       Potassium   Date Value Ref Range Status   11/18/2022 4.1 3.5 - 5.1 mmol/L Final   09/12/2022 3.8 3.5 -  5.1 mmol/L Final   08/30/2022 4.4 3.5 - 5.1 mmol/L Final       CO2   Date Value Ref Range Status   11/18/2022 27 23 - 29 mmol/L Final   09/12/2022 29 23 - 29 mmol/L Final   08/30/2022 30 (H) 23 - 29 mmol/L Final       Chloride   Date Value Ref Range Status   11/18/2022 104 95 - 110 mmol/L Final   09/12/2022 101 95 - 110 mmol/L Final   08/30/2022 104 95 - 110 mmol/L Final       BUN   Date Value Ref Range Status   11/18/2022 26 (H) 8 - 23 mg/dL Final   09/12/2022 18 2 - 20 mg/dL Final   08/30/2022 16 2 - 20 mg/dL Final       Creatinine   Date Value Ref Range Status   11/18/2022 1.5 (H) 0.5 - 1.4 mg/dL Final   09/12/2022 1.31 0.50 - 1.40 mg/dL Final   08/30/2022 1.25 0.50 - 1.40 mg/dL Final       Alkaline Phosphatase   Date Value Ref Range Status   11/18/2022 107 55 - 135 U/L Final   09/12/2022 96 38 - 126 U/L Final   08/30/2022 96 38 - 126 U/L Final       ALT   Date Value Ref Range Status   11/18/2022 15 10 - 44 U/L Final   09/12/2022 55 (H) 10 - 44 U/L Final   08/30/2022 27 10 - 44 U/L Final       AST   Date Value Ref Range Status   11/18/2022 16 10 - 40 U/L Final   09/12/2022 72 (H) 15 - 46 U/L Final   08/30/2022 31 15 - 46 U/L Final       Total Bilirubin   Date Value Ref Range Status   11/18/2022 0.8 0.1 - 1.0 mg/dL Final     Comment:     For infants and newborns, interpretation of results should be based  on gestational age, weight and in agreement with clinical  observations.    Premature Infant recommended reference ranges:  Up to 24 hours.............<8.0 mg/dL  Up to 48 hours............<12.0 mg/dL  3-5 days..................<15.0 mg/dL  6-29 days.................<15.0 mg/dL     09/12/2022 0.5 0.1 - 1.0 mg/dL Final     Comment:     For infants and newborns, interpretation of results should be based  on gestational age, weight and in agreement with clinical  observations.    Premature Infant recommended reference ranges:  Up to 24 hours.............<8.0 mg/dL  Up to 48 hours............<12.0 mg/dL  3-5  days..................<15.0 mg/dL  6-29 days.................<15.0 mg/dL     08/30/2022 0.5 0.1 - 1.0 mg/dL Final     Comment:     For infants and newborns, interpretation of results should be based  on gestational age, weight and in agreement with clinical  observations.    Premature Infant recommended reference ranges:  Up to 24 hours.............<8.0 mg/dL  Up to 48 hours............<12.0 mg/dL  3-5 days..................<15.0 mg/dL  6-29 days.................<15.0 mg/dL         WBC   Date Value Ref Range Status   11/18/2022 8.61 3.90 - 12.70 K/uL Final   09/12/2022 6.21 3.90 - 12.70 K/uL Final   07/06/2022 7.13 3.90 - 12.70 K/uL Final       Hemoglobin   Date Value Ref Range Status   11/18/2022 14.9 14.0 - 18.0 g/dL Final   09/12/2022 15.4 14.0 - 18.0 g/dL Final   07/06/2022 15.3 14.0 - 18.0 g/dL Final       Hematocrit   Date Value Ref Range Status   11/18/2022 45.1 40.0 - 54.0 % Final   09/12/2022 45.6 40.0 - 54.0 % Final   07/06/2022 46.3 40.0 - 54.0 % Final       MCV   Date Value Ref Range Status   11/18/2022 95 82 - 98 fL Final   09/12/2022 92 82 - 98 fL Final   07/06/2022 93 82 - 98 fL Final       Platelets   Date Value Ref Range Status   11/18/2022 313 150 - 450 K/uL Final   09/12/2022 247 150 - 450 K/uL Final   07/06/2022 273 150 - 450 K/uL Final       Lab Results   Component Value Date    CHOL 145 12/08/2021    CHOL 141 08/27/2018       Lab Results   Component Value Date    HDL 45 12/08/2021    HDL 46 08/27/2018       Lab Results   Component Value Date    LDLCALC 83.2 12/08/2021    LDLCALC 78.4 08/27/2018       Lab Results   Component Value Date    TRIG 84 12/08/2021    TRIG 83 08/27/2018       Lab Results   Component Value Date    CHOLHDL 31.0 12/08/2021    CHOLHDL 32.6 08/27/2018     No results found for: RPR  No results found for: QUANTIFERON    Medications:  Current Outpatient Medications on File Prior to Visit   Medication Sig Dispense Refill    apalutamide (ERLEADA) 60 mg Tab Take 240 mg by mouth  once daily. 120 tablet 11    ascorbic acid, vitamin C, (VITAMIN C) 1000 MG tablet Take 1,000 mg by mouth once daily.      bicalutamide (CASODEX) 50 MG Tab Take 1 tablet (50 mg total) by mouth once daily. 30 tablet 0    calcium carbonate (OS-TYREE) 500 mg calcium (1,250 mg) tablet Take 2 tablets (1,000 mg total) by mouth once daily. 60 tablet 11    cefdinir (OMNICEF) 300 MG capsule Take 1 capsule (300 mg total) by mouth 2 (two) times daily. for 10 days 20 capsule 0    cholecalciferol, vitamin D3, (VITAMIN D3) 25 mcg (1,000 unit) capsule Take 1 capsule (1,000 Units total) by mouth once daily. 30 capsule 11    clotrimazole-betamethasone 1-0.05% (LOTRISONE) cream Apply topically 2 (two) times daily. 60 g 3    diltiaZEM (CARDIZEM CD) 300 MG 24 hr capsule TAKE 1 CAPSULE BY MOUTH  ONCE DAILY 90 capsule 3    diphenhydramine HCl (BENADRYL ALLERGY ORAL) Take by mouth.      docusate sodium (COLACE) 250 MG capsule Take 250 mg by mouth once daily.      finasteride (PROSCAR) 5 mg tablet Take 1 tablet (5 mg total) by mouth once daily. 90 tablet 3    GAS RELIEF 80, SIMETHICONE, ORAL Take by mouth.      lisinopriL (PRINIVIL,ZESTRIL) 20 MG tablet TAKE 1 TABLET BY MOUTH ONCE DAILY 90 tablet 3    melatonin 10 mg TbDL Take by mouth.      methocarbamoL (ROBAXIN) 750 MG Tab Take 750-1,500 mg by mouth 3 (three) times daily as needed.      miconazole (MICOTIN) 2 % cream Apply topically 2 (two) times daily. Apply to tip of penis prn irritation 28 g 0    multivit-min-FA-lycopen-lutein (MEN 50 PLUS MULTIVITAMIN) 300-600-300 mcg Tab as directed Orally      multivitamin capsule Take 1 capsule by mouth once daily.      nitrofurantoin, macrocrystal-monohydrate, (MACROBID) 100 MG capsule Take 100 mg by mouth every 12 (twelve) hours.      ofloxacin (FLOXIN) 0.3 % otic solution Apply 10 drops into right ear once a day for 10 days.  Please dispense generic.      omega 3-dha-epa-fish oil 1,000 mg (120 mg-180 mg) Cap 1 capsule.      omega-3 fatty  acids/fish oil (FISH OIL-OMEGA-3 FATTY ACIDS) 300-1,000 mg capsule Take 1 capsule by mouth once daily.      simvastatin (ZOCOR) 20 MG tablet TAKE 1 TABLET BY MOUTH IN  THE EVENING 90 tablet 3    sulfamethoxazole-trimethoprim 800-160mg (BACTRIM DS) 800-160 mg Tab Take 1 tablet by mouth 2 (two) times daily. for 10 days 20 tablet 0    tamsulosin (FLOMAX) 0.4 mg Cap Take 1 capsule (0.4 mg total) by mouth once daily. 90 capsule 3    traZODone (DESYREL) 100 MG tablet TAKE 2 TABLETS BY MOUTH EVERY DAY AT BEDTIME AS NEEDED FOR INSOMNIA 180 tablet 3    triamterene-hydrochlorothiazide 37.5-25 mg (DYAZIDE) 37.5-25 mg per capsule TAKE 1 CAPSULE BY MOUTH IN  THE MORNING 90 capsule 3    TURMERIC ORAL Take by mouth.      wheat dextrin (BENEFIBER CLEAR SF, DEXTRIN, ORAL) Take by mouth.      zinc gluconate 50 mg tablet Take 50 mg by mouth once daily.       No current facility-administered medications on file prior to visit.       Antibiotics:   Antibiotics (From admission, onward)      None            HIV: No components found for: HIV 1/2 AG/AB  Hepatitis C IgG: No components found for: HEPATITIS C  Syphilis: No results found for: RPR    Hepatitis A IgG: No components found for: HEPATITIS A IGG  Hepatitis Bc IgG: No components found for: HEPATITIS B CORE IGG  Hepatitis Bs IgG:  Quantiferon: No results found for: QUANTIFERON  VZV IgG: No components found for: VARICELLA IGG    No components found for: SEDIMENTATION RATE  No components found for: C-REACTIVE PROTEIN      Microbiology x 7d:   Microbiology Results (last 7 days)       ** No results found for the last 168 hours. **            Immunization History   Administered Date(s) Administered    COVID-19, MRNA, LN-S, PF (MODERNA FULL 0.5 ML DOSE) 02/09/2021, 03/08/2021, 12/08/2021    Hepatitis A, Adult 01/13/2020    Tdap 08/21/2020         Reviewed records today as well as relevant labs, cultures, and imaging    Assessment:     cystitis    No toxicities from antimicrobials, but did  have acute kidney injury prior to starting bactrim  Extremely medically complex  Patient is high risk for infectious complications given urinary retention and prostate cancer and MDRO    Plan:     Continue bactrim, 5-7 days likely adequate  F/u urology for urine retention management  Repeat BMP  Counseled extensively on side effects of abx  Gave patient UA/culture specimen cup to drop off if he has recurrent symptoms.    - Will monitor drug therapy for toxicity    - The following labs were ordered:    Orders Placed This Encounter   Procedures    BASIC METABOLIC PANEL     Standing Status:   Future     Standing Expiration Date:   1/20/2024    Urinalysis, Reflex to Urine Culture Urine, Clean Catch     Standing Status:   Future     Standing Expiration Date:   1/20/2024     Order Specific Question:   Preferred Collection Type     Answer:   Urine, Clean Catch     Order Specific Question:   Specimen Source     Answer:   Urine         I have sent communication to the referring physician and/or primary care provider.     I spent a total of 45 minutes on the day of the visit. This includes face to face time and non-face to face time preparing to see the patient (eg, review of tests), obtaining and/or reviewing separately obtained history, documenting clinical information in the electronic or other health record, independently interpreting results, and communicating results to the patient/family/caregiver, or care coordination.      Leah De Leon MD, MPH  Infectious Disease

## 2022-11-23 NOTE — TELEPHONE ENCOUNTER
No answer/unable to lvm. Call attempt to patient regarding Erleada prescription we received. Calling to inform patient Erleada has been approved through insurance with a $2821.38 copay. No grants are currently available at this time but am able to apply patient for  assistance. WILL NEED HH SIZE, ANNUAL INCOME, & HOW PT WANTS TO RECEIVE THEIR PORTION OF APPLICATION.

## 2022-11-28 ENCOUNTER — TELEPHONE (OUTPATIENT)
Dept: HEMATOLOGY/ONCOLOGY | Facility: CLINIC | Age: 77
End: 2022-11-28
Payer: MEDICARE

## 2022-11-28 NOTE — TELEPHONE ENCOUNTER
Sent a staff message to [employer] regarding [employer] assistance application and faxed application prescription to [phone] for provider review and signature. Will continue to follow up.    Mailed patient portion of assistance application to address on file for patient to review, sign and return to [facility]. Will continue to follow up.

## 2022-11-28 NOTE — TELEPHONE ENCOUNTER
----- Message from Ruth Whitfield sent at 11/28/2022 11:22 AM CST -----  Needs advice from nurse:      Who Called:pt  Regarding:patient was not made aware of labs that were scheduled on 11/21 and patient was out of town///please call patient before ordering tests  Would the patient rather a call back or VIA [facility]?  Best Call Back number:[phone]  Additional Info:

## 2022-11-28 NOTE — TELEPHONE ENCOUNTER
Outgoing call to patient regarding Erleada prescription we received. Informed patient Erleada has been approved through insurance with a $2821.38 copay. No grants are currently available at this time but am able to apply patient for  assistance. Patient provided consent, HH size, and annual income and requested to have pt portion sent via mail. Confirmed address on file. Will continue to follow up.

## 2022-11-29 ENCOUNTER — PATIENT MESSAGE (OUTPATIENT)
Dept: UROLOGY | Facility: CLINIC | Age: 77
End: 2022-11-29
Payer: MEDICARE

## 2022-12-01 ENCOUNTER — TELEPHONE (OUTPATIENT)
Dept: HEMATOLOGY/ONCOLOGY | Facility: CLINIC | Age: 77
End: 2022-12-01
Payer: MEDICARE

## 2022-12-01 NOTE — TELEPHONE ENCOUNTER
----- Message from Darren Carter MD sent at 12/1/2022  9:42 AM CST -----  Regarding: RE: Erleada Assistance Application  Signed. Thanks!    ----- Message -----  From: Meme Bravo  Sent: 11/28/2022   2:51 PM CST  To: Darren Carter MD, Darren Carter Staff  Subject: Erleada Assistance Application                   Hi Dr Carter and Staff,        Monster is approved by the patient's insurance with a high copay. We will be assisting the patient in applying to the  patient assistance program. I am faxing the application prescription to [phone] for your review and signature. Please fax signed paperwork back to my attention @ [phone]. If you would like the paperwork faxed to an alternate number please let me know.        Thank you,  Meme Bravo  Patient Care Advocate  [facility]  Ph: [phone]

## 2022-12-01 NOTE — TELEPHONE ENCOUNTER
Received provider portion of assistance application and submission is currently pending patient portion. Will continue to follow up.

## 2022-12-02 NOTE — TELEPHONE ENCOUNTER
----- Message from Victor Manuel Harrell sent at 12/2/2022 12:23 PM CST -----  Type:  Needs Medical Advice    Who Called: pt  Symptoms (please be specific): lump on neck, tender to touch; nauseated; tension in mouth; headache  How long has patient had these symptoms:  2-3 days  Would the patient rather a call back or a response via [facility]? Call back  Best Call Back Number: [phone]

## 2022-12-02 NOTE — TELEPHONE ENCOUNTER
I spoke to patient, asked him what was going on. He told me he had been sick before all this started happening, he thinks he had the flu, then for the past 2 days he's been dizzy, a little nauseated, had a lump on his neck that's tender to touch, which he just noticed today. I asked if he had any other symptoms. He said he has a bad cough. I told him I could schedule him to come in to see Dr. Carter next week, or he could go to an urgent care or the emergency room. He said he thought he would wait another day or 2 to see how he feels. He also complained of having trouble sleeping, clenching his teeth and pressing his tongue against the roof of his mouth, which was making it hurt. We discussed that this might be stress and that perhaps he should talk to Dr. Carter about it when he comes in for his next visit. He said he may do that. I reiterated about going to the UC or ED if it gets worse and he verbalized understanding.

## 2022-12-05 ENCOUNTER — TELEPHONE (OUTPATIENT)
Dept: HEMATOLOGY/ONCOLOGY | Facility: CLINIC | Age: 77
End: 2022-12-05
Payer: MEDICARE

## 2022-12-05 DIAGNOSIS — C61 PROSTATE CANCER: ICD-10-CM

## 2022-12-05 RX ORDER — BICALUTAMIDE 50 MG/1
50 TABLET, FILM COATED ORAL DAILY
Qty: 90 TABLET | Refills: 0 | Status: SHIPPED | OUTPATIENT
Start: 2022-12-05 | End: 2023-02-20 | Stop reason: SDUPTHER

## 2022-12-05 NOTE — TELEPHONE ENCOUNTER
"Patient called regarding medications. He told me Dr. Carter gave him "a little white pill" to take until he received his "new" medication, but they want a $2,000 co-pay. He just finished and sent in his paperwork for financial assistance today. He only has enough medicine to get him through until Friday. What should he do? I told him I would get with Dr. Carter and let him know.   "

## 2022-12-05 NOTE — TELEPHONE ENCOUNTER
----- Message from Rene Browning sent at 12/5/2022  1:11 PM CST -----  .Type: Pt is requesting to speak to nurse about medications.    Who Called: Pt  Would the patient rather a call back or a response via [facility]? Call  Best Call Back Number: [phone]

## 2022-12-05 NOTE — TELEPHONE ENCOUNTER
Outgoing call to patient to check status of his portion of the Erleada assistance application. Patient confirmed he received assistance application and will be mailing the completed paperwork back to [facility] today. Will continue to follow up.

## 2022-12-09 ENCOUNTER — PATIENT MESSAGE (OUTPATIENT)
Dept: UROLOGY | Facility: CLINIC | Age: 77
End: 2022-12-09

## 2022-12-09 ENCOUNTER — OFFICE VISIT (OUTPATIENT)
Dept: UROLOGY | Facility: CLINIC | Age: 77
End: 2022-12-09
Payer: MEDICARE

## 2022-12-09 VITALS
HEIGHT: 73 IN | DIASTOLIC BLOOD PRESSURE: 82 MMHG | SYSTOLIC BLOOD PRESSURE: 166 MMHG | HEART RATE: 80 BPM | BODY MASS INDEX: 29.36 KG/M2 | WEIGHT: 221.56 LBS

## 2022-12-09 DIAGNOSIS — N31.2 HYPOCONTRACTILE BLADDER: ICD-10-CM

## 2022-12-09 DIAGNOSIS — C61 PROSTATE CANCER: Primary | ICD-10-CM

## 2022-12-09 DIAGNOSIS — N39.0 RECURRENT UTI: ICD-10-CM

## 2022-12-09 DIAGNOSIS — R33.9 URINARY RETENTION: ICD-10-CM

## 2022-12-09 LAB
BILIRUB SERPL-MCNC: ABNORMAL MG/DL
BLOOD URINE, POC: 250
CLARITY, POC UA: ABNORMAL
COLOR, POC UA: YELLOW
GLUCOSE UR QL STRIP: ABNORMAL
KETONES UR QL STRIP: ABNORMAL
LEUKOCYTE ESTERASE URINE, POC: ABNORMAL
NITRITE, POC UA: ABNORMAL
PH, POC UA: 5
POC RESIDUAL URINE VOLUME: 0 ML (ref 0–100)
PROTEIN, POC: 30
SPECIFIC GRAVITY, POC UA: 1.02
UROBILINOGEN, POC UA: ABNORMAL

## 2022-12-09 PROCEDURE — 81002 URINALYSIS NONAUTO W/O SCOPE: CPT | Mod: S$GLB,,, | Performed by: STUDENT IN AN ORGANIZED HEALTH CARE EDUCATION/TRAINING PROGRAM

## 2022-12-09 PROCEDURE — 99999 PR PBB SHADOW E&M-EST. PATIENT-LVL III: ICD-10-PCS | Mod: PBBFAC,,, | Performed by: STUDENT IN AN ORGANIZED HEALTH CARE EDUCATION/TRAINING PROGRAM

## 2022-12-09 PROCEDURE — 1159F MED LIST DOCD IN RCRD: CPT | Mod: CPTII,S$GLB,, | Performed by: STUDENT IN AN ORGANIZED HEALTH CARE EDUCATION/TRAINING PROGRAM

## 2022-12-09 PROCEDURE — 3079F DIAST BP 80-89 MM HG: CPT | Mod: CPTII,S$GLB,, | Performed by: STUDENT IN AN ORGANIZED HEALTH CARE EDUCATION/TRAINING PROGRAM

## 2022-12-09 PROCEDURE — 1160F RVW MEDS BY RX/DR IN RCRD: CPT | Mod: CPTII,S$GLB,, | Performed by: STUDENT IN AN ORGANIZED HEALTH CARE EDUCATION/TRAINING PROGRAM

## 2022-12-09 PROCEDURE — 87086 URINE CULTURE/COLONY COUNT: CPT | Performed by: STUDENT IN AN ORGANIZED HEALTH CARE EDUCATION/TRAINING PROGRAM

## 2022-12-09 PROCEDURE — 99024 PR POST-OP FOLLOW-UP VISIT: ICD-10-PCS | Mod: S$GLB,,, | Performed by: STUDENT IN AN ORGANIZED HEALTH CARE EDUCATION/TRAINING PROGRAM

## 2022-12-09 PROCEDURE — 1126F AMNT PAIN NOTED NONE PRSNT: CPT | Mod: CPTII,S$GLB,, | Performed by: STUDENT IN AN ORGANIZED HEALTH CARE EDUCATION/TRAINING PROGRAM

## 2022-12-09 PROCEDURE — 87088 URINE BACTERIA CULTURE: CPT | Performed by: STUDENT IN AN ORGANIZED HEALTH CARE EDUCATION/TRAINING PROGRAM

## 2022-12-09 PROCEDURE — 3077F SYST BP >= 140 MM HG: CPT | Mod: CPTII,S$GLB,, | Performed by: STUDENT IN AN ORGANIZED HEALTH CARE EDUCATION/TRAINING PROGRAM

## 2022-12-09 PROCEDURE — 81002 POCT URINE DIPSTICK WITHOUT MICROSCOPE: ICD-10-PCS | Mod: S$GLB,,, | Performed by: STUDENT IN AN ORGANIZED HEALTH CARE EDUCATION/TRAINING PROGRAM

## 2022-12-09 PROCEDURE — 3079F PR MOST RECENT DIASTOLIC BLOOD PRESSURE 80-89 MM HG: ICD-10-PCS | Mod: CPTII,S$GLB,, | Performed by: STUDENT IN AN ORGANIZED HEALTH CARE EDUCATION/TRAINING PROGRAM

## 2022-12-09 PROCEDURE — 1126F PR PAIN SEVERITY QUANTIFIED, NO PAIN PRESENT: ICD-10-PCS | Mod: CPTII,S$GLB,, | Performed by: STUDENT IN AN ORGANIZED HEALTH CARE EDUCATION/TRAINING PROGRAM

## 2022-12-09 PROCEDURE — 51798 POCT BLADDER SCAN: ICD-10-PCS | Mod: S$GLB,,, | Performed by: STUDENT IN AN ORGANIZED HEALTH CARE EDUCATION/TRAINING PROGRAM

## 2022-12-09 PROCEDURE — 3077F PR MOST RECENT SYSTOLIC BLOOD PRESSURE >= 140 MM HG: ICD-10-PCS | Mod: CPTII,S$GLB,, | Performed by: STUDENT IN AN ORGANIZED HEALTH CARE EDUCATION/TRAINING PROGRAM

## 2022-12-09 PROCEDURE — 51798 US URINE CAPACITY MEASURE: CPT | Mod: S$GLB,,, | Performed by: STUDENT IN AN ORGANIZED HEALTH CARE EDUCATION/TRAINING PROGRAM

## 2022-12-09 PROCEDURE — 99024 POSTOP FOLLOW-UP VISIT: CPT | Mod: S$GLB,,, | Performed by: STUDENT IN AN ORGANIZED HEALTH CARE EDUCATION/TRAINING PROGRAM

## 2022-12-09 PROCEDURE — 1160F PR REVIEW ALL MEDS BY PRESCRIBER/CLIN PHARMACIST DOCUMENTED: ICD-10-PCS | Mod: CPTII,S$GLB,, | Performed by: STUDENT IN AN ORGANIZED HEALTH CARE EDUCATION/TRAINING PROGRAM

## 2022-12-09 PROCEDURE — 99999 PR PBB SHADOW E&M-EST. PATIENT-LVL III: CPT | Mod: PBBFAC,,, | Performed by: STUDENT IN AN ORGANIZED HEALTH CARE EDUCATION/TRAINING PROGRAM

## 2022-12-09 PROCEDURE — 87186 SC STD MICRODIL/AGAR DIL: CPT | Performed by: STUDENT IN AN ORGANIZED HEALTH CARE EDUCATION/TRAINING PROGRAM

## 2022-12-09 PROCEDURE — 87077 CULTURE AEROBIC IDENTIFY: CPT | Performed by: STUDENT IN AN ORGANIZED HEALTH CARE EDUCATION/TRAINING PROGRAM

## 2022-12-09 PROCEDURE — 1159F PR MEDICATION LIST DOCUMENTED IN MEDICAL RECORD: ICD-10-PCS | Mod: CPTII,S$GLB,, | Performed by: STUDENT IN AN ORGANIZED HEALTH CARE EDUCATION/TRAINING PROGRAM

## 2022-12-09 NOTE — PROGRESS NOTES
"Subjective:       Patient ID: Noah Lopez is a 77 y.o. male.    Chief Complaint:  F/U  This is a 77 y.o.  male patient that is an established patient of mine.  The patient was referred to me by Dr. De La Torre for urinary retention. Dr. De La Torre obtained routine bloodwork in 12/2021. His Cr was elevated from baseline. He then ordered a renal US 1/4/22 - marked distention of bladder, no hydronephrosis bilaterally, enlarged prostate vol 197g, and pt presented to ER per his recommendation on for a bedoya placement.   ER visit 1/4/22 - 3600 cc of urine from his Bedoya. Was not started on medications.      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 He feels that his "sphincter" has relaxed more.     Started him on flomax and finasteride 1 week ago. Pt wanted a voiding trial this week.     He failed a voiding trial. Worked up with cysto and UDS.  Cysto -Trilobar hypertrophy of the prostate, mildly enlarged median lobe, significantly enlarged lateral lobes  UDs with Dr. Maloney on 1/31/22 - see his report for full details -   Pdet at Max Flow: 0 cmH2O    EMG Storage: Normal Recruitment             EMG Voiding: Flaring with valsalva     Impression:           Sensation:Normal     Capacity: Large     Compliance:Normal     Detrusor Overactivity:Absent     Continence: No Incontinence     Contractility: Hypocontractility     Emptying:Unsatisfactory - Hypocontractility     Coordination:Dysfunctional (Valsalva) Voiding      He was having a bad reaction to Bactrim sent by Dr. De La Torre. Did not finish course.    Pt was not interested in a surgery. He felt that he "did this to myself." and with the results of the urodynamics with no detrusor pressure and valsalva voiding, an outlet reducing procedure would have a low chance of success. The patient states he is mostly interested in bedoya management and he does not want a surgery at this time.     3/24/22  Here " today for bedoya assistance. Pt went to ER and did not know how to disconnect bag    6/13/22  Pt notes cloudy urine and suprapubic discomfort. Also constipated. PSA was 17 5/25/22 and checked 2 days after a bedoya exchange. Patient notes he does not really drink many fluids from 9am-9pm.     7/12/22  I treated last UTI with ceftin x 10 days. I also recommended that he start augmentin the day before, the day of and the day after bedoya exchanges to guard against infections. He informed me that he did not do that.     8/25/22  Had patient try to call his daughter Lorena to include her in the conversation. She did not , pt stated likely is working. I told the patient I will type up a detailed note so she can follow along remotely.     He is s/p TRUS bx on 8/11/22; TRUS bx 60.4g. PSA 15.8 7/5/22. Full path report below.  Prostate biopsy demonstrated prostate cancer - [place] 4+3, grade group 3 present in 10/18 cores. Prostate biopsy cores were seen in all cores taken from left side.    RELIAPATH DIAGNOSIS:   A.   PROSTATE, LEFT APEX, NEEDLE BIOPSY:          HISTOLOGIC TYPE:  Acinar adenocarcinoma.          HISTOLOGIC GRADE:          Grade Group 3 ([place] Score 4+3=7)            Percentage of Pattern 4:  70%          CRIBRIFORM GLANDS:  Present.          TUMOR QUANTITATION:            Number of Cores Positive:  2            Total Number of Cores:  2            Measurement of Tumor:  19 mm.            Measurement of Core Tissue:  31 mm.            Percentage of Prostatic Tissue Involved by Tumor:  60%          LYMPHOVASCULAR INVASION:  Not identified.          PERINEURAL INVASION:  Present.          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    PROSTATE, LEFT MID, NEEDLE BIOPSY:          HISTOLOGIC TYPE:  Acinar adenocarcinoma.          HISTOLOGIC GRADE:          Grade Group 3 (Vikash Score 4+3=7)            Percentage of Pattern 4:  70%          CRIBRIFORM GLANDS:   Present.          TUMOR QUANTITATION:            Number of Cores Positive:  2            Total Number of Cores:  2            Measurement of Tumor:  16 mm.            Measurement of Core Tissue:  24 mm.            Percentage of Prostatic Tissue Involved by Tumor:  70%          LYMPHOVASCULAR INVASION:  Not identified.          PERINEURAL INVASION:  Present.  (C61.)   C.    PROSTATE, LEFT BASE, NEEDLE BIOPSY:          HISTOLOGIC TYPE:  Acinar adenocarcinoma.          HISTOLOGIC GRADE:          Grade Group 3 ([place] Score 4+3=7)            Percentage of Pattern 4:  90%          CRIBRIFORM GLANDS:  Present.          TUMOR QUANTITATION:            Number of Cores Positive:  4            Total Number of Cores:  4            Measurement of Tumor:  17 mm.            Measurement of Core Tissue:  27 mm.            Percentage of Prostatic Tissue Involved by Tumor:  80%          LYMPHOVASCULAR INVASION:  Not identified.          PERINEURAL INVASION:  Present.  (C61.)   D.   TISSUE1           ROSTATE, LEFT TRANSITION ZONE,  BIOPSY:          HISTOLOGIC TYPE:  Acinar adenocarcinoma.          HISTOLOGIC GRADE:          Grade Group 3 (Vikash Score 4+3=7)            Percentage of Pattern 4:  70%          CRIBRIFORM GLANDS:  Present.          TUMOR QUANTITATION:            Number of Cores Positive:  2            Total Number of Cores:  2            Measurement of Tumor:  7 mm.            Measurement of Core Tissue:  18 mm.            Percentage of Prostatic Tissue Involved by Tumor:  40%          LYMPHOVASCULAR INVASION:  Not identified.          PERINEURAL INVASION:  Not identified.  (C61.)   E.    PROSTATE, RIGHT APEX, NEEDLE BIOPSY:          Benign prostatic tissue with atrophy.  (R97.20)   F.   PROSTATE, RIGHT MID, NEEDLE BIOPSY:          Benign prostatic tissue with chronic inflammation and atrophy, see   comment.  (R97.20)   G.   PROSTATE, RIGHT BASE, NEEDLE BIOPSY:          Benign prostatic tissue with chronic inflammation  with atrophy.   (R97.20)   H.   TISSUE2          PROSTATE, RIGHT TRANSITION ZONE, BIOPSY:          -Benign prostatic tissue with atrophy.  (R97.20)     10/17/22  In the interim, pt voiced that he would like to cancel the bone biopsy due to risks and adverse events and pain. He is in agreement with proceeding with prostate cancer treatment with oncologist as metastatic prostate cancer.   MRI 9/29/22 demonstrated vol of 63.35cc, pubic lesion concerning for metastatic disease, and left pirads 5 lesion with EPE involving left neurovascular bundles, seminal vesicles, and extension involving the left levator ani in close proximity with anterior rectal wall. MRI suggestive of cT4 disease,N0,M1b(pubic bone involvement).   Since he was still in urinary retention he elected to proceed with channel TURP to try to get rid of the catheter.     11/3/22  He is s/p TURP 10/26/22 - path demonstrated Vikash 4+5 prostate cancer, intraductal carcinoma identified, 50% of TURP chips. He is proceeding with ADT + radiation. Saw Dr. Carter with heme/onc on 10/27/22. He has an upcoming consultation with Dr. Devlin on 11/8/22.     11/10/22  Underwent voiding trial last week, pt able to void. Today when called to come in for his appt, he declined to come in with my MA, stated he was talking to another person in the lobby.     12/9/22  Here for PVR check, discuss bilateral simple orchiectomy for hormonal ablation purposes. Notes urine is cloudy, wanted to give us a sample for culture.   For prostate cancer, he was started on lupron and apalutamide by his oncologist.   Daughter anette planning on being there for Feb/March.         LAST PSA  Lab Results   Component Value Date    PSA 11.7 (H) 12/08/2021    PSADIAG 19.6 (H) 11/18/2022    PSADIAG 17.3 (H) 05/25/2022    PSATOTAL 15.8 (H) 07/05/2022    PSAFREE 2.83 (H) 07/05/2022       Lab Results   Component Value Date    CREATININE 1.38 11/21/2022       ---  Past Medical History:   Diagnosis  Date    Colon polyp 2018    Hyperlipidemia     Hypertension     Prostate cancer     Urinary tract infection with hematuria 2022       Past Surgical History:   Procedure Laterality Date    COLONOSCOPY      COLONOSCOPY N/A 2018    Procedure: COLONOSCOPY Suprep PLEASE TEXT PATIENT WITH ARRIVAL TIME;  Surgeon: Niharika Arce MD;  Location: [facility];  Service: Endoscopy;  Laterality: N/A;    CYSTOSCOPY WITH URODYNAMIC TESTING N/A 2022    Procedure: CYSTOSCOPY, WITH URODYNAMIC TESTING FLOUROSCOPIC;  Surgeon: Anthony Maloney MD;  Location: [address] [facility];  Service: Urology;  Laterality: N/A;  1hr    KNEE ARTHROSCOPY      SPINE SURGERY      l-spine    TRANSURETHRAL RESECTION OF PROSTATE N/A 10/26/2022    Procedure: TURP (TRANSURETHRAL RESECTION OF PROSTATE);  Surgeon: Helena Luis MD;  Location: [facility] OR;  Service: Urology;  Laterality: N/A;       Family History   Problem Relation Age of Onset    Hypertension Mother     Heart attack Father     Cirrhosis Father     No Known Problems Brother     No Known Problems Brother     Seizures Daughter     Liver disease Daughter     No Known Problems Son     Prostate cancer Neg Hx     Breast cancer Neg Hx     Pancreatic cancer Neg Hx        Social History     Tobacco Use    Smoking status: Former     Packs/day: 1.50     Types: Cigarettes     Start date:      Quit date:      Years since quittin.9    Smokeless tobacco: Never    Tobacco comments:     Age 17 - 37. Up to 1.5 ppd.   Substance Use Topics    Alcohol use: Yes     Comment: Occaisional. Few drinks a week.    Drug use: Yes     Types: Marijuana       Current Outpatient Medications on File Prior to Visit   Medication Sig Dispense Refill    apalutamide (ERLEADA) 60 mg Tab Take 240 mg by mouth once daily. 120 tablet 11    ascorbic acid, vitamin C, (VITAMIN C) 1000 MG tablet Take 1,000 mg by mouth once daily.      bicalutamide (CASODEX) 50 MG Tab Take 1 tablet (50 mg total) by mouth once  daily. 90 tablet 0    calcium carbonate (OS-TYREE) 500 mg calcium (1,250 mg) tablet Take 2 tablets (1,000 mg total) by mouth once daily. 60 tablet 11    cholecalciferol, vitamin D3, (VITAMIN D3) 25 mcg (1,000 unit) capsule Take 1 capsule (1,000 Units total) by mouth once daily. 30 capsule 11    clotrimazole-betamethasone 1-0.05% (LOTRISONE) cream Apply topically 2 (two) times daily. 60 g 3    diltiaZEM (CARDIZEM CD) 300 MG 24 hr capsule TAKE 1 CAPSULE BY MOUTH  ONCE DAILY 90 capsule 3    diphenhydramine HCl (BENADRYL ALLERGY ORAL) Take by mouth.      docusate sodium (COLACE) 250 MG capsule Take 250 mg by mouth once daily.      finasteride (PROSCAR) 5 mg tablet Take 1 tablet (5 mg total) by mouth once daily. 90 tablet 3    GAS RELIEF 80, SIMETHICONE, ORAL Take by mouth.      lisinopriL (PRINIVIL,ZESTRIL) 20 MG tablet TAKE 1 TABLET BY MOUTH ONCE DAILY 90 tablet 3    melatonin 10 mg TbDL Take by mouth.      methocarbamoL (ROBAXIN) 750 MG Tab Take 750-1,500 mg by mouth 3 (three) times daily as needed.      miconazole (MICOTIN) 2 % cream Apply topically 2 (two) times daily. Apply to tip of penis prn irritation 28 g 0    multivit-min-FA-lycopen-lutein (MEN 50 PLUS MULTIVITAMIN) 300-600-300 mcg Tab as directed Orally      multivitamin capsule Take 1 capsule by mouth once daily.      nitrofurantoin, macrocrystal-monohydrate, (MACROBID) 100 MG capsule Take 100 mg by mouth every 12 (twelve) hours.      ofloxacin (FLOXIN) 0.3 % otic solution Apply 10 drops into right ear once a day for 10 days.  Please dispense generic.      omega 3-dha-epa-fish oil 1,000 mg (120 mg-180 mg) Cap 1 capsule.      omega-3 fatty acids/fish oil (FISH OIL-OMEGA-3 FATTY ACIDS) 300-1,000 mg capsule Take 1 capsule by mouth once daily.      simvastatin (ZOCOR) 20 MG tablet TAKE 1 TABLET BY MOUTH IN  THE EVENING 90 tablet 3    tamsulosin (FLOMAX) 0.4 mg Cap Take 1 capsule (0.4 mg total) by mouth once daily. 90 capsule 3    traZODone (DESYREL) 100 MG tablet  TAKE 2 TABLETS BY MOUTH EVERY DAY AT BEDTIME AS NEEDED FOR INSOMNIA 180 tablet 3    triamterene-hydrochlorothiazide 37.5-25 mg (DYAZIDE) 37.5-25 mg per capsule TAKE 1 CAPSULE BY MOUTH IN  THE MORNING 90 capsule 3    TURMERIC ORAL Take by mouth.      wheat dextrin (BENEFIBER CLEAR SF, DEXTRIN, ORAL) Take by mouth.      zinc gluconate 50 mg tablet Take 50 mg by mouth once daily.       No current facility-administered medications on file prior to visit.       Review of patient's allergies indicates:   Allergen Reactions    Ciprofloxacin Other (See Comments)     tendonitis       Review of Systems   Constitutional:  Negative for chills.   HENT:  Negative for congestion.    Eyes:  Negative for visual disturbance.   Respiratory:  Negative for shortness of breath.    Cardiovascular:  Negative for chest pain.   Gastrointestinal:  Negative for abdominal distention.   Musculoskeletal:  Negative for gait problem.   Skin:  Negative for color change.   Neurological:  Negative for dizziness.   Psychiatric/Behavioral:  Negative for agitation.      Objective:      Physical Exam  Constitutional:       Appearance: He is well-developed.   HENT:      Head: Normocephalic.   Eyes:      Pupils: Pupils are equal, round, and reactive to light.   Pulmonary:      Effort: Pulmonary effort is normal.   Abdominal:      Palpations: Abdomen is soft.   Musculoskeletal:         General: Normal range of motion.      Cervical back: Normal range of motion.   Skin:     General: Skin is warm and dry.   Neurological:      Mental Status: He is alert.       Assessment:       1. Prostate cancer    2. Urinary retention    3. Hypocontractile bladder    4. Recurrent UTI        Plan:       77 y.o. male with [place] 4+5 prostate cancer, Stage IVB (cT3, Nx, M1c) with history of urinary retention, hypocontractile bladder, Dimitri's.    Urinary retention - s/p channel TURP. PVR check today 0cc. Patient is doing very well with emptying.   For hormonal ablation, he would  like to proceed with bilateral simple orchiectomy. I have explained the indication and benefits of proceeding with a bilateral simple orchiectomy.  The risks of the procedure were discussed and included but were not limited to pain, infection, bleeding, scarring, loss of hormone (testosterone) resulting in libido and/or erectile dysfunction, decreased energy, loss of fertility, scrotal infection with possible abscess formation, no guarantee of cancer cure (if found), hematoma and the need for further procedures. It is also possible that the surgery may not rid his need of androgen depriving medications in the future.    The patient would like me to aim for a surgery date where Ms. Carrillo is here locally to help postoperatively.     Prostate cancer  -     POCT Bladder Scan  -     POCT URINE DIPSTICK WITHOUT MICROSCOPE    Urinary retention  -     POCT Bladder Scan  -     POCT URINE DIPSTICK WITHOUT MICROSCOPE    Hypocontractile bladder    Recurrent UTI  -     Urine culture

## 2022-12-12 ENCOUNTER — PATIENT MESSAGE (OUTPATIENT)
Dept: UROLOGY | Facility: CLINIC | Age: 77
End: 2022-12-12
Payer: MEDICARE

## 2022-12-12 DIAGNOSIS — R30.0 DYSURIA: Primary | ICD-10-CM

## 2022-12-12 LAB — BACTERIA UR CULT: ABNORMAL

## 2022-12-12 RX ORDER — SULFAMETHOXAZOLE AND TRIMETHOPRIM 800; 160 MG/1; MG/1
1 TABLET ORAL 2 TIMES DAILY
Qty: 20 TABLET | Refills: 0 | Status: SHIPPED | OUTPATIENT
Start: 2022-12-12 | End: 2022-12-22

## 2022-12-12 NOTE — TELEPHONE ENCOUNTER
Outgoing call to [facility] to check status of patients assistance application. Rep confirmed application has been received and no information is missing. She reports application will move forward for processing and a final determination will be made soon. Will continue to follow up until final determination is made.

## 2022-12-13 ENCOUNTER — TELEPHONE (OUTPATIENT)
Dept: UROLOGY | Facility: CLINIC | Age: 77
End: 2022-12-13
Payer: MEDICARE

## 2022-12-13 ENCOUNTER — PATIENT MESSAGE (OUTPATIENT)
Dept: UROLOGY | Facility: CLINIC | Age: 77
End: 2022-12-13
Payer: MEDICARE

## 2022-12-13 NOTE — TELEPHONE ENCOUNTER
----- Message from Taran Caballero sent at 12/13/2022  1:39 PM CST -----  Type:  Needs Medical Advice    Who Called: self  Reason:speak with nurse regarding urine sample he gave for a bladder infection  Would the patient rather a call back or a response via [facility]? call  Best Call Back Number: [phone]  Additional Information: none

## 2022-12-13 NOTE — TELEPHONE ENCOUNTER
Spoke with patient and gave test results and to  the medication to the pharmacy. Patient voice is understanding.

## 2022-12-15 ENCOUNTER — SPECIALTY PHARMACY (OUTPATIENT)
Dept: PHARMACY | Facility: CLINIC | Age: 77
End: 2022-12-15

## 2022-12-15 NOTE — TELEPHONE ENCOUNTER
Patient is approved for [employer] Patient Assistance Program as of 12/13/22 through 12/31/22 and will receive Erleada free of charge through the programs dispensing pharmacy, [facility]. Someone from the program's pharmacy will be reaching out to patient to coordinate their first shipment. Patient can also contact [employer] Patient Assistance Program at [phone] to check on the status of their prescription and schedule shipment for medication.      FOR DOCUMENTATION ONLY:  Financial Assistance for Erleada is approved from 12/13/22 to 12/31/23  Source: [employer] Patient Assistance Program  Case ID: [id number]  Phone: [phone]  Fax: [phone]   Pharmacy: [facility]      No answer/unable to lvm. Outgoing call attempt to notify patient that he is approved for Erleada PAP and would like to provide him with program info and phone number.     **New application required for 2023 re-enrollment. PAP will now be through [employer] Patient [employer] and will no longer be through [facility]. [facility] will assist patient with 2023 PAP re-enrollment. Mailing new application to address on file for patient to sign and return to [facility].

## 2022-12-15 NOTE — TELEPHONE ENCOUNTER
Sent a staff message to [employer] regarding [employer] assistance application for 2023 PAP renewal and faxed application prescription to [phone] for provider review and signature.    Mailed patient portion of application to address on file.

## 2022-12-15 NOTE — TELEPHONE ENCOUNTER
No answer/unable to lvm. Outgoing call to patient to notify him that new application required for 2023 re-enrollment. PAP will now be through [employer] Patient Assistance Program and will no longer be through [employer]. [facility] will assist patient with 2023 PAP re-enrollment. Will mail a new application to address on file for patient to sign and return to [facility].

## 2022-12-19 NOTE — TELEPHONE ENCOUNTER
Received provider portion of assistance application and submission is pending patient portion. Will continue to follow up.

## 2022-12-21 DIAGNOSIS — C79.51 PROSTATE CANCER METASTATIC TO BONE: Primary | ICD-10-CM

## 2022-12-21 DIAGNOSIS — C61 PROSTATE CANCER METASTATIC TO BONE: Primary | ICD-10-CM

## 2022-12-21 RX ORDER — CEFAZOLIN SODIUM 2 G/50ML
2 SOLUTION INTRAVENOUS
Status: CANCELLED | OUTPATIENT
Start: 2022-12-21

## 2022-12-21 RX ORDER — SODIUM CHLORIDE 9 MG/ML
INJECTION, SOLUTION INTRAVENOUS CONTINUOUS
Status: CANCELLED | OUTPATIENT
Start: 2022-12-21

## 2022-12-22 ENCOUNTER — OFFICE VISIT (OUTPATIENT)
Dept: HEMATOLOGY/ONCOLOGY | Facility: CLINIC | Age: 77
End: 2022-12-22
Payer: MEDICARE

## 2022-12-22 ENCOUNTER — TELEPHONE (OUTPATIENT)
Dept: HEMATOLOGY/ONCOLOGY | Facility: CLINIC | Age: 77
End: 2022-12-22
Payer: MEDICARE

## 2022-12-22 ENCOUNTER — LAB VISIT (OUTPATIENT)
Dept: LAB | Facility: HOSPITAL | Age: 77
End: 2022-12-22
Attending: HOSPITALIST
Payer: MEDICARE

## 2022-12-22 VITALS
RESPIRATION RATE: 18 BRPM | HEIGHT: 73 IN | HEART RATE: 87 BPM | BODY MASS INDEX: 29 KG/M2 | SYSTOLIC BLOOD PRESSURE: 107 MMHG | DIASTOLIC BLOOD PRESSURE: 58 MMHG | WEIGHT: 218.81 LBS | OXYGEN SATURATION: 95 %

## 2022-12-22 DIAGNOSIS — N39.0 URINARY TRACT INFECTION WITH HEMATURIA, SITE UNSPECIFIED: ICD-10-CM

## 2022-12-22 DIAGNOSIS — I10 HYPERTENSION, UNSPECIFIED TYPE: ICD-10-CM

## 2022-12-22 DIAGNOSIS — C79.51 BONE METASTASES: ICD-10-CM

## 2022-12-22 DIAGNOSIS — C78.00 MALIGNANT NEOPLASM METASTATIC TO LUNG, UNSPECIFIED LATERALITY: ICD-10-CM

## 2022-12-22 DIAGNOSIS — C61 PROSTATE CANCER: ICD-10-CM

## 2022-12-22 DIAGNOSIS — Z79.818 ANDROGEN DEPRIVATION THERAPY: ICD-10-CM

## 2022-12-22 DIAGNOSIS — C61 PROSTATE CANCER: Primary | ICD-10-CM

## 2022-12-22 DIAGNOSIS — R31.9 URINARY TRACT INFECTION WITH HEMATURIA, SITE UNSPECIFIED: ICD-10-CM

## 2022-12-22 LAB
ALBUMIN SERPL BCP-MCNC: 4.6 G/DL (ref 3.5–5.2)
ALP SERPL-CCNC: 131 U/L (ref 38–126)
ALT SERPL W/O P-5'-P-CCNC: 19 U/L (ref 10–44)
ANION GAP SERPL CALC-SCNC: 8 MMOL/L (ref 8–16)
AST SERPL-CCNC: 25 U/L (ref 15–46)
BILIRUB SERPL-MCNC: 0.5 MG/DL (ref 0.1–1)
CALCIUM SERPL-MCNC: 9.7 MG/DL (ref 8.7–10.5)
CHLORIDE SERPL-SCNC: 104 MMOL/L (ref 95–110)
CO2 SERPL-SCNC: 31 MMOL/L (ref 23–29)
COMPLEXED PSA SERPL-MCNC: 6.7 NG/ML (ref 0–4)
CREAT SERPL-MCNC: 1.56 MG/DL (ref 0.5–1.4)
ERYTHROCYTE [DISTWIDTH] IN BLOOD BY AUTOMATED COUNT: 13.2 % (ref 11.5–14.5)
EST. GFR  (NO RACE VARIABLE): 45.7 ML/MIN/1.73 M^2
GLUCOSE SERPL-MCNC: 141 MG/DL (ref 70–110)
HCT VFR BLD AUTO: 45.1 % (ref 40–54)
HGB BLD-MCNC: 14.9 G/DL (ref 14–18)
IMM GRANULOCYTES # BLD AUTO: 0.03 K/UL (ref 0–0.04)
MCH RBC QN AUTO: 30.8 PG (ref 27–31)
MCHC RBC AUTO-ENTMCNC: 33 G/DL (ref 32–36)
MCV RBC AUTO: 93 FL (ref 82–98)
NEUTROPHILS # BLD AUTO: 6.7 K/UL (ref 1.8–7.7)
PLATELET # BLD AUTO: 282 K/UL (ref 150–450)
PMV BLD AUTO: 9.4 FL (ref 9.2–12.9)
POTASSIUM SERPL-SCNC: 4.3 MMOL/L (ref 3.5–5.1)
PROT SERPL-MCNC: 8.1 G/DL (ref 6–8.4)
RBC # BLD AUTO: 4.84 M/UL (ref 4.6–6.2)
SODIUM SERPL-SCNC: 143 MMOL/L (ref 136–145)
UUN UR-MCNC: 19 MG/DL (ref 2–20)
WBC # BLD AUTO: 8.47 K/UL (ref 3.9–12.7)

## 2022-12-22 PROCEDURE — 3078F PR MOST RECENT DIASTOLIC BLOOD PRESSURE < 80 MM HG: ICD-10-PCS | Mod: CPTII,S$GLB,, | Performed by: HOSPITALIST

## 2022-12-22 PROCEDURE — 99499 RISK ADDL DX/OHS AUDIT: ICD-10-PCS | Mod: S$GLB,,, | Performed by: HOSPITALIST

## 2022-12-22 PROCEDURE — 80053 COMPREHEN METABOLIC PANEL: CPT | Mod: PO | Performed by: HOSPITALIST

## 2022-12-22 PROCEDURE — 3078F DIAST BP <80 MM HG: CPT | Mod: CPTII,S$GLB,, | Performed by: HOSPITALIST

## 2022-12-22 PROCEDURE — 3074F PR MOST RECENT SYSTOLIC BLOOD PRESSURE < 130 MM HG: ICD-10-PCS | Mod: CPTII,S$GLB,, | Performed by: HOSPITALIST

## 2022-12-22 PROCEDURE — 85027 COMPLETE CBC AUTOMATED: CPT | Mod: PO | Performed by: HOSPITALIST

## 2022-12-22 PROCEDURE — 1159F PR MEDICATION LIST DOCUMENTED IN MEDICAL RECORD: ICD-10-PCS | Mod: CPTII,S$GLB,, | Performed by: HOSPITALIST

## 2022-12-22 PROCEDURE — 3288F PR FALLS RISK ASSESSMENT DOCUMENTED: ICD-10-PCS | Mod: CPTII,S$GLB,, | Performed by: HOSPITALIST

## 2022-12-22 PROCEDURE — 99999 PR PBB SHADOW E&M-EST. PATIENT-LVL V: ICD-10-PCS | Mod: PBBFAC,,, | Performed by: HOSPITALIST

## 2022-12-22 PROCEDURE — 99499 UNLISTED E&M SERVICE: CPT | Mod: S$GLB,,, | Performed by: HOSPITALIST

## 2022-12-22 PROCEDURE — 36415 COLL VENOUS BLD VENIPUNCTURE: CPT | Mod: PO | Performed by: HOSPITALIST

## 2022-12-22 PROCEDURE — 1159F MED LIST DOCD IN RCRD: CPT | Mod: CPTII,S$GLB,, | Performed by: HOSPITALIST

## 2022-12-22 PROCEDURE — 3074F SYST BP LT 130 MM HG: CPT | Mod: CPTII,S$GLB,, | Performed by: HOSPITALIST

## 2022-12-22 PROCEDURE — 99215 OFFICE O/P EST HI 40 MIN: CPT | Mod: S$GLB,,, | Performed by: HOSPITALIST

## 2022-12-22 PROCEDURE — 1125F AMNT PAIN NOTED PAIN PRSNT: CPT | Mod: CPTII,S$GLB,, | Performed by: HOSPITALIST

## 2022-12-22 PROCEDURE — 1101F PR PT FALLS ASSESS DOC 0-1 FALLS W/OUT INJ PAST YR: ICD-10-PCS | Mod: CPTII,S$GLB,, | Performed by: HOSPITALIST

## 2022-12-22 PROCEDURE — 3288F FALL RISK ASSESSMENT DOCD: CPT | Mod: CPTII,S$GLB,, | Performed by: HOSPITALIST

## 2022-12-22 PROCEDURE — 99999 PR PBB SHADOW E&M-EST. PATIENT-LVL V: CPT | Mod: PBBFAC,,, | Performed by: HOSPITALIST

## 2022-12-22 PROCEDURE — 1101F PT FALLS ASSESS-DOCD LE1/YR: CPT | Mod: CPTII,S$GLB,, | Performed by: HOSPITALIST

## 2022-12-22 PROCEDURE — 1125F PR PAIN SEVERITY QUANTIFIED, PAIN PRESENT: ICD-10-PCS | Mod: CPTII,S$GLB,, | Performed by: HOSPITALIST

## 2022-12-22 PROCEDURE — 99215 PR OFFICE/OUTPT VISIT, EST, LEVL V, 40-54 MIN: ICD-10-PCS | Mod: S$GLB,,, | Performed by: HOSPITALIST

## 2022-12-22 PROCEDURE — 84153 ASSAY OF PSA TOTAL: CPT | Performed by: HOSPITALIST

## 2022-12-22 RX ORDER — CALCIUM CARBONATE 500(1250)
2 TABLET ORAL DAILY
Qty: 60 TABLET | Refills: 11 | Status: SHIPPED | OUTPATIENT
Start: 2022-12-22

## 2022-12-22 RX ORDER — VIT C/E/ZN/COPPR/LUTEIN/ZEAXAN 250MG-90MG
1000 CAPSULE ORAL DAILY
Qty: 30 CAPSULE | Refills: 11 | Status: SHIPPED | OUTPATIENT
Start: 2022-12-22

## 2022-12-22 NOTE — Clinical Note
[facility] D[facility] - are you able to see if you could radiate the left pelvic tubercle for Mr. Lopez? He is having more pain from that site. Thanks! Darren

## 2022-12-22 NOTE — ASSESSMENT & PLAN NOTE
- Patient to complete current Bactrim course  - Recheck UCX after completion of abx  - If still positive will discuss with ID team

## 2022-12-22 NOTE — ASSESSMENT & PLAN NOTE
- SP Lupron 22.5mg IM 11/18/22; plans on getting orchiectomy in February 2023  - Continue bicalutamide 50mg po daily; change to apalutamide when it arrives  - Start Ca/D supplementation  - Scheduled bone mineral denisty test  - Referral to genetics

## 2022-12-22 NOTE — PATIENT INSTRUCTIONS
We are treating a metastatic hormone sensitive prostate cancer with hormone therapy. You are tolerating quite well without much side e ffect. Due to increased pain in the left groin, recommend you follow up with Dr. Devlin to consider radiation treatment.     Your next hormone shot would be due on 2/10/23 but you are planning on getting an orchectomy, so that won't be necessary.     Continue taking bicalutamide until the apalutamide arrives; you can then stop bicalutamide and start apalutamide.     - Start calcium and vitamin D supplementation  - Will schedule bone mineral density test  - Referral to genetics to screen for high risk prostate cacner genes    - Please drop off a urine sample next week after completing your antibiotics.

## 2022-12-23 NOTE — TELEPHONE ENCOUNTER
Outgoing call to patient to check on his portion of PAP re-enrollment form. Patient confirmed he received the application in the mail and will mail the signed paperwork back to [facility] this week. Will continue to follow up.

## 2022-12-27 ENCOUNTER — LAB VISIT (OUTPATIENT)
Dept: LAB | Facility: HOSPITAL | Age: 77
End: 2022-12-27
Attending: HOSPITALIST
Payer: MEDICARE

## 2022-12-27 DIAGNOSIS — R31.9 URINARY TRACT INFECTION WITH HEMATURIA, SITE UNSPECIFIED: ICD-10-CM

## 2022-12-27 DIAGNOSIS — N39.0 URINARY TRACT INFECTION WITH HEMATURIA, SITE UNSPECIFIED: ICD-10-CM

## 2022-12-27 LAB
BILIRUB UR QL STRIP: NEGATIVE
CLARITY UR REFRACT.AUTO: ABNORMAL
COLOR UR AUTO: YELLOW
GLUCOSE UR QL STRIP: NEGATIVE
HGB UR QL STRIP: ABNORMAL
KETONES UR QL STRIP: NEGATIVE
LEUKOCYTE ESTERASE UR QL STRIP: ABNORMAL
MICROSCOPIC COMMENT: ABNORMAL
NITRITE UR QL STRIP: NEGATIVE
PH UR STRIP: 5 [PH] (ref 5–8)
PROT UR QL STRIP: ABNORMAL
RBC #/AREA URNS AUTO: 5 /HPF (ref 0–4)
SP GR UR STRIP: >=1.03 (ref 1–1.03)
URN SPEC COLLECT METH UR: ABNORMAL
UROBILINOGEN UR STRIP-ACNC: NEGATIVE EU/DL
WBC #/AREA URNS AUTO: >100 /HPF (ref 0–5)

## 2022-12-27 PROCEDURE — 81000 URINALYSIS NONAUTO W/SCOPE: CPT | Mod: PO | Performed by: HOSPITALIST

## 2022-12-27 PROCEDURE — 87086 URINE CULTURE/COLONY COUNT: CPT | Mod: PO | Performed by: HOSPITALIST

## 2022-12-29 LAB — BACTERIA UR CULT: NO GROWTH

## 2022-12-29 NOTE — TELEPHONE ENCOUNTER
Received patient portion of assistance application and submitted completed application to  for 2023 PAP renewal.

## 2023-01-03 ENCOUNTER — HOSPITAL ENCOUNTER (OUTPATIENT)
Dept: RADIATION THERAPY | Facility: HOSPITAL | Age: 78
Discharge: HOME | End: 2023-01-03
Attending: STUDENT IN AN ORGANIZED HEALTH CARE EDUCATION/TRAINING PROGRAM
Payer: MEDICARE

## 2023-01-06 NOTE — TELEPHONE ENCOUNTER
Care Due:                  Date            Visit Type   Department     Provider  --------------------------------------------------------------------------------                                EP -                              PRIMARY      [facility] FAMILY  Last Visit: 07-      CARE ([facility])   MEDICINE       Anthony De La Torre  Next Visit: None Scheduled  None         None Found                                                            Last  Test          Frequency    Reason                     Performed    Due Date  --------------------------------------------------------------------------------    Lipid Panel.  12 months..  simvastatin..............  12- 12-    [facility] Embedded Care Gaps. Reference number: [id number]. 1/05/2023   9:06:44 PM CST

## 2023-01-07 RX ORDER — SIMVASTATIN 20 MG/1
TABLET, FILM COATED ORAL
Qty: 90 TABLET | Refills: 3 | Status: SHIPPED | OUTPATIENT
Start: 2023-01-07 | End: 2023-08-08 | Stop reason: ALTCHOICE

## 2023-01-07 RX ORDER — LISINOPRIL 20 MG/1
TABLET ORAL
Qty: 90 TABLET | Refills: 3 | Status: SHIPPED | OUTPATIENT
Start: 2023-01-07 | End: 2023-08-08

## 2023-01-09 ENCOUNTER — OFFICE VISIT (OUTPATIENT)
Dept: RADIATION ONCOLOGY | Facility: CLINIC | Age: 78
End: 2023-01-09
Payer: MEDICARE

## 2023-01-09 VITALS
HEIGHT: 73 IN | DIASTOLIC BLOOD PRESSURE: 71 MMHG | SYSTOLIC BLOOD PRESSURE: 155 MMHG | HEART RATE: 72 BPM | WEIGHT: 228.81 LBS | OXYGEN SATURATION: 97 % | BODY MASS INDEX: 30.33 KG/M2 | RESPIRATION RATE: 17 BRPM | TEMPERATURE: 97 F

## 2023-01-09 DIAGNOSIS — C61 PROSTATE CANCER: Primary | ICD-10-CM

## 2023-01-09 PROCEDURE — 1159F MED LIST DOCD IN RCRD: CPT | Mod: CPTII,S$GLB,, | Performed by: STUDENT IN AN ORGANIZED HEALTH CARE EDUCATION/TRAINING PROGRAM

## 2023-01-09 PROCEDURE — 99999 PR PBB SHADOW E&M-EST. PATIENT-LVL III: ICD-10-PCS | Mod: PBBFAC,,, | Performed by: STUDENT IN AN ORGANIZED HEALTH CARE EDUCATION/TRAINING PROGRAM

## 2023-01-09 PROCEDURE — 3077F PR MOST RECENT SYSTOLIC BLOOD PRESSURE >= 140 MM HG: ICD-10-PCS | Mod: CPTII,S$GLB,, | Performed by: STUDENT IN AN ORGANIZED HEALTH CARE EDUCATION/TRAINING PROGRAM

## 2023-01-09 PROCEDURE — 3078F DIAST BP <80 MM HG: CPT | Mod: CPTII,S$GLB,, | Performed by: STUDENT IN AN ORGANIZED HEALTH CARE EDUCATION/TRAINING PROGRAM

## 2023-01-09 PROCEDURE — 99999 PR PBB SHADOW E&M-EST. PATIENT-LVL III: CPT | Mod: PBBFAC,,, | Performed by: STUDENT IN AN ORGANIZED HEALTH CARE EDUCATION/TRAINING PROGRAM

## 2023-01-09 PROCEDURE — 1125F AMNT PAIN NOTED PAIN PRSNT: CPT | Mod: CPTII,S$GLB,, | Performed by: STUDENT IN AN ORGANIZED HEALTH CARE EDUCATION/TRAINING PROGRAM

## 2023-01-09 PROCEDURE — 1159F PR MEDICATION LIST DOCUMENTED IN MEDICAL RECORD: ICD-10-PCS | Mod: CPTII,S$GLB,, | Performed by: STUDENT IN AN ORGANIZED HEALTH CARE EDUCATION/TRAINING PROGRAM

## 2023-01-09 PROCEDURE — 1125F PR PAIN SEVERITY QUANTIFIED, PAIN PRESENT: ICD-10-PCS | Mod: CPTII,S$GLB,, | Performed by: STUDENT IN AN ORGANIZED HEALTH CARE EDUCATION/TRAINING PROGRAM

## 2023-01-09 PROCEDURE — 3077F SYST BP >= 140 MM HG: CPT | Mod: CPTII,S$GLB,, | Performed by: STUDENT IN AN ORGANIZED HEALTH CARE EDUCATION/TRAINING PROGRAM

## 2023-01-09 PROCEDURE — 99214 OFFICE O/P EST MOD 30 MIN: CPT | Mod: S$GLB,,, | Performed by: STUDENT IN AN ORGANIZED HEALTH CARE EDUCATION/TRAINING PROGRAM

## 2023-01-09 PROCEDURE — 99214 PR OFFICE/OUTPT VISIT, EST, LEVL IV, 30-39 MIN: ICD-10-PCS | Mod: S$GLB,,, | Performed by: STUDENT IN AN ORGANIZED HEALTH CARE EDUCATION/TRAINING PROGRAM

## 2023-01-09 PROCEDURE — 3078F PR MOST RECENT DIASTOLIC BLOOD PRESSURE < 80 MM HG: ICD-10-PCS | Mod: CPTII,S$GLB,, | Performed by: STUDENT IN AN ORGANIZED HEALTH CARE EDUCATION/TRAINING PROGRAM

## 2023-01-09 NOTE — PROGRESS NOTES
Radiation Oncology Follow-up Note         Date of Service: 01/09/2023    Chief Complaint: metastatic prostate cancer with bone pain     Reason for visit: consideration for radiation to the pelvis     Referring Physician: Dr Luis (urology)     Implantable devices: denies    Therapy to Date:  No radiation    Diagnosis/Assessment:   Noah Lopez is a 77 y.o. 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 Staging MRI showed a 4.6cm PIRADS 5 lesion with left NVBI+ and SVI+ as well as a suspicious Left pubic lesion. PSMA scan 11/9/2022 showed multiple pulmonary nodules, a large moth-eaten appearance lesion involving both superior and inferior pubic ramus, as well as a small focus in the left inferior pubic ramus. Medical course notable for persistent Serratia UTI  He initially met Radiation Oncology (Dr Devlin 11/8/2022).    S/p Lupron 22.5mg (Dr Carter 11/18/22) with casodex, plan to change to xtandi, with plans on getting orchiectomy in February 2023    PMH of HTN, HLD and arthritis    ECOG 1    Plan   We discussed the role of palliative radiation to the pelvic bone metastases, in 5 fractions using 3DCRT for pain and local control.    Risks, benefits, and side effects of radiotherapy were discussed.   Side effects include but are not limited to fatigue, and pain flare.     The patient signed RT informed consent after I answered questions to their apparent satisfaction.    - 2/15/2023 bilateral simple orchiectomy with Dr Luis  - last colonoscopy up to date  - CT SIM 01/25/2023 (he will be out of town from 01/11 to 01/24)  - declined SW consult for financial assistance      HPI:   Noah Lopez is a 77 y.o. 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 Staging MRI showed a 4.6cm PIRADS 5 lesion with left NVBI+ and SVI+ as well as a suspicious Left pubic lesion. PSMA scan 11/9/2022 showed multiple pulmonary nodules, a large moth-eaten appearance lesion involving both superior and inferior pubic ramus, as well as a small focus in the left inferior pubic ramus. Medical course notable for persistent Serratia UTI  He initially met Radiation Oncology (Dr Devlin 11/8/2022).    - 11/18/2022 Lupron 22.5mg + casodex with Dr Carter    - 12/9/2022 last visit with Dr Luis: plan for bilateral simple orchiectomy 2/15/2023    - 12/22/2022 last visit with Dr Carter: complained of discomfort at left pelvic turbercle metastases, referral for palliative RT      Subjective:   In clinic the patient is alone.  He reports left pelvic bone pain that has been worsening over the last 2 months. The pain is worst when he wakes up or after he sits down and starts moving. At worst 5/10. He does not take any pain meds as he avoids taking meds.    The patient denies other major complaints     The patient denies any history of radiation therapy, implantable cardiac devices, or connective tissue disease.    Current Outpatient Medications on File Prior to Visit   Medication Sig Dispense Refill    apalutamide (ERLEADA) 60 mg Tab Take 240 mg by mouth once daily. 120 tablet 11    ascorbic acid, vitamin C, (VITAMIN C) 1000 MG tablet Take 1,000 mg by mouth once daily.      bicalutamide (CASODEX) 50 MG Tab Take 1 tablet (50 mg total) by mouth once daily. 90 tablet 0    calcium carbonate (OS-TYREE) 500 mg calcium (1,250 mg) tablet Take 2 tablets (1,000 mg total) by mouth once daily. 60 tablet 11    cholecalciferol, vitamin D3, (VITAMIN D3) 25 mcg (1,000 unit) capsule Take 1 capsule (1,000 Units total) by mouth once daily. 30 capsule 11    clotrimazole-betamethasone 1-0.05%  (LOTRISONE) cream Apply topically 2 (two) times daily. 60 g 3    diltiaZEM (CARDIZEM CD) 300 MG 24 hr capsule TAKE 1 CAPSULE BY MOUTH  ONCE DAILY 90 capsule 3    diphenhydramine HCl (BENADRYL ALLERGY ORAL) Take by mouth.      docusate sodium (COLACE) 250 MG capsule Take 250 mg by mouth once daily.      finasteride (PROSCAR) 5 mg tablet Take 1 tablet (5 mg total) by mouth once daily. 90 tablet 3    GAS RELIEF 80, SIMETHICONE, ORAL Take by mouth.      lisinopriL (PRINIVIL,ZESTRIL) 20 MG tablet TAKE 1 TABLET BY MOUTH ONCE DAILY 90 tablet 3    melatonin 10 mg TbDL Take by mouth.      methocarbamoL (ROBAXIN) 750 MG Tab Take 750-1,500 mg by mouth 3 (three) times daily as needed.      miconazole (MICOTIN) 2 % cream Apply topically 2 (two) times daily. Apply to tip of penis prn irritation 28 g 0    multivit-min-FA-lycopen-lutein (MEN 50 PLUS MULTIVITAMIN) 300-600-300 mcg Tab as directed Orally      multivitamin capsule Take 1 capsule by mouth once daily.      nitrofurantoin, macrocrystal-monohydrate, (MACROBID) 100 MG capsule Take 100 mg by mouth every 12 (twelve) hours.      ofloxacin (FLOXIN) 0.3 % otic solution Apply 10 drops into right ear once a day for 10 days.  Please dispense generic.      omega 3-dha-epa-fish oil 1,000 mg (120 mg-180 mg) Cap 1 capsule.      omega-3 fatty acids/fish oil (FISH OIL-OMEGA-3 FATTY ACIDS) 300-1,000 mg capsule Take 1 capsule by mouth once daily.      simvastatin (ZOCOR) 20 MG tablet TAKE 1 TABLET BY MOUTH IN  THE EVENING 90 tablet 3    tamsulosin (FLOMAX) 0.4 mg Cap Take 1 capsule by mouth once daily 90 capsule 3    traZODone (DESYREL) 100 MG tablet TAKE 2 TABLETS BY MOUTH EVERY DAY AT BEDTIME AS NEEDED FOR INSOMNIA 180 tablet 3    triamterene-hydrochlorothiazide 37.5-25 mg (DYAZIDE) 37.5-25 mg per capsule TAKE 1 CAPSULE BY MOUTH IN  THE MORNING 90 capsule 3    TURMERIC ORAL Take by mouth.      wheat dextrin (BENEFIBER CLEAR SF, DEXTRIN, ORAL) Take by mouth.      zinc gluconate 50 mg  tablet Take 50 mg by mouth once daily.       No current facility-administered medications on file prior to visit.       Review of patient's allergies indicates:   Allergen Reactions    Ciprofloxacin Other (See Comments)     tendonitis       Review of Systems  Negative unless as above      Objective:      Physical Exam  Vitals reviewed.     Constitutional:       Appearance: Normal appearance.   HENT:      Head: Normocephalic and atraumatic.   Eyes:      Conjunctiva/sclera: Conjunctivae normal.   Pulmonary:      Effort: Pulmonary effort is normal.   Abdominal:      General: There is no distension.   Genitourinary:     Comments: AKASH deferred  Musculoskeletal:         General: Normal range of motion.  Neurological:      General: No focal deficit present.      Mental Status: Alert and oriented  Psychiatric:         Mood and Affect: Mood normal.         Behavior: Behavior normal.      Imaging: I have personally reviewed the patient's available images and reports and summarized pertinent findings above in HPI.      Pathology: I have personally reviewed the patient's available pathology and summarized pertinent findings above in HPI.      Laboratory: I have personally reviewed the patient's available laboratory values and summarized pertinent findings above in HPI.         I spent approximately 30 minutes reviewing the available records and evaluating the patient, out of which over 50% of the time was spent face to face with the patient in counseling and coordinating this patient's care.     Thank you for the opportunity to care for this patient. Please do not hesitate to contact me with any questions.     Jaswinder Doyle MD/PhD

## 2023-01-10 NOTE — TELEPHONE ENCOUNTER
Outgoing call to [facility] to check on status of PAP re-enrollment application. Attempt was unsuccessful and was not able to reach anyone at program due to high call volume. Will continue to follow up until final determination is made.

## 2023-01-13 ENCOUNTER — PATIENT MESSAGE (OUTPATIENT)
Dept: HEMATOLOGY/ONCOLOGY | Facility: CLINIC | Age: 78
End: 2023-01-13
Payer: MEDICARE

## 2023-01-16 ENCOUNTER — PATIENT MESSAGE (OUTPATIENT)
Dept: HEMATOLOGY/ONCOLOGY | Facility: CLINIC | Age: 78
End: 2023-01-16
Payer: MEDICARE

## 2023-01-18 ENCOUNTER — PATIENT MESSAGE (OUTPATIENT)
Dept: HEMATOLOGY/ONCOLOGY | Facility: CLINIC | Age: 78
End: 2023-01-18
Payer: MEDICARE

## 2023-01-20 NOTE — TELEPHONE ENCOUNTER
PAF arnulfo currently available for patients diagnosis however the arnulfo award is only enough to cover one or two fills. Submitted arnulfo application via online portal and final determination is pending due Automated Income Verification to system unable to verify reported income. Patient will need to submit proof of income for income verification. Will follow up with patient regarding proof of income for arnulfo income verification.    Will continue to work on PAP enrollment while arnulfo is pending.

## 2023-01-26 ENCOUNTER — NURSE TRIAGE (OUTPATIENT)
Dept: ADMINISTRATIVE | Facility: CLINIC | Age: 78
End: 2023-01-26
Payer: MEDICARE

## 2023-01-26 NOTE — TELEPHONE ENCOUNTER
Cough for the past month, worse at night. Reports he coughs so hard it makes him throw up. Feels as if cough is worsening.   Advised, per protocol. Verbalizes understanding, would like call back in this regard as he would like an appt in office.    Reason for Disposition   SEVERE coughing spells (e.g., whooping sound after coughing, vomiting after coughing)    Additional Information   Negative: Bluish (or gray) lips or face   Negative: SEVERE difficulty breathing (e.g., struggling for each breath, speaks in single words)   Negative: Rapid onset of cough and has hives   Negative: Coughing started suddenly after medicine, an allergic food or bee sting   Negative: Difficulty breathing after exposure to flames, smoke, or fumes   Negative: Sounds like a life-threatening emergency to the triager   Negative: MODERATE difficulty breathing (e.g., speaks in phrases, SOB even at rest, pulse 100-120) and still present when not coughing   Negative: Chest pain present when not coughing   Negative: Passed out (i.e., fainted, collapsed and was not responding)   Negative: Patient sounds very sick or weak to the triager   Negative: MILD difficulty breathing (e.g., minimal/no SOB at rest, SOB with walking, pulse <100) and still present when not coughing   Negative: Coughed up > 1 tablespoon (15 ml) blood (Exception: Blood-tinged sputum.)   Negative: Fever > 103 F (39.4 C)   Negative: Fever > 101 F (38.3 C) and over 60 years of age   Negative: Fever > 100.0 F (37.8 C) and has diabetes mellitus or a weak immune system (e.g., HIV positive, cancer chemotherapy, organ transplant, splenectomy, chronic steroids)   Negative: Fever > 100.0 F (37.8 C) and bedridden (e.g., nursing home patient, stroke, chronic illness, recovering from surgery)   Negative: Increasing ankle swelling   Negative: Wheezing is present    Protocols used: Cough-A-OH

## 2023-01-27 ENCOUNTER — OFFICE VISIT (OUTPATIENT)
Dept: HEMATOLOGY/ONCOLOGY | Facility: CLINIC | Age: 78
End: 2023-01-27
Payer: MEDICARE

## 2023-01-27 ENCOUNTER — TELEPHONE (OUTPATIENT)
Dept: HEMATOLOGY/ONCOLOGY | Facility: CLINIC | Age: 78
End: 2023-01-27

## 2023-01-27 ENCOUNTER — HOSPITAL ENCOUNTER (OUTPATIENT)
Dept: RADIATION THERAPY | Facility: HOSPITAL | Age: 78
Discharge: HOME | End: 2023-01-27
Attending: HOSPITALIST
Payer: MEDICARE

## 2023-01-27 ENCOUNTER — LAB VISIT (OUTPATIENT)
Dept: LAB | Facility: HOSPITAL | Age: 78
End: 2023-01-27
Attending: HOSPITALIST
Payer: MEDICARE

## 2023-01-27 VITALS
HEIGHT: 73 IN | DIASTOLIC BLOOD PRESSURE: 70 MMHG | SYSTOLIC BLOOD PRESSURE: 152 MMHG | BODY MASS INDEX: 29.98 KG/M2 | RESPIRATION RATE: 18 BRPM | HEART RATE: 76 BPM | TEMPERATURE: 98 F | OXYGEN SATURATION: 96 % | WEIGHT: 226.19 LBS

## 2023-01-27 DIAGNOSIS — C79.51 BONE METASTASES: ICD-10-CM

## 2023-01-27 DIAGNOSIS — C78.00 MALIGNANT NEOPLASM METASTATIC TO LUNG, UNSPECIFIED LATERALITY: ICD-10-CM

## 2023-01-27 DIAGNOSIS — R05.2 SUBACUTE COUGH: ICD-10-CM

## 2023-01-27 DIAGNOSIS — C61 PROSTATE CANCER: ICD-10-CM

## 2023-01-27 DIAGNOSIS — C61 PROSTATE CANCER: Primary | ICD-10-CM

## 2023-01-27 DIAGNOSIS — E04.1 THYROID NODULE: ICD-10-CM

## 2023-01-27 LAB
ALBUMIN SERPL BCP-MCNC: 3.6 G/DL (ref 3.5–5.2)
ALP SERPL-CCNC: 122 U/L (ref 55–135)
ALT SERPL W/O P-5'-P-CCNC: 10 U/L (ref 10–44)
ANION GAP SERPL CALC-SCNC: 8 MMOL/L (ref 8–16)
AST SERPL-CCNC: 17 U/L (ref 10–40)
BILIRUB SERPL-MCNC: 0.3 MG/DL (ref 0.1–1)
BUN SERPL-MCNC: 14 MG/DL (ref 8–23)
CALCIUM SERPL-MCNC: 10.4 MG/DL (ref 8.7–10.5)
CHLORIDE SERPL-SCNC: 103 MMOL/L (ref 95–110)
CO2 SERPL-SCNC: 29 MMOL/L (ref 23–29)
COMPLEXED PSA SERPL-MCNC: 5.2 NG/ML (ref 0–4)
CREAT SERPL-MCNC: 1.2 MG/DL (ref 0.5–1.4)
ERYTHROCYTE [DISTWIDTH] IN BLOOD BY AUTOMATED COUNT: 13.2 % (ref 11.5–14.5)
EST. GFR  (NO RACE VARIABLE): >60 ML/MIN/1.73 M^2
GLUCOSE SERPL-MCNC: 108 MG/DL (ref 70–110)
HCT VFR BLD AUTO: 42.1 % (ref 40–54)
HGB BLD-MCNC: 13.7 G/DL (ref 14–18)
IMM GRANULOCYTES # BLD AUTO: 0.03 K/UL (ref 0–0.04)
MCH RBC QN AUTO: 30.5 PG (ref 27–31)
MCHC RBC AUTO-ENTMCNC: 32.5 G/DL (ref 32–36)
MCV RBC AUTO: 94 FL (ref 82–98)
NEUTROPHILS # BLD AUTO: 6 K/UL (ref 1.8–7.7)
PLATELET # BLD AUTO: 356 K/UL (ref 150–450)
PMV BLD AUTO: 8.9 FL (ref 9.2–12.9)
POTASSIUM SERPL-SCNC: 4.3 MMOL/L (ref 3.5–5.1)
PROT SERPL-MCNC: 7.8 G/DL (ref 6–8.4)
RBC # BLD AUTO: 4.49 M/UL (ref 4.6–6.2)
SODIUM SERPL-SCNC: 140 MMOL/L (ref 136–145)
WBC # BLD AUTO: 8.16 K/UL (ref 3.9–12.7)

## 2023-01-27 PROCEDURE — 77290 PR  SET RADN THERAPY FIELD COMPLEX: ICD-10-PCS | Mod: 26,,, | Performed by: STUDENT IN AN ORGANIZED HEALTH CARE EDUCATION/TRAINING PROGRAM

## 2023-01-27 PROCEDURE — 1101F PR PT FALLS ASSESS DOC 0-1 FALLS W/OUT INJ PAST YR: ICD-10-PCS | Mod: CPTII,S$GLB,, | Performed by: HOSPITALIST

## 2023-01-27 PROCEDURE — 99999 PR PBB SHADOW E&M-EST. PATIENT-LVL IV: CPT | Mod: PBBFAC,,, | Performed by: HOSPITALIST

## 2023-01-27 PROCEDURE — 3077F PR MOST RECENT SYSTOLIC BLOOD PRESSURE >= 140 MM HG: ICD-10-PCS | Mod: CPTII,S$GLB,, | Performed by: HOSPITALIST

## 2023-01-27 PROCEDURE — 84153 ASSAY OF PSA TOTAL: CPT | Performed by: HOSPITALIST

## 2023-01-27 PROCEDURE — 77014 HC CT GUIDANCE RADIATION THERAPY FLDS PLACEMENT: CPT | Mod: TC | Performed by: STUDENT IN AN ORGANIZED HEALTH CARE EDUCATION/TRAINING PROGRAM

## 2023-01-27 PROCEDURE — 3077F SYST BP >= 140 MM HG: CPT | Mod: CPTII,S$GLB,, | Performed by: HOSPITALIST

## 2023-01-27 PROCEDURE — 99499 UNLISTED E&M SERVICE: CPT | Mod: S$GLB,,, | Performed by: HOSPITALIST

## 2023-01-27 PROCEDURE — 3288F FALL RISK ASSESSMENT DOCD: CPT | Mod: CPTII,S$GLB,, | Performed by: HOSPITALIST

## 2023-01-27 PROCEDURE — 1101F PT FALLS ASSESS-DOCD LE1/YR: CPT | Mod: CPTII,S$GLB,, | Performed by: HOSPITALIST

## 2023-01-27 PROCEDURE — 77334 PR  RADN TREATMENT AID(S) COMPLX: ICD-10-PCS | Mod: 26,,, | Performed by: STUDENT IN AN ORGANIZED HEALTH CARE EDUCATION/TRAINING PROGRAM

## 2023-01-27 PROCEDURE — 77290 THER RAD SIMULAJ FIELD CPLX: CPT | Mod: TC | Performed by: STUDENT IN AN ORGANIZED HEALTH CARE EDUCATION/TRAINING PROGRAM

## 2023-01-27 PROCEDURE — 77263 PR  RADIATION THERAPY PLAN COMPLEX: ICD-10-PCS | Mod: ,,, | Performed by: STUDENT IN AN ORGANIZED HEALTH CARE EDUCATION/TRAINING PROGRAM

## 2023-01-27 PROCEDURE — 77263 THER RADIOLOGY TX PLNG CPLX: CPT | Mod: ,,, | Performed by: STUDENT IN AN ORGANIZED HEALTH CARE EDUCATION/TRAINING PROGRAM

## 2023-01-27 PROCEDURE — 1126F PR PAIN SEVERITY QUANTIFIED, NO PAIN PRESENT: ICD-10-PCS | Mod: CPTII,S$GLB,, | Performed by: HOSPITALIST

## 2023-01-27 PROCEDURE — 77334 RADIATION TREATMENT AID(S): CPT | Mod: 26,,, | Performed by: STUDENT IN AN ORGANIZED HEALTH CARE EDUCATION/TRAINING PROGRAM

## 2023-01-27 PROCEDURE — 99999 PR PBB SHADOW E&M-EST. PATIENT-LVL IV: ICD-10-PCS | Mod: PBBFAC,,, | Performed by: HOSPITALIST

## 2023-01-27 PROCEDURE — 85027 COMPLETE CBC AUTOMATED: CPT | Performed by: HOSPITALIST

## 2023-01-27 PROCEDURE — 99215 OFFICE O/P EST HI 40 MIN: CPT | Mod: S$GLB,,, | Performed by: HOSPITALIST

## 2023-01-27 PROCEDURE — 99499 RISK ADDL DX/OHS AUDIT: ICD-10-PCS | Mod: S$GLB,,, | Performed by: HOSPITALIST

## 2023-01-27 PROCEDURE — 99215 PR OFFICE/OUTPT VISIT, EST, LEVL V, 40-54 MIN: ICD-10-PCS | Mod: S$GLB,,, | Performed by: HOSPITALIST

## 2023-01-27 PROCEDURE — 3078F DIAST BP <80 MM HG: CPT | Mod: CPTII,S$GLB,, | Performed by: HOSPITALIST

## 2023-01-27 PROCEDURE — 1126F AMNT PAIN NOTED NONE PRSNT: CPT | Mod: CPTII,S$GLB,, | Performed by: HOSPITALIST

## 2023-01-27 PROCEDURE — 77290 THER RAD SIMULAJ FIELD CPLX: CPT | Mod: 26,,, | Performed by: STUDENT IN AN ORGANIZED HEALTH CARE EDUCATION/TRAINING PROGRAM

## 2023-01-27 PROCEDURE — 77334 RADIATION TREATMENT AID(S): CPT | Mod: TC | Performed by: STUDENT IN AN ORGANIZED HEALTH CARE EDUCATION/TRAINING PROGRAM

## 2023-01-27 PROCEDURE — 3078F PR MOST RECENT DIASTOLIC BLOOD PRESSURE < 80 MM HG: ICD-10-PCS | Mod: CPTII,S$GLB,, | Performed by: HOSPITALIST

## 2023-01-27 PROCEDURE — 80053 COMPREHEN METABOLIC PANEL: CPT | Performed by: HOSPITALIST

## 2023-01-27 PROCEDURE — 3288F PR FALLS RISK ASSESSMENT DOCUMENTED: ICD-10-PCS | Mod: CPTII,S$GLB,, | Performed by: HOSPITALIST

## 2023-01-27 PROCEDURE — 36415 COLL VENOUS BLD VENIPUNCTURE: CPT | Performed by: HOSPITALIST

## 2023-01-27 RX ORDER — BENZONATATE 100 MG/1
100 CAPSULE ORAL 3 TIMES DAILY PRN
Qty: 90 CAPSULE | Refills: 0 | Status: SHIPPED | OUTPATIENT
Start: 2023-01-27 | End: 2023-01-27

## 2023-01-27 RX ORDER — BENZONATATE 100 MG/1
100 CAPSULE ORAL 3 TIMES DAILY PRN
Qty: 90 CAPSULE | Refills: 0 | Status: SHIPPED | OUTPATIENT
Start: 2023-01-27 | End: 2023-02-06

## 2023-01-27 RX ORDER — CODEINE SULFATE 15 MG/1
15 TABLET ORAL NIGHTLY
Qty: 14 TABLET | Refills: 0 | Status: SHIPPED | OUTPATIENT
Start: 2023-01-27 | End: 2023-02-10

## 2023-01-27 RX ORDER — PROMETHAZINE HYDROCHLORIDE AND CODEINE PHOSPHATE 6.25; 1 MG/5ML; MG/5ML
5 SOLUTION ORAL NIGHTLY PRN
Qty: 118 ML | Refills: 0 | Status: SHIPPED | OUTPATIENT
Start: 2023-01-27 | End: 2023-01-27 | Stop reason: ALTCHOICE

## 2023-01-27 RX ORDER — PROMETHAZINE HYDROCHLORIDE AND CODEINE PHOSPHATE 6.25; 1 MG/5ML; MG/5ML
5 SOLUTION ORAL NIGHTLY PRN
Qty: 118 ML | Refills: 0 | Status: SHIPPED | OUTPATIENT
Start: 2023-01-27 | End: 2023-01-27

## 2023-01-27 NOTE — Clinical Note
Hi all - Mr. Lopez hasn't been able to get his apalutamide RX. He thinks he filled out the patient assistance program paperwork this year. Can someone check in with him? Thanks, Darren

## 2023-01-27 NOTE — TELEPHONE ENCOUNTER
"----- Message from Dominik Go sent at 1/27/2023  9:49 AM CST -----  Consult/Advisory:            Name Of Caller: [facility] [address] [place] [address]         Contact Preference?:  [phone]    Fax: [phone]      Provider Name: Raul      Does patient feel the need to be seen today? No      What is the nature of the call?: Calling to inform office that [facility] doesn't carry promethazine-codeine 6.25-10 mg/5 ml (PHENERGAN WITH CODEINE) 6.25-10 mg/5 mL syrup.    Inquiring if prescription could be changed to DM or plain versions          Additional Notes:  "Thank you for all that you do for our patients"      "

## 2023-01-27 NOTE — PROGRESS NOTES
MEDICAL ONCOLOGY FOLLOW-UP VISIT.     Best Contact Phone Number(s): [phone] (home)      Cancer/Stage/TNM:    Cancer Staging   Prostate cancer  Staging form: Prostate, AJCC 8th Edition  - Clinical: Stage IVB (cT3, cNX, cM1c, Grade Group: 5) - Signed by Darren Carter MD on 11/18/2022       Reason for visit:  Mr. 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 He started first line ADT on 11/18/22 with plan to add apalutamide. Recent medical course notable for persistent Serratia UTI.    Interval History:     Developed fever up to 101.5 the first week of January with associated productive cough. Fever has resolved but continues to have signficant and productive cough of thick yellow mucous throughout the day. Using Mucinex to little effect. Can have paroxysms of cough that lead to emesis. Mild shortness of breath. No limitation in ambulation. No N/V/D. No rashes or joint pains. No rash but has had signficantly increased dry skin. Urine has generally normalized. Has an occaisional tinge of blood at the end of urination.    Still has not received apalutamide. Still using bicalutamide 50mg daily. Mild gynecomastia but no pain. Energy is good. No hot flashes. Otherwise tolerating hormonal therapy well. Orchiectomy scheduled on 2/15/23.        Oncology History   Prostate cancer   12/8/2021 Tumor Markers    PSA 11.7     1/2022 Notable Event    ED visit for urinary retention found on abodminal US. Jones placed.     3/2022 Notable Event    Seen in ED for fall. Incidentally found to have suspicious thyroid nodule     4/20/2022 Biopsy    FNA of thyroid nodule nondiagnostic. Also found to have new SHANNAN lung nodules.     5/16/2022 Imaging Significant Findings    FDG PET-CT in setting of pulmonary nodules:  1.  Two solid left upper lobe pulmonary nodules with no appreciable FDG uptake.  Malignancy is not  excluded, and the larger nodule is likely amenable to percutaneous biopsy. Alternatively, continued surveillance by dedicated chest CT alone, i.e., without FDG PET, may be considered.     2.  Focal FDG uptake in the left prostate gland with extracapsular extension.  These findings are concerning for malignancy.  Correlation with PSA and urology evaluation recommended.  MRI of the prostate may also be helpful for further evaluation.     3.  Abnormal appearance of the left symphysis pubis with hypermetabolic activity.  Differential considerations include fibrous dysplasia, Paget's disease, and metastasis.   MRI or bone algorithm CT may be informative.     4.  Incidental focal uptake in the right cerebellum on at least 2 PET slices.  Recommend brain MRI for further evaluation.     5.  2.2 cm left inferior thyroid lobe nodule.  This nodule was recently biopsied with abnormal results.  Please refer to pathology report.     5/25/2022 Tumor Markers    PSA 17.3     7/6/2022 Biopsy    Attempted SHANNAN nodule biopsy     8/11/2022 Biopsy    TRUS biopsy: Prostate adenocarcinoma up to 4+3 disease in 10/18 cores all from the left side. Associated cribriform glands and PNI.       8/31/2022 Imaging Significant Findings    Bone scan:  1. Uptake within the anterior right 5th and 6th ribs is most likely related to degenerative changes.  Correlation with plain film or chest CT scan recommended.     2.  Intense uptake within the medial left pubic bone corresponds to an area of sclerosis seen on the comparison PET-CT scan.  Differential considerations remain to be fibrous dysplasia, Paget's disease or metastasis.     3.  Negative for metastatic pattern of uptake otherwise.  Degenerative pattern of uptake and other incidental findings as noted above.     9/29/2022 Imaging Significant Findings    MRI prostate: Left-sided PI-RADS 5 lesion with extraprostatic extension involving the left-sided neurovascular bundles and seminal vesicles.   "Extension of abnormal signal adjacent to the left levator ani, cannot rule out involvement.  Abnormal signal also closely approximates with the anterior rectal wall without definite involvement.     Left pubic lesion concerning for metastasis.     10/17/2022 Notable Event    Declined pubic bone biopsy to potentially confirm metastatic prostate cancer     10/26/2022 Procedure    TURPT - pathology review with up to 4+5 disease     11/9/2022 Imaging Significant Findings    PSMA PET-CT shows evidence of osseous and pulmonary metastatic disease     11/18/2022 -  Hormone Therapy    Start Lupron 22.5mg IM; plan to start apalutamide            Physical Exam:   BP (!) 152/70 (BP Location: Left arm, Patient Position: Sitting, BP Method: Medium (Automatic))   Pulse 76   Temp 97.6 °F (36.4 °C) (Oral)   Resp 18   Ht 6' 1" (1.854 m)   Wt 102.6 kg (226 lb 3.1 oz)   SpO2 96%   BMI 29.84 kg/m²      ECOG Performance Status: (foot note - ECOG PS provided by Eastern Cooperative Oncology Group) 0 - Asymptomatic    Physical Exam  Constitutional:       General: He is not in acute distress.     Appearance: Normal appearance.   HENT:      Head: Normocephalic.      Mouth/Throat:      Mouth: Mucous membranes are moist.      Pharynx: Oropharynx is clear.   Eyes:      General: No scleral icterus.     Extraocular Movements: Extraocular movements intact.      Conjunctiva/sclera: Conjunctivae normal.      Pupils: Pupils are equal, round, and reactive to light.   Cardiovascular:      Rate and Rhythm: Normal rate and regular rhythm.      Heart sounds: No murmur heard.  Pulmonary:      Effort: Pulmonary effort is normal. No respiratory distress.      Breath sounds: Normal breath sounds.   Abdominal:      General: There is no distension.      Palpations: Abdomen is soft.   Musculoskeletal:         General: No swelling. Normal range of motion.      Cervical back: Normal range of motion and neck supple.      Right lower leg: No edema.      Left " lower leg: No edema.   Lymphadenopathy:      Cervical: No cervical adenopathy.   Skin:     General: Skin is warm and dry.      Coloration: Skin is not jaundiced.      Findings: No lesion or rash.   Neurological:      General: No focal deficit present.      Mental Status: He is alert and oriented to person, place, and time.      Motor: No weakness.   Psychiatric:         Mood and Affect: Mood normal.         Behavior: Behavior normal.         Thought Content: Thought content normal.         Labs:   Recent Results (from the past 48 hour(s))   CBC Oncology    Collection Time: 01/27/23  8:40 AM   Result Value Ref Range    WBC 8.16 3.90 - 12.70 K/uL    RBC 4.49 (L) 4.60 - 6.20 M/uL    Hemoglobin 13.7 (L) 14.0 - 18.0 g/dL    Hematocrit 42.1 40.0 - 54.0 %    MCV 94 82 - 98 fL    MCH 30.5 27.0 - 31.0 pg    MCHC 32.5 32.0 - 36.0 g/dL    RDW 13.2 11.5 - 14.5 %    Platelets 356 150 - 450 K/uL    MPV 8.9 (L) 9.2 - 12.9 fL    Gran # (ANC) 6.0 1.8 - 7.7 K/uL    Immature Grans (Abs) 0.03 0.00 - 0.04 K/uL   Comprehensive Metabolic Panel    Collection Time: 01/27/23  8:40 AM   Result Value Ref Range    Sodium 140 136 - 145 mmol/L    Potassium 4.3 3.5 - 5.1 mmol/L    Chloride 103 95 - 110 mmol/L    CO2 29 23 - 29 mmol/L    Glucose 108 70 - 110 mg/dL    BUN 14 8 - 23 mg/dL    Creatinine 1.2 0.5 - 1.4 mg/dL    Calcium 10.4 8.7 - 10.5 mg/dL    Total Protein 7.8 6.0 - 8.4 g/dL    Albumin 3.6 3.5 - 5.2 g/dL    Total Bilirubin 0.3 0.1 - 1.0 mg/dL    Alkaline Phosphatase 122 55 - 135 U/L    AST 17 10 - 40 U/L    ALT 10 10 - 44 U/L    Anion Gap 8 8 - 16 mmol/L    eGFR >60.0 >60 mL/min/1.73 m^2   Prostate Specific Antigen, Diagnostic    Collection Time: 01/27/23  8:40 AM   Result Value Ref Range    PSA Diagnostic 5.2 (H) 0.00 - 4.00 ng/mL          Imaging: I have personally reviewed patient's  imaging including PSMA PET CT 11/9/22.    NM PET CT F 18 PYL PSMA, Midthigh to Vertex  Narrative: EXAMINATION:  F 18 PLYPSMA    CLINICAL  HISTORY:  prostate cancer    TECHNIQUE :  Imaging was obtained with reformats from the mid skull to the proximal thigh with utilization of 9.1 mCi of F 18 PLYPSMA    COMPARISON:  PET-CT of 05/16/2022    FINDINGS:  Head neck:    Nasal septal deviation is noted.  Mild mucous membrane thickening is noted at the floor of the left maxillary sinus.    Extensive activity is noted within the region of the salivary glands and or ducts.    Inferior to the left lobe of the thyroid gland a mass is noted with a hypodense central region 2.5 by 1.8 cm with increased uptake image 79 with an SUV of 2.7 maximal.  Could this relate to a necrotic metastasis or to a thyroid nodule. Thyroid ultrasound with FNA may be of use. This appears similar in size when measured in a similar manner on 05/16/2022    Thorax:    Moderate coronary calcifications are noted increasing the patient's coronary risk.    Gynecomastia is noted.    Multiple pulmonary nodules are noted with increased tracer uptake compared to background concerning for prostate metastasis.    One is noted within the left upper lobe that measures 14 mm on today's examination versus 10 mm on the prior of 05/16/2022 with an associated band of consolidation also new since prior.  This demonstrates uptake of 3.3 on this examination.    Inferior to this a mass is noted within the left lobe of the thyroid gland that demonstrates increased uptake compared to background of 14 mm enlarged from 10 mm on 05/16/2022 with increased uptake to 2.7 concerning for prostate metastasis.    A small pulmonary nodule is noted of 4-5 mm not obviously seen on the prior PET-CT fusion image 100 sequence 2 concerning for prostate metastasis too small to categorize on this exam.    A small 4 mm nodule is noted in the right upper lobe image 110 sequence 3 new since the prior worrisome for possible prostate metastasis too small to accurately categorize    Abdomen and pelvis:    Cholelithiasis is noted with a  sub center stone.    Renal hypodensities are noted bilaterally without significant change since the prior statistically favored relate to cysts without worrisome change or characteristics.    A retroaortic left renal vein is noted.  Extensive atherosclerotic aortic calcifications are noted.  Renal calcifications are noted bilaterally proximally.    A distended bladder is noted with a thickened rind of soft tissue along the inferior bladder at the level of the prostate.  This rind of soft tissue along the posterior bladder and along the expected location of the prostate on the left demonstrates uptake on this examination suggestive of prostate neoplasm.  Given the unusual shape a measurement is difficult.  The hypermetabolic right-sided border of the prostate will be measured as an index lesion and demonstrates an SUV maximal of 5.5 axial image 259    A nodule is noted anterior to the spleen of similar size to on the prior with increased uptake likely relating to an accessory splenule, a metastasis would seem less likely.  This is of 1.6 cm    Musculoskeletal:    A moth eaten appearance to the pubis is noted to the left of the pubic symphysis involving both the superior and inferior pubic ramus in this region suggestive of osseous metastatic disease.  This is also somewhat difficult to measure in size an SUV of 32.6 maximal is noted.  A focus of increased uptake is also noted within the inferior pubic ramus on the left posteriorly near the biceps femoral insertion suggestive a small metastatic focus  Impression: 1. Evidence of prostate neoplasm with osseous and pulmonary metastasis.  Detrimental changes noted when comparison is made to 05/16/2022 in particular with progression of several pulmonary nodules and with development of 1 or 2 small pulmonary nodules.  2. A mass is noted inferior to the left lobe of the thyroid gland entirely specific but demonstrating increased uptake similar since the prior  3. A prostate  based mass appears to be present with involvement of the bladder and a distended bladder suggestive outlet obstruction    Electronically signed by: John Steele MD  Date:    11/09/2022  Time:    15:31              Diagnoses:       1. Prostate cancer    2. Subacute cough    3. Malignant neoplasm metastatic to lung, unspecified laterality    4. Bone metastases    5. Thyroid nodule            Assessment and Plan:     1. Prostate cancer  Overview:  Patient with de david high-volume metastatic prosatate adenocarcinoma including lung and osseous mets. Started ADT 11/18/23 with suboptimal PSA response.    Assessment & Plan:  Patient still awaiting apalutamide RX to be deliverd due to patient assistance program; still using bicalutamide 50mg po daily  - Orchiectomy scheduled 2/15/23  - Continue bicalutamide 50mg po daily  - Change bicalutamide to apalutamide 240mg po daily when delivered  - Con't Ca/D  - Needs to scheduled BMD and genetics appt  - FU 3/10/2023      Orders:  -     Ambulatory referral/consult to Genetics; Future; Expected date: 02/03/2023    2. Subacute cough  Assessment & Plan:  - Chest CT scan  - Antitussive RX    Orders:  -     X-Ray Chest PA And Lateral; Future; Expected date: 01/27/2023  -     CT Chest With Contrast; Future; Expected date: 01/27/2023  -     Discontinue: benzonatate (TESSALON) 100 MG capsule; Take 1 capsule (100 mg total) by mouth 3 (three) times daily as needed for Cough.  Dispense: 90 capsule; Refill: 0  -     Discontinue: promethazine-codeine 6.25-10 mg/5 ml (PHENERGAN WITH CODEINE) 6.25-10 mg/5 mL syrup; Take 5 mLs by mouth nightly as needed for Cough.  Dispense: 118 mL; Refill: 0  -     Discontinue: promethazine-codeine 6.25-10 mg/5 ml (PHENERGAN WITH CODEINE) 6.25-10 mg/5 mL syrup; Take 5 mLs by mouth nightly as needed for Cough.  Dispense: 118 mL; Refill: 0  -     benzonatate (TESSALON) 100 MG capsule; Take 1 capsule (100 mg total) by mouth 3 (three) times daily as needed for  Cough.  Dispense: 90 capsule; Refill: 0  -     codeine 15 MG Tab; Take 1 tablet (15 mg total) by mouth every evening. for 14 days  Dispense: 14 tablet; Refill: 0    3. Malignant neoplasm metastatic to lung, unspecified laterality  Overview:  Metastatic prostate cancer based on PSMA imaging    Assessment & Plan:  - Repeat CT chest given signficant cough      4. Bone metastases  Overview:  Has left pelvic turbercle metastases noted on PSMA PET CT with associated discomfort    Assessment & Plan:  - Con't Ca/D  - FU with rad onc      5. Thyroid nodule  Overview:  TI-RADS 5 lesion. Non-diagnostic biopsy 04/2022.    Assessment & Plan:  - will need non-urgent repeat biopsy               Route Chart for Scheduling    Med Onc Chart Routing      Follow up with physician . 3/10/23   Follow up with JENIFFER    Infusion scheduling note    Injection scheduling note    Labs CBC, CMP and PSA   Lab interval:     Imaging DXA scan      Pharmacy appointment    Other referrals          Supportive Plan Information  OP PROSTATE LEUPROLIDE Q3MO   Darren Carter MD   Upcoming Treatment Dates - OP PROSTATE LEUPROLIDE Q3MO    2/10/2023       Chemotherapy       leuprolide (LUPRON) injection 22.5 mg  5/5/2023       Chemotherapy       leuprolide (LUPRON) injection 22.5 mg  7/28/2023       Chemotherapy       leuprolide (LUPRON) injection 22.5 mg  10/20/2023       Chemotherapy       leuprolide (LUPRON) injection 22.5 mg       Darren Carter MD  Hematology/Oncology  [facility]

## 2023-01-27 NOTE — TELEPHONE ENCOUNTER
"----- Message from Dominikmorgan Kinneyon sent at 1/27/2023  1:49 PM CST -----  Consult/Advisory:          Name Of Caller: [facility] [address] [place] [address] [place]        Contact Preference?:  [phone] Fax: [phone]        Provider Name: Raul      Does patient feel the need to be seen today? No      What is the nature of the call?: Calling to inform office that they likely won't have codeine 15 MG Tab until after Monday. Suggesting to have medication refilled elsewhere         Additional Notes:  "Thank you for all that you do for our patients"      "

## 2023-01-27 NOTE — TELEPHONE ENCOUNTER
Spoke with pharmacist at [facility] and was informed they do not carry promethazine-codeine and would like to know if it can be changed to  DM or plain versions. If so please send new rx to pharmacy.

## 2023-01-27 NOTE — ASSESSMENT & PLAN NOTE
Patient still awaiting apalutamide RX to be deliverd due to patient assistance program; still using bicalutamide 50mg po daily  - Orchiectomy scheduled 2/15/23  - Continue bicalutamide 50mg po daily  - Change bicalutamide to apalutamide 240mg po daily when delivered  - Con't Ca/D  - Needs to scheduled BMD and genetics appt  - FU 3/10/2023

## 2023-01-27 NOTE — TELEPHONE ENCOUNTER
Thanks - I told him it would likely be difficult to fill. If they can get it Monday that is probably for the best

## 2023-01-27 NOTE — PATIENT INSTRUCTIONS
You tolerating Lupron shots and bicalutamide well. We need to work on obtaining the apalutamide prescription to help improve long term overall survival. In the meantime continue taking bicalutamide.    Follow up with Dr. Luis on 2/15/23 for the orchiectomy to provide long-term androgen deprivation therapy. No need for additional hormone shots.    Because of your ongoing cough, we should repeat CT scan. Will also prescribe cough medicine into your local pharmacy.    Start apalutamide in place of bicalutamide when you get it.    Con't vitamin D and 4 servings of dairy daily    Schedule bone mineral density test    Will genetics reach out to schedule screening visit

## 2023-01-30 ENCOUNTER — PATIENT MESSAGE (OUTPATIENT)
Dept: HEMATOLOGY/ONCOLOGY | Facility: CLINIC | Age: 78
End: 2023-01-30
Payer: MEDICARE

## 2023-01-30 ENCOUNTER — HOSPITAL ENCOUNTER (OUTPATIENT)
Dept: RADIOLOGY | Facility: HOSPITAL | Age: 78
Discharge: HOME | End: 2023-01-30
Attending: HOSPITALIST
Payer: MEDICARE

## 2023-01-30 DIAGNOSIS — R05.2 SUBACUTE COUGH: ICD-10-CM

## 2023-01-30 PROCEDURE — 71260 CT THORAX DX C+: CPT | Mod: TC,PO

## 2023-01-30 PROCEDURE — 71260 CT CHEST WITH CONTRAST: ICD-10-PCS | Mod: 26,,, | Performed by: RADIOLOGY

## 2023-01-30 PROCEDURE — 25500020 PHARM REV CODE 255: Mod: PO | Performed by: HOSPITALIST

## 2023-01-30 PROCEDURE — 71260 CT THORAX DX C+: CPT | Mod: 26,,, | Performed by: RADIOLOGY

## 2023-01-30 RX ADMIN — IOHEXOL 75 ML: 350 INJECTION, SOLUTION INTRAVENOUS at 08:01

## 2023-01-31 ENCOUNTER — PATIENT MESSAGE (OUTPATIENT)
Dept: PHARMACY | Facility: CLINIC | Age: 78
End: 2023-01-31
Payer: MEDICARE

## 2023-01-31 PROCEDURE — 77295 3-D RADIOTHERAPY PLAN: CPT | Mod: 26,,, | Performed by: STUDENT IN AN ORGANIZED HEALTH CARE EDUCATION/TRAINING PROGRAM

## 2023-01-31 PROCEDURE — 77334 RADIATION TREATMENT AID(S): CPT | Mod: 26,,, | Performed by: STUDENT IN AN ORGANIZED HEALTH CARE EDUCATION/TRAINING PROGRAM

## 2023-01-31 PROCEDURE — 77295 3-D RADIOTHERAPY PLAN: CPT | Mod: TC | Performed by: STUDENT IN AN ORGANIZED HEALTH CARE EDUCATION/TRAINING PROGRAM

## 2023-01-31 PROCEDURE — 77334 PR  RADN TREATMENT AID(S) COMPLX: ICD-10-PCS | Mod: 26,,, | Performed by: STUDENT IN AN ORGANIZED HEALTH CARE EDUCATION/TRAINING PROGRAM

## 2023-01-31 PROCEDURE — 77295 PR 3D RADIOTHERAPY PLAN: ICD-10-PCS | Mod: 26,,, | Performed by: STUDENT IN AN ORGANIZED HEALTH CARE EDUCATION/TRAINING PROGRAM

## 2023-01-31 PROCEDURE — 77300 RADIATION THERAPY DOSE PLAN: CPT | Mod: TC | Performed by: STUDENT IN AN ORGANIZED HEALTH CARE EDUCATION/TRAINING PROGRAM

## 2023-01-31 PROCEDURE — 77300 PR RADIATION THERAPY,DOSIMETRY PLAN: ICD-10-PCS | Mod: 26,,, | Performed by: STUDENT IN AN ORGANIZED HEALTH CARE EDUCATION/TRAINING PROGRAM

## 2023-01-31 PROCEDURE — 77334 RADIATION TREATMENT AID(S): CPT | Mod: TC | Performed by: STUDENT IN AN ORGANIZED HEALTH CARE EDUCATION/TRAINING PROGRAM

## 2023-01-31 PROCEDURE — 77300 RADIATION THERAPY DOSE PLAN: CPT | Mod: 26,,, | Performed by: STUDENT IN AN ORGANIZED HEALTH CARE EDUCATION/TRAINING PROGRAM

## 2023-01-31 NOTE — TELEPHONE ENCOUNTER
Outgoing call to patient to see if he has heard from [employer] with any updates about his enrollment status. Patient says he has had not heard from the program at all. He also said he never did receive first fill from [facility] in December so he has not actually started [facility] yet. Let patient know we will continue to try to follow up with [employer] for updates. Also discussed PAF arnulfo income verification. Patient says he can have his daughter help him mail a copy of his SSI Award Letter to [employer] and requested that I send our mailing address to him via Busuu message. Busuu message sent. Will continue to follow up.

## 2023-02-01 ENCOUNTER — HOSPITAL ENCOUNTER (OUTPATIENT)
Dept: RADIATION THERAPY | Facility: HOSPITAL | Age: 78
Discharge: HOME | End: 2023-02-01
Attending: STUDENT IN AN ORGANIZED HEALTH CARE EDUCATION/TRAINING PROGRAM
Payer: MEDICARE

## 2023-02-01 PROCEDURE — G6002 STEREOSCOPIC X-RAY GUIDANCE: HCPCS | Mod: 26,,, | Performed by: STUDENT IN AN ORGANIZED HEALTH CARE EDUCATION/TRAINING PROGRAM

## 2023-02-01 PROCEDURE — 77387 GUIDANCE FOR RADJ TX DLVR: CPT | Mod: TC | Performed by: STUDENT IN AN ORGANIZED HEALTH CARE EDUCATION/TRAINING PROGRAM

## 2023-02-01 PROCEDURE — G6002 PR STEREOSCOPIC XRAY GUIDE FOR RADIATION TX DELIV: ICD-10-PCS | Mod: 26,,, | Performed by: STUDENT IN AN ORGANIZED HEALTH CARE EDUCATION/TRAINING PROGRAM

## 2023-02-01 PROCEDURE — 77412 RADIATION TX DELIVERY LVL 3: CPT | Performed by: STUDENT IN AN ORGANIZED HEALTH CARE EDUCATION/TRAINING PROGRAM

## 2023-02-01 PROCEDURE — 77417 THER RADIOLOGY PORT IMAGE(S): CPT | Performed by: STUDENT IN AN ORGANIZED HEALTH CARE EDUCATION/TRAINING PROGRAM

## 2023-02-01 NOTE — TELEPHONE ENCOUNTER
Called and scheduled appt josh Zamora for Tuesday March 28th for 10am. Confirmed our location, he voiced thanks and understanding.

## 2023-02-02 PROCEDURE — G6002 PR STEREOSCOPIC XRAY GUIDE FOR RADIATION TX DELIV: ICD-10-PCS | Mod: 26,,, | Performed by: STUDENT IN AN ORGANIZED HEALTH CARE EDUCATION/TRAINING PROGRAM

## 2023-02-02 PROCEDURE — G6002 STEREOSCOPIC X-RAY GUIDANCE: HCPCS | Mod: 26,,, | Performed by: STUDENT IN AN ORGANIZED HEALTH CARE EDUCATION/TRAINING PROGRAM

## 2023-02-02 PROCEDURE — 77387 GUIDANCE FOR RADJ TX DLVR: CPT | Mod: TC | Performed by: STUDENT IN AN ORGANIZED HEALTH CARE EDUCATION/TRAINING PROGRAM

## 2023-02-02 PROCEDURE — 77412 RADIATION TX DELIVERY LVL 3: CPT | Performed by: STUDENT IN AN ORGANIZED HEALTH CARE EDUCATION/TRAINING PROGRAM

## 2023-02-03 ENCOUNTER — TELEPHONE (OUTPATIENT)
Dept: UROLOGY | Facility: CLINIC | Age: 78
End: 2023-02-03
Payer: MEDICARE

## 2023-02-03 PROCEDURE — G6002 PR STEREOSCOPIC XRAY GUIDE FOR RADIATION TX DELIV: ICD-10-PCS | Mod: 26,,, | Performed by: STUDENT IN AN ORGANIZED HEALTH CARE EDUCATION/TRAINING PROGRAM

## 2023-02-03 PROCEDURE — 77387 GUIDANCE FOR RADJ TX DLVR: CPT | Mod: TC | Performed by: STUDENT IN AN ORGANIZED HEALTH CARE EDUCATION/TRAINING PROGRAM

## 2023-02-03 PROCEDURE — G6002 STEREOSCOPIC X-RAY GUIDANCE: HCPCS | Mod: 26,,, | Performed by: STUDENT IN AN ORGANIZED HEALTH CARE EDUCATION/TRAINING PROGRAM

## 2023-02-03 PROCEDURE — 77412 RADIATION TX DELIVERY LVL 3: CPT | Performed by: STUDENT IN AN ORGANIZED HEALTH CARE EDUCATION/TRAINING PROGRAM

## 2023-02-03 NOTE — TELEPHONE ENCOUNTER
----- Message from Dee Dee Montenegro sent at 2/3/2023  9:21 AM CST -----  Type:  Needs Medical Advice    Who Called: pt  Would the patient rather a call back or a response via [facility]? Call back  Best Call Back Number: [phone]  Additional Information:     Pt stated that he will not be able to make his 10:30 appt Tuesday and wants to know if he come at 11:30 instead. Please call pt back

## 2023-02-06 PROCEDURE — G6002 PR STEREOSCOPIC XRAY GUIDE FOR RADIATION TX DELIV: ICD-10-PCS | Mod: 26,,, | Performed by: STUDENT IN AN ORGANIZED HEALTH CARE EDUCATION/TRAINING PROGRAM

## 2023-02-06 PROCEDURE — 77412 RADIATION TX DELIVERY LVL 3: CPT | Performed by: STUDENT IN AN ORGANIZED HEALTH CARE EDUCATION/TRAINING PROGRAM

## 2023-02-06 PROCEDURE — 77387 GUIDANCE FOR RADJ TX DLVR: CPT | Mod: TC | Performed by: STUDENT IN AN ORGANIZED HEALTH CARE EDUCATION/TRAINING PROGRAM

## 2023-02-06 PROCEDURE — G6002 STEREOSCOPIC X-RAY GUIDANCE: HCPCS | Mod: 26,,, | Performed by: STUDENT IN AN ORGANIZED HEALTH CARE EDUCATION/TRAINING PROGRAM

## 2023-02-07 ENCOUNTER — HOSPITAL ENCOUNTER (OUTPATIENT)
Dept: RADIOLOGY | Facility: HOSPITAL | Age: 78
Discharge: HOME | End: 2023-02-07
Attending: HOSPITALIST
Payer: MEDICARE

## 2023-02-07 ENCOUNTER — HOSPITAL ENCOUNTER (OUTPATIENT)
Dept: PREADMISSION TESTING | Facility: HOSPITAL | Age: 78
Discharge: HOME | End: 2023-02-07
Attending: STUDENT IN AN ORGANIZED HEALTH CARE EDUCATION/TRAINING PROGRAM
Payer: MEDICARE

## 2023-02-07 ENCOUNTER — ANESTHESIA EVENT (OUTPATIENT)
Dept: SURGERY | Facility: HOSPITAL | Age: 78
End: 2023-02-07
Payer: MEDICARE

## 2023-02-07 VITALS
SYSTOLIC BLOOD PRESSURE: 140 MMHG | DIASTOLIC BLOOD PRESSURE: 63 MMHG | OXYGEN SATURATION: 99 % | TEMPERATURE: 98 F | WEIGHT: 230.19 LBS | HEART RATE: 84 BPM | RESPIRATION RATE: 16 BRPM | HEIGHT: 73 IN | BODY MASS INDEX: 30.51 KG/M2

## 2023-02-07 DIAGNOSIS — R05.2 SUBACUTE COUGH: ICD-10-CM

## 2023-02-07 PROBLEM — Z79.899 OTHER LONG TERM (CURRENT) DRUG THERAPY: Status: ACTIVE | Noted: 2023-02-07

## 2023-02-07 PROBLEM — F43.10 POSTTRAUMATIC STRESS DISORDER: Status: ACTIVE | Noted: 2023-02-07

## 2023-02-07 PROBLEM — F12.10 CANNABIS ABUSE: Status: ACTIVE | Noted: 2023-02-07

## 2023-02-07 PROBLEM — F33.1 MODERATE RECURRENT MAJOR DEPRESSION: Status: ACTIVE | Noted: 2023-02-07

## 2023-02-07 PROCEDURE — G6002 PR STEREOSCOPIC XRAY GUIDE FOR RADIATION TX DELIV: ICD-10-PCS | Mod: 26,,, | Performed by: STUDENT IN AN ORGANIZED HEALTH CARE EDUCATION/TRAINING PROGRAM

## 2023-02-07 PROCEDURE — 71046 X-RAY EXAM CHEST 2 VIEWS: CPT | Mod: 26,,, | Performed by: RADIOLOGY

## 2023-02-07 PROCEDURE — G6002 STEREOSCOPIC X-RAY GUIDANCE: HCPCS | Mod: 26,,, | Performed by: STUDENT IN AN ORGANIZED HEALTH CARE EDUCATION/TRAINING PROGRAM

## 2023-02-07 PROCEDURE — 71046 X-RAY EXAM CHEST 2 VIEWS: CPT | Mod: TC,FY

## 2023-02-07 PROCEDURE — 71046 XR CHEST PA AND LATERAL: ICD-10-PCS | Mod: 26,,, | Performed by: RADIOLOGY

## 2023-02-07 PROCEDURE — 77412 RADIATION TX DELIVERY LVL 3: CPT | Performed by: STUDENT IN AN ORGANIZED HEALTH CARE EDUCATION/TRAINING PROGRAM

## 2023-02-07 PROCEDURE — 77387 GUIDANCE FOR RADJ TX DLVR: CPT | Mod: TC | Performed by: STUDENT IN AN ORGANIZED HEALTH CARE EDUCATION/TRAINING PROGRAM

## 2023-02-07 RX ORDER — SODIUM CHLORIDE, SODIUM LACTATE, POTASSIUM CHLORIDE, CALCIUM CHLORIDE 600; 310; 30; 20 MG/100ML; MG/100ML; MG/100ML; MG/100ML
INJECTION, SOLUTION INTRAVENOUS CONTINUOUS
Status: CANCELLED | OUTPATIENT
Start: 2023-02-07

## 2023-02-07 RX ORDER — LIDOCAINE HYDROCHLORIDE 10 MG/ML
1 INJECTION, SOLUTION EPIDURAL; INFILTRATION; INTRACAUDAL; PERINEURAL ONCE
Status: CANCELLED | OUTPATIENT
Start: 2023-02-07 | End: 2023-02-07

## 2023-02-07 NOTE — ANESTHESIA PREPROCEDURE EVALUATION
02/07/2023  Noah Lopez is a 78 y.o., male scheduled for Bilateral simple orchiectomy 2/15/23.    Past Medical History:   Diagnosis Date    Colon polyp 09/06/2018    Hyperlipidemia     Hypertension     Prostate cancer     Urinary tract infection with hematuria 11/18/2022     Past Surgical History:   Procedure Laterality Date    COLONOSCOPY  2010    COLONOSCOPY N/A 9/6/2018    Procedure: COLONOSCOPY Suprep PLEASE TEXT PATIENT WITH ARRIVAL TIME;  Surgeon: Niharika Arce MD;  Location: [facility];  Service: Endoscopy;  Laterality: N/A;    CYSTOSCOPY WITH URODYNAMIC TESTING N/A 1/31/2022    Procedure: CYSTOSCOPY, WITH URODYNAMIC TESTING FLOUROSCOPIC;  Surgeon: Anthony Maloney MD;  Location: [address];  Service: Urology;  Laterality: N/A;  1hr    KNEE ARTHROSCOPY      SPINE SURGERY      l-spine    TRANSURETHRAL RESECTION OF PROSTATE N/A 10/26/2022    Procedure: TURP (TRANSURETHRAL RESECTION OF PROSTATE);  Surgeon: Helena Luis MD;  Location: [facility];  Service: Urology;  Laterality: N/A;       Current Outpatient Medications   Medication Instructions    apalutamide (ERLEADA) 240 mg, Oral, Daily    ascorbic acid (vitamin C) (VITAMIN C) 1,000 mg, Oral, Daily    bicalutamide (CASODEX) 50 mg, Oral, Daily    calcium carbonate (OS-TYREE) 1,000 mg, Oral, Daily    cholecalciferol (vitamin D3) (VITAMIN D3) 1,000 Units, Oral, Daily    clotrimazole-betamethasone 1-0.05% (LOTRISONE) cream Topical (Top), 2 times daily    codeine 15 mg, Oral, Nightly    diltiaZEM (CARDIZEM CD) 300 MG 24 hr capsule TAKE 1 CAPSULE BY MOUTH  ONCE DAILY    diphenhydramine HCl (BENADRYL ALLERGY ORAL) Oral    docusate sodium (COLACE) 250 mg, Oral, Daily    finasteride (PROSCAR) 5 mg, Oral, Daily    GAS RELIEF 80, SIMETHICONE, ORAL Oral    lisinopriL (PRINIVIL,ZESTRIL) 20 MG tablet TAKE 1 TABLET BY MOUTH ONCE  DAILY    melatonin 10 mg TbDL Oral    methocarbamoL (ROBAXIN) 750-1,500 mg, Oral, 3 times daily PRN    miconazole (MICOTIN) 2 % cream Topical (Top), 2 times daily, Apply to tip of penis prn irritation    multivit-min-FA-lycopen-lutein (MEN 50 PLUS MULTIVITAMIN) 300-600-300 mcg Tab as directed Orally    multivitamin capsule 1 capsule, Oral, Daily    nitrofurantoin, macrocrystal-monohydrate, (MACROBID) 100 MG capsule 100 mg, Oral, Every 12 hours    ofloxacin (FLOXIN) 0.3 % otic solution Apply 10 drops into right ear once a day for 10 days.  Please dispense generic.    omega 3-dha-epa-fish oil 1,000 mg (120 mg-180 mg) Cap 1 capsule    omega-3 fatty acids/fish oil (FISH OIL-OMEGA-3 FATTY ACIDS) 300-1,000 mg capsule 1 capsule, Oral, Daily    simvastatin (ZOCOR) 20 MG tablet TAKE 1 TABLET BY MOUTH IN  THE EVENING    tamsulosin (FLOMAX) 0.4 mg Cap Take 1 capsule by mouth once daily    traZODone (DESYREL) 100 MG tablet TAKE 2 TABLETS BY MOUTH EVERY DAY AT BEDTIME AS NEEDED FOR INSOMNIA    triamterene-hydrochlorothiazide 37.5-25 mg (DYAZIDE) 37.5-25 mg per capsule TAKE 1 CAPSULE BY MOUTH IN  THE MORNING    TURMERIC ORAL Oral    wheat dextrin (BENEFIBER CLEAR SF, DEXTRIN, ORAL) Oral    zinc gluconate 50 mg, Oral, Daily       Pre-op Assessment    I have reviewed the Patient Summary Reports.     I have reviewed the Nursing Notes.    I have reviewed the Medications.     Review of Systems  Anesthesia Hx:  Personal Hx of Anesthesia complications  Unintended Unpleasent Intraoperative Awareness (towards the end of back surgery at a different hospital)   Social:  Former Smoker, Alcohol Use    Hematology/Oncology:        Current/Recent Cancer. (prostate)   Cardiovascular:   Exercise tolerance: good Hypertension, well controlled  Denies Angina. hyperlipidemia    Pulmonary:  Pulmonary Normal  Denies Shortness of breath.    Renal/:  Renal/ Normal     Hepatic/GI:  Hepatic/GI Normal    Musculoskeletal:   Arthritis    Spine Disorders: cervical and lumbar    Neurological:   Denies TIA. Denies CVA. Neuromuscular Disease,  Denies Seizures.    Endocrine:  Obesity / BMI > 30  Psych:   Psychiatric History (PTSD) depression          Physical Exam  General: Well nourished and Cooperative    Airway:  Mallampati: IV / II  Mouth Opening: Normal  TM Distance: Normal  Tongue: Normal  Neck ROM: Normal ROM    Dental:  Intact    Chest/Lungs:  Clear to auscultation, Normal Respiratory Rate    Heart:  Rate: Normal  Rhythm: Regular Rhythm  Sounds: Normal      Lab Results   Component Value Date    WBC 8.16 01/27/2023    HGB 13.7 (L) 01/27/2023    HCT 42.1 01/27/2023     01/27/2023    CHOL 145 12/08/2021    TRIG 84 12/08/2021    HDL 45 12/08/2021    ALT 10 01/27/2023    AST 17 01/27/2023     01/27/2023    K 4.3 01/27/2023     01/27/2023    CREATININE 1.2 01/27/2023    BUN 14 01/27/2023    CO2 29 01/27/2023    TSH 1.535 12/08/2021    PSA 11.7 (H) 12/08/2021    INR 0.9 07/06/2022    HGBA1C 5.6 01/26/2022     Results for orders placed or performed in visit on 10/04/22   EKG 12-lead    Collection Time: 10/04/22 10:51 AM    Narrative    Test Reason : Z01.810,    Vent. Rate : 087 BPM     Atrial Rate : 087 BPM     P-R Int : 166 ms          QRS Dur : 080 ms      QT Int : 362 ms       P-R-T Axes : 061 039 042 degrees     QTc Int : 435 ms    Normal sinus rhythm  Normal ECG  When compared with ECG of 12-SEP-2022 17:34,  No significant change was found  Confirmed by Arie Chacon MD ([id number]) on 10/6/2022 7:32:16 AM    Referred By: SHIRA VAZQUEZ           Confirmed By:Arie Chacon MD     CXR 2/7/23  The lungs are clear, with normal appearance of pulmonary vasculature and no pleural effusion or pneumothorax.  The cardiac silhouette is normal in size. The hilar and mediastinal contours are unremarkable.  Visualized osseous structures show no acute abnormalities.  There is osseous degenerative change of the spine and shoulders.  Impression:No  acute radiographic findings in the chest.      Anesthesia Plan  Type of Anesthesia, risks & benefits discussed:    Anesthesia Type: Gen ETT  Intra-op Monitoring Plan: Standard ASA Monitors  Post Op Pain Control Plan: multimodal analgesia  Induction:  IV  Airway Plan: Direct, Post-Induction  Informed Consent: Informed consent signed with the Patient and all parties understand the risks and agree with anesthesia plan.  All questions answered.   ASA Score: 2  Day of Surgery Review of History & Physical: H&P Update referred to the surgeon/provider.  Anesthesia Plan Notes: Anesthesia consent to be signed prior to surgery      Ready For Surgery From Anesthesia Perspective.     .

## 2023-02-07 NOTE — DISCHARGE INSTRUCTIONS
Your surgery is scheduled for 2/15/23.    Please report to Hospital Front Lobby on the 1st Floor at 0530 a.m.    THIS TIME IS SUBJECT TO CHANGE.  YOU WILL RECEIVE A PHONE CALL THE DAY BEFORE SURGERY BY 3:30 PM TO CONFIRM YOUR TIME OF ARRIVAL.  IF YOU HAVE NOT RECEIVED A PHONE CALL BY 3:30 PM THE DAY BEFORE YOUR SURGERY PLEASE CALL [phone]     INSTRUCTIONS IMPORTANT!!!  ¨ Do not eat or drink after 12 midnight-including water, candy, gum, & mints. OK to brush teeth.      ____  Proceed to [facility] on *** for additional testing.        ____  Do not wear makeup, including mascara.  ____  No powder, lotions or creams to surgical area.  ____  Please remove all jewelry, including piercings and leave at home.  ____  No money or valuables needed. Please leave at home.  ____  Please bring any documents given by your doctor.  ____  If going home the same day, arrange for a ride home. You will not be able to             drive if Anesthesia was used.  ____  Children under 18 years require a parent / guardian present the entire time             they are in surgery / recovery.  ____  Wear loose fitting clothing. Allow for dressings, bandages.  ____  Stop Aspirin, Ibuprofen, Motrin, Aleve, Goody's/BC powders, Excedrine and Naproxen (NSAIDS) at least 3-5 days before surgery, unless otherwise instructed by your doctor, or the nurse.   You MAY use Tylenol/acetaminophen until day of surgery.  ____  Wash the surgical area with Hibiclens the night before surgery, and again the             morning of surgery.  Be sure to rinse hibiclens off completely (if instructed by   nurse).  ____  If you take diabetic medication, do not take am of surgery unless instructed by Doctor.  ____  Call MD for temperature above 101 degrees or any other signs of infection such as Urinary (bladder) infection, Upper respiratory infection, skin boils, etc.  ____ Stop taking any Fish Oil supplement or any Vitamins that contain Vitamin E at  least 5 days prior to surgery.  ____ Do Not wear your contact lenses the day of your procedure.  You may wear your glasses.      ____Do not shave surgical site for 3 days prior to surgery.  ____ Practice Good hand washing before, during, and after procedure.      I have read or had read and explained to me, and understand the above information.  Additional comments or instructions:  For additional questions call [phone]      ANESTHESIA SIDE EFFECTS  -For the first 24 hours after surgery:  Do not drive, use heavy equipment, make important decisions, or drink alcohol  -It is normal to feel sleepy for several hours.  Rest until you are more awake.  -Have someone stay with you, if needed.  They can watch for problems and help keep you safe.  -Some possible post anesthesia side effects include: nausea and vomiting, sore throat and hoarseness, sleepiness, and dizziness.        Pre-Op Bathing Instructions    Before surgery, you can play an important role in your own health.    Because skin is not sterile, we need to be sure that your skin is as free of germs as possible. By following the instructions below, you can reduce the number of germs on your skin before surgery.    IMPORTANT: You will need to shower with a special soap called Hibiclens*, available at any pharmacy.  If you are allergic to Chlorhexidine (the antiseptic in Hibiclens), use an antibacterial soap such as Dial Soap for your preoperative shower.  You will shower with Hibiclens both the night before your surgery and the morning of your surgery.  Do not use Hibiclens on the head, face or genitals to avoid injury to those areas.    STEP #1: THE NIGHT BEFORE YOUR SURGERY     Do not shave the area of your body where your surgery will be performed.  Shower and wash your hair and body as usual with your normal soap and shampoo.  Rinse your hair and body thoroughly after you shower to remove all soap residue.  With your hand, apply one packet of Hibiclens soap to  the surgical site.   Wash the site gently for five (5) minutes. Do not scrub your skin too hard.   Do not wash with your regular soap after Hibiclens is used.  Rinse your body thoroughly.  Pat yourself dry with a clean, soft towel.  Do not use lotion, cream, or powder.  Wear clean clothes.    STEP #2: THE MORNING OF YOUR SURGERY     Repeat Step #1.    * Not to be used by people allergic to Chlorhexidine.

## 2023-02-07 NOTE — PRE-PROCEDURE INSTRUCTIONS
Arranging a ride home    Allergies, medical, surgical, family and psychosocial histories reviewed with patient. Periop plan of care reviewed. Preop instructions given, including medications to take and to hold. Hibiclens soap and instructions on use given. Time allotted for questions to be addressed.  Patient verbalized understanding.

## 2023-02-08 PROCEDURE — 77336 RADIATION PHYSICS CONSULT: CPT | Performed by: STUDENT IN AN ORGANIZED HEALTH CARE EDUCATION/TRAINING PROGRAM

## 2023-02-14 ENCOUNTER — TELEPHONE (OUTPATIENT)
Dept: PHARMACY | Facility: CLINIC | Age: 78
End: 2023-02-14
Payer: MEDICARE

## 2023-02-14 NOTE — TELEPHONE ENCOUNTER
Patient was at  [employer] asking for assistance with filling out his paperwork.  He was going into  Pharmacy for assistance.  I informed them I spoke to Specialty Pharmacy  and they are the department who sent him the letter.  He was provided their phone number to set up an appointment.

## 2023-02-14 NOTE — TELEPHONE ENCOUNTER
Patient came to [facility] with letter from [facility]. Unfortunately [employer] funding has closed. Obtained copy of income documents in case [employer] opens. Informed pt continuing to follow up on PAP. Pt voiced understanding.

## 2023-02-15 ENCOUNTER — HOSPITAL ENCOUNTER (OUTPATIENT)
Facility: HOSPITAL | Age: 78
Discharge: HOME | End: 2023-02-15
Attending: STUDENT IN AN ORGANIZED HEALTH CARE EDUCATION/TRAINING PROGRAM | Admitting: STUDENT IN AN ORGANIZED HEALTH CARE EDUCATION/TRAINING PROGRAM
Payer: MEDICARE

## 2023-02-15 ENCOUNTER — ANESTHESIA (OUTPATIENT)
Dept: SURGERY | Facility: HOSPITAL | Age: 78
End: 2023-02-15
Payer: MEDICARE

## 2023-02-15 VITALS
WEIGHT: 230 LBS | SYSTOLIC BLOOD PRESSURE: 138 MMHG | OXYGEN SATURATION: 97 % | HEART RATE: 70 BPM | RESPIRATION RATE: 16 BRPM | BODY MASS INDEX: 30.48 KG/M2 | DIASTOLIC BLOOD PRESSURE: 69 MMHG | HEIGHT: 73 IN | TEMPERATURE: 98 F

## 2023-02-15 DIAGNOSIS — C79.51 PROSTATE CANCER METASTATIC TO BONE: ICD-10-CM

## 2023-02-15 DIAGNOSIS — C61 PROSTATE CANCER METASTATIC TO BONE: ICD-10-CM

## 2023-02-15 PROCEDURE — 88305 TISSUE EXAM BY PATHOLOGIST: CPT | Performed by: PATHOLOGY

## 2023-02-15 PROCEDURE — 63600175 PHARM REV CODE 636 W HCPCS: Performed by: ANESTHESIOLOGY

## 2023-02-15 PROCEDURE — 88305 TISSUE EXAM BY PATHOLOGIST: CPT | Mod: 26,,, | Performed by: PATHOLOGY

## 2023-02-15 PROCEDURE — D9220A PRA ANESTHESIA: Mod: ANES,,, | Performed by: ANESTHESIOLOGY

## 2023-02-15 PROCEDURE — 36000707: Performed by: STUDENT IN AN ORGANIZED HEALTH CARE EDUCATION/TRAINING PROGRAM

## 2023-02-15 PROCEDURE — 25000003 PHARM REV CODE 250: Performed by: NURSE ANESTHETIST, CERTIFIED REGISTERED

## 2023-02-15 PROCEDURE — 54520 REMOVAL OF TESTIS: CPT | Mod: 50,,, | Performed by: STUDENT IN AN ORGANIZED HEALTH CARE EDUCATION/TRAINING PROGRAM

## 2023-02-15 PROCEDURE — 71000015 HC POSTOP RECOV 1ST HR: Performed by: STUDENT IN AN ORGANIZED HEALTH CARE EDUCATION/TRAINING PROGRAM

## 2023-02-15 PROCEDURE — 63600175 PHARM REV CODE 636 W HCPCS: Performed by: NURSE ANESTHETIST, CERTIFIED REGISTERED

## 2023-02-15 PROCEDURE — D9220A PRA ANESTHESIA: Mod: CRNA,,, | Performed by: NURSE ANESTHETIST, CERTIFIED REGISTERED

## 2023-02-15 PROCEDURE — 71000033 HC RECOVERY, INTIAL HOUR: Performed by: STUDENT IN AN ORGANIZED HEALTH CARE EDUCATION/TRAINING PROGRAM

## 2023-02-15 PROCEDURE — 54520 PR REMOVAL TESTIS,SIMPLE: ICD-10-PCS | Mod: 50,,, | Performed by: STUDENT IN AN ORGANIZED HEALTH CARE EDUCATION/TRAINING PROGRAM

## 2023-02-15 PROCEDURE — D9220A PRA ANESTHESIA: ICD-10-PCS | Mod: CRNA,,, | Performed by: NURSE ANESTHETIST, CERTIFIED REGISTERED

## 2023-02-15 PROCEDURE — 36000706: Performed by: STUDENT IN AN ORGANIZED HEALTH CARE EDUCATION/TRAINING PROGRAM

## 2023-02-15 PROCEDURE — 37000008 HC ANESTHESIA 1ST 15 MINUTES: Performed by: STUDENT IN AN ORGANIZED HEALTH CARE EDUCATION/TRAINING PROGRAM

## 2023-02-15 PROCEDURE — 63600175 PHARM REV CODE 636 W HCPCS: Performed by: STUDENT IN AN ORGANIZED HEALTH CARE EDUCATION/TRAINING PROGRAM

## 2023-02-15 PROCEDURE — D9220A PRA ANESTHESIA: ICD-10-PCS | Mod: ANES,,, | Performed by: ANESTHESIOLOGY

## 2023-02-15 PROCEDURE — 25000003 PHARM REV CODE 250: Performed by: STUDENT IN AN ORGANIZED HEALTH CARE EDUCATION/TRAINING PROGRAM

## 2023-02-15 PROCEDURE — 37000009 HC ANESTHESIA EA ADD 15 MINS: Performed by: STUDENT IN AN ORGANIZED HEALTH CARE EDUCATION/TRAINING PROGRAM

## 2023-02-15 PROCEDURE — 88305 TISSUE EXAM BY PATHOLOGIST: ICD-10-PCS | Mod: 26,,, | Performed by: PATHOLOGY

## 2023-02-15 RX ORDER — PHENYLEPHRINE HYDROCHLORIDE 10 MG/ML
INJECTION INTRAVENOUS
Status: DISCONTINUED | OUTPATIENT
Start: 2023-02-15 | End: 2023-02-15

## 2023-02-15 RX ORDER — LIDOCAINE HYDROCHLORIDE 10 MG/ML
1 INJECTION, SOLUTION EPIDURAL; INFILTRATION; INTRACAUDAL; PERINEURAL ONCE
Status: DISCONTINUED | OUTPATIENT
Start: 2023-02-15 | End: 2023-02-15 | Stop reason: HOSPADM

## 2023-02-15 RX ORDER — CEFAZOLIN SODIUM 2 G/50ML
2 SOLUTION INTRAVENOUS
Status: COMPLETED | OUTPATIENT
Start: 2023-02-15 | End: 2023-02-15

## 2023-02-15 RX ORDER — DEXMEDETOMIDINE HYDROCHLORIDE 100 UG/ML
INJECTION, SOLUTION INTRAVENOUS
Status: DISCONTINUED | OUTPATIENT
Start: 2023-02-15 | End: 2023-02-15

## 2023-02-15 RX ORDER — SODIUM CHLORIDE 0.9 % (FLUSH) 0.9 %
10 SYRINGE (ML) INJECTION
Status: DISCONTINUED | OUTPATIENT
Start: 2023-02-15 | End: 2023-02-15 | Stop reason: HOSPADM

## 2023-02-15 RX ORDER — HYDROMORPHONE HYDROCHLORIDE 2 MG/ML
0.2 INJECTION, SOLUTION INTRAMUSCULAR; INTRAVENOUS; SUBCUTANEOUS EVERY 5 MIN PRN
Status: DISCONTINUED | OUTPATIENT
Start: 2023-02-15 | End: 2023-02-15 | Stop reason: HOSPADM

## 2023-02-15 RX ORDER — SODIUM CHLORIDE 9 MG/ML
INJECTION, SOLUTION INTRAVENOUS CONTINUOUS
Status: DISCONTINUED | OUTPATIENT
Start: 2023-02-15 | End: 2023-02-15 | Stop reason: HOSPADM

## 2023-02-15 RX ORDER — ONDANSETRON 2 MG/ML
INJECTION INTRAMUSCULAR; INTRAVENOUS
Status: DISCONTINUED | OUTPATIENT
Start: 2023-02-15 | End: 2023-02-15

## 2023-02-15 RX ORDER — ACETAMINOPHEN 10 MG/ML
INJECTION, SOLUTION INTRAVENOUS
Status: DISCONTINUED | OUTPATIENT
Start: 2023-02-15 | End: 2023-02-15

## 2023-02-15 RX ORDER — OXYCODONE AND ACETAMINOPHEN 5; 325 MG/1; MG/1
1 TABLET ORAL ONCE AS NEEDED
Status: COMPLETED | OUTPATIENT
Start: 2023-02-15 | End: 2023-02-15

## 2023-02-15 RX ORDER — NEOSTIGMINE METHYLSULFATE 1 MG/ML
INJECTION, SOLUTION INTRAVENOUS
Status: DISCONTINUED | OUTPATIENT
Start: 2023-02-15 | End: 2023-02-15

## 2023-02-15 RX ORDER — SODIUM CHLORIDE, SODIUM LACTATE, POTASSIUM CHLORIDE, CALCIUM CHLORIDE 600; 310; 30; 20 MG/100ML; MG/100ML; MG/100ML; MG/100ML
INJECTION, SOLUTION INTRAVENOUS CONTINUOUS
Status: DISCONTINUED | OUTPATIENT
Start: 2023-02-15 | End: 2023-02-15 | Stop reason: HOSPADM

## 2023-02-15 RX ORDER — PROPOFOL 10 MG/ML
VIAL (ML) INTRAVENOUS
Status: DISCONTINUED | OUTPATIENT
Start: 2023-02-15 | End: 2023-02-15

## 2023-02-15 RX ORDER — FENTANYL CITRATE 50 UG/ML
INJECTION, SOLUTION INTRAMUSCULAR; INTRAVENOUS
Status: DISCONTINUED | OUTPATIENT
Start: 2023-02-15 | End: 2023-02-15

## 2023-02-15 RX ORDER — EPHEDRINE SULFATE 50 MG/ML
INJECTION, SOLUTION INTRAVENOUS
Status: DISCONTINUED | OUTPATIENT
Start: 2023-02-15 | End: 2023-02-15

## 2023-02-15 RX ORDER — CEPHALEXIN 500 MG/1
500 CAPSULE ORAL EVERY 12 HOURS
Qty: 20 CAPSULE | Refills: 0 | Status: SHIPPED | OUTPATIENT
Start: 2023-02-15 | End: 2023-03-16 | Stop reason: ALTCHOICE

## 2023-02-15 RX ORDER — DEXAMETHASONE SODIUM PHOSPHATE 4 MG/ML
INJECTION, SOLUTION INTRA-ARTICULAR; INTRALESIONAL; INTRAMUSCULAR; INTRAVENOUS; SOFT TISSUE
Status: DISCONTINUED | OUTPATIENT
Start: 2023-02-15 | End: 2023-02-15

## 2023-02-15 RX ORDER — LIDOCAINE HCL/PF 100 MG/5ML
SYRINGE (ML) INTRAVENOUS
Status: DISCONTINUED | OUTPATIENT
Start: 2023-02-15 | End: 2023-02-15

## 2023-02-15 RX ORDER — ROCURONIUM BROMIDE 10 MG/ML
INJECTION, SOLUTION INTRAVENOUS
Status: DISCONTINUED | OUTPATIENT
Start: 2023-02-15 | End: 2023-02-15

## 2023-02-15 RX ORDER — BUPIVACAINE HYDROCHLORIDE 5 MG/ML
INJECTION, SOLUTION EPIDURAL; INTRACAUDAL
Status: DISCONTINUED | OUTPATIENT
Start: 2023-02-15 | End: 2023-02-15 | Stop reason: HOSPADM

## 2023-02-15 RX ORDER — OXYCODONE AND ACETAMINOPHEN 5; 325 MG/1; MG/1
1 TABLET ORAL EVERY 6 HOURS PRN
Qty: 15 TABLET | Refills: 0 | Status: SHIPPED | OUTPATIENT
Start: 2023-02-15 | End: 2023-04-10 | Stop reason: ALTCHOICE

## 2023-02-15 RX ADMIN — PROPOFOL 150 MG: 10 INJECTION, EMULSION INTRAVENOUS at 07:02

## 2023-02-15 RX ADMIN — FENTANYL CITRATE 25 MCG: 50 INJECTION, SOLUTION INTRAMUSCULAR; INTRAVENOUS at 07:02

## 2023-02-15 RX ADMIN — GLYCOPYRROLATE 0.6 MG: 0.2 INJECTION, SOLUTION INTRAMUSCULAR; INTRAVITREAL at 08:02

## 2023-02-15 RX ADMIN — PHENYLEPHRINE HYDROCHLORIDE 100 MCG: 10 INJECTION INTRAVENOUS at 07:02

## 2023-02-15 RX ADMIN — EPHEDRINE SULFATE 10 MG: 50 INJECTION, SOLUTION INTRAMUSCULAR; INTRAVENOUS; SUBCUTANEOUS at 07:02

## 2023-02-15 RX ADMIN — HYDROMORPHONE HYDROCHLORIDE 0.2 MG: 2 INJECTION, SOLUTION INTRAMUSCULAR; INTRAVENOUS; SUBCUTANEOUS at 09:02

## 2023-02-15 RX ADMIN — EPHEDRINE SULFATE 10 MG: 50 INJECTION, SOLUTION INTRAMUSCULAR; INTRAVENOUS; SUBCUTANEOUS at 08:02

## 2023-02-15 RX ADMIN — ACETAMINOPHEN 1000 MG: 10 INJECTION, SOLUTION INTRAVENOUS at 07:02

## 2023-02-15 RX ADMIN — ROCURONIUM BROMIDE 50 MG: 10 INJECTION, SOLUTION INTRAVENOUS at 07:02

## 2023-02-15 RX ADMIN — NEOSTIGMINE METHYLSULFATE 5 MG: 1 INJECTION INTRAVENOUS at 08:02

## 2023-02-15 RX ADMIN — FENTANYL CITRATE 100 MCG: 50 INJECTION, SOLUTION INTRAMUSCULAR; INTRAVENOUS at 07:02

## 2023-02-15 RX ADMIN — SUGAMMADEX 200 MG: 100 INJECTION, SOLUTION INTRAVENOUS at 08:02

## 2023-02-15 RX ADMIN — LIDOCAINE HYDROCHLORIDE 100 MG: 20 INJECTION, SOLUTION INTRAVENOUS at 07:02

## 2023-02-15 RX ADMIN — DEXAMETHASONE SODIUM PHOSPHATE 8 MG: 4 INJECTION, SOLUTION INTRA-ARTICULAR; INTRALESIONAL; INTRAMUSCULAR; INTRAVENOUS; SOFT TISSUE at 07:02

## 2023-02-15 RX ADMIN — ONDANSETRON 8 MG: 2 INJECTION, SOLUTION INTRAMUSCULAR; INTRAVENOUS at 07:02

## 2023-02-15 RX ADMIN — CEFAZOLIN SODIUM 2 G: 2 SOLUTION INTRAVENOUS at 07:02

## 2023-02-15 RX ADMIN — OXYCODONE HYDROCHLORIDE AND ACETAMINOPHEN 1 TABLET: 5; 325 TABLET ORAL at 09:02

## 2023-02-15 RX ADMIN — SODIUM CHLORIDE, SODIUM LACTATE, POTASSIUM CHLORIDE, AND CALCIUM CHLORIDE: .6; .31; .03; .02 INJECTION, SOLUTION INTRAVENOUS at 06:02

## 2023-02-15 RX ADMIN — GLYCOPYRROLATE 0.2 MG: 0.2 INJECTION, SOLUTION INTRAMUSCULAR; INTRAVITREAL at 07:02

## 2023-02-15 RX ADMIN — DEXMEDETOMIDINE HYDROCHLORIDE 8 MCG: 100 INJECTION, SOLUTION, CONCENTRATE INTRAVENOUS at 07:02

## 2023-02-15 RX ADMIN — SODIUM CHLORIDE, SODIUM LACTATE, POTASSIUM CHLORIDE, AND CALCIUM CHLORIDE: .6; .31; .03; .02 INJECTION, SOLUTION INTRAVENOUS at 07:02

## 2023-02-15 NOTE — H&P
"Patient ID: Noah Lopez is a 77 y.o. male.     Chief Complaint:  F/U  This is a 78 y.o.  male patient that is an established patient of mine.  The patient was referred to me by Dr. De La Torre for urinary retention. Dr. De La Torre obtained routine bloodwork in 12/2021. His Cr was elevated from baseline. He then ordered a renal US 1/4/22 - marked distention of bladder, no hydronephrosis bilaterally, enlarged prostate vol 197g, and pt presented to ER per his recommendation on for a bedoya placement.   ER visit 1/4/22 - 3600 cc of urine from his Bedoya. Was not started on medications.      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 He feels that his "sphincter" has relaxed more.      Started him on flomax and finasteride 1 week ago. Pt wanted a voiding trial this week.      He failed a voiding trial. Worked up with cysto and UDS.  Cysto -Trilobar hypertrophy of the prostate, mildly enlarged median lobe, significantly enlarged lateral lobes  UDs with Dr. Maloney on 1/31/22 - see his report for full details -   Pdet at Max Flow: 0 cmH2O     EMG Storage: Normal Recruitment             EMG Voiding: Flaring with valsalva      Impression:           Sensation:Normal     Capacity: Large     Compliance:Normal     Detrusor Overactivity:Absent     Continence: No Incontinence     Contractility: Hypocontractility     Emptying:Unsatisfactory - Hypocontractility     Coordination:Dysfunctional (Valsalva) Voiding        He was having a bad reaction to Bactrim sent by Dr. De La Trore. Did not finish course.    Pt was not interested in a surgery. He felt that he "did this to myself." and with the results of the urodynamics with no detrusor pressure and valsalva voiding, an outlet reducing procedure would have a low chance of success. The patient states he is mostly interested in bedoya management and he does not want a surgery at this time.      3/24/22  Here today for " bedoya assistance. Pt went to ER and did not know how to disconnect bag     6/13/22  Pt notes cloudy urine and suprapubic discomfort. Also constipated. PSA was 17 5/25/22 and checked 2 days after a bedoya exchange. Patient notes he does not really drink many fluids from 9am-9pm.      7/12/22  I treated last UTI with ceftin x 10 days. I also recommended that he start augmentin the day before, the day of and the day after bedoya exchanges to guard against infections. He informed me that he did not do that.      8/25/22  Had patient try to call his daughter Lorena to include her in the conversation. She did not , pt stated likely is working. I told the patient I will type up a detailed note so she can follow along remotely.     He is s/p TRUS bx on 8/11/22; TRUS bx 60.4g. PSA 15.8 7/5/22. Full path report below.  Prostate biopsy demonstrated prostate cancer - Vikash 4+3, grade group 3 present in 10/18 cores. Prostate biopsy cores were seen in all cores taken from left side.     RELIAPATH DIAGNOSIS:   A.   PROSTATE, LEFT APEX, NEEDLE BIOPSY:          HISTOLOGIC TYPE:  Acinar adenocarcinoma.          HISTOLOGIC GRADE:          Grade Group 3 (Vikash Score 4+3=7)            Percentage of Pattern 4:  70%          CRIBRIFORM GLANDS:  Present.          TUMOR QUANTITATION:            Number of Cores Positive:  2            Total Number of Cores:  2            Measurement of Tumor:  19 mm.            Measurement of Core Tissue:  31 mm.            Percentage of Prostatic Tissue Involved by Tumor:  60%          LYMPHOVASCULAR INVASION:  Not identified.          PERINEURAL INVASION:  Present.          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    PROSTATE, LEFT MID, NEEDLE BIOPSY:          HISTOLOGIC TYPE:  Acinar adenocarcinoma.          HISTOLOGIC GRADE:          Grade Group 3 (Vikash Score 4+3=7)            Percentage of Pattern 4:  70%          CRIBRIFORM GLANDS:  Present.           TUMOR QUANTITATION:            Number of Cores Positive:  2            Total Number of Cores:  2            Measurement of Tumor:  16 mm.            Measurement of Core Tissue:  24 mm.            Percentage of Prostatic Tissue Involved by Tumor:  70%          LYMPHOVASCULAR INVASION:  Not identified.          PERINEURAL INVASION:  Present.  (C61.)   C.    PROSTATE, LEFT BASE, NEEDLE BIOPSY:          HISTOLOGIC TYPE:  Acinar adenocarcinoma.          HISTOLOGIC GRADE:          Grade Group 3 (Grace Score 4+3=7)            Percentage of Pattern 4:  90%          CRIBRIFORM GLANDS:  Present.          TUMOR QUANTITATION:            Number of Cores Positive:  4            Total Number of Cores:  4            Measurement of Tumor:  17 mm.            Measurement of Core Tissue:  27 mm.            Percentage of Prostatic Tissue Involved by Tumor:  80%          LYMPHOVASCULAR INVASION:  Not identified.          PERINEURAL INVASION:  Present.  (C61.)   D.   TISSUE1           ROSTATE, LEFT TRANSITION ZONE,  BIOPSY:          HISTOLOGIC TYPE:  Acinar adenocarcinoma.          HISTOLOGIC GRADE:          Grade Group 3 (Grace Score 4+3=7)            Percentage of Pattern 4:  70%          CRIBRIFORM GLANDS:  Present.          TUMOR QUANTITATION:            Number of Cores Positive:  2            Total Number of Cores:  2            Measurement of Tumor:  7 mm.            Measurement of Core Tissue:  18 mm.            Percentage of Prostatic Tissue Involved by Tumor:  40%          LYMPHOVASCULAR INVASION:  Not identified.          PERINEURAL INVASION:  Not identified.  (C61.)   E.    PROSTATE, RIGHT APEX, NEEDLE BIOPSY:          Benign prostatic tissue with atrophy.  (R97.20)   F.   PROSTATE, RIGHT MID, NEEDLE BIOPSY:          Benign prostatic tissue with chronic inflammation and atrophy, see   comment.  (R97.20)   G.   PROSTATE, RIGHT BASE, NEEDLE BIOPSY:          Benign prostatic tissue with chronic inflammation with  atrophy.   (R97.20)   H.   TISSUE2          PROSTATE, RIGHT TRANSITION ZONE, BIOPSY:          -Benign prostatic tissue with atrophy.  (R97.20)      10/17/22  In the interim, pt voiced that he would like to cancel the bone biopsy due to risks and adverse events and pain. He is in agreement with proceeding with prostate cancer treatment with oncologist as metastatic prostate cancer.   MRI 9/29/22 demonstrated vol of 63.35cc, pubic lesion concerning for metastatic disease, and left pirads 5 lesion with EPE involving left neurovascular bundles, seminal vesicles, and extension involving the left levator ani in close proximity with anterior rectal wall. MRI suggestive of cT4 disease,N0,M1b(pubic bone involvement).   Since he was still in urinary retention he elected to proceed with channel TURP to try to get rid of the catheter.      11/3/22  He is s/p TURP 10/26/22 - path demonstrated Vikash 4+5 prostate cancer, intraductal carcinoma identified, 50% of TURP chips. He is proceeding with ADT + radiation. Saw Dr. Carter with heme/onc on 10/27/22. He has an upcoming consultation with Dr. Devlin on 11/8/22.      11/10/22  Underwent voiding trial last week, pt able to void. Today when called to come in for his appt, he declined to come in with my MA, stated he was talking to another person in the lobby.      12/9/22  Here for PVR check, discuss bilateral simple orchiectomy for hormonal ablation purposes. Notes urine is cloudy, wanted to give us a sample for culture.   For prostate cancer, he was started on lupron and apalutamide by his oncologist.   Daughter anette planning on being there for Feb/March.       78 y.o. male with [place] 4+5 prostate cancer, Stage IVB (cT3, Nx, M1c) with history of urinary retention, hypocontractile bladder, Dimitri's.     Urinary retention - s/p channel TURP. PVR check today 0cc. Patient is doing very well with emptying.   For hormonal ablation, he would like to proceed with bilateral simple  orchiectomy. I have explained the indication and benefits of proceeding with a bilateral simple orchiectomy.  The risks of the procedure were discussed and included but were not limited to pain, infection, bleeding, scarring, loss of hormone (testosterone) resulting in libido and/or erectile dysfunction, decreased energy, loss of fertility, scrotal infection with possible abscess formation, no guarantee of cancer cure (if found), hematoma and the need for further procedures. It is also possible that the surgery may not rid his need of androgen depriving medications in the future.    The patient would like me to aim for a surgery date where Ms. Carrillo is here locally to help postoperatively.

## 2023-02-15 NOTE — ANESTHESIA PROCEDURE NOTES
Intubation    Date/Time: 2/15/2023 7:07 AM  Performed by: Leatha Gonzalez CRNA  Authorized by: Tavo Snow Jr., MD     Intubation:     Induction:  Intravenous    Intubated:  Postinduction    Mask Ventilation:  Easy with oral airway    Attempts:  1    Attempted By:  CRNA    Method of Intubation:  Video laryngoscopy    Blade:  Anderson 3    Laryngeal View Grade: Grade I - full view of cords      Difficult Airway Encountered?: No      Complications:  None    Airway Device:  Oral endotracheal tube    Airway Device Size:  7.5    Style/Cuff Inflation:  Cuffed    Inflation Amount (mL):  6    Tube secured:  23    Secured at:  The lips    Placement Verified By:  Capnometry    Complicating Factors:  Poor neck/head extension, anterior larynx and retrognathia    Findings Post-Intubation:  BS equal bilateral and atraumatic/condition of teeth unchanged

## 2023-02-15 NOTE — DISCHARGE INSTRUCTIONS
Remove 4x4 and kerlix roll in 2 days (2/17/23). Can apply neosporin to incision and cover the incision with 4x4 gauze.  No heavy lifting ideally for 2 weeks postop. Ok to ambulate, walk up/down steps, get in/out of cars.  You may use an ice pack to incision site as needed to aid in reducing swelling and pain management.  Call Dr. Luis's office for any questions or concerns regarding your surgery.    ANESTHESIA  -For the first 24 hours after surgery:  Do not drive, use heavy equipment, make important decisions, or drink alcohol  -It is normal to feel sleepy for several hours.  Rest until you are more awake.  -Have someone stay with you, if needed.  They can watch for problems and help keep you safe.  -Some possible post anesthesia side effects include: nausea and vomiting, sore throat and hoarseness, sleepiness, and dizziness.    PAIN  -If you have pain after surgery, pain medicine will help you feel better.  Take it as directed, before pain becomes severe.  Most pain relievers taken by mouth need at least 20-30 minutes to start working.  -Do not drive or drink alcohol while taking pain medicine.  -Pain medication can upset your stomach.  Taking them with a little food may help.  -Other ways to help control pain: elevation, ice, and relaxation  -Call your surgeon if still having unmanageable pain an hour after taking pain medicine.  -Pain medicine can cause constipation.  Taking an over-the counter stool softener while on prescription pain medicine and drinking plenty of fluids can prevent this side effect.  -Call your surgeon if you have severe side effects like: breathing problems, trouble waking up, dizziness, confusion, or severe constipation.    NAUSEA  -Some people have nausea after surgery.  This is often because of anesthesia, pain, pain medicine, or the stress of surgery.  -Do not push yourself to eat.  Start off with clear liquids and soup.  Slowly move to solid foods.  Don't eat fatty, rich, spicy foods at  first.  Eat smaller amounts.  -If you develop persistent nausea and vomiting please notify your surgeon immediately.    BLEEDING  -Different types of surgery require different types of care and dressing changes.  It is important to follow all instructions and advice from your surgeon.  Change dressing as directed.  Call your surgeon for any concerns regarding postop bleeding.    SIGNS OF INFECTION  -Signs of infection include: fever, swelling, drainage, and redness  -Notify your surgeon if you have a fever of 100.4 F (38.0 C) or higher.  -Notify your surgeon if you notice redness, swelling, increased pain, pus, or a foul smell at the incision site.

## 2023-02-15 NOTE — DISCHARGE SUMMARY
[facility] ([facility])  Urology  Discharge Summary      Patient Name: Noah Lopez  MRN: [id number]  Admission Date: 2/15/2023  Hospital Length of Stay: 0 days  Discharge Date: 02/15/2023  Attending Physician: Helena Luis MD   Discharging Provider: Helena Luis MD  Primary Care Physician: Anthony De La Torre MD    HPI: The patient is a 78 y.o. male with past medical history (listed below) high risk prostate cancer.     The patient elected to proceed with the procedure(s) below. Please see H&P and/or clinic progress note(s) for full details.     Procedure(s) (LRB):  Bilateral simple orchiectomy (Bilateral)     Past Medical History:   Diagnosis Date    Colon polyp 09/06/2018    Hyperlipidemia     Hypertension     Prostate cancer     Urinary tract infection with hematuria 11/18/2022       Hospital Course (synopsis of major diagnoses, care, treatment, and services provided during the course of the hospital stay): pt tolerated procedure well, was transferred to pacu and discharged home in stable condition.       Consults:     Significant Diagnostic Studies:      Pending Diagnostic Studies:       Procedure Component Value Units Date/Time    Specimen to Pathology, Surgery Urology [[id number]] Collected: 02/15/23 0804    Order Status: Sent Lab Status: In process Updated: 02/15/23 0804    Specimen: Tissue             There are no hospital problems to display for this patient.      Discharged Condition: good    Disposition:     Follow Up:   Follow-up Information       Helena Luis MD Follow up.    Specialty: Urology  Why: my staff will call to schedule postop wound check in 3-4 weeks.  Contact information:  [address]  [address]  [facility] [address] [phone]                             Patient Instructions:      Diet Adult Regular     Other restrictions (specify):   Order Comments: No heavy lifting ideally for 2 weeks postop. Ok to ambulate, walk up/down steps, get in/out of cars.     Remove dressing in 48  hours   Order Comments: Remove 4x4 and kerlix roll in 2 days (2/17/23). Can apply neosporin to incision and cover the incision with 4x4 gauze; please provide 4x4 gauze upon discharge       Medications:  Reconciled Home Medications:      Medication List        START taking these medications      cephALEXin 500 MG capsule  Commonly known as: KEFLEX  Take 1 capsule (500 mg total) by mouth every 12 (twelve) hours. for 10 days     oxyCODONE-acetaminophen 5-325 mg per tablet  Commonly known as: PERCOCET  Take 1 tablet by mouth every 6 (six) hours as needed for Pain.            CONTINUE taking these medications      apalutamide 60 mg Tab  Commonly known as: ERLEADA  Take 240 mg by mouth once daily.     ascorbic acid (vitamin C) 1000 MG tablet  Commonly known as: VITAMIN C  Take 1,000 mg by mouth once daily.     BENADRYL ALLERGY ORAL  Take by mouth.     BENEFIBER CLEAR SF (DEXTRIN) ORAL  Take by mouth.     bicalutamide 50 MG Tab  Commonly known as: CASODEX  Take 1 tablet (50 mg total) by mouth once daily.     calcium carbonate 500 mg calcium (1,250 mg) tablet  Commonly known as: OS-TYREE  Take 2 tablets (1,000 mg total) by mouth once daily.     cholecalciferol (vitamin D3) 25 mcg (1,000 unit) capsule  Commonly known as: VITAMIN D3  Take 1 capsule (1,000 Units total) by mouth once daily.     clotrimazole-betamethasone 1-0.05% cream  Commonly known as: LOTRISONE  Apply topically 2 (two) times daily.     diltiaZEM 300 MG 24 hr capsule  Commonly known as: CARDIZEM CD  TAKE 1 CAPSULE BY MOUTH  ONCE DAILY     docusate sodium 250 MG capsule  Commonly known as: COLACE  Take 250 mg by mouth once daily.     finasteride 5 mg tablet  Commonly known as: PROSCAR  Take 1 tablet (5 mg total) by mouth once daily.     fish oil-omega-3 fatty acids 300-1,000 mg capsule  Take 1 capsule by mouth once daily.     GAS RELIEF 80 (SIMETHICONE) ORAL  Take by mouth.     lisinopriL 20 MG tablet  Commonly known as: PRINIVIL,ZESTRIL  TAKE 1 TABLET BY MOUTH  ONCE DAILY     melatonin 10 mg Tbdl  Take by mouth.     MEN 50 PLUS MULTIVITAMIN 300-600-300 mcg Tab  Generic drug: multivit-min-FA-lycopen-lutein  as directed Orally     methocarbamoL 750 MG Tab  Commonly known as: ROBAXIN  Take 750-1,500 mg by mouth 3 (three) times daily as needed.     miconazole 2 % cream  Commonly known as: MICOTIN  Apply topically 2 (two) times daily. Apply to tip of penis prn irritation     multivitamin capsule  Take 1 capsule by mouth once daily.     nitrofurantoin (macrocrystal-monohydrate) 100 MG capsule  Commonly known as: MACROBID  Take 100 mg by mouth every 12 (twelve) hours.     ofloxacin 0.3 % otic solution  Commonly known as: FLOXIN  Apply 10 drops into right ear once a day for 10 days.  Please dispense generic.     omega 3-dha-epa-fish oil 1,000 mg (120 mg-180 mg) Cap  1 capsule.     simvastatin 20 MG tablet  Commonly known as: ZOCOR  TAKE 1 TABLET BY MOUTH IN  THE EVENING     tamsulosin 0.4 mg Cap  Commonly known as: FLOMAX  Take 1 capsule by mouth once daily     traZODone 100 MG tablet  Commonly known as: DESYREL  TAKE 2 TABLETS BY MOUTH EVERY DAY AT BEDTIME AS NEEDED FOR INSOMNIA     triamterene-hydrochlorothiazide 37.5-25 mg 37.5-25 mg per capsule  Commonly known as: DYAZIDE  TAKE 1 CAPSULE BY MOUTH IN  THE MORNING     TURMERIC ORAL  Take by mouth.     zinc gluconate 50 mg tablet  Take 50 mg by mouth once daily.              Time spent on the discharge of patient: 15 minutes    Helena Luis MD  Urology  [place] [facility] ([facility])

## 2023-02-15 NOTE — ANESTHESIA POSTPROCEDURE EVALUATION
Anesthesia Post Evaluation    Patient: Noah Lopez    Procedure(s) Performed: Procedure(s) (LRB):  Bilateral simple orchiectomy (Bilateral)    Final Anesthesia Type: general      Patient location during evaluation: PACU  Patient participation: Yes- Able to Participate  Level of consciousness: awake and alert and oriented  Post-procedure vital signs: reviewed and stable  Pain management: adequate  Airway patency: patent    PONV status at discharge: No PONV  Anesthetic complications: no      Cardiovascular status: blood pressure returned to baseline, hemodynamically stable and stable  Respiratory status: unassisted, room air and spontaneous ventilation  Hydration status: euvolemic  Follow-up not needed.          Vitals Value Taken Time   /67 02/15/23 0925   Temp 36.5 °C (97.7 °F) 02/15/23 0925   Pulse 73 02/15/23 0925   Resp 16 02/15/23 0925   SpO2 96 % 02/15/23 0925         Event Time   Out of Recovery 09:15:46         Pain/Barbara Score: Pain Rating Prior to Med Admin: 7 (2/15/2023  9:20 AM)  Barbara Score: 10 (2/15/2023  9:20 AM)

## 2023-02-15 NOTE — TRANSFER OF CARE
"Anesthesia Transfer of Care Note    Patient: Noah Lopez    Procedure(s) Performed: Procedure(s) (LRB):  Bilateral simple orchiectomy (Bilateral)    Patient location: PACU    Anesthesia Type: general    Transport from OR: Transported from OR on 6-10 L/min O2 by face mask with adequate spontaneous ventilation    Post pain: adequate analgesia    Post assessment: no apparent anesthetic complications    Post vital signs: stable    Level of consciousness: awake and alert    Nausea/Vomiting: no nausea/vomiting    Complications: none    Transfer of care protocol was followed      Last vitals:   Visit Vitals  /64 (BP Location: Left arm, Patient Position: Lying)   Pulse 65   Temp 36.6 °C (97.9 °F) (Temporal)   Resp 17   Ht 6' 1" (1.854 m)   Wt 104.3 kg (230 lb)   SpO2 96%   BMI 30.34 kg/m²     "

## 2023-02-15 NOTE — OP NOTE
OPERATIVE DICTATION  DATE OF OPERATION: 02/15/2023    SERVICE: Urology    SURGEONS:  1. 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 No abnormal gross findings encountered.     SPECIMEN(S):   Right testicle, epididymis, spermatic cord  Left testicle, epididymis, spermatic cord    CONDITION: stable    INDICATIONS FOR THE PROCEDURE:  See H&P.    PROCEDURE IN DETAIL:  After appropriate informed consent was obtained, the patient was taken to the operating room and placed in the supine position. After induction of General, he was placed in the supine position. he was prepped and draped in the usual sterile fashion.     Thereafter a WHO timeout was performed and the procedure was initiated.    The procedure began by marking out the median raphe of the scrotum with a marking pen. Numbing medication with lidocaine and marcaine mix plain was administered to the skin and subcutaneous layers. A 15 blade scalpel was used to dissect through the dartos and towards the right hemiscrotum first.    Once the layers of the scrotum were divided the testicle and epididymis were delivered through the incision. Gross inspection revealed no abnormalities seen in the testis, epididymis nor spermatic cord. Afterwards the right spermatic cord was identified and 2  kellee clamps were used to clamp the cord. Afterwards 2 smaller hemostats were used to divide the pedicle into 2 packets. The bovie was used to divide the spermatic cord between the clamps. The specimen was passed off as right testicle, epididymis and spermatic cord. The 2 smaller packets were suture ligated with 2-0 silk sutures. The larger spermatic cord was suture ligated with 2-0 silk suture. Afterwards the bovie was used to address all areas of bleeding. The right hemiscrotum was irrigated with sterile saline.       Attention was turned towards the left spermatic cord. Gross inspection revealed no abnormalities seen in the testis, epididymis nor spermatic cord. The left spermatic cord was identified and 2 kellee clamps were used to clamp the cord. Afterwards 2 smaller hemostats were used to divide the pedicle into 2 packets. The bovie was used to divide the spermatic cord between the clamps. The specimen was passed off as left testicle, epididymis and spermatic cord. The 2 smaller packets were suture ligated with 2-0 silk sutures. The larger spermatic cord was suture ligated with 2-0 silk suture. Afterwards the bovie was used to address all areas of bleeding. The left hemiscrotum was irrigated with sterile saline.     A 2-0 vicryl in a running fashion locking every third stitch was used to close the dartos. A 4-0 monocryl suture in a running fashion was used to close the skin. The incision was dressed with bacitracin, 4x4 gauze, and kerlix as well as mesh underwear.    This concluded the procedure.  All surgical needle, sponge, ray-lewis, and lap counts were correct.     ATTENDING ATTESTATION  I was present and scrubbed for the entire duration  of the procedure.      CASE DURATION:  * Missing case tracking time(s) *    DISPOSITION:   The patient tolerated the procedure well. he was extubated, and taken to post-anesthesia care unit in satisfactory condition.  He will f/u in 3-4 weeks for a postop wound  check.    Helena Luis MD

## 2023-02-15 NOTE — PLAN OF CARE
OOB to bathroom with minimal assistance required.  Able to void moderate amt of clear yellow urine without difficulty.  States readiness to be discharged.

## 2023-02-16 ENCOUNTER — TELEPHONE (OUTPATIENT)
Dept: UROLOGY | Facility: CLINIC | Age: 78
End: 2023-02-16
Payer: MEDICARE

## 2023-02-16 NOTE — TELEPHONE ENCOUNTER
Spoke to patient, he states he has been up moving around, he is eating and drinking with no problem and his pain is manageable. We scheduled his post op appointment for 3/20/23. I encouraged him to continue to be up and moving several times throughout the day. He verbalized understanding of everything we discussed.    n/a

## 2023-02-20 DIAGNOSIS — C61 PROSTATE CANCER: ICD-10-CM

## 2023-02-20 LAB
FINAL PATHOLOGIC DIAGNOSIS: NORMAL
GROSS: NORMAL
Lab: NORMAL

## 2023-02-20 RX ORDER — BICALUTAMIDE 50 MG/1
50 TABLET, FILM COATED ORAL DAILY
Qty: 90 TABLET | Refills: 0 | Status: SHIPPED | OUTPATIENT
Start: 2023-02-20 | End: 2023-02-22

## 2023-02-20 NOTE — TELEPHONE ENCOUNTER
----- Message from Constantin Mendez sent at 2/20/2023 10:10 AM CST -----  Type:  RX Refill Request    Who Called: pt  Refill or New Rx:refill  RX Name and Strength:bicalutamide (CASODEX) 50 MG Tab  How is the patient currently taking it? (ex. 1XDay):Take 1 tablet (50 mg total) by mouth once daily.   Is this a 30 day or 90 day RX:90  Preferred Pharmacy with phone number:[address]   Phone:  [phone]  Fax:  [phone]  local  Local or Mail Order:  Ordering Provider:Darren Carter  Would the patient rather a call back or a response via [facility]?   Best Call Back Number:  Additional Information:

## 2023-02-22 ENCOUNTER — TELEPHONE (OUTPATIENT)
Dept: HEMATOLOGY/ONCOLOGY | Facility: CLINIC | Age: 78
End: 2023-02-22
Payer: MEDICARE

## 2023-02-22 DIAGNOSIS — C61 PROSTATE CANCER: ICD-10-CM

## 2023-02-22 RX ORDER — BICALUTAMIDE 50 MG/1
50 TABLET, FILM COATED ORAL DAILY
Qty: 90 TABLET | Refills: 0 | Status: SHIPPED | OUTPATIENT
Start: 2023-02-22 | End: 2023-11-07 | Stop reason: ALTCHOICE

## 2023-02-22 NOTE — TELEPHONE ENCOUNTER
----- Message from Any Morrissey sent at 2/22/2023  1:06 PM CST -----  .Type:  RX Refill Request    Who Called: pt  Refill or New Rx:refill   RX Name and Strength:bicalutamide (CASODEX) 50 MG Tab  How is the patient currently taking it? (ex. 1XDay):  Is this a 30 day or 90 day RX:  Preferred Pharmacy with phone number:[address]   Phone:  [phone]  Fax:  [phone]  Local or Mail Order:local   Ordering Provider:Raul   Would the patient rather a call back or a response via [facility]? Call   Best Call Back Number:[phone]  Additional Information:

## 2023-02-22 NOTE — TELEPHONE ENCOUNTER
Spoke to patient. Told him the prescription was sent in on Monday. Looks like it was sent in to [facility]. He wants it sent in to [place]. I told him I would ask that it be rerouted..

## 2023-02-23 ENCOUNTER — HOSPITAL ENCOUNTER (OUTPATIENT)
Dept: RADIOLOGY | Facility: HOSPITAL | Age: 78
Discharge: HOME | End: 2023-02-23
Attending: HOSPITALIST
Payer: MEDICARE

## 2023-02-23 DIAGNOSIS — Z79.818 ANDROGEN DEPRIVATION THERAPY: ICD-10-CM

## 2023-02-23 DIAGNOSIS — C61 PROSTATE CANCER: ICD-10-CM

## 2023-02-23 PROCEDURE — 77085 DEXA BONE DENSITY WITH VERTEBRAL FRACTURE ASSESSMENT: ICD-10-PCS | Mod: 26,,, | Performed by: RADIOLOGY

## 2023-02-23 PROCEDURE — 77085 DXA BONE DENSITY AXL VRT FX: CPT | Mod: 26,,, | Performed by: RADIOLOGY

## 2023-02-23 PROCEDURE — 77085 DXA BONE DENSITY AXL VRT FX: CPT | Mod: TC

## 2023-02-24 NOTE — TELEPHONE ENCOUNTER
Patient is approved for [employer] Patient Assistance Program as of 2/24/23 through 12/31/23 and will receive Erleada free of charge through the programs dispensing pharmacy, [facility]. Patients prescription is being triaged to the pharmacy and will ship automatically once processed.       FOR DOCUMENTATION ONLY:  Financial Assistance for Erleada is approved from 2/24/23 to 12/31/23  Source: [employer] Patient Assistance Program  Phone: [phone]  Fax: [phone]   Pharmacy: [facility]

## 2023-02-28 ENCOUNTER — TELEPHONE (OUTPATIENT)
Dept: FAMILY MEDICINE | Facility: CLINIC | Age: 78
End: 2023-02-28
Payer: MEDICARE

## 2023-03-16 ENCOUNTER — OFFICE VISIT (OUTPATIENT)
Dept: HEMATOLOGY/ONCOLOGY | Facility: CLINIC | Age: 78
End: 2023-03-16
Payer: MEDICARE

## 2023-03-16 ENCOUNTER — LAB VISIT (OUTPATIENT)
Dept: LAB | Facility: HOSPITAL | Age: 78
End: 2023-03-16
Attending: HOSPITALIST
Payer: MEDICARE

## 2023-03-16 VITALS
HEART RATE: 70 BPM | RESPIRATION RATE: 18 BRPM | HEIGHT: 73 IN | WEIGHT: 233.25 LBS | SYSTOLIC BLOOD PRESSURE: 128 MMHG | TEMPERATURE: 98 F | OXYGEN SATURATION: 96 % | DIASTOLIC BLOOD PRESSURE: 59 MMHG | BODY MASS INDEX: 30.91 KG/M2

## 2023-03-16 DIAGNOSIS — C61 PROSTATE CANCER: Primary | ICD-10-CM

## 2023-03-16 DIAGNOSIS — E04.1 THYROID NODULE: ICD-10-CM

## 2023-03-16 DIAGNOSIS — I10 HYPERTENSION, UNSPECIFIED TYPE: ICD-10-CM

## 2023-03-16 DIAGNOSIS — E04.1 THYROID NODULE: Primary | ICD-10-CM

## 2023-03-16 DIAGNOSIS — R05.2 SUBACUTE COUGH: ICD-10-CM

## 2023-03-16 DIAGNOSIS — C79.51 BONE METASTASES: ICD-10-CM

## 2023-03-16 DIAGNOSIS — C61 PROSTATE CANCER: ICD-10-CM

## 2023-03-16 DIAGNOSIS — C78.00 MALIGNANT NEOPLASM METASTATIC TO LUNG, UNSPECIFIED LATERALITY: ICD-10-CM

## 2023-03-16 PROBLEM — N39.0 URINARY TRACT INFECTION WITH HEMATURIA: Status: RESOLVED | Noted: 2022-11-18 | Resolved: 2023-03-16

## 2023-03-16 PROBLEM — R31.9 URINARY TRACT INFECTION WITH HEMATURIA: Status: RESOLVED | Noted: 2022-11-18 | Resolved: 2023-03-16

## 2023-03-16 LAB
ALBUMIN SERPL BCP-MCNC: 4 G/DL (ref 3.5–5.2)
ALP SERPL-CCNC: 113 U/L (ref 55–135)
ALT SERPL W/O P-5'-P-CCNC: 12 U/L (ref 10–44)
ANION GAP SERPL CALC-SCNC: 9 MMOL/L (ref 8–16)
AST SERPL-CCNC: 17 U/L (ref 10–40)
BILIRUB SERPL-MCNC: 0.4 MG/DL (ref 0.1–1)
BUN SERPL-MCNC: 16 MG/DL (ref 8–23)
CALCIUM SERPL-MCNC: 10.4 MG/DL (ref 8.7–10.5)
CHLORIDE SERPL-SCNC: 103 MMOL/L (ref 95–110)
CO2 SERPL-SCNC: 31 MMOL/L (ref 23–29)
COMPLEXED PSA SERPL-MCNC: 1.7 NG/ML (ref 0–4)
CREAT SERPL-MCNC: 1.4 MG/DL (ref 0.5–1.4)
ERYTHROCYTE [DISTWIDTH] IN BLOOD BY AUTOMATED COUNT: 13.2 % (ref 11.5–14.5)
EST. GFR  (NO RACE VARIABLE): 51.8 ML/MIN/1.73 M^2
GLUCOSE SERPL-MCNC: 106 MG/DL (ref 70–110)
HCT VFR BLD AUTO: 46.2 % (ref 40–54)
HGB BLD-MCNC: 15.3 G/DL (ref 14–18)
IMM GRANULOCYTES # BLD AUTO: 0.02 K/UL (ref 0–0.04)
MCH RBC QN AUTO: 31 PG (ref 27–31)
MCHC RBC AUTO-ENTMCNC: 33.1 G/DL (ref 32–36)
MCV RBC AUTO: 94 FL (ref 82–98)
NEUTROPHILS # BLD AUTO: 3.3 K/UL (ref 1.8–7.7)
PLATELET # BLD AUTO: 242 K/UL (ref 150–450)
PMV BLD AUTO: 9.1 FL (ref 9.2–12.9)
POTASSIUM SERPL-SCNC: 4.2 MMOL/L (ref 3.5–5.1)
PROT SERPL-MCNC: 7.9 G/DL (ref 6–8.4)
RBC # BLD AUTO: 4.94 M/UL (ref 4.6–6.2)
SODIUM SERPL-SCNC: 143 MMOL/L (ref 136–145)
WBC # BLD AUTO: 5.29 K/UL (ref 3.9–12.7)

## 2023-03-16 PROCEDURE — 36415 COLL VENOUS BLD VENIPUNCTURE: CPT | Performed by: HOSPITALIST

## 2023-03-16 PROCEDURE — 99999 PR PBB SHADOW E&M-EST. PATIENT-LVL IV: CPT | Mod: PBBFAC,,, | Performed by: HOSPITALIST

## 2023-03-16 PROCEDURE — 85027 COMPLETE CBC AUTOMATED: CPT | Performed by: HOSPITALIST

## 2023-03-16 PROCEDURE — 99214 PR OFFICE/OUTPT VISIT, EST, LEVL IV, 30-39 MIN: ICD-10-PCS | Mod: S$GLB,,, | Performed by: HOSPITALIST

## 2023-03-16 PROCEDURE — 3074F SYST BP LT 130 MM HG: CPT | Mod: CPTII,S$GLB,, | Performed by: HOSPITALIST

## 2023-03-16 PROCEDURE — 3074F PR MOST RECENT SYSTOLIC BLOOD PRESSURE < 130 MM HG: ICD-10-PCS | Mod: CPTII,S$GLB,, | Performed by: HOSPITALIST

## 2023-03-16 PROCEDURE — 99999 PR PBB SHADOW E&M-EST. PATIENT-LVL IV: ICD-10-PCS | Mod: PBBFAC,,, | Performed by: HOSPITALIST

## 2023-03-16 PROCEDURE — 1126F AMNT PAIN NOTED NONE PRSNT: CPT | Mod: CPTII,S$GLB,, | Performed by: HOSPITALIST

## 2023-03-16 PROCEDURE — 84153 ASSAY OF PSA TOTAL: CPT | Performed by: HOSPITALIST

## 2023-03-16 PROCEDURE — 3288F PR FALLS RISK ASSESSMENT DOCUMENTED: ICD-10-PCS | Mod: CPTII,S$GLB,, | Performed by: HOSPITALIST

## 2023-03-16 PROCEDURE — 3078F PR MOST RECENT DIASTOLIC BLOOD PRESSURE < 80 MM HG: ICD-10-PCS | Mod: CPTII,S$GLB,, | Performed by: HOSPITALIST

## 2023-03-16 PROCEDURE — 80053 COMPREHEN METABOLIC PANEL: CPT | Performed by: HOSPITALIST

## 2023-03-16 PROCEDURE — 1126F PR PAIN SEVERITY QUANTIFIED, NO PAIN PRESENT: ICD-10-PCS | Mod: CPTII,S$GLB,, | Performed by: HOSPITALIST

## 2023-03-16 PROCEDURE — 3288F FALL RISK ASSESSMENT DOCD: CPT | Mod: CPTII,S$GLB,, | Performed by: HOSPITALIST

## 2023-03-16 PROCEDURE — 99214 OFFICE O/P EST MOD 30 MIN: CPT | Mod: S$GLB,,, | Performed by: HOSPITALIST

## 2023-03-16 PROCEDURE — 1101F PR PT FALLS ASSESS DOC 0-1 FALLS W/OUT INJ PAST YR: ICD-10-PCS | Mod: CPTII,S$GLB,, | Performed by: HOSPITALIST

## 2023-03-16 PROCEDURE — 3078F DIAST BP <80 MM HG: CPT | Mod: CPTII,S$GLB,, | Performed by: HOSPITALIST

## 2023-03-16 PROCEDURE — 1101F PT FALLS ASSESS-DOCD LE1/YR: CPT | Mod: CPTII,S$GLB,, | Performed by: HOSPITALIST

## 2023-03-16 NOTE — ASSESSMENT & PLAN NOTE
Continues to await copay assistance program. PSA is downtrending.    - Still awaiting apalutamide  - SP orchiectomy 2/15/23  - Con't Ca/D  - DEXA normal 2/23; repeat 2/25  - Awaiting genetics followup

## 2023-03-16 NOTE — PROGRESS NOTES
MEDICAL ONCOLOGY FOLLOW-UP VISIT.     Best Contact Phone Number(s): [phone] (home)      Cancer/Stage/TNM:    Cancer Staging   Prostate cancer  Staging form: Prostate, AJCC 8th Edition  - Clinical: Stage IVB (cT3, cNX, cM1c, Grade Group: 5) - Signed by Darren Carter MD on 11/18/2022       Reason for visit:  Mr. 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 He started first line ADT on 11/18/22 with plan to add apalutamide. Recently underwent bilateral orchiectomy 2/15/23.     Interval History:     Recent orchiectomy on 2/15/23. Still has not received apalutamide. Continues on bicaluatmide.    Energy remains low. Cough has resolved. No fevers or chills. No signficant hot flashes. Weight is slightly up in setting of dietary indiscretion. No signficant pain. Does have discomfort with urination he attributes to possible stricture. Urine is clear.  Planning on seeing his urologist on Monday.     Oncology History   Prostate cancer   12/8/2021 Tumor Markers    PSA 11.7     1/2022 Notable Event    ED visit for urinary retention found on abodminal US. Jones placed.     3/2022 Notable Event    Seen in ED for fall. Incidentally found to have suspicious thyroid nodule     4/20/2022 Biopsy    FNA of thyroid nodule nondiagnostic. Also found to have new SHANNAN lung nodules.     5/16/2022 Imaging Significant Findings    FDG PET-CT in setting of pulmonary nodules:  1.  Two solid left upper lobe pulmonary nodules with no appreciable FDG uptake.  Malignancy is not excluded, and the larger nodule is likely amenable to percutaneous biopsy. Alternatively, continued surveillance by dedicated chest CT alone, i.e., without FDG PET, may be considered.     2.  Focal FDG uptake in the left prostate gland with extracapsular extension.  These findings are concerning for malignancy.  Correlation with PSA and urology  evaluation recommended.  MRI of the prostate may also be helpful for further evaluation.     3.  Abnormal appearance of the left symphysis pubis with hypermetabolic activity.  Differential considerations include fibrous dysplasia, Paget's disease, and metastasis.   MRI or bone algorithm CT may be informative.     4.  Incidental focal uptake in the right cerebellum on at least 2 PET slices.  Recommend brain MRI for further evaluation.     5.  2.2 cm left inferior thyroid lobe nodule.  This nodule was recently biopsied with abnormal results.  Please refer to pathology report.     5/25/2022 Tumor Markers    PSA 17.3     7/6/2022 Biopsy    Attempted SHANNAN nodule biopsy     8/11/2022 Biopsy    TRUS biopsy: Prostate adenocarcinoma up to 4+3 disease in 10/18 cores all from the left side. Associated cribriform glands and PNI.       8/31/2022 Imaging Significant Findings    Bone scan:  1. Uptake within the anterior right 5th and 6th ribs is most likely related to degenerative changes.  Correlation with plain film or chest CT scan recommended.     2.  Intense uptake within the medial left pubic bone corresponds to an area of sclerosis seen on the comparison PET-CT scan.  Differential considerations remain to be fibrous dysplasia, Paget's disease or metastasis.     3.  Negative for metastatic pattern of uptake otherwise.  Degenerative pattern of uptake and other incidental findings as noted above.     9/29/2022 Imaging Significant Findings    MRI prostate: Left-sided PI-RADS 5 lesion with extraprostatic extension involving the left-sided neurovascular bundles and seminal vesicles.  Extension of abnormal signal adjacent to the left levator ani, cannot rule out involvement.  Abnormal signal also closely approximates with the anterior rectal wall without definite involvement.     Left pubic lesion concerning for metastasis.     10/17/2022 Notable Event    Declined pubic bone biopsy to potentially confirm metastatic prostate  "cancer     10/26/2022 Procedure    TURPT - pathology review with up to 4+5 disease     11/9/2022 Imaging Significant Findings    PSMA PET-CT shows evidence of osseous and pulmonary metastatic disease     11/18/2022 -  Hormone Therapy    Start Lupron 22.5mg IM; plan to start apalutamide            Physical Exam:   BP (!) 128/59 (BP Location: Left arm, Patient Position: Sitting, BP Method: Medium (Automatic))   Pulse 70   Temp 98 °F (36.7 °C) (Oral)   Resp 18   Ht 6' 1" (1.854 m)   Wt 105.8 kg (233 lb 4 oz)   SpO2 96%   BMI 30.77 kg/m²      ECOG Performance Status: (foot note - ECOG PS provided by [employer]cology [employer]) 0 - Asymptomatic    Physical Exam  Constitutional:       General: He is not in acute distress.     Appearance: Normal appearance.   HENT:      Head: Normocephalic.      Mouth/Throat:      Mouth: Mucous membranes are moist.      Pharynx: Oropharynx is clear.   Eyes:      General: No scleral icterus.     Extraocular Movements: Extraocular movements intact.      Conjunctiva/sclera: Conjunctivae normal.      Pupils: Pupils are equal, round, and reactive to light.   Cardiovascular:      Rate and Rhythm: Normal rate and regular rhythm.      Heart sounds: No murmur heard.  Pulmonary:      Effort: Pulmonary effort is normal. No respiratory distress.      Breath sounds: Normal breath sounds.   Abdominal:      General: There is no distension.      Palpations: Abdomen is soft.   Musculoskeletal:         General: No swelling. Normal range of motion.      Cervical back: Normal range of motion and neck supple.      Right lower leg: No edema.      Left lower leg: No edema.   Lymphadenopathy:      Cervical: No cervical adenopathy.   Skin:     General: Skin is warm and dry.      Coloration: Skin is not jaundiced.      Findings: No lesion or rash.   Neurological:      General: No focal deficit present.      Mental Status: He is alert and oriented to person, place, and time.      Motor: No weakness. "   Psychiatric:         Mood and Affect: Mood normal.         Behavior: Behavior normal.         Thought Content: Thought content normal.         Labs:   Recent Results (from the past 48 hour(s))   CBC Oncology    Collection Time: 03/16/23  9:15 AM   Result Value Ref Range    WBC 5.29 3.90 - 12.70 K/uL    RBC 4.94 4.60 - 6.20 M/uL    Hemoglobin 15.3 14.0 - 18.0 g/dL    Hematocrit 46.2 40.0 - 54.0 %    MCV 94 82 - 98 fL    MCH 31.0 27.0 - 31.0 pg    MCHC 33.1 32.0 - 36.0 g/dL    RDW 13.2 11.5 - 14.5 %    Platelets 242 150 - 450 K/uL    MPV 9.1 (L) 9.2 - 12.9 fL    Gran # (ANC) 3.3 1.8 - 7.7 K/uL    Immature Grans (Abs) 0.02 0.00 - 0.04 K/uL   Comprehensive Metabolic Panel    Collection Time: 03/16/23  9:15 AM   Result Value Ref Range    Sodium 143 136 - 145 mmol/L    Potassium 4.2 3.5 - 5.1 mmol/L    Chloride 103 95 - 110 mmol/L    CO2 31 (H) 23 - 29 mmol/L    Glucose 106 70 - 110 mg/dL    BUN 16 8 - 23 mg/dL    Creatinine 1.4 0.5 - 1.4 mg/dL    Calcium 10.4 8.7 - 10.5 mg/dL    Total Protein 7.9 6.0 - 8.4 g/dL    Albumin 4.0 3.5 - 5.2 g/dL    Total Bilirubin 0.4 0.1 - 1.0 mg/dL    Alkaline Phosphatase 113 55 - 135 U/L    AST 17 10 - 40 U/L    ALT 12 10 - 44 U/L    Anion Gap 9 8 - 16 mmol/L    eGFR 51.8 (A) >60 mL/min/1.73 m^2   Prostate Specific Antigen, Diagnostic    Collection Time: 03/16/23  9:15 AM   Result Value Ref Range    PSA Diagnostic 1.7 0.00 - 4.00 ng/mL            Imaging:   DXA Bone Density with Vertebral Fracture  Narrative: EXAMINATION:  DEXA BONE DENSITY WITH VERTEBRAL FRACTURE ASSESSMENT    CLINICAL HISTORY:  Malignant neoplasm of prostate    TECHNIQUE:  DXA scanning was performed over the hips and lumbar spine.  Review of the images confirms satisfactory positioning and technique.    COMPARISON:  None    FINDINGS:  The L1 to L4 vertebral bone mineral density is equal to 1.403 g/cm squared with a T score of 1.7.    The left femoral neck bone mineral density is equal to 1.058 g/cm squared with a T  score of 0.1.    The right femoral neck bone mineral density is equal to 1.090 g/cm squared with a T score of 0.4.    There is a 8.2% risk of a major osteoporotic fracture and a 4.0% risk of hip fracture in the next 10 years (FRAX).  Impression: Normal bone mineral density.    Consider FDA approved medical therapies in postmenopausal women and men aged 50 years and older, based on the following:    *A hip or vertebral (clinical or morphometric) fracture  *T score less than or equal to -2.5 at the femoral neck or spine after appropriate evaluation to exclude secondary causes.  *Low bone mass -- also known as osteopenia (T score between -1.0 and -2.5 at the femoral neck or spine) and a 10 year probability of hip fracture greater than or equal to 3% or a 10 year probability of major osteoporosis-related fracture greater than or equal to 20% based on the [place]-adapted WHO algorithm.  *Clinicians judgment and/or patient preference may indicate treatment for people with 10 year fracture probabilities is above or below these levels.    Electronically signed by: Anshu Stephens  Date:    02/23/2023  Time:    12:08              Diagnoses:       1. Prostate cancer    2. Malignant neoplasm metastatic to lung, unspecified laterality    3. Hypertension, unspecified type    4. Subacute cough    5. Thyroid nodule    6. Bone metastases            Assessment and Plan:     1. Prostate cancer  Overview:  Patient with de david high-volume metastatic prosatate adenocarcinoma including lung and osseous mets. Started ADT 11/18/23 with bicalutamide and plan to start apalutamide.     Assessment & Plan:  Continues to await copay assistance program. PSA is downtrending.    - Still awaiting apalutamide  - SP orchiectomy 2/15/23  - Con't Ca/D  - DEXA normal 2/23; repeat 2/25  - Awaiting genetics followup      2. Malignant neoplasm metastatic to lung, unspecified laterality  Overview:  Metastatic prostate cancer based on PSMA imaging      3.  Hypertension, unspecified type  Overview:  Home regimen includes diltiazem 300mg and lisinopril 20mg po daily.     Assessment & Plan:  Well controlled  - Continue home regimen      4. Subacute cough  Assessment & Plan:  - Resolved; Recent CT scan stable      5. Thyroid nodule  Overview:  TI-RADS 5 lesion. Non-diagnostic biopsy 04/2022.    Assessment & Plan:  - Reschedule thyroid biopsy    Orders:  -     Ambulatory referral/consult  to [facility] Interventional RAD; Future; Expected date: 03/23/2023    6. Bone metastases  Overview:  Has left pelvic turbercle metastases noted on PSMA PET CT with associated discomfort. Completed RT on 2/3/23.    Assessment & Plan:  - Con't Ca/D               Route Chart for Scheduling    Med Onc Chart Routing      Follow up with physician 4 weeks. Raul 4/10/23   Follow up with JENIFFER    Infusion scheduling note    Injection scheduling note    Labs CBC, CMP and PSA   Scheduling:  Preferred lab:  Lab interval:     Imaging    Pharmacy appointment    Other referrals             Supportive Plan Information  OP PROSTATE LEUPROLIDE Q3MO   Darren Carter MD   Upcoming Treatment Dates - OP PROSTATE LEUPROLIDE Q3MO    2/10/2023       Chemotherapy       leuprolide (LUPRON) injection 22.5 mg  5/5/2023       Chemotherapy       leuprolide (LUPRON) injection 22.5 mg  7/28/2023       Chemotherapy       leuprolide (LUPRON) injection 22.5 mg  10/20/2023       Chemotherapy       leuprolide (LUPRON) injection 22.5 mg       Darren Carter MD  Hematology/Oncology  [facility]

## 2023-03-16 NOTE — PATIENT INSTRUCTIONS
You continue to tolerate treatment will with expected side effects of fatigue. Still awaiting apalutamide RX to be filled. Be sure to change from bicalutamide to apalutamide when this is completed. Bone mineral density test normal; cotninue to take calcium and vitamin D. Be sure to follow up with genetics team to screen for high risk prostate cancer gene    - No need for further ADT shots after your surgery  - Start apalutamide when you get it  - Follow up 4/10/23 prior to your road trip

## 2023-03-17 ENCOUNTER — TELEPHONE (OUTPATIENT)
Dept: INTERVENTIONAL RADIOLOGY/VASCULAR | Facility: CLINIC | Age: 78
End: 2023-03-17
Payer: MEDICARE

## 2023-03-20 ENCOUNTER — OFFICE VISIT (OUTPATIENT)
Dept: UROLOGY | Facility: CLINIC | Age: 78
End: 2023-03-20
Payer: MEDICARE

## 2023-03-20 VITALS
HEART RATE: 90 BPM | WEIGHT: 237.13 LBS | BODY MASS INDEX: 31.43 KG/M2 | DIASTOLIC BLOOD PRESSURE: 74 MMHG | HEIGHT: 73 IN | SYSTOLIC BLOOD PRESSURE: 162 MMHG

## 2023-03-20 DIAGNOSIS — Z90.79 S/P ORCHIECTOMY: ICD-10-CM

## 2023-03-20 DIAGNOSIS — R97.20 ELEVATED PSA: ICD-10-CM

## 2023-03-20 DIAGNOSIS — N13.8 BPH WITH OBSTRUCTION/LOWER URINARY TRACT SYMPTOMS: Primary | ICD-10-CM

## 2023-03-20 DIAGNOSIS — N40.1 BPH WITH OBSTRUCTION/LOWER URINARY TRACT SYMPTOMS: Primary | ICD-10-CM

## 2023-03-20 PROCEDURE — 1126F AMNT PAIN NOTED NONE PRSNT: CPT | Mod: CPTII,S$GLB,, | Performed by: STUDENT IN AN ORGANIZED HEALTH CARE EDUCATION/TRAINING PROGRAM

## 2023-03-20 PROCEDURE — 3078F PR MOST RECENT DIASTOLIC BLOOD PRESSURE < 80 MM HG: ICD-10-PCS | Mod: CPTII,S$GLB,, | Performed by: STUDENT IN AN ORGANIZED HEALTH CARE EDUCATION/TRAINING PROGRAM

## 2023-03-20 PROCEDURE — 1160F PR REVIEW ALL MEDS BY PRESCRIBER/CLIN PHARMACIST DOCUMENTED: ICD-10-PCS | Mod: CPTII,S$GLB,, | Performed by: STUDENT IN AN ORGANIZED HEALTH CARE EDUCATION/TRAINING PROGRAM

## 2023-03-20 PROCEDURE — 3078F DIAST BP <80 MM HG: CPT | Mod: CPTII,S$GLB,, | Performed by: STUDENT IN AN ORGANIZED HEALTH CARE EDUCATION/TRAINING PROGRAM

## 2023-03-20 PROCEDURE — 3077F SYST BP >= 140 MM HG: CPT | Mod: CPTII,S$GLB,, | Performed by: STUDENT IN AN ORGANIZED HEALTH CARE EDUCATION/TRAINING PROGRAM

## 2023-03-20 PROCEDURE — 1159F PR MEDICATION LIST DOCUMENTED IN MEDICAL RECORD: ICD-10-PCS | Mod: CPTII,S$GLB,, | Performed by: STUDENT IN AN ORGANIZED HEALTH CARE EDUCATION/TRAINING PROGRAM

## 2023-03-20 PROCEDURE — 3077F PR MOST RECENT SYSTOLIC BLOOD PRESSURE >= 140 MM HG: ICD-10-PCS | Mod: CPTII,S$GLB,, | Performed by: STUDENT IN AN ORGANIZED HEALTH CARE EDUCATION/TRAINING PROGRAM

## 2023-03-20 PROCEDURE — 99999 PR PBB SHADOW E&M-EST. PATIENT-LVL III: CPT | Mod: PBBFAC,,, | Performed by: STUDENT IN AN ORGANIZED HEALTH CARE EDUCATION/TRAINING PROGRAM

## 2023-03-20 PROCEDURE — 1159F MED LIST DOCD IN RCRD: CPT | Mod: CPTII,S$GLB,, | Performed by: STUDENT IN AN ORGANIZED HEALTH CARE EDUCATION/TRAINING PROGRAM

## 2023-03-20 PROCEDURE — 99214 PR OFFICE/OUTPT VISIT, EST, LEVL IV, 30-39 MIN: ICD-10-PCS | Mod: 24,S$GLB,, | Performed by: STUDENT IN AN ORGANIZED HEALTH CARE EDUCATION/TRAINING PROGRAM

## 2023-03-20 PROCEDURE — 1160F RVW MEDS BY RX/DR IN RCRD: CPT | Mod: CPTII,S$GLB,, | Performed by: STUDENT IN AN ORGANIZED HEALTH CARE EDUCATION/TRAINING PROGRAM

## 2023-03-20 PROCEDURE — 1126F PR PAIN SEVERITY QUANTIFIED, NO PAIN PRESENT: ICD-10-PCS | Mod: CPTII,S$GLB,, | Performed by: STUDENT IN AN ORGANIZED HEALTH CARE EDUCATION/TRAINING PROGRAM

## 2023-03-20 PROCEDURE — 99214 OFFICE O/P EST MOD 30 MIN: CPT | Mod: 24,S$GLB,, | Performed by: STUDENT IN AN ORGANIZED HEALTH CARE EDUCATION/TRAINING PROGRAM

## 2023-03-20 PROCEDURE — 99999 PR PBB SHADOW E&M-EST. PATIENT-LVL III: ICD-10-PCS | Mod: PBBFAC,,, | Performed by: STUDENT IN AN ORGANIZED HEALTH CARE EDUCATION/TRAINING PROGRAM

## 2023-03-20 RX ORDER — INFLUENZA A VIRUS A/VICTORIA/2570/2019 IVR-215 (H1N1) ANTIGEN (FORMALDEHYDE INACTIVATED), INFLUENZA A VIRUS A/DARWIN/9/2021 SAN-010 (H3N2) ANTIGEN (FORMALDEHYDE INACTIVATED), INFLUENZA B VIRUS B/PHUKET/3073/2013 ANTIGEN (FORMALDEHYDE INACTIVATED), AND INFLUENZA B VIRUS B/MICHIGAN/01/2021 ANTIGEN (FORMALDEHYDE INACTIVATED) 60; 60; 60; 60 UG/.7ML; UG/.7ML; UG/.7ML; UG/.7ML
INJECTION, SUSPENSION INTRAMUSCULAR
COMMUNITY
Start: 2022-10-20 | End: 2023-06-25

## 2023-03-20 NOTE — PROGRESS NOTES
Subjective:       Patient ID: Noah Lopez is a 78 y.o. male.    Chief Complaint:  f/u orchiectomy  This is a 78 y.o.  male patient that is an established patient of mine.    The patient was referred to me by Dr. De La Torre for urinary retention. Dr. De La Torre obtained routine bloodwork in 12/2021. His Cr was elevated from baseline. He then ordered a renal US 1/4/22 - marked distention of bladder, no hydronephrosis bilaterally, enlarged prostate vol 197g, and pt presented to ER per his recommendation on for a bedoya placement.  ER visit 1/4/22 - 3600 cc of urine from his Bedoya. Was not started on medications. Failed multiple voiding trials, experienced Dimitri's. Underwent workup with cysto and UDS.  Cysto -Trilobar hypertrophy of the prostate, mildly enlarged median lobe, significantly enlarged lateral lobes  UDs with Dr. Maloney on 1/31/22 - see his report for full details -   Pdet at Max Flow: 0 cmH2O    EMG Storage: Normal Recruitment             EMG Voiding: Flaring with valsalva     Impression:         Sensation:Normal     Capacity: Large     Compliance:Normal     Detrusor Overactivity:Absent     Continence: No Incontinence     Contractility: Hypocontractility     Emptying:Unsatisfactory - Hypocontractility     Coordination:Dysfunctional (Valsalva) Voiding      He is s/p TRUS bx on 8/11/22; TRUS bx 60.4g. PSA 15.8 7/5/22. Prostate biopsy demonstrated prostate cancer - Vikash 4+3, grade group 3 present in 10/18 cores. Prostate biopsy cores were seen in all cores taken from left side.     10/17/22 In the interim, pt voiced that he would like to cancel the bone biopsy due to risks and adverse events and pain. He is in agreement with proceeding with prostate cancer treatment with oncologist as metastatic prostate cancer.   MRI 9/29/22 demonstrated vol of 63.35cc, pubic lesion concerning for metastatic disease, and left pirads 5 lesion with EPE involving left neurovascular bundles, seminal vesicles, and extension  "involving the left levator ani in close proximity with anterior rectal wall. MRI suggestive of cT4 disease,N0,M1b(pubic bone involvement).   Since he was still in urinary retention he elected to proceed with channel TURP to try to get rid of the catheter. He is s/p channel TURP 10/26/22 - path demonstrated Funk 4+5 prostate cancer, intraductal carcinoma identified, 50% of TURP chips. He is proceeding with ADT + radiation. Saw Dr. Carter with heme/onc on 10/27/22. He has an upcoming consultation with Dr. Devlin on 11/8/22. PVR 11/10/22 480cc. PVR 12/9/22 0cc. He elected to proceed with bilateral simple orchiectomy for surgical castration for prostate cancer on 2/15/23.      3/20/23  Here for postop f/u after bilat simple orchiectomy 2/15/23. Most recent heme/onc note from Dr. Darren Carter - "Patient with de david high-volume metastatic prosatate adenocarcinoma including lung and osseous mets. Started ADT 11/18/23 with bicalutamide and plan to start apalutamide."     Notes that his flow is slowed compared to immediately right after the TURP but he state it is "still doable."       LAST PSA  Lab Results   Component Value Date    PSA 11.7 (H) 12/08/2021    PSADIAG 1.7 03/16/2023    PSADIAG 5.2 (H) 01/27/2023    PSADIAG 6.7 (H) 12/22/2022    PSADIAG 19.6 (H) 11/18/2022    PSADIAG 17.3 (H) 05/25/2022    PSATOTAL 15.8 (H) 07/05/2022    PSAFREE 2.83 (H) 07/05/2022       Lab Results   Component Value Date    CREATININE 1.4 03/16/2023       ---  Past Medical History:   Diagnosis Date    Colon polyp 09/06/2018    Hyperlipidemia     Hypertension     Prostate cancer     Urinary tract infection with hematuria 11/18/2022       Past Surgical History:   Procedure Laterality Date    COLONOSCOPY  2010    COLONOSCOPY N/A 9/6/2018    Procedure: COLONOSCOPY Suprep PLEASE TEXT PATIENT WITH ARRIVAL TIME;  Surgeon: Niharika Arce MD;  Location: [facility];  Service: Endoscopy;  Laterality: N/A;    CYSTOSCOPY WITH URODYNAMIC TESTING " N/A 2022    Procedure: CYSTOSCOPY, WITH URODYNAMIC TESTING FLOUROSCOPIC;  Surgeon: Anthony Maloney MD;  [address];  Service: Urology;  Laterality: N/A;  1hr    KNEE ARTHROSCOPY      ORCHIECTOMY Bilateral 2/15/2023    Procedure: Bilateral simple orchiectomy;  Surgeon: Helena Luis MD;  Location: [facility] OR;  Service: Urology;  Laterality: Bilateral;    SPINE SURGERY      l-spine    TRANSURETHRAL RESECTION OF PROSTATE N/A 10/26/2022    Procedure: TURP (TRANSURETHRAL RESECTION OF PROSTATE);  Surgeon: eHlena Luis MD;  Location: [facility] OR;  Service: Urology;  Laterality: N/A;       Family History   Problem Relation Age of Onset    Hypertension Mother     Heart attack Father     Cirrhosis Father     No Known Problems Brother     No Known Problems Brother     Seizures Daughter     Liver disease Daughter     No Known Problems Son     Prostate cancer Neg Hx     Breast cancer Neg Hx     Pancreatic cancer Neg Hx        Social History     Tobacco Use    Smoking status: Former     Packs/day: 1.50     Types: Cigarettes     Start date:      Quit date:      Years since quittin.2    Smokeless tobacco: Never    Tobacco comments:     Age 17 - 37. Up to 1.5 ppd.   Substance Use Topics    Alcohol use: Yes     Comment: Occaisional. Few drinks a week.    Drug use: Yes     Types: Marijuana       Current Outpatient Medications on File Prior to Visit   Medication Sig Dispense Refill    apalutamide (ERLEADA) 60 mg Tab Take 240 mg by mouth once daily. 120 tablet 11    ascorbic acid, vitamin C, (VITAMIN C) 1000 MG tablet Take 1,000 mg by mouth once daily.      bicalutamide (CASODEX) 50 MG Tab Take 1 tablet (50 mg total) by mouth once daily. 90 tablet 0    calcium carbonate (OS-TYREE) 500 mg calcium (1,250 mg) tablet Take 2 tablets (1,000 mg total) by mouth once daily. 60 tablet 11    cholecalciferol, vitamin D3, (VITAMIN D3) 25 mcg (1,000 unit) capsule Take 1 capsule (1,000 Units total) by mouth once daily.  30 capsule 11    clotrimazole-betamethasone 1-0.05% (LOTRISONE) cream Apply topically 2 (two) times daily. 60 g 3    diltiaZEM (CARDIZEM CD) 300 MG 24 hr capsule TAKE 1 CAPSULE BY MOUTH  ONCE DAILY 90 capsule 3    diphenhydramine HCl (BENADRYL ALLERGY ORAL) Take by mouth.      docusate sodium (COLACE) 250 MG capsule Take 250 mg by mouth once daily.      FLUZONE HIGHDOSE QUAD 22-23  mcg/0.7 mL Syrg       GAS RELIEF 80, SIMETHICONE, ORAL Take by mouth.      lisinopriL (PRINIVIL,ZESTRIL) 20 MG tablet TAKE 1 TABLET BY MOUTH ONCE DAILY 90 tablet 3    melatonin 10 mg TbDL Take by mouth.      methocarbamoL (ROBAXIN) 750 MG Tab Take 750-1,500 mg by mouth 3 (three) times daily as needed.      miconazole (MICOTIN) 2 % cream Apply topically 2 (two) times daily. Apply to tip of penis prn irritation 28 g 0    multivit-min-FA-lycopen-lutein (MEN 50 PLUS MULTIVITAMIN) 300-600-300 mcg Tab as directed Orally      multivitamin capsule Take 1 capsule by mouth once daily.      nitrofurantoin, macrocrystal-monohydrate, (MACROBID) 100 MG capsule Take 100 mg by mouth every 12 (twelve) hours.      ofloxacin (FLOXIN) 0.3 % otic solution Apply 10 drops into right ear once a day for 10 days.  Please dispense generic.      omega 3-dha-epa-fish oil 1,000 mg (120 mg-180 mg) Cap 1 capsule.      omega-3 fatty acids/fish oil (FISH OIL-OMEGA-3 FATTY ACIDS) 300-1,000 mg capsule Take 1 capsule by mouth once daily.      oxyCODONE-acetaminophen (PERCOCET) 5-325 mg per tablet Take 1 tablet by mouth every 6 (six) hours as needed for Pain. 15 tablet 0    simvastatin (ZOCOR) 20 MG tablet TAKE 1 TABLET BY MOUTH IN  THE EVENING 90 tablet 3    tamsulosin (FLOMAX) 0.4 mg Cap Take 1 capsule by mouth once daily 90 capsule 3    traZODone (DESYREL) 100 MG tablet TAKE 2 TABLETS BY MOUTH EVERY DAY AT BEDTIME AS NEEDED FOR INSOMNIA 180 tablet 3    TURMERIC ORAL Take by mouth.      wheat dextrin (BENEFIBER CLEAR SF, DEXTRIN, ORAL) Take by mouth.      finasteride  (PROSCAR) 5 mg tablet Take 1 tablet (5 mg total) by mouth once daily. 90 tablet 3     No current facility-administered medications on file prior to visit.       Review of patient's allergies indicates:   Allergen Reactions    Ciprofloxacin Other (See Comments)     tendonitis       Review of Systems   Constitutional:  Negative for chills.   HENT:  Negative for congestion.    Eyes:  Negative for visual disturbance.   Respiratory:  Negative for shortness of breath.    Cardiovascular:  Negative for chest pain.   Gastrointestinal:  Negative for abdominal distention.   Musculoskeletal:  Negative for gait problem.   Skin:  Negative for color change.   Neurological:  Negative for dizziness.   Psychiatric/Behavioral:  Negative for agitation.      Objective:      Physical Exam  Constitutional:       Appearance: He is well-developed.   HENT:      Head: Normocephalic.   Eyes:      Pupils: Pupils are equal, round, and reactive to light.   Pulmonary:      Effort: Pulmonary effort is normal.   Abdominal:      Palpations: Abdomen is soft.   Musculoskeletal:         General: Normal range of motion.      Cervical back: Normal range of motion.   Skin:     General: Skin is warm and dry.   Neurological:      Mental Status: He is alert.       Assessment:       1. BPH with obstruction/lower urinary tract symptoms    2. Elevated PSA    3. S/P orchiectomy        Plan:         Restart finasteride, continue flomax. He still has finasteride leftover.  2.   F/u 6 months for PVR check.   3.   Continue to F/u with heme/onc for prostate cancer care.      BPH with obstruction/lower urinary tract symptoms    Elevated PSA    S/P orchiectomy

## 2023-03-24 ENCOUNTER — PATIENT MESSAGE (OUTPATIENT)
Dept: HEMATOLOGY/ONCOLOGY | Facility: CLINIC | Age: 78
End: 2023-03-24
Payer: MEDICARE

## 2023-03-27 DIAGNOSIS — R97.20 ELEVATED PSA: ICD-10-CM

## 2023-03-27 RX ORDER — FINASTERIDE 5 MG/1
5 TABLET, FILM COATED ORAL DAILY
Qty: 90 TABLET | Refills: 3 | Status: SHIPPED | OUTPATIENT
Start: 2023-03-27 | End: 2023-06-21 | Stop reason: SDUPTHER

## 2023-03-27 NOTE — PROGRESS NOTES
"Department of Hematology and Oncology  [facility]    Cancer Genetics  Initial Consult    Date of Service:  3/28/23  Visit Provider:  Elisha Vergara PA-C  Collaborating Physician:  Jennifer Iverson MD    Patient:  Noah Lopez  :  1945  MRN:  [id number]     Referring Provider    Darren Carter MD  [facility] [place] [address]    SUBJECTIVE      Chief Complaint: Genetic evaluation  History of Present Illness (HPI):  Noah Lopez ("Noah"), 78 y.o., assigned male sex at birth, is established with the [facility] but new to me.  He presents for genetic cancer risk assessment given his diagnosis last year of de daivd metastatic prostate cancer.     Genetic Assessment History  Germline cancer-genetic testing: no  Personal history of cancer:   De david metastatic prostate cancer (2022) with lung and osseous metastases.   Benign tumors:  no  Colon polyps: Yes -    2018 - 5 mm tubular adenoma  Pancreatitis:  no  Chemoprevention: Never used    Past Medical History:   Diagnosis Date    Colon polyp 2018    Hyperlipidemia     Hypertension     Prostate cancer     Urinary tract infection with hematuria 2022     Patient Active Problem List    Diagnosis Date Noted    Cannabis abuse 2023    Moderate recurrent major depression 2023    Other long term (current) drug therapy 2023    Posttraumatic stress disorder 2023    Bone metastases 2022    Prostate cancer 10/26/2022    Pulmonary metastases 05/10/2022    Hypocontractile bladder 2022    Neck arthritis 2022    Thyroid nodule 2022    Insomnia 2022    Hyperlipidemia 2020    Screening for malignant neoplasm of colon 2018    Colon polyp 2018    Hypertension 10/09/2015      Family History  Germline cancer-genetic testing in blood relatives:  No  Ashkenazi Hinduism ancestry: No  Consanguinity " in ancestors:  No  No known history of cancer of colorectal polyps in extended family other than noted below.   Limited information about the extended maternal and paternal relatives.     ** If the pedigree is small/illegible, expand this note window horizontally to view the pedigree in a larger format. **      Family History   Problem Relation Age of Onset    Hypertension Mother     Heart attack Father     Cirrhosis Father     No Known Problems Brother     No Known Problems Brother     Seizures Daughter     Liver disease Daughter     No Known Problems Son     Thyroid cancer Other     Prostate cancer Neg Hx     Breast cancer Neg Hx     Pancreatic cancer Neg Hx       Review of Systems  See HPI.    Pain 0/10  Recent falls: None   Patient's Distress Score today was 0/10 (with 10 being the worst).       OBJECTIVE      Physical Exam  Very pleasant patient.  Unaccompanied  Vitals signs:  There were no vitals filed for this visit.   Constitutional      Appearance:  Well developed and well nourished. No apparent distress.   Pulmonary     Effort:  Normal  Neurological     Mental Status:  Alert and oriented.     Coordination:  Normal  Psychiatric        Mood and Affect: Normal     Thought Content:  Normal       Speech:  Normal     Behavior:  Normal     Judgment:  Normal  Genetics-specific     It is my assessment that the patient is ready to proceed with cancer-genetic testing from a psychosocial perspective.    COUNSELING      Cancer Genetics     Every cell in your body has a copy of your genes, which are segments of DNA that work as an instruction manual to tell the cells how to grow and divide. If a gene has a mutation (abnormality) it could send the wrong instructions to the cells, causing them to grow and divide out of control and develop into cancer cells.     How does this happen?  Random DNA mistakes can occur as the cells multiply.   The DNA can be altered by carcinogens, such as the chemicals in tobacco smoke, UV  rays from the sun, and certain infections like HPV (human papillomavirus).   Abnormal DNA can be inherited from our parents.     How are genetic mutations detected?  Germline testing: Tests the blood or saliva to detect inherited genetic mutations the person was born with. Every cell in the body (blood, saliva, skin, organs, eggs, sperm) will have the abnormal gene which can be passed on to the individual's children.    Somatic testing: Tests the cancer cells to detect acquired genetic mutations that occurred over the course of the individual's life. These abnormal genes are only present in the cancer cells and cannot be passed on to the individual's children.     Sporadic cancer  Approximately 80% of all cancers are sporadic or random. These cancers are caused by an accumulation of genetic mutations acquired over the course of an individual's lifetime. The risk for acquired mutations increases with age and can be influenced by environmental factors (chemical and radiation exposures), lifestyle factors (smoking, alcohol, obesity, lack of exercise, poor diet), and certain medical conditions (diabetes, liver disease, infections). Because cancer is common, some families will have more than one person with the same type of sporadic cancer. The most common cancers are breast, lung, colorectal and prostate. Most sporadic cancers occur after age 60.    Familial cancer  Familial cancer refers to a clustering of cancer in a family that is more than you would expect to see by chance, but it is not hereditary. While hereditary cancer is caused by an inherited mutation in a single gene, familial cancers are caused by a combination of multiple factors, some of which may be inherited (like diabetes or fatty liver disease) and some related to shared lifestyle and environmental factors. Like sporadic cancer, familial cancers are usually diagnosed at older ages and don't follow the same inheritance patterns seen in hereditary  cancers.     Hereditary cancer  Approximately 5-10% of all cancers are hereditary, meaning they are caused by an inherited mutation in a single gene, such as BRCA1. These mutations are passed directly from parent to child, with each child having a 50% chance of inheriting the mutation. Hereditary cancer is suspected in individuals with early-onset cancer (usually before age 50), rare cancers (such as male breast cancer), and/or multiple blood relatives in successive generations with the same or related cancers (for example breast, ovarian, prostate and pancreatic cancer, which are all associated with BRCA gene mutations). Germline testing will show that the relatives have the exact same genetic mutation that is known to cause cancer.         Results of Genetic Testing     Positive:  This means the individual has an inherited mutation that is known to cause cancer and/or other disease.   Each gene is associated with different cancers and different levels of risk. Most genes have specific guidelines for cancer screening and risk reduction that are different from what is done by the general population. With proper management, cancer can be prevented or caught in the early stages when it is treatable and curable.   Relatives will need testing to see if they also have the mutation. Cascade testing refers to the testing of relatives in a particular order. Siblings and parents are the first to be tested, as each has a 50% chance of having the mutation. Once it is determined which side of the family the mutation came from, testing is extended to the aunts and uncles on that side. Mutations don't skip generations, so if an individual tests negative for the familial mutation, their children cannot have the mutation and do not need testing.    Negative:  This means the individual has no inherited mutations that are known to cause cancer and/or other disease.   If the individual has cancer, this could mean their cancer is  sporadic or that they have a hereditary cancer but their inherited mutation is not detectable using current testing technology. Genetics is a rapidly evolving field. If the personal or family history is suspicious for hereditary cancer, updated testing may be recommended in the future.   If the individual does not have cancer, this is considered an uninformative negative since the results don't tell us if their relative(s) with cancer have sporadic cancers or have a genetic mutation the individual did not inherit. For this reason, the most informative individuals to have genetic testing are those with a history of cancer.   Despite the negative result, the individual may be at higher risk for the cancers in the family compared to someone with no family history of cancer. This is due to shared lifestyle, environment and other inherited risk factors.   VUS:  Individuals are born with many harmless mutations that don't affect the function of the gene. A variant of uncertain significance (VUS) is a change in a gene with insufficient evidence to determine if it is harmful or harmless.   Eventually, researchers obtain enough data on the variant to reclassify it. When this is done, the genetics lab will send a notification to the individual and the ordering provider.   Most variants (approximately 91%) are reclassified as harmless.   No actions are needed for a VUS other than checking in with the genetics lab or the genetics team periodically to see if it has been reclassified.     Genetic Discrimination     Genetic discrimination occurs when individuals are discriminated against on the basis of their genetic information.    The Genetic Information Nondiscrimination Act of 2008 (RAY) is [place] federal legislation that provides some protections against use of an individual's genetic information by their health insurer and by their employer.  The are two parts to RAY:  Title I and Title II.    Title I of RAY prohibits most  health insurers from utilizing an individual's genetic information to make decisions regarding insurance eligibility or premium charges.      Title II of RAY prohibits covered entities, in many cases, from requesting or requiring the genetic information of employees and applicants and from using said information to make employment decisions.  This protection does not apply to employers with fewer than 15 employees or to the .    RAY does not protect individuals from genetic discrimination by any other type of policy or entity, including but not limited to life insurance, disability insurance, long-term care insurance,  benefits, and Citizen of [place] [facility] benefits.    Genetic Testing Logistics     In most cases, a blood sample is collected at an [facility] lab and sent to an outside genetics lab for testing. In some cases, a saliva sample is collected by the patient at home and mailed to the genetics lab.     There is a potential for the patient to incur out-of-pocket costs related to genetic testing.     Questions were encouraged and answered to the patient's satisfaction, and he verbalized understanding of information and agreement with the plan.       ASSESSMENT/PLAN      A cancer-genetic evaluation and pre-test genetic counseling were conducted. Based on the information provided by Noah, his personal and/or family history is not particularly suggestive of a potential hereditary predisposition to cancer, but he has limited information about his aunts, uncles and grandparents and is interesting in testing for the benefit of his family and possibility of additional treatment options. The recommendation is to proceed with germline testing to include the genes commonly associated with hereditary prostate cancer (JUNITO, BRCA1, BRCA2, CHEK2, EPCAM, HOXB13, MLH1, MSH2, MSH6, NBN, PALB2, PMS2, RAD51D, TP53).     Noah was given the option of proceeding with testing now, deferring testing to a later date,  or declining testing and opted to proceed.     Genetics lab: [facility]   Genetic test: [facility] BRCA1/2 core panel + Multi-Cancer +RNA Panel, 84 genes (048079.1)   Verbal informed consent: Obtained   Written informed consent: Obtained   Specimen type: Blood   Specimen collection by: [facility]    Specimen collection date: 3/28/23   Results expected by: Approximately 2-3 weeks after the genetic testing lab receives the specimen   Results disclosure plan: Post-test visit if positive or complex result; otherwise, results letter or optional post-test visit       1. Encounter for nonprocreative genetic counseling    2. Prostate cancer  - Genetic Misc Sendout Test, Blood; Future      Follow-up:  Follow up for results review.     Approximately 40 minutes were spent face-to-face with the patient.  Approximately 60 minutes in total were spent on this encounter, which includes face-to-face time and non-face-to-face time preparing to see the patient (e.g., review of tests), obtaining and/or reviewing separately obtained history, documenting clinical information in the electronic or other health record, independently interpreting results (not separately reported) and communicating results to the patient/family/caregiver, or care coordination (not separately reported).     REFERENCES     National Comprehensive Cancer Network (NCCN). (2021). Prostate cancer. NCCN Clinical Practice Guidelines in Oncology (NCCN Guidelines), Version 1.2022.    Elisha Vergara PA-C, MPAS, PA-C  Physician Assistant, Hereditary/High Risk Clinic  Hematology/Oncology, [facility]

## 2023-03-28 ENCOUNTER — OFFICE VISIT (OUTPATIENT)
Dept: HEMATOLOGY/ONCOLOGY | Facility: CLINIC | Age: 78
End: 2023-03-28
Payer: MEDICARE

## 2023-03-28 DIAGNOSIS — Z71.83 ENCOUNTER FOR NONPROCREATIVE GENETIC COUNSELING: Primary | ICD-10-CM

## 2023-03-28 DIAGNOSIS — C61 PROSTATE CANCER: ICD-10-CM

## 2023-03-28 PROCEDURE — 1126F PR PAIN SEVERITY QUANTIFIED, NO PAIN PRESENT: ICD-10-PCS | Mod: CPTII,S$GLB,, | Performed by: PHYSICIAN ASSISTANT

## 2023-03-28 PROCEDURE — 3288F FALL RISK ASSESSMENT DOCD: CPT | Mod: CPTII,S$GLB,, | Performed by: PHYSICIAN ASSISTANT

## 2023-03-28 PROCEDURE — 1101F PR PT FALLS ASSESS DOC 0-1 FALLS W/OUT INJ PAST YR: ICD-10-PCS | Mod: CPTII,S$GLB,, | Performed by: PHYSICIAN ASSISTANT

## 2023-03-28 PROCEDURE — 1126F AMNT PAIN NOTED NONE PRSNT: CPT | Mod: CPTII,S$GLB,, | Performed by: PHYSICIAN ASSISTANT

## 2023-03-28 PROCEDURE — 1159F MED LIST DOCD IN RCRD: CPT | Mod: CPTII,S$GLB,, | Performed by: PHYSICIAN ASSISTANT

## 2023-03-28 PROCEDURE — 1101F PT FALLS ASSESS-DOCD LE1/YR: CPT | Mod: CPTII,S$GLB,, | Performed by: PHYSICIAN ASSISTANT

## 2023-03-28 PROCEDURE — 99215 PR OFFICE/OUTPT VISIT, EST, LEVL V, 40-54 MIN: ICD-10-PCS | Mod: S$GLB,,, | Performed by: PHYSICIAN ASSISTANT

## 2023-03-28 PROCEDURE — 3288F PR FALLS RISK ASSESSMENT DOCUMENTED: ICD-10-PCS | Mod: CPTII,S$GLB,, | Performed by: PHYSICIAN ASSISTANT

## 2023-03-28 PROCEDURE — 99999 PR PBB SHADOW E&M-EST. PATIENT-LVL II: CPT | Mod: PBBFAC,,, | Performed by: PHYSICIAN ASSISTANT

## 2023-03-28 PROCEDURE — 99999 PR PBB SHADOW E&M-EST. PATIENT-LVL II: ICD-10-PCS | Mod: PBBFAC,,, | Performed by: PHYSICIAN ASSISTANT

## 2023-03-28 PROCEDURE — 1159F PR MEDICATION LIST DOCUMENTED IN MEDICAL RECORD: ICD-10-PCS | Mod: CPTII,S$GLB,, | Performed by: PHYSICIAN ASSISTANT

## 2023-03-28 PROCEDURE — 99215 OFFICE O/P EST HI 40 MIN: CPT | Mod: S$GLB,,, | Performed by: PHYSICIAN ASSISTANT

## 2023-03-28 RX ORDER — GLUCOSAM/CHONDRO/HERB 149/HYAL 750-100 MG
1 TABLET ORAL
COMMUNITY
End: 2023-04-10 | Stop reason: ALTCHOICE

## 2023-03-29 NOTE — TELEPHONE ENCOUNTER
Care Due:                  Date            Visit Type   Department     Provider  --------------------------------------------------------------------------------                                EP -                              PRIMARY      [facility]  Last Visit: 07-      CARE ([facility])   MEDICINE       Anthony De La Torre  Next Visit: None Scheduled  None         None Found                                                            Last  Test          Frequency    Reason                     Performed    Due Date  --------------------------------------------------------------------------------    Lipid Panel.  12 months..  simvastatin..............  12- 12-    [facility] Embedded Care Gaps. Reference number: [id number]. 3/28/2023   10:47:29 PM CDT

## 2023-03-30 RX ORDER — DILTIAZEM HYDROCHLORIDE 300 MG/1
CAPSULE, COATED, EXTENDED RELEASE ORAL
Qty: 90 CAPSULE | Refills: 3 | Status: SHIPPED | OUTPATIENT
Start: 2023-03-30 | End: 2024-01-29 | Stop reason: SDUPTHER

## 2023-04-06 DIAGNOSIS — E04.1 THYROID NODULE: Primary | ICD-10-CM

## 2023-04-10 ENCOUNTER — OFFICE VISIT (OUTPATIENT)
Dept: HEMATOLOGY/ONCOLOGY | Facility: CLINIC | Age: 78
End: 2023-04-10
Payer: MEDICARE

## 2023-04-10 ENCOUNTER — LAB VISIT (OUTPATIENT)
Dept: LAB | Facility: HOSPITAL | Age: 78
End: 2023-04-10
Attending: HOSPITALIST
Payer: MEDICARE

## 2023-04-10 VITALS
HEIGHT: 73 IN | OXYGEN SATURATION: 98 % | WEIGHT: 236.13 LBS | HEART RATE: 79 BPM | SYSTOLIC BLOOD PRESSURE: 142 MMHG | BODY MASS INDEX: 31.3 KG/M2 | DIASTOLIC BLOOD PRESSURE: 67 MMHG | RESPIRATION RATE: 18 BRPM

## 2023-04-10 DIAGNOSIS — I10 HYPERTENSION, UNSPECIFIED TYPE: ICD-10-CM

## 2023-04-10 DIAGNOSIS — C78.00 MALIGNANT NEOPLASM METASTATIC TO LUNG, UNSPECIFIED LATERALITY: ICD-10-CM

## 2023-04-10 DIAGNOSIS — E78.5 HYPERLIPIDEMIA, UNSPECIFIED HYPERLIPIDEMIA TYPE: ICD-10-CM

## 2023-04-10 DIAGNOSIS — C79.51 MALIGNANT NEOPLASM METASTATIC TO BONE: ICD-10-CM

## 2023-04-10 DIAGNOSIS — C61 PROSTATE CANCER: ICD-10-CM

## 2023-04-10 DIAGNOSIS — C61 PROSTATE CANCER: Primary | ICD-10-CM

## 2023-04-10 DIAGNOSIS — F33.1 MODERATE RECURRENT MAJOR DEPRESSION: ICD-10-CM

## 2023-04-10 DIAGNOSIS — E04.1 THYROID NODULE: ICD-10-CM

## 2023-04-10 LAB
ALBUMIN SERPL BCP-MCNC: 4.3 G/DL (ref 3.5–5.2)
ALP SERPL-CCNC: 115 U/L (ref 55–135)
ALT SERPL W/O P-5'-P-CCNC: 13 U/L (ref 10–44)
ANION GAP SERPL CALC-SCNC: 12 MMOL/L (ref 8–16)
AST SERPL-CCNC: 19 U/L (ref 10–40)
BILIRUB SERPL-MCNC: 0.3 MG/DL (ref 0.1–1)
BUN SERPL-MCNC: 17 MG/DL (ref 8–23)
CALCIUM SERPL-MCNC: 10.6 MG/DL (ref 8.7–10.5)
CHLORIDE SERPL-SCNC: 103 MMOL/L (ref 95–110)
CO2 SERPL-SCNC: 25 MMOL/L (ref 23–29)
COMPLEXED PSA SERPL-MCNC: 1.3 NG/ML (ref 0–4)
CREAT SERPL-MCNC: 1.3 MG/DL (ref 0.5–1.4)
ERYTHROCYTE [DISTWIDTH] IN BLOOD BY AUTOMATED COUNT: 12.5 % (ref 11.5–14.5)
EST. GFR  (NO RACE VARIABLE): 56.6 ML/MIN/1.73 M^2
GLUCOSE SERPL-MCNC: 127 MG/DL (ref 70–110)
HCT VFR BLD AUTO: 47.5 % (ref 40–54)
HGB BLD-MCNC: 15.9 G/DL (ref 14–18)
IMM GRANULOCYTES # BLD AUTO: 0.02 K/UL (ref 0–0.04)
MCH RBC QN AUTO: 31.1 PG (ref 27–31)
MCHC RBC AUTO-ENTMCNC: 33.5 G/DL (ref 32–36)
MCV RBC AUTO: 93 FL (ref 82–98)
NEUTROPHILS # BLD AUTO: 4 K/UL (ref 1.8–7.7)
PLATELET # BLD AUTO: 244 K/UL (ref 150–450)
PMV BLD AUTO: 9.4 FL (ref 9.2–12.9)
POTASSIUM SERPL-SCNC: 3.9 MMOL/L (ref 3.5–5.1)
PROT SERPL-MCNC: 8.4 G/DL (ref 6–8.4)
RBC # BLD AUTO: 5.12 M/UL (ref 4.6–6.2)
SODIUM SERPL-SCNC: 140 MMOL/L (ref 136–145)
WBC # BLD AUTO: 6 K/UL (ref 3.9–12.7)

## 2023-04-10 PROCEDURE — 99214 PR OFFICE/OUTPT VISIT, EST, LEVL IV, 30-39 MIN: ICD-10-PCS | Mod: S$GLB,,, | Performed by: HOSPITALIST

## 2023-04-10 PROCEDURE — 36415 COLL VENOUS BLD VENIPUNCTURE: CPT | Performed by: HOSPITALIST

## 2023-04-10 PROCEDURE — 3288F FALL RISK ASSESSMENT DOCD: CPT | Mod: CPTII,S$GLB,, | Performed by: HOSPITALIST

## 2023-04-10 PROCEDURE — 99214 OFFICE O/P EST MOD 30 MIN: CPT | Mod: S$GLB,,, | Performed by: HOSPITALIST

## 2023-04-10 PROCEDURE — 99999 PR PBB SHADOW E&M-EST. PATIENT-LVL III: ICD-10-PCS | Mod: PBBFAC,,, | Performed by: HOSPITALIST

## 2023-04-10 PROCEDURE — 84153 ASSAY OF PSA TOTAL: CPT | Performed by: HOSPITALIST

## 2023-04-10 PROCEDURE — 3078F DIAST BP <80 MM HG: CPT | Mod: CPTII,S$GLB,, | Performed by: HOSPITALIST

## 2023-04-10 PROCEDURE — 80053 COMPREHEN METABOLIC PANEL: CPT | Performed by: HOSPITALIST

## 2023-04-10 PROCEDURE — 3078F PR MOST RECENT DIASTOLIC BLOOD PRESSURE < 80 MM HG: ICD-10-PCS | Mod: CPTII,S$GLB,, | Performed by: HOSPITALIST

## 2023-04-10 PROCEDURE — 3077F PR MOST RECENT SYSTOLIC BLOOD PRESSURE >= 140 MM HG: ICD-10-PCS | Mod: CPTII,S$GLB,, | Performed by: HOSPITALIST

## 2023-04-10 PROCEDURE — 1101F PR PT FALLS ASSESS DOC 0-1 FALLS W/OUT INJ PAST YR: ICD-10-PCS | Mod: CPTII,S$GLB,, | Performed by: HOSPITALIST

## 2023-04-10 PROCEDURE — 1101F PT FALLS ASSESS-DOCD LE1/YR: CPT | Mod: CPTII,S$GLB,, | Performed by: HOSPITALIST

## 2023-04-10 PROCEDURE — 99999 PR PBB SHADOW E&M-EST. PATIENT-LVL III: CPT | Mod: PBBFAC,,, | Performed by: HOSPITALIST

## 2023-04-10 PROCEDURE — 1126F PR PAIN SEVERITY QUANTIFIED, NO PAIN PRESENT: ICD-10-PCS | Mod: CPTII,S$GLB,, | Performed by: HOSPITALIST

## 2023-04-10 PROCEDURE — 3077F SYST BP >= 140 MM HG: CPT | Mod: CPTII,S$GLB,, | Performed by: HOSPITALIST

## 2023-04-10 PROCEDURE — 85027 COMPLETE CBC AUTOMATED: CPT | Performed by: HOSPITALIST

## 2023-04-10 PROCEDURE — 3288F PR FALLS RISK ASSESSMENT DOCUMENTED: ICD-10-PCS | Mod: CPTII,S$GLB,, | Performed by: HOSPITALIST

## 2023-04-10 PROCEDURE — 1126F AMNT PAIN NOTED NONE PRSNT: CPT | Mod: CPTII,S$GLB,, | Performed by: HOSPITALIST

## 2023-04-10 NOTE — ASSESSMENT & PLAN NOTE
PSA continues to downtrend on bicalutamide + orchiectomy with minimal side effects. Continues to await aplutamide delivery.  - Will inquire directly with drug company to determine hold up in delivering his medication through the patient assistance program  - SP orchiectomy 2/15/23  - Con't Ca/D  - Germline genetics 3/28/23  - DEXA normal 2/23/23

## 2023-04-10 NOTE — PROGRESS NOTES
MEDICAL ONCOLOGY FOLLOW-UP VISIT.     Best Contact Phone Number(s): [phone] (home)      Cancer/Stage/TNM:    Cancer Staging   Prostate cancer  Staging form: Prostate, AJCC 8th Edition  - Clinical: Stage IVB (cT3, cNX, cM1c, Grade Group: 5) - Signed by Darren Carter MD on 11/18/2022       Reason for visit:  Mr. 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 He started first line ADT on 11/18/22 with plan to add apalutamide. Also with radiation to the pelvis and bilateral orchiectomy 2/15/23.     Interval History:     Leaving on trip to [place] tomorrow for several weeks.    Overall patient feels well. Energy is moderate to low, but not limited in any particular way. Reports rare and generally mild hot flashes. No abdominal pain, nausea or vomiting. Reports ongoing constipation, generally well controlled with OTC medication. Weight and appetite are stable. No pain.    Has ongoing low urinary flow. No nocturia. Recently restarted tamsulosin about a week after discussion with Dr. Hernandez. No clear change at present.    Has yet to receive apalutamide. Continues on bicalutamide. No other concerns.      Oncology History   Prostate cancer   12/8/2021 Tumor Markers    PSA 11.7     1/2022 Notable Event    ED visit for urinary retention found on abodminal US. Jones placed.     3/2022 Notable Event    Seen in ED for fall. Incidentally found to have suspicious thyroid nodule     4/20/2022 Biopsy    FNA of thyroid nodule nondiagnostic. Also found to have new SHANNAN lung nodules.     5/16/2022 Imaging Significant Findings    FDG PET-CT in setting of pulmonary nodules:  1.  Two solid left upper lobe pulmonary nodules with no appreciable FDG uptake.  Malignancy is not excluded, and the larger nodule is likely amenable to percutaneous biopsy. Alternatively, continued surveillance by dedicated chest CT alone,  i.e., without FDG PET, may be considered.     2.  Focal FDG uptake in the left prostate gland with extracapsular extension.  These findings are concerning for malignancy.  Correlation with PSA and urology evaluation recommended.  MRI of the prostate may also be helpful for further evaluation.     3.  Abnormal appearance of the left symphysis pubis with hypermetabolic activity.  Differential considerations include fibrous dysplasia, Paget's disease, and metastasis.   MRI or bone algorithm CT may be informative.     4.  Incidental focal uptake in the right cerebellum on at least 2 PET slices.  Recommend brain MRI for further evaluation.     5.  2.2 cm left inferior thyroid lobe nodule.  This nodule was recently biopsied with abnormal results.  Please refer to pathology report.     5/25/2022 Tumor Markers    PSA 17.3     7/6/2022 Biopsy    Attempted SHANNAN nodule biopsy     8/11/2022 Biopsy    TRUS biopsy: Prostate adenocarcinoma up to 4+3 disease in 10/18 cores all from the left side. Associated cribriform glands and PNI.       8/31/2022 Imaging Significant Findings    Bone scan:  1. Uptake within the anterior right 5th and 6th ribs is most likely related to degenerative changes.  Correlation with plain film or chest CT scan recommended.     2.  Intense uptake within the medial left pubic bone corresponds to an area of sclerosis seen on the comparison PET-CT scan.  Differential considerations remain to be fibrous dysplasia, Paget's disease or metastasis.     3.  Negative for metastatic pattern of uptake otherwise.  Degenerative pattern of uptake and other incidental findings as noted above.     9/29/2022 Imaging Significant Findings    MRI prostate: Left-sided PI-RADS 5 lesion with extraprostatic extension involving the left-sided neurovascular bundles and seminal vesicles.  Extension of abnormal signal adjacent to the left levator ani, cannot rule out involvement.  Abnormal signal also closely approximates with the  "anterior rectal wall without definite involvement.     Left pubic lesion concerning for metastasis.     10/17/2022 Notable Event    Declined pubic bone biopsy to potentially confirm metastatic prostate cancer     10/26/2022 Procedure    TURPT - pathology review with up to 4+5 disease     11/9/2022 Imaging Significant Findings    PSMA PET-CT shows evidence of osseous and pulmonary metastatic disease     11/18/2022 -  Hormone Therapy    Start Lupron 22.5mg IM; plan to start apalutamide     2/1/2023 - 2/7/2023 Radiation Therapy    Treating physician: Jaswinder Doyle    Course: C1 Pelvis 2023    Treatment Site Ref. ID Energy Dose/Fx (Gy) #Fx Dose Correction (Gy) Total Dose (Gy) Start Date End Date Elapsed Days   3D Iliac_Lt Lt Iliac 18X 4 5 / 5 0 20 2/1/2023 2/7/2023 6      3/28/2023 Genetic Testing    Results pending            Physical Exam:   BP (!) 142/67 (BP Location: Left arm, Patient Position: Sitting, BP Method: Medium (Automatic))   Pulse 79   Resp 18   Ht 6' 1" (1.854 m)   Wt 107.1 kg (236 lb 1.8 oz)   SpO2 98%   BMI 31.15 kg/m²      ECOG Performance Status: (foot note - ECOG PS provided by Eastern Cooperative Oncology Group) 0 - Asymptomatic    Physical Exam  Constitutional:       General: He is not in acute distress.     Appearance: Normal appearance.   HENT:      Head: Normocephalic.      Mouth/Throat:      Mouth: Mucous membranes are moist.      Pharynx: Oropharynx is clear.   Eyes:      General: No scleral icterus.     Extraocular Movements: Extraocular movements intact.      Conjunctiva/sclera: Conjunctivae normal.      Pupils: Pupils are equal, round, and reactive to light.   Cardiovascular:      Rate and Rhythm: Normal rate and regular rhythm.      Heart sounds: No murmur heard.  Pulmonary:      Effort: Pulmonary effort is normal. No respiratory distress.      Breath sounds: Normal breath sounds.   Abdominal:      General: There is no distension.      Palpations: Abdomen is soft. "   Musculoskeletal:         General: No swelling. Normal range of motion.      Cervical back: Normal range of motion and neck supple.      Right lower leg: No edema.      Left lower leg: No edema.   Lymphadenopathy:      Cervical: No cervical adenopathy.   Skin:     General: Skin is warm and dry.      Coloration: Skin is not jaundiced.   Neurological:      General: No focal deficit present.      Mental Status: He is alert and oriented to person, place, and time.      Motor: No weakness.   Psychiatric:         Mood and Affect: Mood normal.         Behavior: Behavior normal.         Thought Content: Thought content normal.         Labs:   Recent Results (from the past 48 hour(s))   CBC Oncology    Collection Time: 04/10/23 10:13 AM   Result Value Ref Range    WBC 6.00 3.90 - 12.70 K/uL    RBC 5.12 4.60 - 6.20 M/uL    Hemoglobin 15.9 14.0 - 18.0 g/dL    Hematocrit 47.5 40.0 - 54.0 %    MCV 93 82 - 98 fL    MCH 31.1 (H) 27.0 - 31.0 pg    MCHC 33.5 32.0 - 36.0 g/dL    RDW 12.5 11.5 - 14.5 %    Platelets 244 150 - 450 K/uL    MPV 9.4 9.2 - 12.9 fL    Gran # (ANC) 4.0 1.8 - 7.7 K/uL    Immature Grans (Abs) 0.02 0.00 - 0.04 K/uL   Comprehensive Metabolic Panel    Collection Time: 04/10/23 10:13 AM   Result Value Ref Range    Sodium 140 136 - 145 mmol/L    Potassium 3.9 3.5 - 5.1 mmol/L    Chloride 103 95 - 110 mmol/L    CO2 25 23 - 29 mmol/L    Glucose 127 (H) 70 - 110 mg/dL    BUN 17 8 - 23 mg/dL    Creatinine 1.3 0.5 - 1.4 mg/dL    Calcium 10.6 (H) 8.7 - 10.5 mg/dL    Total Protein 8.4 6.0 - 8.4 g/dL    Albumin 4.3 3.5 - 5.2 g/dL    Total Bilirubin 0.3 0.1 - 1.0 mg/dL    Alkaline Phosphatase 115 55 - 135 U/L    AST 19 10 - 40 U/L    ALT 13 10 - 44 U/L    Anion Gap 12 8 - 16 mmol/L    eGFR 56.6 (A) >60 mL/min/1.73 m^2   Prostate Specific Antigen, Diagnostic    Collection Time: 04/10/23 10:13 AM   Result Value Ref Range    PSA Diagnostic 1.3 0.00 - 4.00 ng/mL              Imaging:   DXA Bone Density with Vertebral  Fracture  Narrative: EXAMINATION:  DEXA BONE DENSITY WITH VERTEBRAL FRACTURE ASSESSMENT    CLINICAL HISTORY:  Malignant neoplasm of prostate    TECHNIQUE:  DXA scanning was performed over the hips and lumbar spine.  Review of the images confirms satisfactory positioning and technique.    COMPARISON:  None    FINDINGS:  The L1 to L4 vertebral bone mineral density is equal to 1.403 g/cm squared with a T score of 1.7.    The left femoral neck bone mineral density is equal to 1.058 g/cm squared with a T score of 0.1.    The right femoral neck bone mineral density is equal to 1.090 g/cm squared with a T score of 0.4.    There is a 8.2% risk of a major osteoporotic fracture and a 4.0% risk of hip fracture in the next 10 years (FRAX).  Impression: Normal bone mineral density.    Consider FDA approved medical therapies in postmenopausal women and men aged 50 years and older, based on the following:    *A hip or vertebral (clinical or morphometric) fracture  *T score less than or equal to -2.5 at the femoral neck or spine after appropriate evaluation to exclude secondary causes.  *Low bone mass -- also known as osteopenia (T score between -1.0 and -2.5 at the femoral neck or spine) and a 10 year probability of hip fracture greater than or equal to 3% or a 10 year probability of major osteoporosis-related fracture greater than or equal to 20% based on the [place]-adapted WHO algorithm.  *Clinicians judgment and/or patient preference may indicate treatment for people with 10 year fracture probabilities is above or below these levels.    Electronically signed by: Anshu Stephens  Date:    02/23/2023  Time:    12:08              Diagnoses:       1. Prostate cancer    2. Malignant neoplasm metastatic to lung, unspecified laterality    3. Hypertension, unspecified type    4. Bone metastases    5. Thyroid nodule    6. Moderate recurrent major depression    7. Hyperlipidemia, unspecified hyperlipidemia type              Assessment and  Plan:     1. Prostate cancer  Overview:  Patient with de david high-volume metastatic prosatate adenocarcinoma including lung and osseous mets. Started ADT 11/18/23 with bicalutamide and plan to start apalutamide.     Assessment & Plan:  PSA continues to downtrend on bicalutamide + orchiectomy with minimal side effects. Continues to await aplutamide delivery.  - Will inquire directly with drug company to determine hold up in delivering his medication through the patient assistance program  - SP orchiectomy 2/15/23  - Con't Ca/D  - Germline genetics 3/28/23  - DEXA normal 2/23/23      2. Malignant neoplasm metastatic to lung, unspecified laterality  Overview:  Metastatic prostate cancer based on PSMA imaging        3. Hypertension, unspecified type  Overview:  Home regimen includes diltiazem 300mg and lisinopril 20mg po daily.     Assessment & Plan:  - Continue home medications      4. Bone metastases  Overview:  Has left pelvic turbercle metastases noted on PSMA PET CT with associated discomfort. Completed RT on 2/3/23.      5. Thyroid nodule  Overview:  TI-RADS 5 lesion. Non-diagnostic biopsy 04/2022.    Assessment & Plan:  - Has repeat biopsy scheduled after returning from upcoming appt      6. Moderate recurrent major depression  Overview:  Generally stable. Not on any antidepresseant    Assessment & Plan:  - Continue to monitor      7. Hyperlipidemia, unspecified hyperlipidemia type  Overview:  On simvastatin 20mg daily                 Route Chart for Scheduling    Med Onc Chart Routing      Follow up with physician . Already scheduled   Follow up with JENIFFER    Infusion scheduling note    Injection scheduling note    Labs    Imaging    Pharmacy appointment    Other referrals                   Darren Carter MD  Hematology/Oncology  [facility]

## 2023-04-10 NOTE — PATIENT INSTRUCTIONS
You continue to tolerated hormonal therapy for prostate cancer well with minimal to moderate expected side effects. You are approved to get the apalutamide through [employer], but we are still waiting on their program to kick in. You can call them directly to inquire about timetables.    In the meantime, continue taking bicalutamide 50mg daily.     Your germline genetic testing was normal.    Additionally, your bone mineral density test did not show any evidence of osteoporosis.    Continue taking vitamin D and calcium to maintain bone health.    Can call [employer] Patient Assistance Program at [phone] to help expedite Erleada prescription

## 2023-05-04 ENCOUNTER — HOSPITAL ENCOUNTER (OUTPATIENT)
Dept: RADIOLOGY | Facility: HOSPITAL | Age: 78
Discharge: HOME | End: 2023-05-04
Attending: HOSPITALIST
Payer: MEDICARE

## 2023-05-04 DIAGNOSIS — E04.1 THYROID NODULE: ICD-10-CM

## 2023-05-04 PROCEDURE — 76536 US EXAM OF HEAD AND NECK: CPT | Mod: TC,PO

## 2023-05-04 PROCEDURE — 76536 US THYROID: ICD-10-PCS | Mod: 26,,, | Performed by: RADIOLOGY

## 2023-05-04 PROCEDURE — 76536 US EXAM OF HEAD AND NECK: CPT | Mod: 26,,, | Performed by: RADIOLOGY

## 2023-05-05 ENCOUNTER — TELEPHONE (OUTPATIENT)
Dept: INTERVENTIONAL RADIOLOGY/VASCULAR | Facility: HOSPITAL | Age: 78
End: 2023-05-05
Payer: MEDICARE

## 2023-05-05 ENCOUNTER — LAB VISIT (OUTPATIENT)
Dept: LAB | Facility: HOSPITAL | Age: 78
End: 2023-05-05
Attending: HOSPITALIST
Payer: MEDICARE

## 2023-05-05 ENCOUNTER — OFFICE VISIT (OUTPATIENT)
Dept: HEMATOLOGY/ONCOLOGY | Facility: CLINIC | Age: 78
End: 2023-05-05
Payer: MEDICARE

## 2023-05-05 VITALS
WEIGHT: 233.69 LBS | HEIGHT: 73 IN | BODY MASS INDEX: 30.97 KG/M2 | HEART RATE: 72 BPM | DIASTOLIC BLOOD PRESSURE: 74 MMHG | RESPIRATION RATE: 18 BRPM | SYSTOLIC BLOOD PRESSURE: 138 MMHG

## 2023-05-05 DIAGNOSIS — C61 PROSTATE CANCER: ICD-10-CM

## 2023-05-05 DIAGNOSIS — E04.1 THYROID NODULE: ICD-10-CM

## 2023-05-05 DIAGNOSIS — C79.51 MALIGNANT NEOPLASM METASTATIC TO BONE: Primary | ICD-10-CM

## 2023-05-05 DIAGNOSIS — I10 HYPERTENSION, UNSPECIFIED TYPE: ICD-10-CM

## 2023-05-05 DIAGNOSIS — E78.5 HYPERLIPIDEMIA, UNSPECIFIED HYPERLIPIDEMIA TYPE: ICD-10-CM

## 2023-05-05 LAB
ALBUMIN SERPL BCP-MCNC: 4.1 G/DL (ref 3.5–5.2)
ALP SERPL-CCNC: 105 U/L (ref 55–135)
ALT SERPL W/O P-5'-P-CCNC: 16 U/L (ref 10–44)
ANION GAP SERPL CALC-SCNC: 9 MMOL/L (ref 8–16)
AST SERPL-CCNC: 19 U/L (ref 10–40)
BILIRUB SERPL-MCNC: 0.4 MG/DL (ref 0.1–1)
BUN SERPL-MCNC: 13 MG/DL (ref 8–23)
CALCIUM SERPL-MCNC: 10 MG/DL (ref 8.7–10.5)
CHLORIDE SERPL-SCNC: 105 MMOL/L (ref 95–110)
CO2 SERPL-SCNC: 28 MMOL/L (ref 23–29)
COMPLEXED PSA SERPL-MCNC: 1.2 NG/ML (ref 0–4)
CREAT SERPL-MCNC: 1.4 MG/DL (ref 0.5–1.4)
ERYTHROCYTE [DISTWIDTH] IN BLOOD BY AUTOMATED COUNT: 12.6 % (ref 11.5–14.5)
EST. GFR  (NO RACE VARIABLE): 51.8 ML/MIN/1.73 M^2
GLUCOSE SERPL-MCNC: 116 MG/DL (ref 70–110)
HCT VFR BLD AUTO: 42.8 % (ref 40–54)
HGB BLD-MCNC: 14.4 G/DL (ref 14–18)
IMM GRANULOCYTES # BLD AUTO: 0.02 K/UL (ref 0–0.04)
MCH RBC QN AUTO: 31.1 PG (ref 27–31)
MCHC RBC AUTO-ENTMCNC: 33.6 G/DL (ref 32–36)
MCV RBC AUTO: 92 FL (ref 82–98)
NEUTROPHILS # BLD AUTO: 4.9 K/UL (ref 1.8–7.7)
PLATELET # BLD AUTO: 264 K/UL (ref 150–450)
PMV BLD AUTO: 9.6 FL (ref 9.2–12.9)
POTASSIUM SERPL-SCNC: 4 MMOL/L (ref 3.5–5.1)
PROT SERPL-MCNC: 7.7 G/DL (ref 6–8.4)
RBC # BLD AUTO: 4.63 M/UL (ref 4.6–6.2)
SODIUM SERPL-SCNC: 142 MMOL/L (ref 136–145)
TESTOST SERPL-MCNC: 17 NG/DL (ref 304–1227)
WBC # BLD AUTO: 6.62 K/UL (ref 3.9–12.7)

## 2023-05-05 PROCEDURE — 3075F PR MOST RECENT SYSTOLIC BLOOD PRESS GE 130-139MM HG: ICD-10-PCS | Mod: CPTII,S$GLB,, | Performed by: HOSPITALIST

## 2023-05-05 PROCEDURE — 80053 COMPREHEN METABOLIC PANEL: CPT | Performed by: HOSPITALIST

## 2023-05-05 PROCEDURE — 84403 ASSAY OF TOTAL TESTOSTERONE: CPT | Performed by: HOSPITALIST

## 2023-05-05 PROCEDURE — 99999 PR PBB SHADOW E&M-EST. PATIENT-LVL III: CPT | Mod: PBBFAC,,, | Performed by: HOSPITALIST

## 2023-05-05 PROCEDURE — 1126F PR PAIN SEVERITY QUANTIFIED, NO PAIN PRESENT: ICD-10-PCS | Mod: CPTII,S$GLB,, | Performed by: HOSPITALIST

## 2023-05-05 PROCEDURE — 99214 OFFICE O/P EST MOD 30 MIN: CPT | Mod: S$GLB,,, | Performed by: HOSPITALIST

## 2023-05-05 PROCEDURE — 99999 PR PBB SHADOW E&M-EST. PATIENT-LVL III: ICD-10-PCS | Mod: PBBFAC,,, | Performed by: HOSPITALIST

## 2023-05-05 PROCEDURE — 1126F AMNT PAIN NOTED NONE PRSNT: CPT | Mod: CPTII,S$GLB,, | Performed by: HOSPITALIST

## 2023-05-05 PROCEDURE — 36415 COLL VENOUS BLD VENIPUNCTURE: CPT | Performed by: HOSPITALIST

## 2023-05-05 PROCEDURE — 85027 COMPLETE CBC AUTOMATED: CPT | Performed by: HOSPITALIST

## 2023-05-05 PROCEDURE — 84153 ASSAY OF PSA TOTAL: CPT | Performed by: HOSPITALIST

## 2023-05-05 PROCEDURE — 3078F DIAST BP <80 MM HG: CPT | Mod: CPTII,S$GLB,, | Performed by: HOSPITALIST

## 2023-05-05 PROCEDURE — 3075F SYST BP GE 130 - 139MM HG: CPT | Mod: CPTII,S$GLB,, | Performed by: HOSPITALIST

## 2023-05-05 PROCEDURE — 3078F PR MOST RECENT DIASTOLIC BLOOD PRESSURE < 80 MM HG: ICD-10-PCS | Mod: CPTII,S$GLB,, | Performed by: HOSPITALIST

## 2023-05-05 PROCEDURE — 99214 PR OFFICE/OUTPT VISIT, EST, LEVL IV, 30-39 MIN: ICD-10-PCS | Mod: S$GLB,,, | Performed by: HOSPITALIST

## 2023-05-05 NOTE — ASSESSMENT & PLAN NOTE
- Apalutamide has arrived; patient to start soon  - Stop bicalutamide when starting apalutamide  - SP orchiectomy; no need for ADT  - Con't Ca/D  - Germline genetic testing with VUS in APC and BMPR1A  - DEXA normal 02/2023; repeat 02/2025

## 2023-05-05 NOTE — PATIENT INSTRUCTIONS
Tolerating hormonal thearpy well. PSA remains low. Please change from bicalutamide to apalutamide as soon as feasible.     Continue taking calcium and vitamin D to [facility] bone health    Recommend following up with the radiology team for thyroid biopsy to look for a separate thyroid cancer.     Follow up in 2 months.    
104

## 2023-05-05 NOTE — NURSING
Spoke with patient and confirmed FNA on 05/08/23 at [facility] with an arrival time of 1230. Instructed to eat and drink as usual, take all medication as perscribed, and that he may drive himself to and from the appointment.

## 2023-05-05 NOTE — PROGRESS NOTES
MEDICAL ONCOLOGY FOLLOW-UP VISIT.     Best Contact Phone Number(s): [phone] (home)      Cancer/Stage/TNM:    Cancer Staging   Prostate cancer  Staging form: Prostate, AJCC 8th Edition  - Clinical: Stage IVB (cT3, cNX, cM1c, Grade Group: 5) - Signed by Darren Carter MD on 11/18/2022       Reason for visit:  Mr. 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 He started first line ADT on 11/18/22 with plan to add apalutamide. Also with radiation to the pelvis and bilateral orchiectomy 2/15/23.     Interval History:     Erleada was delivered during his recent trip to [place]. Just got back yesterday. Has not started yet. Continues on bicalutamide.    No new concerns and feels well. Only complaint is ongoing low urinary flow. No nocturia and no incontinence. Energy remains low but he is very active. Rare hot flashes. No gynecomastia.No other concerns.      Oncology History   Prostate cancer   12/8/2021 Tumor Markers    PSA 11.7     1/2022 Notable Event    ED visit for urinary retention found on abodminal US. Jones placed.     3/2022 Notable Event    Seen in ED for fall. Incidentally found to have suspicious thyroid nodule     4/20/2022 Biopsy    FNA of thyroid nodule nondiagnostic. Also found to have new SHANNAN lung nodules.     5/16/2022 Imaging Significant Findings    FDG PET-CT in setting of pulmonary nodules:  1.  Two solid left upper lobe pulmonary nodules with no appreciable FDG uptake.  Malignancy is not excluded, and the larger nodule is likely amenable to percutaneous biopsy. Alternatively, continued surveillance by dedicated chest CT alone, i.e., without FDG PET, may be considered.     2.  Focal FDG uptake in the left prostate gland with extracapsular extension.  These findings are concerning for malignancy.  Correlation with PSA and urology evaluation recommended.  MRI of the prostate may  also be helpful for further evaluation.     3.  Abnormal appearance of the left symphysis pubis with hypermetabolic activity.  Differential considerations include fibrous dysplasia, Paget's disease, and metastasis.   MRI or bone algorithm CT may be informative.     4.  Incidental focal uptake in the right cerebellum on at least 2 PET slices.  Recommend brain MRI for further evaluation.     5.  2.2 cm left inferior thyroid lobe nodule.  This nodule was recently biopsied with abnormal results.  Please refer to pathology report.     5/25/2022 Tumor Markers    PSA 17.3     7/6/2022 Biopsy    Attempted SHANNAN nodule biopsy     8/11/2022 Biopsy    TRUS biopsy: Prostate adenocarcinoma up to 4+3 disease in 10/18 cores all from the left side. Associated cribriform glands and PNI.       8/31/2022 Imaging Significant Findings    Bone scan:  1. Uptake within the anterior right 5th and 6th ribs is most likely related to degenerative changes.  Correlation with plain film or chest CT scan recommended.     2.  Intense uptake within the medial left pubic bone corresponds to an area of sclerosis seen on the comparison PET-CT scan.  Differential considerations remain to be fibrous dysplasia, Paget's disease or metastasis.     3.  Negative for metastatic pattern of uptake otherwise.  Degenerative pattern of uptake and other incidental findings as noted above.     9/29/2022 Imaging Significant Findings    MRI prostate: Left-sided PI-RADS 5 lesion with extraprostatic extension involving the left-sided neurovascular bundles and seminal vesicles.  Extension of abnormal signal adjacent to the left levator ani, cannot rule out involvement.  Abnormal signal also closely approximates with the anterior rectal wall without definite involvement.     Left pubic lesion concerning for metastasis.     10/17/2022 Notable Event    Declined pubic bone biopsy to potentially confirm metastatic prostate cancer     10/26/2022 Procedure    TURPT - pathology  "review with up to 4+5 disease     11/9/2022 Imaging Significant Findings    PSMA PET-CT shows evidence of osseous and pulmonary metastatic disease     11/18/2022 -  Hormone Therapy    Start Lupron 22.5mg IM; plan to start apalutamide     2/1/2023 - 2/7/2023 Radiation Therapy    Treating physician: Jaswinder Doyle    Course: C1 Pelvis 2023    Treatment Site Ref. ID Energy Dose/Fx (Gy) #Fx Dose Correction (Gy) Total Dose (Gy) Start Date End Date Elapsed Days   3D Iliac_Lt Lt Iliac 18X 4 5 / 5 0 20 2/1/2023 2/7/2023 6      3/28/2023 Genetic Testing    Germline genetic testing. No pathogenic mutations. VUS in APC and BMPR1A            Physical Exam:   /74 (BP Location: Left arm, Patient Position: Sitting, BP Method: Medium (Automatic))   Pulse 72   Resp 18   Ht 6' 1" (1.854 m)   Wt 106 kg (233 lb 11 oz)   BMI 30.83 kg/m²      ECOG Performance Status: (foot note - ECOG PS provided by Eastern Cooperative Oncology Group) 0 - Asymptomatic    Physical Exam  Constitutional:       General: He is not in acute distress.     Appearance: Normal appearance.   HENT:      Head: Normocephalic.      Mouth/Throat:      Mouth: Mucous membranes are moist.      Pharynx: Oropharynx is clear.   Eyes:      General: No scleral icterus.     Extraocular Movements: Extraocular movements intact.      Conjunctiva/sclera: Conjunctivae normal.      Pupils: Pupils are equal, round, and reactive to light.   Cardiovascular:      Rate and Rhythm: Normal rate and regular rhythm.      Heart sounds: No murmur heard.  Pulmonary:      Effort: Pulmonary effort is normal. No respiratory distress.      Breath sounds: Normal breath sounds.   Abdominal:      General: There is no distension.      Palpations: Abdomen is soft.   Musculoskeletal:         General: No swelling. Normal range of motion.      Cervical back: Normal range of motion and neck supple.      Right lower leg: No edema.      Left lower leg: No edema.   Lymphadenopathy:      Cervical: " No cervical adenopathy.   Skin:     General: Skin is warm and dry.      Coloration: Skin is not jaundiced.   Neurological:      General: No focal deficit present.      Mental Status: He is alert and oriented to person, place, and time.      Motor: No weakness.   Psychiatric:         Mood and Affect: Mood normal.         Behavior: Behavior normal.         Thought Content: Thought content normal.         Labs:   Recent Results (from the past 48 hour(s))   CBC Oncology    Collection Time: 05/05/23 11:26 AM   Result Value Ref Range    WBC 6.62 3.90 - 12.70 K/uL    RBC 4.63 4.60 - 6.20 M/uL    Hemoglobin 14.4 14.0 - 18.0 g/dL    Hematocrit 42.8 40.0 - 54.0 %    MCV 92 82 - 98 fL    MCH 31.1 (H) 27.0 - 31.0 pg    MCHC 33.6 32.0 - 36.0 g/dL    RDW 12.6 11.5 - 14.5 %    Platelets 264 150 - 450 K/uL    MPV 9.6 9.2 - 12.9 fL    Gran # (ANC) 4.9 1.8 - 7.7 K/uL    Immature Grans (Abs) 0.02 0.00 - 0.04 K/uL   Comprehensive Metabolic Panel    Collection Time: 05/05/23 11:26 AM   Result Value Ref Range    Sodium 142 136 - 145 mmol/L    Potassium 4.0 3.5 - 5.1 mmol/L    Chloride 105 95 - 110 mmol/L    CO2 28 23 - 29 mmol/L    Glucose 116 (H) 70 - 110 mg/dL    BUN 13 8 - 23 mg/dL    Creatinine 1.4 0.5 - 1.4 mg/dL    Calcium 10.0 8.7 - 10.5 mg/dL    Total Protein 7.7 6.0 - 8.4 g/dL    Albumin 4.1 3.5 - 5.2 g/dL    Total Bilirubin 0.4 0.1 - 1.0 mg/dL    Alkaline Phosphatase 105 55 - 135 U/L    AST 19 10 - 40 U/L    ALT 16 10 - 44 U/L    Anion Gap 9 8 - 16 mmol/L    eGFR 51.8 (A) >60 mL/min/1.73 m^2   Prostate Specific Antigen, Diagnostic    Collection Time: 05/05/23 11:26 AM   Result Value Ref Range    PSA Diagnostic 1.2 0.00 - 4.00 ng/mL   Testosterone    Collection Time: 05/05/23 11:26 AM   Result Value Ref Range    Testosterone, Total 17 (L) 304 - 1227 ng/dL                Imaging:   US Thyroid  Narrative: EXAMINATION:  US THYROID    CLINICAL HISTORY:  Thyroid nodule    TECHNIQUE:  Multiple static ultrasound images are submitted  for interpretation.    COMPARISON:  04/08/2022    FINDINGS:  There is a 24 mm heterogeneous mass in or adjacent to the inferior aspect of the left lobe of the thyroid.  On the prior examination it measured 22 mm.    The right lobe of the thyroid measures 4.3 cm in craniocaudal dimension by 2.1 cm in AP dimension by 1.7 cm in mediolateral dimension.  The isthmus measures 7 mm in AP dimension.  The left lobe of the thyroid measures 5.2 cm in craniocaudal dimension by 1.8 cm in AP dimension by 1.9 cm in mediolateral dimension.  Impression: 1. There is a 24 mm heterogeneous mass in or adjacent to the inferior aspect of the left lobe of the thyroid.  On the prior examination it measured 22 mm.  2. Assuming that the 24 mm heterogeneous nodule is in the left lobe of the thyroid, the left lobe of the thyroid is prominent in size measuring 5.2 cm in craniocaudal dimension.    Electronically signed by: Niraj Bustos MD  Date:    05/04/2023  Time:    11:32              Diagnoses:       1. Bone metastases    2. Prostate cancer    3. Thyroid nodule    4. Hyperlipidemia, unspecified hyperlipidemia type    5. Hypertension, unspecified type                Assessment and Plan:     1. Bone metastases  Overview:  Has left pelvic turbercle metastases noted on PSMA PET CT with associated discomfort. Completed RT on 2/3/23.    Assessment & Plan:  - Con't Ca/D      2. Prostate cancer  Overview:  Patient with de david high-volume metastatic prosatate adenocarcinoma including lung and osseous mets. Started ADT 11/18/23 with bicalutamide and plan to start apalutamide. Completed RT to the left iliac met 2/7/23    Assessment & Plan:  - Apalutamide has arrived; patient to start soon  - Stop bicalutamide when starting apalutamide  - SP orchiectomy; no need for ADT  - Con't Ca/D  - Germline genetic testing with VUS in APC and BMPR1A  - DEXA normal 02/2023; repeat 02/2025      3. Thyroid nodule  Overview:  TI-RADS 5 lesion. Non-diagnostic biopsy  04/2022.    Assessment & Plan:  - Plan for repeat biopsy      4. Hyperlipidemia, unspecified hyperlipidemia type  Overview:  On simvastatin 20mg daily      5. Hypertension, unspecified type  Overview:  Home regimen includes diltiazem 300mg and lisinopril 20mg po daily.                    Route Chart for Scheduling    Med Onc Chart Routing      Follow up with physician 2 months.   Follow up with JENIFFER    Infusion scheduling note    Injection scheduling note    Labs CBC, CMP and PSA   Scheduling:  Preferred lab:  Lab interval:     Imaging    Pharmacy appointment    Other referrals                   Darren Carter MD  Hematology/Oncology  [facility]

## 2023-05-08 ENCOUNTER — HOSPITAL ENCOUNTER (OUTPATIENT)
Dept: INTERVENTIONAL RADIOLOGY/VASCULAR | Facility: HOSPITAL | Age: 78
Discharge: HOME | End: 2023-05-08
Attending: FAMILY MEDICINE
Payer: MEDICARE

## 2023-05-08 VITALS
HEIGHT: 73 IN | DIASTOLIC BLOOD PRESSURE: 63 MMHG | BODY MASS INDEX: 30.88 KG/M2 | HEART RATE: 64 BPM | RESPIRATION RATE: 16 BRPM | WEIGHT: 233 LBS | SYSTOLIC BLOOD PRESSURE: 126 MMHG | TEMPERATURE: 98 F | OXYGEN SATURATION: 95 %

## 2023-05-08 DIAGNOSIS — E04.1 THYROID NODULE: ICD-10-CM

## 2023-05-08 PROCEDURE — 25000003 PHARM REV CODE 250: Performed by: RADIOLOGY

## 2023-05-08 PROCEDURE — 88173 CYTOPATH EVAL FNA REPORT: CPT | Performed by: PATHOLOGY

## 2023-05-08 PROCEDURE — 10005 IR US FINE NEEDLE ASPIRATION BIOPSY, FIRST LESION: ICD-10-PCS | Mod: ,,, | Performed by: RADIOLOGY

## 2023-05-08 PROCEDURE — 88172 PR  EVALUATION OF FNA SMEAR TO DETERMINE ADEQUACY, FIRST EVAL: ICD-10-PCS | Mod: 26,,, | Performed by: PATHOLOGY

## 2023-05-08 PROCEDURE — 88177 CYTP FNA EVAL EA ADDL: CPT | Mod: 26,,, | Performed by: PATHOLOGY

## 2023-05-08 PROCEDURE — 88172 CYTP DX EVAL FNA 1ST EA SITE: CPT | Mod: 26,,, | Performed by: PATHOLOGY

## 2023-05-08 PROCEDURE — 88173 PR  INTERPRETATION OF FNA SMEAR: ICD-10-PCS | Mod: 26,,, | Performed by: PATHOLOGY

## 2023-05-08 PROCEDURE — 88172 CYTP DX EVAL FNA 1ST EA SITE: CPT | Performed by: PATHOLOGY

## 2023-05-08 PROCEDURE — 88173 CYTOPATH EVAL FNA REPORT: CPT | Mod: 26,,, | Performed by: PATHOLOGY

## 2023-05-08 PROCEDURE — 10005 FNA BX W/US GDN 1ST LES: CPT | Performed by: RADIOLOGY

## 2023-05-08 PROCEDURE — 27200940 IR US FINE NEEDLE ASPIRATION BIOPSY, FIRST LESION

## 2023-05-08 PROCEDURE — 88177 PR  EVALUATION OF FNA SMEAR TO DETERMINE ADEQUACY, EA ADD EVAL: ICD-10-PCS | Mod: 26,,, | Performed by: PATHOLOGY

## 2023-05-08 PROCEDURE — 88177 CYTP FNA EVAL EA ADDL: CPT | Performed by: PATHOLOGY

## 2023-05-08 RX ORDER — LIDOCAINE HYDROCHLORIDE 10 MG/ML
INJECTION INFILTRATION; PERINEURAL
Status: COMPLETED | OUTPATIENT
Start: 2023-05-08 | End: 2023-05-08

## 2023-05-08 RX ADMIN — LIDOCAINE HYDROCHLORIDE 10 ML: 10 INJECTION, SOLUTION INFILTRATION; PERINEURAL at 01:05

## 2023-05-08 NOTE — PROCEDURES
Interventional Radiology Immediate Post-Procedure Note    Pre-Op Diagnosis: Thyroid nodule  Post-Op Diagnosis: Same    Procedure: US-guided FNA    Procedure performed by: James De Leon MD  Assistants: None    Estimated Blood Loss: Minimal  Specimen Removed: Yes    Findings/description of procedure:  25-ga FNA passes x6 taken from target LEFT lower pole thyroid nodule. Unable to confirm adequacy of specimens at the time of this note. No additional specimens requested.    No immediate complications. Patient tolerated procedure well. Please see full dictated procedure report for additional details and recommendations.      James De Leon MD  [facility]  Pager [phone]

## 2023-05-08 NOTE — NURSING
Patient's IR care is complete. VSS. See flowsheets. AVS reviewed with patient, and AVS paperwork provided to patient. All questions answered. Dressing c/d/i, no hematoma. Patient ambulatory, accompanied to main hallway at discharge.

## 2023-05-08 NOTE — DISCHARGE SUMMARY
Interventional Radiology Short Stay Discharge Summary      Admit Date: 5/8/2023  Discharge Date: 05/08/2023     Hospital Course: Uneventful    Discharge Diagnosis: LEFT thyroid nodule s/p FNA today    Discharge Condition: Stable    Discharge Disposition: Home    Diet: Resume prior diet    Activity: Activity as tolerated    Follow-up: With referring provider      James De Leon MD  [facility]  Pager [phone]

## 2023-05-08 NOTE — H&P
Interventional Radiology Pre-Procedure History & Physical      Chief Complaint/Reason for Referral: Thyroid nodule    History of Present Illness:  Noah Lopez is a 78 y.o. male who presents with a suspicious LEFT thyroid nodule. Previous bx dated 4/20/22 was indeterminate. Referred for repeat bx.    Past Medical History:   Diagnosis Date    Colon polyp 09/06/2018    Hyperlipidemia     Hypertension     Prostate cancer     Urinary tract infection with hematuria 11/18/2022     Past Surgical History:   Procedure Laterality Date    COLONOSCOPY  2010    COLONOSCOPY N/A 9/6/2018    Procedure: COLONOSCOPY Suprep PLEASE TEXT PATIENT WITH ARRIVAL TIME;  Surgeon: Niharika Arce MD;  Location: [facility];  Service: Endoscopy;  Laterality: N/A;    CYSTOSCOPY WITH URODYNAMIC TESTING N/A 1/31/2022    Procedure: CYSTOSCOPY, WITH URODYNAMIC TESTING FLOUROSCOPIC;  Surgeon: Anthony Maloney MD;  Location: [address];  Service: Urology;  Laterality: N/A;  1hr    KNEE ARTHROSCOPY      ORCHIECTOMY Bilateral 2/15/2023    Procedure: Bilateral simple orchiectomy;  Surgeon: Helena Luis MD;  Location: [facility];  Service: Urology;  Laterality: Bilateral;    SPINE SURGERY      l-spine    TRANSURETHRAL RESECTION OF PROSTATE N/A 10/26/2022    Procedure: TURP (TRANSURETHRAL RESECTION OF PROSTATE);  Surgeon: Helena Luis MD;  Location: [facility];  Service: Urology;  Laterality: N/A;       Allergies:   Review of patient's allergies indicates:   Allergen Reactions    Ciprofloxacin Other (See Comments)     tendonitis       Home Meds:   Prior to Admission medications    Medication Sig Start Date End Date Taking? Authorizing Provider   ascorbic acid, vitamin C, (VITAMIN C) 1000 MG tablet Take 1,000 mg by mouth once daily.   Yes Historical Provider   calcium carbonate (OS-TYREE) 500 mg calcium (1,250 mg) tablet Take 2 tablets (1,000 mg total) by mouth once daily. 12/22/22  Yes Darren Carter MD   cholecalciferol, vitamin D3,  (VITAMIN D3) 25 mcg (1,000 unit) capsule Take 1 capsule (1,000 Units total) by mouth once daily. 12/22/22  Yes Darren Carter MD   diphenhydramine HCl (BENADRYL ALLERGY ORAL) Take by mouth.   Yes Historical Provider   docusate sodium (COLACE) 250 MG capsule Take 250 mg by mouth once daily.   Yes Historical Provider   lisinopriL (PRINIVIL,ZESTRIL) 20 MG tablet TAKE 1 TABLET BY MOUTH ONCE DAILY 1/7/23  Yes Anthony De La Torre MD   melatonin 10 mg TbDL Take by mouth.   Yes Historical Provider   multivit-min-FA-lycopen-lutein (MEN 50 PLUS MULTIVITAMIN) 300-600-300 mcg Tab as directed Orally   Yes Historical Provider   multivitamin capsule Take 1 capsule by mouth once daily.   Yes Historical Provider   simvastatin (ZOCOR) 20 MG tablet TAKE 1 TABLET BY MOUTH IN  THE EVENING 1/7/23  Yes Anthony De La Torre MD   tamsulosin (FLOMAX) 0.4 mg Cap Take 1 capsule by mouth once daily 12/27/22  Yes Helena Luis MD   traZODone (DESYREL) 100 MG tablet TAKE 2 TABLETS BY MOUTH EVERY DAY AT BEDTIME AS NEEDED FOR INSOMNIA 4/4/22  Yes Anthony De La Torre MD   wheat dextrin (BENEFIBER CLEAR SF, DEXTRIN, ORAL) Take by mouth.   Yes Historical Provider   apalutamide (ERLEADA) 60 mg Tab Take 240 mg by mouth once daily. 11/18/22   Darren Carter MD   bicalutamide (CASODEX) 50 MG Tab Take 1 tablet (50 mg total) by mouth once daily. 2/22/23 5/23/23  Darern Carter MD   clotrimazole-betamethasone 1-0.05% (LOTRISONE) cream Apply topically 2 (two) times daily. 4/4/22   Anthony De La Torre MD   diltiaZEM (CARDIZEM CD) 300 MG 24 hr capsule TAKE 1 CAPSULE BY MOUTH  ONCE DAILY 3/30/23   Anthony De La Torre MD   finasteride (PROSCAR) 5 mg tablet Take 1 tablet (5 mg total) by mouth once daily. 3/27/23 3/26/24  Helena Luis MD   FLUZONE HIGHDOSE QUAD 22-23  mcg/0.7 mL Syrg  10/20/22   Historical Provider   GAS RELIEF 80, SIMETHICONE, ORAL Take by mouth.    Historical Provider   methocarbamoL (ROBAXIN) 750 MG Tab Take 750-1,500 mg by mouth 3 (three) times  daily as needed. 10/4/22   Historical Provider   miconazole (MICOTIN) 2 % cream Apply topically 2 (two) times daily. Apply to tip of penis prn irritation 11/14/22   Helena Luis MD   ofloxacin (FLOXIN) 0.3 % otic solution Apply 10 drops into right ear once a day for 10 days.  Please dispense generic. 6/20/16   Historical Provider   omega-3 fatty acids/fish oil (FISH OIL-OMEGA-3 FATTY ACIDS) 300-1,000 mg capsule Take 1 capsule by mouth once daily.    Historical Provider   TURMERIC ORAL Take by mouth.    Historical Provider       Anticoagulation/Antiplatelet Meds: no anticoagulation    Review of Systems:   Hematological: no known coagulopathies  Respiratory: no shortness of breath  Cardiovascular: no chest pain  Gastrointestinal: no abdominal pain  Genitourinary: no dysuria  Musculoskeletal: negative  Neurological: no TIA or stroke symptoms     Physical Exam:  Temp: 98 °F (36.7 °C) (05/08/23 1243)  Pulse: 64 (05/08/23 1335)  Resp: 16 (05/08/23 1335)  BP: 126/63 (05/08/23 1335)  SpO2: 95 % (05/08/23 1335)    General: NAD  HEENT: Normocephalic, sclera anicteric, oropharynx clear  Neck: Supple, no palpable lymphadenopathy  Heart: RRR  Lungs: Symmetric excursions, breathing unlabored  Abd: NTND, soft  Extremities: ARMANDO  Neuro: Gross nonfocal    Laboratory:  Lab Results   Component Value Date    INR 0.9 07/06/2022       Lab Results   Component Value Date    WBC 6.62 05/05/2023    HGB 14.4 05/05/2023    HCT 42.8 05/05/2023    MCV 92 05/05/2023     05/05/2023      Lab Results   Component Value Date     (H) 05/05/2023     05/05/2023    K 4.0 05/05/2023     05/05/2023    CO2 28 05/05/2023    BUN 13 05/05/2023    CREATININE 1.4 05/05/2023    CALCIUM 10.0 05/05/2023    ALT 16 05/05/2023    AST 19 05/05/2023    ALBUMIN 4.1 05/05/2023    BILITOT 0.4 05/05/2023       Imaging:  Thyroid US 5/4/23 and 4/8/22, thyroid bx 4/20/22 reviewed.    Assessment/Plan:  78 y.o. male with a suspicious thyroid nodule.  Will undergo repeat FNA bx today.    Sedation plan: None    Risks (including, but not limited to, pain, bleeding, infection, damage to nearby structures, treatment failure/recurrence, and the need for additional procedures), potential benefits, and alternatives were discussed with the patient. All questions were answered to the best of my abilities. The patient wishes to proceed. Written informed consent was obtained.      James De Leon MD  [facility]  Pager [phone]

## 2023-05-09 LAB
ADEQUACY: ABNORMAL
FINAL PATHOLOGIC DIAGNOSIS: ABNORMAL
Lab: ABNORMAL

## 2023-05-15 ENCOUNTER — TELEPHONE (OUTPATIENT)
Dept: ENDOCRINOLOGY | Facility: CLINIC | Age: 78
End: 2023-05-15
Payer: MEDICARE

## 2023-05-15 ENCOUNTER — PATIENT MESSAGE (OUTPATIENT)
Dept: ENDOCRINOLOGY | Facility: CLINIC | Age: 78
End: 2023-05-15
Payer: MEDICARE

## 2023-05-15 NOTE — TELEPHONE ENCOUNTER
Left thyroid nodule FNA - FLUS     Will schedule follow up  in 6 weeks to consider repeating FNA and saving samples for markers if needed.

## 2023-06-09 NOTE — TELEPHONE ENCOUNTER
----- Message from Genet Whelan sent at 6/9/2023 12:36 PM CDT -----  Regarding: Refill  Contact: [phone]  Type:  RX Refill Request    Who Called:  self   Refill or New Rx: refill  RX Name and Strength: traZODone (DESYREL) 100 MG tablet  How is the patient currently taking it? (ex. 1XDay): TAKE 2 TABLETS BY MOUTH EVERY DAY AT BEDTIME AS NEEDED FOR INSOMNIA  Is this a 30 day or 90 day RX: 180/3 refills   Preferred Pharmacy with phone number:  [facility] [address] [place] [address] [facility]  Local or Mail Order: local   Ordering Provider:   Would the patient rather a call back or a response via [facility]?  Call   Best Call Back Number: [phone]  Additional Information:

## 2023-06-09 NOTE — TELEPHONE ENCOUNTER
No care due was identified.  [facility] Embedded Care Due Messages. Reference number: [id number].   6/09/2023 2:22:20 PM CDT

## 2023-06-09 NOTE — TELEPHONE ENCOUNTER
Care Due:                  Date            Visit Type   Department     Provider  --------------------------------------------------------------------------------                                EP -                              PRIMARY      [facility] FAMILY  Last Visit: 07-      CARE ([facility])   MEDICINE       Anthony De La Torre  Next Visit: None Scheduled  None         None Found                                                            Last  Test          Frequency    Reason                     Performed    Due Date  --------------------------------------------------------------------------------    Office Visit  12 months..  lisinopriL, simvastatin,   07- 06-                             traZODone................    Lipid Panel.  12 months..  simvastatin..............  12- 12-    [facility] Embedded Care Due Messages. Reference number: [id number].   6/09/2023 12:54:28 PM CDT

## 2023-06-10 RX ORDER — TRAZODONE HYDROCHLORIDE 100 MG/1
TABLET ORAL
Qty: 180 TABLET | Refills: 3 | Status: SHIPPED | OUTPATIENT
Start: 2023-06-10 | End: 2023-09-15 | Stop reason: SDUPTHER

## 2023-06-10 RX ORDER — TRAZODONE HYDROCHLORIDE 100 MG/1
TABLET ORAL
Qty: 180 TABLET | Refills: 0 | OUTPATIENT
Start: 2023-06-10

## 2023-06-15 DIAGNOSIS — R97.20 ELEVATED PSA: ICD-10-CM

## 2023-06-15 NOTE — TELEPHONE ENCOUNTER
----- Message from Kathy Schmidt sent at 6/15/2023 11:40 AM CDT -----  Regarding: refill  Contact: self/ walked in  The pt is scheduled to see Dr De La Torre on 6-21-23 next Wed.  Requesting Dr De La Torre send an rx to mail order for finasteride (PROSCAR) 5 mg tablet.  He is also asking for some of the traZODone (DESYREL) 100 MG tablet be sent to [facility] so he can have some to take until he see Dr De La Torre next week.  He is out.  Any questions, he can be reached at [phone]

## 2023-06-15 NOTE — TELEPHONE ENCOUNTER
No care due was identified.  [facility] Embedded Care Due Messages. Reference number: [id number].   6/15/2023 11:52:53 AM CDT

## 2023-06-16 RX ORDER — FINASTERIDE 5 MG/1
5 TABLET, FILM COATED ORAL DAILY
Qty: 90 TABLET | Refills: 3 | OUTPATIENT
Start: 2023-06-16 | End: 2024-06-15

## 2023-06-16 RX ORDER — TRAZODONE HYDROCHLORIDE 100 MG/1
TABLET ORAL
Qty: 180 TABLET | Refills: 3 | OUTPATIENT
Start: 2023-06-16

## 2023-06-21 ENCOUNTER — OFFICE VISIT (OUTPATIENT)
Dept: FAMILY MEDICINE | Facility: CLINIC | Age: 78
End: 2023-06-21
Payer: MEDICARE

## 2023-06-21 VITALS
BODY MASS INDEX: 31.2 KG/M2 | DIASTOLIC BLOOD PRESSURE: 70 MMHG | SYSTOLIC BLOOD PRESSURE: 138 MMHG | HEIGHT: 73 IN | HEART RATE: 104 BPM | OXYGEN SATURATION: 96 % | TEMPERATURE: 98 F | WEIGHT: 235.44 LBS

## 2023-06-21 DIAGNOSIS — C79.51 MALIGNANT NEOPLASM METASTATIC TO BONE: ICD-10-CM

## 2023-06-21 DIAGNOSIS — R97.20 ELEVATED PSA: ICD-10-CM

## 2023-06-21 DIAGNOSIS — E04.1 THYROID NODULE: ICD-10-CM

## 2023-06-21 DIAGNOSIS — I70.0 AORTIC ATHEROSCLEROSIS: ICD-10-CM

## 2023-06-21 DIAGNOSIS — F33.1 MODERATE RECURRENT MAJOR DEPRESSION: ICD-10-CM

## 2023-06-21 DIAGNOSIS — C61 PROSTATE CANCER: ICD-10-CM

## 2023-06-21 DIAGNOSIS — N18.31 STAGE 3A CHRONIC KIDNEY DISEASE: Primary | ICD-10-CM

## 2023-06-21 DIAGNOSIS — G47.00 INSOMNIA, UNSPECIFIED TYPE: ICD-10-CM

## 2023-06-21 DIAGNOSIS — Q04.9: ICD-10-CM

## 2023-06-21 DIAGNOSIS — I10 HYPERTENSION, UNSPECIFIED TYPE: ICD-10-CM

## 2023-06-21 PROCEDURE — 1159F MED LIST DOCD IN RCRD: CPT | Mod: CPTII,S$GLB,, | Performed by: FAMILY MEDICINE

## 2023-06-21 PROCEDURE — 99214 PR OFFICE/OUTPT VISIT, EST, LEVL IV, 30-39 MIN: ICD-10-PCS | Mod: S$GLB,,, | Performed by: FAMILY MEDICINE

## 2023-06-21 PROCEDURE — 1101F PR PT FALLS ASSESS DOC 0-1 FALLS W/OUT INJ PAST YR: ICD-10-PCS | Mod: CPTII,S$GLB,, | Performed by: FAMILY MEDICINE

## 2023-06-21 PROCEDURE — 3078F PR MOST RECENT DIASTOLIC BLOOD PRESSURE < 80 MM HG: ICD-10-PCS | Mod: CPTII,S$GLB,, | Performed by: FAMILY MEDICINE

## 2023-06-21 PROCEDURE — 3075F SYST BP GE 130 - 139MM HG: CPT | Mod: CPTII,S$GLB,, | Performed by: FAMILY MEDICINE

## 2023-06-21 PROCEDURE — 99214 OFFICE O/P EST MOD 30 MIN: CPT | Mod: S$GLB,,, | Performed by: FAMILY MEDICINE

## 2023-06-21 PROCEDURE — 1126F PR PAIN SEVERITY QUANTIFIED, NO PAIN PRESENT: ICD-10-PCS | Mod: CPTII,S$GLB,, | Performed by: FAMILY MEDICINE

## 2023-06-21 PROCEDURE — 1159F PR MEDICATION LIST DOCUMENTED IN MEDICAL RECORD: ICD-10-PCS | Mod: CPTII,S$GLB,, | Performed by: FAMILY MEDICINE

## 2023-06-21 PROCEDURE — 1101F PT FALLS ASSESS-DOCD LE1/YR: CPT | Mod: CPTII,S$GLB,, | Performed by: FAMILY MEDICINE

## 2023-06-21 PROCEDURE — 1126F AMNT PAIN NOTED NONE PRSNT: CPT | Mod: CPTII,S$GLB,, | Performed by: FAMILY MEDICINE

## 2023-06-21 PROCEDURE — 3288F PR FALLS RISK ASSESSMENT DOCUMENTED: ICD-10-PCS | Mod: CPTII,S$GLB,, | Performed by: FAMILY MEDICINE

## 2023-06-21 PROCEDURE — 3075F PR MOST RECENT SYSTOLIC BLOOD PRESS GE 130-139MM HG: ICD-10-PCS | Mod: CPTII,S$GLB,, | Performed by: FAMILY MEDICINE

## 2023-06-21 PROCEDURE — 1160F RVW MEDS BY RX/DR IN RCRD: CPT | Mod: CPTII,S$GLB,, | Performed by: FAMILY MEDICINE

## 2023-06-21 PROCEDURE — 1160F PR REVIEW ALL MEDS BY PRESCRIBER/CLIN PHARMACIST DOCUMENTED: ICD-10-PCS | Mod: CPTII,S$GLB,, | Performed by: FAMILY MEDICINE

## 2023-06-21 PROCEDURE — 3078F DIAST BP <80 MM HG: CPT | Mod: CPTII,S$GLB,, | Performed by: FAMILY MEDICINE

## 2023-06-21 PROCEDURE — 3288F FALL RISK ASSESSMENT DOCD: CPT | Mod: CPTII,S$GLB,, | Performed by: FAMILY MEDICINE

## 2023-06-21 RX ORDER — TRAZODONE HYDROCHLORIDE 100 MG/1
200 TABLET ORAL NIGHTLY
Qty: 60 TABLET | Refills: 0 | Status: SHIPPED | OUTPATIENT
Start: 2023-06-21 | End: 2023-07-23

## 2023-06-21 RX ORDER — FINASTERIDE 5 MG/1
5 TABLET, FILM COATED ORAL DAILY
Qty: 90 TABLET | Refills: 3 | Status: SHIPPED | OUTPATIENT
Start: 2023-06-21 | End: 2024-06-20

## 2023-06-21 NOTE — PROGRESS NOTES
"Subjective:      Patient ID: Noah Lopez is a 78 y.o. male.    Chief Complaint: Medication Refill      Vitals:    06/21/23 1124   BP: (!) 142/82   Pulse: 104   Temp: 98.1 °F (36.7 °C)   TempSrc: Oral   SpO2: 96%   Weight: 106.8 kg (235 lb 7.2 oz)   Height: 6' 1" (1.854 m)        HPI   Checkup, needs rx      Problem List  Patient Active Problem List   Diagnosis    Hypertension    Screening for malignant neoplasm of colon    Hyperlipidemia    Colon polyp    Hypocontractile bladder    Neck arthritis    Thyroid nodule    Insomnia    Pulmonary metastases    Prostate cancer    Bone metastases    Cannabis abuse    Moderate recurrent major depression    Other long term (current) drug therapy    Posttraumatic stress disorder        ALLERGIES:   Review of patient's allergies indicates:   Allergen Reactions    Ciprofloxacin Other (See Comments)     tendonitis       MEDS:   Current Outpatient Medications:     apalutamide (ERLEADA) 60 mg Tab, Take 240 mg by mouth once daily., Disp: 120 tablet, Rfl: 11    ascorbic acid, vitamin C, (VITAMIN C) 1000 MG tablet, Take 1,000 mg by mouth once daily., Disp: , Rfl:     bicalutamide (CASODEX) 50 MG Tab, Take 1 tablet (50 mg total) by mouth once daily., Disp: 90 tablet, Rfl: 0    calcium carbonate (OS-TYREE) 500 mg calcium (1,250 mg) tablet, Take 2 tablets (1,000 mg total) by mouth once daily., Disp: 60 tablet, Rfl: 11    cholecalciferol, vitamin D3, (VITAMIN D3) 25 mcg (1,000 unit) capsule, Take 1 capsule (1,000 Units total) by mouth once daily., Disp: 30 capsule, Rfl: 11    clotrimazole-betamethasone 1-0.05% (LOTRISONE) cream, Apply topically 2 (two) times daily., Disp: 60 g, Rfl: 3    diltiaZEM (CARDIZEM CD) 300 MG 24 hr capsule, TAKE 1 CAPSULE BY MOUTH  ONCE DAILY, Disp: 90 capsule, Rfl: 3    diphenhydramine HCl (BENADRYL ALLERGY ORAL), Take by mouth., Disp: , Rfl:     docusate sodium (COLACE) 250 MG capsule, Take 250 mg by mouth once daily., Disp: , Rfl:     finasteride " (PROSCAR) 5 mg tablet, Take 1 tablet (5 mg total) by mouth once daily., Disp: 90 tablet, Rfl: 3    GAS RELIEF 80, SIMETHICONE, ORAL, Take by mouth., Disp: , Rfl:     lisinopriL (PRINIVIL,ZESTRIL) 20 MG tablet, TAKE 1 TABLET BY MOUTH ONCE DAILY, Disp: 90 tablet, Rfl: 3    melatonin 10 mg TbDL, Take by mouth., Disp: , Rfl:     methocarbamoL (ROBAXIN) 750 MG Tab, Take 750-1,500 mg by mouth 3 (three) times daily as needed., Disp: , Rfl:     miconazole (MICOTIN) 2 % cream, Apply topically 2 (two) times daily. Apply to tip of penis prn irritation, Disp: 28 g, Rfl: 0    multivit-min-FA-lycopen-lutein (MEN 50 PLUS MULTIVITAMIN) 300-600-300 mcg Tab, as directed Orally, Disp: , Rfl:     multivitamin capsule, Take 1 capsule by mouth once daily., Disp: , Rfl:     ofloxacin (FLOXIN) 0.3 % otic solution, Apply 10 drops into right ear once a day for 10 days.  Please dispense generic., Disp: , Rfl:     omega-3 fatty acids/fish oil (FISH OIL-OMEGA-3 FATTY ACIDS) 300-1,000 mg capsule, Take 1 capsule by mouth once daily., Disp: , Rfl:     simvastatin (ZOCOR) 20 MG tablet, TAKE 1 TABLET BY MOUTH IN  THE EVENING, Disp: 90 tablet, Rfl: 3    tamsulosin (FLOMAX) 0.4 mg Cap, Take 1 capsule by mouth once daily, Disp: 90 capsule, Rfl: 3    traZODone (DESYREL) 100 MG tablet, TAKE 2 TABLETS BY MOUTH EVERY DAY AT BEDTIME AS NEEDED FOR INSOMNIA, Disp: 180 tablet, Rfl: 3    TURMERIC ORAL, Take by mouth., Disp: , Rfl:     wheat dextrin (BENEFIBER CLEAR SF, DEXTRIN, ORAL), Take by mouth., Disp: , Rfl:     FLUZONE HIGHDOSE QUAD 22-23  mcg/0.7 mL Syrg, , Disp: , Rfl:       History:  Current Providers as of 6/21/2023  PCP: Anthony De La Torre MD  Care Team Provider: Yasmeen Hubbard LPN  Care Team Provider: Helena Luis MD  Encounter Provider: Anthony De La Torre MD, starting on Wed Jun 21, 2023 12:00 AM  Referring Provider: not found, starting on Wed Jun 21, 2023 12:00 AM  Consulting Physician: Anthony De La Torre MD, starting on Wed Jun 21, 2023 11:20 AM  (Active)   Past Medical History:   Diagnosis Date    Colon polyp 2018    Hyperlipidemia     Hypertension     Prostate cancer     Urinary tract infection with hematuria 2022     Past Surgical History:   Procedure Laterality Date    COLONOSCOPY      COLONOSCOPY N/A 2018    Procedure: COLONOSCOPY Suprep PLEASE TEXT PATIENT WITH ARRIVAL TIME;  Surgeon: Niharika Arce MD;  Location: [facility];  Service: Endoscopy;  Laterality: N/A;    CYSTOSCOPY WITH URODYNAMIC TESTING N/A 2022    Procedure: CYSTOSCOPY, WITH URODYNAMIC TESTING FLOUROSCOPIC;  Surgeon: Anthony Maloney MD;  Location: [address] [facility];  Service: Urology;  Laterality: N/A;  1hr    KNEE ARTHROSCOPY      ORCHIECTOMY Bilateral 2/15/2023    Procedure: Bilateral simple orchiectomy;  Surgeon: Helena Luis MD;  Location: [facility];  Service: Urology;  Laterality: Bilateral;    SPINE SURGERY      l-spine    TRANSURETHRAL RESECTION OF PROSTATE N/A 10/26/2022    Procedure: TURP (TRANSURETHRAL RESECTION OF PROSTATE);  Surgeon: Helena Luis MD;  Location: [facility];  Service: Urology;  Laterality: N/A;     Social History     Tobacco Use    Smoking status: Former     Packs/day: 1.50     Years: 20.00     Pack years: 30.00     Types: Cigarettes     Start date:      Quit date:      Years since quittin.4     Passive exposure: Never    Smokeless tobacco: Never    Tobacco comments:     Age 17 - 37. Up to 1.5 ppd.   Substance Use Topics    Alcohol use: Yes     Comment: Occasional. Few drinks a week.    Drug use: Yes     Types: Marijuana         Review of Systems   Constitutional:  Negative for appetite change, fatigue, fever and unexpected weight change.   HENT:  Negative for congestion, ear pain, sinus pressure and sore throat.    Eyes:  Negative for pain and visual disturbance.   Respiratory:  Negative for shortness of breath.    Cardiovascular:  Negative for chest pain.   Gastrointestinal:  Negative for abdominal pain, constipation  and diarrhea.   Endocrine: Negative for polyuria.   Genitourinary:  Negative for difficulty urinating and frequency.   Musculoskeletal:  Negative for arthralgias, back pain and myalgias.   Skin:  Negative for color change.   Allergic/Immunologic: Negative.    Neurological:  Negative for syncope, weakness and headaches.   Hematological:  Does not bruise/bleed easily.   Psychiatric/Behavioral:  Negative for dysphoric mood and suicidal ideas. The patient is not nervous/anxious.    All other systems reviewed and are negative.  Objective:     Physical Exam  Vitals and nursing note reviewed.   Constitutional:       General: He is not in acute distress.     Appearance: He is well-developed. He is not diaphoretic.   HENT:      Head: Normocephalic and atraumatic.      Right Ear: External ear normal.      Left Ear: External ear normal.      Mouth/Throat:      Pharynx: No oropharyngeal exudate.   Eyes:      General: No scleral icterus.        Right eye: No discharge.         Left eye: No discharge.      Conjunctiva/sclera: Conjunctivae normal.      Pupils: Pupils are equal, round, and reactive to light.   Neck:      Thyroid: No thyromegaly.      Vascular: No JVD.      Trachea: No tracheal deviation.   Cardiovascular:      Rate and Rhythm: Normal rate and regular rhythm.      Heart sounds: No murmur heard.    No friction rub. No gallop.   Pulmonary:      Effort: Pulmonary effort is normal. No respiratory distress.      Breath sounds: Normal breath sounds. No stridor. No wheezing or rales.   Chest:      Chest wall: No tenderness.   Abdominal:      General: There is no distension.      Palpations: Abdomen is soft. There is no mass.      Tenderness: There is no abdominal tenderness. There is no guarding or rebound.   Musculoskeletal:         General: No tenderness. Normal range of motion.      Cervical back: Normal range of motion and neck supple.   Lymphadenopathy:      Cervical: No cervical adenopathy.   Skin:     General: Skin  is warm and dry.      Coloration: Skin is not pale.      Findings: No erythema or rash.   Neurological:      Mental Status: He is alert and oriented to person, place, and time.      Cranial Nerves: No cranial nerve deficit.      Motor: No abnormal muscle tone.      Coordination: Coordination normal.      Deep Tendon Reflexes: Reflexes are normal and symmetric. Reflexes normal.   Psychiatric:         Behavior: Behavior normal.         Thought Content: Thought content normal.         Judgment: Judgment normal.           Assessment:     No diagnosis found.  Plan:        Medication List            Accurate as of June 21, 2023 12:06 PM. If you have any questions, ask your nurse or doctor.                CONTINUE taking these medications      apalutamide 60 mg Tab  Commonly known as: ERLEADA  Take 240 mg by mouth once daily.     ascorbic acid (vitamin C) 1000 MG tablet  Commonly known as: VITAMIN C     BENADRYL ALLERGY ORAL     BENEFIBER CLEAR SF (DEXTRIN) ORAL     bicalutamide 50 MG Tab  Commonly known as: CASODEX  Take 1 tablet (50 mg total) by mouth once daily.     calcium carbonate 500 mg calcium (1,250 mg) tablet  Commonly known as: OS-TYREE  Take 2 tablets (1,000 mg total) by mouth once daily.     cholecalciferol (vitamin D3) 25 mcg (1,000 unit) capsule  Commonly known as: VITAMIN D3  Take 1 capsule (1,000 Units total) by mouth once daily.     clotrimazole-betamethasone 1-0.05% cream  Commonly known as: LOTRISONE  Apply topically 2 (two) times daily.     diltiaZEM 300 MG 24 hr capsule  Commonly known as: CARDIZEM CD  TAKE 1 CAPSULE BY MOUTH  ONCE DAILY     docusate sodium 250 MG capsule  Commonly known as: COLACE     finasteride 5 mg tablet  Commonly known as: PROSCAR  Take 1 tablet (5 mg total) by mouth once daily.     fish oil-omega-3 fatty acids 300-1,000 mg capsule     FLUZONE HIGHDOSE QUAD 22-23  mcg/0.7 mL Syrg  Generic drug: flu vacc [id number](65yr up)-PF     GAS RELIEF 80 (SIMETHICONE) ORAL      lisinopriL 20 MG tablet  Commonly known as: PRINIVIL,ZESTRIL  TAKE 1 TABLET BY MOUTH ONCE DAILY     melatonin 10 mg Tbdl     MEN 50 PLUS MULTIVITAMIN 300-600-300 mcg Tab  Generic drug: multivit-min-FA-lycopen-lutein     methocarbamoL 750 MG Tab  Commonly known as: ROBAXIN     miconazole 2 % cream  Commonly known as: MICOTIN  Apply topically 2 (two) times daily. Apply to tip of penis prn irritation     multivitamin capsule     ofloxacin 0.3 % otic solution  Commonly known as: FLOXIN     simvastatin 20 MG tablet  Commonly known as: ZOCOR  TAKE 1 TABLET BY MOUTH IN  THE EVENING     tamsulosin 0.4 mg Cap  Commonly known as: FLOMAX  Take 1 capsule by mouth once daily     traZODone 100 MG tablet  Commonly known as: DESYREL  TAKE 2 TABLETS BY MOUTH EVERY DAY AT BEDTIME AS NEEDED FOR INSOMNIA     TURMERIC ORAL            There are no diagnoses linked to this encounter.

## 2023-06-24 NOTE — PROGRESS NOTES
Subjective:      Patient ID: Noah Lopez is a 78 y.o. male.    Chief Complaint:  Thyroid Nodule; Follow up visit     Patient Active Problem List   Diagnosis    Hypertension    Screening for malignant neoplasm of colon    Hyperlipidemia    Colon polyp    Hypocontractile bladder    Neck arthritis    Thyroid nodule    Insomnia    Pulmonary metastases    Prostate cancer    Bone metastases    Cannabis abuse    Moderate recurrent major depression    Other long term (current) drug therapy    Posttraumatic stress disorder    Anomaly of cerebellum    Aortic atherosclerosis    Stage 3a chronic kidney disease       History of Present Illness  77 YO Male w/ above PMH that presents to the endocrine clinic for follow up evaluation of thyroid nodule.   Denies thyromegaly or neck compressive symptoms.         With regards to thyroid nodules:   -TSH  WNL       Thyroid nodule was incidentally found on CT scan along with b/l lung nodules for evaluation after a slip and fall with head trauma     THYROID US 5/2023:      Impression:     1. There is a 24 mm heterogeneous mass in or adjacent to the inferior aspect of the left lobe of the thyroid.  On the prior examination it measured 22 mm.  2. Assuming that the 24 mm heterogeneous nodule is in the left lobe of the thyroid, the left lobe of the thyroid is prominent in size measuring 5.2 cm in craniocaudal dimension.    -Thyroid US 4/2022:  Impression:     There is a 22 mm hypoechoic nodule in the inferior pole of the left lobe of the thyroid.  This is characteristic of a TI-RADS Level 5, highly suspicious.  Hence, I recommend consideration of a fine-needle aspiration.      PET SCAN 11/2022:    Inferior to this a mass is noted within the left lobe of the thyroid gland that demonstrates increased uptake compared to background of 14 mm enlarged from 10 mm on 05/16/2022 with increased uptake to 2.7 concerning for prostate metastasis.         -FNA of the Left 2.2 cm nodule on 4/2022:    Atypical cells present. There are atypical cells present in a background of anucleate squamous cells and foamy macrophages. While these may represent reactive changes in a cyst, a more significant process cannot be entirely excluded. Clinical and radiographic correlation are recommended      PLAN TO REPEAT FNA WAS MADE ON 5/2022 BUT PATIENT NEVER HAD FNA     REPEAT FNA ON 5/2023 showing Follicular cells of un determinate significance.  (FLUS)     -Compressive symptoms?  DENIES     -History of radiation?  yES   -Family history of thyroid CA?  DENIES     ROS:   As above    Objective:     There were no vitals taken for this visit.  BP Readings from Last 3 Encounters:   06/21/23 138/70   05/08/23 126/63   05/05/23 138/74     Wt Readings from Last 1 Encounters:   06/21/23 1124 106.8 kg (235 lb 7.2 oz)     There is no height or weight on file to calculate BMI.      Physical Exam  Vitals reviewed.   Constitutional:       General: He is not in acute distress.     Appearance: Normal appearance. He is well-developed. He is not ill-appearing.   HENT:      Nose: Nose normal. No rhinorrhea.      Mouth/Throat:      Mouth: Mucous membranes are moist.   Eyes:      Extraocular Movements: Extraocular movements intact.      Pupils: Pupils are equal, round, and reactive to light.      Comments: No proptosis, No lid lag, No conjunctival erythema    Neck:      Thyroid: No thyromegaly.      Trachea: No tracheal deviation.      Comments: Left thyroid nodule palpated on exam   Cardiovascular:      Rate and Rhythm: Normal rate and regular rhythm.      Pulses: Normal pulses.   Pulmonary:      Effort: Pulmonary effort is normal.      Breath sounds: Normal breath sounds.   Abdominal:      Palpations: Abdomen is soft. There is no mass.      Tenderness: There is no abdominal tenderness.      Hernia: No hernia is present.      Comments: No scar tissue, No Violaceous striae    Musculoskeletal:         General: No swelling.      Cervical back: Neck  supple. No tenderness.      Right lower leg: No edema.      Left lower leg: No edema.   Skin:     General: Skin is warm.      Findings: No rash.   Neurological:      General: No focal deficit present.      Mental Status: He is alert and oriented to person, place, and time.   Psychiatric:         Mood and Affect: Mood normal.         Judgment: Judgment normal.              Lab Review:   Lab Results   Component Value Date    HGBA1C 5.6 01/26/2022     Lab Results   Component Value Date    CHOL 145 12/08/2021    HDL 45 12/08/2021    LDLCALC 83.2 12/08/2021    TRIG 84 12/08/2021    CHOLHDL 31.0 12/08/2021     Lab Results   Component Value Date     05/05/2023    K 4.0 05/05/2023     05/05/2023    CO2 28 05/05/2023     (H) 05/05/2023    BUN 13 05/05/2023    CREATININE 1.4 05/05/2023    CALCIUM 10.0 05/05/2023    PROT 7.7 05/05/2023    ALBUMIN 4.1 05/05/2023    BILITOT 0.4 05/05/2023    ALKPHOS 105 05/05/2023    AST 19 05/05/2023    ALT 16 05/05/2023    ANIONGAP 9 05/05/2023    ESTGFRAFRICA >60 07/06/2022    EGFRNONAA 53 (A) 07/06/2022    TSH 1.535 12/08/2021     Vit D, 25-Hydroxy   Date Value Ref Range Status   12/08/2021 90 30 - 96 ng/mL Final     Comment:     Vitamin D deficiency.........<10 ng/mL                              Vitamin D insufficiency......10-29 ng/mL       Vitamin D sufficiency........> or equal to 30 ng/mL  Vitamin D toxicity............>100 ng/mL         Assessment and Plan       Thyroid nodule    Left Dominant thyroid nodule showing Atypia on FNA from 5/2022 and FLUS on FNA from 5/2023.      Hypermetabolic on PET scan from 2022  (Concerning for Prostate CA metastasis vs Primary Thyroid CA)     Discussed with patient option of repeat FNA and saving samples to send for molecular markers if repeat FNA showed AUS or FLUS again and that way we would avoid needing to repeat a biopsy     Discussed with patient that if nodule is Thyroid CA the treatment of choice would be thyroidectomy       Due to his metastatic prostate CA and prognosis patient prefers to not undergo repeat FNA at this time and prefers to monitor thyroid nodule with repeat Thyroid US in 6 months.  Pt is open to FNA in 6 months if nodule is increasing in size.      Risk of Thyroid  CA discussed     Repeat thyroid US in 6 months

## 2023-06-26 ENCOUNTER — OFFICE VISIT (OUTPATIENT)
Dept: ENDOCRINOLOGY | Facility: CLINIC | Age: 78
End: 2023-06-26
Payer: MEDICARE

## 2023-06-26 VITALS
WEIGHT: 238.31 LBS | HEIGHT: 73 IN | BODY MASS INDEX: 31.59 KG/M2 | SYSTOLIC BLOOD PRESSURE: 130 MMHG | DIASTOLIC BLOOD PRESSURE: 70 MMHG

## 2023-06-26 DIAGNOSIS — E04.1 THYROID NODULE: Primary | ICD-10-CM

## 2023-06-26 PROCEDURE — 3078F DIAST BP <80 MM HG: CPT | Mod: CPTII,S$GLB,, | Performed by: GENERAL ACUTE CARE HOSPITAL

## 2023-06-26 PROCEDURE — 3288F FALL RISK ASSESSMENT DOCD: CPT | Mod: CPTII,S$GLB,, | Performed by: GENERAL ACUTE CARE HOSPITAL

## 2023-06-26 PROCEDURE — 1160F PR REVIEW ALL MEDS BY PRESCRIBER/CLIN PHARMACIST DOCUMENTED: ICD-10-PCS | Mod: CPTII,S$GLB,, | Performed by: GENERAL ACUTE CARE HOSPITAL

## 2023-06-26 PROCEDURE — 99214 PR OFFICE/OUTPT VISIT, EST, LEVL IV, 30-39 MIN: ICD-10-PCS | Mod: S$GLB,,, | Performed by: GENERAL ACUTE CARE HOSPITAL

## 2023-06-26 PROCEDURE — 99999 PR PBB SHADOW E&M-EST. PATIENT-LVL IV: ICD-10-PCS | Mod: PBBFAC,,, | Performed by: GENERAL ACUTE CARE HOSPITAL

## 2023-06-26 PROCEDURE — 1159F MED LIST DOCD IN RCRD: CPT | Mod: CPTII,S$GLB,, | Performed by: GENERAL ACUTE CARE HOSPITAL

## 2023-06-26 PROCEDURE — 3078F PR MOST RECENT DIASTOLIC BLOOD PRESSURE < 80 MM HG: ICD-10-PCS | Mod: CPTII,S$GLB,, | Performed by: GENERAL ACUTE CARE HOSPITAL

## 2023-06-26 PROCEDURE — 1101F PT FALLS ASSESS-DOCD LE1/YR: CPT | Mod: CPTII,S$GLB,, | Performed by: GENERAL ACUTE CARE HOSPITAL

## 2023-06-26 PROCEDURE — 3075F SYST BP GE 130 - 139MM HG: CPT | Mod: CPTII,S$GLB,, | Performed by: GENERAL ACUTE CARE HOSPITAL

## 2023-06-26 PROCEDURE — 3288F PR FALLS RISK ASSESSMENT DOCUMENTED: ICD-10-PCS | Mod: CPTII,S$GLB,, | Performed by: GENERAL ACUTE CARE HOSPITAL

## 2023-06-26 PROCEDURE — 1126F AMNT PAIN NOTED NONE PRSNT: CPT | Mod: CPTII,S$GLB,, | Performed by: GENERAL ACUTE CARE HOSPITAL

## 2023-06-26 PROCEDURE — 1101F PR PT FALLS ASSESS DOC 0-1 FALLS W/OUT INJ PAST YR: ICD-10-PCS | Mod: CPTII,S$GLB,, | Performed by: GENERAL ACUTE CARE HOSPITAL

## 2023-06-26 PROCEDURE — 99214 OFFICE O/P EST MOD 30 MIN: CPT | Mod: S$GLB,,, | Performed by: GENERAL ACUTE CARE HOSPITAL

## 2023-06-26 PROCEDURE — 1159F PR MEDICATION LIST DOCUMENTED IN MEDICAL RECORD: ICD-10-PCS | Mod: CPTII,S$GLB,, | Performed by: GENERAL ACUTE CARE HOSPITAL

## 2023-06-26 PROCEDURE — 99999 PR PBB SHADOW E&M-EST. PATIENT-LVL IV: CPT | Mod: PBBFAC,,, | Performed by: GENERAL ACUTE CARE HOSPITAL

## 2023-06-26 PROCEDURE — 1160F RVW MEDS BY RX/DR IN RCRD: CPT | Mod: CPTII,S$GLB,, | Performed by: GENERAL ACUTE CARE HOSPITAL

## 2023-06-26 PROCEDURE — 3075F PR MOST RECENT SYSTOLIC BLOOD PRESS GE 130-139MM HG: ICD-10-PCS | Mod: CPTII,S$GLB,, | Performed by: GENERAL ACUTE CARE HOSPITAL

## 2023-06-26 PROCEDURE — 1126F PR PAIN SEVERITY QUANTIFIED, NO PAIN PRESENT: ICD-10-PCS | Mod: CPTII,S$GLB,, | Performed by: GENERAL ACUTE CARE HOSPITAL

## 2023-07-11 ENCOUNTER — OFFICE VISIT (OUTPATIENT)
Dept: HEMATOLOGY/ONCOLOGY | Facility: CLINIC | Age: 78
End: 2023-07-11
Payer: MEDICARE

## 2023-07-11 ENCOUNTER — LAB VISIT (OUTPATIENT)
Dept: LAB | Facility: HOSPITAL | Age: 78
End: 2023-07-11
Attending: HOSPITALIST
Payer: MEDICARE

## 2023-07-11 VITALS
OXYGEN SATURATION: 95 % | HEART RATE: 89 BPM | DIASTOLIC BLOOD PRESSURE: 67 MMHG | RESPIRATION RATE: 18 BRPM | BODY MASS INDEX: 31.18 KG/M2 | HEIGHT: 73 IN | WEIGHT: 235.25 LBS | SYSTOLIC BLOOD PRESSURE: 146 MMHG

## 2023-07-11 DIAGNOSIS — Z79.818 ANDROGEN DEPRIVATION THERAPY: ICD-10-CM

## 2023-07-11 DIAGNOSIS — E78.5 HYPERLIPIDEMIA, UNSPECIFIED HYPERLIPIDEMIA TYPE: ICD-10-CM

## 2023-07-11 DIAGNOSIS — F33.1 MODERATE RECURRENT MAJOR DEPRESSION: ICD-10-CM

## 2023-07-11 DIAGNOSIS — C61 PROSTATE CANCER: Primary | ICD-10-CM

## 2023-07-11 DIAGNOSIS — C79.51 MALIGNANT NEOPLASM METASTATIC TO BONE: ICD-10-CM

## 2023-07-11 DIAGNOSIS — C61 PROSTATE CANCER: ICD-10-CM

## 2023-07-11 DIAGNOSIS — I10 HYPERTENSION, UNSPECIFIED TYPE: ICD-10-CM

## 2023-07-11 DIAGNOSIS — E04.1 THYROID NODULE: ICD-10-CM

## 2023-07-11 DIAGNOSIS — Z79.899 LONG TERM CURRENT USE OF THERAPEUTIC DRUG: ICD-10-CM

## 2023-07-11 DIAGNOSIS — N18.31 STAGE 3A CHRONIC KIDNEY DISEASE: ICD-10-CM

## 2023-07-11 LAB
ALBUMIN SERPL BCP-MCNC: 3.9 G/DL (ref 3.5–5.2)
ALP SERPL-CCNC: 90 U/L (ref 55–135)
ALT SERPL W/O P-5'-P-CCNC: 14 U/L (ref 10–44)
ANION GAP SERPL CALC-SCNC: 11 MMOL/L (ref 8–16)
AST SERPL-CCNC: 18 U/L (ref 10–40)
BILIRUB SERPL-MCNC: 0.2 MG/DL (ref 0.1–1)
BUN SERPL-MCNC: 30 MG/DL (ref 8–23)
CALCIUM SERPL-MCNC: 10.3 MG/DL (ref 8.7–10.5)
CHLORIDE SERPL-SCNC: 103 MMOL/L (ref 95–110)
CO2 SERPL-SCNC: 26 MMOL/L (ref 23–29)
COMPLEXED PSA SERPL-MCNC: 0.76 NG/ML (ref 0–4)
CREAT SERPL-MCNC: 1.3 MG/DL (ref 0.5–1.4)
ERYTHROCYTE [DISTWIDTH] IN BLOOD BY AUTOMATED COUNT: 13.1 % (ref 11.5–14.5)
EST. GFR  (NO RACE VARIABLE): 56.6 ML/MIN/1.73 M^2
GLUCOSE SERPL-MCNC: 176 MG/DL (ref 70–110)
HCT VFR BLD AUTO: 44.4 % (ref 40–54)
HGB BLD-MCNC: 14.7 G/DL (ref 14–18)
IMM GRANULOCYTES # BLD AUTO: 0.01 K/UL (ref 0–0.04)
MCH RBC QN AUTO: 30.8 PG (ref 27–31)
MCHC RBC AUTO-ENTMCNC: 33.1 G/DL (ref 32–36)
MCV RBC AUTO: 93 FL (ref 82–98)
NEUTROPHILS # BLD AUTO: 4 K/UL (ref 1.8–7.7)
PLATELET # BLD AUTO: 251 K/UL (ref 150–450)
PMV BLD AUTO: 9.9 FL (ref 9.2–12.9)
POTASSIUM SERPL-SCNC: 4.1 MMOL/L (ref 3.5–5.1)
PROT SERPL-MCNC: 7.7 G/DL (ref 6–8.4)
RBC # BLD AUTO: 4.77 M/UL (ref 4.6–6.2)
SODIUM SERPL-SCNC: 140 MMOL/L (ref 136–145)
WBC # BLD AUTO: 5.57 K/UL (ref 3.9–12.7)

## 2023-07-11 PROCEDURE — 3077F PR MOST RECENT SYSTOLIC BLOOD PRESSURE >= 140 MM HG: ICD-10-PCS | Mod: CPTII,S$GLB,, | Performed by: HOSPITALIST

## 2023-07-11 PROCEDURE — 3078F PR MOST RECENT DIASTOLIC BLOOD PRESSURE < 80 MM HG: ICD-10-PCS | Mod: CPTII,S$GLB,, | Performed by: HOSPITALIST

## 2023-07-11 PROCEDURE — 3078F DIAST BP <80 MM HG: CPT | Mod: CPTII,S$GLB,, | Performed by: HOSPITALIST

## 2023-07-11 PROCEDURE — 1101F PR PT FALLS ASSESS DOC 0-1 FALLS W/OUT INJ PAST YR: ICD-10-PCS | Mod: CPTII,S$GLB,, | Performed by: HOSPITALIST

## 2023-07-11 PROCEDURE — 99999 PR PBB SHADOW E&M-EST. PATIENT-LVL III: CPT | Mod: PBBFAC,,, | Performed by: HOSPITALIST

## 2023-07-11 PROCEDURE — 80053 COMPREHEN METABOLIC PANEL: CPT | Performed by: HOSPITALIST

## 2023-07-11 PROCEDURE — 99214 PR OFFICE/OUTPT VISIT, EST, LEVL IV, 30-39 MIN: ICD-10-PCS | Mod: S$GLB,,, | Performed by: HOSPITALIST

## 2023-07-11 PROCEDURE — 99214 OFFICE O/P EST MOD 30 MIN: CPT | Mod: S$GLB,,, | Performed by: HOSPITALIST

## 2023-07-11 PROCEDURE — 1126F AMNT PAIN NOTED NONE PRSNT: CPT | Mod: CPTII,S$GLB,, | Performed by: HOSPITALIST

## 2023-07-11 PROCEDURE — 36415 COLL VENOUS BLD VENIPUNCTURE: CPT | Performed by: HOSPITALIST

## 2023-07-11 PROCEDURE — 3288F FALL RISK ASSESSMENT DOCD: CPT | Mod: CPTII,S$GLB,, | Performed by: HOSPITALIST

## 2023-07-11 PROCEDURE — 99999 PR PBB SHADOW E&M-EST. PATIENT-LVL III: ICD-10-PCS | Mod: PBBFAC,,, | Performed by: HOSPITALIST

## 2023-07-11 PROCEDURE — 3288F PR FALLS RISK ASSESSMENT DOCUMENTED: ICD-10-PCS | Mod: CPTII,S$GLB,, | Performed by: HOSPITALIST

## 2023-07-11 PROCEDURE — 1101F PT FALLS ASSESS-DOCD LE1/YR: CPT | Mod: CPTII,S$GLB,, | Performed by: HOSPITALIST

## 2023-07-11 PROCEDURE — 1126F PR PAIN SEVERITY QUANTIFIED, NO PAIN PRESENT: ICD-10-PCS | Mod: CPTII,S$GLB,, | Performed by: HOSPITALIST

## 2023-07-11 PROCEDURE — 84153 ASSAY OF PSA TOTAL: CPT | Performed by: HOSPITALIST

## 2023-07-11 PROCEDURE — 85027 COMPLETE CBC AUTOMATED: CPT | Performed by: HOSPITALIST

## 2023-07-11 PROCEDURE — 3077F SYST BP >= 140 MM HG: CPT | Mod: CPTII,S$GLB,, | Performed by: HOSPITALIST

## 2023-07-11 NOTE — PROGRESS NOTES
ADVANCED PROSTATE CANCER CLINIC - FOLLOW UP VISIT     Best Contact Phone Number(s): [phone] (home)       Cancer/Stage/TNM:    Cancer Staging   Prostate cancer  Staging form: Prostate, AJCC 8th Edition  - Clinical: Stage IVB (cT3, cNX, cM1c, Grade Group: 5) - Signed by Darren Carter MD on 11/18/2022        Reason for visit: Noah Lopez is a 78 y.o. 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 He started first line ADT on 11/18/22 with plan to add apalutamide. Also with radiation to the pelvis and bilateral orchiectomy 2/15/23.       HPI:      Started apalutamide 240mg daily on 6/7/23. Energy level is stable - reports it is low, but remains quite active. Reports intermittent hot flashes which are tolerable. No urinary hestitancy. Flow remains slightly low. No other obstructive symptoms.     Recent thyroid nodule diagnosed as FLUS. Patient would like to defer further testing at present.     History has been obtained by chart review and discussion with the patient.     Oncology History   Prostate cancer   12/8/2021 Tumor Markers    PSA 11.7     1/2022 Notable Event    ED visit for urinary retention found on abodminal US. Jones placed.     3/2022 Notable Event    Seen in ED for fall. Incidentally found to have suspicious thyroid nodule     4/20/2022 Biopsy    FNA of thyroid nodule nondiagnostic. Also found to have new SHANNAN lung nodules.     5/16/2022 Imaging Significant Findings    FDG PET-CT in setting of pulmonary nodules:  1.  Two solid left upper lobe pulmonary nodules with no appreciable FDG uptake.  Malignancy is not excluded, and the larger nodule is likely amenable to percutaneous biopsy. Alternatively, continued surveillance by dedicated chest CT alone, i.e., without FDG PET, may be considered.     2.  Focal FDG uptake in the left prostate gland with extracapsular extension.  These findings are  concerning for malignancy.  Correlation with PSA and urology evaluation recommended.  MRI of the prostate may also be helpful for further evaluation.     3.  Abnormal appearance of the left symphysis pubis with hypermetabolic activity.  Differential considerations include fibrous dysplasia, Paget's disease, and metastasis.   MRI or bone algorithm CT may be informative.     4.  Incidental focal uptake in the right cerebellum on at least 2 PET slices.  Recommend brain MRI for further evaluation.     5.  2.2 cm left inferior thyroid lobe nodule.  This nodule was recently biopsied with abnormal results.  Please refer to pathology report.     5/25/2022 Tumor Markers    PSA 17.3     7/6/2022 Biopsy    Attempted SHANNAN nodule biopsy     8/11/2022 Biopsy    TRUS biopsy: Prostate adenocarcinoma up to 4+3 disease in 10/18 cores all from the left side. Associated cribriform glands and PNI.       8/31/2022 Imaging Significant Findings    Bone scan:  1. Uptake within the anterior right 5th and 6th ribs is most likely related to degenerative changes.  Correlation with plain film or chest CT scan recommended.     2.  Intense uptake within the medial left pubic bone corresponds to an area of sclerosis seen on the comparison PET-CT scan.  Differential considerations remain to be fibrous dysplasia, Paget's disease or metastasis.     3.  Negative for metastatic pattern of uptake otherwise.  Degenerative pattern of uptake and other incidental findings as noted above.     9/29/2022 Imaging Significant Findings    MRI prostate: Left-sided PI-RADS 5 lesion with extraprostatic extension involving the left-sided neurovascular bundles and seminal vesicles.  Extension of abnormal signal adjacent to the left levator ani, cannot rule out involvement.  Abnormal signal also closely approximates with the anterior rectal wall without definite involvement.     Left pubic lesion concerning for metastasis.     10/17/2022 Notable Event    Declined pubic  bone biopsy to potentially confirm metastatic prostate cancer     10/26/2022 Procedure    TURPT - pathology review with up to 4+5 disease     11/9/2022 Imaging Significant Findings    PSMA PET-CT shows evidence of osseous and pulmonary metastatic disease     11/18/2022 -  Hormone Therapy    Start Lupron 22.5mg IM; plan to start apalutamide     2/1/2023 - 2/7/2023 Radiation Therapy    Treating physician: Jaswinder Doyle    Course: C1 Pelvis 2023    Treatment Site Ref. ID Energy Dose/Fx (Gy) #Fx Dose Correction (Gy) Total Dose (Gy) Start Date End Date Elapsed Days   3D Iliac_Lt Lt Iliac 18X 4 5 / 5 0 20 2/1/2023 2/7/2023 6      3/28/2023 Genetic Testing    Germline genetic testing. No pathogenic mutations. VUS in APC and BMPR1A           Past Medical History:   Diagnosis Date    Colon polyp 09/06/2018    Hyperlipidemia     Hypertension     Prostate cancer     Stage 3a chronic kidney disease 6/21/2023    Urinary tract infection with hematuria 11/18/2022         Past Surgical History:   Procedure Laterality Date    COLONOSCOPY  2010    COLONOSCOPY N/A 9/6/2018    Procedure: COLONOSCOPY Suprep PLEASE TEXT PATIENT WITH ARRIVAL TIME;  Surgeon: Niharika Arce MD;  Location: [facility];  Service: Endoscopy;  Laterality: N/A;    CYSTOSCOPY WITH URODYNAMIC TESTING N/A 1/31/2022    Procedure: CYSTOSCOPY, WITH URODYNAMIC TESTING FLOUROSCOPIC;  Surgeon: Anthony Maloney MD;  Location: [address];  Service: Urology;  Laterality: N/A;  1hr    KNEE ARTHROSCOPY      ORCHIECTOMY Bilateral 2/15/2023    Procedure: Bilateral simple orchiectomy;  Surgeon: Helena Luis MD;  Location: [facility];  Service: Urology;  Laterality: Bilateral;    SPINE SURGERY      l-spine    TRANSURETHRAL RESECTION OF PROSTATE N/A 10/26/2022    Procedure: TURP (TRANSURETHRAL RESECTION OF PROSTATE);  Surgeon: Helena Luis MD;  Location: [facility];  Service: Urology;  Laterality: N/A;         I have reviewed and updated the patient's past medical,  surgical, family and social histories.     Review of patient's allergies indicates:   Allergen Reactions    Ciprofloxacin Other (See Comments)     tendonitis         Current Outpatient Medications   Medication Sig Dispense Refill    apalutamide (ERLEADA) 60 mg Tab Take 240 mg by mouth once daily. 120 tablet 11    ascorbic acid, vitamin C, (VITAMIN C) 1000 MG tablet Take 1,000 mg by mouth once daily.      bicalutamide (CASODEX) 50 MG Tab Take 1 tablet (50 mg total) by mouth once daily. 90 tablet 0    calcium carbonate (OS-TYREE) 500 mg calcium (1,250 mg) tablet Take 2 tablets (1,000 mg total) by mouth once daily. 60 tablet 11    cholecalciferol, vitamin D3, (VITAMIN D3) 25 mcg (1,000 unit) capsule Take 1 capsule (1,000 Units total) by mouth once daily. 30 capsule 11    clotrimazole-betamethasone 1-0.05% (LOTRISONE) cream Apply topically 2 (two) times daily. 60 g 3    diltiaZEM (CARDIZEM CD) 300 MG 24 hr capsule TAKE 1 CAPSULE BY MOUTH  ONCE DAILY 90 capsule 3    diphenhydramine HCl (BENADRYL ALLERGY ORAL) Take by mouth.      docusate sodium (COLACE) 250 MG capsule Take 250 mg by mouth once daily.      finasteride (PROSCAR) 5 mg tablet Take 1 tablet (5 mg total) by mouth once daily. 90 tablet 3    GAS RELIEF 80, SIMETHICONE, ORAL Take by mouth.      lisinopriL (PRINIVIL,ZESTRIL) 20 MG tablet TAKE 1 TABLET BY MOUTH ONCE DAILY 90 tablet 3    melatonin 10 mg TbDL Take by mouth.      methocarbamoL (ROBAXIN) 750 MG Tab Take 750-1,500 mg by mouth 3 (three) times daily as needed.      miconazole (MICOTIN) 2 % cream Apply topically 2 (two) times daily. Apply to tip of penis prn irritation 28 g 0    multivit-min-FA-lycopen-lutein (MEN 50 PLUS MULTIVITAMIN) 300-600-300 mcg Tab as directed Orally      multivitamin capsule Take 1 capsule by mouth once daily.      ofloxacin (FLOXIN) 0.3 % otic solution Apply 10 drops into right ear once a day for 10 days.  Please dispense generic.      omega-3 fatty acids/fish oil (FISH OIL-OMEGA-3  "FATTY ACIDS) 300-1,000 mg capsule Take 1 capsule by mouth once daily.      simvastatin (ZOCOR) 20 MG tablet TAKE 1 TABLET BY MOUTH IN  THE EVENING 90 tablet 3    tamsulosin (FLOMAX) 0.4 mg Cap Take 1 capsule by mouth once daily 90 capsule 3    traZODone (DESYREL) 100 MG tablet TAKE 2 TABLETS BY MOUTH EVERY DAY AT BEDTIME AS NEEDED FOR INSOMNIA 180 tablet 3    traZODone (DESYREL) 100 MG tablet Take 2 tablets (200 mg total) by mouth every evening. 60 tablet 0    TURMERIC ORAL Take by mouth.      wheat dextrin (BENEFIBER CLEAR SF, DEXTRIN, ORAL) Take by mouth.       No current facility-administered medications for this visit.        Objective:      Physical Exam:   BP (!) 146/67 (BP Location: Left arm, Patient Position: Sitting, BP Method: Medium (Automatic))   Pulse 89   Resp 18   Ht 6' 1" (1.854 m)   Wt 106.7 kg (235 lb 3.7 oz)   SpO2 95%   BMI 31.03 kg/m²       ECOG Performance status:   ECOG SCORE                       Physical Exam  Constitutional:       General: He is not in acute distress.     Appearance: Normal appearance.   HENT:      Head: Normocephalic.   Eyes:      General: No scleral icterus.     Extraocular Movements: Extraocular movements intact.      Conjunctiva/sclera: Conjunctivae normal.   Cardiovascular:      Rate and Rhythm: Normal rate.   Pulmonary:      Effort: Pulmonary effort is normal. No respiratory distress.   Abdominal:      General: There is no distension.      Palpations: Abdomen is soft.   Musculoskeletal:      Right lower leg: No edema.      Left lower leg: No edema.   Skin:     General: Skin is warm and dry.   Neurological:      Mental Status: He is alert and oriented to person, place, and time.      Motor: No weakness.   Psychiatric:         Mood and Affect: Mood normal.         Behavior: Behavior normal.         Thought Content: Thought content normal.        Recent Labs:   Lab Results   Component Value Date    WBC 5.57 07/11/2023    RBC 4.77 07/11/2023    HGB 14.7 07/11/2023 "    HCT 44.4 07/11/2023    MCV 93 07/11/2023    MCH 30.8 07/11/2023    MCHC 33.1 07/11/2023    RDW 13.1 07/11/2023     07/11/2023    MPV 9.9 07/11/2023    IMMGR 0.3 09/12/2022    GRAN 4.0 07/11/2023    IGABS 0.01 07/11/2023    LYMPH 0.8 (L) 09/12/2022    LYMPH 13.0 (L) 09/12/2022    MONO 0.7 09/12/2022    MONO 11.3 09/12/2022    EOS 0.1 09/12/2022    BASO 0.03 09/12/2022    NRBC 0 09/12/2022    EOSINOPHIL 0.8 09/12/2022    BASOPHIL 0.5 09/12/2022    DIFFMETHOD Automated 09/12/2022       Lab Results   Component Value Date     07/11/2023    K 4.1 07/11/2023     07/11/2023    CO2 26 07/11/2023     (H) 07/11/2023    BUN 30 (H) 07/11/2023    CREATININE 1.3 07/11/2023    CALCIUM 10.3 07/11/2023    PROT 7.7 07/11/2023    ALBUMIN 3.9 07/11/2023    BILITOT 0.2 07/11/2023    ALKPHOS 90 07/11/2023    AST 18 07/11/2023    ALT 14 07/11/2023    ANIONGAP 11 07/11/2023    EGFRNORACEVR 56.6 (A) 07/11/2023        Lab Results   Component Value Date    PSADIAG 0.76 07/11/2023    PSADIAG 1.2 05/05/2023    PSADIAG 1.3 04/10/2023    PSADIAG 1.7 03/16/2023    PSADIAG 5.2 (H) 01/27/2023    PSADIAG 6.7 (H) 12/22/2022    PSADIAG 19.6 (H) 11/18/2022    PSADIAG 17.3 (H) 05/25/2022    PSATOTAL 15.8 (H) 07/05/2022    PSA 11.7 (H) 12/08/2021        Cardiovascular Screening:  Primary care physician: Anthony De La Torre MD      The 10-year ASCVD risk score (Echo DK, et al., 2019) is: 36.7%    Values used to calculate the score:      Age: 78 years      Sex: Male      Is Non- : No      Diabetic: No      Tobacco smoker: No      Systolic Blood Pressure: 146 mmHg      Is BP treated: Yes      HDL Cholesterol: 45 mg/dL      Total Cholesterol: 145 mg/dL    ASCVD Risk Level: High risk >= 20%    EKG:   Results for orders placed or performed in visit on 10/04/22   EKG 12-lead    Collection Time: 10/04/22 10:51 AM    Narrative    Test Reason : Z01.810,    Vent. Rate : 087 BPM     Atrial Rate : 087 BPM     P-R Int :  166 ms          QRS Dur : 080 ms      QT Int : 362 ms       P-R-T Axes : 061 039 042 degrees     QTc Int : 435 ms    Normal sinus rhythm  Normal ECG  When compared with ECG of 12-SEP-2022 17:34,  No significant change was found  Confirmed by Oswaldo AVILA, Arie NATION ([id number]) on 10/6/2022 7:32:16 AM    Referred By: SHIRA VAZQUEZ           Confirmed By:Arie Chacon MD       High blood pressure = Yes  Antihypertensive agents: diltiaZEM - 300 MG  lisinopriL - 20 MG   Comments:     DM2: No  Antidiabetic agents: This patient does not have an active medication from one of the medication groupers.   Comments:     Hyperlipidemia: Yes  Lipid lowering agents: simvastatin - 20 MG   Comments:     Antiplatelet therapy: No  Agent: This patient does not have an active medication from one of the medication groupers.   Comment:     Body mass index is 31.03 kg/m².  If greater than 30, referral to nutrition    Lab Results   Component Value Date    CHOL 145 12/08/2021    LDLCALC 83.2 12/08/2021    HDL 45 12/08/2021    TRIG 84 12/08/2021    HGBA1C 5.6 01/26/2022        Bone Health    No results found for: [id number]     Results for orders placed during the hospital encounter of 02/23/23    DXA Bone Density with Vertebral Fracture    Impression  Normal bone mineral density.    Consider FDA approved medical therapies in postmenopausal women and men aged 50 years and older, based on the following:    *A hip or vertebral (clinical or morphometric) fracture  *T score less than or equal to -2.5 at the femoral neck or spine after appropriate evaluation to exclude secondary causes.  *Low bone mass -- also known as osteopenia (T score between -1.0 and -2.5 at the femoral neck or spine) and a 10 year probability of hip fracture greater than or equal to 3% or a 10 year probability of major osteoporosis-related fracture greater than or equal to 20% based on the [place]-adapted WHO algorithm.  *Clinicians judgment and/or patient preference may indicate  treatment for people with 10 year fracture probabilities is above or below these levels.      Electronically signed by: Anshu Stephens  Date:    02/23/2023  Time:    12:08       Vitamin D: 1000 IU daily  Calcium: Recommend 4 servings of dairy daily     Osteopenia/Osteoporosis: None    Bone strengthening agent: None     Staging Imaging     Results for orders placed during the hospital encounter of 11/09/22    NM PET CT F 18 PYL PSMA, Midthigh to Vertex    Impression  1. Evidence of prostate neoplasm with osseous and pulmonary metastasis.  Detrimental changes noted when comparison is made to 05/16/2022 in particular with progression of several pulmonary nodules and with development of 1 or 2 small pulmonary nodules.  2. A mass is noted inferior to the left lobe of the thyroid gland entirely specific but demonstrating increased uptake similar since the prior  3. A prostate based mass appears to be present with involvement of the bladder and a distended bladder suggestive outlet obstruction      Electronically signed by: John Steele MD  Date:    11/09/2022  Time:    15:31       Results for orders placed during the hospital encounter of 08/31/22    NM Bone Scan Whole Body    Impression  1. Uptake within the anterior right 5th and 6th ribs is most likely related to degenerative changes.  Correlation with plain film or chest CT scan recommended.    2.  Intense uptake within the medial left pubic bone corresponds to an area of sclerosis seen on the comparison PET-CT scan.  Differential considerations remain to be fibrous dysplasia, Paget's disease or metastasis.    3.  Negative for metastatic pattern of uptake otherwise.  Degenerative pattern of uptake and other incidental findings as noted above.      Electronically signed by: Antonio Mckeon MD  Date:    08/31/2022  Time:    12:35      Results for orders placed during the hospital encounter of 09/29/22    MRI Prostate W W/O Contrast    Impression  Left-sided PI-RADS 5  lesion with extraprostatic extension involving the left-sided neurovascular bundles and seminal vesicles.  Extension of abnormal signal adjacent to the left levator ani, cannot rule out involvement.  Abnormal signal also closely approximates with the anterior rectal wall without definite involvement.    Left pubic lesion concerning for metastasis.    Diffuse heterogeneous bone marrow.    Overall Assessment: PI-RADS 5 - Very high (clinically significant cancer is highly likely to be present)    Number of targets created for potential MR/US fusion biopsy    Transitional/Peripheral zone: 1    This report was flagged in Epic as abnormal.    Electronically signed by resident: Al Castrejon  Date:    09/29/2022  Time:    16:12    Electronically signed by: Shekhar Rodríguez MD  Date:    09/29/2022  Time:    19:08       No results found for this or any previous visit.       I have personally reviewed the above imaging.     Path:   Reviewed pathology as documented above.    Genomic testing:     Germline genetic testing: 3/28/23 - VUS in APC and BMPR1A  Results for orders placed or performed in visit on 03/28/23   Genetic Misc Sendout Test, Blood   Result Value Ref Range    Miscellaneous Genetic Test Name [facility] BRCA + 84/RNA     Genso Specimen Type Blood     Genetic counseling? Yes     Parental or Sibling Testing? No     Test Result See result image under hyperlink     Reference Lab SEE COMMENT         Somatic tumor genotyping: None      ctDNA genotyping: None       Diagnoses:     1. Prostate cancer    2. Androgen deprivation therapy    3. Bone metastases    4. Hypertension, unspecified type    5. Hyperlipidemia, unspecified hyperlipidemia type    6. Moderate recurrent major depression    7. Thyroid nodule    8. Stage 3a chronic kidney disease    9. Long term current use of therapeutic drug          Assessment and Plan:     1. Prostate cancer  Overview:  Patient with de david high-volume metastatic prosatate adenocarcinoma  including lung and osseous mets. Started ADT 11/18/23 with bicalutamide and plan to start apalutamide. Completed RT to the left iliac met 2/7/23    Assessment & Plan:  Tolerating treatment well. PSA is downtrending. Will continue ADT via orchiectomy and concurrent apalutamide.    - Awaiting ctDNA - will need to make sure it was sent  - FU in 4-6 weeks for toxicity check after starting apalutamide    Orders:  -     Tumor NGS Blood; Future; Expected date: 07/11/2023  -     Tumor NGS Blood Add-on; Future; Expected date: 07/11/2023    2. Androgen deprivation therapy  Overview:  Current agent: History of bilateral orchiectomy  Start date:  ADT start 11/2022  Last dose: Orchiectomy 2/15/23  Duration of therapy: Indefefinite    Novel Androgen signaling inhibitors:   Agent: Apalutamide  Start date: 6/7/23        Assessment & Plan:  - Will check A1c and Lipids with next labs      3. Bone metastases  Overview:  Has left pelvic turbercle metastases noted on PSMA PET CT with associated discomfort. Completed RT on 2/3/23.    Assessment & Plan:  - Con't Ca/D  - Defer antiresportive therapy until develop mCRPC      4. Hypertension, unspecified type  Overview:  Home regimen includes diltiazem 300mg and lisinopril 20mg po daily.     Assessment & Plan:  Reasonable control today.  - Continue home medications      5. Hyperlipidemia, unspecified hyperlipidemia type  Overview:  On simvastatin 20mg daily      6. Moderate recurrent major depression  Overview:  Generally stable. Not on any antidepresseant      7. Thyroid nodule  Overview:  TI-RADS 5 lesion. Non-diagnostic biopsy 04/2022. FLUS on repeat biopsy 5/8/23.    Assessment & Plan:  - Patient is declining further evaluation      8. Stage 3a chronic kidney disease  Assessment & Plan:  - Stable, at recent baseline      9. Long term current use of therapeutic drug            Follow up:   Route Chart for Scheduling    Med Onc Chart Routing      Follow up with physician 4 weeks.   Follow  up with JENIFFER    Infusion scheduling note    Injection scheduling note    Labs CBC, CMP and other   Scheduling:  Preferred lab: [place]  Lab interval:  A1c and fasting lipids   Imaging    Pharmacy appointment    Other referrals                     The above information has been reviewed with the patient and all questions have been answered to their apparent satisfaction.  They understand that they can call the clinic with any questions.    Darren Carter

## 2023-07-11 NOTE — PATIENT INSTRUCTIONS
Tolerating apalutamide well. PSA has come down further and minimal side effects including fatigue.    - Plan to check diabetes screen and cholesterol panel with you next labs, can be done in [place].   - Continue to monitor weight on hormone therapy; try not to gain signficant weight.  - Please try to exercise 30 minutes a day at least 3-5 days per week.

## 2023-07-11 NOTE — ASSESSMENT & PLAN NOTE
Tolerating treatment well. PSA is downtrending. Will continue ADT via orchiectomy and concurrent apalutamide.    - Awaiting ctDNA - will need to make sure it was sent  - FU in 4-6 weeks for toxicity check after starting apalutamide

## 2023-07-17 DIAGNOSIS — Z79.899 LONG TERM CURRENT USE OF THERAPEUTIC DRUG: Primary | ICD-10-CM

## 2023-07-17 DIAGNOSIS — Z79.818 ANDROGEN DEPRIVATION THERAPY: ICD-10-CM

## 2023-07-22 NOTE — TELEPHONE ENCOUNTER
Refill Routing Note   Medication(s) are not appropriate for processing by [facility] for the following reason(s):      New or recently adjusted medication    [facility] action(s):  Defer Care Due:  None identified            Appointments  past 12m or future 3m with PCP    Date Provider   Last Visit   6/21/2023 Anthony De La Torre MD   Next Visit   12/22/2023 Anthony De La Torre MD   ED visits in past 90 days: 0        Note composed:6:16 PM 07/22/2023

## 2023-07-23 RX ORDER — TRAZODONE HYDROCHLORIDE 100 MG/1
TABLET ORAL
Qty: 60 TABLET | Refills: 0 | Status: SHIPPED | OUTPATIENT
Start: 2023-07-23 | End: 2023-08-23

## 2023-07-31 NOTE — PATIENT INSTRUCTIONS
The risks of the procedure were discussed and included but were not limited to pain, infection, bleeding, scarring, loss of hormone (testosterone) resulting in libido and/or erectile dysfunction, decreased energy, loss of fertility, scrotal infection with possible abscess formation, no guarantee of cancer cure (if found), hematoma and the need for further procedures. It is also possible that the surgery may not rid his need of androgen depriving medications in the future.    
Handoff

## 2023-08-07 ENCOUNTER — LAB VISIT (OUTPATIENT)
Dept: LAB | Facility: HOSPITAL | Age: 78
End: 2023-08-07
Attending: HOSPITALIST
Payer: MEDICARE

## 2023-08-07 DIAGNOSIS — Z79.818 ANDROGEN DEPRIVATION THERAPY: ICD-10-CM

## 2023-08-07 DIAGNOSIS — C61 PROSTATE CANCER: ICD-10-CM

## 2023-08-07 DIAGNOSIS — Z79.899 LONG TERM CURRENT USE OF THERAPEUTIC DRUG: ICD-10-CM

## 2023-08-07 LAB
25(OH)D3+25(OH)D2 SERPL-MCNC: 62 NG/ML (ref 30–96)
ALBUMIN SERPL BCP-MCNC: 4.3 G/DL (ref 3.5–5.2)
ALP SERPL-CCNC: 82 U/L (ref 38–126)
ALT SERPL W/O P-5'-P-CCNC: 21 U/L (ref 10–44)
ANION GAP SERPL CALC-SCNC: 9 MMOL/L (ref 8–16)
AST SERPL-CCNC: 28 U/L (ref 15–46)
BILIRUB SERPL-MCNC: 0.3 MG/DL (ref 0.1–1)
CALCIUM SERPL-MCNC: 10.3 MG/DL (ref 8.7–10.5)
CHLORIDE SERPL-SCNC: 104 MMOL/L (ref 95–110)
CHOLEST SERPL-MCNC: 224 MG/DL (ref 120–199)
CHOLEST/HDLC SERPL: 4.5 {RATIO} (ref 2–5)
CO2 SERPL-SCNC: 32 MMOL/L (ref 23–29)
COMPLEXED PSA SERPL-MCNC: 0.67 NG/ML (ref 0–4)
CREAT SERPL-MCNC: 1.37 MG/DL (ref 0.5–1.4)
ERYTHROCYTE [DISTWIDTH] IN BLOOD BY AUTOMATED COUNT: 13.2 % (ref 11.5–14.5)
EST. GFR  (NO RACE VARIABLE): 53.1 ML/MIN/1.73 M^2
ESTIMATED AVG GLUCOSE: 108 MG/DL (ref 68–131)
GLUCOSE SERPL-MCNC: 111 MG/DL (ref 70–110)
HBA1C MFR BLD: 5.4 % (ref 4–5.6)
HCT VFR BLD AUTO: 44.9 % (ref 40–54)
HDLC SERPL-MCNC: 50 MG/DL (ref 40–75)
HDLC SERPL: 22.3 % (ref 20–50)
HGB BLD-MCNC: 15.1 G/DL (ref 14–18)
IMM GRANULOCYTES # BLD AUTO: 0.03 K/UL (ref 0–0.04)
LDLC SERPL CALC-MCNC: 150.4 MG/DL (ref 63–159)
MCH RBC QN AUTO: 31.7 PG (ref 27–31)
MCHC RBC AUTO-ENTMCNC: 33.6 G/DL (ref 32–36)
MCV RBC AUTO: 94 FL (ref 82–98)
NEUTROPHILS # BLD AUTO: 2.8 K/UL (ref 1.8–7.7)
NONHDLC SERPL-MCNC: 174 MG/DL
PLATELET # BLD AUTO: 241 K/UL (ref 150–450)
PMV BLD AUTO: 9.9 FL (ref 9.2–12.9)
POTASSIUM SERPL-SCNC: 4.2 MMOL/L (ref 3.5–5.1)
PROT SERPL-MCNC: 8.1 G/DL (ref 6–8.4)
RBC # BLD AUTO: 4.77 M/UL (ref 4.6–6.2)
SODIUM SERPL-SCNC: 145 MMOL/L (ref 136–145)
TRIGL SERPL-MCNC: 118 MG/DL (ref 30–150)
UUN UR-MCNC: 22 MG/DL (ref 2–20)
WBC # BLD AUTO: 4.52 K/UL (ref 3.9–12.7)

## 2023-08-07 PROCEDURE — 85027 COMPLETE CBC AUTOMATED: CPT | Mod: PO | Performed by: HOSPITALIST

## 2023-08-07 PROCEDURE — 80061 LIPID PANEL: CPT | Performed by: HOSPITALIST

## 2023-08-07 PROCEDURE — 82306 VITAMIN D 25 HYDROXY: CPT | Mod: PO | Performed by: HOSPITALIST

## 2023-08-07 PROCEDURE — 36415 COLL VENOUS BLD VENIPUNCTURE: CPT | Mod: PO | Performed by: HOSPITALIST

## 2023-08-07 PROCEDURE — 83036 HEMOGLOBIN GLYCOSYLATED A1C: CPT | Performed by: HOSPITALIST

## 2023-08-07 PROCEDURE — 84153 ASSAY OF PSA TOTAL: CPT | Performed by: HOSPITALIST

## 2023-08-07 PROCEDURE — 80053 COMPREHEN METABOLIC PANEL: CPT | Mod: PO | Performed by: HOSPITALIST

## 2023-08-08 ENCOUNTER — OFFICE VISIT (OUTPATIENT)
Dept: HEMATOLOGY/ONCOLOGY | Facility: CLINIC | Age: 78
End: 2023-08-08
Payer: MEDICARE

## 2023-08-08 ENCOUNTER — LAB VISIT (OUTPATIENT)
Dept: LAB | Facility: HOSPITAL | Age: 78
End: 2023-08-08
Attending: HOSPITALIST
Payer: MEDICARE

## 2023-08-08 VITALS
RESPIRATION RATE: 18 BRPM | DIASTOLIC BLOOD PRESSURE: 79 MMHG | HEART RATE: 97 BPM | HEIGHT: 73 IN | WEIGHT: 233 LBS | BODY MASS INDEX: 30.88 KG/M2 | SYSTOLIC BLOOD PRESSURE: 183 MMHG | TEMPERATURE: 98 F | OXYGEN SATURATION: 95 %

## 2023-08-08 DIAGNOSIS — E78.5 HYPERLIPIDEMIA, UNSPECIFIED HYPERLIPIDEMIA TYPE: ICD-10-CM

## 2023-08-08 DIAGNOSIS — C61 PROSTATE CANCER: Primary | ICD-10-CM

## 2023-08-08 DIAGNOSIS — Z79.818 ANDROGEN DEPRIVATION THERAPY: ICD-10-CM

## 2023-08-08 DIAGNOSIS — E04.1 THYROID NODULE: ICD-10-CM

## 2023-08-08 DIAGNOSIS — C61 PROSTATE CANCER: ICD-10-CM

## 2023-08-08 DIAGNOSIS — C79.51 MALIGNANT NEOPLASM METASTATIC TO BONE: ICD-10-CM

## 2023-08-08 DIAGNOSIS — I10 HYPERTENSION, UNSPECIFIED TYPE: ICD-10-CM

## 2023-08-08 LAB — TSH SERPL DL<=0.005 MIU/L-ACNC: 1.31 UIU/ML (ref 0.4–4)

## 2023-08-08 PROCEDURE — 1126F AMNT PAIN NOTED NONE PRSNT: CPT | Mod: CPTII,S$GLB,, | Performed by: HOSPITALIST

## 2023-08-08 PROCEDURE — 99999 PR PBB SHADOW E&M-EST. PATIENT-LVL IV: ICD-10-PCS | Mod: PBBFAC,,, | Performed by: HOSPITALIST

## 2023-08-08 PROCEDURE — 3078F PR MOST RECENT DIASTOLIC BLOOD PRESSURE < 80 MM HG: ICD-10-PCS | Mod: CPTII,S$GLB,, | Performed by: HOSPITALIST

## 2023-08-08 PROCEDURE — 3288F PR FALLS RISK ASSESSMENT DOCUMENTED: ICD-10-PCS | Mod: CPTII,S$GLB,, | Performed by: HOSPITALIST

## 2023-08-08 PROCEDURE — 99215 PR OFFICE/OUTPT VISIT, EST, LEVL V, 40-54 MIN: ICD-10-PCS | Mod: S$GLB,,, | Performed by: HOSPITALIST

## 2023-08-08 PROCEDURE — 1101F PT FALLS ASSESS-DOCD LE1/YR: CPT | Mod: CPTII,S$GLB,, | Performed by: HOSPITALIST

## 2023-08-08 PROCEDURE — 3077F PR MOST RECENT SYSTOLIC BLOOD PRESSURE >= 140 MM HG: ICD-10-PCS | Mod: CPTII,S$GLB,, | Performed by: HOSPITALIST

## 2023-08-08 PROCEDURE — 3078F DIAST BP <80 MM HG: CPT | Mod: CPTII,S$GLB,, | Performed by: HOSPITALIST

## 2023-08-08 PROCEDURE — 3288F FALL RISK ASSESSMENT DOCD: CPT | Mod: CPTII,S$GLB,, | Performed by: HOSPITALIST

## 2023-08-08 PROCEDURE — 3077F SYST BP >= 140 MM HG: CPT | Mod: CPTII,S$GLB,, | Performed by: HOSPITALIST

## 2023-08-08 PROCEDURE — 1126F PR PAIN SEVERITY QUANTIFIED, NO PAIN PRESENT: ICD-10-PCS | Mod: CPTII,S$GLB,, | Performed by: HOSPITALIST

## 2023-08-08 PROCEDURE — 1101F PR PT FALLS ASSESS DOC 0-1 FALLS W/OUT INJ PAST YR: ICD-10-PCS | Mod: CPTII,S$GLB,, | Performed by: HOSPITALIST

## 2023-08-08 PROCEDURE — 36415 COLL VENOUS BLD VENIPUNCTURE: CPT | Performed by: HOSPITALIST

## 2023-08-08 PROCEDURE — 99999 PR PBB SHADOW E&M-EST. PATIENT-LVL IV: CPT | Mod: PBBFAC,,, | Performed by: HOSPITALIST

## 2023-08-08 PROCEDURE — 99215 OFFICE O/P EST HI 40 MIN: CPT | Mod: S$GLB,,, | Performed by: HOSPITALIST

## 2023-08-08 PROCEDURE — 84443 ASSAY THYROID STIM HORMONE: CPT | Performed by: HOSPITALIST

## 2023-08-08 RX ORDER — LISINOPRIL 40 MG/1
40 TABLET ORAL DAILY
Qty: 90 TABLET | Refills: 3 | Status: SHIPPED | OUTPATIENT
Start: 2023-08-08

## 2023-08-08 RX ORDER — ATORVASTATIN CALCIUM 40 MG/1
40 TABLET, FILM COATED ORAL DAILY
Qty: 90 TABLET | Refills: 3 | Status: SHIPPED | OUTPATIENT
Start: 2023-08-08 | End: 2024-08-07

## 2023-08-08 NOTE — ASSESSMENT & PLAN NOTE
Tolerating treatment well, PSA slightly lower today.    - Will need to resend Tempus xF  - FU in 3 months for toxicity check

## 2023-08-08 NOTE — PATIENT INSTRUCTIONS
PSA still downtrending on apalutamide + ADT. Tolerating well with minimal expected side effects.     - Will increase lisinopril to 40mg daily. New RX for a 40mg tablet sent to your mail order pharmacy. Can double up on your current 20mg tablets for now.   - Will change from simvastatin 20mg daily to atorvastatin 40mg daily due to cholesterol still being high  - Send circulating tumor DNA profiling today on the third floor

## 2023-08-08 NOTE — PROGRESS NOTES
ADVANCED PROSTATE CANCER CLINIC - FOLLOW UP VISIT     Best Contact Phone Number(s): [phone] (home)       Cancer/Stage/TNM:    Cancer Staging   Prostate cancer  Staging form: Prostate, AJCC 8th Edition  - Clinical: Stage IVB (cT3, cNX, cM1c, Grade Group: 5) - Signed by Darren Carter MD on 11/18/2022        Reason for visit: Noah Lopez is a 78 y.o. 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 He started first line ADT on 11/18/22 with plan to add apalutamide. Also with radiation to the pelvis and bilateral orchiectomy 2/15/23.     History has been obtained by chart review and discussion with the patient.    HPI:      Energy is stable. Reports hot flashes most days, but not bothersome. Reports good urinary function; no obstruction. Ongoing low flow but no dribbling. No urinary urgency and no incontinence. Denies any rash.    Blood pressure is markedly elevated today. Does not check at home.       Oncology History   Prostate cancer   12/8/2021 Tumor Markers    PSA 11.7     1/2022 Notable Event    ED visit for urinary retention found on abodminal US. Jones placed.     3/2022 Notable Event    Seen in ED for fall. Incidentally found to have suspicious thyroid nodule     4/20/2022 Biopsy    FNA of thyroid nodule nondiagnostic. Also found to have new SHANNAN lung nodules.     5/16/2022 Imaging Significant Findings    FDG PET-CT in setting of pulmonary nodules:  1.  Two solid left upper lobe pulmonary nodules with no appreciable FDG uptake.  Malignancy is not excluded, and the larger nodule is likely amenable to percutaneous biopsy. Alternatively, continued surveillance by dedicated chest CT alone, i.e., without FDG PET, may be considered.     2.  Focal FDG uptake in the left prostate gland with extracapsular extension.  These findings are concerning for malignancy.  Correlation with PSA and urology  evaluation recommended.  MRI of the prostate may also be helpful for further evaluation.     3.  Abnormal appearance of the left symphysis pubis with hypermetabolic activity.  Differential considerations include fibrous dysplasia, Paget's disease, and metastasis.   MRI or bone algorithm CT may be informative.     4.  Incidental focal uptake in the right cerebellum on at least 2 PET slices.  Recommend brain MRI for further evaluation.     5.  2.2 cm left inferior thyroid lobe nodule.  This nodule was recently biopsied with abnormal results.  Please refer to pathology report.     5/25/2022 Tumor Markers    PSA 17.3     7/6/2022 Biopsy    Attempted SHANNAN nodule biopsy     8/11/2022 Biopsy    TRUS biopsy: Prostate adenocarcinoma up to 4+3 disease in 10/18 cores all from the left side. Associated cribriform glands and PNI.       8/31/2022 Imaging Significant Findings    Bone scan:  1. Uptake within the anterior right 5th and 6th ribs is most likely related to degenerative changes.  Correlation with plain film or chest CT scan recommended.     2.  Intense uptake within the medial left pubic bone corresponds to an area of sclerosis seen on the comparison PET-CT scan.  Differential considerations remain to be fibrous dysplasia, Paget's disease or metastasis.     3.  Negative for metastatic pattern of uptake otherwise.  Degenerative pattern of uptake and other incidental findings as noted above.     9/29/2022 Imaging Significant Findings    MRI prostate: Left-sided PI-RADS 5 lesion with extraprostatic extension involving the left-sided neurovascular bundles and seminal vesicles.  Extension of abnormal signal adjacent to the left levator ani, cannot rule out involvement.  Abnormal signal also closely approximates with the anterior rectal wall without definite involvement.     Left pubic lesion concerning for metastasis.     10/17/2022 Notable Event    Declined pubic bone biopsy to potentially confirm metastatic prostate  cancer     10/26/2022 Procedure    TURPT - pathology review with up to 4+5 disease     11/9/2022 Imaging Significant Findings    PSMA PET-CT shows evidence of osseous and pulmonary metastatic disease     11/18/2022 -  Hormone Therapy    Start Lupron 22.5mg IM; plan to start apalutamide     2/1/2023 - 2/7/2023 Radiation Therapy    Treating physician: Jaswinder Doyle    Course: C1 Pelvis 2023    Treatment Site Ref. ID Energy Dose/Fx (Gy) #Fx Dose Correction (Gy) Total Dose (Gy) Start Date End Date Elapsed Days   3D Iliac_Lt Lt Iliac 18X 4 5 / 5 0 20 2/1/2023 2/7/2023 6      3/28/2023 Genetic Testing    Germline genetic testing. No pathogenic mutations. VUS in APC and BMPR1A           Past Medical History:   Diagnosis Date    Colon polyp 09/06/2018    Hyperlipidemia     Hypertension     Prostate cancer     Stage 3a chronic kidney disease 6/21/2023    Urinary tract infection with hematuria 11/18/2022         Past Surgical History:   Procedure Laterality Date    COLONOSCOPY  2010    COLONOSCOPY N/A 9/6/2018    Procedure: COLONOSCOPY Suprep PLEASE TEXT PATIENT WITH ARRIVAL TIME;  Surgeon: Niharika Arce MD;  Location: [facility];  Service: Endoscopy;  Laterality: N/A;    CYSTOSCOPY WITH URODYNAMIC TESTING N/A 1/31/2022    Procedure: CYSTOSCOPY, WITH URODYNAMIC TESTING FLOUROSCOPIC;  Surgeon: Anthony Maloney MD;  Location: [address];  Service: Urology;  Laterality: N/A;  1hr    KNEE ARTHROSCOPY      ORCHIECTOMY Bilateral 2/15/2023    Procedure: Bilateral simple orchiectomy;  Surgeon: Helena Luis MD;  Location: [facility];  Service: Urology;  Laterality: Bilateral;    SPINE SURGERY      l-spine    TRANSURETHRAL RESECTION OF PROSTATE N/A 10/26/2022    Procedure: TURP (TRANSURETHRAL RESECTION OF PROSTATE);  Surgeon: Helena Luis MD;  Location: [facility];  Service: Urology;  Laterality: N/A;         I have reviewed and updated the patient's past medical, surgical, family and social histories.     Review of  patient's allergies indicates:   Allergen Reactions    Ciprofloxacin Other (See Comments)     tendonitis         Current Outpatient Medications   Medication Sig Dispense Refill    apalutamide (ERLEADA) 60 mg Tab Take 240 mg by mouth once daily. 120 tablet 11    ascorbic acid, vitamin C, (VITAMIN C) 1000 MG tablet Take 1,000 mg by mouth once daily.      atorvastatin (LIPITOR) 40 MG tablet Take 1 tablet (40 mg total) by mouth once daily. 90 tablet 3    bicalutamide (CASODEX) 50 MG Tab Take 1 tablet (50 mg total) by mouth once daily. 90 tablet 0    calcium carbonate (OS-TYREE) 500 mg calcium (1,250 mg) tablet Take 2 tablets (1,000 mg total) by mouth once daily. 60 tablet 11    cholecalciferol, vitamin D3, (VITAMIN D3) 25 mcg (1,000 unit) capsule Take 1 capsule (1,000 Units total) by mouth once daily. 30 capsule 11    clotrimazole-betamethasone 1-0.05% (LOTRISONE) cream Apply topically 2 (two) times daily. 60 g 3    diltiaZEM (CARDIZEM CD) 300 MG 24 hr capsule TAKE 1 CAPSULE BY MOUTH  ONCE DAILY 90 capsule 3    diphenhydramine HCl (BENADRYL ALLERGY ORAL) Take by mouth.      docusate sodium (COLACE) 250 MG capsule Take 250 mg by mouth once daily.      finasteride (PROSCAR) 5 mg tablet Take 1 tablet (5 mg total) by mouth once daily. 90 tablet 3    GAS RELIEF 80, SIMETHICONE, ORAL Take by mouth.      lisinopriL (PRINIVIL,ZESTRIL) 40 MG tablet Take 1 tablet (40 mg total) by mouth once daily. 90 tablet 3    melatonin 10 mg TbDL Take by mouth.      methocarbamoL (ROBAXIN) 750 MG Tab Take 750-1,500 mg by mouth 3 (three) times daily as needed.      miconazole (MICOTIN) 2 % cream Apply topically 2 (two) times daily. Apply to tip of penis prn irritation 28 g 0    multivit-min-FA-lycopen-lutein (MEN 50 PLUS MULTIVITAMIN) 300-600-300 mcg Tab as directed Orally      multivitamin capsule Take 1 capsule by mouth once daily.      ofloxacin (FLOXIN) 0.3 % otic solution Apply 10 drops into right ear once a day for 10 days.  Please dispense  "generic.      omega-3 fatty acids/fish oil (FISH OIL-OMEGA-3 FATTY ACIDS) 300-1,000 mg capsule Take 1 capsule by mouth once daily.      tamsulosin (FLOMAX) 0.4 mg Cap Take 1 capsule by mouth once daily 90 capsule 3    traZODone (DESYREL) 100 MG tablet TAKE 2 TABLETS BY MOUTH EVERY DAY AT BEDTIME AS NEEDED FOR INSOMNIA 180 tablet 3    traZODone (DESYREL) 100 MG tablet TAKE 2 TABLETS BY MOUTH EVERY EVENING 60 tablet 0    TURMERIC ORAL Take by mouth.      wheat dextrin (BENEFIBER CLEAR SF, DEXTRIN, ORAL) Take by mouth.       No current facility-administered medications for this visit.        Objective:      Physical Exam:   BP (!) 183/79 (BP Location: Left arm, Patient Position: Sitting, BP Method: Medium (Automatic))   Pulse 97   Temp 97.9 °F (36.6 °C) (Oral)   Resp 18   Ht 6' 1" (1.854 m)   Wt 105.7 kg (233 lb 0.4 oz)   SpO2 95%   BMI 30.74 kg/m²       ECOG PS: 0             Physical Exam  Constitutional:       General: He is not in acute distress.     Appearance: Normal appearance.   HENT:      Head: Normocephalic.   Eyes:      General: No scleral icterus.     Extraocular Movements: Extraocular movements intact.      Conjunctiva/sclera: Conjunctivae normal.   Cardiovascular:      Rate and Rhythm: Normal rate.   Pulmonary:      Effort: Pulmonary effort is normal. No respiratory distress.   Abdominal:      General: There is no distension.      Palpations: Abdomen is soft.   Musculoskeletal:      Right lower leg: No edema.      Left lower leg: No edema.   Skin:     General: Skin is warm and dry.   Neurological:      Mental Status: He is alert and oriented to person, place, and time.      Motor: No weakness.   Psychiatric:         Mood and Affect: Mood normal.         Behavior: Behavior normal.         Thought Content: Thought content normal.          Recent Labs:   Lab Results   Component Value Date    WBC 4.52 08/07/2023    RBC 4.77 08/07/2023    HGB 15.1 08/07/2023    HCT 44.9 08/07/2023    MCV 94 08/07/2023 "    MCH 31.7 (H) 08/07/2023    MCHC 33.6 08/07/2023    RDW 13.2 08/07/2023     08/07/2023    MPV 9.9 08/07/2023    IMMGR 0.3 09/12/2022    GRAN 2.8 08/07/2023    IGABS 0.03 08/07/2023    LYMPH 0.8 (L) 09/12/2022    LYMPH 13.0 (L) 09/12/2022    MONO 0.7 09/12/2022    MONO 11.3 09/12/2022    EOS 0.1 09/12/2022    BASO 0.03 09/12/2022    NRBC 0 09/12/2022    EOSINOPHIL 0.8 09/12/2022    BASOPHIL 0.5 09/12/2022    DIFFMETHOD Automated 09/12/2022       Lab Results   Component Value Date     08/07/2023    K 4.2 08/07/2023     08/07/2023    CO2 32 (H) 08/07/2023     (H) 08/07/2023    BUN 22 (H) 08/07/2023    CREATININE 1.37 08/07/2023    CALCIUM 10.3 08/07/2023    PROT 8.1 08/07/2023    ALBUMIN 4.3 08/07/2023    BILITOT 0.3 08/07/2023    ALKPHOS 82 08/07/2023    AST 28 08/07/2023    ALT 21 08/07/2023    ANIONGAP 9 08/07/2023    EGFRNORACEVR 53.1 (A) 08/07/2023        Lab Results   Component Value Date    PSADIAG 0.67 08/07/2023    PSADIAG 0.76 07/11/2023    PSADIAG 1.2 05/05/2023    PSADIAG 1.3 04/10/2023    PSADIAG 1.7 03/16/2023    PSADIAG 5.2 (H) 01/27/2023    PSADIAG 6.7 (H) 12/22/2022    PSADIAG 19.6 (H) 11/18/2022    PSADIAG 17.3 (H) 05/25/2022    PSATOTAL 15.8 (H) 07/05/2022    PSA 11.7 (H) 12/08/2021        Cardiovascular Screening:  Primary care physician: Anthony De La Torre MD      The 10-year ASCVD risk score (Echo DK, et al., 2019) is: 52.8%    Values used to calculate the score:      Age: 78 years      Sex: Male      Is Non- : No      Diabetic: No      Tobacco smoker: No      Systolic Blood Pressure: 183 mmHg      Is BP treated: Yes      HDL Cholesterol: 50 mg/dL      Total Cholesterol: 224 mg/dL    ASCVD Risk Level: High risk >= 20%    EKG:   Results for orders placed or performed in visit on 10/04/22   EKG 12-lead    Collection Time: 10/04/22 10:51 AM    Narrative    Test Reason : Z01.810,    Vent. Rate : 087 BPM     Atrial Rate : 087 BPM     P-R Int : 166 ms   "        QRS Dur : 080 ms      QT Int : 362 ms       P-R-T Axes : 061 039 042 degrees     QTc Int : 435 ms    Normal sinus rhythm  Normal ECG  When compared with ECG of 12-SEP-2022 17:34,  No significant change was found  Confirmed by Oswaldo AVILA, Arie NATION ([id number]) on 10/6/2022 7:32:16 AM    Referred By: SHIRA VAZQUEZ           Confirmed By:Arie Chacon MD       High blood pressure = Yes  Antihypertensive agents: diltiaZEM - 300 MG  lisinopriL - 40 MG   Comments:     DM2: No  Antidiabetic agents: This patient does not have an active medication from one of the medication groupers.   Comments:     Hyperlipidemia: Yes  Lipid lowering agents: atorvastatin - 40 MG   Comments:     Antiplatelet therapy: No  Agent: This patient does not have an active medication from one of the medication groupers.   Comment:     Body mass index is 30.74 kg/m².  If greater than 30, referral to nutrition    Lab Results   Component Value Date    CHOL 224 (H) 08/07/2023    LDLCALC 150.4 08/07/2023    HDL 50 08/07/2023    TRIG 118 08/07/2023    HGBA1C 5.4 08/07/2023        Bone Health    No results found for: "[id number]"     Results for orders placed during the hospital encounter of 02/23/23    DXA Bone Density with Vertebral Fracture    Impression  Normal bone mineral density.    Consider FDA approved medical therapies in postmenopausal women and men aged 50 years and older, based on the following:    *A hip or vertebral (clinical or morphometric) fracture  *T score less than or equal to -2.5 at the femoral neck or spine after appropriate evaluation to exclude secondary causes.  *Low bone mass -- also known as osteopenia (T score between -1.0 and -2.5 at the femoral neck or spine) and a 10 year probability of hip fracture greater than or equal to 3% or a 10 year probability of major osteoporosis-related fracture greater than or equal to 20% based on the [place]-adapted WHO algorithm.  *Clinicians judgment and/or patient preference may indicate " treatment for people with 10 year fracture probabilities is above or below these levels.      Electronically signed by: Anshu Stephens  Date:    02/23/2023  Time:    12:08       Vitamin D: 1000 IU daily  Calcium: Recommend 4 servings of dairy daily     Osteopenia/Osteoporosis: None    Bone strengthening agent: None     Staging Imaging     Results for orders placed during the hospital encounter of 11/09/22    NM PET CT F 18 PYL PSMA, Midthigh to Vertex    Impression  1. Evidence of prostate neoplasm with osseous and pulmonary metastasis.  Detrimental changes noted when comparison is made to 05/16/2022 in particular with progression of several pulmonary nodules and with development of 1 or 2 small pulmonary nodules.  2. A mass is noted inferior to the left lobe of the thyroid gland entirely specific but demonstrating increased uptake similar since the prior  3. A prostate based mass appears to be present with involvement of the bladder and a distended bladder suggestive outlet obstruction      Electronically signed by: John Steele MD  Date:    11/09/2022  Time:    15:31       Results for orders placed during the hospital encounter of 08/31/22    NM Bone Scan Whole Body    Impression  1. Uptake within the anterior right 5th and 6th ribs is most likely related to degenerative changes.  Correlation with plain film or chest CT scan recommended.    2.  Intense uptake within the medial left pubic bone corresponds to an area of sclerosis seen on the comparison PET-CT scan.  Differential considerations remain to be fibrous dysplasia, Paget's disease or metastasis.    3.  Negative for metastatic pattern of uptake otherwise.  Degenerative pattern of uptake and other incidental findings as noted above.      Electronically signed by: Antonio Mckeon MD  Date:    08/31/2022  Time:    12:35      Results for orders placed during the hospital encounter of 09/29/22    MRI Prostate W W/O Contrast    Impression  Left-sided PI-RADS 5  lesion with extraprostatic extension involving the left-sided neurovascular bundles and seminal vesicles.  Extension of abnormal signal adjacent to the left levator ani, cannot rule out involvement.  Abnormal signal also closely approximates with the anterior rectal wall without definite involvement.    Left pubic lesion concerning for metastasis.    Diffuse heterogeneous bone marrow.    Overall Assessment: PI-RADS 5 - Very high (clinically significant cancer is highly likely to be present)    Number of targets created for potential MR/US fusion biopsy    Transitional/Peripheral zone: 1    This report was flagged in Epic as abnormal.    Electronically signed by resident: Al Castrejon  Date:    09/29/2022  Time:    16:12    Electronically signed by: Shekhar Rodríguez MD  Date:    09/29/2022  Time:    19:08       No results found for this or any previous visit.       I have personally reviewed the above imaging.     Path:   Reviewed pathology as documented above.    Genomic testing:     Germline genetic testing: 3/28/23 - VUS in APC and BMPR1A  Results for orders placed or performed in visit on 03/28/23   Genetic Misc Sendout Test, Blood   Result Value Ref Range    Miscellaneous Genetic Test Name [facility] BRCA + 84/RNA     Genso Specimen Type Blood     Genetic counseling? Yes     Parental or Sibling Testing? No     Test Result See result image under hyperlink     Reference Lab SEE COMMENT         Somatic tumor genotyping: None      ctDNA genotyping: None       Diagnoses:     1. Prostate cancer    2. Androgen deprivation therapy    3. Bone metastases    4. Hypertension, unspecified type    5. Hyperlipidemia, unspecified hyperlipidemia type            Assessment and Plan:     1. Prostate cancer  Overview:  Patient with de david high-volume metastatic prosatate adenocarcinoma including lung and osseous mets. Started ADT 11/18/23 with apalutamide 6/7/23. Completed RT to the left iliac met 2/7/23.    Assessment &  Plan:  Tolerating treatment well, PSA slightly lower today.    - Will need to resend Tempus xF  - FU in 3 months for toxicity check    Orders:  -     TSH; Standing  -     Tumor NGS Blood; Future; Expected date: 08/08/2023  -     Tumor NGS Blood Add-on; Future; Expected date: 08/08/2023    2. Androgen deprivation therapy  Overview:  Current agent: History of bilateral orchiectomy  Start date:  ADT start 11/2022  Last dose: Orchiectomy 2/15/23  Duration of therapy: Indefefinite    Novel Androgen signaling inhibitors:   Agent: Apalutamide  Start date: 6/7/23        Assessment & Plan:  - Check TSH with next labs on apalutamide      3. Bone metastases  Overview:  Has left pelvic turbercle metastases noted on PSMA PET CT with associated discomfort. Completed RT on 2/3/23.    Assessment & Plan:  - Con't Ca/D      4. Hypertension, unspecified type  Overview:  Home regimen includes diltiazem 300mg and lisinopril 20mg po daily.     Assessment & Plan:  - Poor control; increase lisinopril to 40mg po daily    Orders:  -     lisinopriL (PRINIVIL,ZESTRIL) 40 MG tablet; Take 1 tablet (40 mg total) by mouth once daily.  Dispense: 90 tablet; Refill: 3    5. Hyperlipidemia, unspecified hyperlipidemia type  Overview:  On simvastatin 20mg daily    Assessment & Plan:  - Cholesterol remains high.  - Will change from simvastatin 20mg to atorvastatin 40mg    Orders:  -     atorvastatin (LIPITOR) 40 MG tablet; Take 1 tablet (40 mg total) by mouth once daily.  Dispense: 90 tablet; Refill: 3              Follow up:   Route Chart for Scheduling    Med Onc Chart Routing      Follow up with physician 3 months.   Follow up with JENIFFER    Infusion scheduling note    Injection scheduling note    Labs CBC, CMP and PSA   Scheduling:  Preferred lab:  Lab interval:     Imaging    Pharmacy appointment    Other referrals                       The above information has been reviewed with the patient and all questions have been answered to their apparent  satisfaction.  They understand that they can call the clinic with any questions.    Darren Carter

## 2023-08-16 LAB
DNA RANGE(S) EXAMINED NAR: NORMAL
GENE DIS ANL INTERP-IMP: POSITIVE
GENE DIS ASSESSED: NORMAL
MSI CA SPEC-IMP: NOT DETECTED
REASON FOR STUDY: NORMAL
TEMPUS LCA: NORMAL
TEMPUS PORTAL: NORMAL
TEMPUS TRIAL1: NORMAL
TEMPUS TRIALCOUNT: 1

## 2023-08-21 ENCOUNTER — TELEPHONE (OUTPATIENT)
Dept: FAMILY MEDICINE | Facility: CLINIC | Age: 78
End: 2023-08-21
Payer: MEDICARE

## 2023-08-21 NOTE — TELEPHONE ENCOUNTER
Spoke to pt he stated he a stated he has a cyst 3 quarter size around he left of his chest. Pt tried popping it, and it is blue/purple and sore to touch. Please advise.     ----- Message from Sebastián Mendoza sent at 8/21/2023 11:50 AM CDT -----  Contact: pt  Type:  Sooner Appointment Request    Caller is requesting a sooner appointment.  Caller declined first available appointment listed below.  Caller will not accept being placed on the waitlist and is requesting a message be sent to doctor.  Name of Caller:pt   When is the first available appointment?books are closed   Symptoms:cyst busted and its blue/purple / severe pain  Would the patient rather a call back or a response via [facility]? call  Best Call Back Number:[phone]  Additional Information:

## 2023-08-22 ENCOUNTER — OFFICE VISIT (OUTPATIENT)
Dept: FAMILY MEDICINE | Facility: CLINIC | Age: 78
End: 2023-08-22
Payer: MEDICARE

## 2023-08-22 VITALS
OXYGEN SATURATION: 95 % | BODY MASS INDEX: 31.04 KG/M2 | HEART RATE: 92 BPM | DIASTOLIC BLOOD PRESSURE: 70 MMHG | WEIGHT: 234.25 LBS | TEMPERATURE: 98 F | SYSTOLIC BLOOD PRESSURE: 124 MMHG | HEIGHT: 73 IN

## 2023-08-22 DIAGNOSIS — L72.3 INFECTED SEBACEOUS CYST: Primary | ICD-10-CM

## 2023-08-22 DIAGNOSIS — L08.9 INFECTED SEBACEOUS CYST: Primary | ICD-10-CM

## 2023-08-22 DIAGNOSIS — F41.9 ANXIETY: ICD-10-CM

## 2023-08-22 PROCEDURE — 1160F RVW MEDS BY RX/DR IN RCRD: CPT | Mod: CPTII,S$GLB,, | Performed by: FAMILY MEDICINE

## 2023-08-22 PROCEDURE — 1125F PR PAIN SEVERITY QUANTIFIED, PAIN PRESENT: ICD-10-PCS | Mod: CPTII,S$GLB,, | Performed by: FAMILY MEDICINE

## 2023-08-22 PROCEDURE — 1125F AMNT PAIN NOTED PAIN PRSNT: CPT | Mod: CPTII,S$GLB,, | Performed by: FAMILY MEDICINE

## 2023-08-22 PROCEDURE — 1101F PR PT FALLS ASSESS DOC 0-1 FALLS W/OUT INJ PAST YR: ICD-10-PCS | Mod: CPTII,S$GLB,, | Performed by: FAMILY MEDICINE

## 2023-08-22 PROCEDURE — 99213 PR OFFICE/OUTPT VISIT, EST, LEVL III, 20-29 MIN: ICD-10-PCS | Mod: S$GLB,,, | Performed by: FAMILY MEDICINE

## 2023-08-22 PROCEDURE — 1159F MED LIST DOCD IN RCRD: CPT | Mod: CPTII,S$GLB,, | Performed by: FAMILY MEDICINE

## 2023-08-22 PROCEDURE — 3078F PR MOST RECENT DIASTOLIC BLOOD PRESSURE < 80 MM HG: ICD-10-PCS | Mod: CPTII,S$GLB,, | Performed by: FAMILY MEDICINE

## 2023-08-22 PROCEDURE — 3288F PR FALLS RISK ASSESSMENT DOCUMENTED: ICD-10-PCS | Mod: CPTII,S$GLB,, | Performed by: FAMILY MEDICINE

## 2023-08-22 PROCEDURE — 1159F PR MEDICATION LIST DOCUMENTED IN MEDICAL RECORD: ICD-10-PCS | Mod: CPTII,S$GLB,, | Performed by: FAMILY MEDICINE

## 2023-08-22 PROCEDURE — 3288F FALL RISK ASSESSMENT DOCD: CPT | Mod: CPTII,S$GLB,, | Performed by: FAMILY MEDICINE

## 2023-08-22 PROCEDURE — 3074F PR MOST RECENT SYSTOLIC BLOOD PRESSURE < 130 MM HG: ICD-10-PCS | Mod: CPTII,S$GLB,, | Performed by: FAMILY MEDICINE

## 2023-08-22 PROCEDURE — 1101F PT FALLS ASSESS-DOCD LE1/YR: CPT | Mod: CPTII,S$GLB,, | Performed by: FAMILY MEDICINE

## 2023-08-22 PROCEDURE — 1160F PR REVIEW ALL MEDS BY PRESCRIBER/CLIN PHARMACIST DOCUMENTED: ICD-10-PCS | Mod: CPTII,S$GLB,, | Performed by: FAMILY MEDICINE

## 2023-08-22 PROCEDURE — 3078F DIAST BP <80 MM HG: CPT | Mod: CPTII,S$GLB,, | Performed by: FAMILY MEDICINE

## 2023-08-22 PROCEDURE — 99213 OFFICE O/P EST LOW 20 MIN: CPT | Mod: S$GLB,,, | Performed by: FAMILY MEDICINE

## 2023-08-22 PROCEDURE — 3074F SYST BP LT 130 MM HG: CPT | Mod: CPTII,S$GLB,, | Performed by: FAMILY MEDICINE

## 2023-08-22 RX ORDER — SULFAMETHOXAZOLE AND TRIMETHOPRIM 800; 160 MG/1; MG/1
1 TABLET ORAL 2 TIMES DAILY
Qty: 14 TABLET | Refills: 0 | Status: SHIPPED | OUTPATIENT
Start: 2023-08-22 | End: 2023-09-01

## 2023-08-22 RX ORDER — CLONAZEPAM 0.5 MG/1
0.5 TABLET ORAL DAILY PRN
Qty: 30 TABLET | Refills: 0 | Status: SHIPPED | OUTPATIENT
Start: 2023-08-22 | End: 2023-12-22

## 2023-08-22 NOTE — PROCEDURES
"Incision & Drainage    Date/Time: 8/22/2023 2:13 PM    Performed by: Anthony De La Torre MD  Authorized by: Anthony De La Torre MD    Time out: Immediately prior to procedure a "time out" was called to verify the correct patient, procedure, equipment, support staff and site/side marked as required.    Consent Done?:  Yes (Verbal)    Type:  Cyst  Body area:  Trunk  Anesthesia:  Local infiltration  Local anesthetic: Lidocaine 2% with epinephrine  Anesthetic total (ml):  2  Scalpel size:  11  Incision type:  Single straight  Incision depth: subcutaneous    Complexity:  Simple  Drainage:  Pus and purulent  Drainage amount:  Copious  Wound treatment:  Incision, wound left open, deloculation, sebum removal, removal of cyst capsule, wound packed and drainage  Packing material:  1/4 in gauze  Patient tolerance:  Patient tolerated the procedure well with no immediate complications  Pain Assessment: 0        I and D, copious sebum, pus and capsule removed, packed, Rx bactrim  "

## 2023-08-22 NOTE — PROGRESS NOTES
"Subjective:      Patient ID: Noah Lopez is a 78 y.o. male.    Chief Complaint: Cyst      Vitals:    08/22/23 1322   BP: 124/70   Pulse: 92   Temp: 98.2 °F (36.8 °C)   SpO2: 95%   Weight: 106.3 kg (234 lb 3.8 oz)   Height: 6' 1" (1.854 m)        HPI   Left breast infected seb cyst needs to be I and Ded  Going to [facility]  Needs med for anxiety, won't be able to use his weed      Problem List  Patient Active Problem List   Diagnosis    Hypertension    Screening for malignant neoplasm of colon    Hyperlipidemia    Colon polyp    Hypocontractile bladder    Neck arthritis    Thyroid nodule    Insomnia    Pulmonary metastases    Prostate cancer    Bone metastases    Cannabis abuse    Moderate recurrent major depression    Posttraumatic stress disorder    Anomaly of cerebellum    Aortic atherosclerosis    Stage 3a chronic kidney disease    Androgen deprivation therapy    Infected sebaceous cyst    Anxiety        ALLERGIES:   Review of patient's allergies indicates:   Allergen Reactions    Ciprofloxacin Other (See Comments)     tendonitis       MEDS:   Current Outpatient Medications:     apalutamide (ERLEADA) 60 mg Tab, Take 240 mg by mouth once daily., Disp: 120 tablet, Rfl: 11    ascorbic acid, vitamin C, (VITAMIN C) 1000 MG tablet, Take 1,000 mg by mouth once daily., Disp: , Rfl:     atorvastatin (LIPITOR) 40 MG tablet, Take 1 tablet (40 mg total) by mouth once daily., Disp: 90 tablet, Rfl: 3    bicalutamide (CASODEX) 50 MG Tab, Take 1 tablet (50 mg total) by mouth once daily., Disp: 90 tablet, Rfl: 0    calcium carbonate (OS-TYREE) 500 mg calcium (1,250 mg) tablet, Take 2 tablets (1,000 mg total) by mouth once daily., Disp: 60 tablet, Rfl: 11    cholecalciferol, vitamin D3, (VITAMIN D3) 25 mcg (1,000 unit) capsule, Take 1 capsule (1,000 Units total) by mouth once daily., Disp: 30 capsule, Rfl: 11    clotrimazole-betamethasone 1-0.05% (LOTRISONE) cream, Apply topically 2 (two) times daily., Disp: 60 g, Rfl: 3    " diltiaZEM (CARDIZEM CD) 300 MG 24 hr capsule, TAKE 1 CAPSULE BY MOUTH  ONCE DAILY, Disp: 90 capsule, Rfl: 3    diphenhydramine HCl (BENADRYL ALLERGY ORAL), Take by mouth., Disp: , Rfl:     docusate sodium (COLACE) 250 MG capsule, Take 250 mg by mouth once daily., Disp: , Rfl:     finasteride (PROSCAR) 5 mg tablet, Take 1 tablet (5 mg total) by mouth once daily., Disp: 90 tablet, Rfl: 3    GAS RELIEF 80, SIMETHICONE, ORAL, Take by mouth., Disp: , Rfl:     lisinopriL (PRINIVIL,ZESTRIL) 40 MG tablet, Take 1 tablet (40 mg total) by mouth once daily., Disp: 90 tablet, Rfl: 3    melatonin 10 mg TbDL, Take by mouth., Disp: , Rfl:     methocarbamoL (ROBAXIN) 750 MG Tab, Take 750-1,500 mg by mouth 3 (three) times daily as needed., Disp: , Rfl:     miconazole (MICOTIN) 2 % cream, Apply topically 2 (two) times daily. Apply to tip of penis prn irritation, Disp: 28 g, Rfl: 0    multivit-min-FA-lycopen-lutein (MEN 50 PLUS MULTIVITAMIN) 300-600-300 mcg Tab, as directed Orally, Disp: , Rfl:     multivitamin capsule, Take 1 capsule by mouth once daily., Disp: , Rfl:     ofloxacin (FLOXIN) 0.3 % otic solution, Apply 10 drops into right ear once a day for 10 days.  Please dispense generic., Disp: , Rfl:     omega-3 fatty acids/fish oil (FISH OIL-OMEGA-3 FATTY ACIDS) 300-1,000 mg capsule, Take 1 capsule by mouth once daily., Disp: , Rfl:     tamsulosin (FLOMAX) 0.4 mg Cap, Take 1 capsule by mouth once daily, Disp: 90 capsule, Rfl: 3    traZODone (DESYREL) 100 MG tablet, TAKE 2 TABLETS BY MOUTH EVERY DAY AT BEDTIME AS NEEDED FOR INSOMNIA, Disp: 180 tablet, Rfl: 3    traZODone (DESYREL) 100 MG tablet, TAKE 2 TABLETS BY MOUTH EVERY EVENING, Disp: 60 tablet, Rfl: 0    TURMERIC ORAL, Take by mouth., Disp: , Rfl:     wheat dextrin (BENEFIBER CLEAR SF, DEXTRIN, ORAL), Take by mouth., Disp: , Rfl:     clonazePAM (KLONOPIN) 0.5 MG tablet, Take 1 tablet (0.5 mg total) by mouth daily as needed for Anxiety., Disp: 30 tablet, Rfl:  0      History:  Current Providers as of 2023  PCP: Anthony De La Torre MD  Care Team Provider: Yasmeen Hubbard LPN  Care Team Provider: Darren Carter MD  Encounter Provider: Anthony De La Torre MD, starting on Tue Aug 22, 2023 12:00 AM  Referring Provider: not found, starting on Tue Aug 22, 2023 12:00 AM  Consulting Physician: Anthony De La Torre MD, starting on Tue Aug 22, 2023  1:19 PM (Active)   Past Medical History:   Diagnosis Date    Colon polyp 2018    Hyperlipidemia     Hypertension     Prostate cancer     Stage 3a chronic kidney disease 2023    Urinary tract infection with hematuria 2022     Past Surgical History:   Procedure Laterality Date    COLONOSCOPY      COLONOSCOPY N/A 2018    Procedure: COLONOSCOPY Suprep PLEASE TEXT PATIENT WITH ARRIVAL TIME;  Surgeon: Niharika Arce MD;  Location: [facility];  Service: Endoscopy;  Laterality: N/A;    CYSTOSCOPY WITH URODYNAMIC TESTING N/A 2022    Procedure: CYSTOSCOPY, WITH URODYNAMIC TESTING FLOUROSCOPIC;  Surgeon: Anthony Maloney MD;  Location: [address];  Service: Urology;  Laterality: N/A;  1hr    KNEE ARTHROSCOPY      ORCHIECTOMY Bilateral 2/15/2023    Procedure: Bilateral simple orchiectomy;  Surgeon: Helena Luis MD;  Location: [facility];  Service: Urology;  Laterality: Bilateral;    SPINE SURGERY      l-spine    TRANSURETHRAL RESECTION OF PROSTATE N/A 10/26/2022    Procedure: TURP (TRANSURETHRAL RESECTION OF PROSTATE);  Surgeon: Helena Luis MD;  Location: [facility];  Service: Urology;  Laterality: N/A;     Social History     Tobacco Use    Smoking status: Former     Current packs/day: 0.00     Average packs/day: 1.5 packs/day for 20.0 years (30.0 ttl pk-yrs)     Types: Cigarettes     Start date:      Quit date:      Years since quittin.6     Passive exposure: Never    Smokeless tobacco: Never    Tobacco comments:     Age 17 - 37. Up to 1.5 ppd.   Substance Use Topics    Alcohol use: Yes      Comment: Occasional. Few drinks a week.    Drug use: Yes     Types: Marijuana         Review of Systems   Constitutional:  Negative for appetite change, fatigue, fever and unexpected weight change.   HENT:  Negative for congestion, ear pain, sinus pressure and sore throat.    Eyes:  Negative for pain and visual disturbance.   Respiratory:  Negative for shortness of breath.    Cardiovascular:  Negative for chest pain.   Gastrointestinal:  Negative for abdominal pain, constipation and diarrhea.   Endocrine: Negative for polyuria.   Genitourinary:  Negative for difficulty urinating and frequency.   Musculoskeletal:  Negative for arthralgias, back pain and myalgias.   Skin:  Positive for wound. Negative for color change.   Allergic/Immunologic: Negative.    Neurological:  Negative for syncope, weakness and headaches.   Hematological:  Does not bruise/bleed easily.   Psychiatric/Behavioral:  Negative for dysphoric mood and suicidal ideas. The patient is nervous/anxious.    All other systems reviewed and are negative.    Objective:     Physical Exam        Assessment:     1. Infected sebaceous cyst    2. Anxiety      Plan:        Medication List            Accurate as of August 22, 2023  2:17 PM. If you have any questions, ask your nurse or doctor.                START taking these medications      clonazePAM 0.5 MG tablet  Commonly known as: KlonoPIN  Take 1 tablet (0.5 mg total) by mouth daily as needed for Anxiety.  Started by: Anthony De La Torre MD            CONTINUE taking these medications      apalutamide 60 mg Tab  Commonly known as: ERLEADA  Take 240 mg by mouth once daily.     ascorbic acid (vitamin C) 1000 MG tablet  Commonly known as: VITAMIN C     atorvastatin 40 MG tablet  Commonly known as: LIPITOR  Take 1 tablet (40 mg total) by mouth once daily.     BENADRYL ALLERGY ORAL     BENEFIBER CLEAR SF (DEXTRIN) ORAL     bicalutamide 50 MG Tab  Commonly known as: CASODEX  Take 1 tablet (50 mg total) by mouth once  daily.     calcium carbonate 500 mg calcium (1,250 mg) tablet  Commonly known as: OS-TYREE  Take 2 tablets (1,000 mg total) by mouth once daily.     cholecalciferol (vitamin D3) 25 mcg (1,000 unit) capsule  Commonly known as: VITAMIN D3  Take 1 capsule (1,000 Units total) by mouth once daily.     clotrimazole-betamethasone 1-0.05% cream  Commonly known as: LOTRISONE  Apply topically 2 (two) times daily.     diltiaZEM 300 MG 24 hr capsule  Commonly known as: CARDIZEM CD  TAKE 1 CAPSULE BY MOUTH  ONCE DAILY     docusate sodium 250 MG capsule  Commonly known as: COLACE     finasteride 5 mg tablet  Commonly known as: PROSCAR  Take 1 tablet (5 mg total) by mouth once daily.     fish oil-omega-3 fatty acids 300-1,000 mg capsule     GAS RELIEF 80 (SIMETHICONE) ORAL     lisinopriL 40 MG tablet  Commonly known as: PRINIVIL,ZESTRIL  Take 1 tablet (40 mg total) by mouth once daily.     melatonin 10 mg Tbdl     MEN 50 PLUS MULTIVITAMIN 327--300 mcg Tab  Generic drug: mv-min-folic-K1-lycopen-lutein     methocarbamoL 750 MG Tab  Commonly known as: ROBAXIN     miconazole 2 % cream  Commonly known as: MICOTIN  Apply topically 2 (two) times daily. Apply to tip of penis prn irritation     multivitamin capsule     ofloxacin 0.3 % otic solution  Commonly known as: FLOXIN     tamsulosin 0.4 mg Cap  Commonly known as: FLOMAX  Take 1 capsule by mouth once daily     * traZODone 100 MG tablet  Commonly known as: DESYREL  TAKE 2 TABLETS BY MOUTH EVERY DAY AT BEDTIME AS NEEDED FOR INSOMNIA     * traZODone 100 MG tablet  Commonly known as: DESYREL  TAKE 2 TABLETS BY MOUTH EVERY EVENING     TURMERIC ORAL           * This list has 2 medication(s) that are the same as other medications prescribed for you. Read the directions carefully, and ask your doctor or other care provider to review them with you.                   Where to Get Your Medications        These medications were sent to [facility], [place]  [address]   [address] [facility] [address]      Phone: [phone]   clonazePAM 0.5 MG tablet       Infected sebaceous cyst  -     Incision & Drainage    Anxiety    Other orders  -     clonazePAM (KLONOPIN) 0.5 MG tablet; Take 1 tablet (0.5 mg total) by mouth daily as needed for Anxiety.  Dispense: 30 tablet; Refill: 0

## 2023-08-22 NOTE — TELEPHONE ENCOUNTER
Refill Routing Note   Medication(s) are not appropriate for processing by [facility] for the following reason(s):      New or recently adjusted medication    [facility] action(s):  Defer Care Due:  None identified            Appointments  past 12m or future 3m with PCP    Date Provider   Last Visit   6/21/2023 Anthony De La Torre MD   Next Visit   8/22/2023 Anthony De La Torre MD   ED visits in past 90 days: 0        Note composed:1:15 PM 08/22/2023

## 2023-08-22 NOTE — TELEPHONE ENCOUNTER
No care due was identified.  [facility] Embedded Care Due Messages. Reference number: [id number].   8/22/2023 1:08:16 PM CDT

## 2023-08-23 RX ORDER — TRAZODONE HYDROCHLORIDE 100 MG/1
TABLET ORAL
Qty: 60 TABLET | Refills: 0 | Status: SHIPPED | OUTPATIENT
Start: 2023-08-23 | End: 2023-09-15

## 2023-09-06 RX ORDER — TRAZODONE HYDROCHLORIDE 100 MG/1
TABLET ORAL
Refills: 0 | OUTPATIENT
Start: 2023-09-06

## 2023-09-06 NOTE — TELEPHONE ENCOUNTER
Refill Decision Note   Noah Lopez  is requesting a refill authorization.  Brief Assessment and Rationale for Refill:  Quick Discontinue     Medication Therapy Plan: Pharmacy is requesting new scripts for the following medications without required information, (sig/ frequency/qty/etc)       Medication Reconciliation Completed: No   Comments: Pharmacies have been requesting medications for patients without required information, (sig, frequency, qty, etc.). In addition, requests are sent for medication(s) pt. are currently not taking, and medications patients have never taken.    We have spoken to the pharmacies about these request types and advised their teams previously that we are unable to assess these New Script requests and require all details for these requests. This is a known issue and has been reported.     No Care Gaps recommended.     Note composed:5:39 PM 09/06/2023

## 2023-09-06 NOTE — TELEPHONE ENCOUNTER
No care due was identified.  [facility] Embedded Care Due Messages. Reference number: [id number].   9/06/2023 4:45:16 PM CDT

## 2023-09-14 ENCOUNTER — TELEPHONE (OUTPATIENT)
Dept: FAMILY MEDICINE | Facility: CLINIC | Age: 78
End: 2023-09-14
Payer: MEDICARE

## 2023-09-14 NOTE — TELEPHONE ENCOUNTER
----- Message from Sylwia Resendez sent at 9/14/2023 10:40 AM CDT -----  Type:  RX Refill Request    Who Called:  Pt  Refill or New Rx: Refill  RX Name and Strength: traZODone (DESYREL) 100 MG tablet [8083]  How is the patient currently taking it? (ex. 1XDay): TAKE 2 TABLETS BY MOUTH EVERY DAY AT BEDTIME AS NEEDED FOR INSOMNIA  Is this a 30 day or 90 day RX: 180  Preferred Pharmacy with phone number: [address]   Phone:  [phone]  Local or Mail Order: local  Ordering Provider: Britt  Would the patient rather a call back or a response via [facility]?  call  Best Call Back Number: [phone]  Additional Information:  Pt states that he is going out of town on 9/20/23 and needs to take his medication with him.  Pt would like to take his refills with him and is requesting a sooner prescription refill.  Pt would like a call back.

## 2023-09-15 RX ORDER — TRAZODONE HYDROCHLORIDE 100 MG/1
TABLET ORAL
Qty: 180 TABLET | Refills: 3 | Status: SHIPPED | OUTPATIENT
Start: 2023-09-15 | End: 2023-09-15 | Stop reason: SDUPTHER

## 2023-09-15 RX ORDER — TRAZODONE HYDROCHLORIDE 100 MG/1
TABLET ORAL
Qty: 180 TABLET | Refills: 3 | Status: SHIPPED | OUTPATIENT
Start: 2023-09-15

## 2023-11-02 ENCOUNTER — PATIENT MESSAGE (OUTPATIENT)
Dept: FAMILY MEDICINE | Facility: CLINIC | Age: 78
End: 2023-11-02
Payer: MEDICARE

## 2023-11-07 ENCOUNTER — OFFICE VISIT (OUTPATIENT)
Dept: HEMATOLOGY/ONCOLOGY | Facility: CLINIC | Age: 78
End: 2023-11-07
Payer: MEDICARE

## 2023-11-07 ENCOUNTER — LAB VISIT (OUTPATIENT)
Dept: LAB | Facility: HOSPITAL | Age: 78
End: 2023-11-07
Attending: HOSPITALIST
Payer: MEDICARE

## 2023-11-07 VITALS
SYSTOLIC BLOOD PRESSURE: 136 MMHG | WEIGHT: 226.44 LBS | OXYGEN SATURATION: 97 % | BODY MASS INDEX: 30.01 KG/M2 | TEMPERATURE: 98 F | HEIGHT: 73 IN | HEART RATE: 79 BPM | DIASTOLIC BLOOD PRESSURE: 69 MMHG | RESPIRATION RATE: 18 BRPM

## 2023-11-07 DIAGNOSIS — C61 PROSTATE CANCER: ICD-10-CM

## 2023-11-07 DIAGNOSIS — Z79.818 ANDROGEN DEPRIVATION THERAPY: ICD-10-CM

## 2023-11-07 DIAGNOSIS — E04.1 THYROID NODULE: ICD-10-CM

## 2023-11-07 DIAGNOSIS — E78.5 HYPERLIPIDEMIA, UNSPECIFIED HYPERLIPIDEMIA TYPE: ICD-10-CM

## 2023-11-07 DIAGNOSIS — I10 HYPERTENSION, UNSPECIFIED TYPE: ICD-10-CM

## 2023-11-07 DIAGNOSIS — C61 PROSTATE CANCER: Primary | ICD-10-CM

## 2023-11-07 LAB
ALBUMIN SERPL BCP-MCNC: 3.9 G/DL (ref 3.5–5.2)
ALP SERPL-CCNC: 83 U/L (ref 55–135)
ALT SERPL W/O P-5'-P-CCNC: 23 U/L (ref 10–44)
ANION GAP SERPL CALC-SCNC: 8 MMOL/L (ref 8–16)
AST SERPL-CCNC: 23 U/L (ref 10–40)
BILIRUB SERPL-MCNC: 0.3 MG/DL (ref 0.1–1)
BUN SERPL-MCNC: 16 MG/DL (ref 8–23)
CALCIUM SERPL-MCNC: 10.6 MG/DL (ref 8.7–10.5)
CHLORIDE SERPL-SCNC: 105 MMOL/L (ref 95–110)
CO2 SERPL-SCNC: 30 MMOL/L (ref 23–29)
COMPLEXED PSA SERPL-MCNC: 1.5 NG/ML (ref 0–4)
CREAT SERPL-MCNC: 1.5 MG/DL (ref 0.5–1.4)
ERYTHROCYTE [DISTWIDTH] IN BLOOD BY AUTOMATED COUNT: 13 % (ref 11.5–14.5)
EST. GFR  (NO RACE VARIABLE): 47.7 ML/MIN/1.73 M^2
GLUCOSE SERPL-MCNC: 112 MG/DL (ref 70–110)
HCT VFR BLD AUTO: 45 % (ref 40–54)
HGB BLD-MCNC: 15.2 G/DL (ref 14–18)
IMM GRANULOCYTES # BLD AUTO: 0.03 K/UL (ref 0–0.04)
MCH RBC QN AUTO: 32.2 PG (ref 27–31)
MCHC RBC AUTO-ENTMCNC: 33.8 G/DL (ref 32–36)
MCV RBC AUTO: 95 FL (ref 82–98)
NEUTROPHILS # BLD AUTO: 3.7 K/UL (ref 1.8–7.7)
PLATELET # BLD AUTO: 261 K/UL (ref 150–450)
PMV BLD AUTO: 9.6 FL (ref 9.2–12.9)
POTASSIUM SERPL-SCNC: 5.3 MMOL/L (ref 3.5–5.1)
PROT SERPL-MCNC: 7.8 G/DL (ref 6–8.4)
RBC # BLD AUTO: 4.72 M/UL (ref 4.6–6.2)
SODIUM SERPL-SCNC: 143 MMOL/L (ref 136–145)
TESTOST SERPL-MCNC: 31 NG/DL (ref 304–1227)
TSH SERPL DL<=0.005 MIU/L-ACNC: 1.95 UIU/ML (ref 0.4–4)
WBC # BLD AUTO: 5.31 K/UL (ref 3.9–12.7)

## 2023-11-07 PROCEDURE — 1101F PT FALLS ASSESS-DOCD LE1/YR: CPT | Mod: CPTII,S$GLB,, | Performed by: HOSPITALIST

## 2023-11-07 PROCEDURE — 3288F PR FALLS RISK ASSESSMENT DOCUMENTED: ICD-10-PCS | Mod: CPTII,S$GLB,, | Performed by: HOSPITALIST

## 2023-11-07 PROCEDURE — 3288F FALL RISK ASSESSMENT DOCD: CPT | Mod: CPTII,S$GLB,, | Performed by: HOSPITALIST

## 2023-11-07 PROCEDURE — 99999 PR PBB SHADOW E&M-EST. PATIENT-LVL III: CPT | Mod: PBBFAC,,, | Performed by: HOSPITALIST

## 2023-11-07 PROCEDURE — 1126F AMNT PAIN NOTED NONE PRSNT: CPT | Mod: CPTII,S$GLB,, | Performed by: HOSPITALIST

## 2023-11-07 PROCEDURE — 3075F SYST BP GE 130 - 139MM HG: CPT | Mod: CPTII,S$GLB,, | Performed by: HOSPITALIST

## 2023-11-07 PROCEDURE — 99499 UNLISTED E&M SERVICE: CPT | Mod: S$GLB,,, | Performed by: HOSPITALIST

## 2023-11-07 PROCEDURE — 3075F PR MOST RECENT SYSTOLIC BLOOD PRESS GE 130-139MM HG: ICD-10-PCS | Mod: CPTII,S$GLB,, | Performed by: HOSPITALIST

## 2023-11-07 PROCEDURE — 84443 ASSAY THYROID STIM HORMONE: CPT | Performed by: HOSPITALIST

## 2023-11-07 PROCEDURE — 1101F PR PT FALLS ASSESS DOC 0-1 FALLS W/OUT INJ PAST YR: ICD-10-PCS | Mod: CPTII,S$GLB,, | Performed by: HOSPITALIST

## 2023-11-07 PROCEDURE — 84403 ASSAY OF TOTAL TESTOSTERONE: CPT | Performed by: HOSPITALIST

## 2023-11-07 PROCEDURE — 84153 ASSAY OF PSA TOTAL: CPT | Performed by: HOSPITALIST

## 2023-11-07 PROCEDURE — 3078F PR MOST RECENT DIASTOLIC BLOOD PRESSURE < 80 MM HG: ICD-10-PCS | Mod: CPTII,S$GLB,, | Performed by: HOSPITALIST

## 2023-11-07 PROCEDURE — 36415 COLL VENOUS BLD VENIPUNCTURE: CPT | Performed by: HOSPITALIST

## 2023-11-07 PROCEDURE — 3078F DIAST BP <80 MM HG: CPT | Mod: CPTII,S$GLB,, | Performed by: HOSPITALIST

## 2023-11-07 PROCEDURE — 80053 COMPREHEN METABOLIC PANEL: CPT | Performed by: HOSPITALIST

## 2023-11-07 PROCEDURE — 85027 COMPLETE CBC AUTOMATED: CPT | Performed by: HOSPITALIST

## 2023-11-07 PROCEDURE — 1126F PR PAIN SEVERITY QUANTIFIED, NO PAIN PRESENT: ICD-10-PCS | Mod: CPTII,S$GLB,, | Performed by: HOSPITALIST

## 2023-11-07 PROCEDURE — 99215 OFFICE O/P EST HI 40 MIN: CPT | Mod: S$GLB,,, | Performed by: HOSPITALIST

## 2023-11-07 PROCEDURE — 99999 PR PBB SHADOW E&M-EST. PATIENT-LVL III: ICD-10-PCS | Mod: PBBFAC,,, | Performed by: HOSPITALIST

## 2023-11-07 PROCEDURE — 99215 PR OFFICE/OUTPT VISIT, EST, LEVL V, 40-54 MIN: ICD-10-PCS | Mod: S$GLB,,, | Performed by: HOSPITALIST

## 2023-11-07 RX ORDER — AMOXICILLIN 500 MG/1
500 TABLET, FILM COATED ORAL EVERY 8 HOURS
COMMUNITY
Start: 2023-11-01 | End: 2023-12-22

## 2023-11-07 NOTE — ASSESSMENT & PLAN NOTE
PSA up today. Will repeat in 4 weeks and if confirmed rising will recheck PSMA PET CT scan.   - FU 4 weeks with repeat PET CT  - Con't apalutamide 240mg daily

## 2023-11-07 NOTE — PATIENT INSTRUCTIONS
You continue to tolerate ADT + apalutamide well. Mild jump in PSA today. Will repeat PSA in 4 weeks. If rising will repeat PSMA PET scan to determine if the prostate cancer is growing again. 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    - FU 4 weeks with repeat PSA

## 2023-11-07 NOTE — PROGRESS NOTES
ADVANCED PROSTATE CANCER CLINIC - FOLLOW UP VISIT     Best Contact Phone Number(s): [phone] (home)       Cancer/Stage/TNM:    Cancer Staging   Prostate cancer  Staging form: Prostate, AJCC 8th Edition  - Clinical: Stage IVB (cT3, cNX, cM1c, Grade Group: 5) - Signed by Darren Carter MD on 11/18/2022        Reason for visit: De david metastatic prostate cancer with lung and osseous mets sp orchiectomy and on apalutamide.    HPI: Noah Lopez is a 78 y.o. 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 He started first line ADT on 11/18/22 with addition of apalutamide 06/2023. He completed SBRT to the left iliac lesion 02/2023 and underwent bilateral orchiectomy 2/15/23. He presents to medical oncology clinic for routine follow up.    History has been obtained by chart review and discussion with the patient.    Interval Events:      No new concerns. Recently returned from 2 week trip to [facility] and the . Had a good. Energy is stable. Hot flashes stable; not bothersome. Urination stable with somewhat low. No nocturia. Good compliance with apalutamide. No rahs.        Oncology History   Prostate cancer   12/8/2021 Tumor Markers    PSA 11.7     1/2022 Notable Event    ED visit for urinary retention found on abodminal US. Jones placed.     3/2022 Notable Event    Seen in ED for fall. Incidentally found to have suspicious thyroid nodule     4/20/2022 Biopsy    FNA of thyroid nodule nondiagnostic. Also found to have new SHANNAN lung nodules.     5/16/2022 Imaging Significant Findings    FDG PET-CT in setting of pulmonary nodules:  1.  Two solid left upper lobe pulmonary nodules with no appreciable FDG uptake.  Malignancy is not excluded, and the larger nodule is likely amenable to percutaneous biopsy. Alternatively, continued surveillance by dedicated chest CT alone, i.e., without FDG PET, may be considered.      2.  Focal FDG uptake in the left prostate gland with extracapsular extension.  These findings are concerning for malignancy.  Correlation with PSA and urology evaluation recommended.  MRI of the prostate may also be helpful for further evaluation.     3.  Abnormal appearance of the left symphysis pubis with hypermetabolic activity.  Differential considerations include fibrous dysplasia, Paget's disease, and metastasis.   MRI or bone algorithm CT may be informative.     4.  Incidental focal uptake in the right cerebellum on at least 2 PET slices.  Recommend brain MRI for further evaluation.     5.  2.2 cm left inferior thyroid lobe nodule.  This nodule was recently biopsied with abnormal results.  Please refer to pathology report.     5/25/2022 Tumor Markers    PSA 17.3     7/6/2022 Biopsy    Attempted SHANNAN nodule biopsy     8/11/2022 Biopsy    TRUS biopsy: Prostate adenocarcinoma up to 4+3 disease in 10/18 cores all from the left side. Associated cribriform glands and PNI.       8/31/2022 Imaging Significant Findings    Bone scan:  1. Uptake within the anterior right 5th and 6th ribs is most likely related to degenerative changes.  Correlation with plain film or chest CT scan recommended.     2.  Intense uptake within the medial left pubic bone corresponds to an area of sclerosis seen on the comparison PET-CT scan.  Differential considerations remain to be fibrous dysplasia, Paget's disease or metastasis.     3.  Negative for metastatic pattern of uptake otherwise.  Degenerative pattern of uptake and other incidental findings as noted above.     9/29/2022 Imaging Significant Findings    MRI prostate: Left-sided PI-RADS 5 lesion with extraprostatic extension involving the left-sided neurovascular bundles and seminal vesicles.  Extension of abnormal signal adjacent to the left levator ani, cannot rule out involvement.  Abnormal signal also closely approximates with the anterior rectal wall without definite  involvement.     Left pubic lesion concerning for metastasis.     10/17/2022 Notable Event    Declined pubic bone biopsy to potentially confirm metastatic prostate cancer     10/26/2022 Procedure    TURPT - pathology review with up to 4+5 disease     11/9/2022 Imaging Significant Findings    PSMA PET-CT shows evidence of osseous and pulmonary metastatic disease     11/18/2022 -  Hormone Therapy    Start Lupron 22.5mg IM; plan to start apalutamide     2/1/2023 - 2/7/2023 Radiation Therapy    Treating physician: Jaswinder Doyle    Course: C1 Pelvis 2023    Treatment Site Ref. ID Energy Dose/Fx (Gy) #Fx Dose Correction (Gy) Total Dose (Gy) Start Date End Date Elapsed Days   3D Iliac_Lt Lt Iliac 18X 4 5 / 5 0 20 2/1/2023 2/7/2023 6      3/28/2023 Genetic Testing    Germline genetic testing. No pathogenic mutations. VUS in APC and BMPR1A           Past Medical History:   Diagnosis Date    Colon polyp 09/06/2018    Hyperlipidemia     Hypertension     Prostate cancer     Stage 3a chronic kidney disease 6/21/2023    Urinary tract infection with hematuria 11/18/2022         Past Surgical History:   Procedure Laterality Date    COLONOSCOPY  2010    COLONOSCOPY N/A 9/6/2018    Procedure: COLONOSCOPY Suprep PLEASE TEXT PATIENT WITH ARRIVAL TIME;  Surgeon: Niharika Arce MD;  Location: [facility];  Service: Endoscopy;  Laterality: N/A;    CYSTOSCOPY WITH URODYNAMIC TESTING N/A 1/31/2022    Procedure: CYSTOSCOPY, WITH URODYNAMIC TESTING FLOUROSCOPIC;  Surgeon: Anthony Maloney MD;  [address];  Service: Urology;  Laterality: N/A;  1hr    KNEE ARTHROSCOPY      ORCHIECTOMY Bilateral 2/15/2023    Procedure: Bilateral simple orchiectomy;  Surgeon: Helena Luis MD;  Location: [facility] OR;  Service: Urology;  Laterality: Bilateral;    SPINE SURGERY      l-spine    TRANSURETHRAL RESECTION OF PROSTATE N/A 10/26/2022    Procedure: TURP (TRANSURETHRAL RESECTION OF PROSTATE);  Surgeon: Helena Luis MD;  Location: [facility]  OR;  Service: Urology;  Laterality: N/A;         I have reviewed and updated the patient's past medical, surgical, family and social histories.     Review of patient's allergies indicates:   Allergen Reactions    Ciprofloxacin Other (See Comments)     tendonitis         Current Outpatient Medications   Medication Sig Dispense Refill    amoxicillin (AMOXIL) 500 MG Tab Take 500 mg by mouth every 8 (eight) hours.      apalutamide (ERLEADA) 60 mg Tab Take 240 mg by mouth once daily. 120 tablet 11    ascorbic acid, vitamin C, (VITAMIN C) 1000 MG tablet Take 1,000 mg by mouth once daily.      atorvastatin (LIPITOR) 40 MG tablet Take 1 tablet (40 mg total) by mouth once daily. 90 tablet 3    calcium carbonate (OS-TYREE) 500 mg calcium (1,250 mg) tablet Take 2 tablets (1,000 mg total) by mouth once daily. 60 tablet 11    cholecalciferol, vitamin D3, (VITAMIN D3) 25 mcg (1,000 unit) capsule Take 1 capsule (1,000 Units total) by mouth once daily. 30 capsule 11    clonazePAM (KLONOPIN) 0.5 MG tablet Take 1 tablet (0.5 mg total) by mouth daily as needed for Anxiety. 30 tablet 0    clotrimazole-betamethasone 1-0.05% (LOTRISONE) cream Apply topically 2 (two) times daily. 60 g 3    diltiaZEM (CARDIZEM CD) 300 MG 24 hr capsule TAKE 1 CAPSULE BY MOUTH  ONCE DAILY 90 capsule 3    diphenhydramine HCl (BENADRYL ALLERGY ORAL) Take by mouth.      docusate sodium (COLACE) 250 MG capsule Take 250 mg by mouth once daily.      finasteride (PROSCAR) 5 mg tablet Take 1 tablet (5 mg total) by mouth once daily. 90 tablet 3    GAS RELIEF 80, SIMETHICONE, ORAL Take by mouth.      lisinopriL (PRINIVIL,ZESTRIL) 40 MG tablet Take 1 tablet (40 mg total) by mouth once daily. 90 tablet 3    melatonin 10 mg TbDL Take by mouth.      methocarbamoL (ROBAXIN) 750 MG Tab Take 750-1,500 mg by mouth 3 (three) times daily as needed.      miconazole (MICOTIN) 2 % cream Apply topically 2 (two) times daily. Apply to tip of penis prn irritation 28 g 0     "multivit-min-FA-lycopen-lutein (MEN 50 PLUS MULTIVITAMIN) 300-600-300 mcg Tab as directed Orally      multivitamin capsule Take 1 capsule by mouth once daily.      ofloxacin (FLOXIN) 0.3 % otic solution Apply 10 drops into right ear once a day for 10 days.  Please dispense generic.      omega-3 fatty acids/fish oil (FISH OIL-OMEGA-3 FATTY ACIDS) 300-1,000 mg capsule Take 1 capsule by mouth once daily.      tamsulosin (FLOMAX) 0.4 mg Cap Take 1 capsule by mouth once daily 90 capsule 3    traZODone (DESYREL) 100 MG tablet TAKE 2 TABLETS BY MOUTH EVERY DAY AT BEDTIME AS NEEDED FOR INSOMNIA 180 tablet 3    TURMERIC ORAL Take by mouth.      wheat dextrin (BENEFIBER CLEAR SF, DEXTRIN, ORAL) Take by mouth.       No current facility-administered medications for this visit.        Objective:      Physical Exam:   /69   Pulse 79   Temp 97.9 °F (36.6 °C) (Oral)   Resp 18   Ht 6' 1" (1.854 m)   Wt 102.7 kg (226 lb 6.6 oz)   SpO2 97%   BMI 29.87 kg/m²       ECOG PS: 0             Physical Exam  Constitutional:       General: He is not in acute distress.     Appearance: Normal appearance.   HENT:      Head: Normocephalic.   Eyes:      General: No scleral icterus.     Extraocular Movements: Extraocular movements intact.      Conjunctiva/sclera: Conjunctivae normal.   Cardiovascular:      Rate and Rhythm: Normal rate.   Pulmonary:      Effort: Pulmonary effort is normal. No respiratory distress.   Abdominal:      General: There is no distension.      Palpations: Abdomen is soft.   Musculoskeletal:      Right lower leg: No edema.      Left lower leg: No edema.   Skin:     General: Skin is warm and dry.   Neurological:      Mental Status: He is alert and oriented to person, place, and time.      Motor: No weakness.   Psychiatric:         Mood and Affect: Mood normal.         Behavior: Behavior normal.         Thought Content: Thought content normal.          Recent Labs:   Lab Results   Component Value Date    WBC 5.31 " 11/07/2023    RBC 4.72 11/07/2023    HGB 15.2 11/07/2023    HCT 45.0 11/07/2023    MCV 95 11/07/2023    MCH 32.2 (H) 11/07/2023    MCHC 33.8 11/07/2023    RDW 13.0 11/07/2023     11/07/2023    MPV 9.6 11/07/2023    IMMGR 0.3 09/12/2022    GRAN 3.7 11/07/2023    IGABS 0.03 11/07/2023    LYMPH 0.8 (L) 09/12/2022    LYMPH 13.0 (L) 09/12/2022    MONO 0.7 09/12/2022    MONO 11.3 09/12/2022    EOS 0.1 09/12/2022    BASO 0.03 09/12/2022    NRBC 0 09/12/2022    EOSINOPHIL 0.8 09/12/2022    BASOPHIL 0.5 09/12/2022    DIFFMETHOD Automated 09/12/2022       Lab Results   Component Value Date     11/07/2023    K 5.3 (H) 11/07/2023     11/07/2023    CO2 30 (H) 11/07/2023     (H) 11/07/2023    BUN 16 11/07/2023    CREATININE 1.5 (H) 11/07/2023    CALCIUM 10.6 (H) 11/07/2023    PROT 7.8 11/07/2023    ALBUMIN 3.9 11/07/2023    BILITOT 0.3 11/07/2023    ALKPHOS 83 11/07/2023    AST 23 11/07/2023    ALT 23 11/07/2023    ANIONGAP 8 11/07/2023    EGFRNORACEVR 47.7 (A) 11/07/2023        Lab Results   Component Value Date    PSADIAG 1.5 11/07/2023    PSADIAG 0.67 08/07/2023    PSADIAG 0.76 07/11/2023    PSADIAG 1.2 05/05/2023    PSADIAG 1.3 04/10/2023    PSADIAG 1.7 03/16/2023    PSADIAG 5.2 (H) 01/27/2023    PSADIAG 6.7 (H) 12/22/2022    PSADIAG 19.6 (H) 11/18/2022    PSADIAG 17.3 (H) 05/25/2022    PSATOTAL 15.8 (H) 07/05/2022    PSA 11.7 (H) 12/08/2021        Cardiovascular Screening:  Primary care physician: Anthony De La Torre MD      The 10-year ASCVD risk score (Echo ROSE, et al., 2019) is: 35.6%    Values used to calculate the score:      Age: 78 years      Sex: Male      Is Non- : No      Diabetic: No      Tobacco smoker: No      Systolic Blood Pressure: 136 mmHg      Is BP treated: Yes      HDL Cholesterol: 50 mg/dL      Total Cholesterol: 224 mg/dL    ASCVD Risk Level: High risk >= 20%    EKG:   Results for orders placed or performed in visit on 10/04/22   EKG 12-lead    Collection  Time: 10/04/22 10:51 AM    Narrative    Test Reason : Z01.810,    Vent. Rate : 087 BPM     Atrial Rate : 087 BPM     P-R Int : 166 ms          QRS Dur : 080 ms      QT Int : 362 ms       P-R-T Axes : 061 039 042 degrees     QTc Int : 435 ms    Normal sinus rhythm  Normal ECG  When compared with ECG of 12-SEP-2022 17:34,  No significant change was found  Confirmed by Oswaldo AVILA, Arie NATION ([id number]) on 10/6/2022 7:32:16 AM    Referred By: SHIRA VAZQUEZ           Confirmed By:Arie Chacon MD       High blood pressure = Yes  Antihypertensive agents: diltiaZEM - 300 MG  lisinopriL - 40 MG   Comments:     DM2: No  Antidiabetic agents: This patient does not have an active medication from one of the medication groupers.   Comments:     Hyperlipidemia: Yes  Lipid lowering agents: atorvastatin - 40 MG   Comments:     Antiplatelet therapy: No  Agent: This patient does not have an active medication from one of the medication groupers.   Comment:     Body mass index is 29.87 kg/m².  If greater than 30, referral to nutrition    Lab Results   Component Value Date    CHOL 224 (H) 08/07/2023    LDLCALC 150.4 08/07/2023    HDL 50 08/07/2023    TRIG 118 08/07/2023    HGBA1C 5.4 08/07/2023        Bone Health    Lab Results   Component Value Date    [id number] 62 08/07/2023        Results for orders placed during the hospital encounter of 02/23/23    DXA Bone Density with Vertebral Fracture    Impression  Normal bone mineral density.    Consider FDA approved medical therapies in postmenopausal women and men aged 50 years and older, based on the following:    *A hip or vertebral (clinical or morphometric) fracture  *T score less than or equal to -2.5 at the femoral neck or spine after appropriate evaluation to exclude secondary causes.  *Low bone mass -- also known as osteopenia (T score between -1.0 and -2.5 at the femoral neck or spine) and a 10 year probability of hip fracture greater than or equal to 3% or a 10 year probability of major  osteoporosis-related fracture greater than or equal to 20% based on the [place]-adapted WHO algorithm.  *Clinicians judgment and/or patient preference may indicate treatment for people with 10 year fracture probabilities is above or below these levels.      Electronically signed by: Anshu Stephens  Date:    02/23/2023  Time:    12:08       Vitamin D: 1000 IU daily  Calcium: Recommend 4 servings of dairy daily     Osteopenia/Osteoporosis: None    Bone strengthening agent: None     Staging Imaging     Results for orders placed during the hospital encounter of 11/09/22    NM PET CT F 18 PYL PSMA, Midthigh to Vertex    Impression  1. Evidence of prostate neoplasm with osseous and pulmonary metastasis.  Detrimental changes noted when comparison is made to 05/16/2022 in particular with progression of several pulmonary nodules and with development of 1 or 2 small pulmonary nodules.  2. A mass is noted inferior to the left lobe of the thyroid gland entirely specific but demonstrating increased uptake similar since the prior  3. A prostate based mass appears to be present with involvement of the bladder and a distended bladder suggestive outlet obstruction      Electronically signed by: John Steele MD  Date:    11/09/2022  Time:    15:31       Results for orders placed during the hospital encounter of 08/31/22    NM Bone Scan Whole Body    Impression  1. Uptake within the anterior right 5th and 6th ribs is most likely related to degenerative changes.  Correlation with plain film or chest CT scan recommended.    2.  Intense uptake within the medial left pubic bone corresponds to an area of sclerosis seen on the comparison PET-CT scan.  Differential considerations remain to be fibrous dysplasia, Paget's disease or metastasis.    3.  Negative for metastatic pattern of uptake otherwise.  Degenerative pattern of uptake and other incidental findings as noted above.      Electronically signed by: Antonio Mckeon,  MD  Date:    08/31/2022  Time:    12:35      Results for orders placed during the hospital encounter of 09/29/22    MRI Prostate W W/O Contrast    Impression  Left-sided PI-RADS 5 lesion with extraprostatic extension involving the left-sided neurovascular bundles and seminal vesicles.  Extension of abnormal signal adjacent to the left levator ani, cannot rule out involvement.  Abnormal signal also closely approximates with the anterior rectal wall without definite involvement.    Left pubic lesion concerning for metastasis.    Diffuse heterogeneous bone marrow.    Overall Assessment: PI-RADS 5 - Very high (clinically significant cancer is highly likely to be present)    Number of targets created for potential MR/US fusion biopsy    Transitional/Peripheral zone: 1    This report was flagged in Epic as abnormal.    Electronically signed by resident: Al Castrejon  Date:    09/29/2022  Time:    16:12    Electronically signed by: Shekhar Rodríguez MD  Date:    09/29/2022  Time:    19:08       No results found for this or any previous visit.       I have personally reviewed the above imaging.     Path:   Reviewed pathology as documented above.    Genomic testing:     Germline genetic testing: 3/28/23 - VUS in APC and BMPR1A    Results for orders placed or performed in visit on 03/28/23   Genetic Misc Sendout Test, Blood   Result Value Ref Range    Miscellaneous Genetic Test Name [facility] BRCA + 84/RNA     Genso Specimen Type Blood     Genetic counseling? Yes     Parental or Sibling Testing? No     Test Result See result image under hyperlink     Reference Lab SEE COMMENT         Somatic tumor genotyping: None      ctDNA genotyping:           Diagnoses:     1. Prostate cancer    2. Androgen deprivation therapy    3. Hypertension, unspecified type    4. Hyperlipidemia, unspecified hyperlipidemia type              Assessment and Plan:     1. Prostate cancer  Overview:  Patient with de david high-volume metastatic prosatate  adenocarcinoma including lung and osseous mets. Started ADT 11/18/23 with apalutamide 6/7/23. Completed RT to the left iliac met 2/7/23.    Assessment & Plan:  PSA up today. Will repeat in 4 weeks and if confirmed rising will recheck PSMA PET CT scan.   - FU 4 weeks with repeat PET CT  - Con't apalutamide 240mg daily      2. Androgen deprivation therapy  Overview:  Current agent: History of bilateral orchiectomy  Start date:  ADT start 11/2022  Last dose: Orchiectomy 2/15/23  Duration of therapy: Indefefinite    Novel Androgen signaling inhibitors:   Agent: Apalutamide  Start date: 6/7/23          3. Hypertension, unspecified type  Overview:  Home regimen includes diltiazem 300mg and lisinopril 20mg po daily.       4. Hyperlipidemia, unspecified hyperlipidemia type  Overview:  On atorvastatin daily                  Follow up:   Route Chart for Scheduling    Med Onc Chart Routing      Follow up with physician 4 weeks.   Follow up with JENIFFER    Infusion scheduling note    Injection scheduling note    Labs CBC, CMP and PSA   Scheduling:  Preferred lab:  Lab interval:  Please check at least one day prior to appt   Imaging    Pharmacy appointment    Other referrals                           The above information has been reviewed with the patient and all questions have been answered to their apparent satisfaction.  They understand that they can call the clinic with any questions.    Darren Carter

## 2023-12-04 ENCOUNTER — LAB VISIT (OUTPATIENT)
Dept: LAB | Facility: HOSPITAL | Age: 78
End: 2023-12-04
Attending: HOSPITALIST
Payer: MEDICARE

## 2023-12-04 DIAGNOSIS — C61 PROSTATE CANCER: ICD-10-CM

## 2023-12-04 DIAGNOSIS — E04.1 THYROID NODULE: ICD-10-CM

## 2023-12-04 LAB
ALBUMIN SERPL BCP-MCNC: 4.1 G/DL (ref 3.5–5.2)
ALP SERPL-CCNC: 86 U/L (ref 38–126)
ALT SERPL W/O P-5'-P-CCNC: 26 U/L (ref 10–44)
ANION GAP SERPL CALC-SCNC: 9 MMOL/L (ref 8–16)
AST SERPL-CCNC: 28 U/L (ref 15–46)
BILIRUB SERPL-MCNC: 0.4 MG/DL (ref 0.1–1)
CALCIUM SERPL-MCNC: 10.2 MG/DL (ref 8.7–10.5)
CHLORIDE SERPL-SCNC: 100 MMOL/L (ref 95–110)
CO2 SERPL-SCNC: 33 MMOL/L (ref 23–29)
COMPLEXED PSA SERPL-MCNC: 1.7 NG/ML (ref 0–4)
CREAT SERPL-MCNC: 1.35 MG/DL (ref 0.5–1.4)
ERYTHROCYTE [DISTWIDTH] IN BLOOD BY AUTOMATED COUNT: 12.3 % (ref 11.5–14.5)
EST. GFR  (NO RACE VARIABLE): 54.1 ML/MIN/1.73 M^2
GLUCOSE SERPL-MCNC: 169 MG/DL (ref 70–110)
HCT VFR BLD AUTO: 43.3 % (ref 40–54)
HGB BLD-MCNC: 14.5 G/DL (ref 14–18)
IMM GRANULOCYTES # BLD AUTO: 0.03 K/UL (ref 0–0.04)
MCH RBC QN AUTO: 32.2 PG (ref 27–31)
MCHC RBC AUTO-ENTMCNC: 33.5 G/DL (ref 32–36)
MCV RBC AUTO: 96 FL (ref 82–98)
NEUTROPHILS # BLD AUTO: 3.6 K/UL (ref 1.8–7.7)
PLATELET # BLD AUTO: 252 K/UL (ref 150–450)
PMV BLD AUTO: 9.6 FL (ref 9.2–12.9)
POTASSIUM SERPL-SCNC: 4.3 MMOL/L (ref 3.5–5.1)
PROT SERPL-MCNC: 7.8 G/DL (ref 6–8.4)
RBC # BLD AUTO: 4.51 M/UL (ref 4.6–6.2)
SODIUM SERPL-SCNC: 142 MMOL/L (ref 136–145)
TESTOST SERPL-MCNC: 19 NG/DL (ref 304–1227)
TSH SERPL DL<=0.005 MIU/L-ACNC: 2.73 UIU/ML (ref 0.4–4)
UUN UR-MCNC: 21 MG/DL (ref 2–20)
WBC # BLD AUTO: 5.16 K/UL (ref 3.9–12.7)

## 2023-12-04 PROCEDURE — 84443 ASSAY THYROID STIM HORMONE: CPT | Mod: PN | Performed by: HOSPITALIST

## 2023-12-04 PROCEDURE — 80053 COMPREHEN METABOLIC PANEL: CPT | Mod: PN | Performed by: HOSPITALIST

## 2023-12-04 PROCEDURE — 85027 COMPLETE CBC AUTOMATED: CPT | Mod: PN | Performed by: HOSPITALIST

## 2023-12-04 PROCEDURE — 84153 ASSAY OF PSA TOTAL: CPT | Performed by: HOSPITALIST

## 2023-12-04 PROCEDURE — 36415 COLL VENOUS BLD VENIPUNCTURE: CPT | Mod: PN | Performed by: HOSPITALIST

## 2023-12-04 PROCEDURE — 84403 ASSAY OF TOTAL TESTOSTERONE: CPT | Mod: PN | Performed by: HOSPITALIST

## 2023-12-05 ENCOUNTER — OFFICE VISIT (OUTPATIENT)
Dept: HEMATOLOGY/ONCOLOGY | Facility: CLINIC | Age: 78
End: 2023-12-05
Payer: MEDICARE

## 2023-12-05 VITALS
SYSTOLIC BLOOD PRESSURE: 131 MMHG | BODY MASS INDEX: 31.09 KG/M2 | HEIGHT: 73 IN | RESPIRATION RATE: 18 BRPM | HEART RATE: 84 BPM | WEIGHT: 234.56 LBS | DIASTOLIC BLOOD PRESSURE: 66 MMHG | OXYGEN SATURATION: 96 %

## 2023-12-05 DIAGNOSIS — C79.51 MALIGNANT NEOPLASM METASTATIC TO BONE: ICD-10-CM

## 2023-12-05 DIAGNOSIS — Z79.818 ANDROGEN DEPRIVATION THERAPY: ICD-10-CM

## 2023-12-05 DIAGNOSIS — C61 PROSTATE CANCER: Primary | ICD-10-CM

## 2023-12-05 PROCEDURE — 3078F DIAST BP <80 MM HG: CPT | Mod: CPTII,S$GLB,, | Performed by: HOSPITALIST

## 2023-12-05 PROCEDURE — 1126F PR PAIN SEVERITY QUANTIFIED, NO PAIN PRESENT: ICD-10-PCS | Mod: CPTII,S$GLB,, | Performed by: HOSPITALIST

## 2023-12-05 PROCEDURE — 99215 OFFICE O/P EST HI 40 MIN: CPT | Mod: S$GLB,,, | Performed by: HOSPITALIST

## 2023-12-05 PROCEDURE — 3288F FALL RISK ASSESSMENT DOCD: CPT | Mod: CPTII,S$GLB,, | Performed by: HOSPITALIST

## 2023-12-05 PROCEDURE — 99999 PR PBB SHADOW E&M-EST. PATIENT-LVL III: ICD-10-PCS | Mod: PBBFAC,,, | Performed by: HOSPITALIST

## 2023-12-05 PROCEDURE — 3075F PR MOST RECENT SYSTOLIC BLOOD PRESS GE 130-139MM HG: ICD-10-PCS | Mod: CPTII,S$GLB,, | Performed by: HOSPITALIST

## 2023-12-05 PROCEDURE — 3078F PR MOST RECENT DIASTOLIC BLOOD PRESSURE < 80 MM HG: ICD-10-PCS | Mod: CPTII,S$GLB,, | Performed by: HOSPITALIST

## 2023-12-05 PROCEDURE — 1101F PR PT FALLS ASSESS DOC 0-1 FALLS W/OUT INJ PAST YR: ICD-10-PCS | Mod: CPTII,S$GLB,, | Performed by: HOSPITALIST

## 2023-12-05 PROCEDURE — 99999 PR PBB SHADOW E&M-EST. PATIENT-LVL III: CPT | Mod: PBBFAC,,, | Performed by: HOSPITALIST

## 2023-12-05 PROCEDURE — 3288F PR FALLS RISK ASSESSMENT DOCUMENTED: ICD-10-PCS | Mod: CPTII,S$GLB,, | Performed by: HOSPITALIST

## 2023-12-05 PROCEDURE — 99215 PR OFFICE/OUTPT VISIT, EST, LEVL V, 40-54 MIN: ICD-10-PCS | Mod: S$GLB,,, | Performed by: HOSPITALIST

## 2023-12-05 PROCEDURE — 3075F SYST BP GE 130 - 139MM HG: CPT | Mod: CPTII,S$GLB,, | Performed by: HOSPITALIST

## 2023-12-05 PROCEDURE — 99499 UNLISTED E&M SERVICE: CPT | Mod: S$GLB,,, | Performed by: HOSPITALIST

## 2023-12-05 PROCEDURE — 1101F PT FALLS ASSESS-DOCD LE1/YR: CPT | Mod: CPTII,S$GLB,, | Performed by: HOSPITALIST

## 2023-12-05 PROCEDURE — 1126F AMNT PAIN NOTED NONE PRSNT: CPT | Mod: CPTII,S$GLB,, | Performed by: HOSPITALIST

## 2023-12-05 NOTE — ASSESSMENT & PLAN NOTE
PSA up to 1.7 c/f mCRPC. Will repeat PSMA PET CT. Consider biopsy for molecular profiling if amenable. Otherwise would plan for either docetaxel, early use of Pluvicto, or potentially RT if oligometastatic progression.  - Con't apluatmide for now  - FU 4 weeks with PSMA PET CT and repeat PSA

## 2023-12-05 NOTE — PROGRESS NOTES
ADVANCED PROSTATE CANCER CLINIC - FOLLOW UP VISIT     Best Contact Phone Number(s): [phone] (home)       Cancer/Stage/TNM:    Cancer Staging   Prostate cancer  Staging form: Prostate, AJCC 8th Edition  - Clinical: Stage IVB (cT3, cNX, cM1c, Grade Group: 5) - Signed by Darren Carter MD on 11/18/2022        Reason for visit: De david metastatic prostate cancer with lung and osseous mets sp orchiectomy and on apalutamide.    HPI: Noah Lopez is a 78 y.o. 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 He started first line ADT on 11/18/22 with addition of apalutamide 06/2023. He completed SBRT to the left iliac lesion 02/2023 and underwent bilateral orchiectomy 2/15/23. He presents to medical oncology clinic in setting of rising PSA c/f mCRPC.    History has been obtained by chart review and discussion with the patient.    Interval Events:          Overall feels good. No current pain. No new urianry complaints. Hot flashes are stable. Energy has been fair. Looking forward to upcoming fishing trip. Also has plans to travel to his daughters. January 16th to February 9th.          Oncology History   Prostate cancer   12/8/2021 Tumor Markers    PSA 11.7     1/2022 Notable Event    ED visit for urinary retention found on abodminal US. Jones placed.     3/2022 Notable Event    Seen in ED for fall. Incidentally found to have suspicious thyroid nodule     4/20/2022 Biopsy    FNA of thyroid nodule nondiagnostic. Also found to have new SHANNAN lung nodules.     5/16/2022 Imaging Significant Findings    FDG PET-CT in setting of pulmonary nodules:  1.  Two solid left upper lobe pulmonary nodules with no appreciable FDG uptake.  Malignancy is not excluded, and the larger nodule is likely amenable to percutaneous biopsy. Alternatively, continued surveillance by dedicated chest CT alone, i.e., without FDG PET, may be  considered.     2.  Focal FDG uptake in the left prostate gland with extracapsular extension.  These findings are concerning for malignancy.  Correlation with PSA and urology evaluation recommended.  MRI of the prostate may also be helpful for further evaluation.     3.  Abnormal appearance of the left symphysis pubis with hypermetabolic activity.  Differential considerations include fibrous dysplasia, Paget's disease, and metastasis.   MRI or bone algorithm CT may be informative.     4.  Incidental focal uptake in the right cerebellum on at least 2 PET slices.  Recommend brain MRI for further evaluation.     5.  2.2 cm left inferior thyroid lobe nodule.  This nodule was recently biopsied with abnormal results.  Please refer to pathology report.     5/25/2022 Tumor Markers    PSA 17.3     7/6/2022 Biopsy    Attempted SHANNAN nodule biopsy     8/11/2022 Biopsy    TRUS biopsy: Prostate adenocarcinoma up to 4+3 disease in 10/18 cores all from the left side. Associated cribriform glands and PNI.       8/31/2022 Imaging Significant Findings    Bone scan:  1. Uptake within the anterior right 5th and 6th ribs is most likely related to degenerative changes.  Correlation with plain film or chest CT scan recommended.     2.  Intense uptake within the medial left pubic bone corresponds to an area of sclerosis seen on the comparison PET-CT scan.  Differential considerations remain to be fibrous dysplasia, Paget's disease or metastasis.     3.  Negative for metastatic pattern of uptake otherwise.  Degenerative pattern of uptake and other incidental findings as noted above.     9/29/2022 Imaging Significant Findings    MRI prostate: Left-sided PI-RADS 5 lesion with extraprostatic extension involving the left-sided neurovascular bundles and seminal vesicles.  Extension of abnormal signal adjacent to the left levator ani, cannot rule out involvement.  Abnormal signal also closely approximates with the anterior rectal wall without  definite involvement.     Left pubic lesion concerning for metastasis.     10/17/2022 Notable Event    Declined pubic bone biopsy to potentially confirm metastatic prostate cancer     10/26/2022 Procedure    TURPT - pathology review with up to 4+5 disease     11/9/2022 Imaging Significant Findings    PSMA PET-CT shows evidence of osseous and pulmonary metastatic disease     11/18/2022 -  Hormone Therapy    Start Lupron 22.5mg IM; plan to start apalutamide     2/1/2023 - 2/7/2023 Radiation Therapy    Treating physician: Jaswinder Doyle    Course: C1 Pelvis 2023    Treatment Site Ref. ID Energy Dose/Fx (Gy) #Fx Dose Correction (Gy) Total Dose (Gy) Start Date End Date Elapsed Days   3D Iliac_Lt Lt Iliac 18X 4 5 / 5 0 20 2/1/2023 2/7/2023 6      3/28/2023 Genetic Testing    Germline genetic testing. No pathogenic mutations. VUS in APC and BMPR1A           Past Medical History:   Diagnosis Date    Colon polyp 09/06/2018    Hyperlipidemia     Hypertension     Prostate cancer     Stage 3a chronic kidney disease 6/21/2023    Urinary tract infection with hematuria 11/18/2022         Past Surgical History:   Procedure Laterality Date    COLONOSCOPY  2010    COLONOSCOPY N/A 9/6/2018    Procedure: COLONOSCOPY Suprep PLEASE TEXT PATIENT WITH ARRIVAL TIME;  Surgeon: Niharika Arce MD;  Location: [facility];  Service: Endoscopy;  Laterality: N/A;    CYSTOSCOPY WITH URODYNAMIC TESTING N/A 1/31/2022    Procedure: CYSTOSCOPY, WITH URODYNAMIC TESTING FLOUROSCOPIC;  Surgeon: Anthony Maloney MD;  Location: [address];  Service: Urology;  Laterality: N/A;  1hr    KNEE ARTHROSCOPY      ORCHIECTOMY Bilateral 2/15/2023    Procedure: Bilateral simple orchiectomy;  Surgeon: Helena Luis MD;  Location: [facility];  Service: Urology;  Laterality: Bilateral;    SPINE SURGERY      l-spine    TRANSURETHRAL RESECTION OF PROSTATE N/A 10/26/2022    Procedure: TURP (TRANSURETHRAL RESECTION OF PROSTATE);  Surgeon: Helena Luis MD;   Location: [facility] OR;  Service: Urology;  Laterality: N/A;         I have reviewed and updated the patient's past medical, surgical, family and social histories.     Review of patient's allergies indicates:   Allergen Reactions    Ciprofloxacin Other (See Comments)     tendonitis         Current Outpatient Medications   Medication Sig Dispense Refill    amoxicillin (AMOXIL) 500 MG Tab Take 500 mg by mouth every 8 (eight) hours.      apalutamide (ERLEADA) 60 mg Tab Take 240 mg by mouth once daily. 120 tablet 11    ascorbic acid, vitamin C, (VITAMIN C) 1000 MG tablet Take 1,000 mg by mouth once daily.      atorvastatin (LIPITOR) 40 MG tablet Take 1 tablet (40 mg total) by mouth once daily. 90 tablet 3    calcium carbonate (OS-TYREE) 500 mg calcium (1,250 mg) tablet Take 2 tablets (1,000 mg total) by mouth once daily. 60 tablet 11    cholecalciferol, vitamin D3, (VITAMIN D3) 25 mcg (1,000 unit) capsule Take 1 capsule (1,000 Units total) by mouth once daily. 30 capsule 11    clonazePAM (KLONOPIN) 0.5 MG tablet Take 1 tablet (0.5 mg total) by mouth daily as needed for Anxiety. 30 tablet 0    clotrimazole-betamethasone 1-0.05% (LOTRISONE) cream Apply topically 2 (two) times daily. 60 g 3    diltiaZEM (CARDIZEM CD) 300 MG 24 hr capsule TAKE 1 CAPSULE BY MOUTH  ONCE DAILY 90 capsule 3    diphenhydramine HCl (BENADRYL ALLERGY ORAL) Take by mouth.      docusate sodium (COLACE) 250 MG capsule Take 250 mg by mouth once daily.      finasteride (PROSCAR) 5 mg tablet Take 1 tablet (5 mg total) by mouth once daily. 90 tablet 3    GAS RELIEF 80, SIMETHICONE, ORAL Take by mouth.      lisinopriL (PRINIVIL,ZESTRIL) 40 MG tablet Take 1 tablet (40 mg total) by mouth once daily. 90 tablet 3    melatonin 10 mg TbDL Take by mouth.      methocarbamoL (ROBAXIN) 750 MG Tab Take 750-1,500 mg by mouth 3 (three) times daily as needed.      miconazole (MICOTIN) 2 % cream Apply topically 2 (two) times daily. Apply to tip of penis prn irritation 28 g 0  "   multivit-min-FA-lycopen-lutein (MEN 50 PLUS MULTIVITAMIN) 300-600-300 mcg Tab as directed Orally      multivitamin capsule Take 1 capsule by mouth once daily.      ofloxacin (FLOXIN) 0.3 % otic solution Apply 10 drops into right ear once a day for 10 days.  Please dispense generic.      omega-3 fatty acids/fish oil (FISH OIL-OMEGA-3 FATTY ACIDS) 300-1,000 mg capsule Take 1 capsule by mouth once daily.      tamsulosin (FLOMAX) 0.4 mg Cap Take 1 capsule by mouth once daily 90 capsule 3    traZODone (DESYREL) 100 MG tablet TAKE 2 TABLETS BY MOUTH EVERY DAY AT BEDTIME AS NEEDED FOR INSOMNIA 180 tablet 3    TURMERIC ORAL Take by mouth.      wheat dextrin (BENEFIBER CLEAR SF, DEXTRIN, ORAL) Take by mouth.       No current facility-administered medications for this visit.        Objective:      Physical Exam:   /66 (BP Location: Right arm, Patient Position: Sitting, BP Method: Medium (Automatic))   Pulse 84   Resp 18   Ht 6' 1" (1.854 m)   Wt 106.4 kg (234 lb 9.1 oz)   SpO2 96%   BMI 30.95 kg/m²       ECOG PS: 0             Physical Exam  Constitutional:       General: He is not in acute distress.     Appearance: Normal appearance.   HENT:      Head: Normocephalic.   Eyes:      General: No scleral icterus.     Extraocular Movements: Extraocular movements intact.      Conjunctiva/sclera: Conjunctivae normal.   Cardiovascular:      Rate and Rhythm: Normal rate.   Pulmonary:      Effort: Pulmonary effort is normal. No respiratory distress.   Abdominal:      General: There is no distension.      Palpations: Abdomen is soft.   Musculoskeletal:      Right lower leg: No edema.      Left lower leg: No edema.   Skin:     General: Skin is warm and dry.   Neurological:      Mental Status: He is alert and oriented to person, place, and time.      Motor: No weakness.   Psychiatric:         Mood and Affect: Mood normal.         Behavior: Behavior normal.         Thought Content: Thought content normal.          Recent " Labs:   Lab Results   Component Value Date    WBC 5.16 12/04/2023    RBC 4.51 (L) 12/04/2023    HGB 14.5 12/04/2023    HCT 43.3 12/04/2023    MCV 96 12/04/2023    MCH 32.2 (H) 12/04/2023    MCHC 33.5 12/04/2023    RDW 12.3 12/04/2023     12/04/2023    MPV 9.6 12/04/2023    IMMGR 0.3 09/12/2022    GRAN 3.6 12/04/2023    IGABS 0.03 12/04/2023    LYMPH 0.8 (L) 09/12/2022    LYMPH 13.0 (L) 09/12/2022    MONO 0.7 09/12/2022    MONO 11.3 09/12/2022    EOS 0.1 09/12/2022    BASO 0.03 09/12/2022    NRBC 0 09/12/2022    EOSINOPHIL 0.8 09/12/2022    BASOPHIL 0.5 09/12/2022    DIFFMETHOD Automated 09/12/2022       Lab Results   Component Value Date     12/04/2023    K 4.3 12/04/2023     12/04/2023    CO2 33 (H) 12/04/2023     (H) 12/04/2023    BUN 21 (H) 12/04/2023    CREATININE 1.35 12/04/2023    CALCIUM 10.2 12/04/2023    PROT 7.8 12/04/2023    ALBUMIN 4.1 12/04/2023    BILITOT 0.4 12/04/2023    ALKPHOS 86 12/04/2023    AST 28 12/04/2023    ALT 26 12/04/2023    ANIONGAP 9 12/04/2023    EGFRNORACEVR 54.1 (A) 12/04/2023        Lab Results   Component Value Date    PSADIAG 1.7 12/04/2023    PSADIAG 1.5 11/07/2023    PSADIAG 0.67 08/07/2023    PSADIAG 0.76 07/11/2023    PSADIAG 1.2 05/05/2023    PSADIAG 1.3 04/10/2023    PSADIAG 1.7 03/16/2023    PSADIAG 5.2 (H) 01/27/2023    PSADIAG 6.7 (H) 12/22/2022    PSADIAG 19.6 (H) 11/18/2022    PSATOTAL 15.8 (H) 07/05/2022    PSA 11.7 (H) 12/08/2021        Cardiovascular Screening:  Primary care physician: Anthony De La Torre MD      The 10-year ASCVD risk score (Echo ROSE, et al., 2019) is: 33.7%    Values used to calculate the score:      Age: 78 years      Sex: Male      Is Non- : No      Diabetic: No      Tobacco smoker: No      Systolic Blood Pressure: 131 mmHg      Is BP treated: Yes      HDL Cholesterol: 50 mg/dL      Total Cholesterol: 224 mg/dL    ASCVD Risk Level: High risk >= 20%    EKG:   Results for orders placed or performed in  visit on 10/04/22   EKG 12-lead    Collection Time: 10/04/22 10:51 AM    Narrative    Test Reason : Z01.810,    Vent. Rate : 087 BPM     Atrial Rate : 087 BPM     P-R Int : 166 ms          QRS Dur : 080 ms      QT Int : 362 ms       P-R-T Axes : 061 039 042 degrees     QTc Int : 435 ms    Normal sinus rhythm  Normal ECG  When compared with ECG of 12-SEP-2022 17:34,  No significant change was found  Confirmed by Arie Chacon MD ([id number]) on 10/6/2022 7:32:16 AM    Referred By: SHIRA VAZQUEZ           Confirmed By:Arie Chacon MD       High blood pressure = Yes  Antihypertensive agents: diltiaZEM - 300 MG  lisinopriL - 40 MG   Comments:     DM2: No  Antidiabetic agents: This patient does not have an active medication from one of the medication groupers.   Comments:     Hyperlipidemia: Yes  Lipid lowering agents: atorvastatin - 40 MG   Comments:     Antiplatelet therapy: No  Agent: This patient does not have an active medication from one of the medication groupers.   Comment:     Body mass index is 30.95 kg/m².  If greater than 30, referral to nutrition    Lab Results   Component Value Date    CHOL 224 (H) 08/07/2023    LDLCALC 150.4 08/07/2023    HDL 50 08/07/2023    TRIG 118 08/07/2023    HGBA1C 5.4 08/07/2023        Bone Health    Lab Results   Component Value Date    [id number] 62 08/07/2023        Results for orders placed during the hospital encounter of 02/23/23    DXA Bone Density with Vertebral Fracture    Impression  Normal bone mineral density.    Consider FDA approved medical therapies in postmenopausal women and men aged 50 years and older, based on the following:    *A hip or vertebral (clinical or morphometric) fracture  *T score less than or equal to -2.5 at the femoral neck or spine after appropriate evaluation to exclude secondary causes.  *Low bone mass -- also known as osteopenia (T score between -1.0 and -2.5 at the femoral neck or spine) and a 10 year probability of hip fracture greater than or  equal to 3% or a 10 year probability of major osteoporosis-related fracture greater than or equal to 20% based on the [place]-adapted WHO algorithm.  *Clinicians judgment and/or patient preference may indicate treatment for people with 10 year fracture probabilities is above or below these levels.      Electronically signed by: Anshu Stephens  Date:    02/23/2023  Time:    12:08       Vitamin D: 1000 IU daily  Calcium: Recommend 4 servings of dairy daily     Osteopenia/Osteoporosis: None    Bone strengthening agent: None     Staging Imaging     Results for orders placed during the hospital encounter of 11/09/22    NM PET CT F 18 PYL PSMA, Midthigh to Vertex    Impression  1. Evidence of prostate neoplasm with osseous and pulmonary metastasis.  Detrimental changes noted when comparison is made to 05/16/2022 in particular with progression of several pulmonary nodules and with development of 1 or 2 small pulmonary nodules.  2. A mass is noted inferior to the left lobe of the thyroid gland entirely specific but demonstrating increased uptake similar since the prior  3. A prostate based mass appears to be present with involvement of the bladder and a distended bladder suggestive outlet obstruction      Electronically signed by: John Steele MD  Date:    11/09/2022  Time:    15:31       Results for orders placed during the hospital encounter of 08/31/22    NM Bone Scan Whole Body    Impression  1. Uptake within the anterior right 5th and 6th ribs is most likely related to degenerative changes.  Correlation with plain film or chest CT scan recommended.    2.  Intense uptake within the medial left pubic bone corresponds to an area of sclerosis seen on the comparison PET-CT scan.  Differential considerations remain to be fibrous dysplasia, Paget's disease or metastasis.    3.  Negative for metastatic pattern of uptake otherwise.  Degenerative pattern of uptake and other incidental findings as noted above.      Electronically  signed by: Antonio Mckeon MD  Date:    08/31/2022  Time:    12:35      Results for orders placed during the hospital encounter of 09/29/22    MRI Prostate W W/O Contrast    Impression  Left-sided PI-RADS 5 lesion with extraprostatic extension involving the left-sided neurovascular bundles and seminal vesicles.  Extension of abnormal signal adjacent to the left levator ani, cannot rule out involvement.  Abnormal signal also closely approximates with the anterior rectal wall without definite involvement.    Left pubic lesion concerning for metastasis.    Diffuse heterogeneous bone marrow.    Overall Assessment: PI-RADS 5 - Very high (clinically significant cancer is highly likely to be present)    Number of targets created for potential MR/US fusion biopsy    Transitional/Peripheral zone: 1    This report was flagged in Epic as abnormal.    Electronically signed by resident: Al Castrejon  Date:    09/29/2022  Time:    16:12    Electronically signed by: Shekhar Rodríguez MD  Date:    09/29/2022  Time:    19:08       No results found for this or any previous visit.       I have personally reviewed the above imaging.     Path:   Reviewed pathology as documented above.    Genomic testing:     Germline genetic testing: 3/28/23 - VUS in APC and BMPR1A    Results for orders placed or performed in visit on 03/28/23   Genetic Misc Sendout Test, Blood   Result Value Ref Range    Miscellaneous Genetic Test Name [facility] BRCA + 84/RNA     Genso Specimen Type Blood     Genetic counseling? Yes     Parental or Sibling Testing? No     Test Result See result image under hyperlink     Reference Lab SEE COMMENT         Somatic tumor genotyping: None      ctDNA genotyping:           Diagnoses:     1. Prostate cancer    2. Androgen deprivation therapy    3. Bone metastases                Assessment and Plan:     1. Prostate cancer  Overview:  Patient with de david high-volume metastatic prosatate adenocarcinoma including lung and osseous  mets. Started ADT 11/18/23 with apalutamide 6/7/23. Completed RT to the left iliac met 2/7/23.    Assessment & Plan:  PSA up to 1.7 c/f mCRPC. Will repeat PSMA PET CT. Consider biopsy for molecular profiling if amenable. Otherwise would plan for either docetaxel, early use of Pluvicto, or potentially RT if oligometastatic progression.  - Con't apluatmide for now  - FU 4 weeks with PSMA PET CT and repeat PSA      Orders:  -     NM PET CT F 18 PYL PSMA, Midthigh to Vertex; Future; Expected date: 12/05/2023    2. Androgen deprivation therapy  Overview:  Current agent: History of bilateral orchiectomy  Start date:  ADT start 11/2022  Last dose: Orchiectomy 2/15/23  Duration of therapy: Indefefinite    Novel Androgen signaling inhibitors:   Agent: Apalutamide  Start date: 6/7/23          3. Bone metastases  Overview:  Has left pelvic turbercle metastases noted on PSMA PET CT with associated discomfort. Completed RT on 2/3/23.    Assessment & Plan:  - Con't Ca/D  - Will plan to start Xgeva                    Follow up:   Route Chart for Scheduling    Med Onc Chart Routing      Follow up with physician 4 weeks.   Follow up with JENIFFER    Infusion scheduling note New or changed treatment   Xgeva 4 weeks   Injection scheduling note    Labs CBC, CMP and PSA   Scheduling:  Preferred lab:  Lab interval:     Imaging PET scan      Pharmacy appointment    Other referrals                     Therapy Plan Information  Medications  denosumab (XGEVA) solution 120 mg  120 mg, Subcutaneous, Every 4 weeks         The above information has been reviewed with the patient and all questions have been answered to their apparent satisfaction.  They understand that they can call the clinic with any questions.    Darren Carter

## 2023-12-05 NOTE — PATIENT INSTRUCTIONS
Repeat PSMA PET CT scan. If easily accessible site, would plan on scheduling a biopsy as well.    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 Otherwise would recommend docetaxel chemotherapy or consider early use of Pluvicto. Could likely start after upcoming trip to your daughters in January.    Plan to start Xgeva to help stregnthen bones. Please check with your dentist to see if it is OK to start Xgeva. Continue taking calcium and vitamin D.    FU in 4 weeks.

## 2023-12-22 ENCOUNTER — OFFICE VISIT (OUTPATIENT)
Dept: FAMILY MEDICINE | Facility: CLINIC | Age: 78
End: 2023-12-22
Payer: MEDICARE

## 2023-12-22 VITALS
HEIGHT: 73 IN | DIASTOLIC BLOOD PRESSURE: 62 MMHG | HEART RATE: 85 BPM | TEMPERATURE: 98 F | SYSTOLIC BLOOD PRESSURE: 128 MMHG | WEIGHT: 233.81 LBS | OXYGEN SATURATION: 96 % | BODY MASS INDEX: 30.99 KG/M2

## 2023-12-22 DIAGNOSIS — I70.0 AORTIC ATHEROSCLEROSIS: ICD-10-CM

## 2023-12-22 DIAGNOSIS — F33.1 MODERATE RECURRENT MAJOR DEPRESSION: ICD-10-CM

## 2023-12-22 DIAGNOSIS — N18.31 STAGE 3A CHRONIC KIDNEY DISEASE: ICD-10-CM

## 2023-12-22 DIAGNOSIS — Q04.9: ICD-10-CM

## 2023-12-22 DIAGNOSIS — C61 PROSTATE CANCER: Primary | ICD-10-CM

## 2023-12-22 DIAGNOSIS — C78.00 MALIGNANT NEOPLASM METASTATIC TO LUNG, UNSPECIFIED LATERALITY: ICD-10-CM

## 2023-12-22 DIAGNOSIS — C79.51 MALIGNANT NEOPLASM METASTATIC TO BONE: ICD-10-CM

## 2023-12-22 PROCEDURE — 3078F DIAST BP <80 MM HG: CPT | Mod: CPTII,S$GLB,, | Performed by: FAMILY MEDICINE

## 2023-12-22 PROCEDURE — 99499 UNLISTED E&M SERVICE: CPT | Mod: S$GLB,,, | Performed by: FAMILY MEDICINE

## 2023-12-22 PROCEDURE — 99214 OFFICE O/P EST MOD 30 MIN: CPT | Mod: S$GLB,,, | Performed by: FAMILY MEDICINE

## 2023-12-22 PROCEDURE — 3288F FALL RISK ASSESSMENT DOCD: CPT | Mod: CPTII,S$GLB,, | Performed by: FAMILY MEDICINE

## 2023-12-22 PROCEDURE — 1160F RVW MEDS BY RX/DR IN RCRD: CPT | Mod: CPTII,S$GLB,, | Performed by: FAMILY MEDICINE

## 2023-12-22 PROCEDURE — 1159F MED LIST DOCD IN RCRD: CPT | Mod: CPTII,S$GLB,, | Performed by: FAMILY MEDICINE

## 2023-12-22 PROCEDURE — 3074F SYST BP LT 130 MM HG: CPT | Mod: CPTII,S$GLB,, | Performed by: FAMILY MEDICINE

## 2023-12-22 PROCEDURE — 1101F PT FALLS ASSESS-DOCD LE1/YR: CPT | Mod: CPTII,S$GLB,, | Performed by: FAMILY MEDICINE

## 2023-12-22 PROCEDURE — 1125F AMNT PAIN NOTED PAIN PRSNT: CPT | Mod: CPTII,S$GLB,, | Performed by: FAMILY MEDICINE

## 2023-12-22 NOTE — PROGRESS NOTES
"Subjective:      Patient ID: Noah Lopez is a 78 y.o. male.    Chief Complaint: Follow-up (6 mon)      Vitals:    12/22/23 1059   BP: 128/62   Pulse: 85   Temp: 97.9 °F (36.6 °C)   TempSrc: Oral   SpO2: 96%   Weight: 106.1 kg (233 lb 12.8 oz)   Height: 6' 1" (1.854 m)        HPI   Checkup below dx  [facility]  [facility] 4 months [facility]  Clonazeipam not needed  Uses trazodone 200 mg nightly  Sees Dr Carter, onco, for his prostate cacner with bone and lung mets  Left groin pain  Uses [facility]  Declines vaccines  Needs asleep   Problem List  Patient Active Problem List   Diagnosis    Hypertension    Screening for malignant neoplasm of colon    Hyperlipidemia    Colon polyp    Hypocontractile bladder    Neck arthritis    Thyroid nodule    Insomnia    Pulmonary metastases    Prostate cancer    Bone metastases    Cannabis abuse    Moderate recurrent major depression    Posttraumatic stress disorder    Anomaly of cerebellum    Aortic atherosclerosis    Stage 3a chronic kidney disease    Androgen deprivation therapy    Infected sebaceous cyst    Anxiety        ALLERGIES:   Review of patient's allergies indicates:   Allergen Reactions    Ciprofloxacin Other (See Comments)     tendonitis       MEDS:   Current Outpatient Medications:     apalutamide (ERLEADA) 60 mg Tab, Take 240 mg by mouth once daily., Disp: 120 tablet, Rfl: 11    ascorbic acid, vitamin C, (VITAMIN C) 1000 MG tablet, Take 1,000 mg by mouth once daily., Disp: , Rfl:     atorvastatin (LIPITOR) 40 MG tablet, Take 1 tablet (40 mg total) by mouth once daily., Disp: 90 tablet, Rfl: 3    cholecalciferol, vitamin D3, (VITAMIN D3) 25 mcg (1,000 unit) capsule, Take 1 capsule (1,000 Units total) by mouth once daily., Disp: 30 capsule, Rfl: 11    clotrimazole-betamethasone 1-0.05% (LOTRISONE) cream, Apply topically 2 (two) times daily., Disp: 60 g, Rfl: 3    diltiaZEM (CARDIZEM CD) 300 MG 24 hr capsule, TAKE 1 CAPSULE BY MOUTH  ONCE DAILY, Disp: 90 " capsule, Rfl: 3    docusate sodium (COLACE) 250 MG capsule, Take 250 mg by mouth once daily., Disp: , Rfl:     finasteride (PROSCAR) 5 mg tablet, Take 1 tablet (5 mg total) by mouth once daily., Disp: 90 tablet, Rfl: 3    lisinopriL (PRINIVIL,ZESTRIL) 40 MG tablet, Take 1 tablet (40 mg total) by mouth once daily., Disp: 90 tablet, Rfl: 3    methocarbamoL (ROBAXIN) 750 MG Tab, Take 750-1,500 mg by mouth 3 (three) times daily as needed., Disp: , Rfl:     miconazole (MICOTIN) 2 % cream, Apply topically 2 (two) times daily. Apply to tip of penis prn irritation, Disp: 28 g, Rfl: 0    multivit-min-FA-lycopen-lutein (MEN 50 PLUS MULTIVITAMIN) 300-600-300 mcg Tab, as directed Orally, Disp: , Rfl:     multivitamin capsule, Take 1 capsule by mouth once daily., Disp: , Rfl:     ofloxacin (FLOXIN) 0.3 % otic solution, Apply 10 drops into right ear once a day for 10 days.  Please dispense generic., Disp: , Rfl:     omega-3 fatty acids/fish oil (FISH OIL-OMEGA-3 FATTY ACIDS) 300-1,000 mg capsule, Take 1 capsule by mouth once daily., Disp: , Rfl:     tamsulosin (FLOMAX) 0.4 mg Cap, Take 1 capsule by mouth once daily, Disp: 90 capsule, Rfl: 3    traZODone (DESYREL) 100 MG tablet, TAKE 2 TABLETS BY MOUTH EVERY DAY AT BEDTIME AS NEEDED FOR INSOMNIA, Disp: 180 tablet, Rfl: 3    TURMERIC ORAL, Take by mouth., Disp: , Rfl:     wheat dextrin (BENEFIBER CLEAR SF, DEXTRIN, ORAL), Take by mouth., Disp: , Rfl:     calcium carbonate (OS-TYREE) 500 mg calcium (1,250 mg) tablet, Take 2 tablets (1,000 mg total) by mouth once daily. (Patient not taking: Reported on 12/22/2023), Disp: 60 tablet, Rfl: 11    GAS RELIEF 80, SIMETHICONE, ORAL, Take by mouth., Disp: , Rfl:       History:  Current Providers as of 12/22/2023  PCP: Anthony De La Torre MD  Care Team Provider: Yasmeen Hubbard LPN  Care Team Provider: Darren Carter MD  Encounter Provider: Anthony De La Torre MD, starting on Fri Dec 22, 2023 12:00 AM  Referring Provider: not found, starting on  Fri Dec 22, 2023 12:00 AM  Consulting Physician: Anthony De La Torre MD, starting on Fri Dec 22, 2023 10:53 AM (Active)   Past Medical History:   Diagnosis Date    Colon polyp 2018    Hyperlipidemia     Hypertension     Prostate cancer     Stage 3a chronic kidney disease 2023    Urinary tract infection with hematuria 2022     Past Surgical History:   Procedure Laterality Date    COLONOSCOPY      COLONOSCOPY N/A 2018    Procedure: COLONOSCOPY Suprep PLEASE TEXT PATIENT WITH ARRIVAL TIME;  Surgeon: Niharika Arce MD;  Location: [facility];  Service: Endoscopy;  Laterality: N/A;    CYSTOSCOPY WITH URODYNAMIC TESTING N/A 2022    Procedure: CYSTOSCOPY, WITH URODYNAMIC TESTING FLOUROSCOPIC;  Surgeon: Anthony Maloney MD;  Location: [address];  Service: Urology;  Laterality: N/A;  1hr    KNEE ARTHROSCOPY      ORCHIECTOMY Bilateral 2/15/2023    Procedure: Bilateral simple orchiectomy;  Surgeon: Helena Luis MD;  Location: [facility];  Service: Urology;  Laterality: Bilateral;    SPINE SURGERY      l-spine    TRANSURETHRAL RESECTION OF PROSTATE N/A 10/26/2022    Procedure: TURP (TRANSURETHRAL RESECTION OF PROSTATE);  Surgeon: Helena Luis MD;  Location: [facility];  Service: Urology;  Laterality: N/A;     Social History     Tobacco Use    Smoking status: Former     Current packs/day: 0.00     Average packs/day: 1.5 packs/day for 20.0 years (30.0 ttl pk-yrs)     Types: Cigarettes     Start date:      Quit date:      Years since quittin.0     Passive exposure: Never    Smokeless tobacco: Never    Tobacco comments:     Age 17 - 37. Up to 1.5 ppd.   Substance Use Topics    Alcohol use: Yes     Comment: Occasional. Few drinks a week.    Drug use: Yes     Types: Marijuana         Review of Systems   Constitutional:  Negative for appetite change, fatigue, fever and unexpected weight change.   HENT:  Negative for congestion, ear pain, sinus pressure and sore throat.    Eyes:  Negative  for pain and visual disturbance.   Respiratory:  Negative for shortness of breath.    Cardiovascular:  Negative for chest pain.   Gastrointestinal:  Negative for abdominal pain, constipation and diarrhea.   Endocrine: Negative for polyuria.   Genitourinary:  Negative for difficulty urinating and frequency.   Musculoskeletal:  Negative for arthralgias, back pain and myalgias.   Skin:  Negative for color change.   Allergic/Immunologic: Negative.    Neurological:  Negative for syncope, weakness and headaches.   Hematological:  Does not bruise/bleed easily.   Psychiatric/Behavioral:  Negative for dysphoric mood and suicidal ideas. The patient is not nervous/anxious.    All other systems reviewed and are negative.    Objective:     Physical Exam  Vitals and nursing note reviewed.   Constitutional:       General: He is not in acute distress.     Appearance: He is well-developed. He is not diaphoretic.   HENT:      Head: Normocephalic and atraumatic.      Right Ear: External ear normal.      Left Ear: External ear normal.      Mouth/Throat:      Pharynx: No oropharyngeal exudate.   Eyes:      General: No scleral icterus.        Right eye: No discharge.         Left eye: No discharge.      Conjunctiva/sclera: Conjunctivae normal.      Pupils: Pupils are equal, round, and reactive to light.   Neck:      Thyroid: No thyromegaly.      Vascular: No JVD.      Trachea: No tracheal deviation.   Cardiovascular:      Rate and Rhythm: Normal rate and regular rhythm.      Heart sounds: No murmur heard.     No friction rub. No gallop.   Pulmonary:      Effort: Pulmonary effort is normal. No respiratory distress.      Breath sounds: Normal breath sounds. No stridor. No wheezing or rales.   Chest:      Chest wall: No tenderness.   Abdominal:      General: There is no distension.      Palpations: Abdomen is soft. There is no mass.      Tenderness: There is no abdominal tenderness. There is no guarding or rebound.   Musculoskeletal:          General: No tenderness. Normal range of motion.      Cervical back: Normal range of motion and neck supple.   Lymphadenopathy:      Cervical: No cervical adenopathy.   Skin:     General: Skin is warm and dry.      Coloration: Skin is not pale.      Findings: No erythema or rash.   Neurological:      Mental Status: He is alert and oriented to person, place, and time.      Cranial Nerves: No cranial nerve deficit.      Motor: No abnormal muscle tone.      Coordination: Coordination normal.      Deep Tendon Reflexes: Reflexes are normal and symmetric. Reflexes normal.   Psychiatric:         Behavior: Behavior normal.         Thought Content: Thought content normal.         Judgment: Judgment normal.             Assessment:     1. Prostate cancer    2. Malignant neoplasm metastatic to lung, unspecified laterality    3. Moderate recurrent major depression    4. Anomaly of cerebellum    5. Stage 3a chronic kidney disease    6. Malignant neoplasm metastatic to bone    7. Aortic atherosclerosis      Plan:        Medication List            Accurate as of December 22, 2023 11:59 PM. If you have any questions, ask your nurse or doctor.                CONTINUE taking these medications      apalutamide 60 mg Tab  Commonly known as: ERLEADA  Take 240 mg by mouth once daily.     ascorbic acid (vitamin C) 1000 MG tablet  Commonly known as: VITAMIN C     atorvastatin 40 MG tablet  Commonly known as: LIPITOR  Take 1 tablet (40 mg total) by mouth once daily.     BENEFIBER CLEAR SF (DEXTRIN) ORAL     calcium carbonate 500 mg calcium (1,250 mg) tablet  Commonly known as: OS-TYREE  Take 2 tablets (1,000 mg total) by mouth once daily.     cholecalciferol (vitamin D3) 25 mcg (1,000 unit) capsule  Commonly known as: VITAMIN D3  Take 1 capsule (1,000 Units total) by mouth once daily.     clotrimazole-betamethasone 1-0.05% cream  Commonly known as: LOTRISONE  Apply topically 2 (two) times daily.     diltiaZEM 300 MG 24 hr capsule  Commonly  known as: CARDIZEM CD  TAKE 1 CAPSULE BY MOUTH  ONCE DAILY     docusate sodium 250 MG capsule  Commonly known as: COLACE     finasteride 5 mg tablet  Commonly known as: PROSCAR  Take 1 tablet (5 mg total) by mouth once daily.     fish oil-omega-3 fatty acids 300-1,000 mg capsule     GAS RELIEF 80 (SIMETHICONE) ORAL     lisinopriL 40 MG tablet  Commonly known as: PRINIVIL,ZESTRIL  Take 1 tablet (40 mg total) by mouth once daily.     MEN 50 PLUS MULTIVITAMIN 758--300 mcg Tab  Generic drug: mv-min-folic-K1-lycopen-lutein     methocarbamoL 750 MG Tab  Commonly known as: ROBAXIN     miconazole 2 % cream  Commonly known as: MICOTIN  Apply topically 2 (two) times daily. Apply to tip of penis prn irritation     multivitamin capsule     ofloxacin 0.3 % otic solution  Commonly known as: FLOXIN     tamsulosin 0.4 mg Cap  Commonly known as: FLOMAX  Take 1 capsule by mouth once daily     traZODone 100 MG tablet  Commonly known as: DESYREL  TAKE 2 TABLETS BY MOUTH EVERY DAY AT BEDTIME AS NEEDED FOR INSOMNIA     TURMERIC ORAL            STOP taking these medications      BENADRYL ALLERGY ORAL  Stopped by: Anthony De La Torre MD     clonazePAM 0.5 MG tablet  Commonly known as: KlonoPIN  Stopped by: Anthony De La Torre MD     melatonin 10 mg Tbdl  Stopped by: Anthony De La Torre MD            Prostate cancer    Malignant neoplasm metastatic to lung, unspecified laterality    Moderate recurrent major depression    Anomaly of cerebellum    Stage 3a chronic kidney disease    Malignant neoplasm metastatic to bone    Aortic atherosclerosis

## 2024-01-02 ENCOUNTER — HOSPITAL ENCOUNTER (OUTPATIENT)
Dept: RADIOLOGY | Facility: HOSPITAL | Age: 79
Discharge: HOME | End: 2024-01-02
Attending: HOSPITALIST
Payer: MEDICARE

## 2024-01-02 ENCOUNTER — APPOINTMENT (OUTPATIENT)
Dept: RADIATION THERAPY | Facility: OTHER | Age: 79
End: 2024-01-02
Attending: STUDENT IN AN ORGANIZED HEALTH CARE EDUCATION/TRAINING PROGRAM
Payer: MEDICARE

## 2024-01-02 DIAGNOSIS — C61 PROSTATE CANCER: ICD-10-CM

## 2024-01-02 PROCEDURE — A9595 NM PET CT F 18 PYL PSMA, MIDTHIGH TO VERTEX: HCPCS | Mod: TB

## 2024-01-02 PROCEDURE — 78815 PET IMAGE W/CT SKULL-THIGH: CPT | Mod: 26,PS,, | Performed by: STUDENT IN AN ORGANIZED HEALTH CARE EDUCATION/TRAINING PROGRAM

## 2024-01-02 PROCEDURE — 78815 PET IMAGE W/CT SKULL-THIGH: CPT | Mod: TC,PS

## 2024-01-03 ENCOUNTER — OFFICE VISIT (OUTPATIENT)
Dept: HEMATOLOGY/ONCOLOGY | Facility: CLINIC | Age: 79
End: 2024-01-03
Payer: MEDICARE

## 2024-01-03 VITALS
DIASTOLIC BLOOD PRESSURE: 67 MMHG | SYSTOLIC BLOOD PRESSURE: 146 MMHG | WEIGHT: 233.69 LBS | OXYGEN SATURATION: 97 % | HEIGHT: 73 IN | HEART RATE: 79 BPM | RESPIRATION RATE: 18 BRPM | BODY MASS INDEX: 30.97 KG/M2

## 2024-01-03 DIAGNOSIS — C61 PROSTATE CANCER: Primary | ICD-10-CM

## 2024-01-03 DIAGNOSIS — C79.51 MALIGNANT NEOPLASM METASTATIC TO BONE: ICD-10-CM

## 2024-01-03 DIAGNOSIS — Z79.818 ANDROGEN DEPRIVATION THERAPY: ICD-10-CM

## 2024-01-03 PROCEDURE — 99215 OFFICE O/P EST HI 40 MIN: CPT | Mod: S$GLB,,, | Performed by: HOSPITALIST

## 2024-01-03 PROCEDURE — 99999 PR PBB SHADOW E&M-EST. PATIENT-LVL IV: CPT | Mod: PBBFAC,,, | Performed by: HOSPITALIST

## 2024-01-03 RX ORDER — SODIUM CHLORIDE 0.9 % (FLUSH) 0.9 %
10 SYRINGE (ML) INJECTION
OUTPATIENT
Start: 2024-01-09

## 2024-01-03 RX ORDER — HEPARIN 100 UNIT/ML
500 SYRINGE INTRAVENOUS
OUTPATIENT
Start: 2024-01-09

## 2024-01-03 NOTE — ASSESSMENT & PLAN NOTE
Overall good disease control on PSMA PET imaging aside from increased sclerosis of the left pubic tubercle c/f oligoprogression. Will review with rad onc to see if he is a candidate for radiation to this spot, as may provide pain relief and delay further PSA progression. Otherwise would need to proceed with doctaxel chemotherapy.    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   - Start zometa ASAP

## 2024-01-03 NOTE — PATIENT INSTRUCTIONS
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 Recent PSMA PET CT scan shows overall good disease control except for a single spot in the pubic bone correlating with your pain. I think radiation may be able to help with pain control and further delay PSA rise allowing us to continue on apalutamide for the near future. On evidence of more systemic progression would proceed with docetaxel chemotherapy as previously discussed.    Starting zometa shots today; will given every 12 weeks to help prevent further bone pain or fracture associated with prostate cancer. Please continue vitamin D.

## 2024-01-03 NOTE — PROGRESS NOTES
ADVANCED PROSTATE CANCER CLINIC - FOLLOW UP VISIT     Best Contact Phone Number(s): [phone] (home)       Cancer/Stage/TNM:    Cancer Staging   Prostate cancer  Staging form: Prostate, AJCC 8th Edition  - Clinical: Stage IVB (cT3, cNX, cM1c, Grade Group: 5) - Signed by Darren Carter MD on 11/18/2022        Reason for visit: De david metastatic prostate cancer with lung and osseous mets sp orchiectomy and on apalutamide.    HPI: Noah Lopez is a 78 y.o. 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 He started first line ADT on 11/18/22 with addition of apalutamide 06/2023. He completed SBRT to the left iliac lesion 02/2023 and underwent bilateral orchiectomy 2/15/23. He presents to medical oncology clinic in setting of rising PSA c/f mCRPC with oligoprogessino to the left left pubic ramus on PSMA PET.    History has been obtained by chart review and discussion with the patient.    Interval Events:      Energy is stable and no new urinating complaints. Has new pain at left pubic ramus. Pain is generally constant but mild. Does get more painful with movement and weight bearing. Saw dentist about 1 month ago and had wisdom tooth pullled no other dental issues. No other concerns. Leaving 1/18/24 for 3 week trip to [place].         Oncology History   Prostate cancer   12/8/2021 Tumor Markers    PSA 11.7     1/2022 Notable Event    ED visit for urinary retention found on abodminal US. Jones placed.     3/2022 Notable Event    Seen in ED for fall. Incidentally found to have suspicious thyroid nodule     4/20/2022 Biopsy    FNA of thyroid nodule nondiagnostic. Also found to have new SHANNAN lung nodules.     5/16/2022 Imaging Significant Findings    FDG PET-CT in setting of pulmonary nodules:  1.  Two solid left upper lobe pulmonary nodules with no appreciable FDG uptake.  Malignancy is not excluded, and the  larger nodule is likely amenable to percutaneous biopsy. Alternatively, continued surveillance by dedicated chest CT alone, i.e., without FDG PET, may be considered.     2.  Focal FDG uptake in the left prostate gland with extracapsular extension.  These findings are concerning for malignancy.  Correlation with PSA and urology evaluation recommended.  MRI of the prostate may also be helpful for further evaluation.     3.  Abnormal appearance of the left symphysis pubis with hypermetabolic activity.  Differential considerations include fibrous dysplasia, Paget's disease, and metastasis.   MRI or bone algorithm CT may be informative.     4.  Incidental focal uptake in the right cerebellum on at least 2 PET slices.  Recommend brain MRI for further evaluation.     5.  2.2 cm left inferior thyroid lobe nodule.  This nodule was recently biopsied with abnormal results.  Please refer to pathology report.     5/25/2022 Tumor Markers    PSA 17.3     7/6/2022 Biopsy    Attempted SHANNAN nodule biopsy     8/11/2022 Biopsy    TRUS biopsy: Prostate adenocarcinoma up to 4+3 disease in 10/18 cores all from the left side. Associated cribriform glands and PNI.       8/31/2022 Imaging Significant Findings    Bone scan:  1. Uptake within the anterior right 5th and 6th ribs is most likely related to degenerative changes.  Correlation with plain film or chest CT scan recommended.     2.  Intense uptake within the medial left pubic bone corresponds to an area of sclerosis seen on the comparison PET-CT scan.  Differential considerations remain to be fibrous dysplasia, Paget's disease or metastasis.     3.  Negative for metastatic pattern of uptake otherwise.  Degenerative pattern of uptake and other incidental findings as noted above.     9/29/2022 Imaging Significant Findings    MRI prostate: Left-sided PI-RADS 5 lesion with extraprostatic extension involving the left-sided neurovascular bundles and seminal vesicles.  Extension of abnormal  signal adjacent to the left levator ani, cannot rule out involvement.  Abnormal signal also closely approximates with the anterior rectal wall without definite involvement.     Left pubic lesion concerning for metastasis.     10/17/2022 Notable Event    Declined pubic bone biopsy to potentially confirm metastatic prostate cancer     10/26/2022 Procedure    TURPT - pathology review with up to 4+5 disease     11/9/2022 Imaging Significant Findings    PSMA PET-CT shows evidence of osseous and pulmonary metastatic disease     11/18/2022 -  Hormone Therapy    Start Lupron 22.5mg IM; plan to start apalutamide     2/1/2023 - 2/7/2023 Radiation Therapy    Treating physician: Jaswinder Doyle    Course: C1 Pelvis 2023    Treatment Site Ref. ID Energy Dose/Fx (Gy) #Fx Dose Correction (Gy) Total Dose (Gy) Start Date End Date Elapsed Days   3D Iliac_Lt Lt Iliac 18X 4 5 / 5 0 20 2/1/2023 2/7/2023 6      3/28/2023 Genetic Testing    Germline genetic testing. No pathogenic mutations. VUS in APC and BMPR1A     1/2/2024 Imaging Significant Findings     PSMA PET CT   Impression:  - Interval treatment response.  Decreased size and radiotracer uptake of the prostate remnant.  Previously described pulmonary nodules have either resolved or decreased in size compared to prior exam.  - Mixed interval changes within the left pubic bone lesion with decreased involvement of the inferior pubic ramus and enlarged lytic component within the superior pubic ramus.  Interval resolution of prior uptake within the left ischial tuberosity.  - Stable nodule within the superior mediastinum projecting inferiorly from the left thyroid.  Recommend correlation with prior biopsy.  - No new tracer avid lesion.           Past Medical History:   Diagnosis Date    Colon polyp 09/06/2018    Hyperlipidemia     Hypertension     Prostate cancer     Stage 3a chronic kidney disease 6/21/2023    Urinary tract infection with hematuria 11/18/2022         Past Surgical  History:   Procedure Laterality Date    COLONOSCOPY  2010    COLONOSCOPY N/A 9/6/2018    Procedure: COLONOSCOPY Suprep PLEASE TEXT PATIENT WITH ARRIVAL TIME;  Surgeon: Niharika Arce MD;  Location: [facility];  Service: Endoscopy;  Laterality: N/A;    CYSTOSCOPY WITH URODYNAMIC TESTING N/A 1/31/2022    Procedure: CYSTOSCOPY, WITH URODYNAMIC TESTING FLOUROSCOPIC;  Surgeon: Anthony Maloney MD;  [address];  Service: Urology;  Laterality: N/A;  1hr    KNEE ARTHROSCOPY      ORCHIECTOMY Bilateral 2/15/2023    Procedure: Bilateral simple orchiectomy;  Surgeon: Helena Luis MD;  Location: [facility] OR;  Service: Urology;  Laterality: Bilateral;    SPINE SURGERY      l-spine    TRANSURETHRAL RESECTION OF PROSTATE N/A 10/26/2022    Procedure: TURP (TRANSURETHRAL RESECTION OF PROSTATE);  Surgeon: Helena Luis MD;  Location: [facility];  Service: Urology;  Laterality: N/A;         I have reviewed and updated the patient's past medical, surgical, family and social histories.     Review of patient's allergies indicates:   Allergen Reactions    Ciprofloxacin Other (See Comments)     tendonitis         Current Outpatient Medications   Medication Sig Dispense Refill    apalutamide (ERLEADA) 60 mg Tab Take 240 mg by mouth once daily. 120 tablet 11    ascorbic acid, vitamin C, (VITAMIN C) 1000 MG tablet Take 1,000 mg by mouth once daily.      atorvastatin (LIPITOR) 40 MG tablet Take 1 tablet (40 mg total) by mouth once daily. 90 tablet 3    calcium carbonate (OS-TYREE) 500 mg calcium (1,250 mg) tablet Take 2 tablets (1,000 mg total) by mouth once daily. (Patient not taking: Reported on 12/22/2023) 60 tablet 11    cholecalciferol, vitamin D3, (VITAMIN D3) 25 mcg (1,000 unit) capsule Take 1 capsule (1,000 Units total) by mouth once daily. 30 capsule 11    clotrimazole-betamethasone 1-0.05% (LOTRISONE) cream Apply topically 2 (two) times daily. 60 g 3    diltiaZEM (CARDIZEM CD) 300 MG 24 hr capsule TAKE 1 CAPSULE BY MOUTH  " ONCE DAILY 90 capsule 3    docusate sodium (COLACE) 250 MG capsule Take 250 mg by mouth once daily.      finasteride (PROSCAR) 5 mg tablet Take 1 tablet (5 mg total) by mouth once daily. 90 tablet 3    GAS RELIEF 80, SIMETHICONE, ORAL Take by mouth.      lisinopriL (PRINIVIL,ZESTRIL) 40 MG tablet Take 1 tablet (40 mg total) by mouth once daily. 90 tablet 3    methocarbamoL (ROBAXIN) 750 MG Tab Take 750-1,500 mg by mouth 3 (three) times daily as needed.      miconazole (MICOTIN) 2 % cream Apply topically 2 (two) times daily. Apply to tip of penis prn irritation 28 g 0    multivit-min-FA-lycopen-lutein (MEN 50 PLUS MULTIVITAMIN) 300-600-300 mcg Tab as directed Orally      multivitamin capsule Take 1 capsule by mouth once daily.      ofloxacin (FLOXIN) 0.3 % otic solution Apply 10 drops into right ear once a day for 10 days.  Please dispense generic.      omega-3 fatty acids/fish oil (FISH OIL-OMEGA-3 FATTY ACIDS) 300-1,000 mg capsule Take 1 capsule by mouth once daily.      tamsulosin (FLOMAX) 0.4 mg Cap Take 1 capsule by mouth once daily 90 capsule 3    traZODone (DESYREL) 100 MG tablet TAKE 2 TABLETS BY MOUTH EVERY DAY AT BEDTIME AS NEEDED FOR INSOMNIA 180 tablet 3    TURMERIC ORAL Take by mouth.      wheat dextrin (BENEFIBER CLEAR SF, DEXTRIN, ORAL) Take by mouth.       No current facility-administered medications for this visit.        Objective:      Physical Exam:   BP (!) 146/67 (BP Location: Left arm, Patient Position: Sitting, BP Method: Medium (Automatic))   Pulse 79   Resp 18   Ht 6' 1" (1.854 m)   Wt 106 kg (233 lb 11 oz)   SpO2 97%   BMI 30.83 kg/m²       ECOG PS: 0             Physical Exam  Constitutional:       General: He is not in acute distress.     Appearance: Normal appearance.   HENT:      Head: Normocephalic.   Eyes:      General: No scleral icterus.     Extraocular Movements: Extraocular movements intact.      Conjunctiva/sclera: Conjunctivae normal.   Cardiovascular:      Rate and " Rhythm: Normal rate.   Pulmonary:      Effort: Pulmonary effort is normal. No respiratory distress.   Abdominal:      General: There is no distension.      Palpations: Abdomen is soft.   Musculoskeletal:      Right lower leg: No edema.      Left lower leg: No edema.   Skin:     General: Skin is warm and dry.   Neurological:      Mental Status: He is alert and oriented to person, place, and time.      Motor: No weakness.   Psychiatric:         Mood and Affect: Mood normal.         Behavior: Behavior normal.         Thought Content: Thought content normal.          Recent Labs:   Lab Results   Component Value Date    WBC 6.79 01/02/2024    RBC 4.84 01/02/2024    HGB 15.5 01/02/2024    HCT 45.6 01/02/2024    MCV 94 01/02/2024    MCH 32.0 (H) 01/02/2024    MCHC 34.0 01/02/2024    RDW 12.7 01/02/2024     01/02/2024    MPV 9.8 01/02/2024    IMMGR 0.3 09/12/2022    GRAN 4.8 01/02/2024    IGABS 0.04 01/02/2024    LYMPH 0.8 (L) 09/12/2022    LYMPH 13.0 (L) 09/12/2022    MONO 0.7 09/12/2022    MONO 11.3 09/12/2022    EOS 0.1 09/12/2022    BASO 0.03 09/12/2022    NRBC 0 09/12/2022    EOSINOPHIL 0.8 09/12/2022    BASOPHIL 0.5 09/12/2022    DIFFMETHOD Automated 09/12/2022       Lab Results   Component Value Date     01/02/2024    K 3.9 01/02/2024     01/02/2024    CO2 24 01/02/2024     (H) 01/02/2024    BUN 23 01/02/2024    CREATININE 1.4 01/02/2024    CALCIUM 10.5 01/02/2024    PROT 7.9 01/02/2024    ALBUMIN 4.0 01/02/2024    BILITOT 0.4 01/02/2024    ALKPHOS 87 01/02/2024    AST 25 01/02/2024    ALT 24 01/02/2024    ANIONGAP 14 01/02/2024    EGFRNORACEVR 52 (A) 01/02/2024        Lab Results   Component Value Date    PSADIAG 2.2 01/02/2024    PSADIAG 1.7 12/04/2023    PSADIAG 1.5 11/07/2023    PSADIAG 0.67 08/07/2023    PSADIAG 0.76 07/11/2023    PSADIAG 1.2 05/05/2023    PSADIAG 1.3 04/10/2023    PSADIAG 1.7 03/16/2023    PSADIAG 5.2 (H) 01/27/2023    PSADIAG 6.7 (H) 12/22/2022    PSATOTAL 15.8 (H)  07/05/2022    PSA 11.7 (H) 12/08/2021        Cardiovascular Screening:  Primary care physician: Anthony De La Torre MD      The 10-year ASCVD risk score (Echo ROSE, et al., 2019) is: 39.3%    Values used to calculate the score:      Age: 78 years      Sex: Male      Is Non- : No      Diabetic: No      Tobacco smoker: No      Systolic Blood Pressure: 146 mmHg      Is BP treated: Yes      HDL Cholesterol: 50 mg/dL      Total Cholesterol: 224 mg/dL    ASCVD Risk Level: High risk >= 20%    EKG:   Results for orders placed or performed in visit on 10/04/22   EKG 12-lead    Collection Time: 10/04/22 10:51 AM    Narrative    Test Reason : Z01.810,    Vent. Rate : 087 BPM     Atrial Rate : 087 BPM     P-R Int : 166 ms          QRS Dur : 080 ms      QT Int : 362 ms       P-R-T Axes : 061 039 042 degrees     QTc Int : 435 ms    Normal sinus rhythm  Normal ECG  When compared with ECG of 12-SEP-2022 17:34,  No significant change was found  Confirmed by Arie Chacon MD ([id number]) on 10/6/2022 7:32:16 AM    Referred By: SHIRA VAZQUEZ           Confirmed By:Arie Chacon MD       High blood pressure = Yes  Antihypertensive agents: diltiaZEM - 300 MG  lisinopriL - 40 MG   Comments:     DM2: No  Antidiabetic agents: This patient does not have an active medication from one of the medication groupers.   Comments:     Hyperlipidemia: Yes  Lipid lowering agents: atorvastatin - 40 MG   Comments:     Antiplatelet therapy: No  Agent: This patient does not have an active medication from one of the medication groupers.   Comment:     Body mass index is 30.83 kg/m².  If greater than 30, referral to nutrition    Lab Results   Component Value Date    CHOL 224 (H) 08/07/2023    LDLCALC 150.4 08/07/2023    HDL 50 08/07/2023    TRIG 118 08/07/2023    HGBA1C 5.4 08/07/2023        Bone Health    Lab Results   Component Value Date    [id number] 62 08/07/2023        Results for orders placed during the hospital encounter of  02/23/23    DXA Bone Density with Vertebral Fracture    Impression  Normal bone mineral density.    Consider FDA approved medical therapies in postmenopausal women and men aged 50 years and older, based on the following:    *A hip or vertebral (clinical or morphometric) fracture  *T score less than or equal to -2.5 at the femoral neck or spine after appropriate evaluation to exclude secondary causes.  *Low bone mass -- also known as osteopenia (T score between -1.0 and -2.5 at the femoral neck or spine) and a 10 year probability of hip fracture greater than or equal to 3% or a 10 year probability of major osteoporosis-related fracture greater than or equal to 20% based on the [place]-adapted WHO algorithm.  *Clinicians judgment and/or patient preference may indicate treatment for people with 10 year fracture probabilities is above or below these levels.      Electronically signed by: Anshu Stephens  Date:    02/23/2023  Time:    12:08       Vitamin D: 1000 IU daily  Calcium: Recommend 4 servings of dairy daily     Osteopenia/Osteoporosis: None    Bone strengthening agent: None     Staging Imaging     Results for orders placed during the hospital encounter of 11/09/22    NM PET CT F 18 PYL PSMA, Midthigh to Vertex    Impression  1. Evidence of prostate neoplasm with osseous and pulmonary metastasis.  Detrimental changes noted when comparison is made to 05/16/2022 in particular with progression of several pulmonary nodules and with development of 1 or 2 small pulmonary nodules.  2. A mass is noted inferior to the left lobe of the thyroid gland entirely specific but demonstrating increased uptake similar since the prior  3. A prostate based mass appears to be present with involvement of the bladder and a distended bladder suggestive outlet obstruction      Electronically signed by: John Steele MD  Date:    11/09/2022  Time:    15:31       Results for orders placed during the hospital encounter of 08/31/22    NM Bone  Scan Whole Body    Impression  1. Uptake within the anterior right 5th and 6th ribs is most likely related to degenerative changes.  Correlation with plain film or chest CT scan recommended.    2.  Intense uptake within the medial left pubic bone corresponds to an area of sclerosis seen on the comparison PET-CT scan.  Differential considerations remain to be fibrous dysplasia, Paget's disease or metastasis.    3.  Negative for metastatic pattern of uptake otherwise.  Degenerative pattern of uptake and other incidental findings as noted above.      Electronically signed by: Antonio Mckeon MD  Date:    08/31/2022  Time:    12:35      Results for orders placed during the hospital encounter of 09/29/22    MRI Prostate W W/O Contrast    Impression  Left-sided PI-RADS 5 lesion with extraprostatic extension involving the left-sided neurovascular bundles and seminal vesicles.  Extension of abnormal signal adjacent to the left levator ani, cannot rule out involvement.  Abnormal signal also closely approximates with the anterior rectal wall without definite involvement.    Left pubic lesion concerning for metastasis.    Diffuse heterogeneous bone marrow.    Overall Assessment: PI-RADS 5 - Very high (clinically significant cancer is highly likely to be present)    Number of targets created for potential MR/US fusion biopsy    Transitional/Peripheral zone: 1    This report was flagged in Epic as abnormal.    Electronically signed by resident: Al Castrejon  Date:    09/29/2022  Time:    16:12    Electronically signed by: Shekhar Rodríguez MD  Date:    09/29/2022  Time:    19:08       No results found for this or any previous visit.       I have personally reviewed the above imaging.     Path:   Reviewed pathology as documented above.    Genomic testing:     Germline genetic testing: 3/28/23 - VUS in APC and BMPR1A    Results for orders placed or performed in visit on 03/28/23   Genetic Misc Sendout Test, Blood   Result Value Ref  Range    Miscellaneous Genetic Test Name Nir BRCA + 84/RNA     Genso Specimen Type Blood     Genetic counseling? Yes     Parental or Sibling Testing? No     Test Result See result image under hyperlink     Reference Lab SEE COMMENT         Somatic tumor genotyping: None      ctDNA genotyping:           Diagnoses:     1. Prostate cancer    2. Androgen deprivation therapy    3. Bone metastases                  Assessment and Plan:     1. Prostate cancer  Overview:  Patient with de david high-volume metastatic prosatate adenocarcinoma including lung and osseous mets. Started ADT 11/18/23 with apalutamide 6/7/23. Completed RT to the left iliac met 2/7/23.    Assessment & Plan:  Overall good disease control on PSMA PET imaging aside from increased sclerosis of the left pubic tubercle c/f oligoprogression. Will review with rad onc to see if he is a candidate for radiation to this spot, as may provide pain relief and delay further PSA progression. Otherwise would need to proceed with doctaxel chemotherapy.    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   - Start zometa ASAP      2. Androgen deprivation therapy  Overview:  Current agent: History of bilateral orchiectomy  Start date:  ADT start 11/2022  Last dose: Orchiectomy 2/15/23  Duration of therapy: Indefefinite    Novel Androgen signaling inhibitors:   Agent: Apalutamide  Start date: 6/7/23          3. Bone metastases  Overview:  Has left pelvic turbercle metastases noted on PSMA PET CT with associated discomfort. Completed RT on 2/3/23.    Orders:  -     Ambulatory referral/consult to Radiation Oncology; Future; Expected date: 01/10/2024    Other orders  -     zoledronic acid (ZOMETA) 4 mg in sodium chloride 0.9% 100 mL IVPB  -     sodium chloride 0.9% 250 mL flush bag  -     sodium chloride 0.9% flush 10 mL  -     heparin, porcine (PF) 100 unit/mL injection flush 500 Units  -     alteplase injection 2  mg                    Follow up:   Route Chart for Scheduling    Med Onc Chart Routing      Follow up with physician 4 weeks.   Follow up with JENIFFER    Infusion scheduling note   zometa   Injection scheduling note    Labs CBC, CMP and PSA   Scheduling:  Preferred lab:  Lab interval:     Imaging    Pharmacy appointment    Other referrals                     Therapy Plan Information  Medications  zoledronic acid (ZOMETA) 4 mg in sodium chloride 0.9% 100 mL IVPB  4 mg, Intravenous, Every 4 weeks  Flushes  sodium chloride 0.9% 250 mL flush bag  Intravenous, Every 4 weeks  sodium chloride 0.9% flush 10 mL  10 mL, Intravenous, Every 4 weeks  heparin, porcine (PF) 100 unit/mL injection flush 500 Units  500 Units, Intravenous, Every 4 weeks  alteplase injection 2 mg  2 mg, Intra-Catheter, Every 4 weeks         The above information has been reviewed with the patient and all questions have been answered to their apparent satisfaction.  They understand that they can call the clinic with any questions.    Darren Carter

## 2024-01-08 ENCOUNTER — OFFICE VISIT (OUTPATIENT)
Dept: RADIATION ONCOLOGY | Facility: CLINIC | Age: 79
End: 2024-01-08
Payer: MEDICARE

## 2024-01-08 VITALS
HEIGHT: 73 IN | SYSTOLIC BLOOD PRESSURE: 141 MMHG | BODY MASS INDEX: 31.2 KG/M2 | TEMPERATURE: 97 F | DIASTOLIC BLOOD PRESSURE: 82 MMHG | WEIGHT: 235.44 LBS | HEART RATE: 84 BPM

## 2024-01-08 DIAGNOSIS — C79.51 MALIGNANT NEOPLASM METASTATIC TO BONE: ICD-10-CM

## 2024-01-08 PROCEDURE — 99214 OFFICE O/P EST MOD 30 MIN: CPT | Mod: S$GLB,,, | Performed by: STUDENT IN AN ORGANIZED HEALTH CARE EDUCATION/TRAINING PROGRAM

## 2024-01-08 PROCEDURE — 99999 PR PBB SHADOW E&M-EST. PATIENT-LVL IV: CPT | Mod: PBBFAC,,, | Performed by: STUDENT IN AN ORGANIZED HEALTH CARE EDUCATION/TRAINING PROGRAM

## 2024-01-08 NOTE — PROGRESS NOTES
Radiation Oncology Follow-up Note                                                                                                                                 Date of Service: 01/08/2024     Chief Complaint: metastatic prostate cancer with bone pain, s/p palliative RT     Reason for visit: consideration for re-irradiation to the pelvis     Referring Physician: Dr Carter ([facility])     Implantable devices: denies     Therapy to Date:  Course: C1 Pelvis 2023  Treatment Site Ref. ID Energy Dose/Fx (Gy) #Fx Dose Correction (Gy) Total Dose (Gy) Start Date End Date Elapsed Days   3D Iliac_Lt Lt Iliac 18X 4 5 / 5 0 20 2/1/2023 2/7/2023 6        Diagnosis/Assessment:   Noah Lopez is a 78 y.o. 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 Staging MRI showed a 4.6cm PIRADS 5 lesion with left NVBI+ and SVI+ as well as a suspicious Left pubic lesion. PSMA scan 11/9/2022 showed multiple pulmonary nodules, a large moth-eaten appearance lesion involving both superior and inferior pubic ramus, as well as a small focus in the left inferior pubic ramus. Medical course notable for persistent Serratia UTI  He initially met Radiation Oncology (Dr Devlin 11/8/2022).  S/p Lupron 22.5mg (Dr Carter 11/18/22) with casodex, plan to change to xtandi, with plans on getting orchiectomy in February 2023  He is s/p palliative RT to the left iliac 20Gy/5fx completed 2/7/2023, for pain and local control with 100% pain control       Now with recurrence of the left pubic bone pain, in setting of PSA rise and mixed response on PSMA of the left pubic bone lesion    ECOG 2     Plan   We discussed the role of palliative re-irradiation to the pelvic bone metastasis, in 5 to 10 fractions using 3DCRT for pain and local control.     Risks, benefits, and  side effects of radiotherapy were discussed.   Side effects include but are not limited to fatigue, and pain flare.     The patient signed RT informed consent after I answered questions to their apparent satisfaction.     - 2/15/2023 bilateral simple orchiectomy with Dr Luis  - last colonoscopy up to date  - CT SIM 01/10/2024  - out of town trip (01/16/2024 - 02/6/2024)  - wants to start RT after 2/6/2024 when he returns from out of town trip     Interval history:     2/7/2023 Tolerated radiation therapy well with no expected toxicities, without significant treatment delays or breaks.  will return to [facility]    5/5/2023 PSA 1.2    7/11/2023 PSA 0.76    8/7/2023 PSA 0.67    11/7/2023 PSA 1.5    12/4/2023 PSA 1.7    1/2/2024 PSA 2.2    1/2/2024 PSMA   treatment response    Mixed interval changes within the left pubic bone lesion with decreased involvement of the inferior pubic ramus and enlarged lytic component within the superior pubic ramus.  Interval resolution of prior uptake within the left ischial tuberosity    No new tracer avid lesion         Subjective:   In clinic the patient is alone.  He reports that the left pelvic bone bone he had a year ago and that resolved 100% with EBRT started to return 2 months ago.  The pain is a 2/10 when sitting or lying down, and is tolerable at that level  It gets to 8/10 and sharp, when standing or moving around    The pain is focal, does not radiate.     He denies other major complaints     Review of patient's allergies indicates:   Allergen Reactions    Ciprofloxacin Other (See Comments)     tendonitis       Current Outpatient Medications on File Prior to Visit   Medication Sig Dispense Refill    apalutamide (ERLEADA) 60 mg Tab Take 240 mg by mouth once daily. 120 tablet 11    ascorbic acid, vitamin C, (VITAMIN C) 1000 MG tablet Take 1,000 mg by mouth once daily.      atorvastatin (LIPITOR) 40 MG tablet Take 1 tablet (40 mg total) by mouth once daily. 90 tablet 3     calcium carbonate (OS-TYREE) 500 mg calcium (1,250 mg) tablet Take 2 tablets (1,000 mg total) by mouth once daily. (Patient not taking: Reported on 12/22/2023) 60 tablet 11    cholecalciferol, vitamin D3, (VITAMIN D3) 25 mcg (1,000 unit) capsule Take 1 capsule (1,000 Units total) by mouth once daily. 30 capsule 11    clotrimazole-betamethasone 1-0.05% (LOTRISONE) cream Apply topically 2 (two) times daily. 60 g 3    diltiaZEM (CARDIZEM CD) 300 MG 24 hr capsule TAKE 1 CAPSULE BY MOUTH  ONCE DAILY 90 capsule 3    docusate sodium (COLACE) 250 MG capsule Take 250 mg by mouth once daily.      finasteride (PROSCAR) 5 mg tablet Take 1 tablet (5 mg total) by mouth once daily. 90 tablet 3    GAS RELIEF 80, SIMETHICONE, ORAL Take by mouth.      lisinopriL (PRINIVIL,ZESTRIL) 40 MG tablet Take 1 tablet (40 mg total) by mouth once daily. 90 tablet 3    methocarbamoL (ROBAXIN) 750 MG Tab Take 750-1,500 mg by mouth 3 (three) times daily as needed.      miconazole (MICOTIN) 2 % cream Apply topically 2 (two) times daily. Apply to tip of penis prn irritation 28 g 0    multivit-min-FA-lycopen-lutein (MEN 50 PLUS MULTIVITAMIN) 300-600-300 mcg Tab as directed Orally      multivitamin capsule Take 1 capsule by mouth once daily.      ofloxacin (FLOXIN) 0.3 % otic solution Apply 10 drops into right ear once a day for 10 days.  Please dispense generic.      omega-3 fatty acids/fish oil (FISH OIL-OMEGA-3 FATTY ACIDS) 300-1,000 mg capsule Take 1 capsule by mouth once daily.      tamsulosin (FLOMAX) 0.4 mg Cap Take 1 capsule by mouth once daily 90 capsule 3    traZODone (DESYREL) 100 MG tablet TAKE 2 TABLETS BY MOUTH EVERY DAY AT BEDTIME AS NEEDED FOR INSOMNIA 180 tablet 3    TURMERIC ORAL Take by mouth.      wheat dextrin (BENEFIBER CLEAR SF, DEXTRIN, ORAL) Take by mouth.       No current facility-administered medications on file prior to visit.               Review of Systems  Negative unless as above     HPI:   Noah Lopez is a 77  y.o. 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 Staging MRI showed a 4.6cm PIRADS 5 lesion with left NVBI+ and SVI+ as well as a suspicious Left pubic lesion. PSMA scan 11/9/2022 showed multiple pulmonary nodules, a large moth-eaten appearance lesion involving both superior and inferior pubic ramus, as well as a small focus in the left inferior pubic ramus. Medical course notable for persistent Serratia UTI  He initially met Radiation Oncology (Dr Devlin 11/8/2022).     - 11/18/2022 Lupron 22.5mg + casodex with Dr Carter     - 12/9/2022 last visit with Dr Luis: plan for bilateral simple orchiectomy 2/15/2023     - 12/22/2022 last visit with Dr Carter: complained of discomfort at left pelvic turbercle metastases, referral for palliative RT             Objective:      Physical Exam  Vitals reviewed.     Constitutional:       Appearance: Normal appearance.   HENT:      Head: Normocephalic and atraumatic.   Eyes:      Conjunctiva/sclera: Conjunctivae normal.   Pulmonary:      Effort: Pulmonary effort is normal.   Abdominal:      General: There is no distension.   Genitourinary:     Comments: AKASH deferred  Musculoskeletal:         General: Normal range of motion.  Neurological:      General: No focal deficit present.      Mental Status: Alert and oriented  Psychiatric:         Mood and Affect: Mood normal.         Behavior: Behavior normal.      Imaging: I have personally reviewed the patient's available images and reports and summarized pertinent findings above in HPI.        Laboratory: I have personally reviewed the patient's available laboratory values and summarized pertinent findings above in HPI.         I spent approximately 30 minutes reviewing the available records and evaluating the patient, out of which over 50% of  the time was spent face to face with the patient in counseling and coordinating this patient's care.     Thank you for the opportunity to care for this patient. Please do not hesitate to contact me with any questions.     Jaswinder Doyle MD/PhD

## 2024-01-10 ENCOUNTER — HOSPITAL ENCOUNTER (OUTPATIENT)
Dept: RADIATION THERAPY | Facility: HOSPITAL | Age: 79
Discharge: HOME | End: 2024-01-10
Attending: STUDENT IN AN ORGANIZED HEALTH CARE EDUCATION/TRAINING PROGRAM
Payer: MEDICARE

## 2024-01-10 PROCEDURE — 77014 PR  CT GUIDANCE PLACEMENT RAD THERAPY FIELDS: CPT | Mod: 26,,, | Performed by: STUDENT IN AN ORGANIZED HEALTH CARE EDUCATION/TRAINING PROGRAM

## 2024-01-10 PROCEDURE — 77263 THER RADIOLOGY TX PLNG CPLX: CPT | Mod: ,,, | Performed by: STUDENT IN AN ORGANIZED HEALTH CARE EDUCATION/TRAINING PROGRAM

## 2024-01-10 PROCEDURE — 77014 HC CT GUIDANCE RADIATION THERAPY FLDS PLACEMENT: CPT | Mod: TC | Performed by: STUDENT IN AN ORGANIZED HEALTH CARE EDUCATION/TRAINING PROGRAM

## 2024-01-16 ENCOUNTER — TELEPHONE (OUTPATIENT)
Dept: FAMILY MEDICINE | Facility: CLINIC | Age: 79
End: 2024-01-16
Payer: MEDICARE

## 2024-01-16 PROCEDURE — 77334 RADIATION TREATMENT AID(S): CPT | Mod: 26,,, | Performed by: STUDENT IN AN ORGANIZED HEALTH CARE EDUCATION/TRAINING PROGRAM

## 2024-01-16 PROCEDURE — 77300 RADIATION THERAPY DOSE PLAN: CPT | Mod: TC | Performed by: STUDENT IN AN ORGANIZED HEALTH CARE EDUCATION/TRAINING PROGRAM

## 2024-01-16 PROCEDURE — 77295 3-D RADIOTHERAPY PLAN: CPT | Mod: TC | Performed by: STUDENT IN AN ORGANIZED HEALTH CARE EDUCATION/TRAINING PROGRAM

## 2024-01-16 PROCEDURE — 77370 RADIATION PHYSICS CONSULT: CPT | Performed by: STUDENT IN AN ORGANIZED HEALTH CARE EDUCATION/TRAINING PROGRAM

## 2024-01-16 PROCEDURE — 77295 3-D RADIOTHERAPY PLAN: CPT | Mod: 26,,, | Performed by: STUDENT IN AN ORGANIZED HEALTH CARE EDUCATION/TRAINING PROGRAM

## 2024-01-16 PROCEDURE — 77334 RADIATION TREATMENT AID(S): CPT | Mod: TC | Performed by: STUDENT IN AN ORGANIZED HEALTH CARE EDUCATION/TRAINING PROGRAM

## 2024-01-16 PROCEDURE — 77300 RADIATION THERAPY DOSE PLAN: CPT | Mod: 26,,, | Performed by: STUDENT IN AN ORGANIZED HEALTH CARE EDUCATION/TRAINING PROGRAM

## 2024-01-16 RX ORDER — HYDROCODONE BITARTRATE AND ACETAMINOPHEN 5; 325 MG/1; MG/1
1 TABLET ORAL EVERY 8 HOURS PRN
Qty: 30 TABLET | Refills: 0 | Status: SHIPPED | OUTPATIENT
Start: 2024-01-16

## 2024-01-16 NOTE — TELEPHONE ENCOUNTER
Attempted pt. Left detailed message to  his Rx at the pharmacy and to call if he has any other concerns.

## 2024-01-16 NOTE — TELEPHONE ENCOUNTER
Pt called with pelvic bone pain; Rx sent; need to follow up with his radiation oncologist for other types of treatment

## 2024-01-18 PROCEDURE — G6002 STEREOSCOPIC X-RAY GUIDANCE: HCPCS | Mod: 26,,, | Performed by: STUDENT IN AN ORGANIZED HEALTH CARE EDUCATION/TRAINING PROGRAM

## 2024-01-18 PROCEDURE — 77427 RADIATION TX MANAGEMENT X5: CPT | Mod: ,,, | Performed by: STUDENT IN AN ORGANIZED HEALTH CARE EDUCATION/TRAINING PROGRAM

## 2024-01-18 PROCEDURE — 77417 THER RADIOLOGY PORT IMAGE(S): CPT | Performed by: STUDENT IN AN ORGANIZED HEALTH CARE EDUCATION/TRAINING PROGRAM

## 2024-01-18 PROCEDURE — 77387 GUIDANCE FOR RADJ TX DLVR: CPT | Mod: TC | Performed by: STUDENT IN AN ORGANIZED HEALTH CARE EDUCATION/TRAINING PROGRAM

## 2024-01-18 PROCEDURE — 77412 RADIATION TX DELIVERY LVL 3: CPT | Performed by: STUDENT IN AN ORGANIZED HEALTH CARE EDUCATION/TRAINING PROGRAM

## 2024-01-19 PROCEDURE — 77412 RADIATION TX DELIVERY LVL 3: CPT | Performed by: STUDENT IN AN ORGANIZED HEALTH CARE EDUCATION/TRAINING PROGRAM

## 2024-01-19 PROCEDURE — 77387 GUIDANCE FOR RADJ TX DLVR: CPT | Mod: TC | Performed by: STUDENT IN AN ORGANIZED HEALTH CARE EDUCATION/TRAINING PROGRAM

## 2024-01-19 PROCEDURE — G6002 STEREOSCOPIC X-RAY GUIDANCE: HCPCS | Mod: 26,,, | Performed by: STUDENT IN AN ORGANIZED HEALTH CARE EDUCATION/TRAINING PROGRAM

## 2024-01-22 PROCEDURE — G6002 STEREOSCOPIC X-RAY GUIDANCE: HCPCS | Mod: 26,,, | Performed by: STUDENT IN AN ORGANIZED HEALTH CARE EDUCATION/TRAINING PROGRAM

## 2024-01-22 PROCEDURE — 77387 GUIDANCE FOR RADJ TX DLVR: CPT | Mod: TC | Performed by: STUDENT IN AN ORGANIZED HEALTH CARE EDUCATION/TRAINING PROGRAM

## 2024-01-22 PROCEDURE — 77412 RADIATION TX DELIVERY LVL 3: CPT | Performed by: STUDENT IN AN ORGANIZED HEALTH CARE EDUCATION/TRAINING PROGRAM

## 2024-01-23 PROCEDURE — 77412 RADIATION TX DELIVERY LVL 3: CPT | Performed by: STUDENT IN AN ORGANIZED HEALTH CARE EDUCATION/TRAINING PROGRAM

## 2024-01-23 PROCEDURE — G6002 STEREOSCOPIC X-RAY GUIDANCE: HCPCS | Mod: 26,,, | Performed by: STUDENT IN AN ORGANIZED HEALTH CARE EDUCATION/TRAINING PROGRAM

## 2024-01-23 PROCEDURE — 77387 GUIDANCE FOR RADJ TX DLVR: CPT | Mod: TC | Performed by: STUDENT IN AN ORGANIZED HEALTH CARE EDUCATION/TRAINING PROGRAM

## 2024-01-24 PROCEDURE — G6002 STEREOSCOPIC X-RAY GUIDANCE: HCPCS | Mod: 26,,, | Performed by: STUDENT IN AN ORGANIZED HEALTH CARE EDUCATION/TRAINING PROGRAM

## 2024-01-24 PROCEDURE — 77387 GUIDANCE FOR RADJ TX DLVR: CPT | Mod: TC | Performed by: STUDENT IN AN ORGANIZED HEALTH CARE EDUCATION/TRAINING PROGRAM

## 2024-01-24 PROCEDURE — 77412 RADIATION TX DELIVERY LVL 3: CPT | Performed by: STUDENT IN AN ORGANIZED HEALTH CARE EDUCATION/TRAINING PROGRAM

## 2024-01-24 PROCEDURE — 77336 RADIATION PHYSICS CONSULT: CPT | Performed by: STUDENT IN AN ORGANIZED HEALTH CARE EDUCATION/TRAINING PROGRAM

## 2024-01-25 DIAGNOSIS — C79.51 MALIGNANT NEOPLASM METASTATIC TO BONE: Primary | ICD-10-CM

## 2024-01-25 PROCEDURE — 77412 RADIATION TX DELIVERY LVL 3: CPT | Performed by: STUDENT IN AN ORGANIZED HEALTH CARE EDUCATION/TRAINING PROGRAM

## 2024-01-25 PROCEDURE — 77427 RADIATION TX MANAGEMENT X5: CPT | Mod: ,,, | Performed by: STUDENT IN AN ORGANIZED HEALTH CARE EDUCATION/TRAINING PROGRAM

## 2024-01-25 PROCEDURE — 77417 THER RADIOLOGY PORT IMAGE(S): CPT | Performed by: STUDENT IN AN ORGANIZED HEALTH CARE EDUCATION/TRAINING PROGRAM

## 2024-01-25 PROCEDURE — G6002 STEREOSCOPIC X-RAY GUIDANCE: HCPCS | Mod: 26,,, | Performed by: STUDENT IN AN ORGANIZED HEALTH CARE EDUCATION/TRAINING PROGRAM

## 2024-01-25 PROCEDURE — 77387 GUIDANCE FOR RADJ TX DLVR: CPT | Mod: TC | Performed by: STUDENT IN AN ORGANIZED HEALTH CARE EDUCATION/TRAINING PROGRAM

## 2024-01-25 RX ORDER — ONDANSETRON HYDROCHLORIDE 8 MG/1
8 TABLET, FILM COATED ORAL EVERY 8 HOURS PRN
Qty: 30 TABLET | Refills: 0 | Status: SHIPPED | OUTPATIENT
Start: 2024-01-25

## 2024-01-26 PROCEDURE — 77387 GUIDANCE FOR RADJ TX DLVR: CPT | Mod: TC | Performed by: STUDENT IN AN ORGANIZED HEALTH CARE EDUCATION/TRAINING PROGRAM

## 2024-01-26 PROCEDURE — G6002 STEREOSCOPIC X-RAY GUIDANCE: HCPCS | Mod: 26,,, | Performed by: STUDENT IN AN ORGANIZED HEALTH CARE EDUCATION/TRAINING PROGRAM

## 2024-01-26 PROCEDURE — 77412 RADIATION TX DELIVERY LVL 3: CPT | Performed by: STUDENT IN AN ORGANIZED HEALTH CARE EDUCATION/TRAINING PROGRAM

## 2024-01-29 ENCOUNTER — TELEPHONE (OUTPATIENT)
Dept: HEMATOLOGY/ONCOLOGY | Facility: CLINIC | Age: 79
End: 2024-01-29
Payer: MEDICARE

## 2024-01-29 ENCOUNTER — TELEPHONE (OUTPATIENT)
Dept: FAMILY MEDICINE | Facility: CLINIC | Age: 79
End: 2024-01-29
Payer: MEDICARE

## 2024-01-29 DIAGNOSIS — C79.51 MALIGNANT NEOPLASM METASTATIC TO BONE: Primary | ICD-10-CM

## 2024-01-29 PROCEDURE — 77412 RADIATION TX DELIVERY LVL 3: CPT | Performed by: STUDENT IN AN ORGANIZED HEALTH CARE EDUCATION/TRAINING PROGRAM

## 2024-01-29 PROCEDURE — 77387 GUIDANCE FOR RADJ TX DLVR: CPT | Mod: TC | Performed by: STUDENT IN AN ORGANIZED HEALTH CARE EDUCATION/TRAINING PROGRAM

## 2024-01-29 PROCEDURE — G6002 STEREOSCOPIC X-RAY GUIDANCE: HCPCS | Mod: 26,,, | Performed by: STUDENT IN AN ORGANIZED HEALTH CARE EDUCATION/TRAINING PROGRAM

## 2024-01-29 RX ORDER — OXYCODONE HYDROCHLORIDE 5 MG/1
5 TABLET ORAL EVERY 4 HOURS PRN
Qty: 42 TABLET | Refills: 0 | Status: SHIPPED | OUTPATIENT
Start: 2024-01-29 | End: 2024-04-29 | Stop reason: SDUPTHER

## 2024-01-29 RX ORDER — DILTIAZEM HYDROCHLORIDE 300 MG/1
300 CAPSULE, COATED, EXTENDED RELEASE ORAL DAILY
Qty: 90 CAPSULE | Refills: 3 | Status: SHIPPED | OUTPATIENT
Start: 2024-01-29 | End: 2024-02-12

## 2024-01-29 NOTE — TELEPHONE ENCOUNTER
I spoke to patient. He is having constant pain of 7-8. He can't get comfortable, it is always there. It started in his left leg, moved up to his back, and now it's in his other leg. The pain is always there, it's a shooting pain when he tries to walk or move. He doesn't like pain pills, but this is unbearable pain like he's never had before. He thought he could wait until he came in for his appointment this week, but he can't stand it any more. I told him I would let Dr. Catrer know and ask him to send something in to his pharmacy on file.

## 2024-01-29 NOTE — TELEPHONE ENCOUNTER
Order for oxycodoen is in. Let's also get a CT a/p stat. If he is to the point he can't walk or bear any weight he should go to the ED.

## 2024-01-29 NOTE — TELEPHONE ENCOUNTER
I spoke to patient again. Told him medication had been called in. Made the appointment for the CT tomorrow at 3:30.

## 2024-01-29 NOTE — TELEPHONE ENCOUNTER
----- Message from Rosamaria Blanca sent at 1/29/2024 10:49 AM CST -----  Regarding: Medical Assistance  Contact: Patient  Patient is requesting a call back in reference to pain experiencing   Patient would like speak with office directly in regards to getting medication prescribed   Patient stated he has been attempting to hold out until appt on 01/31 but pain has been consistent for 2 weeks   Patient would like a call back for further assistance   Please Assist     Patient can be reached at  [phone]

## 2024-01-30 ENCOUNTER — HOSPITAL ENCOUNTER (OUTPATIENT)
Dept: RADIOLOGY | Facility: HOSPITAL | Age: 79
Discharge: HOME | End: 2024-01-30
Attending: HOSPITALIST
Payer: MEDICARE

## 2024-01-30 DIAGNOSIS — C79.51 MALIGNANT NEOPLASM METASTATIC TO BONE: ICD-10-CM

## 2024-01-30 PROCEDURE — 74177 CT ABD & PELVIS W/CONTRAST: CPT | Mod: 26,,, | Performed by: RADIOLOGY

## 2024-01-30 PROCEDURE — 77387 GUIDANCE FOR RADJ TX DLVR: CPT | Mod: TC | Performed by: STUDENT IN AN ORGANIZED HEALTH CARE EDUCATION/TRAINING PROGRAM

## 2024-01-30 PROCEDURE — 74177 CT ABD & PELVIS W/CONTRAST: CPT | Mod: TC

## 2024-01-30 PROCEDURE — 77412 RADIATION TX DELIVERY LVL 3: CPT | Performed by: STUDENT IN AN ORGANIZED HEALTH CARE EDUCATION/TRAINING PROGRAM

## 2024-01-30 PROCEDURE — 25500020 PHARM REV CODE 255: Performed by: HOSPITALIST

## 2024-01-30 PROCEDURE — G6002 STEREOSCOPIC X-RAY GUIDANCE: HCPCS | Mod: 26,,, | Performed by: STUDENT IN AN ORGANIZED HEALTH CARE EDUCATION/TRAINING PROGRAM

## 2024-01-30 RX ADMIN — IOHEXOL 100 ML: 350 INJECTION, SOLUTION INTRAVENOUS at 04:01

## 2024-01-31 ENCOUNTER — OFFICE VISIT (OUTPATIENT)
Dept: HEMATOLOGY/ONCOLOGY | Facility: CLINIC | Age: 79
End: 2024-01-31
Payer: MEDICARE

## 2024-01-31 ENCOUNTER — LAB VISIT (OUTPATIENT)
Dept: LAB | Facility: HOSPITAL | Age: 79
End: 2024-01-31
Attending: HOSPITALIST
Payer: MEDICARE

## 2024-01-31 VITALS
BODY MASS INDEX: 30.07 KG/M2 | SYSTOLIC BLOOD PRESSURE: 150 MMHG | DIASTOLIC BLOOD PRESSURE: 79 MMHG | HEART RATE: 90 BPM | WEIGHT: 226.88 LBS | HEIGHT: 73 IN | OXYGEN SATURATION: 98 % | RESPIRATION RATE: 18 BRPM

## 2024-01-31 DIAGNOSIS — Z79.818 ANDROGEN DEPRIVATION THERAPY: ICD-10-CM

## 2024-01-31 DIAGNOSIS — C61 PROSTATE CANCER: Primary | ICD-10-CM

## 2024-01-31 DIAGNOSIS — E04.1 THYROID NODULE: ICD-10-CM

## 2024-01-31 DIAGNOSIS — G89.3 CANCER ASSOCIATED PAIN: ICD-10-CM

## 2024-01-31 DIAGNOSIS — C61 PROSTATE CANCER: ICD-10-CM

## 2024-01-31 DIAGNOSIS — I10 HYPERTENSION, UNSPECIFIED TYPE: ICD-10-CM

## 2024-01-31 DIAGNOSIS — C78.00 MALIGNANT NEOPLASM METASTATIC TO LUNG, UNSPECIFIED LATERALITY: ICD-10-CM

## 2024-01-31 DIAGNOSIS — C79.51 MALIGNANT NEOPLASM METASTATIC TO BONE: ICD-10-CM

## 2024-01-31 LAB
ALBUMIN SERPL BCP-MCNC: 3.6 G/DL (ref 3.5–5.2)
ALP SERPL-CCNC: 102 U/L (ref 55–135)
ALT SERPL W/O P-5'-P-CCNC: 17 U/L (ref 10–44)
ANION GAP SERPL CALC-SCNC: 8 MMOL/L (ref 8–16)
AST SERPL-CCNC: 19 U/L (ref 10–40)
BILIRUB SERPL-MCNC: 0.2 MG/DL (ref 0.1–1)
BUN SERPL-MCNC: 18 MG/DL (ref 8–23)
CALCIUM SERPL-MCNC: 10.8 MG/DL (ref 8.7–10.5)
CHLORIDE SERPL-SCNC: 105 MMOL/L (ref 95–110)
CO2 SERPL-SCNC: 27 MMOL/L (ref 23–29)
COMPLEXED PSA SERPL-MCNC: 2.3 NG/ML (ref 0–4)
CREAT SERPL-MCNC: 1.4 MG/DL (ref 0.5–1.4)
ERYTHROCYTE [DISTWIDTH] IN BLOOD BY AUTOMATED COUNT: 12.5 % (ref 11.5–14.5)
EST. GFR  (NO RACE VARIABLE): 51.4 ML/MIN/1.73 M^2
GLUCOSE SERPL-MCNC: 122 MG/DL (ref 70–110)
HCT VFR BLD AUTO: 44.5 % (ref 40–54)
HGB BLD-MCNC: 15.5 G/DL (ref 14–18)
IMM GRANULOCYTES # BLD AUTO: 0.02 K/UL (ref 0–0.04)
MCH RBC QN AUTO: 32 PG (ref 27–31)
MCHC RBC AUTO-ENTMCNC: 34.8 G/DL (ref 32–36)
MCV RBC AUTO: 92 FL (ref 82–98)
NEUTROPHILS # BLD AUTO: 6.4 K/UL (ref 1.8–7.7)
PLATELET # BLD AUTO: 291 K/UL (ref 150–450)
PMV BLD AUTO: 9 FL (ref 9.2–12.9)
POTASSIUM SERPL-SCNC: 4.2 MMOL/L (ref 3.5–5.1)
PROT SERPL-MCNC: 7.8 G/DL (ref 6–8.4)
RBC # BLD AUTO: 4.84 M/UL (ref 4.6–6.2)
SODIUM SERPL-SCNC: 140 MMOL/L (ref 136–145)
TESTOST SERPL-MCNC: 35 NG/DL (ref 304–1227)
TSH SERPL DL<=0.005 MIU/L-ACNC: 1.49 UIU/ML (ref 0.4–4)
WBC # BLD AUTO: 8.74 K/UL (ref 3.9–12.7)

## 2024-01-31 PROCEDURE — 99215 OFFICE O/P EST HI 40 MIN: CPT | Mod: S$GLB,,, | Performed by: HOSPITALIST

## 2024-01-31 PROCEDURE — 77387 GUIDANCE FOR RADJ TX DLVR: CPT | Mod: TC | Performed by: STUDENT IN AN ORGANIZED HEALTH CARE EDUCATION/TRAINING PROGRAM

## 2024-01-31 PROCEDURE — 77412 RADIATION TX DELIVERY LVL 3: CPT | Performed by: STUDENT IN AN ORGANIZED HEALTH CARE EDUCATION/TRAINING PROGRAM

## 2024-01-31 PROCEDURE — 84443 ASSAY THYROID STIM HORMONE: CPT | Performed by: HOSPITALIST

## 2024-01-31 PROCEDURE — G6002 STEREOSCOPIC X-RAY GUIDANCE: HCPCS | Mod: 26,,, | Performed by: STUDENT IN AN ORGANIZED HEALTH CARE EDUCATION/TRAINING PROGRAM

## 2024-01-31 PROCEDURE — 99999 PR PBB SHADOW E&M-EST. PATIENT-LVL III: CPT | Mod: PBBFAC,,, | Performed by: HOSPITALIST

## 2024-01-31 PROCEDURE — 84403 ASSAY OF TOTAL TESTOSTERONE: CPT | Performed by: HOSPITALIST

## 2024-01-31 PROCEDURE — 36415 COLL VENOUS BLD VENIPUNCTURE: CPT | Performed by: HOSPITALIST

## 2024-01-31 PROCEDURE — 85027 COMPLETE CBC AUTOMATED: CPT | Performed by: HOSPITALIST

## 2024-01-31 PROCEDURE — 84153 ASSAY OF PSA TOTAL: CPT | Performed by: HOSPITALIST

## 2024-01-31 PROCEDURE — 80053 COMPREHEN METABOLIC PANEL: CPT | Performed by: HOSPITALIST

## 2024-01-31 RX ORDER — CYCLOBENZAPRINE HCL 10 MG
10 TABLET ORAL 3 TIMES DAILY PRN
Qty: 21 TABLET | Refills: 0 | Status: SHIPPED | OUTPATIENT
Start: 2024-01-31 | End: 2024-02-10

## 2024-01-31 NOTE — ASSESSMENT & PLAN NOTE
- Con't vitamin D  - Pt hesitant to have teeth pulled prior to zometa as recommendd by his dentist; he will follow up with dentist to further discuss  
- Good rsponse on most recent imaging  
Will continue ADT + apalutamide for now to see if RT can successfully delay PSA progression of the oligomets. On clear PSA progression will proceed with docetaxel therapy.  - Repeat PSA prior to his upcoming trip  - PT to follow up in March after he returns from [place]  - Con't apalutamide 240mg po daily  
No

## 2024-01-31 NOTE — PROGRESS NOTES
[facility] - FOLLOW UP VISIT     Best Contact Phone Number(s): [phone] (home)       Cancer/Stage/TNM:    Cancer Staging   Prostate cancer  Staging form: Prostate, AJCC 8th Edition  - Clinical: Stage IVB (cT3, cNX, cM1c, Grade Group: 5) - Signed by Darren Carter MD on 11/18/2022        Reason for visit: De david metastatic prostate cancer with lung and osseous mets sp orchiectomy and on apalutamide.    HPI: Noah Loepz is a 79 y.o. 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 He started first line ADT on 11/18/22 with addition of apalutamide 06/2023. He completed SBRT to the left iliac lesion 02/2023 and underwent bilateral orchiectomy 2/15/23. He was found to have rising PSA c/f mCRPC 12/2023 with oligoprogessino to the left left pubic ramus on PSMA PET. He underwent palliative RT to the left pubic ramus 1/18/24    History has been obtained by chart review and discussion with the patient.    Interval Events:      Started palliative RT to the left pubic ramus 1/18/24. Has developed severe worsening of his pelvic pain. CT a/p 1/30/24 shows known met in the left pubic ramus, slightly larger with assocaited cortical breakthrough.      Pain left pubic ramus had become nearly unbearable about 1-2 weeks ago. Started RT treatments to the area 1/18/24. Had been taken hydrocone q4 hours. Pain seems signficantly improved over the last two days.  Has ongoing stable soreness which is bearable.     Does note have some increased lower back and right hip pain he associates with adjusting for his pain. Still awaiting dental clearance - he has it scheduled, but is quite worried that he will need teeth pulled, which he is not really willing to do. Has ongoing obstructive urinary symptoms that are stable       Oncology History   Prostate cancer   12/8/2021 Tumor Markers    PSA 11.7     1/2022  Notable Event    ED visit for urinary retention found on abodminal US. Jones placed.     3/2022 Notable Event    Seen in ED for fall. Incidentally found to have suspicious thyroid nodule     4/20/2022 Biopsy    FNA of thyroid nodule nondiagnostic. Also found to have new SHANNAN lung nodules.     5/16/2022 Imaging Significant Findings    FDG PET-CT in setting of pulmonary nodules:  1.  Two solid left upper lobe pulmonary nodules with no appreciable FDG uptake.  Malignancy is not excluded, and the larger nodule is likely amenable to percutaneous biopsy. Alternatively, continued surveillance by dedicated chest CT alone, i.e., without FDG PET, may be considered.     2.  Focal FDG uptake in the left prostate gland with extracapsular extension.  These findings are concerning for malignancy.  Correlation with PSA and urology evaluation recommended.  MRI of the prostate may also be helpful for further evaluation.     3.  Abnormal appearance of the left symphysis pubis with hypermetabolic activity.  Differential considerations include fibrous dysplasia, Paget's disease, and metastasis.   MRI or bone algorithm CT may be informative.     4.  Incidental focal uptake in the right cerebellum on at least 2 PET slices.  Recommend brain MRI for further evaluation.     5.  2.2 cm left inferior thyroid lobe nodule.  This nodule was recently biopsied with abnormal results.  Please refer to pathology report.     5/25/2022 Tumor Markers    PSA 17.3     7/6/2022 Biopsy    Attempted SHANNAN nodule biopsy     8/11/2022 Biopsy    TRUS biopsy: Prostate adenocarcinoma up to 4+3 disease in 10/18 cores all from the left side. Associated cribriform glands and PNI.       8/31/2022 Imaging Significant Findings    Bone scan:  1. Uptake within the anterior right 5th and 6th ribs is most likely related to degenerative changes.  Correlation with plain film or chest CT scan recommended.     2.  Intense uptake within the medial left pubic bone corresponds to an  area of sclerosis seen on the comparison PET-CT scan.  Differential considerations remain to be fibrous dysplasia, Paget's disease or metastasis.     3.  Negative for metastatic pattern of uptake otherwise.  Degenerative pattern of uptake and other incidental findings as noted above.     9/29/2022 Imaging Significant Findings    MRI prostate: Left-sided PI-RADS 5 lesion with extraprostatic extension involving the left-sided neurovascular bundles and seminal vesicles.  Extension of abnormal signal adjacent to the left levator ani, cannot rule out involvement.  Abnormal signal also closely approximates with the anterior rectal wall without definite involvement.     Left pubic lesion concerning for metastasis.     10/17/2022 Notable Event    Declined pubic bone biopsy to potentially confirm metastatic prostate cancer     10/26/2022 Procedure    TURPT - pathology review with up to 4+5 disease     11/9/2022 Imaging Significant Findings    PSMA PET-CT shows evidence of osseous and pulmonary metastatic disease     11/18/2022 -  Hormone Therapy    Start Lupron 22.5mg IM; plan to start apalutamide     2/1/2023 - 2/7/2023 Radiation Therapy    Treating physician: Jaswinder Doyle    Course: C1 Pelvis 2023    Treatment Site Ref. ID Energy Dose/Fx (Gy) #Fx Dose Correction (Gy) Total Dose (Gy) Start Date End Date Elapsed Days   3D Iliac_Lt Lt Iliac 18X 4 5 / 5 0 20 2/1/2023 2/7/2023 6      3/28/2023 Genetic Testing    Germline genetic testing. No pathogenic mutations. VUS in APC and BMPR1A     1/2/2024 Imaging Significant Findings     PSMA PET CT   Impression:  - Interval treatment response.  Decreased size and radiotracer uptake of the prostate remnant.  Previously described pulmonary nodules have either resolved or decreased in size compared to prior exam.  - Mixed interval changes within the left pubic bone lesion with decreased involvement of the inferior pubic ramus and enlarged lytic component within the superior pubic  ramus.  Interval resolution of prior uptake within the left ischial tuberosity.  - Stable nodule within the superior mediastinum projecting inferiorly from the left thyroid.  Recommend correlation with prior biopsy.  - No new tracer avid lesion.     1/30/2024 Imaging Significant Findings    CT a/p 1/30/24  1. Surgical changes of TURP and bilateral orchiectomy.  There is remnant prostate with residual disease better assessed by PSMA or MRI.  2. *Enlarging metastasis in the left pubic/superior pubic ramus bone.  There is cortical breakthrough involving both the anterior and posterior cortex with tumoral replacement.  Correlation advised.  Cholelithiasis.           Past Medical History:   Diagnosis Date    Colon polyp 09/06/2018    Hyperlipidemia     Hypertension     Prostate cancer     Stage 3a chronic kidney disease 6/21/2023    Urinary tract infection with hematuria 11/18/2022         Past Surgical History:   Procedure Laterality Date    COLONOSCOPY  2010    COLONOSCOPY N/A 9/6/2018    Procedure: COLONOSCOPY Suprep PLEASE TEXT PATIENT WITH ARRIVAL TIME;  Surgeon: Niharika Arce MD;  Location: [facility];  Service: Endoscopy;  Laterality: N/A;    CYSTOSCOPY WITH URODYNAMIC TESTING N/A 1/31/2022    Procedure: CYSTOSCOPY, WITH URODYNAMIC TESTING FLOUROSCOPIC;  Surgeon: Anthony Maloney MD;  Location: [facility] OR [facility];  Service: Urology;  Laterality: N/A;  1hr    KNEE ARTHROSCOPY      ORCHIECTOMY Bilateral 2/15/2023    Procedure: Bilateral simple orchiectomy;  Surgeon: Helena Luis MD;  Location: [facility];  Service: Urology;  Laterality: Bilateral;    SPINE SURGERY      l-spine    TRANSURETHRAL RESECTION OF PROSTATE N/A 10/26/2022    Procedure: TURP (TRANSURETHRAL RESECTION OF PROSTATE);  Surgeon: Helena Luis MD;  Location: [facility];  Service: Urology;  Laterality: N/A;         I have reviewed and updated the patient's past medical, surgical, family and social histories.     Review of patient's allergies  indicates:   Allergen Reactions    Ciprofloxacin Other (See Comments)     tendonitis         Current Outpatient Medications   Medication Sig Dispense Refill    apalutamide (ERLEADA) 60 mg Tab Take 240 mg by mouth once daily. 120 tablet 11    ascorbic acid, vitamin C, (VITAMIN C) 1000 MG tablet Take 1,000 mg by mouth once daily.      atorvastatin (LIPITOR) 40 MG tablet Take 1 tablet (40 mg total) by mouth once daily. 90 tablet 3    calcium carbonate (OS-TYREE) 500 mg calcium (1,250 mg) tablet Take 2 tablets (1,000 mg total) by mouth once daily. (Patient not taking: Reported on 12/22/2023) 60 tablet 11    cholecalciferol, vitamin D3, (VITAMIN D3) 25 mcg (1,000 unit) capsule Take 1 capsule (1,000 Units total) by mouth once daily. 30 capsule 11    clotrimazole-betamethasone 1-0.05% (LOTRISONE) cream Apply topically 2 (two) times daily. 60 g 3    cyclobenzaprine (FLEXERIL) 10 MG tablet Take 1 tablet (10 mg total) by mouth 3 (three) times daily as needed for Muscle spasms. 21 tablet 0    diltiaZEM (CARDIZEM CD) 300 MG 24 hr capsule Take 1 capsule (300 mg total) by mouth once daily. 90 capsule 3    docusate sodium (COLACE) 250 MG capsule Take 250 mg by mouth once daily.      finasteride (PROSCAR) 5 mg tablet Take 1 tablet (5 mg total) by mouth once daily. 90 tablet 3    GAS RELIEF 80, SIMETHICONE, ORAL Take by mouth.      HYDROcodone-acetaminophen (NORCO) 5-325 mg per tablet Take 1 tablet by mouth every 8 (eight) hours as needed for Pain. 30 tablet 0    lisinopriL (PRINIVIL,ZESTRIL) 40 MG tablet Take 1 tablet (40 mg total) by mouth once daily. 90 tablet 3    methocarbamoL (ROBAXIN) 750 MG Tab Take 750-1,500 mg by mouth 3 (three) times daily as needed.      miconazole (MICOTIN) 2 % cream Apply topically 2 (two) times daily. Apply to tip of penis prn irritation 28 g 0    multivit-min-FA-lycopen-lutein (MEN 50 PLUS MULTIVITAMIN) 300-600-300 mcg Tab as directed Orally      multivitamin capsule Take 1 capsule by mouth once daily.  "     ofloxacin (FLOXIN) 0.3 % otic solution Apply 10 drops into right ear once a day for 10 days.  Please dispense generic.      omega-3 fatty acids/fish oil (FISH OIL-OMEGA-3 FATTY ACIDS) 300-1,000 mg capsule Take 1 capsule by mouth once daily.      ondansetron (ZOFRAN) 8 MG tablet Take 1 tablet (8 mg total) by mouth every 8 (eight) hours as needed for Nausea. 30 tablet 0    oxyCODONE (ROXICODONE) 5 MG immediate release tablet Take 1 tablet (5 mg total) by mouth every 4 (four) hours as needed for Pain. 42 tablet 0    tamsulosin (FLOMAX) 0.4 mg Cap Take 1 capsule by mouth once daily 90 capsule 3    traZODone (DESYREL) 100 MG tablet TAKE 2 TABLETS BY MOUTH EVERY DAY AT BEDTIME AS NEEDED FOR INSOMNIA 180 tablet 3    TURMERIC ORAL Take by mouth.      wheat dextrin (BENEFIBER CLEAR SF, DEXTRIN, ORAL) Take by mouth.       No current facility-administered medications for this visit.        Objective:      Physical Exam:   BP (!) 150/79 (BP Location: Left arm, Patient Position: Sitting, BP Method: Medium (Automatic))   Pulse 90   Resp 18   Ht 6' 1" (1.854 m)   Wt 102.9 kg (226 lb 13.7 oz)   SpO2 98%   BMI 29.93 kg/m²       ECOG PS: 0             Physical Exam  Constitutional:       General: He is not in acute distress.     Appearance: Normal appearance.   HENT:      Head: Normocephalic.   Eyes:      General: No scleral icterus.     Extraocular Movements: Extraocular movements intact.      Conjunctiva/sclera: Conjunctivae normal.   Cardiovascular:      Rate and Rhythm: Normal rate.   Pulmonary:      Effort: Pulmonary effort is normal. No respiratory distress.   Abdominal:      General: There is no distension.      Palpations: Abdomen is soft.   Musculoskeletal:      Right lower leg: No edema.      Left lower leg: No edema.   Skin:     General: Skin is warm and dry.   Neurological:      Mental Status: He is alert and oriented to person, place, and time.      Motor: No weakness.   Psychiatric:         Mood and Affect: " Mood normal.         Behavior: Behavior normal.         Thought Content: Thought content normal.          Recent Labs:   Lab Results   Component Value Date    WBC 8.74 01/31/2024    RBC 4.84 01/31/2024    HGB 15.5 01/31/2024    HCT 44.5 01/31/2024    MCV 92 01/31/2024    MCH 32.0 (H) 01/31/2024    MCHC 34.8 01/31/2024    RDW 12.5 01/31/2024     01/31/2024    MPV 9.0 (L) 01/31/2024    IMMGR 0.3 09/12/2022    GRAN 6.4 01/31/2024    IGABS 0.02 01/31/2024    LYMPH 0.8 (L) 09/12/2022    LYMPH 13.0 (L) 09/12/2022    MONO 0.7 09/12/2022    MONO 11.3 09/12/2022    EOS 0.1 09/12/2022    BASO 0.03 09/12/2022    NRBC 0 09/12/2022    EOSINOPHIL 0.8 09/12/2022    BASOPHIL 0.5 09/12/2022    DIFFMETHOD Automated 09/12/2022       Lab Results   Component Value Date     01/31/2024    K 4.2 01/31/2024     01/31/2024    CO2 27 01/31/2024     (H) 01/31/2024    BUN 18 01/31/2024    CREATININE 1.4 01/31/2024    CALCIUM 10.8 (H) 01/31/2024    PROT 7.8 01/31/2024    ALBUMIN 3.6 01/31/2024    BILITOT 0.2 01/31/2024    ALKPHOS 102 01/31/2024    AST 19 01/31/2024    ALT 17 01/31/2024    ANIONGAP 8 01/31/2024    EGFRNORACEVR 51.4 (A) 01/31/2024        Lab Results   Component Value Date    PSADIAG 2.3 01/31/2024    PSADIAG 2.2 01/02/2024    PSADIAG 1.7 12/04/2023    PSADIAG 1.5 11/07/2023    PSADIAG 0.67 08/07/2023    PSADIAG 0.76 07/11/2023    PSADIAG 1.2 05/05/2023    PSADIAG 1.3 04/10/2023    PSADIAG 1.7 03/16/2023    PSADIAG 5.2 (H) 01/27/2023    PSATOTAL 15.8 (H) 07/05/2022    PSA 11.7 (H) 12/08/2021        Cardiovascular Screening:  Primary care physician: Anthony De La Torre MD      The 10-year ASCVD risk score (Echo ROSE, et al., 2019) is: 42.8%    Values used to calculate the score:      Age: 79 years      Sex: Male      Is Non- : No      Diabetic: No      Tobacco smoker: No      Systolic Blood Pressure: 150 mmHg      Is BP treated: Yes      HDL Cholesterol: 50 mg/dL      Total Cholesterol:  224 mg/dL    ASCVD Risk Level: High risk >= 20%    EKG:   Results for orders placed or performed in visit on 10/04/22   EKG 12-lead    Collection Time: 10/04/22 10:51 AM    Narrative    Test Reason : Z01.810,    Vent. Rate : 087 BPM     Atrial Rate : 087 BPM     P-R Int : 166 ms          QRS Dur : 080 ms      QT Int : 362 ms       P-R-T Axes : 061 039 042 degrees     QTc Int : 435 ms    Normal sinus rhythm  Normal ECG  When compared with ECG of 12-SEP-2022 17:34,  No significant change was found  Confirmed by Arie Chacon MD ([id number]) on 10/6/2022 7:32:16 AM    Referred By: SHIRA VAZQUEZ           Confirmed By:Arie Chacno MD       High blood pressure = Yes  Antihypertensive agents: diltiaZEM - 300 MG  lisinopriL - 40 MG   Comments:     DM2: No  Antidiabetic agents: This patient does not have an active medication from one of the medication groupers.   Comments:     Hyperlipidemia: Yes  Lipid lowering agents: atorvastatin - 40 MG   Comments:     Antiplatelet therapy: No  Agent: This patient does not have an active medication from one of the medication groupers.   Comment:     Body mass index is 29.93 kg/m².  If greater than 30, referral to nutrition    Lab Results   Component Value Date    CHOL 224 (H) 08/07/2023    LDLCALC 150.4 08/07/2023    HDL 50 08/07/2023    TRIG 118 08/07/2023    HGBA1C 5.4 08/07/2023        Bone Health    Lab Results   Component Value Date    TNQLDMKZ50AN 62 08/07/2023        Results for orders placed during the hospital encounter of 02/23/23    DXA Bone Density with Vertebral Fracture    Impression  Normal bone mineral density.    Consider FDA approved medical therapies in postmenopausal women and men aged 50 years and older, based on the following:    *A hip or vertebral (clinical or morphometric) fracture  *T score less than or equal to -2.5 at the femoral neck or spine after appropriate evaluation to exclude secondary causes.  *Low bone mass -- also known as osteopenia (T score between  -1.0 and -2.5 at the femoral neck or spine) and a 10 year probability of hip fracture greater than or equal to 3% or a 10 year probability of major osteoporosis-related fracture greater than or equal to 20% based on the [place]-adapted WHO algorithm.  *Clinicians judgment and/or patient preference may indicate treatment for people with 10 year fracture probabilities is above or below these levels.      Electronically signed by: Anshu Stephens  Date:    02/23/2023  Time:    12:08       Vitamin D: 1000 IU daily  Calcium: Recommend 4 servings of dairy daily     Osteopenia/Osteoporosis: None    Bone strengthening agent: None     Staging Imaging     Results for orders placed during the hospital encounter of 11/09/22    NM PET CT F 18 PYL PSMA, Midthigh to Vertex    Impression  1. Evidence of prostate neoplasm with osseous and pulmonary metastasis.  Detrimental changes noted when comparison is made to 05/16/2022 in particular with progression of several pulmonary nodules and with development of 1 or 2 small pulmonary nodules.  2. A mass is noted inferior to the left lobe of the thyroid gland entirely specific but demonstrating increased uptake similar since the prior  3. A prostate based mass appears to be present with involvement of the bladder and a distended bladder suggestive outlet obstruction      Electronically signed by: John Steele MD  Date:    11/09/2022  Time:    15:31       Results for orders placed during the hospital encounter of 08/31/22    NM Bone Scan Whole Body    Impression  1. Uptake within the anterior right 5th and 6th ribs is most likely related to degenerative changes.  Correlation with plain film or chest CT scan recommended.    2.  Intense uptake within the medial left pubic bone corresponds to an area of sclerosis seen on the comparison PET-CT scan.  Differential considerations remain to be fibrous dysplasia, Paget's disease or metastasis.    3.  Negative for metastatic pattern of uptake  otherwise.  Degenerative pattern of uptake and other incidental findings as noted above.      Electronically signed by: Antonio Mckeon MD  Date:    08/31/2022  Time:    12:35      Results for orders placed during the hospital encounter of 09/29/22    MRI Prostate W W/O Contrast    Impression  Left-sided PI-RADS 5 lesion with extraprostatic extension involving the left-sided neurovascular bundles and seminal vesicles.  Extension of abnormal signal adjacent to the left levator ani, cannot rule out involvement.  Abnormal signal also closely approximates with the anterior rectal wall without definite involvement.    Left pubic lesion concerning for metastasis.    Diffuse heterogeneous bone marrow.    Overall Assessment: PI-RADS 5 - Very high (clinically significant cancer is highly likely to be present)    Number of targets created for potential MR/US fusion biopsy    Transitional/Peripheral zone: 1    This report was flagged in Epic as abnormal.    Electronically signed by resident: Al Castrejon  Date:    09/29/2022  Time:    16:12    Electronically signed by: Shekhar Rodríguez MD  Date:    09/29/2022  Time:    19:08       No results found for this or any previous visit.       I have personally reviewed the above imaging.     Path:   Reviewed pathology as documented above.    Genomic testing:     Germline genetic testing: 3/28/23 - VUS in APC and BMPR1A    Results for orders placed or performed in visit on 03/28/23   Genetic Misc Sendout Test, Blood   Result Value Ref Range    Miscellaneous Genetic Test Name [facility] BRCA + 84/RNA     Genso Specimen Type Blood     Genetic counseling? Yes     Parental or Sibling Testing? No     Test Result See result image under hyperlink     Reference Lab SEE COMMENT         Somatic tumor genotyping: None      ctDNA genotyping:           Diagnoses:     1. Prostate cancer    2. Androgen deprivation therapy    3. Cancer associated pain    4. Hypertension, unspecified type    5. Bone  metastases    6. Malignant neoplasm metastatic to lung, unspecified laterality                    Assessment and Plan:     1. Prostate cancer  Overview:  Patient with de david high-volume metastatic prosatate adenocarcinoma including lung and osseous mets. Started ADT 11/18/23 with apalutamide 6/7/23. Completed RT to the left iliac met 2/7/23. Subsequent oligoprogression to left pubic tubercle. Underwent RT 01/2024    Assessment & Plan:  Will continue ADT + apalutamide for now to see if RT can successfully delay PSA progression of the oligomets. On clear PSA progression will proceed with docetaxel therapy.  - Repeat PSA prior to his upcoming trip  - PT to follow up in March after he returns from [place]  - Con't apalutamide 240mg po daily      2. Androgen deprivation therapy  Overview:  Current agent: History of bilateral orchiectomy  Start date:  ADT start 11/2022  Last dose: Orchiectomy 2/15/23  Duration of therapy: Indefefinite    Novel Androgen signaling inhibitors:   Agent: Apalutamide  Start date: 6/7/23          3. Cancer associated pain  -     cyclobenzaprine (FLEXERIL) 10 MG tablet; Take 1 tablet (10 mg total) by mouth 3 (three) times daily as needed for Muscle spasms.  Dispense: 21 tablet; Refill: 0    4. Hypertension, unspecified type  Overview:  Home regimen includes diltiazem and lisinopril      5. Bone metastases  Overview:  Has left pelvic turbercle metastases noted on PSMA PET CT with associated discomfort. Completed RT on 2/3/23.    Assessment & Plan:  - Con't vitamin D  - Pt hesitant to have teeth pulled prior to zometa as recommendd by his dentist; he will follow up with dentist to further discuss      6. Malignant neoplasm metastatic to lung, unspecified laterality  Overview:  Metastatic prostate cancer based on PSMA imaging      Assessment & Plan:  - Good rsponse on most recent imaging                        Follow up:   Route Chart for Scheduling    Med Onc Chart Routing      Follow up with  physician Debra Carter 3/18/24   Follow up with JENIFFER    Infusion scheduling note    Injection scheduling note    Labs CBC, CMP and PSA   Scheduling:  Preferred lab:  Lab interval:  PSA only 2/21/24 - can be local lab; full labs prior to clinic appt 3/18/24   Imaging    Pharmacy appointment    Other referrals                     Therapy Plan Information  Medications  zoledronic acid (ZOMETA) 4 mg in sodium chloride 0.9% 100 mL IVPB  4 mg, Intravenous, Every 4 weeks  Flushes  sodium chloride 0.9% 250 mL flush bag  Intravenous, Every 4 weeks  sodium chloride 0.9% flush 10 mL  10 mL, Intravenous, Every 4 weeks  heparin, porcine (PF) 100 unit/mL injection flush 500 Units  500 Units, Intravenous, Every 4 weeks  alteplase injection 2 mg  2 mg, Intra-Catheter, Every 4 weeks         The above information has been reviewed with the patient and all questions have been answered to their apparent satisfaction.  They understand that they can call the clinic with any questions.    Darren Carter

## 2024-01-31 NOTE — PATIENT INSTRUCTIONS
PSA is generally stable at 2.3 today. I hope that the radiation therapy will help delay any further PSA progression. Will continue apalutamide 240mg daily.    Pain in the left pubic bone starting to improve following radiation. Hopefully this will continue. Can continue pain medications along with muscle relaxants as needed.     Still awaiting dental clearance prior to starting Zometa. I do think this is an important mediation to help reduce the risk of future bone pain or fracture. Continue vitamin D for now.    Recheck PSA prior to your trip and I will see you upon your return in March. When PSA appaers to be consistently rising, will plan to start docetaxel chemotherapy given by IV infusion every three weeks for up to 10 doses.

## 2024-02-01 ENCOUNTER — APPOINTMENT (OUTPATIENT)
Dept: RADIATION THERAPY | Facility: OTHER | Age: 79
End: 2024-02-01
Attending: STUDENT IN AN ORGANIZED HEALTH CARE EDUCATION/TRAINING PROGRAM
Payer: MEDICARE

## 2024-02-01 PROCEDURE — 77336 RADIATION PHYSICS CONSULT: CPT | Performed by: STUDENT IN AN ORGANIZED HEALTH CARE EDUCATION/TRAINING PROGRAM

## 2024-02-06 ENCOUNTER — TELEPHONE (OUTPATIENT)
Dept: HEMATOLOGY/ONCOLOGY | Facility: CLINIC | Age: 79
End: 2024-02-06
Payer: MEDICARE

## 2024-02-06 NOTE — TELEPHONE ENCOUNTER
I spoke to patient. He is asking that I send a form to his dentist for clearance. He gave me the dentist name and phone number. I called and had to leave a message for them to call me back with a fax number. Once I get that, I will fax over.

## 2024-02-06 NOTE — TELEPHONE ENCOUNTER
----- Message from Russell Jeter sent at 2/6/2024 11:49 AM CST -----  Name Of Caller: Noah        Provider Name: Darren Carter        Does patient feel the need to be seen today? no        Relationship to the Pt?: patient        Contact Preference?: [phone]        What is the nature of the call?:Patient states that he would like to speak with someone in the office in regards to some questions he has about some paper work that he may need completed before going to the dentist.

## 2024-02-07 ENCOUNTER — TELEPHONE (OUTPATIENT)
Dept: HEMATOLOGY/ONCOLOGY | Facility: CLINIC | Age: 79
End: 2024-02-07
Payer: MEDICARE

## 2024-02-07 NOTE — TELEPHONE ENCOUNTER
----- Message from Victor Manuel Harrell sent at 2/7/2024 10:15 AM CST -----  Type:  Patient Returning Call    Who Called:pt  Who Left Message for Patient:nurse   Does the patient know what this is regarding?:appt on 02/28  Would the patient rather a call back or a response via [facility]? Call   Best Call Back Number: [phone]  Additional Information:

## 2024-02-07 NOTE — TELEPHONE ENCOUNTER
I spoke to patient. Wanted to reschedule appointments. I assisted him with getting them changed. Sending in mail.

## 2024-02-12 ENCOUNTER — TELEPHONE (OUTPATIENT)
Dept: FAMILY MEDICINE | Facility: CLINIC | Age: 79
End: 2024-02-12
Payer: MEDICARE

## 2024-02-12 RX ORDER — NIFEDIPINE 60 MG/1
60 TABLET, EXTENDED RELEASE ORAL DAILY
Qty: 90 TABLET | Refills: 3 | Status: SHIPPED | OUTPATIENT
Start: 2024-02-12 | End: 2024-04-09

## 2024-02-20 ENCOUNTER — LAB VISIT (OUTPATIENT)
Dept: LAB | Facility: HOSPITAL | Age: 79
End: 2024-02-20
Attending: HOSPITALIST
Payer: MEDICARE

## 2024-02-20 DIAGNOSIS — C61 PROSTATE CANCER: ICD-10-CM

## 2024-02-20 LAB — COMPLEXED PSA SERPL-MCNC: 1.4 NG/ML (ref 0–4)

## 2024-02-20 PROCEDURE — 36415 COLL VENOUS BLD VENIPUNCTURE: CPT | Mod: PN | Performed by: HOSPITALIST

## 2024-02-20 PROCEDURE — 84153 ASSAY OF PSA TOTAL: CPT | Performed by: HOSPITALIST

## 2024-03-15 ENCOUNTER — LAB VISIT (OUTPATIENT)
Dept: LAB | Facility: HOSPITAL | Age: 79
End: 2024-03-15
Attending: HOSPITALIST
Payer: MEDICARE

## 2024-03-15 DIAGNOSIS — C61 PROSTATE CANCER: ICD-10-CM

## 2024-03-15 LAB — COMPLEXED PSA SERPL-MCNC: 1.1 NG/ML (ref 0–4)

## 2024-03-15 PROCEDURE — 84153 ASSAY OF PSA TOTAL: CPT | Performed by: HOSPITALIST

## 2024-03-15 PROCEDURE — 36415 COLL VENOUS BLD VENIPUNCTURE: CPT | Mod: PN | Performed by: HOSPITALIST

## 2024-03-18 ENCOUNTER — INFUSION (OUTPATIENT)
Dept: INFUSION THERAPY | Facility: HOSPITAL | Age: 79
End: 2024-03-18
Payer: MEDICARE

## 2024-03-18 ENCOUNTER — OFFICE VISIT (OUTPATIENT)
Dept: HEMATOLOGY/ONCOLOGY | Facility: CLINIC | Age: 79
End: 2024-03-18
Payer: MEDICARE

## 2024-03-18 VITALS
SYSTOLIC BLOOD PRESSURE: 145 MMHG | HEIGHT: 73 IN | OXYGEN SATURATION: 97 % | BODY MASS INDEX: 30.57 KG/M2 | HEART RATE: 97 BPM | TEMPERATURE: 97 F | DIASTOLIC BLOOD PRESSURE: 72 MMHG | RESPIRATION RATE: 18 BRPM | WEIGHT: 230.63 LBS

## 2024-03-18 VITALS — HEART RATE: 69 BPM | DIASTOLIC BLOOD PRESSURE: 63 MMHG | SYSTOLIC BLOOD PRESSURE: 132 MMHG | RESPIRATION RATE: 18 BRPM

## 2024-03-18 DIAGNOSIS — C79.51 MALIGNANT NEOPLASM METASTATIC TO BONE: ICD-10-CM

## 2024-03-18 DIAGNOSIS — C61 PROSTATE CANCER: Primary | ICD-10-CM

## 2024-03-18 DIAGNOSIS — Z79.818 ANDROGEN DEPRIVATION THERAPY: ICD-10-CM

## 2024-03-18 DIAGNOSIS — G89.3 CANCER ASSOCIATED PAIN: ICD-10-CM

## 2024-03-18 DIAGNOSIS — C79.51 MALIGNANT NEOPLASM METASTATIC TO BONE: Primary | ICD-10-CM

## 2024-03-18 DIAGNOSIS — C61 PROSTATE CANCER: ICD-10-CM

## 2024-03-18 PROCEDURE — 96374 THER/PROPH/DIAG INJ IV PUSH: CPT

## 2024-03-18 PROCEDURE — 99215 OFFICE O/P EST HI 40 MIN: CPT | Mod: S$GLB,,, | Performed by: HOSPITALIST

## 2024-03-18 PROCEDURE — 99999 PR PBB SHADOW E&M-EST. PATIENT-LVL V: CPT | Mod: PBBFAC,,, | Performed by: HOSPITALIST

## 2024-03-18 PROCEDURE — 63600175 PHARM REV CODE 636 W HCPCS: Performed by: HOSPITALIST

## 2024-03-18 PROCEDURE — 25000003 PHARM REV CODE 250: Performed by: HOSPITALIST

## 2024-03-18 RX ORDER — HEPARIN 100 UNIT/ML
500 SYRINGE INTRAVENOUS
Status: DISCONTINUED | OUTPATIENT
Start: 2024-03-18 | End: 2024-03-18 | Stop reason: HOSPADM

## 2024-03-18 RX ORDER — SODIUM CHLORIDE 0.9 % (FLUSH) 0.9 %
10 SYRINGE (ML) INJECTION
Status: DISCONTINUED | OUTPATIENT
Start: 2024-03-18 | End: 2024-03-18 | Stop reason: HOSPADM

## 2024-03-18 RX ORDER — DILTIAZEM HYDROCHLORIDE 300 MG/1
300 CAPSULE, COATED, EXTENDED RELEASE ORAL
COMMUNITY
Start: 2024-02-19 | End: 2024-04-09 | Stop reason: SDUPTHER

## 2024-03-18 RX ORDER — SODIUM CHLORIDE 0.9 % (FLUSH) 0.9 %
10 SYRINGE (ML) INJECTION
Status: CANCELLED | OUTPATIENT
Start: 2024-04-15

## 2024-03-18 RX ORDER — HEPARIN 100 UNIT/ML
500 SYRINGE INTRAVENOUS
Status: CANCELLED | OUTPATIENT
Start: 2024-04-15

## 2024-03-18 RX ORDER — ZOLEDRONIC ACID 0.04 MG/ML
4 INJECTION, SOLUTION INTRAVENOUS
Status: CANCELLED | OUTPATIENT
Start: 2024-04-15

## 2024-03-18 RX ORDER — ZOLEDRONIC ACID 0.04 MG/ML
4 INJECTION, SOLUTION INTRAVENOUS
Status: COMPLETED | OUTPATIENT
Start: 2024-03-18 | End: 2024-03-18

## 2024-03-18 RX ADMIN — ZOLEDRONIC ACID 4 MG: 0.04 INJECTION, SOLUTION INTRAVENOUS at 01:03

## 2024-03-18 RX ADMIN — SODIUM CHLORIDE: 9 INJECTION, SOLUTION INTRAVENOUS at 01:03

## 2024-03-18 NOTE — ASSESSMENT & PLAN NOTE
PSA is stable to flat with improvement in his pubic pain. Plan to continue apalutamide for now. May repeat PSMA PET q6 months.   - Con't apalutamide 240mg daily; sp orchiectomy  - FU 6 weeks for PSA check  - Con't zometa q6 weeks  - Repeat PSMA PET in July

## 2024-03-18 NOTE — PLAN OF CARE
1346 Patient tolerated C1D1 Zometa without incident. Seen by Dr Carter prior to infusion. Labs reviewed. Creatinine clearance okay for 4mg dose. Vitals stable before and after infusion.  PIV flushed without resistance and positive for blood return before, during and after infusion. Encouraged hydration. Patient next appointment to be scheduled. To contact provider with questions or concerns. D/C ambulatory and stable.

## 2024-03-18 NOTE — PROGRESS NOTES
ADVANCED PROSTATE CANCER CLINIC - FOLLOW UP VISIT     Best Contact Phone Number(s): [phone] (home)       Cancer/Stage/TNM:    Cancer Staging   Prostate cancer  Staging form: Prostate, AJCC 8th Edition  - Clinical: Stage IVB (cT3, cNX, cM1c, Grade Group: 5) - Signed by Darern Carter MD on 11/18/2022        Reason for visit: De david metastatic prostate cancer with lung and osseous mets sp orchiectomy and on apalutamide.    HPI: Noah Lopez is a 79 y.o. 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 He started first line ADT on 11/18/22 with addition of apalutamide 06/2023. He completed SBRT to the left iliac lesion 02/2023 and underwent bilateral orchiectomy 2/15/23. He was found to have rising PSA c/f mCRPC 12/2023 with oligoprogessino to the left left pubic ramus on PSMA PET. He underwent palliative RT to the left pubic ramus 1/18/24.     History has been obtained by chart review and discussion with the patient.    Interval Events:      Recently returned from trip to [place]. Overall went well. Went to [facility] and the [employer]. Utilized electric scooter around the [place]jo. Pubic pain has signficantly improved. Notes some pain around the lower back radiating into both hips. Very rare use of hydrocodone-APAP. Feels as thought it is well controlled. Good compliance with apalutamide. No rash. Energy is good. No changes to his urinary habits. Recently saw a dentist - OK to proceed with zometa. Continues on vitamin D daily.       Oncology History   Prostate cancer   12/8/2021 Tumor Markers    PSA 11.7     1/2022 Notable Event    ED visit for urinary retention found on abodminal US. Jones placed.     3/2022 Notable Event    Seen in ED for fall. Incidentally found to have suspicious thyroid nodule     4/20/2022 Biopsy    FNA of thyroid nodule nondiagnostic. Also found to have new SHANNAN lung  nodules.     5/16/2022 Imaging Significant Findings    FDG PET-CT in setting of pulmonary nodules:  1.  Two solid left upper lobe pulmonary nodules with no appreciable FDG uptake.  Malignancy is not excluded, and the larger nodule is likely amenable to percutaneous biopsy. Alternatively, continued surveillance by dedicated chest CT alone, i.e., without FDG PET, may be considered.     2.  Focal FDG uptake in the left prostate gland with extracapsular extension.  These findings are concerning for malignancy.  Correlation with PSA and urology evaluation recommended.  MRI of the prostate may also be helpful for further evaluation.     3.  Abnormal appearance of the left symphysis pubis with hypermetabolic activity.  Differential considerations include fibrous dysplasia, Paget's disease, and metastasis.   MRI or bone algorithm CT may be informative.     4.  Incidental focal uptake in the right cerebellum on at least 2 PET slices.  Recommend brain MRI for further evaluation.     5.  2.2 cm left inferior thyroid lobe nodule.  This nodule was recently biopsied with abnormal results.  Please refer to pathology report.     5/25/2022 Tumor Markers    PSA 17.3     7/6/2022 Biopsy    Attempted SHANNAN nodule biopsy     8/11/2022 Biopsy    TRUS biopsy: Prostate adenocarcinoma up to 4+3 disease in 10/18 cores all from the left side. Associated cribriform glands and PNI.       8/31/2022 Imaging Significant Findings    Bone scan:  1. Uptake within the anterior right 5th and 6th ribs is most likely related to degenerative changes.  Correlation with plain film or chest CT scan recommended.     2.  Intense uptake within the medial left pubic bone corresponds to an area of sclerosis seen on the comparison PET-CT scan.  Differential considerations remain to be fibrous dysplasia, Paget's disease or metastasis.     3.  Negative for metastatic pattern of uptake otherwise.  Degenerative pattern of uptake and other incidental findings as noted  above.     9/29/2022 Imaging Significant Findings    MRI prostate: Left-sided PI-RADS 5 lesion with extraprostatic extension involving the left-sided neurovascular bundles and seminal vesicles.  Extension of abnormal signal adjacent to the left levator ani, cannot rule out involvement.  Abnormal signal also closely approximates with the anterior rectal wall without definite involvement.     Left pubic lesion concerning for metastasis.     10/17/2022 Notable Event    Declined pubic bone biopsy to potentially confirm metastatic prostate cancer     10/26/2022 Procedure    TURPT - pathology review with up to 4+5 disease     11/9/2022 Imaging Significant Findings    PSMA PET-CT shows evidence of osseous and pulmonary metastatic disease     11/18/2022 -  Hormone Therapy    Start Lupron 22.5mg IM; plan to start apalutamide     2/1/2023 - 2/7/2023 Radiation Therapy    Treating physician: Jaswinder Doyle    Course: C1 Pelvis 2023    Treatment Site Ref. ID Energy Dose/Fx (Gy) #Fx Dose Correction (Gy) Total Dose (Gy) Start Date End Date Elapsed Days   3D Iliac_Lt Lt Iliac 18X 4 5 / 5 0 20 2/1/2023 2/7/2023 6      3/28/2023 Genetic Testing    Germline genetic testing. No pathogenic mutations. VUS in APC and BMPR1A     1/2/2024 Imaging Significant Findings     PSMA PET CT   Impression:  - Interval treatment response.  Decreased size and radiotracer uptake of the prostate remnant.  Previously described pulmonary nodules have either resolved or decreased in size compared to prior exam.  - Mixed interval changes within the left pubic bone lesion with decreased involvement of the inferior pubic ramus and enlarged lytic component within the superior pubic ramus.  Interval resolution of prior uptake within the left ischial tuberosity.  - Stable nodule within the superior mediastinum projecting inferiorly from the left thyroid.  Recommend correlation with prior biopsy.  - No new tracer avid lesion.     1/18/2024 - 1/31/2024  Radiation Therapy    Treating physician: lisa childs  Course: C2 Pelvis 2024    Treatment Site Ref. ID Energy Dose/Fx (Gy) #Fx Dose Correction (Gy) Total Dose (Gy) Start Date End Date Elapsed Days   3D Pelvis NormEZCalc 18X 3 10 / 10 0 30 1/18/2024 1/31/2024 13          1/30/2024 Imaging Significant Findings    CT a/p 1/30/24  1. Surgical changes of TURP and bilateral orchiectomy.  There is remnant prostate with residual disease better assessed by PSMA or MRI.  2. *Enlarging metastasis in the left pubic/superior pubic ramus bone.  There is cortical breakthrough involving both the anterior and posterior cortex with tumoral replacement.  Correlation advised.  Cholelithiasis.           Past Medical History:   Diagnosis Date    Colon polyp 09/06/2018    Hyperlipidemia     Hypertension     Prostate cancer     Stage 3a chronic kidney disease 6/21/2023    Urinary tract infection with hematuria 11/18/2022         Past Surgical History:   Procedure Laterality Date    COLONOSCOPY  2010    COLONOSCOPY N/A 9/6/2018    Procedure: COLONOSCOPY Suprep PLEASE TEXT PATIENT WITH ARRIVAL TIME;  Surgeon: Niharika Arce MD;  Location: [facility];  Service: Endoscopy;  Laterality: N/A;    CYSTOSCOPY WITH URODYNAMIC TESTING N/A 1/31/2022    Procedure: CYSTOSCOPY, WITH URODYNAMIC TESTING FLOUROSCOPIC;  Surgeon: Anthony Maloney MD;  Location: [address];  Service: Urology;  Laterality: N/A;  1hr    KNEE ARTHROSCOPY      ORCHIECTOMY Bilateral 2/15/2023    Procedure: Bilateral simple orchiectomy;  Surgeon: Helena Luis MD;  Location: [facility];  Service: Urology;  Laterality: Bilateral;    SPINE SURGERY      l-spine    TRANSURETHRAL RESECTION OF PROSTATE N/A 10/26/2022    Procedure: TURP (TRANSURETHRAL RESECTION OF PROSTATE);  Surgeon: Helena Luis MD;  Location: [facility];  Service: Urology;  Laterality: N/A;         I have reviewed and updated the patient's past medical, surgical, family and social histories.     Review of  patient's allergies indicates:   Allergen Reactions    Ciprofloxacin Other (See Comments)     tendonitis         Current Outpatient Medications   Medication Sig Dispense Refill    apalutamide (ERLEADA) 60 mg Tab Take 240 mg by mouth once daily. 120 tablet 11    ascorbic acid, vitamin C, (VITAMIN C) 1000 MG tablet Take 1,000 mg by mouth once daily.      atorvastatin (LIPITOR) 40 MG tablet Take 1 tablet (40 mg total) by mouth once daily. 90 tablet 3    cholecalciferol, vitamin D3, (VITAMIN D3) 25 mcg (1,000 unit) capsule Take 1 capsule (1,000 Units total) by mouth once daily. 30 capsule 11    clotrimazole-betamethasone 1-0.05% (LOTRISONE) cream Apply topically 2 (two) times daily. 60 g 3    docusate sodium (COLACE) 250 MG capsule Take 250 mg by mouth once daily.      finasteride (PROSCAR) 5 mg tablet Take 1 tablet (5 mg total) by mouth once daily. 90 tablet 3    GAS RELIEF 80, SIMETHICONE, ORAL Take by mouth.      HYDROcodone-acetaminophen (NORCO) 5-325 mg per tablet Take 1 tablet by mouth every 8 (eight) hours as needed for Pain. 30 tablet 0    lisinopriL (PRINIVIL,ZESTRIL) 40 MG tablet Take 1 tablet (40 mg total) by mouth once daily. 90 tablet 3    methocarbamoL (ROBAXIN) 750 MG Tab Take 750-1,500 mg by mouth 3 (three) times daily as needed.      miconazole (MICOTIN) 2 % cream Apply topically 2 (two) times daily. Apply to tip of penis prn irritation 28 g 0    multivit-min-FA-lycopen-lutein (MEN 50 PLUS MULTIVITAMIN) 300-600-300 mcg Tab as directed Orally      multivitamin capsule Take 1 capsule by mouth once daily.      NIFEdipine (PROCARDIA-XL) 60 MG (OSM) 24 hr tablet Take 1 tablet (60 mg total) by mouth once daily. 90 tablet 3    ofloxacin (FLOXIN) 0.3 % otic solution Apply 10 drops into right ear once a day for 10 days.  Please dispense generic.      omega-3 fatty acids/fish oil (FISH OIL-OMEGA-3 FATTY ACIDS) 300-1,000 mg capsule Take 1 capsule by mouth once daily.      ondansetron (ZOFRAN) 8 MG tablet Take 1  "tablet (8 mg total) by mouth every 8 (eight) hours as needed for Nausea. 30 tablet 0    oxyCODONE (ROXICODONE) 5 MG immediate release tablet Take 1 tablet (5 mg total) by mouth every 4 (four) hours as needed for Pain. 42 tablet 0    tamsulosin (FLOMAX) 0.4 mg Cap Take 1 capsule by mouth once daily 90 capsule 3    traZODone (DESYREL) 100 MG tablet TAKE 2 TABLETS BY MOUTH EVERY DAY AT BEDTIME AS NEEDED FOR INSOMNIA 180 tablet 3    TURMERIC ORAL Take by mouth.      wheat dextrin (BENEFIBER CLEAR SF, DEXTRIN, ORAL) Take by mouth.      calcium carbonate (OS-TYREE) 500 mg calcium (1,250 mg) tablet Take 2 tablets (1,000 mg total) by mouth once daily. (Patient not taking: Reported on 12/22/2023) 60 tablet 11    diltiaZEM (CARDIZEM CD) 300 MG 24 hr capsule Take 300 mg by mouth.       No current facility-administered medications for this visit.        Objective:      Physical Exam:   BP (!) 145/72   Pulse 97   Temp 97.2 °F (36.2 °C) (Oral)   Resp 18   Ht 6' 1" (1.854 m)   Wt 104.6 kg (230 lb 9.6 oz)   SpO2 97%   BMI 30.42 kg/m²       ECOG PS: 0             Physical Exam  Constitutional:       General: He is not in acute distress.     Appearance: Normal appearance.   HENT:      Head: Normocephalic.   Eyes:      General: No scleral icterus.     Extraocular Movements: Extraocular movements intact.      Conjunctiva/sclera: Conjunctivae normal.   Cardiovascular:      Rate and Rhythm: Normal rate.   Pulmonary:      Effort: Pulmonary effort is normal. No respiratory distress.   Abdominal:      General: There is no distension.      Palpations: Abdomen is soft.   Musculoskeletal:      Right lower leg: No edema.      Left lower leg: No edema.   Skin:     General: Skin is warm and dry.   Neurological:      Mental Status: He is alert and oriented to person, place, and time.      Motor: No weakness.   Psychiatric:         Mood and Affect: Mood normal.         Behavior: Behavior normal.         Thought Content: Thought content normal. "          Recent Labs:   Lab Results   Component Value Date    WBC 8.74 01/31/2024    RBC 4.84 01/31/2024    HGB 15.5 01/31/2024    HCT 44.5 01/31/2024    MCV 92 01/31/2024    MCH 32.0 (H) 01/31/2024    MCHC 34.8 01/31/2024    RDW 12.5 01/31/2024     01/31/2024    MPV 9.0 (L) 01/31/2024    IMMGR 0.3 09/12/2022    GRAN 6.4 01/31/2024    IGABS 0.02 01/31/2024    LYMPH 0.8 (L) 09/12/2022    LYMPH 13.0 (L) 09/12/2022    MONO 0.7 09/12/2022    MONO 11.3 09/12/2022    EOS 0.1 09/12/2022    BASO 0.03 09/12/2022    NRBC 0 09/12/2022    EOSINOPHIL 0.8 09/12/2022    BASOPHIL 0.5 09/12/2022    DIFFMETHOD Automated 09/12/2022       Lab Results   Component Value Date     01/31/2024    K 4.2 01/31/2024     01/31/2024    CO2 27 01/31/2024     (H) 01/31/2024    BUN 18 01/31/2024    CREATININE 1.4 01/31/2024    CALCIUM 10.8 (H) 01/31/2024    PROT 7.8 01/31/2024    ALBUMIN 3.6 01/31/2024    BILITOT 0.2 01/31/2024    ALKPHOS 102 01/31/2024    AST 19 01/31/2024    ALT 17 01/31/2024    ANIONGAP 8 01/31/2024    EGFRNORACEVR 51.4 (A) 01/31/2024        Lab Results   Component Value Date    PSADIAG 1.1 03/15/2024    PSADIAG 1.4 02/20/2024    PSADIAG 2.3 01/31/2024    PSADIAG 2.2 01/02/2024    PSADIAG 1.7 12/04/2023    PSADIAG 1.5 11/07/2023    PSADIAG 0.67 08/07/2023    PSADIAG 0.76 07/11/2023    PSADIAG 1.2 05/05/2023    PSADIAG 1.3 04/10/2023    PSATOTAL 15.8 (H) 07/05/2022    PSA 11.7 (H) 12/08/2021        Cardiovascular Screening:  Primary care physician: Anthony De La Torre MD      The 10-year ASCVD risk score (Echo ROSE, et al., 2019) is: 40.9%    Values used to calculate the score:      Age: 79 years      Sex: Male      Is Non- : No      Diabetic: No      Tobacco smoker: No      Systolic Blood Pressure: 145 mmHg      Is BP treated: Yes      HDL Cholesterol: 50 mg/dL      Total Cholesterol: 224 mg/dL    ASCVD Risk Level: High risk >= 20%    EKG:   Results for orders placed or performed in  visit on 10/04/22   EKG 12-lead    Collection Time: 10/04/22 10:51 AM    Narrative    Test Reason : Z01.810,    Vent. Rate : 087 BPM     Atrial Rate : 087 BPM     P-R Int : 166 ms          QRS Dur : 080 ms      QT Int : 362 ms       P-R-T Axes : 061 039 042 degrees     QTc Int : 435 ms    Normal sinus rhythm  Normal ECG  When compared with ECG of 12-SEP-2022 17:34,  No significant change was found  Confirmed by Arie Chacon MD ([id number]) on 10/6/2022 7:32:16 AM    Referred By: SHIRA VAZQUEZ           Confirmed By:Arie Chacon MD       High blood pressure = Yes  Antihypertensive agents: diltiaZEM - 300 MG  lisinopriL - 40 MG  NIFEdipine - 60 MG (OSM)   Comments:     DM2: No  Antidiabetic agents: This patient does not have an active medication from one of the medication groupers.   Comments:     Hyperlipidemia: Yes  Lipid lowering agents: atorvastatin - 40 MG   Comments:     Antiplatelet therapy: No  Agent: This patient does not have an active medication from one of the medication groupers.   Comment:     Body mass index is 30.42 kg/m².  If greater than 30, referral to nutrition    Lab Results   Component Value Date    CHOL 224 (H) 08/07/2023    LDLCALC 150.4 08/07/2023    HDL 50 08/07/2023    TRIG 118 08/07/2023    HGBA1C 5.4 08/07/2023        Bone Health    Lab Results   Component Value Date    JZJMVDPQ24DI 62 08/07/2023        Results for orders placed during the hospital encounter of 02/23/23    DXA Bone Density with Vertebral Fracture    Impression  Normal bone mineral density.    Consider FDA approved medical therapies in postmenopausal women and men aged 50 years and older, based on the following:    *A hip or vertebral (clinical or morphometric) fracture  *T score less than or equal to -2.5 at the femoral neck or spine after appropriate evaluation to exclude secondary causes.  *Low bone mass -- also known as osteopenia (T score between -1.0 and -2.5 at the femoral neck or spine) and a 10 year probability of hip  fracture greater than or equal to 3% or a 10 year probability of major osteoporosis-related fracture greater than or equal to 20% based on the [place]-adapted WHO algorithm.  *Clinicians judgment and/or patient preference may indicate treatment for people with 10 year fracture probabilities is above or below these levels.      Electronically signed by: Anshu Stephens  Date:    02/23/2023  Time:    12:08       Vitamin D: 1000 IU daily  Calcium: Recommend 4 servings of dairy daily     Osteopenia/Osteoporosis: None    Bone strengthening agent: None     Staging Imaging     Results for orders placed during the hospital encounter of 11/09/22    NM PET CT F 18 PYL PSMA, Midthigh to Vertex    Impression  1. Evidence of prostate neoplasm with osseous and pulmonary metastasis.  Detrimental changes noted when comparison is made to 05/16/2022 in particular with progression of several pulmonary nodules and with development of 1 or 2 small pulmonary nodules.  2. A mass is noted inferior to the left lobe of the thyroid gland entirely specific but demonstrating increased uptake similar since the prior  3. A prostate based mass appears to be present with involvement of the bladder and a distended bladder suggestive outlet obstruction      Electronically signed by: John Steele MD  Date:    11/09/2022  Time:    15:31       Results for orders placed during the hospital encounter of 08/31/22    NM Bone Scan Whole Body    Impression  1. Uptake within the anterior right 5th and 6th ribs is most likely related to degenerative changes.  Correlation with plain film or chest CT scan recommended.    2.  Intense uptake within the medial left pubic bone corresponds to an area of sclerosis seen on the comparison PET-CT scan.  Differential considerations remain to be fibrous dysplasia, Paget's disease or metastasis.    3.  Negative for metastatic pattern of uptake otherwise.  Degenerative pattern of uptake and other incidental findings as noted  above.      Electronically signed by: Antonio Mckeon MD  Date:    08/31/2022  Time:    12:35      Results for orders placed during the hospital encounter of 09/29/22    MRI Prostate W W/O Contrast    Impression  Left-sided PI-RADS 5 lesion with extraprostatic extension involving the left-sided neurovascular bundles and seminal vesicles.  Extension of abnormal signal adjacent to the left levator ani, cannot rule out involvement.  Abnormal signal also closely approximates with the anterior rectal wall without definite involvement.    Left pubic lesion concerning for metastasis.    Diffuse heterogeneous bone marrow.    Overall Assessment: PI-RADS 5 - Very high (clinically significant cancer is highly likely to be present)    Number of targets created for potential MR/US fusion biopsy    Transitional/Peripheral zone: 1    This report was flagged in Epic as abnormal.    Electronically signed by resident: Al Castrejon  Date:    09/29/2022  Time:    16:12    Electronically signed by: Shekhar Rodríguez MD  Date:    09/29/2022  Time:    19:08       No results found for this or any previous visit.       I have personally reviewed the above imaging.     Path:   Reviewed pathology as documented above.    Genomic testing:     Germline genetic testing: 3/28/23 - VUS in APC and BMPR1A    Results for orders placed or performed in visit on 03/28/23   Genetic Misc Sendout Test, Blood   Result Value Ref Range    Miscellaneous Genetic Test Name [facility] BRCA + 84/RNA     Genso Specimen Type Blood     Genetic counseling? Yes     Parental or Sibling Testing? No     Test Result See result image under hyperlink     Reference Lab SEE COMMENT         Somatic tumor genotyping: None      ctDNA genotyping:           Diagnoses:     1. Prostate cancer    2. Androgen deprivation therapy    3. Bone metastases    4. Cancer associated pain                      Assessment and Plan:     1. Prostate cancer  Overview:  Patient with de david high-volume  metastatic prosatate adenocarcinoma including lung and osseous mets. Started ADT 11/18/23 with apalutamide 6/7/23. Completed RT to the left iliac met 2/7/23. Subsequent oligoprogression to left pubic tubercle. Underwent RT 01/2024    Assessment & Plan:  PSA is stable to flat with improvement in his pubic pain. Plan to continue apalutamide for now. May repeat PSMA PET q6 months.   - Con't apalutamide 240mg daily; sp orchiectomy  - FU 6 weeks for PSA check  - Con't zometa q6 weeks  - Repeat PSMA PET in July 2. Androgen deprivation therapy  Overview:  Current agent: History of bilateral orchiectomy  Start date:  ADT start 11/2022  Last dose: Orchiectomy 2/15/23  Duration of therapy: Indefefinite    Novel Androgen signaling inhibitors:   Agent: Apalutamide  Start date: 6/7/23          3. Bone metastases  Overview:  Has left pelvic turbercle metastases noted on PSMA PET CT with associated discomfort. Completed RT on 2/3/23.    Assessment & Plan:  - Con't vitamin D today  - Start zometa today  - FU in 6 weeks for zometa #2      4. Cancer associated pain  Overview:  Takes hydrocodone-APAP sparingly            Follow up:   Route Chart for Scheduling    Med Onc Chart Routing      Follow up with physician 6 weeks.   Follow up with JENIFFER    Infusion scheduling note    Injection scheduling note    Labs CBC, CMP and PSA   Scheduling:  Preferred lab:  Lab interval:     Imaging    Pharmacy appointment    Other referrals                     Therapy Plan Information  Medications  zoledronic acid (ZOMETA) 4 mg in sodium chloride 0.9% 100 mL IVPB  4 mg, Intravenous, Every 4 weeks  Flushes  sodium chloride 0.9% 250 mL flush bag  Intravenous, Every 4 weeks  sodium chloride 0.9% flush 10 mL  10 mL, Intravenous, Every 4 weeks  heparin, porcine (PF) 100 unit/mL injection flush 500 Units  500 Units, Intravenous, Every 4 weeks  alteplase injection 2 mg  2 mg, Intra-Catheter, Every 4 weeks         The above information has been reviewed  with the patient and all questions have been answered to their apparent satisfaction.  They understand that they can call the clinic with any questions.    Darren Carter

## 2024-04-01 DIAGNOSIS — C61 PROSTATE CANCER: ICD-10-CM

## 2024-04-01 NOTE — TELEPHONE ENCOUNTER
----- Message from Any Langston sent at 4/1/2024 10:35 AM CDT -----  Type:  RX Refill Request    Who Called: Pt   Refill or New Rx:Rx   RX Name and Strength:apalutamide (ERLEADA) 60 mg Tab  How is the patient currently taking it? (ex. 1XDay):4 tablet 1x   Is this a 30 day or 90 day RX:30  Preferred Pharmacy with phone number:  Would the patient rather a call back or a response via [facility]? Call back   Best Call Back Number:[phone]  Additional Information: pt is out of medication .. Normally it is mailed in from [facility]..(free).. pt is out of medication . Pt only have 3 pills for today and need 4 would like some advice about taking what he have and getting a quick order

## 2024-04-01 NOTE — TELEPHONE ENCOUNTER
I spoke to patient and told him I would ask Dr. Carter to send in a refill for his Erleada. He would be fine not to take it for a few days until it comes in. I told him if Dr. Carter told me any different, I would call him back.

## 2024-04-04 DIAGNOSIS — R97.20 ELEVATED PSA: ICD-10-CM

## 2024-04-04 NOTE — TELEPHONE ENCOUNTER
No care due was identified.  [facility] Embedded Care Due Messages. Reference number: [id number].   4/04/2024 4:47:23 AM CDT

## 2024-04-05 RX ORDER — FINASTERIDE 5 MG/1
5 TABLET, FILM COATED ORAL
Qty: 90 TABLET | Refills: 2 | Status: SHIPPED | OUTPATIENT
Start: 2024-04-05

## 2024-04-05 NOTE — TELEPHONE ENCOUNTER
Refill Routing Note   Medication(s) are not appropriate for processing by [facility] for the following reason(s):        Drug-disease interaction    [facility] action(s):  Defer        Medication Therapy Plan: PROSTATE CANCER ON PROBLEM LIST      Appointments  past 12m or future 3m with PCP    Date Provider   Last Visit   12/22/2023 Anthony De La Torre MD   Next Visit   6/21/2024 Anthony De La Torre MD   ED visits in past 90 days: 0        Note composed:11:35 AM 04/05/2024

## 2024-04-08 ENCOUNTER — TELEPHONE (OUTPATIENT)
Dept: FAMILY MEDICINE | Facility: CLINIC | Age: 79
End: 2024-04-08
Payer: MEDICARE

## 2024-04-08 ENCOUNTER — PATIENT MESSAGE (OUTPATIENT)
Dept: HEMATOLOGY/ONCOLOGY | Facility: CLINIC | Age: 79
End: 2024-04-08
Payer: MEDICARE

## 2024-04-08 ENCOUNTER — TELEPHONE (OUTPATIENT)
Dept: HEMATOLOGY/ONCOLOGY | Facility: CLINIC | Age: 79
End: 2024-04-08
Payer: MEDICARE

## 2024-04-08 DIAGNOSIS — C61 PROSTATE CANCER: ICD-10-CM

## 2024-04-08 NOTE — TELEPHONE ENCOUNTER
I spoke to representative at pharmacy. He is not enrolled in the PAP for this year. We will have to get that filled out and sent. The pharmacy also said they didn't receive the prescription and asked if we could send it again. I told them I would ask our pharmacy to do that. Patient with come in to sign PAP form to get him re-enrolled.

## 2024-04-08 NOTE — TELEPHONE ENCOUNTER
"----- Message from Yanet Montague sent at 4/8/2024  9:28 AM CDT -----  Regarding: Rx  RX Name and Strength:    apalutamide (ERLEADA) 60 mg Tab          How is the patient currently taking it?     Take 240 mg by mouth once daily.      Is this a 30 day or 90 day Rx?    120 tablet      Preferred Pharmacy with phone number:    [facility] [place], [place] - [address]   [address] [facility] [address]  [facility] [address]  Phone: [phone] Fax: [phone]      Local or Mail Order:    Mail Order      Ordering Provider:   Dr. Carter      Contact Preference:   Noah @ [phone]      Additional Information:  Pt would like rx sent to [facility]. He has been without his medication for a week. Please give the pt a call back. He also has some questions.      "Thank you for all that you do for our patients"      "

## 2024-04-08 NOTE — TELEPHONE ENCOUNTER
----- Message from Medina Dodge sent at 4/8/2024  9:48 AM CDT -----  Type:  Patient Advice     Who Called:pt     Does the patient know what this is regarding?:Prescription   Would the patient rather a call back or a response via [facility]? Call   Best Call Back Number: [phone]  Additional Information:     Pt is requesting a call back regarding his prescriptions. He is confused on what prescriptions he is supposed to be taking

## 2024-04-08 NOTE — TELEPHONE ENCOUNTER
[facility]    Diltiazem was discontinued bc the pharmacy was out of stock  It was changed to nifedipine with mail order   But he did receive diltiazem from [facility]    He never took the nifedipine    [facility] now has diltiazem is stock     Can you send a new rx to [facility] for the diltiazem or should he take the nifedipine    Nurse Call pt to advise

## 2024-04-09 ENCOUNTER — PATIENT MESSAGE (OUTPATIENT)
Dept: FAMILY MEDICINE | Facility: CLINIC | Age: 79
End: 2024-04-09
Payer: MEDICARE

## 2024-04-09 RX ORDER — DILTIAZEM HYDROCHLORIDE 300 MG/1
300 CAPSULE, COATED, EXTENDED RELEASE ORAL DAILY
Qty: 90 CAPSULE | Refills: 3 | Status: SHIPPED | OUTPATIENT
Start: 2024-04-09

## 2024-04-29 ENCOUNTER — LAB VISIT (OUTPATIENT)
Dept: LAB | Facility: HOSPITAL | Age: 79
End: 2024-04-29
Attending: HOSPITALIST
Payer: MEDICARE

## 2024-04-29 ENCOUNTER — OFFICE VISIT (OUTPATIENT)
Dept: HEMATOLOGY/ONCOLOGY | Facility: CLINIC | Age: 79
End: 2024-04-29
Payer: MEDICARE

## 2024-04-29 VITALS
WEIGHT: 239.63 LBS | SYSTOLIC BLOOD PRESSURE: 138 MMHG | TEMPERATURE: 98 F | RESPIRATION RATE: 18 BRPM | HEART RATE: 83 BPM | HEIGHT: 73 IN | DIASTOLIC BLOOD PRESSURE: 78 MMHG | BODY MASS INDEX: 31.76 KG/M2 | OXYGEN SATURATION: 98 %

## 2024-04-29 DIAGNOSIS — G89.3 CANCER ASSOCIATED PAIN: ICD-10-CM

## 2024-04-29 DIAGNOSIS — E04.1 THYROID NODULE: ICD-10-CM

## 2024-04-29 DIAGNOSIS — C61 PROSTATE CANCER: Primary | ICD-10-CM

## 2024-04-29 DIAGNOSIS — Z79.818 ANDROGEN DEPRIVATION THERAPY: ICD-10-CM

## 2024-04-29 DIAGNOSIS — C79.51 MALIGNANT NEOPLASM METASTATIC TO BONE: ICD-10-CM

## 2024-04-29 DIAGNOSIS — C61 PROSTATE CANCER: ICD-10-CM

## 2024-04-29 LAB
ALBUMIN SERPL BCP-MCNC: 3.6 G/DL (ref 3.5–5.2)
ALP SERPL-CCNC: 99 U/L (ref 55–135)
ALT SERPL W/O P-5'-P-CCNC: 33 U/L (ref 10–44)
ANION GAP SERPL CALC-SCNC: 8 MMOL/L (ref 8–16)
AST SERPL-CCNC: 27 U/L (ref 10–40)
BILIRUB SERPL-MCNC: 0.2 MG/DL (ref 0.1–1)
BUN SERPL-MCNC: 14 MG/DL (ref 8–23)
CALCIUM SERPL-MCNC: 9.4 MG/DL (ref 8.7–10.5)
CHLORIDE SERPL-SCNC: 104 MMOL/L (ref 95–110)
CO2 SERPL-SCNC: 28 MMOL/L (ref 23–29)
COMPLEXED PSA SERPL-MCNC: 1.2 NG/ML (ref 0–4)
CREAT SERPL-MCNC: 1.3 MG/DL (ref 0.5–1.4)
ERYTHROCYTE [DISTWIDTH] IN BLOOD BY AUTOMATED COUNT: 13.3 % (ref 11.5–14.5)
EST. GFR  (NO RACE VARIABLE): 56.2 ML/MIN/1.73 M^2
GLUCOSE SERPL-MCNC: 154 MG/DL (ref 70–110)
HCT VFR BLD AUTO: 44.8 % (ref 40–54)
HGB BLD-MCNC: 14.9 G/DL (ref 14–18)
IMM GRANULOCYTES # BLD AUTO: 0.03 K/UL (ref 0–0.04)
MCH RBC QN AUTO: 31.8 PG (ref 27–31)
MCHC RBC AUTO-ENTMCNC: 33.3 G/DL (ref 32–36)
MCV RBC AUTO: 96 FL (ref 82–98)
NEUTROPHILS # BLD AUTO: 4.7 K/UL (ref 1.8–7.7)
PLATELET # BLD AUTO: 254 K/UL (ref 150–450)
PMV BLD AUTO: 9.7 FL (ref 9.2–12.9)
POTASSIUM SERPL-SCNC: 4.2 MMOL/L (ref 3.5–5.1)
PROT SERPL-MCNC: 7.1 G/DL (ref 6–8.4)
RBC # BLD AUTO: 4.68 M/UL (ref 4.6–6.2)
SODIUM SERPL-SCNC: 140 MMOL/L (ref 136–145)
TSH SERPL DL<=0.005 MIU/L-ACNC: 1.46 UIU/ML (ref 0.4–4)
WBC # BLD AUTO: 6.55 K/UL (ref 3.9–12.7)

## 2024-04-29 PROCEDURE — 1125F AMNT PAIN NOTED PAIN PRSNT: CPT | Mod: CPTII,S$GLB,, | Performed by: HOSPITALIST

## 2024-04-29 PROCEDURE — 36415 COLL VENOUS BLD VENIPUNCTURE: CPT | Performed by: HOSPITALIST

## 2024-04-29 PROCEDURE — 99999 PR PBB SHADOW E&M-EST. PATIENT-LVL V: CPT | Mod: PBBFAC,,, | Performed by: HOSPITALIST

## 2024-04-29 PROCEDURE — 84443 ASSAY THYROID STIM HORMONE: CPT | Performed by: HOSPITALIST

## 2024-04-29 PROCEDURE — 80053 COMPREHEN METABOLIC PANEL: CPT | Performed by: HOSPITALIST

## 2024-04-29 PROCEDURE — 1159F MED LIST DOCD IN RCRD: CPT | Mod: CPTII,S$GLB,, | Performed by: HOSPITALIST

## 2024-04-29 PROCEDURE — 1101F PT FALLS ASSESS-DOCD LE1/YR: CPT | Mod: CPTII,S$GLB,, | Performed by: HOSPITALIST

## 2024-04-29 PROCEDURE — 3288F FALL RISK ASSESSMENT DOCD: CPT | Mod: CPTII,S$GLB,, | Performed by: HOSPITALIST

## 2024-04-29 PROCEDURE — 3078F DIAST BP <80 MM HG: CPT | Mod: CPTII,S$GLB,, | Performed by: HOSPITALIST

## 2024-04-29 PROCEDURE — 84153 ASSAY OF PSA TOTAL: CPT | Performed by: HOSPITALIST

## 2024-04-29 PROCEDURE — 99215 OFFICE O/P EST HI 40 MIN: CPT | Mod: S$GLB,,, | Performed by: HOSPITALIST

## 2024-04-29 PROCEDURE — 3075F SYST BP GE 130 - 139MM HG: CPT | Mod: CPTII,S$GLB,, | Performed by: HOSPITALIST

## 2024-04-29 PROCEDURE — 85027 COMPLETE CBC AUTOMATED: CPT | Performed by: HOSPITALIST

## 2024-04-29 RX ORDER — OXYCODONE HYDROCHLORIDE 5 MG/1
5 TABLET ORAL EVERY 4 HOURS PRN
Qty: 42 TABLET | Refills: 0 | Status: SHIPPED | OUTPATIENT
Start: 2024-04-29

## 2024-04-29 RX ORDER — CYCLOBENZAPRINE HCL 10 MG
10 TABLET ORAL 3 TIMES DAILY PRN
Qty: 60 TABLET | Refills: 0 | Status: SHIPPED | OUTPATIENT
Start: 2024-04-29

## 2024-04-29 NOTE — ASSESSMENT & PLAN NOTE
PSA remains generally stable. Will follow up with him in clinic 6/27/24 with interim labs on 5/22/24 with zometa. Otherwise continue apaltuamide 240mg daily. Sp orchiectomy.   - Con't apaltuamide 240mg daily  - Repeat labs 5/22/24  - Zometa 5/22/24  - FU with me 6/27/24  - PSMA PET CT likely ~07/2024 or earlier if PSA rising

## 2024-04-29 NOTE — PROGRESS NOTES
ADVANCED PROSTATE CANCER CLINIC - FOLLOW UP VISIT     Best Contact Phone Number(s): [phone] (home)       Cancer/Stage/TNM:    Cancer Staging   Prostate cancer  Staging form: Prostate, AJCC 8th Edition  - Clinical: Stage IVB (cT3, cNX, cM1c, Grade Group: 5) - Signed by Darren Carter MD on 11/18/2022        Reason for visit: De david metastatic prostate cancer with lung and osseous mets sp orchiectomy and on apalutamide.    HPI: Noah Lopez is a 79 y.o. 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 He started first line ADT on 11/18/22 with addition of apalutamide 06/2023. He completed SBRT to the left iliac lesion 02/2023 and underwent bilateral orchiectomy 2/15/23. He was found to have rising PSA c/f mCRPC 12/2023 with oligoprogessino to the left left pubic ramus on PSMA PET. He underwent palliative RT to the left pubic ramus 1/18/24. He presents to medical oncollgy clinic for routine follow up.    History has been obtained by chart review and discussion with the patient.    Interval Events:      Has had good resolution of his left pubic pain. Does have some increased pain in the right lower back. Associates it with muscle spasms. Particularly worse after first step or two.     Has trip to 5/[place]/24 - 6/10/24. Likely getting back to [place] a few weeks later.    Ran out of apalutamide a fwe weeks ago; patient assistance program had also run out. Back on drug over the last week, but paying out of pocket. Taking 4 tablets daily. No rash. Energy is fair; staying active with traveling and fishing. Has moderate hot flashes which are generally tolerable.            Oncology History   Prostate cancer   12/8/2021 Tumor Markers    PSA 11.7     1/2022 Notable Event    ED visit for urinary retention found on abodminal US. Jones placed.     3/2022 Notable Event    Seen in ED for fall. Incidentally found to  have suspicious thyroid nodule     4/20/2022 Biopsy    FNA of thyroid nodule nondiagnostic. Also found to have new SHANNAN lung nodules.     5/16/2022 Imaging Significant Findings    FDG PET-CT in setting of pulmonary nodules:  1.  Two solid left upper lobe pulmonary nodules with no appreciable FDG uptake.  Malignancy is not excluded, and the larger nodule is likely amenable to percutaneous biopsy. Alternatively, continued surveillance by dedicated chest CT alone, i.e., without FDG PET, may be considered.     2.  Focal FDG uptake in the left prostate gland with extracapsular extension.  These findings are concerning for malignancy.  Correlation with PSA and urology evaluation recommended.  MRI of the prostate may also be helpful for further evaluation.     3.  Abnormal appearance of the left symphysis pubis with hypermetabolic activity.  Differential considerations include fibrous dysplasia, Paget's disease, and metastasis.   MRI or bone algorithm CT may be informative.     4.  Incidental focal uptake in the right cerebellum on at least 2 PET slices.  Recommend brain MRI for further evaluation.     5.  2.2 cm left inferior thyroid lobe nodule.  This nodule was recently biopsied with abnormal results.  Please refer to pathology report.     5/25/2022 Tumor Markers    PSA 17.3     7/6/2022 Biopsy    Attempted SHANNAN nodule biopsy     8/11/2022 Biopsy    TRUS biopsy: Prostate adenocarcinoma up to 4+3 disease in 10/18 cores all from the left side. Associated cribriform glands and PNI.       8/31/2022 Imaging Significant Findings    Bone scan:  1. Uptake within the anterior right 5th and 6th ribs is most likely related to degenerative changes.  Correlation with plain film or chest CT scan recommended.     2.  Intense uptake within the medial left pubic bone corresponds to an area of sclerosis seen on the comparison PET-CT scan.  Differential considerations remain to be fibrous dysplasia, Paget's disease or metastasis.     3.   Negative for metastatic pattern of uptake otherwise.  Degenerative pattern of uptake and other incidental findings as noted above.     9/29/2022 Imaging Significant Findings    MRI prostate: Left-sided PI-RADS 5 lesion with extraprostatic extension involving the left-sided neurovascular bundles and seminal vesicles.  Extension of abnormal signal adjacent to the left levator ani, cannot rule out involvement.  Abnormal signal also closely approximates with the anterior rectal wall without definite involvement.     Left pubic lesion concerning for metastasis.     10/17/2022 Notable Event    Declined pubic bone biopsy to potentially confirm metastatic prostate cancer     10/26/2022 Procedure    TURPT - pathology review with up to 4+5 disease     11/9/2022 Imaging Significant Findings    PSMA PET-CT shows evidence of osseous and pulmonary metastatic disease     11/18/2022 -  Hormone Therapy    Start Lupron 22.5mg IM; plan to start apalutamide     2/1/2023 - 2/7/2023 Radiation Therapy    Treating physician: Jaswinder Doyle    Course: C1 Pelvis 2023    Treatment Site Ref. ID Energy Dose/Fx (Gy) #Fx Dose Correction (Gy) Total Dose (Gy) Start Date End Date Elapsed Days   3D Iliac_Lt Lt Iliac 18X 4 5 / 5 0 20 2/1/2023 2/7/2023 6        3/28/2023 Genetic Testing    Germline genetic testing. No pathogenic mutations. VUS in APC and BMPR1A     1/2/2024 Imaging Significant Findings     PSMA PET CT   Impression:  - Interval treatment response.  Decreased size and radiotracer uptake of the prostate remnant.  Previously described pulmonary nodules have either resolved or decreased in size compared to prior exam.  - Mixed interval changes within the left pubic bone lesion with decreased involvement of the inferior pubic ramus and enlarged lytic component within the superior pubic ramus.  Interval resolution of prior uptake within the left ischial tuberosity.  - Stable nodule within the superior mediastinum projecting inferiorly  from the left thyroid.  Recommend correlation with prior biopsy.  - No new tracer avid lesion.     1/18/2024 - 1/31/2024 Radiation Therapy    Treating physician: lisa childs  Course: C2 Pelvis 2024    Treatment Site Ref. ID Energy Dose/Fx (Gy) #Fx Dose Correction (Gy) Total Dose (Gy) Start Date End Date Elapsed Days   3D Pelvis NormEZCalc 18X 3 10 / 10 0 30 1/18/2024 1/31/2024 13            1/30/2024 Imaging Significant Findings    CT a/p 1/30/24  1. Surgical changes of TURP and bilateral orchiectomy.  There is remnant prostate with residual disease better assessed by PSMA or MRI.  2. *Enlarging metastasis in the left pubic/superior pubic ramus bone.  There is cortical breakthrough involving both the anterior and posterior cortex with tumoral replacement.  Correlation advised.  Cholelithiasis.           Past Medical History:   Diagnosis Date    Colon polyp 09/06/2018    Hyperlipidemia     Hypertension     Prostate cancer     Stage 3a chronic kidney disease 6/21/2023    Urinary tract infection with hematuria 11/18/2022         Past Surgical History:   Procedure Laterality Date    COLONOSCOPY  2010    COLONOSCOPY N/A 9/6/2018    Procedure: COLONOSCOPY Suprep PLEASE TEXT PATIENT WITH ARRIVAL TIME;  Surgeon: Niharika Arce MD;  Location: [facility];  Service: Endoscopy;  Laterality: N/A;    CYSTOSCOPY WITH URODYNAMIC TESTING N/A 1/31/2022    Procedure: CYSTOSCOPY, WITH URODYNAMIC TESTING FLOUROSCOPIC;  Surgeon: Anthony Maloney MD;  [address];  Service: Urology;  Laterality: N/A;  1hr    KNEE ARTHROSCOPY      ORCHIECTOMY Bilateral 2/15/2023    Procedure: Bilateral simple orchiectomy;  Surgeon: Helena Luis MD;  Location: [facility];  Service: Urology;  Laterality: Bilateral;    SPINE SURGERY      l-spine    TRANSURETHRAL RESECTION OF PROSTATE N/A 10/26/2022    Procedure: TURP (TRANSURETHRAL RESECTION OF PROSTATE);  Surgeon: Helena Luis MD;  Location: [facility] OR;  Service: Urology;  Laterality:  N/A;         I have reviewed and updated the patient's past medical, surgical, family and social histories.     Review of patient's allergies indicates:   Allergen Reactions    Ciprofloxacin Other (See Comments)     tendonitis         Current Outpatient Medications   Medication Sig Dispense Refill    apalutamide (ERLEADA) 60 mg Tab Take 240 mg by mouth once daily. 120 tablet 11    ascorbic acid, vitamin C, (VITAMIN C) 1000 MG tablet Take 1,000 mg by mouth once daily.      atorvastatin (LIPITOR) 40 MG tablet Take 1 tablet (40 mg total) by mouth once daily. 90 tablet 3    cholecalciferol, vitamin D3, (VITAMIN D3) 25 mcg (1,000 unit) capsule Take 1 capsule (1,000 Units total) by mouth once daily. 30 capsule 11    clotrimazole-betamethasone 1-0.05% (LOTRISONE) cream Apply topically 2 (two) times daily. 60 g 3    diltiaZEM (CARDIZEM CD) 300 MG 24 hr capsule Take 1 capsule (300 mg total) by mouth once daily. 90 capsule 3    docusate sodium (COLACE) 250 MG capsule Take 250 mg by mouth once daily.      finasteride (PROSCAR) 5 mg tablet TAKE 1 TABLET BY MOUTH ONCE  DAILY 90 tablet 2    GAS RELIEF 80, SIMETHICONE, ORAL Take by mouth.      HYDROcodone-acetaminophen (NORCO) 5-325 mg per tablet Take 1 tablet by mouth every 8 (eight) hours as needed for Pain. 30 tablet 0    lisinopriL (PRINIVIL,ZESTRIL) 40 MG tablet Take 1 tablet (40 mg total) by mouth once daily. 90 tablet 3    miconazole (MICOTIN) 2 % cream Apply topically 2 (two) times daily. Apply to tip of penis prn irritation 28 g 0    multivit-min-FA-lycopen-lutein (MEN 50 PLUS MULTIVITAMIN) 300-600-300 mcg Tab as directed Orally      multivitamin capsule Take 1 capsule by mouth once daily.      ofloxacin (FLOXIN) 0.3 % otic solution Apply 10 drops into right ear once a day for 10 days.  Please dispense generic.      omega-3 fatty acids/fish oil (FISH OIL-OMEGA-3 FATTY ACIDS) 300-1,000 mg capsule Take 1 capsule by mouth once daily.      ondansetron (ZOFRAN) 8 MG tablet  "Take 1 tablet (8 mg total) by mouth every 8 (eight) hours as needed for Nausea. 30 tablet 0    tamsulosin (FLOMAX) 0.4 mg Cap Take 1 capsule by mouth once daily 90 capsule 3    traZODone (DESYREL) 100 MG tablet TAKE 2 TABLETS BY MOUTH EVERY DAY AT BEDTIME AS NEEDED FOR INSOMNIA 180 tablet 3    TURMERIC ORAL Take by mouth.      wheat dextrin (BENEFIBER CLEAR SF, DEXTRIN, ORAL) Take by mouth.      calcium carbonate (OS-TYREE) 500 mg calcium (1,250 mg) tablet Take 2 tablets (1,000 mg total) by mouth once daily. (Patient not taking: Reported on 4/29/2024) 60 tablet 11    cyclobenzaprine (FLEXERIL) 10 MG tablet Take 1 tablet (10 mg total) by mouth 3 (three) times daily as needed for Muscle spasms. 60 tablet 0    oxyCODONE (ROXICODONE) 5 MG immediate release tablet Take 1 tablet (5 mg total) by mouth every 4 (four) hours as needed for Pain. 42 tablet 0     No current facility-administered medications for this visit.        Objective:      Physical Exam:   /78   Pulse 83   Temp 98.3 °F (36.8 °C) (Oral)   Resp 18   Ht 6' 1" (1.854 m)   Wt 108.7 kg (239 lb 10.2 oz)   SpO2 98%   BMI 31.62 kg/m²       ECOG PS: 0             Physical Exam  Constitutional:       General: He is not in acute distress.     Appearance: Normal appearance.   HENT:      Head: Normocephalic.   Eyes:      General: No scleral icterus.     Extraocular Movements: Extraocular movements intact.      Conjunctiva/sclera: Conjunctivae normal.   Cardiovascular:      Rate and Rhythm: Normal rate.   Pulmonary:      Effort: Pulmonary effort is normal. No respiratory distress.   Abdominal:      General: There is no distension.      Palpations: Abdomen is soft.   Musculoskeletal:      Right lower leg: No edema.      Left lower leg: No edema.   Skin:     General: Skin is warm and dry.   Neurological:      Mental Status: He is alert and oriented to person, place, and time.      Motor: No weakness.   Psychiatric:         Mood and Affect: Mood normal.         " Behavior: Behavior normal.         Thought Content: Thought content normal.          Recent Labs:   Lab Results   Component Value Date    WBC 6.55 04/29/2024    RBC 4.68 04/29/2024    HGB 14.9 04/29/2024    HCT 44.8 04/29/2024    MCV 96 04/29/2024    MCH 31.8 (H) 04/29/2024    MCHC 33.3 04/29/2024    RDW 13.3 04/29/2024     04/29/2024    MPV 9.7 04/29/2024    IMMGR 0.3 09/12/2022    GRAN 4.7 04/29/2024    IGABS 0.03 04/29/2024    LYMPH 0.8 (L) 09/12/2022    LYMPH 13.0 (L) 09/12/2022    MONO 0.7 09/12/2022    MONO 11.3 09/12/2022    EOS 0.1 09/12/2022    BASO 0.03 09/12/2022    NRBC 0 09/12/2022    EOSINOPHIL 0.8 09/12/2022    BASOPHIL 0.5 09/12/2022    DIFFMETHOD Automated 09/12/2022       Lab Results   Component Value Date     04/29/2024    K 4.2 04/29/2024     04/29/2024    CO2 28 04/29/2024     (H) 04/29/2024    BUN 14 04/29/2024    CREATININE 1.3 04/29/2024    CALCIUM 9.4 04/29/2024    PROT 7.1 04/29/2024    ALBUMIN 3.6 04/29/2024    BILITOT 0.2 04/29/2024    ALKPHOS 99 04/29/2024    AST 27 04/29/2024    ALT 33 04/29/2024    ANIONGAP 8 04/29/2024    EGFRNORACEVR 56.2 (A) 04/29/2024        Lab Results   Component Value Date    PSADIAG 1.2 04/29/2024    PSADIAG 1.1 03/15/2024    PSADIAG 1.4 02/20/2024    PSADIAG 2.3 01/31/2024    PSADIAG 2.2 01/02/2024    PSADIAG 1.7 12/04/2023    PSADIAG 1.5 11/07/2023    PSADIAG 0.67 08/07/2023    PSADIAG 0.76 07/11/2023    PSADIAG 1.2 05/05/2023    PSATOTAL 15.8 (H) 07/05/2022    PSA 11.7 (H) 12/08/2021        Cardiovascular Screening:  Primary care physician: Anthony De La Torre MD      The 10-year ASCVD risk score (Echo ROSE, et al., 2019) is: 38.2%    Values used to calculate the score:      Age: 79 years      Sex: Male      Is Non- : No      Diabetic: No      Tobacco smoker: No      Systolic Blood Pressure: 138 mmHg      Is BP treated: Yes      HDL Cholesterol: 50 mg/dL      Total Cholesterol: 224 mg/dL    ASCVD Risk Level:  High risk >= 20%    EKG:   Results for orders placed or performed in visit on 10/04/22   EKG 12-lead    Collection Time: 10/04/22 10:51 AM    Narrative    Test Reason : Z01.810,    Vent. Rate : 087 BPM     Atrial Rate : 087 BPM     P-R Int : 166 ms          QRS Dur : 080 ms      QT Int : 362 ms       P-R-T Axes : 061 039 042 degrees     QTc Int : 435 ms    Normal sinus rhythm  Normal ECG  When compared with ECG of 12-SEP-2022 17:34,  No significant change was found  Confirmed by Oswaldo AVILA, Arie NATION ([id number]) on 10/6/2022 7:32:16 AM    Referred By: SHIRA VAZQUEZ           Confirmed By:Arie Chacon MD       High blood pressure = Yes  Antihypertensive agents: diltiaZEM - 300 MG  lisinopriL - 40 MG   Comments:     DM2: No  Antidiabetic agents: This patient does not have an active medication from one of the medication groupers.   Comments:     Hyperlipidemia: Yes  Lipid lowering agents: atorvastatin - 40 MG   Comments:     Antiplatelet therapy: No  Agent: This patient does not have an active medication from one of the medication groupers.   Comment:     Body mass index is 31.62 kg/m².  If greater than 30, referral to nutrition    Lab Results   Component Value Date    CHOL 224 (H) 08/07/2023    LDLCALC 150.4 08/07/2023    HDL 50 08/07/2023    TRIG 118 08/07/2023    HGBA1C 5.4 08/07/2023        Bone Health    Lab Results   Component Value Date    NJZCHAFW12GE 62 08/07/2023        Results for orders placed during the hospital encounter of 02/23/23    DXA Bone Density with Vertebral Fracture    Impression  Normal bone mineral density.    Consider FDA approved medical therapies in postmenopausal women and men aged 50 years and older, based on the following:    *A hip or vertebral (clinical or morphometric) fracture  *T score less than or equal to -2.5 at the femoral neck or spine after appropriate evaluation to exclude secondary causes.  *Low bone mass -- also known as osteopenia (T score between -1.0 and -2.5 at the femoral neck  or spine) and a 10 year probability of hip fracture greater than or equal to 3% or a 10 year probability of major osteoporosis-related fracture greater than or equal to 20% based on the [place]-adapted WHO algorithm.  *Clinicians judgment and/or patient preference may indicate treatment for people with 10 year fracture probabilities is above or below these levels.      Electronically signed by: Anshu Stephens  Date:    02/23/2023  Time:    12:08       Vitamin D: 1000 IU daily  Calcium: Recommend 4 servings of dairy daily     Osteopenia/Osteoporosis: None    Bone strengthening agent: None     Staging Imaging     Results for orders placed during the hospital encounter of 11/09/22    NM PET CT F 18 PYL PSMA, Midthigh to Vertex    Impression  1. Evidence of prostate neoplasm with osseous and pulmonary metastasis.  Detrimental changes noted when comparison is made to 05/16/2022 in particular with progression of several pulmonary nodules and with development of 1 or 2 small pulmonary nodules.  2. A mass is noted inferior to the left lobe of the thyroid gland entirely specific but demonstrating increased uptake similar since the prior  3. A prostate based mass appears to be present with involvement of the bladder and a distended bladder suggestive outlet obstruction      Electronically signed by: John Steele MD  Date:    11/09/2022  Time:    15:31       Results for orders placed during the hospital encounter of 08/31/22    NM Bone Scan Whole Body    Impression  1. Uptake within the anterior right 5th and 6th ribs is most likely related to degenerative changes.  Correlation with plain film or chest CT scan recommended.    2.  Intense uptake within the medial left pubic bone corresponds to an area of sclerosis seen on the comparison PET-CT scan.  Differential considerations remain to be fibrous dysplasia, Paget's disease or metastasis.    3.  Negative for metastatic pattern of uptake otherwise.  Degenerative pattern of uptake  and other incidental findings as noted above.      Electronically signed by: Antonio Mckeon MD  Date:    08/31/2022  Time:    12:35      Results for orders placed during the hospital encounter of 09/29/22    MRI Prostate W W/O Contrast    Impression  Left-sided PI-RADS 5 lesion with extraprostatic extension involving the left-sided neurovascular bundles and seminal vesicles.  Extension of abnormal signal adjacent to the left levator ani, cannot rule out involvement.  Abnormal signal also closely approximates with the anterior rectal wall without definite involvement.    Left pubic lesion concerning for metastasis.    Diffuse heterogeneous bone marrow.    Overall Assessment: PI-RADS 5 - Very high (clinically significant cancer is highly likely to be present)    Number of targets created for potential MR/US fusion biopsy    Transitional/Peripheral zone: 1    This report was flagged in Epic as abnormal.    Electronically signed by resident: Al Castrejon  Date:    09/29/2022  Time:    16:12    Electronically signed by: Shekhar Rodríguez MD  Date:    09/29/2022  Time:    19:08       No results found for this or any previous visit.       I have personally reviewed the above imaging.     Path:   Reviewed pathology as documented above.    Genomic testing:     Germline genetic testing: 3/28/23 - VUS in APC and BMPR1A    Results for orders placed or performed in visit on 03/28/23   Genetic Misc Sendout Test, Blood   Result Value Ref Range    Miscellaneous Genetic Test Name [facility] BRCA + 84/RNA     [place] Specimen Type Blood     Genetic counseling? Yes     Parental or Sibling Testing? No     Test Result See result image under hyperlink     Reference Lab SEE COMMENT         Somatic tumor genotyping: None      ctDNA genotyping:           Diagnoses:     1. Prostate cancer    2. Androgen deprivation therapy    3. Bone metastases    4. Cancer associated pain                        Assessment and Plan:     1. Prostate  cancer  Overview:  Patient with de david high-volume metastatic prosatate adenocarcinoma including lung and osseous mets. Started ADT 11/18/23 with apalutamide 6/7/23. Completed RT to the left iliac met 2/7/23. Subsequent oligoprogression to left pubic tubercle. Underwent RT 01/2024    Assessment & Plan:  PSA remains generally stable. Will follow up with him in clinic 6/27/24 with interim labs on 5/22/24 with zometa. Otherwise continue apaltuamide 240mg daily. Sp orchiectomy.   - Con't apaltuamide 240mg daily  - Repeat labs 5/22/24  - Zometa 5/22/24  - FU with me 6/27/24  - PSMA PET CT likely ~07/2024 or earlier if PSA rising      2. Androgen deprivation therapy  Overview:  Current agent: History of bilateral orchiectomy  Start date:  ADT start 11/2022  Last dose: Orchiectomy 2/15/23  Duration of therapy: Indefefinite    Novel Androgen signaling inhibitors:   Agent: Apalutamide  Start date: 6/7/23          3. Bone metastases  Overview:  Has left pelvic turbercle metastases noted on PSMA PET CT with associated discomfort. Completed RT on 2/3/23.    Assessment & Plan:  - Con't vitamin D  - Scheduled Zometa #2 5/22/24    Orders:  -     oxyCODONE (ROXICODONE) 5 MG immediate release tablet; Take 1 tablet (5 mg total) by mouth every 4 (four) hours as needed for Pain.  Dispense: 42 tablet; Refill: 0  -     cyclobenzaprine (FLEXERIL) 10 MG tablet; Take 1 tablet (10 mg total) by mouth 3 (three) times daily as needed for Muscle spasms.  Dispense: 60 tablet; Refill: 0    4. Cancer associated pain  Overview:  Takes hydrocodone-APAP sparingly    Orders:  -     oxyCODONE (ROXICODONE) 5 MG immediate release tablet; Take 1 tablet (5 mg total) by mouth every 4 (four) hours as needed for Pain.  Dispense: 42 tablet; Refill: 0  -     cyclobenzaprine (FLEXERIL) 10 MG tablet; Take 1 tablet (10 mg total) by mouth 3 (three) times daily as needed for Muscle spasms.  Dispense: 60 tablet; Refill: 0            Follow up:   Route Chart for  Scheduling    Med Onc Chart Routing      Follow up with physician Debra Carter ~ 6/27/24   Follow up with JENIFFER    Infusion scheduling note   Zometa 5/22/24   Injection scheduling note    Labs CBC, CMP and PSA   Scheduling:  Preferred lab:  Lab interval:  check CMP and PSA 5/22; full labs with next MD visit   Imaging    Pharmacy appointment    Other referrals                     Therapy Plan Information  Medications  zoledronic 4 mg/100 mL infusion 4 mg  4 mg, Intravenous, Every 4 weeks  Flushes  sodium chloride 0.9% 250 mL flush bag  Intravenous, Every 4 weeks  sodium chloride 0.9% flush 10 mL  10 mL, Intravenous, Every 4 weeks  heparin, porcine (PF) 100 unit/mL injection flush 500 Units  500 Units, Intravenous, Every 4 weeks  alteplase injection 2 mg  2 mg, Intra-Catheter, Every 4 weeks         The above information has been reviewed with the patient and all questions have been answered to their apparent satisfaction.  They understand that they can call the clinic with any questions.    Darren Carter

## 2024-05-07 ENCOUNTER — TELEPHONE (OUTPATIENT)
Dept: HEMATOLOGY/ONCOLOGY | Facility: CLINIC | Age: 79
End: 2024-05-07
Payer: MEDICARE

## 2024-05-07 ENCOUNTER — PATIENT MESSAGE (OUTPATIENT)
Dept: HEMATOLOGY/ONCOLOGY | Facility: CLINIC | Age: 79
End: 2024-05-07
Payer: MEDICARE

## 2024-05-07 NOTE — NURSING
Discussed role of Integrative Oncology and clinical benefit to patient.   Visits for Zometa and HemOnc confirmed w/labs prior.   Working on bucket list with his daughter. Declined integrative oncology at this time. Will send supplemental information via portal message, Contact information provided, LIZZY Tello.

## 2024-05-10 ENCOUNTER — TELEPHONE (OUTPATIENT)
Dept: HEMATOLOGY/ONCOLOGY | Facility: CLINIC | Age: 79
End: 2024-05-10
Payer: MEDICARE

## 2024-05-10 NOTE — TELEPHONE ENCOUNTER
----- Message from Ruth Browning sent at 5/10/2024  1:29 PM CDT -----  Type:  Needs Medical Advice    Who Called: pt  Symptoms (please be specific): pt needs  call back to reschedule his procedure May 22 due to him flying out will need to change them to either may 20-21 please call pt to discuss   via [facility]? call  Best Call Back Number: [phone]  Additional Information:

## 2024-05-21 ENCOUNTER — INFUSION (OUTPATIENT)
Dept: INFUSION THERAPY | Facility: HOSPITAL | Age: 79
End: 2024-05-21
Attending: HOSPITALIST
Payer: MEDICARE

## 2024-05-21 VITALS
HEART RATE: 85 BPM | SYSTOLIC BLOOD PRESSURE: 157 MMHG | BODY MASS INDEX: 31.25 KG/M2 | DIASTOLIC BLOOD PRESSURE: 71 MMHG | RESPIRATION RATE: 18 BRPM | WEIGHT: 236.88 LBS | TEMPERATURE: 97 F

## 2024-05-21 DIAGNOSIS — C61 PROSTATE CANCER: ICD-10-CM

## 2024-05-21 DIAGNOSIS — C79.51 MALIGNANT NEOPLASM METASTATIC TO BONE: Primary | ICD-10-CM

## 2024-05-21 PROCEDURE — 63600175 PHARM REV CODE 636 W HCPCS: Performed by: HOSPITALIST

## 2024-05-21 PROCEDURE — 96374 THER/PROPH/DIAG INJ IV PUSH: CPT

## 2024-05-21 PROCEDURE — 25000003 PHARM REV CODE 250: Performed by: HOSPITALIST

## 2024-05-21 RX ORDER — HEPARIN 100 UNIT/ML
500 SYRINGE INTRAVENOUS
Status: DISCONTINUED | OUTPATIENT
Start: 2024-05-21 | End: 2024-05-21 | Stop reason: HOSPADM

## 2024-05-21 RX ORDER — SODIUM CHLORIDE 0.9 % (FLUSH) 0.9 %
10 SYRINGE (ML) INJECTION
Status: DISCONTINUED | OUTPATIENT
Start: 2024-05-21 | End: 2024-05-21 | Stop reason: HOSPADM

## 2024-05-21 RX ORDER — ZOLEDRONIC ACID 0.04 MG/ML
4 INJECTION, SOLUTION INTRAVENOUS
OUTPATIENT
Start: 2024-06-11

## 2024-05-21 RX ORDER — ZOLEDRONIC ACID 0.04 MG/ML
4 INJECTION, SOLUTION INTRAVENOUS
Status: COMPLETED | OUTPATIENT
Start: 2024-05-21 | End: 2024-05-21

## 2024-05-21 RX ORDER — HEPARIN 100 UNIT/ML
500 SYRINGE INTRAVENOUS
OUTPATIENT
Start: 2024-06-11

## 2024-05-21 RX ORDER — SODIUM CHLORIDE 0.9 % (FLUSH) 0.9 %
10 SYRINGE (ML) INJECTION
OUTPATIENT
Start: 2024-06-11

## 2024-05-21 RX ADMIN — ZOLEDRONIC ACID 4 MG: 0.04 INJECTION, SOLUTION INTRAVENOUS at 11:05

## 2024-05-21 RX ADMIN — SODIUM CHLORIDE: 9 INJECTION, SOLUTION INTRAVENOUS at 11:05

## 2024-06-08 DIAGNOSIS — E78.5 HYPERLIPIDEMIA, UNSPECIFIED HYPERLIPIDEMIA TYPE: ICD-10-CM

## 2024-06-10 RX ORDER — ATORVASTATIN CALCIUM 40 MG/1
40 TABLET, FILM COATED ORAL
Qty: 90 TABLET | Refills: 3 | Status: SHIPPED | OUTPATIENT
Start: 2024-06-10

## 2024-06-21 ENCOUNTER — OFFICE VISIT (OUTPATIENT)
Dept: FAMILY MEDICINE | Facility: CLINIC | Age: 79
End: 2024-06-21
Payer: MEDICARE

## 2024-06-21 VITALS
DIASTOLIC BLOOD PRESSURE: 68 MMHG | HEART RATE: 92 BPM | WEIGHT: 235.69 LBS | OXYGEN SATURATION: 96 % | SYSTOLIC BLOOD PRESSURE: 138 MMHG | HEIGHT: 73 IN | BODY MASS INDEX: 31.24 KG/M2 | TEMPERATURE: 98 F

## 2024-06-21 DIAGNOSIS — N18.31 STAGE 3A CHRONIC KIDNEY DISEASE: ICD-10-CM

## 2024-06-21 DIAGNOSIS — F33.1 MODERATE RECURRENT MAJOR DEPRESSION: ICD-10-CM

## 2024-06-21 DIAGNOSIS — C44.602 SKIN CANCER OF ARM, RIGHT: Primary | ICD-10-CM

## 2024-06-21 DIAGNOSIS — Q04.9: ICD-10-CM

## 2024-06-21 DIAGNOSIS — I70.0 AORTIC ATHEROSCLEROSIS: ICD-10-CM

## 2024-06-21 RX ORDER — TRAZODONE HYDROCHLORIDE 100 MG/1
TABLET ORAL
Qty: 180 TABLET | Refills: 3 | Status: SHIPPED | OUTPATIENT
Start: 2024-06-21

## 2024-06-21 NOTE — PROCEDURES
Destruction, Malignant Lesion    Date/Time: 6/21/2024 1:08 PM    Performed by: Anthony De La Torre MD  Authorized by: Anthony De La Torre MD    Body area:  Lower arm / wrist  Laterality:  Right  Position:  Supine  Preparation: Patient was prepped and draped in usual sterile fashion    Anesthesia:  Local infiltration  Local anesthetic:  Lidocaine 1% with epinephrine  Lesion size (cm):  0.5  Sterile dressings:  Gauze  Bleeding:  None   Patient tolerated the procedure well with no immediate complications.

## 2024-06-21 NOTE — PROGRESS NOTES
"Subjective:      Patient ID: Noah Lopez is a 79 y.o. male.    Chief Complaint: Follow-up      Vitals:    06/21/24 1204   BP: 138/68   Pulse: 92   Temp: 98.2 °F (36.8 °C)   TempSrc: Oral   SpO2: 96%   Weight: 106.9 kg (235 lb 10.8 oz)   Height: 6' 1" (1.854 m)        HPI   Check up; has prostate cancer, on Erleada  Has ez mets, had radiation  Problem List  Patient Active Problem List   Diagnosis    Hypertension    Screening for malignant neoplasm of colon    Hyperlipidemia    Colon polyp    Hypocontractile bladder    Neck arthritis    Thyroid nodule    Insomnia    Pulmonary metastases    Prostate cancer    Bone metastases    Cannabis abuse    Moderate recurrent major depression    Posttraumatic stress disorder    Anomaly of cerebellum    Aortic atherosclerosis    Stage 3a chronic kidney disease    Androgen deprivation therapy    Infected sebaceous cyst    Anxiety    Cancer associated pain        ALLERGIES:   Review of patient's allergies indicates:   Allergen Reactions    Ciprofloxacin Other (See Comments)     tendonitis       MEDS:   Current Outpatient Medications:     apalutamide (ERLEADA) 60 mg Tab, Take 240 mg by mouth once daily., Disp: 120 tablet, Rfl: 11    ascorbic acid, vitamin C, (VITAMIN C) 1000 MG tablet, Take 1,000 mg by mouth once daily., Disp: , Rfl:     atorvastatin (LIPITOR) 40 MG tablet, TAKE 1 TABLET BY MOUTH ONCE  DAILY, Disp: 90 tablet, Rfl: 3    calcium carbonate (OS-TYREE) 500 mg calcium (1,250 mg) tablet, Take 2 tablets (1,000 mg total) by mouth once daily., Disp: 60 tablet, Rfl: 11    cholecalciferol, vitamin D3, (VITAMIN D3) 25 mcg (1,000 unit) capsule, Take 1 capsule (1,000 Units total) by mouth once daily., Disp: 30 capsule, Rfl: 11    clotrimazole-betamethasone 1-0.05% (LOTRISONE) cream, Apply topically 2 (two) times daily., Disp: 60 g, Rfl: 3    cyclobenzaprine (FLEXERIL) 10 MG tablet, Take 1 tablet (10 mg total) by mouth 3 (three) times daily as needed for Muscle spasms., " Disp: 60 tablet, Rfl: 0    diltiaZEM (CARDIZEM CD) 300 MG 24 hr capsule, Take 1 capsule (300 mg total) by mouth once daily., Disp: 90 capsule, Rfl: 3    docusate sodium (COLACE) 250 MG capsule, Take 250 mg by mouth once daily., Disp: , Rfl:     finasteride (PROSCAR) 5 mg tablet, TAKE 1 TABLET BY MOUTH ONCE  DAILY, Disp: 90 tablet, Rfl: 2    GAS RELIEF 80, SIMETHICONE, ORAL, Take by mouth., Disp: , Rfl:     HYDROcodone-acetaminophen (NORCO) 5-325 mg per tablet, Take 1 tablet by mouth every 8 (eight) hours as needed for Pain., Disp: 30 tablet, Rfl: 0    lisinopriL (PRINIVIL,ZESTRIL) 40 MG tablet, Take 1 tablet (40 mg total) by mouth once daily., Disp: 90 tablet, Rfl: 3    miconazole (MICOTIN) 2 % cream, Apply topically 2 (two) times daily. Apply to tip of penis prn irritation, Disp: 28 g, Rfl: 0    multivit-min-FA-lycopen-lutein (MEN 50 PLUS MULTIVITAMIN) 300-600-300 mcg Tab, as directed Orally, Disp: , Rfl:     multivitamin capsule, Take 1 capsule by mouth once daily., Disp: , Rfl:     ofloxacin (FLOXIN) 0.3 % otic solution, Apply 10 drops into right ear once a day for 10 days.  Please dispense generic., Disp: , Rfl:     omega-3 fatty acids/fish oil (FISH OIL-OMEGA-3 FATTY ACIDS) 300-1,000 mg capsule, Take 1 capsule by mouth once daily., Disp: , Rfl:     ondansetron (ZOFRAN) 8 MG tablet, Take 1 tablet (8 mg total) by mouth every 8 (eight) hours as needed for Nausea., Disp: 30 tablet, Rfl: 0    oxyCODONE (ROXICODONE) 5 MG immediate release tablet, Take 1 tablet (5 mg total) by mouth every 4 (four) hours as needed for Pain., Disp: 42 tablet, Rfl: 0    tamsulosin (FLOMAX) 0.4 mg Cap, Take 1 capsule by mouth once daily, Disp: 90 capsule, Rfl: 3    TURMERIC ORAL, Take by mouth., Disp: , Rfl:     wheat dextrin (BENEFIBER CLEAR SF, DEXTRIN, ORAL), Take by mouth., Disp: , Rfl:     traZODone (DESYREL) 100 MG tablet, TAKE 2 TABLETS BY MOUTH EVERY DAY AT BEDTIME AS NEEDED FOR INSOMNIA, Disp: 180 tablet, Rfl:  3      History:  Current Providers as of 2024  PCP: Anthony De La Torre MD  Care Team Provider: Yasmeen Hubbard LPN  Care Team Provider: Darren Carter MD  Care Team Provider: Tana Mcmillan RN  Encounter Provider: Anthony De La Torre MD, starting on  12:00 AM  Referring Provider: not found, starting on  12:00 AM  Consulting Physician: Anthony De La Torre MD, starting on Fri Dec 22, 2023 11:38 AM (Active)   Past Medical History:   Diagnosis Date    Colon polyp 2018    Hyperlipidemia     Hypertension     Prostate cancer     Stage 3a chronic kidney disease 2023    Urinary tract infection with hematuria 2022     Past Surgical History:   Procedure Laterality Date    COLONOSCOPY      COLONOSCOPY N/A 2018    Procedure: COLONOSCOPY Suprep PLEASE TEXT PATIENT WITH ARRIVAL TIME;  Surgeon: Niharika Arce MD;  Location: [facility];  Service: Endoscopy;  Laterality: N/A;    CYSTOSCOPY WITH URODYNAMIC TESTING N/A 2022    Procedure: CYSTOSCOPY, WITH URODYNAMIC TESTING FLOUROSCOPIC;  Surgeon: Anthony Maloney MD;  [address];  Service: Urology;  Laterality: N/A;  1hr    KNEE ARTHROSCOPY      ORCHIECTOMY Bilateral 2/15/2023    Procedure: Bilateral simple orchiectomy;  Surgeon: Helena Luis MD;  Location: [facility];  Service: Urology;  Laterality: Bilateral;    SPINE SURGERY      l-spine    TRANSURETHRAL RESECTION OF PROSTATE N/A 10/26/2022    Procedure: TURP (TRANSURETHRAL RESECTION OF PROSTATE);  Surgeon: Helena Luis MD;  Location: [facility];  Service: Urology;  Laterality: N/A;     Social History     Tobacco Use    Smoking status: Former     Current packs/day: 0.00     Average packs/day: 1.5 packs/day for 20.0 years (30.0 ttl pk-yrs)     Types: Cigarettes     Start date:      Quit date:      Years since quittin.4     Passive exposure: Never    Smokeless tobacco: Never    Tobacco comments:     Age 17 - 37. Up to 1.5 ppd.   Substance  Use Topics    Alcohol use: Yes     Comment: Occasional. Few drinks a week.    Drug use: Yes     Types: Marijuana         Review of Systems   Constitutional:  Negative for appetite change, fatigue, fever and unexpected weight change.   HENT:  Negative for congestion, ear pain, sinus pressure and sore throat.    Eyes:  Negative for pain and visual disturbance.   Respiratory:  Negative for shortness of breath.    Cardiovascular:  Negative for chest pain.   Gastrointestinal:  Negative for abdominal pain, constipation and diarrhea.   Endocrine: Negative for polyuria.   Genitourinary:  Negative for difficulty urinating and frequency.   Musculoskeletal:  Negative for arthralgias, back pain and myalgias.   Skin:  Positive for wound. Negative for color change.        Right forearm lesion   Allergic/Immunologic: Negative.    Neurological:  Negative for syncope, weakness and headaches.   Hematological:  Does not bruise/bleed easily.   Psychiatric/Behavioral:  Negative for dysphoric mood and suicidal ideas. The patient is not nervous/anxious.    All other systems reviewed and are negative.    Objective:     Physical Exam  Vitals and nursing note reviewed.   Constitutional:       General: He is not in acute distress.     Appearance: He is well-developed. He is not diaphoretic.   HENT:      Head: Normocephalic and atraumatic.      Right Ear: External ear normal.      Left Ear: External ear normal.      Mouth/Throat:      Pharynx: No oropharyngeal exudate.   Eyes:      General: No scleral icterus.        Right eye: No discharge.         Left eye: No discharge.      Conjunctiva/sclera: Conjunctivae normal.      Pupils: Pupils are equal, round, and reactive to light.   Neck:      Thyroid: No thyromegaly.      Vascular: No JVD.      Trachea: No tracheal deviation.   Cardiovascular:      Rate and Rhythm: Normal rate and regular rhythm.      Heart sounds: No murmur heard.     No friction rub. No gallop.   Pulmonary:      Effort:  Pulmonary effort is normal. No respiratory distress.      Breath sounds: Normal breath sounds. No stridor. No wheezing or rales.   Chest:      Chest wall: No tenderness.   Abdominal:      General: There is no distension.      Palpations: Abdomen is soft. There is no mass.      Tenderness: There is no abdominal tenderness. There is no guarding or rebound.   Musculoskeletal:         General: No tenderness. Normal range of motion.      Cervical back: Normal range of motion and neck supple.   Lymphadenopathy:      Cervical: No cervical adenopathy.   Skin:     General: Skin is warm and dry.      Coloration: Skin is not pale.      Findings: No erythema or rash.   Neurological:      Mental Status: He is alert and oriented to person, place, and time.      Cranial Nerves: No cranial nerve deficit.      Motor: No abnormal muscle tone.      Coordination: Coordination normal.      Deep Tendon Reflexes: Reflexes are normal and symmetric. Reflexes normal.   Psychiatric:         Behavior: Behavior normal.         Thought Content: Thought content normal.         Judgment: Judgment normal.             Assessment:     1. Skin cancer of arm, right    2. Moderate recurrent major depression    3. Anomaly of cerebellum    4. Stage 3a chronic kidney disease    5. Aortic atherosclerosis      Plan:        Medication List            Accurate as of June 21, 2024  1:11 PM. If you have any questions, ask your nurse or doctor.                CONTINUE taking these medications      apalutamide 60 mg Tab  Commonly known as: ERLEADA  Take 240 mg by mouth once daily.     ascorbic acid (vitamin C) 1000 MG tablet  Commonly known as: VITAMIN C     atorvastatin 40 MG tablet  Commonly known as: LIPITOR  TAKE 1 TABLET BY MOUTH ONCE  DAILY     BENEFIBER CLEAR SF (DEXTRIN) ORAL     calcium carbonate 500 mg calcium (1,250 mg) tablet  Commonly known as: OS-TYREE  Take 2 tablets (1,000 mg total) by mouth once daily.     cholecalciferol (vitamin D3) 25 mcg (1,000  unit) capsule  Commonly known as: VITAMIN D3  Take 1 capsule (1,000 Units total) by mouth once daily.     clotrimazole-betamethasone 1-0.05% cream  Commonly known as: LOTRISONE  Apply topically 2 (two) times daily.     cyclobenzaprine 10 MG tablet  Commonly known as: FLEXERIL  Take 1 tablet (10 mg total) by mouth 3 (three) times daily as needed for Muscle spasms.     diltiaZEM 300 MG 24 hr capsule  Commonly known as: CARDIZEM CD  Take 1 capsule (300 mg total) by mouth once daily.     docusate sodium 250 MG capsule  Commonly known as: COLACE     finasteride 5 mg tablet  Commonly known as: PROSCAR  TAKE 1 TABLET BY MOUTH ONCE  DAILY     fish oil-omega-3 fatty acids 300-1,000 mg capsule     GAS RELIEF 80 (SIMETHICONE) ORAL     HYDROcodone-acetaminophen 5-325 mg per tablet  Commonly known as: NORCO  Take 1 tablet by mouth every 8 (eight) hours as needed for Pain.     lisinopriL 40 MG tablet  Commonly known as: PRINIVIL,ZESTRIL  Take 1 tablet (40 mg total) by mouth once daily.     MEN 50 PLUS MULTIVITAMIN 052--300 mcg Tab  Generic drug: mv-min-folic-K1-lycopen-lutein     miconazole 2 % cream  Commonly known as: MICOTIN  Apply topically 2 (two) times daily. Apply to tip of penis prn irritation     multivitamin capsule     ofloxacin 0.3 % otic solution  Commonly known as: FLOXIN     ondansetron 8 MG tablet  Commonly known as: ZOFRAN  Take 1 tablet (8 mg total) by mouth every 8 (eight) hours as needed for Nausea.     oxyCODONE 5 MG immediate release tablet  Commonly known as: ROXICODONE  Take 1 tablet (5 mg total) by mouth every 4 (four) hours as needed for Pain.     tamsulosin 0.4 mg Cap  Commonly known as: FLOMAX  Take 1 capsule by mouth once daily     traZODone 100 MG tablet  Commonly known as: DESYREL  TAKE 2 TABLETS BY MOUTH EVERY DAY AT BEDTIME AS NEEDED FOR INSOMNIA     TURMERIC ORAL               Where to Get Your Medications        These medications were sent to [facility] - [place], [place] - [address]  [address], [facility] [address]      Phone: [phone]   traZODone 100 MG tablet       Skin cancer of arm, right  -     Destruction, Malignant Lesion  -     Specimen to Pathology, Dermatology    Moderate recurrent major depression    Anomaly of cerebellum    Stage 3a chronic kidney disease    Aortic atherosclerosis    Other orders  -     traZODone (DESYREL) 100 MG tablet; TAKE 2 TABLETS BY MOUTH EVERY DAY AT BEDTIME AS NEEDED FOR INSOMNIA  Dispense: 180 tablet; Refill: 3  -     Cancel: biopsy      [facility] upper post arm similar lesion hyfercated  Rifght forearm Bx shave and path sent

## 2024-07-01 ENCOUNTER — LAB VISIT (OUTPATIENT)
Dept: LAB | Facility: HOSPITAL | Age: 79
End: 2024-07-01
Attending: HOSPITALIST
Payer: MEDICARE

## 2024-07-01 ENCOUNTER — OFFICE VISIT (OUTPATIENT)
Dept: HEMATOLOGY/ONCOLOGY | Facility: CLINIC | Age: 79
End: 2024-07-01
Payer: MEDICARE

## 2024-07-01 VITALS
HEART RATE: 95 BPM | HEIGHT: 73 IN | TEMPERATURE: 98 F | BODY MASS INDEX: 31.2 KG/M2 | DIASTOLIC BLOOD PRESSURE: 70 MMHG | SYSTOLIC BLOOD PRESSURE: 161 MMHG | OXYGEN SATURATION: 95 % | WEIGHT: 235.44 LBS

## 2024-07-01 DIAGNOSIS — I10 HYPERTENSION, UNSPECIFIED TYPE: ICD-10-CM

## 2024-07-01 DIAGNOSIS — C79.51 MALIGNANT NEOPLASM METASTATIC TO BONE: ICD-10-CM

## 2024-07-01 DIAGNOSIS — C61 PROSTATE CANCER: Primary | ICD-10-CM

## 2024-07-01 DIAGNOSIS — Z79.818 ANDROGEN DEPRIVATION THERAPY: ICD-10-CM

## 2024-07-01 DIAGNOSIS — C61 PROSTATE CANCER: ICD-10-CM

## 2024-07-01 LAB
ALBUMIN SERPL BCP-MCNC: 3.7 G/DL (ref 3.5–5.2)
ALP SERPL-CCNC: 90 U/L (ref 55–135)
ALT SERPL W/O P-5'-P-CCNC: 37 U/L (ref 10–44)
ANION GAP SERPL CALC-SCNC: 8 MMOL/L (ref 8–16)
AST SERPL-CCNC: 27 U/L (ref 10–40)
BILIRUB SERPL-MCNC: 0.2 MG/DL (ref 0.1–1)
BUN SERPL-MCNC: 20 MG/DL (ref 8–23)
CALCIUM SERPL-MCNC: 10 MG/DL (ref 8.7–10.5)
CHLORIDE SERPL-SCNC: 104 MMOL/L (ref 95–110)
CO2 SERPL-SCNC: 27 MMOL/L (ref 23–29)
COMPLEXED PSA SERPL-MCNC: 1.6 NG/ML (ref 0–4)
CREAT SERPL-MCNC: 1.4 MG/DL (ref 0.5–1.4)
ERYTHROCYTE [DISTWIDTH] IN BLOOD BY AUTOMATED COUNT: 13.1 % (ref 11.5–14.5)
EST. GFR  (NO RACE VARIABLE): 51.4 ML/MIN/1.73 M^2
GLUCOSE SERPL-MCNC: 176 MG/DL (ref 70–110)
HCT VFR BLD AUTO: 47.9 % (ref 40–54)
HGB BLD-MCNC: 16.2 G/DL (ref 14–18)
IMM GRANULOCYTES # BLD AUTO: 0.02 K/UL (ref 0–0.04)
MCH RBC QN AUTO: 32.5 PG (ref 27–31)
MCHC RBC AUTO-ENTMCNC: 33.8 G/DL (ref 32–36)
MCV RBC AUTO: 96 FL (ref 82–98)
NEUTROPHILS # BLD AUTO: 4.3 K/UL (ref 1.8–7.7)
PLATELET # BLD AUTO: 231 K/UL (ref 150–450)
PMV BLD AUTO: 10.1 FL (ref 9.2–12.9)
POTASSIUM SERPL-SCNC: 4.1 MMOL/L (ref 3.5–5.1)
PROT SERPL-MCNC: 7.4 G/DL (ref 6–8.4)
RBC # BLD AUTO: 4.99 M/UL (ref 4.6–6.2)
SODIUM SERPL-SCNC: 139 MMOL/L (ref 136–145)
WBC # BLD AUTO: 5.99 K/UL (ref 3.9–12.7)

## 2024-07-01 PROCEDURE — 3077F SYST BP >= 140 MM HG: CPT | Mod: CPTII,S$GLB,, | Performed by: HOSPITALIST

## 2024-07-01 PROCEDURE — 80053 COMPREHEN METABOLIC PANEL: CPT | Performed by: HOSPITALIST

## 2024-07-01 PROCEDURE — 1101F PT FALLS ASSESS-DOCD LE1/YR: CPT | Mod: CPTII,S$GLB,, | Performed by: HOSPITALIST

## 2024-07-01 PROCEDURE — 3078F DIAST BP <80 MM HG: CPT | Mod: CPTII,S$GLB,, | Performed by: HOSPITALIST

## 2024-07-01 PROCEDURE — 36415 COLL VENOUS BLD VENIPUNCTURE: CPT | Performed by: HOSPITALIST

## 2024-07-01 PROCEDURE — 84153 ASSAY OF PSA TOTAL: CPT | Performed by: HOSPITALIST

## 2024-07-01 PROCEDURE — 99215 OFFICE O/P EST HI 40 MIN: CPT | Mod: S$GLB,,, | Performed by: HOSPITALIST

## 2024-07-01 PROCEDURE — 1159F MED LIST DOCD IN RCRD: CPT | Mod: CPTII,S$GLB,, | Performed by: HOSPITALIST

## 2024-07-01 PROCEDURE — 1125F AMNT PAIN NOTED PAIN PRSNT: CPT | Mod: CPTII,S$GLB,, | Performed by: HOSPITALIST

## 2024-07-01 PROCEDURE — G2211 COMPLEX E/M VISIT ADD ON: HCPCS | Mod: S$GLB,,, | Performed by: HOSPITALIST

## 2024-07-01 PROCEDURE — 99999 PR PBB SHADOW E&M-EST. PATIENT-LVL V: CPT | Mod: PBBFAC,,, | Performed by: HOSPITALIST

## 2024-07-01 PROCEDURE — 3288F FALL RISK ASSESSMENT DOCD: CPT | Mod: CPTII,S$GLB,, | Performed by: HOSPITALIST

## 2024-07-01 PROCEDURE — 85027 COMPLETE CBC AUTOMATED: CPT | Performed by: HOSPITALIST

## 2024-07-01 NOTE — PROGRESS NOTES
ADVANCED PROSTATE CANCER CLINIC - FOLLOW UP VISIT     Best Contact Phone Number(s): [phone] (home)       Cancer/Stage/TNM:    Cancer Staging   Prostate cancer  Staging form: Prostate, AJCC 8th Edition  - Clinical: Stage IVB (cT3, cNX, cM1c, Grade Group: 5) - Signed by Darren Carter MD on 11/18/2022        Reason for visit: De david metastatic prostate cancer with lung and osseous mets sp orchiectomy and on apalutamide.    HPI: Noah Lopez is a 79 y.o. 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 He started first line ADT on 11/18/22 with addition of apalutamide 06/2023. He completed SBRT to the left iliac lesion 02/2023 and underwent bilateral orchiectomy 2/15/23. He was found to have rising PSA c/f mCRPC 12/2023 with oligoprogessino to the left left pubic ramus on PSMA PET. He underwent palliative RT to the left pubic ramus 1/18/24. He presents to medical oncollgy clinic for routine follow up.    History has been obtained by chart review and discussion with the patient.    Interval Events:     Overall feels generally well. Recently returned from trip to [place]. Tolerating apalutamide well. Notes he has been very active which has improved his energy. No return of his prior pelvic pain - did recently 'tweak' his back after picking and no other sites of active disease. Hot flashes persist but remain  tolerable. No current urinary           Oncology History   Prostate cancer   12/8/2021 Tumor Markers    PSA 11.7     1/2022 Notable Event    ED visit for urinary retention found on abodminal US. Jones placed.     3/2022 Notable Event    Seen in ED for fall. Incidentally found to have suspicious thyroid nodule     4/20/2022 Biopsy    FNA of thyroid nodule nondiagnostic. Also found to have new SHANNAN lung nodules.     5/16/2022 Imaging Significant Findings    FDG PET-CT in setting of pulmonary nodules:  1.   Two solid left upper lobe pulmonary nodules with no appreciable FDG uptake.  Malignancy is not excluded, and the larger nodule is likely amenable to percutaneous biopsy. Alternatively, continued surveillance by dedicated chest CT alone, i.e., without FDG PET, may be considered.     2.  Focal FDG uptake in the left prostate gland with extracapsular extension.  These findings are concerning for malignancy.  Correlation with PSA and urology evaluation recommended.  MRI of the prostate may also be helpful for further evaluation.     3.  Abnormal appearance of the left symphysis pubis with hypermetabolic activity.  Differential considerations include fibrous dysplasia, Paget's disease, and metastasis.   MRI or bone algorithm CT may be informative.     4.  Incidental focal uptake in the right cerebellum on at least 2 PET slices.  Recommend brain MRI for further evaluation.     5.  2.2 cm left inferior thyroid lobe nodule.  This nodule was recently biopsied with abnormal results.  Please refer to pathology report.     5/25/2022 Tumor Markers    PSA 17.3     7/6/2022 Biopsy    Attempted SHANNAN nodule biopsy     8/11/2022 Biopsy    TRUS biopsy: Prostate adenocarcinoma up to 4+3 disease in 10/18 cores all from the left side. Associated cribriform glands and PNI.       8/31/2022 Imaging Significant Findings    Bone scan:  1. Uptake within the anterior right 5th and 6th ribs is most likely related to degenerative changes.  Correlation with plain film or chest CT scan recommended.     2.  Intense uptake within the medial left pubic bone corresponds to an area of sclerosis seen on the comparison PET-CT scan.  Differential considerations remain to be fibrous dysplasia, Paget's disease or metastasis.     3.  Negative for metastatic pattern of uptake otherwise.  Degenerative pattern of uptake and other incidental findings as noted above.     9/29/2022 Imaging Significant Findings    MRI prostate: Left-sided PI-RADS 5 lesion with  extraprostatic extension involving the left-sided neurovascular bundles and seminal vesicles.  Extension of abnormal signal adjacent to the left levator ani, cannot rule out involvement.  Abnormal signal also closely approximates with the anterior rectal wall without definite involvement.     Left pubic lesion concerning for metastasis.     10/17/2022 Notable Event    Declined pubic bone biopsy to potentially confirm metastatic prostate cancer     10/26/2022 Procedure    TURPT - pathology review with up to 4+5 disease     11/9/2022 Imaging Significant Findings    PSMA PET-CT shows evidence of osseous and pulmonary metastatic disease     11/18/2022 -  Hormone Therapy    Start Lupron 22.5mg IM; plan to start apalutamide     2/1/2023 - 2/7/2023 Radiation Therapy    Treating physician: Jaswinder Childs    Course: C1 Pelvis 2023    Treatment Site Ref. ID Energy Dose/Fx (Gy) #Fx Dose Correction (Gy) Total Dose (Gy) Start Date End Date Elapsed Days   3D Iliac_Lt Lt Iliac 18X 4 5 / 5 0 20 2/1/2023 2/7/2023 6        3/28/2023 Genetic Testing    Germline genetic testing. No pathogenic mutations. VUS in APC and BMPR1A     1/2/2024 Imaging Significant Findings     PSMA PET CT   Impression:  - Interval treatment response.  Decreased size and radiotracer uptake of the prostate remnant.  Previously described pulmonary nodules have either resolved or decreased in size compared to prior exam.  - Mixed interval changes within the left pubic bone lesion with decreased involvement of the inferior pubic ramus and enlarged lytic component within the superior pubic ramus.  Interval resolution of prior uptake within the left ischial tuberosity.  - Stable nodule within the superior mediastinum projecting inferiorly from the left thyroid.  Recommend correlation with prior biopsy.  - No new tracer avid lesion.     1/18/2024 - 1/31/2024 Radiation Therapy    Treating physician: jaswinder childs  Course: C2 Pelvis 2024    Treatment Site Ref.  ID Energy Dose/Fx (Gy) #Fx Dose Correction (Gy) Total Dose (Gy) Start Date End Date Elapsed Days   3D Pelvis NormEZCalc 18X 3 10 / 10 0 30 1/18/2024 1/31/2024 13            1/30/2024 Imaging Significant Findings    CT a/p 1/30/24  1. Surgical changes of TURP and bilateral orchiectomy.  There is remnant prostate with residual disease better assessed by PSMA or MRI.  2. *Enlarging metastasis in the left pubic/superior pubic ramus bone.  There is cortical breakthrough involving both the anterior and posterior cortex with tumoral replacement.  Correlation advised.  Cholelithiasis.           Past Medical History:   Diagnosis Date    Colon polyp 09/06/2018    Hyperlipidemia     Hypertension     Prostate cancer     Stage 3a chronic kidney disease 6/21/2023    Urinary tract infection with hematuria 11/18/2022         Past Surgical History:   Procedure Laterality Date    COLONOSCOPY  2010    COLONOSCOPY N/A 9/6/2018    Procedure: COLONOSCOPY Suprep PLEASE TEXT PATIENT WITH ARRIVAL TIME;  Surgeon: Niharika Arce MD;  Location: [facility];  Service: Endoscopy;  Laterality: N/A;    CYSTOSCOPY WITH URODYNAMIC TESTING N/A 1/31/2022    Procedure: CYSTOSCOPY, WITH URODYNAMIC TESTING FLOUROSCOPIC;  Surgeon: Anthony Maloney MD;  Location: [address];  Service: Urology;  Laterality: N/A;  1hr    KNEE ARTHROSCOPY      ORCHIECTOMY Bilateral 2/15/2023    Procedure: Bilateral simple orchiectomy;  Surgeon: Helena Luis MD;  Location: [facility];  Service: Urology;  Laterality: Bilateral;    SPINE SURGERY      l-spine    TRANSURETHRAL RESECTION OF PROSTATE N/A 10/26/2022    Procedure: TURP (TRANSURETHRAL RESECTION OF PROSTATE);  Surgeon: Helena Luis MD;  Location: [facility];  Service: Urology;  Laterality: N/A;         I have reviewed and updated the patient's past medical, surgical, family and social histories.     Review of patient's allergies indicates:   Allergen Reactions    Ciprofloxacin Other (See Comments)     tendonitis          Current Outpatient Medications   Medication Sig Dispense Refill    apalutamide (ERLEADA) 60 mg Tab Take 240 mg by mouth once daily. 120 tablet 11    ascorbic acid, vitamin C, (VITAMIN C) 1000 MG tablet Take 1,000 mg by mouth once daily.      atorvastatin (LIPITOR) 40 MG tablet TAKE 1 TABLET BY MOUTH ONCE  DAILY 90 tablet 3    calcium carbonate (OS-TYREE) 500 mg calcium (1,250 mg) tablet Take 2 tablets (1,000 mg total) by mouth once daily. 60 tablet 11    cholecalciferol, vitamin D3, (VITAMIN D3) 25 mcg (1,000 unit) capsule Take 1 capsule (1,000 Units total) by mouth once daily. 30 capsule 11    clotrimazole-betamethasone 1-0.05% (LOTRISONE) cream Apply topically 2 (two) times daily. 60 g 3    cyclobenzaprine (FLEXERIL) 10 MG tablet Take 1 tablet (10 mg total) by mouth 3 (three) times daily as needed for Muscle spasms. 60 tablet 0    diltiaZEM (CARDIZEM CD) 300 MG 24 hr capsule Take 1 capsule (300 mg total) by mouth once daily. 90 capsule 3    docusate sodium (COLACE) 250 MG capsule Take 250 mg by mouth once daily.      finasteride (PROSCAR) 5 mg tablet TAKE 1 TABLET BY MOUTH ONCE  DAILY 90 tablet 2    GAS RELIEF 80, SIMETHICONE, ORAL Take by mouth.      HYDROcodone-acetaminophen (NORCO) 5-325 mg per tablet Take 1 tablet by mouth every 8 (eight) hours as needed for Pain. 30 tablet 0    lisinopriL (PRINIVIL,ZESTRIL) 40 MG tablet Take 1 tablet (40 mg total) by mouth once daily. 90 tablet 3    miconazole (MICOTIN) 2 % cream Apply topically 2 (two) times daily. Apply to tip of penis prn irritation 28 g 0    multivit-min-FA-lycopen-lutein (MEN 50 PLUS MULTIVITAMIN) 300-600-300 mcg Tab as directed Orally      multivitamin capsule Take 1 capsule by mouth once daily.      ofloxacin (FLOXIN) 0.3 % otic solution Apply 10 drops into right ear once a day for 10 days.  Please dispense generic.      omega-3 fatty acids/fish oil (FISH OIL-OMEGA-3 FATTY ACIDS) 300-1,000 mg capsule Take 1 capsule by mouth once daily.       "ondansetron (ZOFRAN) 8 MG tablet Take 1 tablet (8 mg total) by mouth every 8 (eight) hours as needed for Nausea. 30 tablet 0    oxyCODONE (ROXICODONE) 5 MG immediate release tablet Take 1 tablet (5 mg total) by mouth every 4 (four) hours as needed for Pain. 42 tablet 0    tamsulosin (FLOMAX) 0.4 mg Cap Take 1 capsule by mouth once daily 90 capsule 3    traZODone (DESYREL) 100 MG tablet TAKE 2 TABLETS BY MOUTH EVERY DAY AT BEDTIME AS NEEDED FOR INSOMNIA 180 tablet 3    TURMERIC ORAL Take by mouth.      wheat dextrin (BENEFIBER CLEAR SF, DEXTRIN, ORAL) Take by mouth.       No current facility-administered medications for this visit.        Objective:      Physical Exam:   BP (!) 161/70 (BP Location: Left arm, Patient Position: Sitting, BP Method: Large (Automatic))   Pulse 95   Temp 98.4 °F (36.9 °C) (Oral)   Ht 6' 1" (1.854 m)   Wt 106.8 kg (235 lb 7.2 oz)   SpO2 95%   BMI 31.06 kg/m²       ECOG PS: 0             Physical Exam  Constitutional:       General: He is not in acute distress.     Appearance: Normal appearance.   HENT:      Head: Normocephalic.   Eyes:      General: No scleral icterus.     Extraocular Movements: Extraocular movements intact.      Conjunctiva/sclera: Conjunctivae normal.   Cardiovascular:      Rate and Rhythm: Normal rate.   Pulmonary:      Effort: Pulmonary effort is normal. No respiratory distress.   Abdominal:      General: There is no distension.      Palpations: Abdomen is soft.   Musculoskeletal:      Right lower leg: No edema.      Left lower leg: No edema.   Skin:     General: Skin is warm and dry.   Neurological:      Mental Status: He is alert and oriented to person, place, and time.      Motor: No weakness.   Psychiatric:         Mood and Affect: Mood normal.         Behavior: Behavior normal.         Thought Content: Thought content normal.          Recent Labs:   Lab Results   Component Value Date    WBC 5.99 07/01/2024    RBC 4.99 07/01/2024    HGB 16.2 07/01/2024    HCT " 47.9 07/01/2024    MCV 96 07/01/2024    MCH 32.5 (H) 07/01/2024    MCHC 33.8 07/01/2024    RDW 13.1 07/01/2024     07/01/2024    MPV 10.1 07/01/2024    IMMGR 0.3 09/12/2022    GRAN 4.3 07/01/2024    IGABS 0.02 07/01/2024    LYMPH 0.8 (L) 09/12/2022    LYMPH 13.0 (L) 09/12/2022    MONO 0.7 09/12/2022    MONO 11.3 09/12/2022    EOS 0.1 09/12/2022    BASO 0.03 09/12/2022    NRBC 0 09/12/2022    EOSINOPHIL 0.8 09/12/2022    BASOPHIL 0.5 09/12/2022    DIFFMETHOD Automated 09/12/2022       Lab Results   Component Value Date     07/01/2024    K 4.1 07/01/2024     07/01/2024    CO2 27 07/01/2024     (H) 07/01/2024    BUN 20 07/01/2024    CREATININE 1.4 07/01/2024    CALCIUM 10.0 07/01/2024    PROT 7.4 07/01/2024    ALBUMIN 3.7 07/01/2024    BILITOT 0.2 07/01/2024    ALKPHOS 90 07/01/2024    AST 27 07/01/2024    ALT 37 07/01/2024    ANIONGAP 8 07/01/2024    EGFRNORACEVR 51.4 (A) 07/01/2024        Lab Results   Component Value Date    PSADIAG 1.6 07/01/2024    PSADIAG 1.2 05/21/2024    PSADIAG 1.2 04/29/2024    PSADIAG 1.1 03/15/2024    PSADIAG 1.4 02/20/2024    PSADIAG 2.3 01/31/2024    PSADIAG 2.2 01/02/2024    PSADIAG 1.7 12/04/2023    PSADIAG 1.5 11/07/2023    PSADIAG 0.67 08/07/2023    PSATOTAL 15.8 (H) 07/05/2022    PSA 11.7 (H) 12/08/2021        Cardiovascular Screening:  Primary care physician: Anthony De La Torre MD      The 10-year ASCVD risk score (Echo ROSE, et al., 2019) is: 47%    Values used to calculate the score:      Age: 79 years      Sex: Male      Is Non- : No      Diabetic: No      Tobacco smoker: No      Systolic Blood Pressure: 161 mmHg      Is BP treated: Yes      HDL Cholesterol: 50 mg/dL      Total Cholesterol: 224 mg/dL    ASCVD Risk Level: High risk >= 20%    EKG:   Results for orders placed or performed in visit on 10/04/22   EKG 12-lead    Collection Time: 10/04/22 10:51 AM    Narrative    Test Reason : Z01.810,    Vent. Rate : 087 BPM     Atrial  Rate : 087 BPM     P-R Int : 166 ms          QRS Dur : 080 ms      QT Int : 362 ms       P-R-T Axes : 061 039 042 degrees     QTc Int : 435 ms    Normal sinus rhythm  Normal ECG  When compared with ECG of 12-SEP-2022 17:34,  No significant change was found  Confirmed by Arie Chacon MD ([id number]) on 10/6/2022 7:32:16 AM    Referred By: SHIRA VAZQUEZ           Confirmed By:Arie Chacon MD       High blood pressure = Yes  Antihypertensive agents: diltiaZEM - 300 MG  lisinopriL - 40 MG   Comments:     DM2: No  Antidiabetic agents: This patient does not have an active medication from one of the medication groupers.   Comments:     Hyperlipidemia: Yes  Lipid lowering agents: atorvastatin - 40 MG   Comments:     Antiplatelet therapy: No  Agent: This patient does not have an active medication from one of the medication groupers.   Comment:     Body mass index is 31.06 kg/m².  If greater than 30, referral to nutrition    Lab Results   Component Value Date    CHOL 224 (H) 08/07/2023    LDLCALC 150.4 08/07/2023    HDL 50 08/07/2023    TRIG 118 08/07/2023    HGBA1C 5.4 08/07/2023        Bone Health    Lab Results   Component Value Date    LSBKXSLG66CP 62 08/07/2023        Results for orders placed during the hospital encounter of 02/23/23    DXA Bone Density with Vertebral Fracture    Impression  Normal bone mineral density.    Consider FDA approved medical therapies in postmenopausal women and men aged 50 years and older, based on the following:    *A hip or vertebral (clinical or morphometric) fracture  *T score less than or equal to -2.5 at the femoral neck or spine after appropriate evaluation to exclude secondary causes.  *Low bone mass -- also known as osteopenia (T score between -1.0 and -2.5 at the femoral neck or spine) and a 10 year probability of hip fracture greater than or equal to 3% or a 10 year probability of major osteoporosis-related fracture greater than or equal to 20% based on the [place]-adapted WHO  algorithm.  *Clinicians judgment and/or patient preference may indicate treatment for people with 10 year fracture probabilities is above or below these levels.      Electronically signed by: Anshu Stephens  Date:    02/23/2023  Time:    12:08       Vitamin D: 1000 IU daily  Calcium: Recommend 4 servings of dairy daily     Osteopenia/Osteoporosis: None    Bone strengthening agent: None     Staging Imaging     Results for orders placed during the hospital encounter of 11/09/22    NM PET CT F 18 PYL PSMA, Midthigh to Vertex    Impression  1. Evidence of prostate neoplasm with osseous and pulmonary metastasis.  Detrimental changes noted when comparison is made to 05/16/2022 in particular with progression of several pulmonary nodules and with development of 1 or 2 small pulmonary nodules.  2. A mass is noted inferior to the left lobe of the thyroid gland entirely specific but demonstrating increased uptake similar since the prior  3. A prostate based mass appears to be present with involvement of the bladder and a distended bladder suggestive outlet obstruction      Electronically signed by: John Steele MD  Date:    11/09/2022  Time:    15:31       Results for orders placed during the hospital encounter of 08/31/22    NM Bone Scan Whole Body    Impression  1. Uptake within the anterior right 5th and 6th ribs is most likely related to degenerative changes.  Correlation with plain film or chest CT scan recommended.    2.  Intense uptake within the medial left pubic bone corresponds to an area of sclerosis seen on the comparison PET-CT scan.  Differential considerations remain to be fibrous dysplasia, Paget's disease or metastasis.    3.  Negative for metastatic pattern of uptake otherwise.  Degenerative pattern of uptake and other incidental findings as noted above.      Electronically signed by: Antonio Mckeon MD  Date:    08/31/2022  Time:    12:35      Results for orders placed during the hospital encounter of  09/29/22    MRI Prostate W W/O Contrast    Impression  Left-sided PI-RADS 5 lesion with extraprostatic extension involving the left-sided neurovascular bundles and seminal vesicles.  Extension of abnormal signal adjacent to the left levator ani, cannot rule out involvement.  Abnormal signal also closely approximates with the anterior rectal wall without definite involvement.    Left pubic lesion concerning for metastasis.    Diffuse heterogeneous bone marrow.    Overall Assessment: PI-RADS 5 - Very high (clinically significant cancer is highly likely to be present)    Number of targets created for potential MR/US fusion biopsy    Transitional/Peripheral zone: 1    This report was flagged in Epic as abnormal.    Electronically signed by resident: Al Castrejon  Date:    09/29/2022  Time:    16:12    Electronically signed by: Shekhar Rodríguez MD  Date:    09/29/2022  Time:    19:08       No results found for this or any previous visit.       I have personally reviewed the above imaging.     Path:   Reviewed pathology as documented above.    Genomic testing:     Germline genetic testing: 3/28/23 - VUS in APC and BMPR1A    Results for orders placed or performed in visit on 03/28/23   Genetic Misc Sendout Test, Blood   Result Value Ref Range    Miscellaneous Genetic Test Name [facility] BRCA + 84/RNA     Genso Specimen Type Blood     Genetic counseling? Yes     Parental or Sibling Testing? No     Test Result See result image under hyperlink     Reference Lab SEE COMMENT         Somatic tumor genotyping: None      ctDNA genotyping:           Diagnoses:     1. Prostate cancer    2. Androgen deprivation therapy    3. Hypertension, unspecified type    4. Bone metastases                          Assessment and Plan:     1. Prostate cancer  Overview:  Patient with de david high-volume metastatic prosatate adenocarcinoma including lung and osseous mets. Started ADT 11/18/23 with apalutamide 6/7/23. Completed RT to the left iliac  met 2/7/23. Subsequent oligoprogression to left pubic tubercle. Underwent RT 01/2024 with good PSA response    Assessment & Plan:  - Con't ADT + apalutamide  - ADT sp orchiectomy  - FU  6 weeks PSMA PET CT  - Zometa in 6 weeks    Orders:  -     NM PET CT F 18 PYL PSMA, Midthigh to Vertex; Future; Expected date: 07/01/2024    2. Androgen deprivation therapy  Overview:  Current agent: History of bilateral orchiectomy  Start date:  ADT start 11/2022  Last dose: Orchiectomy 2/15/23  Duration of therapy: Indefefinite    Novel Androgen signaling inhibitors:   Agent: Apalutamide  Start date: 6/7/23          3. Hypertension, unspecified type  Overview:  Home regimen includes diltiazem and lisinopril      4. Bone metastases  Overview:  Has left pelvic turbercle metastases noted on PSMA PET CT with associated discomfort. Completed RT on 2/3/23.    Assessment & Plan:  - Con't vitamin D  - Scheduled zometa #3 in 6 weeks                Follow up:   Route Chart for Scheduling    Med Onc Chart Routing      Follow up with physician 6 weeks.   Follow up with JENIFFER    Infusion scheduling note   zometa 6 weeks   Injection scheduling note    Labs CBC, CMP and PSA   Scheduling:  Preferred lab:  Lab interval:     Imaging PET scan      Pharmacy appointment    Other referrals                     Therapy Plan Information  Medications  zoledronic 4 mg/100 mL infusion 4 mg  4 mg, Intravenous, Every 4 weeks  Flushes  sodium chloride 0.9% 250 mL flush bag  Intravenous, Every 4 weeks  sodium chloride 0.9% flush 10 mL  10 mL, Intravenous, Every 4 weeks  heparin, porcine (PF) 100 unit/mL injection flush 500 Units  500 Units, Intravenous, Every 4 weeks  alteplase injection 2 mg  2 mg, Intra-Catheter, Every 4 weeks         The above information has been reviewed with the patient and all questions have been answered to their apparent satisfaction.  They understand that they can call the clinic with any questions.    Darren Caretr

## 2024-07-07 ENCOUNTER — TELEPHONE (OUTPATIENT)
Dept: FAMILY MEDICINE | Facility: CLINIC | Age: 79
End: 2024-07-07
Payer: MEDICARE

## 2024-07-07 NOTE — TELEPHONE ENCOUNTER
----- Message from Anthony De La Torre MD sent at 7/7/2024  3:49 PM CDT -----  Skin cancer, needs to Return to excise

## 2024-07-08 ENCOUNTER — TELEPHONE (OUTPATIENT)
Dept: FAMILY MEDICINE | Facility: CLINIC | Age: 79
End: 2024-07-08
Payer: MEDICARE

## 2024-07-08 DIAGNOSIS — C44.92 SQUAMOUS CELL SKIN CANCER: Primary | ICD-10-CM

## 2024-07-12 ENCOUNTER — TELEPHONE (OUTPATIENT)
Dept: HEMATOLOGY/ONCOLOGY | Facility: CLINIC | Age: 79
End: 2024-07-12
Payer: MEDICARE

## 2024-07-12 NOTE — TELEPHONE ENCOUNTER
I spoke to patient. He said he PCP took a couple of skin tags off and sent them off and they are cancerous. The margins are not clear. He can't get in with dermatology until October. Was calling for some guidance.

## 2024-07-12 NOTE — TELEPHONE ENCOUNTER
----- Message from Darren Carter MD sent at 7/12/2024  3:29 PM CDT -----  Yes, they should triage their appts.    Darren  ----- Message -----  From: Yanet Tan RN  Sent: 7/12/2024  12:28 PM CDT  To: Darren Carter MD    Should he call derm first?  ----- Message -----  From: Any Langston  Sent: 7/12/2024  12:23 PM CDT  To: Darren Carter Staff    Type:  Patient Returning Call    Who Called:Pt   Would the patient rather a call back or a response via [facility]? Call back   Best Call Back Number: [phone]  Additional Information: pt is trying to see are be seen sooner than August for skin tags that are cancerous   evaluate and give pain medication enlarged lymph node, tx with ibuprofen and discharge

## 2024-07-17 NOTE — TELEPHONE ENCOUNTER
----- Message from Anthony De La Torre MD sent at 2/3/2022  6:25 AM CST -----  CALL PT TESTS ARE NORMAL  
Pt letter generated in regards to results and put in mail  
Detail Level: Detailed

## 2024-07-25 ENCOUNTER — TELEPHONE (OUTPATIENT)
Dept: DERMATOLOGY | Facility: CLINIC | Age: 79
End: 2024-07-25
Payer: MEDICARE

## 2024-08-08 ENCOUNTER — HOSPITAL ENCOUNTER (OUTPATIENT)
Dept: RADIOLOGY | Facility: HOSPITAL | Age: 79
Discharge: HOME | End: 2024-08-08
Attending: HOSPITALIST
Payer: MEDICARE

## 2024-08-08 DIAGNOSIS — C61 PROSTATE CANCER: ICD-10-CM

## 2024-08-08 PROCEDURE — A9595 HC PIFLUFOLASTAT F-18, DX, PER 1 MCI: HCPCS | Mod: TB | Performed by: HOSPITALIST

## 2024-08-08 PROCEDURE — 78815 PET IMAGE W/CT SKULL-THIGH: CPT | Mod: TC

## 2024-08-08 PROCEDURE — 78815 PET IMAGE W/CT SKULL-THIGH: CPT | Mod: 26,PS,, | Performed by: NUCLEAR MEDICINE

## 2024-08-08 RX ORDER — FLUDEOXYGLUCOSE F18 500 MCI/ML
9.22 INJECTION INTRAVENOUS
Status: DISCONTINUED | OUTPATIENT
Start: 2024-08-08 | End: 2024-08-08

## 2024-08-08 RX ADMIN — PIFLUFOLASTAT F-18 9.22 MILLICURIE: 80 INJECTION INTRAVENOUS at 11:08

## 2024-08-12 ENCOUNTER — LAB VISIT (OUTPATIENT)
Dept: LAB | Facility: HOSPITAL | Age: 79
End: 2024-08-12
Attending: HOSPITALIST
Payer: MEDICARE

## 2024-08-12 ENCOUNTER — OFFICE VISIT (OUTPATIENT)
Dept: HEMATOLOGY/ONCOLOGY | Facility: CLINIC | Age: 79
End: 2024-08-12
Payer: MEDICARE

## 2024-08-12 ENCOUNTER — INFUSION (OUTPATIENT)
Dept: INFUSION THERAPY | Facility: HOSPITAL | Age: 79
End: 2024-08-12
Attending: HOSPITALIST
Payer: MEDICARE

## 2024-08-12 VITALS
BODY MASS INDEX: 31.41 KG/M2 | WEIGHT: 238.13 LBS | HEART RATE: 78 BPM | DIASTOLIC BLOOD PRESSURE: 74 MMHG | SYSTOLIC BLOOD PRESSURE: 154 MMHG | OXYGEN SATURATION: 95 %

## 2024-08-12 VITALS
HEIGHT: 73 IN | HEART RATE: 78 BPM | BODY MASS INDEX: 31.56 KG/M2 | WEIGHT: 238.13 LBS | SYSTOLIC BLOOD PRESSURE: 154 MMHG | RESPIRATION RATE: 18 BRPM | DIASTOLIC BLOOD PRESSURE: 74 MMHG | TEMPERATURE: 98 F

## 2024-08-12 DIAGNOSIS — C61 PROSTATE CANCER: ICD-10-CM

## 2024-08-12 DIAGNOSIS — C79.51 MALIGNANT NEOPLASM METASTATIC TO BONE: Primary | ICD-10-CM

## 2024-08-12 DIAGNOSIS — C61 PROSTATE CANCER: Primary | ICD-10-CM

## 2024-08-12 DIAGNOSIS — C79.51 MALIGNANT NEOPLASM METASTATIC TO BONE: ICD-10-CM

## 2024-08-12 DIAGNOSIS — Z79.818 ANDROGEN DEPRIVATION THERAPY: ICD-10-CM

## 2024-08-12 DIAGNOSIS — E04.1 THYROID NODULE: ICD-10-CM

## 2024-08-12 LAB
ALBUMIN SERPL BCP-MCNC: 3.6 G/DL (ref 3.5–5.2)
ALP SERPL-CCNC: 83 U/L (ref 55–135)
ALT SERPL W/O P-5'-P-CCNC: 25 U/L (ref 10–44)
ANION GAP SERPL CALC-SCNC: 10 MMOL/L (ref 8–16)
AST SERPL-CCNC: 21 U/L (ref 10–40)
BILIRUB SERPL-MCNC: 0.3 MG/DL (ref 0.1–1)
BUN SERPL-MCNC: 16 MG/DL (ref 8–23)
CALCIUM SERPL-MCNC: 10.2 MG/DL (ref 8.7–10.5)
CHLORIDE SERPL-SCNC: 104 MMOL/L (ref 95–110)
CO2 SERPL-SCNC: 25 MMOL/L (ref 23–29)
COMPLEXED PSA SERPL-MCNC: 1.8 NG/ML (ref 0–4)
CREAT SERPL-MCNC: 1.2 MG/DL (ref 0.5–1.4)
ERYTHROCYTE [DISTWIDTH] IN BLOOD BY AUTOMATED COUNT: 12.9 % (ref 11.5–14.5)
EST. GFR  (NO RACE VARIABLE): >60 ML/MIN/1.73 M^2
GLUCOSE SERPL-MCNC: 205 MG/DL (ref 70–110)
HCT VFR BLD AUTO: 44.8 % (ref 40–54)
HGB BLD-MCNC: 15.1 G/DL (ref 14–18)
IMM GRANULOCYTES # BLD AUTO: 0.06 K/UL (ref 0–0.04)
MCH RBC QN AUTO: 31.9 PG (ref 27–31)
MCHC RBC AUTO-ENTMCNC: 33.7 G/DL (ref 32–36)
MCV RBC AUTO: 95 FL (ref 82–98)
NEUTROPHILS # BLD AUTO: 5.4 K/UL (ref 1.8–7.7)
PLATELET # BLD AUTO: 215 K/UL (ref 150–450)
PMV BLD AUTO: 10 FL (ref 9.2–12.9)
POTASSIUM SERPL-SCNC: 4.1 MMOL/L (ref 3.5–5.1)
PROT SERPL-MCNC: 7.1 G/DL (ref 6–8.4)
RBC # BLD AUTO: 4.73 M/UL (ref 4.6–6.2)
SODIUM SERPL-SCNC: 139 MMOL/L (ref 136–145)
WBC # BLD AUTO: 7.11 K/UL (ref 3.9–12.7)

## 2024-08-12 PROCEDURE — 80053 COMPREHEN METABOLIC PANEL: CPT | Performed by: HOSPITALIST

## 2024-08-12 PROCEDURE — 63600175 PHARM REV CODE 636 W HCPCS: Performed by: HOSPITALIST

## 2024-08-12 PROCEDURE — 99999 PR PBB SHADOW E&M-EST. PATIENT-LVL III: CPT | Mod: PBBFAC,,, | Performed by: HOSPITALIST

## 2024-08-12 PROCEDURE — 99215 OFFICE O/P EST HI 40 MIN: CPT | Mod: S$GLB,,, | Performed by: HOSPITALIST

## 2024-08-12 PROCEDURE — 1126F AMNT PAIN NOTED NONE PRSNT: CPT | Mod: CPTII,S$GLB,, | Performed by: HOSPITALIST

## 2024-08-12 PROCEDURE — 84153 ASSAY OF PSA TOTAL: CPT | Performed by: HOSPITALIST

## 2024-08-12 PROCEDURE — 3288F FALL RISK ASSESSMENT DOCD: CPT | Mod: CPTII,S$GLB,, | Performed by: HOSPITALIST

## 2024-08-12 PROCEDURE — 85027 COMPLETE CBC AUTOMATED: CPT | Performed by: HOSPITALIST

## 2024-08-12 PROCEDURE — G2211 COMPLEX E/M VISIT ADD ON: HCPCS | Mod: S$GLB,,, | Performed by: HOSPITALIST

## 2024-08-12 PROCEDURE — 3078F DIAST BP <80 MM HG: CPT | Mod: CPTII,S$GLB,, | Performed by: HOSPITALIST

## 2024-08-12 PROCEDURE — 3077F SYST BP >= 140 MM HG: CPT | Mod: CPTII,S$GLB,, | Performed by: HOSPITALIST

## 2024-08-12 PROCEDURE — 96374 THER/PROPH/DIAG INJ IV PUSH: CPT

## 2024-08-12 PROCEDURE — 1101F PT FALLS ASSESS-DOCD LE1/YR: CPT | Mod: CPTII,S$GLB,, | Performed by: HOSPITALIST

## 2024-08-12 PROCEDURE — 36415 COLL VENOUS BLD VENIPUNCTURE: CPT | Performed by: HOSPITALIST

## 2024-08-12 RX ORDER — SODIUM CHLORIDE 0.9 % (FLUSH) 0.9 %
10 SYRINGE (ML) INJECTION
Status: DISCONTINUED | OUTPATIENT
Start: 2024-08-12 | End: 2024-08-12 | Stop reason: HOSPADM

## 2024-08-12 RX ORDER — HEPARIN 100 UNIT/ML
500 SYRINGE INTRAVENOUS
OUTPATIENT
Start: 2024-09-09

## 2024-08-12 RX ORDER — ZOLEDRONIC ACID 0.04 MG/ML
4 INJECTION, SOLUTION INTRAVENOUS
Status: COMPLETED | OUTPATIENT
Start: 2024-08-12 | End: 2024-08-12

## 2024-08-12 RX ORDER — HEPARIN 100 UNIT/ML
500 SYRINGE INTRAVENOUS
Status: DISCONTINUED | OUTPATIENT
Start: 2024-08-12 | End: 2024-08-12 | Stop reason: HOSPADM

## 2024-08-12 RX ORDER — SODIUM CHLORIDE 0.9 % (FLUSH) 0.9 %
10 SYRINGE (ML) INJECTION
OUTPATIENT
Start: 2024-09-09

## 2024-08-12 RX ORDER — ZOLEDRONIC ACID 0.04 MG/ML
4 INJECTION, SOLUTION INTRAVENOUS
OUTPATIENT
Start: 2024-09-09

## 2024-08-12 RX ADMIN — ZOLEDRONIC ACID 4 MG: 0.04 INJECTION, SOLUTION INTRAVENOUS at 03:08

## 2024-08-12 NOTE — PATIENT INSTRUCTIONS
No new concerns today. PSA minimally elevated at 1.8 and no new concerning findings on PSMA PET CT. Will continue current treatment plan.     - Zometa shot today  - Con't vitamin D  - Con't apaltuamide  - FU in 3 months with zometa shot

## 2024-08-12 NOTE — PROGRESS NOTES
ADVANCED PROSTATE CANCER CLINIC - FOLLOW UP VISIT     Best Contact Phone Number(s): [phone] (home)       Cancer/Stage/TNM:    Cancer Staging   Prostate cancer  Staging form: Prostate, AJCC 8th Edition  - Clinical: Stage IVB (cT3, cNX, cM1c, Grade Group: 5) - Signed by Darren Carter MD on 11/18/2022        Reason for visit: De david metastatic prostate cancer with lung and osseous mets sp orchiectomy and on apalutamide.    Molecular:  Tempus xF TP53  Germline genetics 3/28/23 - VUS in APC and BMPR1A    Treatment:  11/2022 -    ADT  06/2023 -   Apalutamide  02/2023   SBRT to left iliac  02/2023   Orichiectomy  01/2024   SBRT to left pubic ramus    HPI: Noah Lopez is a 79 y.o. 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 He started first line ADT on 11/18/22 with addition of apalutamide 06/2023. He completed SBRT to the left iliac lesion 02/2023 and underwent bilateral orchiectomy 2/15/23. He was found to have rising PSA c/f mCRPC 12/2023 with oligoprogessino to the left left pubic ramus on PSMA PET. He underwent palliative RT to the left pubic ramus 1/18/24. He presents to medical oncollgy clinic for routine follow up.    History has been obtained by chart review and discussion with the patient.    Interval Events:     Doing geenrally well. Energy remains generally low, but continues to be quite active and independent. No further pelvic pain. Ongoing hot flashes seem to be improving. No signficant urinary issues - no nocturia. No other concerns.     Oncology History   Prostate cancer   12/8/2021 Tumor Markers    PSA 11.7     1/2022 Notable Event    ED visit for urinary retention found on abodminal US. Jones placed.     3/2022 Notable Event    Seen in ED for fall. Incidentally found to have suspicious thyroid nodule     4/20/2022 Biopsy    FNA of thyroid nodule nondiagnostic. Also found to have new  SHANNAN lung nodules.     5/16/2022 Imaging Significant Findings    FDG PET-CT in setting of pulmonary nodules:  1.  Two solid left upper lobe pulmonary nodules with no appreciable FDG uptake.  Malignancy is not excluded, and the larger nodule is likely amenable to percutaneous biopsy. Alternatively, continued surveillance by dedicated chest CT alone, i.e., without FDG PET, may be considered.     2.  Focal FDG uptake in the left prostate gland with extracapsular extension.  These findings are concerning for malignancy.  Correlation with PSA and urology evaluation recommended.  MRI of the prostate may also be helpful for further evaluation.     3.  Abnormal appearance of the left symphysis pubis with hypermetabolic activity.  Differential considerations include fibrous dysplasia, Paget's disease, and metastasis.   MRI or bone algorithm CT may be informative.     4.  Incidental focal uptake in the right cerebellum on at least 2 PET slices.  Recommend brain MRI for further evaluation.     5.  2.2 cm left inferior thyroid lobe nodule.  This nodule was recently biopsied with abnormal results.  Please refer to pathology report.     5/25/2022 Tumor Markers    PSA 17.3     7/6/2022 Biopsy    Attempted SHANNAN nodule biopsy     8/11/2022 Biopsy    TRUS biopsy: Prostate adenocarcinoma up to 4+3 disease in 10/18 cores all from the left side. Associated cribriform glands and PNI.       8/31/2022 Imaging Significant Findings    Bone scan:  1. Uptake within the anterior right 5th and 6th ribs is most likely related to degenerative changes.  Correlation with plain film or chest CT scan recommended.     2.  Intense uptake within the medial left pubic bone corresponds to an area of sclerosis seen on the comparison PET-CT scan.  Differential considerations remain to be fibrous dysplasia, Paget's disease or metastasis.     3.  Negative for metastatic pattern of uptake otherwise.  Degenerative pattern of uptake and other incidental findings as  noted above.     9/29/2022 Imaging Significant Findings    MRI prostate: Left-sided PI-RADS 5 lesion with extraprostatic extension involving the left-sided neurovascular bundles and seminal vesicles.  Extension of abnormal signal adjacent to the left levator ani, cannot rule out involvement.  Abnormal signal also closely approximates with the anterior rectal wall without definite involvement.     Left pubic lesion concerning for metastasis.     10/17/2022 Notable Event    Declined pubic bone biopsy to potentially confirm metastatic prostate cancer     10/26/2022 Procedure    TURPT - pathology review with up to 4+5 disease     11/9/2022 Imaging Significant Findings    PSMA PET-CT shows evidence of osseous and pulmonary metastatic disease     11/18/2022 -  Hormone Therapy    Start Lupron 22.5mg IM; plan to start apalutamide     2/1/2023 - 2/7/2023 Radiation Therapy    Treating physician: Jaswinder Doyle    Course: C1 Pelvis 2023    Treatment Site Ref. ID Energy Dose/Fx (Gy) #Fx Dose Correction (Gy) Total Dose (Gy) Start Date End Date Elapsed Days   3D Iliac_Lt Lt Iliac 18X 4 5 / 5 0 20 2/1/2023 2/7/2023 6        3/28/2023 Genetic Testing    Germline genetic testing. No pathogenic mutations. VUS in APC and BMPR1A     1/2/2024 Imaging Significant Findings     PSMA PET CT   Impression:  - Interval treatment response.  Decreased size and radiotracer uptake of the prostate remnant.  Previously described pulmonary nodules have either resolved or decreased in size compared to prior exam.  - Mixed interval changes within the left pubic bone lesion with decreased involvement of the inferior pubic ramus and enlarged lytic component within the superior pubic ramus.  Interval resolution of prior uptake within the left ischial tuberosity.  - Stable nodule within the superior mediastinum projecting inferiorly from the left thyroid.  Recommend correlation with prior biopsy.  - No new tracer avid lesion.     1/18/2024 - 1/31/2024  Radiation Therapy    Treating physician: lisa childs  Course: C2 Pelvis 2024    Treatment Site Ref. ID Energy Dose/Fx (Gy) #Fx Dose Correction (Gy) Total Dose (Gy) Start Date End Date Elapsed Days   3D Pelvis NormEZCalc 18X 3 10 / 10 0 30 1/18/2024 1/31/2024 13            1/30/2024 Imaging Significant Findings    CT a/p 1/30/24  1. Surgical changes of TURP and bilateral orchiectomy.  There is remnant prostate with residual disease better assessed by PSMA or MRI.  2. *Enlarging metastasis in the left pubic/superior pubic ramus bone.  There is cortical breakthrough involving both the anterior and posterior cortex with tumoral replacement.  Correlation advised.  Cholelithiasis.     8/8/2024 Imaging Significant Findings    PSMA PET CT 8/8/24  Impression:  1. In this patient with metastatic prostate cancer there is mild soft tissue thickening and increased radiotracer uptake in the left aspect of the prostate, possibly corresponding to seminal vesicle.  Attention on follow-up.  2. Interval decreased radiotracer uptake within metastatic lesion within the left pubic bone.  3. Additional findings, as above.           Past Medical History:   Diagnosis Date    Colon polyp 09/06/2018    Hyperlipidemia     Hypertension     Prostate cancer     Stage 3a chronic kidney disease 6/21/2023    Urinary tract infection with hematuria 11/18/2022         Past Surgical History:   Procedure Laterality Date    COLONOSCOPY  2010    COLONOSCOPY N/A 9/6/2018    Procedure: COLONOSCOPY Suprep PLEASE TEXT PATIENT WITH ARRIVAL TIME;  Surgeon: Niharika Arce MD;  Location: [facility];  Service: Endoscopy;  Laterality: N/A;    CYSTOSCOPY WITH URODYNAMIC TESTING N/A 1/31/2022    Procedure: CYSTOSCOPY, WITH URODYNAMIC TESTING FLOUROSCOPIC;  Surgeon: Anthony Maloney MD;  Location: [address];  Service: Urology;  Laterality: N/A;  1hr    KNEE ARTHROSCOPY      ORCHIECTOMY Bilateral 2/15/2023    Procedure: Bilateral simple orchiectomy;   Surgeon: Helena Luis MD;  Location: [facility] OR;  Service: Urology;  Laterality: Bilateral;    SPINE SURGERY      l-spine    TRANSURETHRAL RESECTION OF PROSTATE N/A 10/26/2022    Procedure: TURP (TRANSURETHRAL RESECTION OF PROSTATE);  Surgeon: Helena Luis MD;  Location: [facility] OR;  Service: Urology;  Laterality: N/A;         I have reviewed and updated the patient's past medical, surgical, family and social histories.     Review of patient's allergies indicates:   Allergen Reactions    Ciprofloxacin Other (See Comments)     tendonitis         Current Outpatient Medications   Medication Sig Dispense Refill    apalutamide (ERLEADA) 60 mg Tab Take 240 mg by mouth once daily. 120 tablet 11    ascorbic acid, vitamin C, (VITAMIN C) 1000 MG tablet Take 1,000 mg by mouth once daily.      atorvastatin (LIPITOR) 40 MG tablet TAKE 1 TABLET BY MOUTH ONCE  DAILY 90 tablet 3    calcium carbonate (OS-TYREE) 500 mg calcium (1,250 mg) tablet Take 2 tablets (1,000 mg total) by mouth once daily. 60 tablet 11    cholecalciferol, vitamin D3, (VITAMIN D3) 25 mcg (1,000 unit) capsule Take 1 capsule (1,000 Units total) by mouth once daily. 30 capsule 11    clotrimazole-betamethasone 1-0.05% (LOTRISONE) cream Apply topically 2 (two) times daily. 60 g 3    cyclobenzaprine (FLEXERIL) 10 MG tablet Take 1 tablet (10 mg total) by mouth 3 (three) times daily as needed for Muscle spasms. 60 tablet 0    diltiaZEM (CARDIZEM CD) 300 MG 24 hr capsule Take 1 capsule (300 mg total) by mouth once daily. 90 capsule 3    docusate sodium (COLACE) 250 MG capsule Take 250 mg by mouth once daily.      finasteride (PROSCAR) 5 mg tablet TAKE 1 TABLET BY MOUTH ONCE  DAILY 90 tablet 2    GAS RELIEF 80, SIMETHICONE, ORAL Take by mouth.      HYDROcodone-acetaminophen (NORCO) 5-325 mg per tablet Take 1 tablet by mouth every 8 (eight) hours as needed for Pain. 30 tablet 0    lisinopriL (PRINIVIL,ZESTRIL) 40 MG tablet Take 1 tablet (40 mg total) by mouth once daily. 90  tablet 3    miconazole (MICOTIN) 2 % cream Apply topically 2 (two) times daily. Apply to tip of penis prn irritation 28 g 0    multivit-min-FA-lycopen-lutein (MEN 50 PLUS MULTIVITAMIN) 300-600-300 mcg Tab as directed Orally      multivitamin capsule Take 1 capsule by mouth once daily.      ofloxacin (FLOXIN) 0.3 % otic solution Apply 10 drops into right ear once a day for 10 days.  Please dispense generic.      omega-3 fatty acids/fish oil (FISH OIL-OMEGA-3 FATTY ACIDS) 300-1,000 mg capsule Take 1 capsule by mouth once daily.      ondansetron (ZOFRAN) 8 MG tablet Take 1 tablet (8 mg total) by mouth every 8 (eight) hours as needed for Nausea. 30 tablet 0    oxyCODONE (ROXICODONE) 5 MG immediate release tablet Take 1 tablet (5 mg total) by mouth every 4 (four) hours as needed for Pain. 42 tablet 0    tamsulosin (FLOMAX) 0.4 mg Cap Take 1 capsule by mouth once daily 90 capsule 3    traZODone (DESYREL) 100 MG tablet TAKE 2 TABLETS BY MOUTH EVERY DAY AT BEDTIME AS NEEDED FOR INSOMNIA 180 tablet 3    TURMERIC ORAL Take by mouth.      wheat dextrin (BENEFIBER CLEAR SF, DEXTRIN, ORAL) Take by mouth.       No current facility-administered medications for this visit.        Objective:      Physical Exam:   BP (!) 154/74 (BP Location: Left arm, Patient Position: Sitting, BP Method: Large (Automatic))   Pulse 78   Wt 108 kg (238 lb 1.6 oz)   SpO2 95%   BMI 31.41 kg/m²       ECOG PS: 0             Physical Exam  Constitutional:       General: He is not in acute distress.     Appearance: Normal appearance.   HENT:      Head: Normocephalic.   Eyes:      General: No scleral icterus.     Extraocular Movements: Extraocular movements intact.      Conjunctiva/sclera: Conjunctivae normal.   Cardiovascular:      Rate and Rhythm: Normal rate.   Pulmonary:      Effort: Pulmonary effort is normal. No respiratory distress.   Abdominal:      General: There is no distension.      Palpations: Abdomen is soft.   Musculoskeletal:      Right  lower leg: No edema.      Left lower leg: No edema.   Skin:     General: Skin is warm and dry.   Neurological:      Mental Status: He is alert and oriented to person, place, and time.      Motor: No weakness.   Psychiatric:         Mood and Affect: Mood normal.         Behavior: Behavior normal.         Thought Content: Thought content normal.          Recent Labs:   Lab Results   Component Value Date    WBC 7.11 08/12/2024    RBC 4.73 08/12/2024    HGB 15.1 08/12/2024    HCT 44.8 08/12/2024    MCV 95 08/12/2024    MCH 31.9 (H) 08/12/2024    MCHC 33.7 08/12/2024    RDW 12.9 08/12/2024     08/12/2024    MPV 10.0 08/12/2024    IMMGR 0.3 09/12/2022    GRAN 5.4 08/12/2024    IGABS 0.06 (H) 08/12/2024    LYMPH 0.8 (L) 09/12/2022    LYMPH 13.0 (L) 09/12/2022    MONO 0.7 09/12/2022    MONO 11.3 09/12/2022    EOS 0.1 09/12/2022    BASO 0.03 09/12/2022    NRBC 0 09/12/2022    EOSINOPHIL 0.8 09/12/2022    BASOPHIL 0.5 09/12/2022    DIFFMETHOD Automated 09/12/2022       Lab Results   Component Value Date     08/12/2024    K 4.1 08/12/2024     08/12/2024    CO2 25 08/12/2024     (H) 08/12/2024    BUN 16 08/12/2024    CREATININE 1.2 08/12/2024    CALCIUM 10.2 08/12/2024    PROT 7.1 08/12/2024    ALBUMIN 3.6 08/12/2024    BILITOT 0.3 08/12/2024    ALKPHOS 83 08/12/2024    AST 21 08/12/2024    ALT 25 08/12/2024    ANIONGAP 10 08/12/2024    EGFRNORACEVR >60.0 08/12/2024        Lab Results   Component Value Date    PSADIAG 1.8 08/12/2024    PSADIAG 1.6 07/01/2024    PSADIAG 1.2 05/21/2024    PSADIAG 1.2 04/29/2024    PSADIAG 1.1 03/15/2024    PSADIAG 1.4 02/20/2024    PSADIAG 2.3 01/31/2024    PSADIAG 2.2 01/02/2024    PSADIAG 1.7 12/04/2023    PSADIAG 1.5 11/07/2023    PSATOTAL 15.8 (H) 07/05/2022    PSA 11.7 (H) 12/08/2021        Cardiovascular Screening:  Primary care physician: Anthony De La Torre MD      The 10-year ASCVD risk score (Echo DK, et al., 2019) is: 44.3%    Values used to calculate the  score:      Age: 79 years      Sex: Male      Is Non- : No      Diabetic: No      Tobacco smoker: No      Systolic Blood Pressure: 154 mmHg      Is BP treated: Yes      HDL Cholesterol: 50 mg/dL      Total Cholesterol: 224 mg/dL    ASCVD Risk Level: High risk >= 20%    EKG:   Results for orders placed or performed in visit on 10/04/22   EKG 12-lead    Collection Time: 10/04/22 10:51 AM    Narrative    Test Reason : Z01.810,    Vent. Rate : 087 BPM     Atrial Rate : 087 BPM     P-R Int : 166 ms          QRS Dur : 080 ms      QT Int : 362 ms       P-R-T Axes : 061 039 042 degrees     QTc Int : 435 ms    Normal sinus rhythm  Normal ECG  When compared with ECG of 12-SEP-2022 17:34,  No significant change was found  Confirmed by Oswaldo AVILA, Arie NATION ([id number]) on 10/6/2022 7:32:16 AM    Referred By: SHIRA VAZQUEZ           Confirmed By:Arie Chacon MD       High blood pressure = Yes  Antihypertensive agents: diltiaZEM - 300 MG  lisinopriL - 40 MG   Comments:     DM2: No  Antidiabetic agents: This patient does not have an active medication from one of the medication groupers.   Comments:     Hyperlipidemia: Yes  Lipid lowering agents: atorvastatin - 40 MG   Comments:     Antiplatelet therapy: No  Agent: This patient does not have an active medication from one of the medication groupers.   Comment:     Body mass index is 31.41 kg/m².  If greater than 30, referral to nutrition    Lab Results   Component Value Date    CHOL 224 (H) 08/07/2023    LDLCALC 150.4 08/07/2023    HDL 50 08/07/2023    TRIG 118 08/07/2023    HGBA1C 5.4 08/07/2023        Bone Health    Lab Results   Component Value Date    RRDWSEWN15MS 62 08/07/2023        Results for orders placed during the hospital encounter of 02/23/23    DXA Bone Density with Vertebral Fracture    Impression  Normal bone mineral density.    Consider FDA approved medical therapies in postmenopausal women and men aged 50 years and older, based on the  following:    *A hip or vertebral (clinical or morphometric) fracture  *T score less than or equal to -2.5 at the femoral neck or spine after appropriate evaluation to exclude secondary causes.  *Low bone mass -- also known as osteopenia (T score between -1.0 and -2.5 at the femoral neck or spine) and a 10 year probability of hip fracture greater than or equal to 3% or a 10 year probability of major osteoporosis-related fracture greater than or equal to 20% based on the [place]-adapted WHO algorithm.  *Clinicians judgment and/or patient preference may indicate treatment for people with 10 year fracture probabilities is above or below these levels.      Electronically signed by: Anshu Stephens  Date:    02/23/2023  Time:    12:08       Vitamin D: 1000 IU daily  Calcium: Recommend 4 servings of dairy daily     Osteopenia/Osteoporosis: None    Bone strengthening agent: None     Staging Imaging     Results for orders placed during the hospital encounter of 11/09/22    NM PET CT F 18 PYL PSMA, Midthigh to Vertex    Impression  1. Evidence of prostate neoplasm with osseous and pulmonary metastasis.  Detrimental changes noted when comparison is made to 05/16/2022 in particular with progression of several pulmonary nodules and with development of 1 or 2 small pulmonary nodules.  2. A mass is noted inferior to the left lobe of the thyroid gland entirely specific but demonstrating increased uptake similar since the prior  3. A prostate based mass appears to be present with involvement of the bladder and a distended bladder suggestive outlet obstruction      Electronically signed by: John Steele MD  Date:    11/09/2022  Time:    15:31       Results for orders placed during the hospital encounter of 08/31/22    NM Bone Scan Whole Body    Impression  1. Uptake within the anterior right 5th and 6th ribs is most likely related to degenerative changes.  Correlation with plain film or chest CT scan recommended.    2.  Intense uptake  within the medial left pubic bone corresponds to an area of sclerosis seen on the comparison PET-CT scan.  Differential considerations remain to be fibrous dysplasia, Paget's disease or metastasis.    3.  Negative for metastatic pattern of uptake otherwise.  Degenerative pattern of uptake and other incidental findings as noted above.      Electronically signed by: Antonio Mckeon MD  Date:    08/31/2022  Time:    12:35      Results for orders placed during the hospital encounter of 09/29/22    MRI Prostate W W/O Contrast    Impression  Left-sided PI-RADS 5 lesion with extraprostatic extension involving the left-sided neurovascular bundles and seminal vesicles.  Extension of abnormal signal adjacent to the left levator ani, cannot rule out involvement.  Abnormal signal also closely approximates with the anterior rectal wall without definite involvement.    Left pubic lesion concerning for metastasis.    Diffuse heterogeneous bone marrow.    Overall Assessment: PI-RADS 5 - Very high (clinically significant cancer is highly likely to be present)    Number of targets created for potential MR/US fusion biopsy    Transitional/Peripheral zone: 1    This report was flagged in Epic as abnormal.    Electronically signed by resident: Al Castrejon  Date:    09/29/2022  Time:    16:12    Electronically signed by: Shekhar Rodríguez MD  Date:    09/29/2022  Time:    19:08       No results found for this or any previous visit.       I have personally reviewed the above imaging.     Path:   Reviewed pathology as documented above.    Genomic testing:     Germline genetic testing: 3/28/23 - VUS in APC and BMPR1A    Results for orders placed or performed in visit on 03/28/23   Genetic Misc Sendout Test, Blood   Result Value Ref Range    Miscellaneous Genetic Test Name [facility] BRCA + 84/RNA     Genso Specimen Type Blood     Genetic counseling? Yes     Parental or Sibling Testing? No     Test Result See result image under hyperlink      Reference Lab SEE COMMENT         Somatic tumor genotyping: None      ctDNA genotyping:           Diagnoses:     1. Prostate cancer    2. Androgen deprivation therapy    3. Thyroid nodule    4. Bone metastases                            Assessment and Plan:     1. Prostate cancer  Overview:  Patient with de david high-volume metastatic prosatate adenocarcinoma including lung and osseous mets. Started ADT 11/18/23 with apalutamide 6/7/23. Completed RT to the left iliac met 2/7/23. Subsequent oligoprogression to left pubic tubercle. Underwent RT 01/2024 with good PSA response    Assessment & Plan:  PSA minimally elevated today at 1.8 with no progression on PSMA PET CT. Will continue current treatment plan.  - Con't ADT + apalutamide  - ADT with orchiectomy  - Zometa today - patient missed appt, can reschedule next available  - FU with me 3 months and zometa         2. Androgen deprivation therapy  Overview:  Current agent: History of bilateral orchiectomy  Start date:  ADT start 11/2022  Last dose: Orchiectomy 2/15/23  Duration of therapy: Indefefinite    Novel Androgen signaling inhibitors:   Agent: Apalutamide  Start date: 6/7/23          3. Thyroid nodule  Overview:  TI-RADS 5 lesion. Non-diagnostic biopsy 04/2022. FLUS on repeat biopsy 5/8/23.    Assessment & Plan:  - Patient requesting ongoing observation      4. Bone metastases  Overview:  Has left pelvic turbercle metastases noted on PSMA PET CT with associated discomfort. Completed RT on 2/3/23.    Assessment & Plan:  - Continue vitamin D  - Continue zometa                  Follow up:   Route Chart for Scheduling    Med Onc Chart Routing      Follow up with physician 3 months.   Follow up with JENIFFER    Infusion scheduling note   reschedule zometa next available; again in 3 months   Injection scheduling note    Labs CBC, CMP and PSA   Scheduling:  Preferred lab:  Lab interval:     Imaging    Pharmacy appointment    Other referrals                     Therapy Plan  Information  Medications  zoledronic 4 mg/100 mL infusion 4 mg  4 mg, Intravenous, Every 4 weeks  Flushes  sodium chloride 0.9% 250 mL flush bag  Intravenous, Every 4 weeks  sodium chloride 0.9% flush 10 mL  10 mL, Intravenous, Every 4 weeks  heparin, porcine (PF) 100 unit/mL injection flush 500 Units  500 Units, Intravenous, Every 4 weeks  alteplase injection 2 mg  2 mg, Intra-Catheter, Every 4 weeks         The above information has been reviewed with the patient and all questions have been answered to their apparent satisfaction.  They understand that they can call the clinic with any questions.    Darren Carter

## 2024-08-12 NOTE — PLAN OF CARE
Pt. States he has hard time with memory Left today after  vanessa't Had to turn around come back for zometa. Tolerated well.

## 2024-08-12 NOTE — ASSESSMENT & PLAN NOTE
PSA minimally elevated today at 1.8 with no progression on PSMA PET CT. Will continue current treatment plan.  - Con't ADT + apalutamide  - ADT with orchiectomy  - Zometa today - patient missed appt, can reschedule next available  - FU with me 3 months and zometa

## 2024-08-17 DIAGNOSIS — I10 HYPERTENSION, UNSPECIFIED TYPE: ICD-10-CM

## 2024-08-19 RX ORDER — LISINOPRIL 40 MG/1
40 TABLET ORAL
Qty: 90 TABLET | Refills: 3 | Status: SHIPPED | OUTPATIENT
Start: 2024-08-19

## 2024-09-12 RX ORDER — DILTIAZEM HYDROCHLORIDE 300 MG/1
300 CAPSULE, COATED, EXTENDED RELEASE ORAL DAILY
Qty: 90 CAPSULE | Refills: 3 | Status: CANCELLED | OUTPATIENT
Start: 2024-09-12

## 2024-09-12 NOTE — TELEPHONE ENCOUNTER
----- Message from Cleopatra Brito sent at 9/12/2024  1:50 PM CDT -----  Type:  RX Refill Request    Who Called: pt  Refill or New Rx:refill  RX Name and Strength:diltiaZEM (CARDIZEM CD) 300 MG 24 hr capsule  Preferred Pharmacy with phone number:[facility] - [address]  Local or Mail Order:local  Ordering Provider:jude  Would the patient rather a call back or a response via [facility]? call  Best Call Back Number: [phone]  Additional Information: states [facility] saying [facility] wont fill medication due to being an old script would like call from office

## 2024-09-12 NOTE — TELEPHONE ENCOUNTER
No care due was identified.  [facility] Embedded Care Due Messages. Reference number: [id number].   9/12/2024 1:55:54 PM CDT

## 2024-09-12 NOTE — TELEPHONE ENCOUNTER
Pt is confused with cardizem rx  He would like us to call [facility] to see if the insurance will continue to pay for it. He should have an active rx available     I advised we will work on it Friday and notify him

## 2024-09-13 NOTE — TELEPHONE ENCOUNTER
I spoke with [facility]  The last fill was June 23 #90 at [facility]     [facility] will refill this rx   Pt notified

## 2024-10-04 ENCOUNTER — OFFICE VISIT (OUTPATIENT)
Dept: DERMATOLOGY | Facility: CLINIC | Age: 79
End: 2024-10-04
Payer: MEDICARE

## 2024-10-04 ENCOUNTER — TELEPHONE (OUTPATIENT)
Dept: DERMATOLOGY | Facility: CLINIC | Age: 79
End: 2024-10-04
Payer: MEDICARE

## 2024-10-04 DIAGNOSIS — L57.0 ACTINIC KERATOSIS: ICD-10-CM

## 2024-10-04 DIAGNOSIS — Z12.83 SKIN CANCER SCREENING: Primary | ICD-10-CM

## 2024-10-04 DIAGNOSIS — L85.3 XEROSIS CUTIS: ICD-10-CM

## 2024-10-04 DIAGNOSIS — D18.01 CHERRY ANGIOMA: ICD-10-CM

## 2024-10-04 DIAGNOSIS — D48.5 NEOPLASM OF UNCERTAIN BEHAVIOR OF SKIN: ICD-10-CM

## 2024-10-04 DIAGNOSIS — L82.1 SEBORRHEIC KERATOSES: ICD-10-CM

## 2024-10-04 PROCEDURE — 99999 PR PBB SHADOW E&M-EST. PATIENT-LVL III: CPT | Mod: PBBFAC,,, | Performed by: DERMATOLOGY

## 2024-10-04 NOTE — PROGRESS NOTES
Subjective:      Patient ID:  Noah Lopez is a 79 y.o. male who presents for   Chief Complaint   Patient presents with    Skin Check    Lesion     Lesion      Patient here for Total Body Skin Exam  Last seen by dermatologist: never    Patient with specific complaint of lesion(s)  Location: Right forearm  Duration: unknown  Symptoms: none  Relieving factors/Previous treatments: bx by PCP - no further tx.     Final Pathologic Diagnosis   Date Value Ref Range Status   06/21/2024   Final    Skin, right forearm, shave biopsy:  -SQUAMOUS CELL CARCINOMA IN-SITU, EXTENDING TO THE PERIPHERAL BIOPSY EDGES AND DEEP BIOPSY EDGE    This lesion is skin cancer. You will be contacted regarding treatment.       Comment:     Interp By Nakul Pollock M.D., Signed on 06/25/2024 at 11:01       Pt with hx of metastatic prostate cancer.  Otherwise notes a painful spot on back of right upper arm, and annoying growth on the right clavicle.    Review of Systems   Skin:  Positive for recent sunburn and wears hat. Negative for daily sunscreen use and activity-related sunscreen use.   Hematologic/Lymphatic: Bruises/bleeds easily.       Objective:   Physical Exam   Constitutional: He appears well-developed and well-nourished. No distress.   Neurological: He is alert and oriented to person, place, and time. He is not disoriented.   Psychiatric: He has a normal mood and affect.   Skin:   Areas Examined (abnormalities noted in diagram):   Scalp / Hair Palpated and Inspected  Head / Face Inspection Performed  Neck Inspection Performed  Chest / Axilla Inspection Performed  Abdomen Inspection Performed  Genitals / Buttocks / Groin Inspection Performed  Back Inspection Performed  RUE Inspected  LUE Inspection Performed  RLE Inspected  LLE Inspection Performed  Nails and Digits Inspection Performed            Diagram Legend     Erythematous scaling macule/papule c/w actinic keratosis       Vascular papule c/w angioma      Pigmented verrucoid  papule/plaque c/w seborrheic keratosis      Yellow umbilicated papule c/w sebaceous hyperplasia      Irregularly shaped tan macule c/w lentigo     1-2 mm smooth white papules consistent with Milia      Movable subcutaneous cyst with punctum c/w epidermal inclusion cyst      Subcutaneous movable cyst c/w pilar cyst      Firm pink to brown papule c/w dermatofibroma      Pedunculated fleshy papule(s) c/w skin tag(s)      Evenly pigmented macule c/w junctional nevus     Mildly variegated pigmented, slightly irregular-bordered macule c/w mildly atypical nevus      Flesh colored to evenly pigmented papule c/w intradermal nevus       Pink pearly papule/plaque c/w basal cell carcinoma      Erythematous hyperkeratotic cursted plaque c/w SCC      Surgical scar with no sign of skin cancer recurrence      Open and closed comedones      Inflammatory papules and pustules      Verrucoid papule consistent consistent with wart     Erythematous eczematous patches and plaques     Dystrophic onycholytic nail with subungual debris c/w onychomycosis     Umbilicated papule    Erythematous-base heme-crusted tan verrucoid plaque consistent with inflamed seborrheic keratosis     Erythematous Silvery Scaling Plaque c/w Psoriasis     See annotation      Right upper arm      Right forearm    Assessment / Plan:      Pathology Orders:       Normal Orders This Visit    Specimen to Pathology, Dermatology     Comments:    Number of Specimens:->2  ------------------------->-------------------------  Spec 1 Procedure:->Biopsy  Spec 1 Clinical Impression:->r/o SCCIS possibly has been  biopsied before  Spec 1 Source:->right forearm  ------------------------->-------------------------  Spec 2 Procedure:->Biopsy  Spec 2 Clinical Impression:->scaly painful plaque r/o SCCIS  Spec 2 Source:->right upper arm  Release to patient->Immediate  Send normal result to authorizing provider's In Basket if  patient is active on MyChart:->Yes    Questions:    Procedure  Type: Dermatology and skin neoplasms    Number of Specimens: 2    ------------------------: -------------------------    Spec 1 Procedure: Biopsy    Spec 1 Clinical Impression: r/o SCCIS possibly has been biopsied before    Spec 1 Source: right forearm    ------------------------: -------------------------    Spec 2 Procedure: Biopsy    Spec 2 Clinical Impression: scaly painful plaque r/o SCCIS    Spec 2 Source: right upper arm    Clinical Information: non healing lesions    Release to patient: Immediate    Send normal result to authorizing provider's In Basket if patient is active on MyChart: Yes          Skin cancer screening    Total body skin examination performed today including at least 12 points as noted in physical examination. Suspicious lesions noted.    Recommend daily sun protection/avoidance, use of at least SPF 30, broad spectrum sunscreen (OTC drug), skin self examinations, and routine physician surveillance to optimize early detection    Neoplasm of uncertain behavior of skin  -     Specimen to Pathology, Dermatology  Right upper arm and right forearm  Shave biopsy procedure note:    Shave biopsy performed after verbal consent including risk of infection, scar, recurrence, need for additional treatment of site. Area prepped with alcohol, anesthetized with approximately 1.0cc of 1% lidocaine with epinephrine. Lesional tissue shaved with razor blade. Hemostasis achieved with application of aluminum chloride followed by hyfrecation. No complications. Dressing applied. Wound care explained.    *no photograph taken from PCP biopsy in June; will rebiopsy area of concern on right forearm today.    Seborrheic keratoses  These are benign inherited growths without a malignant potential. Reassurance given to patient. No treatment is necessary.     Cherry angioma  This is a benign vascular lesion. Reassurance given. No treatment required.     Actinic keratosis  Cryosurgery Procedure Note    Verbal consent from  the patient is obtained including, but not limited to, risk of hypopigmentation/hyperpigmentation, scar, recurrence of lesion. The patient is aware of the precancerous quality and need for treatment of these lesions. Liquid nitrogen cryosurgery is applied to the 1 actinic keratoses, as detailed in the physical exam, to produce a freeze injury. The patient is aware that blisters may form and is instructed on wound care with gentle cleansing and use of vaseline ointment to keep moist until healed. The patient is supplied a handout on cryosurgery and is instructed to call if lesions do not completely resolve.         Xerosis legs  Good skin care regimen discussed including limiting to one bath or shower/day, using lukewarm water with mild soap and moisturizing cream to skin 1 - 2x/day. Brochure was provided and reviewed with patient.      No follow-ups on file. 1 yr

## 2024-10-10 ENCOUNTER — TELEPHONE (OUTPATIENT)
Dept: DERMATOLOGY | Facility: CLINIC | Age: 79
End: 2024-10-10
Payer: MEDICARE

## 2024-10-10 NOTE — TELEPHONE ENCOUNTER
----- Message from Nupur Fonseca MD sent at 10/9/2024  4:18 PM CDT -----    1. Skin,  Right forearm,  shave biopsy:   - SQUAMOUS CELL CARCINOMA IN SITU   - The tumor involves the peripheral tissue edge.  The deep tissue edge is uninvolved by tumor.    This lesion is skin cancer. You will be contacted regarding treatment.      2. Skin,  Right upper arm,  shave biopsy:   - SQUAMOUS CELL CARCINOMA IN SITU   - The tumor involves the peripheral tissue edge.  The deep tissue edge is uninvolved by tumor.    This lesion is skin cancer. You will be contacted regarding treatment.    Please notify patient of results and schedule for ED&C of both.

## 2024-11-12 ENCOUNTER — INFUSION (OUTPATIENT)
Dept: INFUSION THERAPY | Facility: HOSPITAL | Age: 79
End: 2024-11-12
Attending: HOSPITALIST
Payer: MEDICARE

## 2024-11-12 ENCOUNTER — LAB VISIT (OUTPATIENT)
Dept: LAB | Facility: HOSPITAL | Age: 79
End: 2024-11-12
Attending: HOSPITALIST
Payer: MEDICARE

## 2024-11-12 ENCOUNTER — OFFICE VISIT (OUTPATIENT)
Dept: HEMATOLOGY/ONCOLOGY | Facility: CLINIC | Age: 79
End: 2024-11-12
Payer: MEDICARE

## 2024-11-12 VITALS — HEIGHT: 73 IN | WEIGHT: 239.63 LBS | BODY MASS INDEX: 31.76 KG/M2

## 2024-11-12 VITALS
WEIGHT: 239.63 LBS | TEMPERATURE: 98 F | DIASTOLIC BLOOD PRESSURE: 73 MMHG | OXYGEN SATURATION: 97 % | HEART RATE: 81 BPM | SYSTOLIC BLOOD PRESSURE: 159 MMHG | RESPIRATION RATE: 18 BRPM | BODY MASS INDEX: 31.62 KG/M2

## 2024-11-12 DIAGNOSIS — G89.3 CANCER ASSOCIATED PAIN: ICD-10-CM

## 2024-11-12 DIAGNOSIS — Z79.899 LONG TERM CURRENT USE OF THERAPEUTIC DRUG: ICD-10-CM

## 2024-11-12 DIAGNOSIS — I10 HYPERTENSION, UNSPECIFIED TYPE: ICD-10-CM

## 2024-11-12 DIAGNOSIS — E78.5 HYPERLIPIDEMIA, UNSPECIFIED HYPERLIPIDEMIA TYPE: ICD-10-CM

## 2024-11-12 DIAGNOSIS — C79.51 MALIGNANT NEOPLASM METASTATIC TO BONE: ICD-10-CM

## 2024-11-12 DIAGNOSIS — Z79.818 ANDROGEN DEPRIVATION THERAPY: ICD-10-CM

## 2024-11-12 DIAGNOSIS — C61 PROSTATE CANCER: ICD-10-CM

## 2024-11-12 DIAGNOSIS — C61 PROSTATE CANCER: Primary | ICD-10-CM

## 2024-11-12 DIAGNOSIS — C79.51 MALIGNANT NEOPLASM METASTATIC TO BONE: Primary | ICD-10-CM

## 2024-11-12 LAB
ALBUMIN SERPL BCP-MCNC: 3.4 G/DL (ref 3.5–5.2)
ALP SERPL-CCNC: 79 U/L (ref 40–150)
ALT SERPL W/O P-5'-P-CCNC: 24 U/L (ref 10–44)
ANION GAP SERPL CALC-SCNC: 9 MMOL/L (ref 8–16)
AST SERPL-CCNC: 23 U/L (ref 10–40)
BILIRUB SERPL-MCNC: 0.3 MG/DL (ref 0.1–1)
BUN SERPL-MCNC: 16 MG/DL (ref 8–23)
CALCIUM SERPL-MCNC: 9.9 MG/DL (ref 8.7–10.5)
CHLORIDE SERPL-SCNC: 107 MMOL/L (ref 95–110)
CO2 SERPL-SCNC: 26 MMOL/L (ref 23–29)
COMPLEXED PSA SERPL-MCNC: 2.4 NG/ML (ref 0–4)
CREAT SERPL-MCNC: 1.2 MG/DL (ref 0.5–1.4)
ERYTHROCYTE [DISTWIDTH] IN BLOOD BY AUTOMATED COUNT: 12.8 % (ref 11.5–14.5)
EST. GFR  (NO RACE VARIABLE): >60 ML/MIN/1.73 M^2
GLUCOSE SERPL-MCNC: 236 MG/DL (ref 70–110)
HCT VFR BLD AUTO: 44.8 % (ref 40–54)
HGB BLD-MCNC: 14.9 G/DL (ref 14–18)
IMM GRANULOCYTES # BLD AUTO: 0.13 K/UL (ref 0–0.04)
MCH RBC QN AUTO: 31.6 PG (ref 27–31)
MCHC RBC AUTO-ENTMCNC: 33.3 G/DL (ref 32–36)
MCV RBC AUTO: 95 FL (ref 82–98)
NEUTROPHILS # BLD AUTO: 4.4 K/UL (ref 1.8–7.7)
PLATELET # BLD AUTO: 223 K/UL (ref 150–450)
PMV BLD AUTO: 9.8 FL (ref 9.2–12.9)
POTASSIUM SERPL-SCNC: 4.1 MMOL/L (ref 3.5–5.1)
PROT SERPL-MCNC: 6.9 G/DL (ref 6–8.4)
RBC # BLD AUTO: 4.71 M/UL (ref 4.6–6.2)
SODIUM SERPL-SCNC: 142 MMOL/L (ref 136–145)
WBC # BLD AUTO: 5.94 K/UL (ref 3.9–12.7)

## 2024-11-12 PROCEDURE — 84153 ASSAY OF PSA TOTAL: CPT | Performed by: HOSPITALIST

## 2024-11-12 PROCEDURE — 99999 PR PBB SHADOW E&M-EST. PATIENT-LVL V: CPT | Mod: PBBFAC,,, | Performed by: HOSPITALIST

## 2024-11-12 PROCEDURE — 3078F DIAST BP <80 MM HG: CPT | Mod: CPTII,S$GLB,, | Performed by: HOSPITALIST

## 2024-11-12 PROCEDURE — 3288F FALL RISK ASSESSMENT DOCD: CPT | Mod: CPTII,S$GLB,, | Performed by: HOSPITALIST

## 2024-11-12 PROCEDURE — 1126F AMNT PAIN NOTED NONE PRSNT: CPT | Mod: CPTII,S$GLB,, | Performed by: HOSPITALIST

## 2024-11-12 PROCEDURE — 85027 COMPLETE CBC AUTOMATED: CPT | Performed by: HOSPITALIST

## 2024-11-12 PROCEDURE — G2211 COMPLEX E/M VISIT ADD ON: HCPCS | Mod: S$GLB,,, | Performed by: HOSPITALIST

## 2024-11-12 PROCEDURE — 63600175 PHARM REV CODE 636 W HCPCS: Performed by: HOSPITALIST

## 2024-11-12 PROCEDURE — 99215 OFFICE O/P EST HI 40 MIN: CPT | Mod: S$GLB,,, | Performed by: HOSPITALIST

## 2024-11-12 PROCEDURE — 96374 THER/PROPH/DIAG INJ IV PUSH: CPT

## 2024-11-12 PROCEDURE — 80053 COMPREHEN METABOLIC PANEL: CPT | Performed by: HOSPITALIST

## 2024-11-12 PROCEDURE — 36415 COLL VENOUS BLD VENIPUNCTURE: CPT | Performed by: HOSPITALIST

## 2024-11-12 PROCEDURE — 1101F PT FALLS ASSESS-DOCD LE1/YR: CPT | Mod: CPTII,S$GLB,, | Performed by: HOSPITALIST

## 2024-11-12 PROCEDURE — 3077F SYST BP >= 140 MM HG: CPT | Mod: CPTII,S$GLB,, | Performed by: HOSPITALIST

## 2024-11-12 PROCEDURE — 1159F MED LIST DOCD IN RCRD: CPT | Mod: CPTII,S$GLB,, | Performed by: HOSPITALIST

## 2024-11-12 RX ORDER — HEPARIN 100 UNIT/ML
500 SYRINGE INTRAVENOUS
OUTPATIENT
Start: 2024-12-03

## 2024-11-12 RX ORDER — SODIUM CHLORIDE 0.9 % (FLUSH) 0.9 %
10 SYRINGE (ML) INJECTION
OUTPATIENT
Start: 2024-12-03

## 2024-11-12 RX ORDER — SODIUM CHLORIDE 0.9 % (FLUSH) 0.9 %
10 SYRINGE (ML) INJECTION
Status: DISCONTINUED | OUTPATIENT
Start: 2024-11-12 | End: 2024-11-12 | Stop reason: HOSPADM

## 2024-11-12 RX ORDER — HEPARIN 100 UNIT/ML
500 SYRINGE INTRAVENOUS
Status: DISCONTINUED | OUTPATIENT
Start: 2024-11-12 | End: 2024-11-12 | Stop reason: HOSPADM

## 2024-11-12 RX ORDER — ZOLEDRONIC ACID 0.04 MG/ML
4 INJECTION, SOLUTION INTRAVENOUS
Status: COMPLETED | OUTPATIENT
Start: 2024-11-12 | End: 2024-11-12

## 2024-11-12 RX ORDER — ZOLEDRONIC ACID 0.04 MG/ML
4 INJECTION, SOLUTION INTRAVENOUS
OUTPATIENT
Start: 2024-12-03

## 2024-11-12 RX ADMIN — ZOLEDRONIC ACID 4 MG: 0.04 INJECTION, SOLUTION INTRAVENOUS at 02:11

## 2024-11-12 NOTE — PROGRESS NOTES
[facility] - FOLLOW UP VISIT     Best Contact Phone Number(s): [phone] (home)       Cancer/Stage/TNM:    Cancer Staging   Prostate cancer  Staging form: Prostate, AJCC 8th Edition  - Clinical: Stage IVB (cT3, cNX, cM1c, Grade Group: 5) - Signed by Darren Carter MD on 11/18/2022        Reason for visit: De david metastatic prostate cancer with lung and osseous mets sp orchiectomy and on apalutamide.    Molecular:  Tempus xF TP53  Germline genetics 3/28/23 - VUS in APC and BMPR1A    Densitometry:  02/23/23 - Normal    Treatment:  11/2022 -    ADT  06/2023 -   Apalutamide  02/2023   SBRT to left iliac  02/2023   Orichiectomy  01/2024   SBRT to left pubic ramus    HPI: Noah Lopez is a 79 y.o. 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 He started first line ADT on 11/18/22 with addition of apalutamide 06/2023. He completed SBRT to the left iliac lesion 02/2023 and underwent bilateral orchiectomy 2/15/23. He was found to have rising PSA c/f mCRPC 12/2023 with oligoprogessino to the left left pubic ramus on PSMA PET. He underwent palliative RT to the left pubic ramus 1/18/24. He presents to medical oncology clinic for routine follow up.    History has been obtained by chart review and discussion with the patient.    Interval Events:     Overall feels well. Energy is fair - remaisn at his baseline level of activity and still without any pelvic pain. Ongoing hot flashes not problematic; slightly improving. No signficant changes to his urinary habits. No other concerns.     Oncology History   Prostate cancer   12/8/2021 Tumor Markers    PSA 11.7     1/2022 Notable Event    ED visit for urinary retention found on abodminal US. Jones placed.     3/2022 Notable Event    Seen in ED for fall. Incidentally found to have suspicious thyroid nodule     4/20/2022 Biopsy    FNA of thyroid  nodule nondiagnostic. Also found to have new SHANNAN lung nodules.     5/16/2022 Imaging Significant Findings    FDG PET-CT in setting of pulmonary nodules:  1.  Two solid left upper lobe pulmonary nodules with no appreciable FDG uptake.  Malignancy is not excluded, and the larger nodule is likely amenable to percutaneous biopsy. Alternatively, continued surveillance by dedicated chest CT alone, i.e., without FDG PET, may be considered.     2.  Focal FDG uptake in the left prostate gland with extracapsular extension.  These findings are concerning for malignancy.  Correlation with PSA and urology evaluation recommended.  MRI of the prostate may also be helpful for further evaluation.     3.  Abnormal appearance of the left symphysis pubis with hypermetabolic activity.  Differential considerations include fibrous dysplasia, Paget's disease, and metastasis.   MRI or bone algorithm CT may be informative.     4.  Incidental focal uptake in the right cerebellum on at least 2 PET slices.  Recommend brain MRI for further evaluation.     5.  2.2 cm left inferior thyroid lobe nodule.  This nodule was recently biopsied with abnormal results.  Please refer to pathology report.     5/25/2022 Tumor Markers    PSA 17.3     7/6/2022 Biopsy    Attempted SHANNAN nodule biopsy     8/11/2022 Biopsy    TRUS biopsy: Prostate adenocarcinoma up to 4+3 disease in 10/18 cores all from the left side. Associated cribriform glands and PNI.       8/31/2022 Imaging Significant Findings    Bone scan:  1. Uptake within the anterior right 5th and 6th ribs is most likely related to degenerative changes.  Correlation with plain film or chest CT scan recommended.     2.  Intense uptake within the medial left pubic bone corresponds to an area of sclerosis seen on the comparison PET-CT scan.  Differential considerations remain to be fibrous dysplasia, Paget's disease or metastasis.     3.  Negative for metastatic pattern of uptake otherwise.  Degenerative  pattern of uptake and other incidental findings as noted above.     9/29/2022 Imaging Significant Findings    MRI prostate: Left-sided PI-RADS 5 lesion with extraprostatic extension involving the left-sided neurovascular bundles and seminal vesicles.  Extension of abnormal signal adjacent to the left levator ani, cannot rule out involvement.  Abnormal signal also closely approximates with the anterior rectal wall without definite involvement.     Left pubic lesion concerning for metastasis.     10/17/2022 Notable Event    Declined pubic bone biopsy to potentially confirm metastatic prostate cancer     10/26/2022 Procedure    TURPT - pathology review with up to 4+5 disease     11/9/2022 Imaging Significant Findings    PSMA PET-CT shows evidence of osseous and pulmonary metastatic disease     11/18/2022 -  Hormone Therapy    Start Lupron 22.5mg IM; plan to start apalutamide     2/1/2023 - 2/7/2023 Radiation Therapy    Treating physician: Jaswinder Doyle    Course: C1 Pelvis 2023    Treatment Site Ref. ID Energy Dose/Fx (Gy) #Fx Dose Correction (Gy) Total Dose (Gy) Start Date End Date Elapsed Days   3D Iliac_Lt Lt Iliac 18X 4 5 / 5 0 20 2/1/2023 2/7/2023 6        3/28/2023 Genetic Testing    Germline genetic testing. No pathogenic mutations. VUS in APC and BMPR1A     1/2/2024 Imaging Significant Findings     PSMA PET CT   Impression:  - Interval treatment response.  Decreased size and radiotracer uptake of the prostate remnant.  Previously described pulmonary nodules have either resolved or decreased in size compared to prior exam.  - Mixed interval changes within the left pubic bone lesion with decreased involvement of the inferior pubic ramus and enlarged lytic component within the superior pubic ramus.  Interval resolution of prior uptake within the left ischial tuberosity.  - Stable nodule within the superior mediastinum projecting inferiorly from the left thyroid.  Recommend correlation with prior biopsy.  - No  new tracer avid lesion.     1/18/2024 - 1/31/2024 Radiation Therapy    Treating physician: lisa childs  Course: C2 Pelvis 2024    Treatment Site Ref. ID Energy Dose/Fx (Gy) #Fx Dose Correction (Gy) Total Dose (Gy) Start Date End Date Elapsed Days   3D Pelvis NormEZCalc 18X 3 10 / 10 0 30 1/18/2024 1/31/2024 13            1/30/2024 Imaging Significant Findings    CT a/p 1/30/24  1. Surgical changes of TURP and bilateral orchiectomy.  There is remnant prostate with residual disease better assessed by PSMA or MRI.  2. *Enlarging metastasis in the left pubic/superior pubic ramus bone.  There is cortical breakthrough involving both the anterior and posterior cortex with tumoral replacement.  Correlation advised.  Cholelithiasis.     8/8/2024 Imaging Significant Findings    PSMA PET CT 8/8/24  Impression:  1. In this patient with metastatic prostate cancer there is mild soft tissue thickening and increased radiotracer uptake in the left aspect of the prostate, possibly corresponding to seminal vesicle.  Attention on follow-up.  2. Interval decreased radiotracer uptake within metastatic lesion within the left pubic bone.  3. Additional findings, as above.           Past Medical History:   Diagnosis Date    Colon polyp 09/06/2018    Hyperlipidemia     Hypertension     Prostate cancer     Stage 3a chronic kidney disease 6/21/2023    Urinary tract infection with hematuria 11/18/2022         Past Surgical History:   Procedure Laterality Date    COLONOSCOPY  2010    COLONOSCOPY N/A 9/6/2018    Procedure: COLONOSCOPY Suprep PLEASE TEXT PATIENT WITH ARRIVAL TIME;  Surgeon: Niharika Arce MD;  Location: [facility];  Service: Endoscopy;  Laterality: N/A;    CYSTOSCOPY WITH URODYNAMIC TESTING N/A 1/31/2022    Procedure: CYSTOSCOPY, WITH URODYNAMIC TESTING FLOUROSCOPIC;  Surgeon: Anthony Maloney MD;  Location: [address];  Service: Urology;  Laterality: N/A;  1hr    KNEE ARTHROSCOPY      ORCHIECTOMY Bilateral  2/15/2023    Procedure: Bilateral simple orchiectomy;  Surgeon: Helena Luis MD;  Location: [facility] OR;  Service: Urology;  Laterality: Bilateral;    SPINE SURGERY      l-spine    TRANSURETHRAL RESECTION OF PROSTATE N/A 10/26/2022    Procedure: TURP (TRANSURETHRAL RESECTION OF PROSTATE);  Surgeon: Helena Luis MD;  Location: [facility] OR;  Service: Urology;  Laterality: N/A;         I have reviewed and updated the patient's past medical, surgical, family and social histories.     Review of patient's allergies indicates:   Allergen Reactions    Ciprofloxacin Other (See Comments)     tendonitis         Current Outpatient Medications   Medication Sig Dispense Refill    apalutamide (ERLEADA) 60 mg Tab Take 240 mg by mouth once daily. 120 tablet 11    ascorbic acid, vitamin C, (VITAMIN C) 1000 MG tablet Take 1,000 mg by mouth once daily.      atorvastatin (LIPITOR) 40 MG tablet TAKE 1 TABLET BY MOUTH ONCE  DAILY 90 tablet 3    calcium carbonate (OS-TYREE) 500 mg calcium (1,250 mg) tablet Take 2 tablets (1,000 mg total) by mouth once daily. 60 tablet 11    cholecalciferol, vitamin D3, (VITAMIN D3) 25 mcg (1,000 unit) capsule Take 1 capsule (1,000 Units total) by mouth once daily. 30 capsule 11    clotrimazole-betamethasone 1-0.05% (LOTRISONE) cream Apply topically 2 (two) times daily. 60 g 3    cyclobenzaprine (FLEXERIL) 10 MG tablet Take 1 tablet (10 mg total) by mouth 3 (three) times daily as needed for Muscle spasms. 60 tablet 0    diltiaZEM (CARDIZEM CD) 300 MG 24 hr capsule Take 1 capsule (300 mg total) by mouth once daily. 90 capsule 3    docusate sodium (COLACE) 250 MG capsule Take 250 mg by mouth once daily.      finasteride (PROSCAR) 5 mg tablet TAKE 1 TABLET BY MOUTH ONCE  DAILY 90 tablet 2    GAS RELIEF 80, SIMETHICONE, ORAL Take by mouth.      HYDROcodone-acetaminophen (NORCO) 5-325 mg per tablet Take 1 tablet by mouth every 8 (eight) hours as needed for Pain. 30 tablet 0    lisinopriL (PRINIVIL,ZESTRIL) 40 MG  tablet TAKE 1 TABLET BY MOUTH ONCE  DAILY 90 tablet 3    miconazole (MICOTIN) 2 % cream Apply topically 2 (two) times daily. Apply to tip of penis prn irritation 28 g 0    multivit-min-FA-lycopen-lutein (MEN 50 PLUS MULTIVITAMIN) 300-600-300 mcg Tab as directed Orally      multivitamin capsule Take 1 capsule by mouth once daily.      ofloxacin (FLOXIN) 0.3 % otic solution Apply 10 drops into right ear once a day for 10 days.  Please dispense generic.      omega-3 fatty acids/fish oil (FISH OIL-OMEGA-3 FATTY ACIDS) 300-1,000 mg capsule Take 1 capsule by mouth once daily.      ondansetron (ZOFRAN) 8 MG tablet Take 1 tablet (8 mg total) by mouth every 8 (eight) hours as needed for Nausea. 30 tablet 0    oxyCODONE (ROXICODONE) 5 MG immediate release tablet Take 1 tablet (5 mg total) by mouth every 4 (four) hours as needed for Pain. 42 tablet 0    tamsulosin (FLOMAX) 0.4 mg Cap Take 1 capsule by mouth once daily 90 capsule 3    traZODone (DESYREL) 100 MG tablet TAKE 2 TABLETS BY MOUTH EVERY DAY AT BEDTIME AS NEEDED FOR INSOMNIA 180 tablet 3    TURMERIC ORAL Take by mouth.      wheat dextrin (BENEFIBER CLEAR SF, DEXTRIN, ORAL) Take by mouth.       No current facility-administered medications for this visit.     Facility-Administered Medications Ordered in Other Visits   Medication Dose Route Frequency Provider Last Rate Last Admin    0.9% NaCl 250 mL flush bag   Intravenous PRN Darren Carter MD        alteplase injection 2 mg  2 mg Intra-Catheter PRN Darren Carter MD        heparin, porcine (PF) 100 unit/mL injection flush 500 Units  500 Units Intravenous PRN Darren Carter MD        sodium chloride 0.9% flush 10 mL  10 mL Intravenous PRN Darren Carter MD        zoledronic 4 mg/100 mL infusion 4 mg  4 mg Intravenous 1 time in Clinic/HOD Darren Carter MD            Objective:      Physical Exam:   BP (!) 159/73 (BP Location: Left arm, Patient Position: Sitting)   Pulse 81   Temp 98 °F (36.7 °C)    Resp 18   Wt 108.7 kg (239 lb 10.2 oz)   SpO2 97%   BMI 31.62 kg/m²       ECOG PS: 0             Physical Exam  Constitutional:       General: He is not in acute distress.     Appearance: Normal appearance.   HENT:      Head: Normocephalic.   Eyes:      General: No scleral icterus.     Extraocular Movements: Extraocular movements intact.      Conjunctiva/sclera: Conjunctivae normal.   Cardiovascular:      Rate and Rhythm: Normal rate.   Pulmonary:      Effort: Pulmonary effort is normal. No respiratory distress.   Abdominal:      General: There is no distension.      Palpations: Abdomen is soft.   Musculoskeletal:      Right lower leg: No edema.      Left lower leg: No edema.   Skin:     General: Skin is warm and dry.   Neurological:      Mental Status: He is alert and oriented to person, place, and time.      Motor: No weakness.   Psychiatric:         Mood and Affect: Mood normal.         Behavior: Behavior normal.         Thought Content: Thought content normal.          Recent Labs:   Lab Results   Component Value Date    WBC 5.94 11/12/2024    RBC 4.71 11/12/2024    HGB 14.9 11/12/2024    HCT 44.8 11/12/2024    MCV 95 11/12/2024    MCH 31.6 (H) 11/12/2024    MCHC 33.3 11/12/2024    RDW 12.8 11/12/2024     11/12/2024    MPV 9.8 11/12/2024    IMMGR 0.3 09/12/2022    GRAN 4.4 11/12/2024    IGABS 0.13 (H) 11/12/2024    LYMPH 0.8 (L) 09/12/2022    LYMPH 13.0 (L) 09/12/2022    MONO 0.7 09/12/2022    MONO 11.3 09/12/2022    EOS 0.1 09/12/2022    BASO 0.03 09/12/2022    NRBC 0 09/12/2022    EOSINOPHIL 0.8 09/12/2022    BASOPHIL 0.5 09/12/2022    DIFFMETHOD Automated 09/12/2022       Lab Results   Component Value Date     11/12/2024    K 4.1 11/12/2024     11/12/2024    CO2 26 11/12/2024     (H) 11/12/2024    BUN 16 11/12/2024    CREATININE 1.2 11/12/2024    CALCIUM 9.9 11/12/2024    PROT 6.9 11/12/2024    ALBUMIN 3.4 (L) 11/12/2024    BILITOT 0.3 11/12/2024    ALKPHOS 79 11/12/2024    AST 23  11/12/2024    ALT 24 11/12/2024    ANIONGAP 9 11/12/2024    EGFRNORACEVR >60.0 11/12/2024        Lab Results   Component Value Date    PSADIAG 2.4 11/12/2024    PSADIAG 1.8 08/12/2024    PSADIAG 1.6 07/01/2024    PSADIAG 1.2 05/21/2024    PSADIAG 1.2 04/29/2024    PSADIAG 1.1 03/15/2024    PSADIAG 1.4 02/20/2024    PSADIAG 2.3 01/31/2024    PSADIAG 2.2 01/02/2024    PSADIAG 1.7 12/04/2023    PSATOTAL 15.8 (H) 07/05/2022    PSA 11.7 (H) 12/08/2021        Cardiovascular Screening:  Primary care physician: Anthony De La Torre MD      The 10-year ASCVD risk score (Echo DK, et al., 2019) is: 46.2%    Values used to calculate the score:      Age: 79 years      Sex: Male      Is Non- : No      Diabetic: No      Tobacco smoker: No      Systolic Blood Pressure: 159 mmHg      Is BP treated: Yes      HDL Cholesterol: 50 mg/dL      Total Cholesterol: 224 mg/dL    ASCVD Risk Level: High risk >= 20%    EKG:   Results for orders placed or performed in visit on 10/04/22   EKG 12-lead    Collection Time: 10/04/22 10:51 AM    Narrative    Test Reason : Z01.810,    Vent. Rate : 087 BPM     Atrial Rate : 087 BPM     P-R Int : 166 ms          QRS Dur : 080 ms      QT Int : 362 ms       P-R-T Axes : 061 039 042 degrees     QTc Int : 435 ms    Normal sinus rhythm  Normal ECG  When compared with ECG of 12-SEP-2022 17:34,  No significant change was found  Confirmed by Arie Chacon MD ([id number]) on 10/6/2022 7:32:16 AM    Referred By: SHIRA VAZQUEZ           Confirmed By:Arie Chacon MD       High blood pressure = Yes  Antihypertensive agents: diltiaZEM - 300 MG  lisinopriL - 40 MG   Comments:     DM2: No  Antidiabetic agents: This patient does not have an active medication from one of the medication groupers.   Comments:     Hyperlipidemia: Yes  Lipid lowering agents: atorvastatin - 40 MG   Comments:     Antiplatelet therapy: No  Agent: This patient does not have an active medication from one of the medication  jennifer.   Comment:     Body mass index is 31.62 kg/m².  If greater than 30, referral to nutrition    Lab Results   Component Value Date    CHOL 224 (H) 08/07/2023    LDLCALC 150.4 08/07/2023    HDL 50 08/07/2023    TRIG 118 08/07/2023    HGBA1C 5.4 08/07/2023        Bone Health    Lab Results   Component Value Date    [id number] 62 08/07/2023        Results for orders placed during the hospital encounter of 02/23/23    DXA Bone Density with Vertebral Fracture    Impression  Normal bone mineral density.    Consider FDA approved medical therapies in postmenopausal women and men aged 50 years and older, based on the following:    *A hip or vertebral (clinical or morphometric) fracture  *T score less than or equal to -2.5 at the femoral neck or spine after appropriate evaluation to exclude secondary causes.  *Low bone mass -- also known as osteopenia (T score between -1.0 and -2.5 at the femoral neck or spine) and a 10 year probability of hip fracture greater than or equal to 3% or a 10 year probability of major osteoporosis-related fracture greater than or equal to 20% based on the [place]-adapted WHO algorithm.  *Clinicians judgment and/or patient preference may indicate treatment for people with 10 year fracture probabilities is above or below these levels.      Electronically signed by: Anshu Stephens  Date:    02/23/2023  Time:    12:08       Vitamin D: 1000 IU daily  Calcium: Recommend 4 servings of dairy daily     Osteopenia/Osteoporosis: None    Bone strengthening agent: None     Staging Imaging     Results for orders placed during the hospital encounter of 11/09/22    NM PET CT F 18 PYL PSMA, Midthigh to Vertex    Impression  1. Evidence of prostate neoplasm with osseous and pulmonary metastasis.  Detrimental changes noted when comparison is made to 05/16/2022 in particular with progression of several pulmonary nodules and with development of 1 or 2 small pulmonary nodules.  2. A mass is noted inferior to the  left lobe of the thyroid gland entirely specific but demonstrating increased uptake similar since the prior  3. A prostate based mass appears to be present with involvement of the bladder and a distended bladder suggestive outlet obstruction      Electronically signed by: John Steele MD  Date:    11/09/2022  Time:    15:31       Results for orders placed during the hospital encounter of 08/31/22    NM Bone Scan Whole Body    Impression  1. Uptake within the anterior right 5th and 6th ribs is most likely related to degenerative changes.  Correlation with plain film or chest CT scan recommended.    2.  Intense uptake within the medial left pubic bone corresponds to an area of sclerosis seen on the comparison PET-CT scan.  Differential considerations remain to be fibrous dysplasia, Paget's disease or metastasis.    3.  Negative for metastatic pattern of uptake otherwise.  Degenerative pattern of uptake and other incidental findings as noted above.      Electronically signed by: Antonio Mckeon MD  Date:    08/31/2022  Time:    12:35      Results for orders placed during the hospital encounter of 09/29/22    MRI Prostate W W/O Contrast    Impression  Left-sided PI-RADS 5 lesion with extraprostatic extension involving the left-sided neurovascular bundles and seminal vesicles.  Extension of abnormal signal adjacent to the left levator ani, cannot rule out involvement.  Abnormal signal also closely approximates with the anterior rectal wall without definite involvement.    Left pubic lesion concerning for metastasis.    Diffuse heterogeneous bone marrow.    Overall Assessment: PI-RADS 5 - Very high (clinically significant cancer is highly likely to be present)    Number of targets created for potential MR/US fusion biopsy    Transitional/Peripheral zone: 1    This report was flagged in Epic as abnormal.    Electronically signed by resident: Al Castrejon  Date:    09/29/2022  Time:    16:12    Electronically signed  by: Shekhar Rodríguez MD  Date:    09/29/2022  Time:    19:08       No results found for this or any previous visit.       I have personally reviewed the above imaging.     Path:   Reviewed pathology as documented above.    Genomic testing:     Germline genetic testing: 3/28/23 - VUS in APC and BMPR1A    Results for orders placed or performed in visit on 03/28/23   Genetic Misc Sendout Test, Blood    Collection Time: 03/28/23 11:11 AM   Result Value Ref Range    Miscellaneous Genetic Test Name [facility] BRCA + 84/RNA     Genso Specimen Type Blood     Genetic counseling? Yes     Parental or Sibling Testing? No     Test Result See result image under hyperlink     Reference Lab SEE COMMENT         Somatic tumor genotyping: None      ctDNA genotyping:           Diagnoses:     1. Prostate cancer    2. Hyperlipidemia, unspecified hyperlipidemia type    3. Hypertension, unspecified type    4. Bone metastases    5. Cancer associated pain    6. Androgen deprivation therapy    7. Long term current use of therapeutic drug                  Assessment and Plan:     1. Prostate cancer  Overview:  Patient with de david high-volume metastatic prosatate adenocarcinoma including lung and osseous mets. Started ADT 11/18/23 with apalutamide 6/7/23. Completed RT to the left iliac met 2/7/23. Subsequent oligoprogression to left pubic tubercle. Underwent RT 01/2024 with good PSA response    Assessment & Plan:  PSA continues to slowly rise. Generally asymptomatic. Concerning for slow progression of his mCRPC  - Con't ADT + apalutamide  - ADT with orchiectomy  - Zometa today  - Repeat DEXA and PSMA PET in 3 months  - FU 3 months with next zometa  - Consider changing to docetaxaxe; pototentially GERA trial on radiologic progression    Orders:  -     DXA Bone Density Axial Skeleton 1 or more sites; Future; Expected date: 11/12/2024  -     NM PET CT GA68 PSMA, midthigh to vertex; Future; Expected date: 11/12/2024    2. Hyperlipidemia, unspecified  hyperlipidemia type  Overview:  On atorvastatin daily      3. Hypertension, unspecified type  Overview:  Home regimen includes diltiazem and lisinopril      4. Bone metastases  Overview:  Has left pelvic turbercle metastases noted on PSMA PET CT with associated discomfort. Completed RT on 2/3/23.    Assessment & Plan:  - Con't vitamin D  - Con't zometa q12 weeks      5. Cancer associated pain  Overview:  Takes hydrocodone-APAP sparingly      6. Androgen deprivation therapy  Overview:  Current agent: History of bilateral orchiectomy  Start date:  ADT start 11/2022  Last dose: Orchiectomy 2/15/23  Duration of therapy: Indefefinite    Novel Androgen signaling inhibitors:   Agent: Apalutamide  Start date: 6/7/23        Orders:  -     DXA Bone Density Axial Skeleton 1 or more sites; Future; Expected date: 11/12/2024    7. Long term current use of therapeutic drug  -     DXA Bone Density Axial Skeleton 1 or more sites; Future; Expected date: 11/12/2024                  Follow up:   Route Chart for Scheduling    Med Onc Chart Routing      Follow up with physician 3 months.   Follow up with JENIFFER    Infusion scheduling note    Injection scheduling note    Labs CBC, CMP and PSA   Scheduling:  Preferred lab:  Lab interval:     Imaging PET scan and DXA scan      Pharmacy appointment    Other referrals                     Therapy Plan Information  ZOLEDRONIC ACID (ZOMETA) IV for Malignant neoplasm metastatic to bone, noted on 12/22/2022  ZOLEDRONIC ACID (ZOMETA) IV for Prostate cancer, noted on 10/26/2022  Medications  zoledronic 4 mg/100 mL infusion 4 mg  4 mg, Intravenous, Every 4 weeks  Flushes  0.9% NaCl 250 mL flush bag  Intravenous, Every 4 weeks  sodium chloride 0.9% flush 10 mL  10 mL, Intravenous, Every 4 weeks  heparin, porcine (PF) 100 unit/mL injection flush 500 Units  500 Units, Intravenous, Every 4 weeks  alteplase injection 2 mg  2 mg, Intra-Catheter, Every 4 weeks      No therapy plan of the specified type  found.    No therapy plan of the specified type found.         The above information has been reviewed with the patient and all questions have been answered to their apparent satisfaction.  They understand that they can call the clinic with any questions.    Darern Carter

## 2024-11-12 NOTE — ASSESSMENT & PLAN NOTE
PSA continues to slowly rise. Generally asymptomatic. Concerning for slow progression of his mCRPC  - Con't ADT + apalutamide  - ADT with orchiectomy  - Zometa today  - Repeat DEXA and PSMA PET in 3 months  - FU 3 months with next zometa  - Consider changing to docetaxaxe; pototentially GERA trial on radiologic progression

## 2024-11-23 ENCOUNTER — TELEPHONE (OUTPATIENT)
Dept: FAMILY MEDICINE | Facility: CLINIC | Age: 79
End: 2024-11-23
Payer: MEDICARE

## 2024-11-25 ENCOUNTER — TELEPHONE (OUTPATIENT)
Dept: DERMATOLOGY | Facility: CLINIC | Age: 79
End: 2024-11-25
Payer: MEDICARE

## 2024-11-25 DIAGNOSIS — R97.20 ELEVATED PSA: ICD-10-CM

## 2024-11-25 NOTE — TELEPHONE ENCOUNTER
----- Message from LIZZY Lopez sent at 11/22/2024  3:40 PM CST -----    ----- Message -----  From: Cleopatra Brito  Sent: 11/22/2024  12:58 PM CST  To: Raymundo Espitia Staff    Type:  Patient Returning Call    Who Called:pt  Who Left Message for Patient:office  Does the patient know what this is regarding?:returning call  Would the patient rather a call back or a response via [facility]? call  Best Call Back Number: [phone]  Additional Information:

## 2024-11-26 NOTE — TELEPHONE ENCOUNTER
No care due was identified.  [facility] Embedded Care Due Messages. Reference number: [id number].   11/25/2024 9:13:02 PM CST

## 2024-11-26 NOTE — TELEPHONE ENCOUNTER
Refill Routing Note   Medication(s) are not appropriate for processing by [facility] for the following reason(s):        Outside of protocol    [facility] action(s):  Route        Medication Therapy Plan: Prostate Cancer is on problem list      Appointments  past 12m or future 3m with PCP    Date Provider   Last Visit   6/21/2024 Anthony De La Torre MD   Next Visit   1/13/2025 Anthony De La Torre MD   ED visits in past 90 days: 0        Note composed:5:54 PM 11/26/2024

## 2024-11-29 RX ORDER — FINASTERIDE 5 MG/1
5 TABLET, FILM COATED ORAL
Qty: 90 TABLET | Refills: 2 | Status: SHIPPED | OUTPATIENT
Start: 2024-11-29

## 2024-12-04 ENCOUNTER — OFFICE VISIT (OUTPATIENT)
Dept: DERMATOLOGY | Facility: CLINIC | Age: 79
End: 2024-12-04
Payer: MEDICARE

## 2024-12-04 DIAGNOSIS — D09.9 SQUAMOUS CELL CARCINOMA IN SITU (SCCIS): Primary | ICD-10-CM

## 2024-12-04 PROCEDURE — 99499 UNLISTED E&M SERVICE: CPT | Mod: S$GLB,,, | Performed by: DERMATOLOGY

## 2024-12-04 PROCEDURE — 17262 DSTRJ MAL LES T/A/L 1.1-2.0: CPT | Mod: S$GLB,,, | Performed by: DERMATOLOGY

## 2024-12-04 PROCEDURE — 99999 PR PBB SHADOW E&M-EST. PATIENT-LVL III: CPT | Mod: PBBFAC,,, | Performed by: DERMATOLOGY

## 2024-12-04 RX ORDER — MUPIROCIN 20 MG/G
OINTMENT TOPICAL 3 TIMES DAILY
Qty: 22 G | Refills: 2 | Status: SHIPPED | OUTPATIENT
Start: 2024-12-04

## 2024-12-04 NOTE — PROGRESS NOTES
Here for electrodesiccation and curettage of SCCIS on the  right forearm and right upper arm. bx done on 10/4/24:       Final Pathologic Diagnosis   Date Value Ref Range Status   10/04/2024   Final    1. Skin,  Right forearm,  shave biopsy:   - SQUAMOUS CELL CARCINOMA IN SITU   - The tumor involves the peripheral tissue edge.  The deep tissue edge is uninvolved by tumor.    This lesion is skin cancer. You will be contacted regarding treatment.      2. Skin,  Right upper arm,  shave biopsy:   - SQUAMOUS CELL CARCINOMA IN SITU   - The tumor involves the peripheral tissue edge.  The deep tissue edge is uninvolved by tumor.    This lesion is skin cancer. You will be contacted regarding treatment.         Comment:     Interp By Elenita Paul MD., Signed on 10/08/2024 at 15:04               Electrodessication and Curettage Procedure note:    Verbal consent obtained. Lesional tissue marked and prepped with alcohol. Lesion anesthetized with 1% lidocaine with epinephrine. Curettage and Desiccation x 3 cycles to base. Aluminum chloride for hemostasis. Lesion size after primary curettage: 1.4 cm right upper arm; 1.3 cm right forearm     Area bandaged and wound care explained.    F/u 3 months

## 2024-12-05 ENCOUNTER — TELEPHONE (OUTPATIENT)
Dept: FAMILY MEDICINE | Facility: CLINIC | Age: 79
End: 2024-12-05
Payer: MEDICARE

## 2024-12-05 DIAGNOSIS — C61 PROSTATE CANCER: ICD-10-CM

## 2024-12-05 DIAGNOSIS — E78.5 HYPERLIPIDEMIA, UNSPECIFIED HYPERLIPIDEMIA TYPE: ICD-10-CM

## 2024-12-05 DIAGNOSIS — I10 HYPERTENSION, UNSPECIFIED TYPE: ICD-10-CM

## 2024-12-05 RX ORDER — DILTIAZEM HYDROCHLORIDE 300 MG/1
300 CAPSULE, COATED, EXTENDED RELEASE ORAL DAILY
Qty: 90 CAPSULE | Refills: 3 | Status: SHIPPED | OUTPATIENT
Start: 2024-12-05

## 2024-12-05 RX ORDER — LISINOPRIL 40 MG/1
40 TABLET ORAL DAILY
Qty: 90 TABLET | Refills: 3 | Status: SHIPPED | OUTPATIENT
Start: 2024-12-05

## 2024-12-05 RX ORDER — TRAZODONE HYDROCHLORIDE 100 MG/1
TABLET ORAL
Qty: 180 TABLET | Refills: 3 | Status: SHIPPED | OUTPATIENT
Start: 2024-12-05

## 2024-12-05 RX ORDER — ATORVASTATIN CALCIUM 40 MG/1
40 TABLET, FILM COATED ORAL NIGHTLY
Qty: 90 TABLET | Refills: 3 | Status: SHIPPED | OUTPATIENT
Start: 2024-12-05

## 2024-12-09 NOTE — TELEPHONE ENCOUNTER
Care Due:                  Date            Visit Type   Department     Provider  --------------------------------------------------------------------------------                                EP -                              PRIMARY      [facility] FAM[facility]  Last Visit: 06-      CARE ([facility])   CHARLES De La Torre                              EP -                              PRIMARY      [facility]  Next Visit: 01-      CARE ([facility])   CHARLES De La Torre                                                            Last  Test          Frequency    Reason                     Performed    Due Date  --------------------------------------------------------------------------------    Lipid Panel.  12 months..  atorvastatin.............  08- 08-    Health [facility] Embedded Care Due Messages. Reference number: [id number].   12/09/2024 1:54:43 PM CST

## 2024-12-09 NOTE — TELEPHONE ENCOUNTER
----- Message from Kathy sent at 12/9/2024  1:38 PM CST -----  Regarding: refill  Contact: self/ walked in  Please cancel the script that went to [facility] and resend to local pharmacy at [facility].  He is out and cannot sleep without it.  TraZODone (DESYREL) 100 MG tablet.  Asking if this could PLEASE be done today.  Call him when done at [phone]

## 2024-12-12 RX ORDER — TRAZODONE HYDROCHLORIDE 100 MG/1
TABLET ORAL
Qty: 180 TABLET | Refills: 3 | Status: SHIPPED | OUTPATIENT
Start: 2024-12-12

## 2024-12-20 NOTE — TELEPHONE ENCOUNTER
Spoke with patient about the time frame of his surgery. Patient voice his understanding. He states he also spoke with his daughter about the time frame of the surgery.  
n/a

## 2025-01-13 ENCOUNTER — OFFICE VISIT (OUTPATIENT)
Dept: FAMILY MEDICINE | Facility: CLINIC | Age: 80
End: 2025-01-13
Payer: MEDICARE

## 2025-01-13 VITALS
BODY MASS INDEX: 31.1 KG/M2 | SYSTOLIC BLOOD PRESSURE: 158 MMHG | DIASTOLIC BLOOD PRESSURE: 78 MMHG | OXYGEN SATURATION: 98 % | HEIGHT: 73 IN | HEART RATE: 98 BPM | WEIGHT: 234.69 LBS | TEMPERATURE: 98 F

## 2025-01-13 DIAGNOSIS — Q04.9: ICD-10-CM

## 2025-01-13 DIAGNOSIS — M47.812 NECK ARTHRITIS: Primary | ICD-10-CM

## 2025-01-13 DIAGNOSIS — G89.3 CANCER ASSOCIATED PAIN: ICD-10-CM

## 2025-01-13 DIAGNOSIS — F41.9 ANXIETY: ICD-10-CM

## 2025-01-13 DIAGNOSIS — I10 HYPERTENSION, UNSPECIFIED TYPE: ICD-10-CM

## 2025-01-13 DIAGNOSIS — Z13.31 POSITIVE DEPRESSION SCREENING: ICD-10-CM

## 2025-01-13 DIAGNOSIS — C78.00 MALIGNANT NEOPLASM METASTATIC TO LUNG, UNSPECIFIED LATERALITY: ICD-10-CM

## 2025-01-13 DIAGNOSIS — F33.1 MODERATE RECURRENT MAJOR DEPRESSION: ICD-10-CM

## 2025-01-13 DIAGNOSIS — C79.51 MALIGNANT NEOPLASM METASTATIC TO BONE: ICD-10-CM

## 2025-01-13 DIAGNOSIS — N18.31 STAGE 3A CHRONIC KIDNEY DISEASE: ICD-10-CM

## 2025-01-13 DIAGNOSIS — G47.00 INSOMNIA, UNSPECIFIED TYPE: ICD-10-CM

## 2025-01-13 PROCEDURE — 3077F SYST BP >= 140 MM HG: CPT | Mod: CPTII,S$GLB,, | Performed by: FAMILY MEDICINE

## 2025-01-13 PROCEDURE — 1159F MED LIST DOCD IN RCRD: CPT | Mod: CPTII,S$GLB,, | Performed by: FAMILY MEDICINE

## 2025-01-13 PROCEDURE — 1126F AMNT PAIN NOTED NONE PRSNT: CPT | Mod: CPTII,S$GLB,, | Performed by: FAMILY MEDICINE

## 2025-01-13 PROCEDURE — 99214 OFFICE O/P EST MOD 30 MIN: CPT | Mod: S$GLB,,, | Performed by: FAMILY MEDICINE

## 2025-01-13 PROCEDURE — 1101F PT FALLS ASSESS-DOCD LE1/YR: CPT | Mod: CPTII,S$GLB,, | Performed by: FAMILY MEDICINE

## 2025-01-13 PROCEDURE — 3078F DIAST BP <80 MM HG: CPT | Mod: CPTII,S$GLB,, | Performed by: FAMILY MEDICINE

## 2025-01-13 PROCEDURE — 3288F FALL RISK ASSESSMENT DOCD: CPT | Mod: CPTII,S$GLB,, | Performed by: FAMILY MEDICINE

## 2025-01-13 PROCEDURE — 1160F RVW MEDS BY RX/DR IN RCRD: CPT | Mod: CPTII,S$GLB,, | Performed by: FAMILY MEDICINE

## 2025-01-13 RX ORDER — CLONAZEPAM 0.5 MG/1
0.5 TABLET ORAL DAILY PRN
Qty: 30 TABLET | Refills: 2 | Status: SHIPPED | OUTPATIENT
Start: 2025-01-13 | End: 2025-01-13

## 2025-01-13 RX ORDER — OXYCODONE HYDROCHLORIDE 5 MG/1
5 TABLET ORAL EVERY 4 HOURS PRN
Qty: 42 TABLET | Refills: 0 | Status: SHIPPED | OUTPATIENT
Start: 2025-01-13

## 2025-01-13 RX ORDER — CYCLOBENZAPRINE HCL 10 MG
10 TABLET ORAL 3 TIMES DAILY PRN
Qty: 60 TABLET | Refills: 0 | Status: SHIPPED | OUTPATIENT
Start: 2025-01-13

## 2025-01-13 RX ORDER — CLOTRIMAZOLE AND BETAMETHASONE DIPROPIONATE 10; .64 MG/G; MG/G
CREAM TOPICAL 2 TIMES DAILY
Qty: 60 G | Refills: 3 | Status: SHIPPED | OUTPATIENT
Start: 2025-01-13

## 2025-01-13 RX ORDER — BUPROPION HYDROCHLORIDE 75 MG/1
75 TABLET ORAL DAILY
Qty: 90 TABLET | Refills: 3 | Status: SHIPPED | OUTPATIENT
Start: 2025-01-13 | End: 2026-01-13

## 2025-01-13 RX ORDER — CLONAZEPAM 0.5 MG/1
0.5 TABLET ORAL NIGHTLY
Qty: 30 TABLET | Refills: 2 | Status: SHIPPED | OUTPATIENT
Start: 2025-01-13 | End: 2026-01-13

## 2025-01-13 NOTE — PROGRESS NOTES
"Subjective:      Patient ID: Noah Lopez is a 80 y.o. male.    Chief Complaint: Follow-up (6 mon)      Vitals:    01/13/25 0929   BP: (!) 158/78   Pulse: 98   Temp: 98.1 °F (36.7 °C)   TempSrc: Oral   SpO2: 98%   Weight: 106.4 kg (234 lb 10.9 oz)   Height: 6' 1" (1.854 m)        HPI   Check up; back from [facility] with daughter  Has belwo Dx  Constipated  Goes to Dr Carter and derm Dr Fonseca for skin cancer  Stage 4B prostate CA        Problem List  Patient Active Problem List   Diagnosis    Hypertension    Screening for malignant neoplasm of colon    Hyperlipidemia    Colon polyp    Hypocontractile bladder    Neck arthritis    Thyroid nodule    Insomnia    Pulmonary metastases    Prostate cancer    Bone metastases    Cannabis abuse    Moderate recurrent major depression    Posttraumatic stress disorder    Anomaly of cerebellum    Aortic atherosclerosis    Stage 3a chronic kidney disease    Androgen deprivation therapy    Infected sebaceous cyst    Anxiety    Cancer associated pain        ALLERGIES:   Review of patient's allergies indicates:   Allergen Reactions    Ciprofloxacin Other (See Comments)     tendonitis       MEDS:   Current Outpatient Medications:     ascorbic acid, vitamin C, (VITAMIN C) 1000 MG tablet, Take 1,000 mg by mouth once daily., Disp: , Rfl:     atorvastatin (LIPITOR) 40 MG tablet, Take 1 tablet (40 mg total) by mouth every evening., Disp: 90 tablet, Rfl: 3    calcium carbonate (OS-TYREE) 500 mg calcium (1,250 mg) tablet, Take 2 tablets (1,000 mg total) by mouth once daily., Disp: 60 tablet, Rfl: 11    cholecalciferol, vitamin D3, (VITAMIN D3) 25 mcg (1,000 unit) capsule, Take 1 capsule (1,000 Units total) by mouth once daily., Disp: 30 capsule, Rfl: 11    diltiaZEM (CARDIZEM CD) 300 MG 24 hr capsule, Take 1 capsule (300 mg total) by mouth once daily., Disp: 90 capsule, Rfl: 3    docusate sodium (COLACE) 250 MG capsule, Take 250 mg by mouth once daily., Disp: , Rfl:     finasteride " (PROSCAR) 5 mg tablet, TAKE 1 TABLET BY MOUTH ONCE  DAILY, Disp: 90 tablet, Rfl: 2    GAS RELIEF 80, SIMETHICONE, ORAL, Take by mouth., Disp: , Rfl:     lisinopriL (PRINIVIL,ZESTRIL) 40 MG tablet, Take 1 tablet (40 mg total) by mouth once daily., Disp: 90 tablet, Rfl: 3    miconazole (MICOTIN) 2 % cream, Apply topically 2 (two) times daily. Apply to tip of penis prn irritation, Disp: 28 g, Rfl: 0    multivit-min-FA-lycopen-lutein (MEN 50 PLUS MULTIVITAMIN) 300-600-300 mcg Tab, as directed Orally, Disp: , Rfl:     multivitamin capsule, Take 1 capsule by mouth once daily., Disp: , Rfl:     mupirocin (BACTROBAN) 2 % ointment, Apply topically 3 (three) times daily. To healing areas, Disp: 22 g, Rfl: 2    ofloxacin (FLOXIN) 0.3 % otic solution, Apply 10 drops into right ear once a day for 10 days.  Please dispense generic., Disp: , Rfl:     omega-3 fatty acids/fish oil (FISH OIL-OMEGA-3 FATTY ACIDS) 300-1,000 mg capsule, Take 1 capsule by mouth once daily., Disp: , Rfl:     ondansetron (ZOFRAN) 8 MG tablet, Take 1 tablet (8 mg total) by mouth every 8 (eight) hours as needed for Nausea., Disp: 30 tablet, Rfl: 0    tamsulosin (FLOMAX) 0.4 mg Cap, Take 1 capsule by mouth once daily, Disp: 90 capsule, Rfl: 3    traZODone (DESYREL) 100 MG tablet, TAKE 2 TABLETS BY MOUTH EVERY DAY AT BEDTIME AS NEEDED FOR INSOMNIA, Disp: 180 tablet, Rfl: 3    TURMERIC ORAL, Take by mouth., Disp: , Rfl:     wheat dextrin (BENEFIBER CLEAR SF, DEXTRIN, ORAL), Take by mouth., Disp: , Rfl:     apalutamide (ERLEADA) 60 mg Tab, TAKE 4 TABLETS BY MOUTH ONCE  DAILY, Disp: 120 tablet, Rfl: 11    buPROPion (WELLBUTRIN) 75 MG tablet, Take 1 tablet (75 mg total) by mouth Daily., Disp: 90 tablet, Rfl: 3    clonazePAM (KLONOPIN) 0.5 MG tablet, Take 1 tablet (0.5 mg total) by mouth every evening., Disp: 30 tablet, Rfl: 2    clotrimazole-betamethasone 1-0.05% (LOTRISONE) cream, Apply topically 2 (two) times daily., Disp: 60 g, Rfl: 3    cyclobenzaprine  (FLEXERIL) 10 MG tablet, Take 1 tablet (10 mg total) by mouth 3 (three) times daily as needed for Muscle spasms., Disp: 60 tablet, Rfl: 0    oxyCODONE (ROXICODONE) 5 MG immediate release tablet, Take 1 tablet (5 mg total) by mouth every 4 (four) hours as needed for Pain., Disp: 42 tablet, Rfl: 0      History:  Current Providers as of 1/13/2025  PCP: Anthony De La Torre MD  Care Team Provider: Yasmeen Hubbard LPN  Care Team Provider: Darren Carter MD  Care Team Provider: Tana Mcmillan RN  Encounter Provider: Anthony De La Torre MD, starting on Mon Jan 13, 2025 12:00 AM  Referring Provider: not found, starting on Mon Jan 13, 2025 12:00 AM  Consulting Physician: Anthony De La Torre MD, starting on Mon Nov 25, 2024 10:16 AM (Active)   Past Medical History:   Diagnosis Date    Colon polyp 09/06/2018    Hyperlipidemia     Hypertension     Prostate cancer     Stage 3a chronic kidney disease 6/21/2023    Urinary tract infection with hematuria 11/18/2022     Past Surgical History:   Procedure Laterality Date    COLONOSCOPY  2010    COLONOSCOPY N/A 9/6/2018    Procedure: COLONOSCOPY Suprep PLEASE TEXT PATIENT WITH ARRIVAL TIME;  Surgeon: Niharika Arce MD;  Location: [facility];  Service: Endoscopy;  Laterality: N/A;    CYSTOSCOPY WITH URODYNAMIC TESTING N/A 1/31/2022    Procedure: CYSTOSCOPY, WITH URODYNAMIC TESTING FLOUROSCOPIC;  Surgeon: Anthony Maloney MD;  Location: [address] [facility];  Service: Urology;  Laterality: N/A;  1hr    KNEE ARTHROSCOPY      ORCHIECTOMY Bilateral 2/15/2023    Procedure: Bilateral simple orchiectomy;  Surgeon: Helena Luis MD;  Location: [facility];  Service: Urology;  Laterality: Bilateral;    SPINE SURGERY      l-spine    TRANSURETHRAL RESECTION OF PROSTATE N/A 10/26/2022    Procedure: TURP (TRANSURETHRAL RESECTION OF PROSTATE);  Surgeon: Helena Luis MD;  Location: [facility];  Service: Urology;  Laterality: N/A;     Social History     Tobacco Use    Smoking status: Former     Current  packs/day: 0.00     Average packs/day: 1.5 packs/day for 20.0 years (30.0 ttl pk-yrs)     Types: Cigarettes     Start date:      Quit date:      Years since quittin.0     Passive exposure: Never    Smokeless tobacco: Never    Tobacco comments:     Age 17 - 37. Up to 1.5 ppd.   Substance Use Topics    Alcohol use: Yes     Comment: Occasional. Few drinks a week.    Drug use: Yes     Types: Marijuana         Review of Systems   Constitutional:  Negative for appetite change, fatigue, fever and unexpected weight change.   HENT:  Negative for congestion, ear pain, sinus pressure and sore throat.    Eyes:  Negative for pain and visual disturbance.   Respiratory:  Negative for shortness of breath.    Cardiovascular:  Negative for chest pain.   Gastrointestinal:  Negative for abdominal pain, constipation and diarrhea.   Endocrine: Negative for polyuria.   Genitourinary:  Negative for difficulty urinating and frequency.   Musculoskeletal:  Negative for arthralgias, back pain and myalgias.   Skin:  Negative for color change.   Allergic/Immunologic: Negative.    Neurological:  Negative for syncope, weakness and headaches.   Hematological:  Does not bruise/bleed easily.   Psychiatric/Behavioral:  Negative for dysphoric mood and suicidal ideas. The patient is not nervous/anxious.    All other systems reviewed and are negative.    Objective:     Physical Exam  Vitals and nursing note reviewed.   Constitutional:       General: He is not in acute distress.     Appearance: He is well-developed. He is not diaphoretic.   HENT:      Head: Normocephalic and atraumatic.      Right Ear: External ear normal.      Left Ear: External ear normal.      Mouth/Throat:      Pharynx: No oropharyngeal exudate.   Eyes:      General: No scleral icterus.        Right eye: No discharge.         Left eye: No discharge.      Conjunctiva/sclera: Conjunctivae normal.      Pupils: Pupils are equal, round, and reactive to light.   Neck:       Thyroid: No thyromegaly.      Vascular: No JVD.      Trachea: No tracheal deviation.   Cardiovascular:      Rate and Rhythm: Normal rate and regular rhythm.      Heart sounds: No murmur heard.     No friction rub. No gallop.   Pulmonary:      Effort: Pulmonary effort is normal. No respiratory distress.      Breath sounds: Normal breath sounds. No stridor. No wheezing or rales.   Chest:      Chest wall: No tenderness.   Abdominal:      General: There is no distension.      Palpations: Abdomen is soft. There is no mass.      Tenderness: There is no abdominal tenderness. There is no guarding or rebound.   Musculoskeletal:         General: No tenderness. Normal range of motion.      Cervical back: Normal range of motion and neck supple.   Lymphadenopathy:      Cervical: No cervical adenopathy.   Skin:     General: Skin is warm and dry.      Coloration: Skin is not pale.      Findings: No erythema or rash.   Neurological:      General: No focal deficit present.      Mental Status: He is alert and oriented to person, place, and time. Mental status is at baseline.      Cranial Nerves: No cranial nerve deficit.      Motor: No abnormal muscle tone.      Coordination: Coordination normal.      Deep Tendon Reflexes: Reflexes are normal and symmetric. Reflexes normal.   Psychiatric:         Mood and Affect: Mood normal.         Behavior: Behavior normal.         Thought Content: Thought content normal.         Judgment: Judgment normal.             Assessment:     1. Neck arthritis    2. Malignant neoplasm metastatic to lung, unspecified laterality    3. Moderate recurrent major depression    4. Anomaly of cerebellum    5. Stage 3a chronic kidney disease    6. Anxiety    7. Hypertension, unspecified type    8. Insomnia, unspecified type    9. Bone metastases    10. Cancer associated pain    11. Positive depression screening      Plan:        Medication List            Accurate as of January 13, 2025 11:59 PM. If you have any  questions, ask your nurse or doctor.                START taking these medications      buPROPion 75 MG tablet  Commonly known as: WELLBUTRIN  Take 1 tablet (75 mg total) by mouth Daily.  Started by: Anthony De La Torre MD     clonazePAM 0.5 MG tablet  Commonly known as: KlonoPIN  Take 1 tablet (0.5 mg total) by mouth every evening.  Started by: Anthony De La Torre MD            CONTINUE taking these medications      apalutamide 60 mg Tab  Commonly known as: ERLEADA  Take 240 mg by mouth once daily.     ascorbic acid (vitamin C) 1000 MG tablet  Commonly known as: VITAMIN C     atorvastatin 40 MG tablet  Commonly known as: LIPITOR  Take 1 tablet (40 mg total) by mouth every evening.     BENEFIBER CLEAR SF (DEXTRIN) ORAL     calcium carbonate 500 mg calcium (1,250 mg) tablet  Commonly known as: OS-TYREE  Take 2 tablets (1,000 mg total) by mouth once daily.     cholecalciferol (vitamin D3) 25 mcg (1,000 unit) capsule  Commonly known as: VITAMIN D3  Take 1 capsule (1,000 Units total) by mouth once daily.     clotrimazole-betamethasone 1-0.05% cream  Commonly known as: LOTRISONE  Apply topically 2 (two) times daily.     cyclobenzaprine 10 MG tablet  Commonly known as: FLEXERIL  Take 1 tablet (10 mg total) by mouth 3 (three) times daily as needed for Muscle spasms.     diltiaZEM 300 MG 24 hr capsule  Commonly known as: CARDIZEM CD  Take 1 capsule (300 mg total) by mouth once daily.     docusate sodium 250 MG capsule  Commonly known as: COLACE     finasteride 5 mg tablet  Commonly known as: PROSCAR  TAKE 1 TABLET BY MOUTH ONCE  DAILY     fish oil-omega-3 fatty acids 300-1,000 mg capsule     GAS RELIEF 80 (SIMETHICONE) ORAL     lisinopriL 40 MG tablet  Commonly known as: PRINIVIL,ZESTRIL  Take 1 tablet (40 mg total) by mouth once daily.     MEN 50 PLUS MULTIVITAMIN 837--300 mcg Tab  Generic drug: mv-min-folic-K1-lycopen-lutein     miconazole 2 % cream  Commonly known as: MICOTIN  Apply topically 2 (two) times daily. Apply to  tip of penis prn irritation     multivitamin capsule     mupirocin 2 % ointment  Commonly known as: BACTROBAN  Apply topically 3 (three) times daily. To healing areas     ofloxacin 0.3 % otic solution  Commonly known as: FLOXIN     ondansetron 8 MG tablet  Commonly known as: ZOFRAN  Take 1 tablet (8 mg total) by mouth every 8 (eight) hours as needed for Nausea.     oxyCODONE 5 MG immediate release tablet  Commonly known as: ROXICODONE  Take 1 tablet (5 mg total) by mouth every 4 (four) hours as needed for Pain.     tamsulosin 0.4 mg Cap  Commonly known as: FLOMAX  Take 1 capsule by mouth once daily     traZODone 100 MG tablet  Commonly known as: DESYREL  TAKE 2 TABLETS BY MOUTH EVERY DAY AT BEDTIME AS NEEDED FOR INSOMNIA     TURMERIC ORAL            STOP taking these medications      HYDROcodone-acetaminophen 5-325 mg per tablet  Commonly known as: NORCO  Stopped by: Anthony De La Torre MD               Where to Get Your Medications        These medications were sent to [facility] [place] [place] - [address] [address]      Phone: [phone]   buPROPion 75 MG tablet  clonazePAM 0.5 MG tablet  clotrimazole-betamethasone 1-0.05% cream  cyclobenzaprine 10 MG tablet  oxyCODONE 5 MG immediate release tablet       Neck arthritis    Malignant neoplasm metastatic to lung, unspecified laterality    Moderate recurrent major depression    Anomaly of cerebellum    Stage 3a chronic kidney disease    Anxiety  -     Discontinue: clonazePAM (KLONOPIN) 0.5 MG tablet; Take 1 tablet (0.5 mg total) by mouth daily as needed for Anxiety.  Dispense: 30 tablet; Refill: 2  -     clonazePAM (KLONOPIN) 0.5 MG tablet; Take 1 tablet (0.5 mg total) by mouth every evening.  Dispense: 30 tablet; Refill: 2    Hypertension, unspecified type    Insomnia, unspecified type    Bone metastases  -     oxyCODONE (ROXICODONE) 5 MG immediate release tablet; Take 1 tablet (5 mg total) by mouth every 4 (four) hours as  needed for Pain.  Dispense: 42 tablet; Refill: 0  -     cyclobenzaprine (FLEXERIL) 10 MG tablet; Take 1 tablet (10 mg total) by mouth 3 (three) times daily as needed for Muscle spasms.  Dispense: 60 tablet; Refill: 0    Cancer associated pain  -     oxyCODONE (ROXICODONE) 5 MG immediate release tablet; Take 1 tablet (5 mg total) by mouth every 4 (four) hours as needed for Pain.  Dispense: 42 tablet; Refill: 0  -     cyclobenzaprine (FLEXERIL) 10 MG tablet; Take 1 tablet (10 mg total) by mouth 3 (three) times daily as needed for Muscle spasms.  Dispense: 60 tablet; Refill: 0    Positive depression screening  Comments:  I have reviewed the positive depression score which warrants active treatment with psychotherapy and/or medications.    Other orders  -     clotrimazole-betamethasone 1-0.05% (LOTRISONE) cream; Apply topically 2 (two) times daily.  Dispense: 60 g; Refill: 3  -     buPROPion (WELLBUTRIN) 75 MG tablet; Take 1 tablet (75 mg total) by mouth Daily.  Dispense: 90 tablet; Refill: 3

## 2025-01-17 DIAGNOSIS — C61 PROSTATE CANCER: ICD-10-CM

## 2025-01-26 ENCOUNTER — HOSPITAL ENCOUNTER (EMERGENCY)
Facility: HOSPITAL | Age: 80
Discharge: HOME | End: 2025-01-26
Attending: EMERGENCY MEDICINE
Payer: MEDICARE

## 2025-01-26 VITALS
BODY MASS INDEX: 31.14 KG/M2 | HEIGHT: 73 IN | HEART RATE: 88 BPM | WEIGHT: 235 LBS | OXYGEN SATURATION: 98 % | RESPIRATION RATE: 20 BRPM | SYSTOLIC BLOOD PRESSURE: 183 MMHG | DIASTOLIC BLOOD PRESSURE: 89 MMHG | TEMPERATURE: 98 F

## 2025-01-26 DIAGNOSIS — R19.7 DIARRHEA, UNSPECIFIED TYPE: Primary | ICD-10-CM

## 2025-01-26 LAB
ALBUMIN SERPL BCP-MCNC: 3.9 G/DL (ref 3.5–5.2)
ALP SERPL-CCNC: 100 U/L (ref 38–126)
ALT SERPL W/O P-5'-P-CCNC: 20 U/L (ref 10–44)
ANION GAP SERPL CALC-SCNC: 7 MMOL/L (ref 8–16)
AST SERPL-CCNC: 30 U/L (ref 15–46)
BASOPHILS # BLD AUTO: 0.03 K/UL (ref 0–0.2)
BASOPHILS NFR BLD: 0.4 % (ref 0–1.9)
BILIRUB SERPL-MCNC: 0.4 MG/DL (ref 0.1–1)
BILIRUB UR QL STRIP: NEGATIVE
CALCIUM SERPL-MCNC: 9.5 MG/DL (ref 8.7–10.5)
CHLORIDE SERPL-SCNC: 106 MMOL/L (ref 95–110)
CLARITY UR REFRACT.AUTO: CLEAR
CO2 SERPL-SCNC: 26 MMOL/L (ref 23–29)
COLOR UR AUTO: YELLOW
CREAT SERPL-MCNC: 1.21 MG/DL (ref 0.5–1.4)
DIFFERENTIAL METHOD BLD: ABNORMAL
EOSINOPHIL # BLD AUTO: 0.1 K/UL (ref 0–0.5)
EOSINOPHIL NFR BLD: 2 % (ref 0–8)
ERYTHROCYTE [DISTWIDTH] IN BLOOD BY AUTOMATED COUNT: 12.7 % (ref 11.5–14.5)
EST. GFR  (NO RACE VARIABLE): >60 ML/MIN/1.73 M^2
GLUCOSE SERPL-MCNC: 145 MG/DL (ref 70–110)
GLUCOSE UR QL STRIP: NEGATIVE
HCT VFR BLD AUTO: 43.6 % (ref 40–54)
HGB BLD-MCNC: 14.8 G/DL (ref 14–18)
HGB UR QL STRIP: NEGATIVE
IMM GRANULOCYTES # BLD AUTO: 0.03 K/UL (ref 0–0.04)
IMM GRANULOCYTES NFR BLD AUTO: 0.4 % (ref 0–0.5)
KETONES UR QL STRIP: NEGATIVE
LEUKOCYTE ESTERASE UR QL STRIP: NEGATIVE
LYMPHOCYTES # BLD AUTO: 1 K/UL (ref 1–4.8)
LYMPHOCYTES NFR BLD: 13.9 % (ref 18–48)
MCH RBC QN AUTO: 32.3 PG (ref 27–31)
MCHC RBC AUTO-ENTMCNC: 33.9 G/DL (ref 32–36)
MCV RBC AUTO: 95 FL (ref 82–98)
MONOCYTES # BLD AUTO: 0.6 K/UL (ref 0.3–1)
MONOCYTES NFR BLD: 9.1 % (ref 4–15)
NEUTROPHILS # BLD AUTO: 5.1 K/UL (ref 1.8–7.7)
NEUTROPHILS NFR BLD: 74.2 % (ref 38–73)
NITRITE UR QL STRIP: NEGATIVE
NRBC BLD-RTO: 0 /100 WBC
OB PNL STL: NEGATIVE
PH UR STRIP: 6 [PH] (ref 5–8)
PLATELET # BLD AUTO: 299 K/UL (ref 150–450)
PMV BLD AUTO: 9.7 FL (ref 9.2–12.9)
POTASSIUM SERPL-SCNC: 3.9 MMOL/L (ref 3.5–5.1)
PROT SERPL-MCNC: 7.5 G/DL (ref 6–8.4)
PROT UR QL STRIP: ABNORMAL
RBC # BLD AUTO: 4.58 M/UL (ref 4.6–6.2)
SODIUM SERPL-SCNC: 139 MMOL/L (ref 136–145)
SP GR UR STRIP: >=1.03 (ref 1–1.03)
URN SPEC COLLECT METH UR: ABNORMAL
UROBILINOGEN UR STRIP-ACNC: NEGATIVE EU/DL
UUN UR-MCNC: 12 MG/DL (ref 2–20)
WBC # BLD AUTO: 6.85 K/UL (ref 3.9–12.7)

## 2025-01-26 PROCEDURE — 85025 COMPLETE CBC W/AUTO DIFF WBC: CPT | Mod: ER | Performed by: NURSE PRACTITIONER

## 2025-01-26 PROCEDURE — 81003 URINALYSIS AUTO W/O SCOPE: CPT | Mod: ER | Performed by: NURSE PRACTITIONER

## 2025-01-26 PROCEDURE — 87328 CRYPTOSPORIDIUM AG IA: CPT | Mod: ER

## 2025-01-26 PROCEDURE — 82705 FATS/LIPIDS FECES QUAL: CPT | Mod: ER

## 2025-01-26 PROCEDURE — 99283 EMERGENCY DEPT VISIT LOW MDM: CPT | Mod: ER

## 2025-01-26 PROCEDURE — 87449 NOS EACH ORGANISM AG IA: CPT | Mod: 91,ER

## 2025-01-26 PROCEDURE — 87046 STOOL CULTR AEROBIC BACT EA: CPT | Mod: ER

## 2025-01-26 PROCEDURE — 87324 CLOSTRIDIUM AG IA: CPT | Mod: ER

## 2025-01-26 PROCEDURE — 83993 ASSAY FOR CALPROTECTIN FECAL: CPT | Mod: ER

## 2025-01-26 PROCEDURE — 89055 LEUKOCYTE ASSESSMENT FECAL: CPT | Mod: ER

## 2025-01-26 PROCEDURE — 87493 C DIFF AMPLIFIED PROBE: CPT | Mod: ER

## 2025-01-26 PROCEDURE — 80053 COMPREHEN METABOLIC PANEL: CPT | Mod: ER | Performed by: NURSE PRACTITIONER

## 2025-01-26 PROCEDURE — 82272 OCCULT BLD FECES 1-3 TESTS: CPT | Mod: ER

## 2025-01-26 PROCEDURE — 87425 ROTAVIRUS AG IA: CPT | Mod: ER

## 2025-01-26 PROCEDURE — 82653 EL-1 FECAL QUANTITATIVE: CPT | Mod: ER

## 2025-01-26 PROCEDURE — 87427 SHIGA-LIKE TOXIN AG IA: CPT | Mod: 59,ER

## 2025-01-26 PROCEDURE — 87209 SMEAR COMPLEX STAIN: CPT | Mod: ER

## 2025-01-26 PROCEDURE — 87045 FECES CULTURE AEROBIC BACT: CPT | Mod: ER

## 2025-01-26 PROCEDURE — 87338 HPYLORI STOOL AG IA: CPT | Mod: ER

## 2025-01-26 NOTE — FIRST PROVIDER EVALUATION
Emergency Department TeleTriage Encounter Note      CHIEF COMPLAINT    Chief Complaint   Patient presents with    Diarrhea     Diarrhea X2 weeks. Pt has taken pepto with no relief. Pt report change in stool smell and color.        VITAL SIGNS   Initial Vitals [01/26/25 1610]   BP Pulse Resp Temp SpO2   (!) 178/83 92 20 98.2 °F (36.8 °C) 98 %      MAP       --            ALLERGIES    Review of patient's allergies indicates:   Allergen Reactions    Ciprofloxacin Other (See Comments)     tendonitis       PROVIDER TRIAGE NOTE  This is a teletriage evaluation of a 80 y.o. male presenting to the ED complaining of abd pain with diarrhea for two weeks. No hematochezia or fever. No vomiting.    Alert, no distress.     Initial orders will be placed and care will be transferred to an alternate provider when patient is roomed for a full evaluation. Any additional orders and the final disposition will be determined by that provider.         ORDERS  Labs Reviewed   CBC W/ AUTO DIFFERENTIAL   COMPREHENSIVE METABOLIC PANEL   URINALYSIS, REFLEX TO URINE CULTURE       ED Orders (720h ago, onward)      Start Ordered     Status Ordering Provider    01/26/25 1738 01/26/25 1737  Vital signs  Every 2 hours         Ordered BRYANT PRAJAPATI N.    01/26/25 1737 01/26/25 1737  Orthostatic vital signs  Once         Ordered BRYANT PRAJAPATI N.    01/26/25 1737 01/26/25 1737  Diet NPO  Diet effective now         Ordered BRYANT PRAJAPATI N.    01/26/25 1737 01/26/25 1737  Insert peripheral IV  Once         Ordered BRYANT PRAJAPATI N.    01/26/25 1737 01/26/25 1737  CBC W/ AUTO DIFFERENTIAL  STAT         Ordered BRYANT PRAJAPATI N.    01/26/25 1737 01/26/25 1737  Comp. Metabolic Panel  STAT         Ordered BRYANT PRAJAPATI N.    01/26/25 1737 01/26/25 1737  Urinalysis, Reflex to Urine Culture Urine, Clean Catch  STAT         Ordered BRYANT PRAJAPATI N.              Virtual Visit Note: The provider triage  portion of this emergency department evaluation and documentation was performed via [employer], a HIPAA-compliant telemedicine application, in concert with a tele-presenter in the room. A face to face patient evaluation with one of my colleagues will occur once the patient is placed in an emergency department room.      DISCLAIMER: This note was prepared with Every[employer]ape voice recognition transcription software. Garbled syntax, mangled pronouns, and other bizarre constructions may be attributed to that software system.

## 2025-01-27 ENCOUNTER — TELEPHONE (OUTPATIENT)
Dept: GASTROENTEROLOGY | Facility: CLINIC | Age: 80
End: 2025-01-27
Payer: MEDICARE

## 2025-01-27 LAB
C DIFF GDH STL QL: POSITIVE
C DIFF TOX A+B STL QL IA: NEGATIVE
C DIFF TOX GENS STL QL NAA+PROBE: POSITIVE
WBC #/AREA STL HPF: NORMAL /[HPF]

## 2025-01-27 NOTE — DISCHARGE INSTRUCTIONS
You can try some immodium (over the counter) to help with the diarrhea. Your stool tests are pending and you will be notified for positive studies requiring treatment.  Return to the ER if you have bloody stools, or unable to stay hydrated, or for severe abdominal pain, or any other concerning sign or symptom.    It was nice meeting you, and I hope you feel better soon.     Our goal in the ER is to always give you outstanding care and exceptional service. You may receive a survey by mail or email in the next week about your experience in our ED. We would greatly appreciate you completing and returning the survey. Your feedback provides us with a way to recognize our staff who give very good care and it helps us learn how to improve when your experience was below our aspiration of excellence.     Sincerely,     Tana Burger PA-C  Emergency Room Physician Assistant  [facility]

## 2025-01-27 NOTE — ED NOTES
Assumed care of pt who came in reporting intermittent diarrhea X 2 weeks along with abd discomfort at times. He denies any dizziness, N/V, fever, SOB, and has no real other complaints and is sitting quietly appearing to be in no acute distress. Abd is rounded and per him this is his normal abd - soft and non tender. Will cont to monitor

## 2025-01-27 NOTE — ED PROVIDER NOTES
Encounter Date: 1/26/2025       History     Chief Complaint   Patient presents with    Diarrhea     Diarrhea X2 weeks. Pt has taken pepto with no relief. Pt report change in stool smell and color.      Noah Lopez is a 80 y.o. male who has a past medical history of Colon polyp, Hyperlipidemia, Hypertension, Prostate cancer, Stage 3a chronic kidney disease, and Urinary tract infection with hematuria. presenting to the Emergency Department for diarrhea.  Patient reports he has been having diarrhea for over 2 weeks.  He got back from [place] on January 7th and reports it started a few days before that while on his trip away.  He reports some days he will have as many as 15-20 bowel movements, but he has also had a few days without diarrhea. It was very pale grey in color before he started taking pepto bismol, then the stool darkened. It is soft, not liquid. No blood in the stool. He has intermittent abdominal aches and cramps that he describes as mild, improves with defecation and/or pepto bismol.  He is tolerating normal appetite and staying hydrated.  He has not had any fevers, nausea or vomiting. No contacts with similar symptoms. He is on an oral chemo for stage IV prostate cancer. He denies any suspicious water sources or undercooked meat.  Denies any recent antibiotics.  Denies any hospitalizations.  He did visit his ex-wife in a nursing home around [place]. Denies CP, SOB, viral URI or other symptoms.        The history is provided by the patient.     Review of patient's allergies indicates:   Allergen Reactions    Ciprofloxacin Other (See Comments)     tendonitis     Past Medical History:   Diagnosis Date    Colon polyp 09/06/2018    Hyperlipidemia     Hypertension     Prostate cancer     Stage 3a chronic kidney disease 6/21/2023    Urinary tract infection with hematuria 11/18/2022     Past Surgical History:   Procedure Laterality Date    COLONOSCOPY  2010    COLONOSCOPY N/A 9/6/2018    Procedure:  COLONOSCOPY Suprep PLEASE TEXT PATIENT WITH ARRIVAL TIME;  Surgeon: Niharika Arce MD;  Location: [facility];  Service: Endoscopy;  Laterality: N/A;    CYSTOSCOPY WITH URODYNAMIC TESTING N/A 2022    Procedure: CYSTOSCOPY, WITH URODYNAMIC TESTING FLOUROSCOPIC;  Surgeon: Anthony Maloney MD;  Location: [address] [facility];  Service: Urology;  Laterality: N/A;  1hr    KNEE ARTHROSCOPY      ORCHIECTOMY Bilateral 2/15/2023    Procedure: Bilateral simple orchiectomy;  Surgeon: Helena Luis MD;  Location: [facility] OR;  Service: Urology;  Laterality: Bilateral;    SPINE SURGERY      l-spine    TRANSURETHRAL RESECTION OF PROSTATE N/A 10/26/2022    Procedure: TURP (TRANSURETHRAL RESECTION OF PROSTATE);  Surgeon: Helena Luis MD;  Location: [facility];  Service: Urology;  Laterality: N/A;     Family History   Problem Relation Name Age of Onset    Hypertension Mother      Heart attack Father      Cirrhosis Father      No Known Problems Brother      No Known Problems Brother      Seizures Daughter      Liver disease Daughter      No Known Problems Son      Thyroid cancer Other      Prostate cancer Neg Hx      Breast cancer Neg Hx      Pancreatic cancer Neg Hx       Social History     Tobacco Use    Smoking status: Former     Current packs/day: 0.00     Average packs/day: 1.5 packs/day for 20.0 years (30.0 ttl pk-yrs)     Types: Cigarettes     Start date:      Quit date:      Years since quittin.0     Passive exposure: Never    Smokeless tobacco: Never    Tobacco comments:     Age 17 - 37. Up to 1.5 ppd.   Substance Use Topics    Alcohol use: Yes     Comment: Occasional. Few drinks a week.    Drug use: Yes     Types: Marijuana     Review of Systems   Constitutional:  Negative for chills, fatigue, fever and unexpected weight change.   Respiratory:  Negative for shortness of breath.    Cardiovascular:  Negative for chest pain.   Gastrointestinal:  Positive for abdominal pain and diarrhea. Negative for blood in  stool, nausea and vomiting.   Genitourinary:  Positive for difficulty urinating (chronic, prostate cancer). Negative for dysuria.   Skin:  Negative for color change.   Neurological:  Negative for headaches.   Psychiatric/Behavioral:  Negative for agitation and confusion.        Physical Exam     Initial Vitals [01/26/25 1610]   BP Pulse Resp Temp SpO2   (!) 178/83 92 20 98.2 °F (36.8 °C) 98 %      MAP       --         Physical Exam    Nursing note and vitals reviewed.  Constitutional: He appears well-developed and well-nourished. He is not diaphoretic.  Non-toxic appearance. No distress.   HENT:   Head: Normocephalic and atraumatic.   Right Ear: External ear normal.   Left Ear: External ear normal.   Eyes: EOM are normal.   Neck: Neck supple.   Normal range of motion.  Cardiovascular:  Normal rate.           Pulmonary/Chest: No respiratory distress.   Abdominal: Abdomen is soft. He exhibits no distension. There is no abdominal tenderness. There is no rebound.   Musculoskeletal:         General: Normal range of motion.      Cervical back: Normal range of motion and neck supple.     Neurological: He is alert and oriented to person, place, and time. GCS score is 15. GCS eye subscore is 4. GCS verbal subscore is 5. GCS motor subscore is 6.   Skin: Skin is dry.   Psychiatric: He has a normal mood and affect. His behavior is normal. Judgment and thought content normal.         ED Course   Procedures  Labs Reviewed   CBC W/ AUTO DIFFERENTIAL - Abnormal       Result Value    WBC 6.85      RBC 4.58 (*)     Hemoglobin 14.8      Hematocrit 43.6      MCV 95      MCH 32.3 (*)     MCHC 33.9      RDW 12.7      Platelets 299      MPV 9.7      Immature Granulocytes 0.4      Gran # (ANC) 5.1      Immature Grans (Abs) 0.03      Lymph # 1.0      Mono # 0.6      Eos # 0.1      Baso # 0.03      nRBC 0      Gran % 74.2 (*)     Lymph % 13.9 (*)     Mono % 9.1      Eosinophil % 2.0      Basophil % 0.4      Differential Method Automated      COMPREHENSIVE METABOLIC PANEL - Abnormal    Sodium 139      Potassium 3.9      Chloride 106      CO2 26      Glucose 145 (*)     BUN 12      Creatinine 1.21      Calcium 9.5      Total Protein 7.5      Albumin 3.9      Total Bilirubin 0.4      Alkaline Phosphatase 100      AST 30      ALT 20      Anion Gap 7 (*)     eGFR >60.0     URINALYSIS, REFLEX TO URINE CULTURE - Abnormal    Specimen UA Urine, Clean Catch      Color, UA Yellow      Appearance, UA Clear      pH, UA 6.0      Specific Gravity, UA >=1.030 (*)     Protein, UA Trace (*)     Glucose, UA Negative      Ketones, UA Negative      Bilirubin (UA) Negative      Occult Blood UA Negative      Nitrite, UA Negative      Urobilinogen, UA Negative      Leukocytes, UA Negative      Narrative:     Preferred Collection Type->Urine, Clean Catch  Specimen Source->Urine   CLOSTRIDIUM DIFFICILE   CULTURE, STOOL   ENTEROHEMORRHAGIC E.COLI   H. PYLORI ANTIGEN, STOOL   PANCREATIC ELASTASE, FECAL   FECAL FAT, QUALITATIVE   OCCULT BLOOD X 1, STOOL   WBC, STOOL   ROTAVIRUS ANTIGEN, STOOL   CALPROTECTIN, STOOL   GIARDIA/CRYPTOSPORIDIUM (EIA)   STOOL EXAM-OVA,CYSTS,PARASITES          Imaging Results    None          Medications - No data to display  Medical Decision Making  Nontoxic appearing 80-year-old male presents with diarrhea that has been going on for a little over 20 days.  He is immunocompromised, on an oral chemo for prostate cancer.  His abdomen is soft and nontender.  There is no blood or mucus in the stool.  He does not appear dehydrated.  His vitals are reassuring and he is not toxic.  Plan to obtain basic labs to check kidney function and electrolytes, WBC.  No focal abdominal tenderness, CT abdomen pelvis unlikely to be beneficial, will defer at this time.    Viral diarrhea, bacterial diarrhea, non infectious, malabsorption, appendicitis, mesenteric ischemia, diverticulitis, thyroid storm, adrenal crisis, antibiotics/drug associated, GI Bleed    Labs are  reassuring. There is no focal abdominal tenderness, rebound/guarding, or other peritoneal signs to suggest perforation or emergent surgical process. There is no fever or leukocytosis to suggest acute bacterial enteritis, colitis, or other significant infection. Less likely C. Diff as the stool is formed. He did recently travel to [place], stool studies are sent off. The patient appears well-hydrated and is able to tolerate PO in the ED. CT A/P is unlikely to provide additional benefit or outweigh the risks of contrast/radiation. He does not require admission. Discussed supportive treatment and importance of hydration. Immodium is a consideration, discussed risks with this medication when there is present infection (no alarming features for infection). I do not feel the patient's symptoms warrant inpatient management at this time. He will be called for positive stool studies and treated appropriately.  GI referral has been placed as well.  Encouraged follow up with PCP.  Strict return precautions were discussed, including bloody stools, worsening abdominal pain, fever/chills, inability to tolerate food or drink, severe weakness, signs of dehydration, or for any other concerns.        Problems Addressed:  Diarrhea, unspecified type: acute illness or injury    Amount and/or Complexity of Data Reviewed  Labs: ordered. Decision-making details documented in ED Course.  Discussion of management or test interpretation with external provider(s): none    Risk  Risk Details: Comorbidities taken into consideration during the patient's evaluation and treatment include  has a past medical history of Colon polyp (09/06/2018), Hyperlipidemia, Hypertension, Prostate cancer, Stage 3a chronic kidney disease (6/21/2023), and Urinary tract infection with hematuria (11/18/2022).  Social determinants of health taken into consideration during development of our treatment plan include difficulty in obtaining follow-up and obtaining  medications; health literacy                 ED Course as of 01/26/25 1907   Sun Jan 26, 2025 1837 Urinalysis, Reflex to Urine Culture Urine, Clean Catch(!)  No SOI [CS]   1837 CBC W/ AUTO DIFFERENTIAL(!)  No leukocytosis. Normal H&H [CS]   1838 Comp. Metabolic Panel(!)  No significant abnormality. Glucose elevated, consistent with T2DM. No AG [CS]   1843 Discussed patient with my attending Dr. Calderón. Stable for PA at this time.  [CS]      ED Course User Index  [CS] Tana Burger PA-C                             Clinical Impression:  Final diagnoses:  [R19.7] Diarrhea, unspecified type (Primary)          ED Disposition Condition    Discharge Stable          ED Prescriptions    None       Follow-up Information       Follow up With Specialties Details Why Contact Info    Anthony De La Torre MD Family Medicine Schedule an appointment as soon as possible for a visit   [address] [place] [id number]  [phone]               Tana Burger PA-C  01/26/25 1907

## 2025-01-28 DIAGNOSIS — A04.72 C. DIFFICILE DIARRHEA: Primary | ICD-10-CM

## 2025-01-28 LAB
CRYPTOSP AG STL QL IA: NEGATIVE
E COLI SXT1 STL QL IA: NEGATIVE
E COLI SXT2 STL QL IA: NEGATIVE
G LAMBLIA AG STL QL IA: NEGATIVE
RV AG STL QL IA.RAPID: NEGATIVE

## 2025-01-28 RX ORDER — VANCOMYCIN HYDROCHLORIDE 125 MG/1
125 CAPSULE ORAL 4 TIMES DAILY
Qty: 40 CAPSULE | Refills: 0 | Status: SHIPPED | OUTPATIENT
Start: 2025-01-28 | End: 2025-02-07

## 2025-01-29 LAB
BACTERIA STL CULT: NORMAL
ELASTASE 1, FECAL: 484 MCG/G

## 2025-01-30 LAB
CALPROTECTIN STL-MCNT: 32.1 MCG/G
FAT STL QL: NORMAL
H PYLORI AG STL QL IA: NOT DETECTED
NEUTRAL FAT STL QL: NORMAL

## 2025-02-01 LAB — O+P STL MICRO: NORMAL

## 2025-02-14 ENCOUNTER — OFFICE VISIT (OUTPATIENT)
Dept: FAMILY MEDICINE | Facility: CLINIC | Age: 80
End: 2025-02-14
Payer: MEDICARE

## 2025-02-14 VITALS
HEIGHT: 73 IN | DIASTOLIC BLOOD PRESSURE: 76 MMHG | WEIGHT: 228.06 LBS | HEART RATE: 100 BPM | OXYGEN SATURATION: 98 % | SYSTOLIC BLOOD PRESSURE: 136 MMHG | BODY MASS INDEX: 30.23 KG/M2 | TEMPERATURE: 98 F

## 2025-02-14 DIAGNOSIS — C61 PROSTATE CANCER: Primary | ICD-10-CM

## 2025-02-14 DIAGNOSIS — A04.72 C. DIFFICILE DIARRHEA: ICD-10-CM

## 2025-02-14 DIAGNOSIS — C78.00 MALIGNANT NEOPLASM METASTATIC TO LUNG, UNSPECIFIED LATERALITY: ICD-10-CM

## 2025-02-14 DIAGNOSIS — R19.7 DIARRHEA OF PRESUMED INFECTIOUS ORIGIN: ICD-10-CM

## 2025-02-14 PROCEDURE — 3078F DIAST BP <80 MM HG: CPT | Mod: CPTII,S$GLB,, | Performed by: PHYSICIAN ASSISTANT

## 2025-02-14 PROCEDURE — 1159F MED LIST DOCD IN RCRD: CPT | Mod: CPTII,S$GLB,, | Performed by: PHYSICIAN ASSISTANT

## 2025-02-14 PROCEDURE — 1101F PT FALLS ASSESS-DOCD LE1/YR: CPT | Mod: CPTII,S$GLB,, | Performed by: PHYSICIAN ASSISTANT

## 2025-02-14 PROCEDURE — 1126F AMNT PAIN NOTED NONE PRSNT: CPT | Mod: CPTII,S$GLB,, | Performed by: PHYSICIAN ASSISTANT

## 2025-02-14 PROCEDURE — 1160F RVW MEDS BY RX/DR IN RCRD: CPT | Mod: CPTII,S$GLB,, | Performed by: PHYSICIAN ASSISTANT

## 2025-02-14 PROCEDURE — 3288F FALL RISK ASSESSMENT DOCD: CPT | Mod: CPTII,S$GLB,, | Performed by: PHYSICIAN ASSISTANT

## 2025-02-14 PROCEDURE — 3075F SYST BP GE 130 - 139MM HG: CPT | Mod: CPTII,S$GLB,, | Performed by: PHYSICIAN ASSISTANT

## 2025-02-16 ENCOUNTER — HOSPITAL ENCOUNTER (EMERGENCY)
Facility: HOSPITAL | Age: 80
Discharge: HOME | End: 2025-02-16
Attending: FAMILY MEDICINE
Payer: MEDICARE

## 2025-02-16 VITALS
SYSTOLIC BLOOD PRESSURE: 223 MMHG | BODY MASS INDEX: 30.48 KG/M2 | OXYGEN SATURATION: 96 % | RESPIRATION RATE: 20 BRPM | WEIGHT: 230 LBS | HEART RATE: 72 BPM | TEMPERATURE: 98 F | DIASTOLIC BLOOD PRESSURE: 99 MMHG | HEIGHT: 73 IN

## 2025-02-16 DIAGNOSIS — E86.0 DEHYDRATION: ICD-10-CM

## 2025-02-16 DIAGNOSIS — N42.89 PROSTATE MASS: ICD-10-CM

## 2025-02-16 DIAGNOSIS — R11.2 NAUSEA AND VOMITING, UNSPECIFIED VOMITING TYPE: ICD-10-CM

## 2025-02-16 DIAGNOSIS — K52.9 GASTROENTERITIS: Primary | ICD-10-CM

## 2025-02-16 LAB
ALBUMIN SERPL BCP-MCNC: 4.6 G/DL (ref 3.5–5.2)
ALP SERPL-CCNC: 144 U/L (ref 38–126)
ALT SERPL W/O P-5'-P-CCNC: 39 U/L (ref 10–44)
AMYLASE SERPL-CCNC: 104 U/L (ref 30–110)
ANION GAP SERPL CALC-SCNC: 13 MMOL/L (ref 8–16)
AST SERPL-CCNC: 75 U/L (ref 15–46)
BACTERIA #/AREA URNS AUTO: ABNORMAL /HPF
BASOPHILS # BLD AUTO: 0.04 K/UL (ref 0–0.2)
BASOPHILS NFR BLD: 0.3 % (ref 0–1.9)
BILIRUB SERPL-MCNC: 0.7 MG/DL (ref 0.1–1)
BILIRUB UR QL STRIP: NEGATIVE
CA CARBONATE CRY UR QL COMP ASSIST: ABNORMAL
CALCIUM SERPL-MCNC: 10.7 MG/DL (ref 8.7–10.5)
CHLORIDE SERPL-SCNC: 104 MMOL/L (ref 95–110)
CLARITY UR REFRACT.AUTO: ABNORMAL
CO2 SERPL-SCNC: 24 MMOL/L (ref 23–29)
COLOR UR AUTO: YELLOW
CREAT SERPL-MCNC: 1.66 MG/DL (ref 0.5–1.4)
DIFFERENTIAL METHOD BLD: ABNORMAL
EOSINOPHIL # BLD AUTO: 0 K/UL (ref 0–0.5)
EOSINOPHIL NFR BLD: 0 % (ref 0–8)
ERYTHROCYTE [DISTWIDTH] IN BLOOD BY AUTOMATED COUNT: 12.3 % (ref 11.5–14.5)
EST. GFR  (NO RACE VARIABLE): 41.7 ML/MIN/1.73 M^2
GLUCOSE SERPL-MCNC: 166 MG/DL (ref 70–110)
GLUCOSE UR QL STRIP: NEGATIVE
GRAN CASTS UR QL COMP ASSIST: 1 /LPF
HCT VFR BLD AUTO: 48.8 % (ref 40–54)
HGB BLD-MCNC: 16.7 G/DL (ref 14–18)
HGB UR QL STRIP: ABNORMAL
HYALINE CASTS UR QL AUTO: 0 /LPF
IMM GRANULOCYTES # BLD AUTO: 0.05 K/UL (ref 0–0.04)
IMM GRANULOCYTES NFR BLD AUTO: 0.3 % (ref 0–0.5)
KETONES UR QL STRIP: ABNORMAL
LACTATE SERPL-SCNC: 1.8 MMOL/L (ref 0.5–2.2)
LEUKOCYTE ESTERASE UR QL STRIP: NEGATIVE
LIPASE SERPL-CCNC: 155 U/L (ref 23–300)
LYMPHOCYTES # BLD AUTO: 1.1 K/UL (ref 1–4.8)
LYMPHOCYTES NFR BLD: 6.8 % (ref 18–48)
MCH RBC QN AUTO: 31.9 PG (ref 27–31)
MCHC RBC AUTO-ENTMCNC: 34.2 G/DL (ref 32–36)
MCV RBC AUTO: 93 FL (ref 82–98)
MICROSCOPIC COMMENT: ABNORMAL
MONOCYTES # BLD AUTO: 0.7 K/UL (ref 0.3–1)
MONOCYTES NFR BLD: 4.5 % (ref 4–15)
NEUTROPHILS # BLD AUTO: 13.5 K/UL (ref 1.8–7.7)
NEUTROPHILS NFR BLD: 88.1 % (ref 38–73)
NITRITE UR QL STRIP: NEGATIVE
NRBC BLD-RTO: 0 /100 WBC
PH UR STRIP: 6 [PH] (ref 5–8)
PLATELET # BLD AUTO: 316 K/UL (ref 150–450)
PMV BLD AUTO: 10 FL (ref 9.2–12.9)
POTASSIUM SERPL-SCNC: 4 MMOL/L (ref 3.5–5.1)
PROT SERPL-MCNC: 8.9 G/DL (ref 6–8.4)
PROT UR QL STRIP: ABNORMAL
RBC # BLD AUTO: 5.23 M/UL (ref 4.6–6.2)
RBC #/AREA URNS AUTO: 2 /HPF (ref 0–4)
SODIUM SERPL-SCNC: 141 MMOL/L (ref 136–145)
SP GR UR STRIP: >=1.03 (ref 1–1.03)
TROPONIN I SERPL-MCNC: 0.16 NG/ML (ref 0.01–0.03)
URN SPEC COLLECT METH UR: ABNORMAL
UROBILINOGEN UR STRIP-ACNC: NEGATIVE EU/DL
UUN UR-MCNC: 22 MG/DL (ref 2–20)
WBC # BLD AUTO: 15.36 K/UL (ref 3.9–12.7)
WBC #/AREA URNS AUTO: 1 /HPF (ref 0–5)

## 2025-02-16 PROCEDURE — 99900035 HC TECH TIME PER 15 MIN (STAT): Mod: ER

## 2025-02-16 PROCEDURE — 99285 EMERGENCY DEPT VISIT HI MDM: CPT | Mod: 25,ER

## 2025-02-16 PROCEDURE — 85025 COMPLETE CBC W/AUTO DIFF WBC: CPT | Mod: ER | Performed by: FAMILY MEDICINE

## 2025-02-16 PROCEDURE — 96361 HYDRATE IV INFUSION ADD-ON: CPT | Mod: ER

## 2025-02-16 PROCEDURE — 83605 ASSAY OF LACTIC ACID: CPT | Mod: ER | Performed by: FAMILY MEDICINE

## 2025-02-16 PROCEDURE — 87040 BLOOD CULTURE FOR BACTERIA: CPT | Mod: ER | Performed by: FAMILY MEDICINE

## 2025-02-16 PROCEDURE — 25000003 PHARM REV CODE 250: Mod: ER | Performed by: FAMILY MEDICINE

## 2025-02-16 PROCEDURE — 96374 THER/PROPH/DIAG INJ IV PUSH: CPT | Mod: ER

## 2025-02-16 PROCEDURE — 63600175 PHARM REV CODE 636 W HCPCS: Mod: ER | Performed by: FAMILY MEDICINE

## 2025-02-16 PROCEDURE — 80053 COMPREHEN METABOLIC PANEL: CPT | Mod: ER | Performed by: FAMILY MEDICINE

## 2025-02-16 PROCEDURE — 81000 URINALYSIS NONAUTO W/SCOPE: CPT | Mod: ER | Performed by: FAMILY MEDICINE

## 2025-02-16 PROCEDURE — 93005 ELECTROCARDIOGRAM TRACING: CPT | Mod: ER

## 2025-02-16 PROCEDURE — 82150 ASSAY OF AMYLASE: CPT | Mod: ER | Performed by: FAMILY MEDICINE

## 2025-02-16 PROCEDURE — 83690 ASSAY OF LIPASE: CPT | Mod: ER | Performed by: FAMILY MEDICINE

## 2025-02-16 PROCEDURE — 84484 ASSAY OF TROPONIN QUANT: CPT | Mod: ER | Performed by: FAMILY MEDICINE

## 2025-02-16 PROCEDURE — 96375 TX/PRO/DX INJ NEW DRUG ADDON: CPT | Mod: ER

## 2025-02-16 RX ORDER — ONDANSETRON HYDROCHLORIDE 2 MG/ML
4 INJECTION, SOLUTION INTRAVENOUS
Status: COMPLETED | OUTPATIENT
Start: 2025-02-16 | End: 2025-02-16

## 2025-02-16 RX ORDER — FAMOTIDINE 10 MG/ML
20 INJECTION INTRAVENOUS
Status: COMPLETED | OUTPATIENT
Start: 2025-02-16 | End: 2025-02-16

## 2025-02-16 RX ORDER — ONDANSETRON 4 MG/1
4 TABLET, ORALLY DISINTEGRATING ORAL EVERY 6 HOURS PRN
Qty: 20 TABLET | Refills: 0 | Status: ON HOLD | OUTPATIENT
Start: 2025-02-16

## 2025-02-16 RX ORDER — OXYCODONE AND ACETAMINOPHEN 5; 325 MG/1; MG/1
1 TABLET ORAL EVERY 6 HOURS PRN
Qty: 12 TABLET | Refills: 0 | Status: ON HOLD | OUTPATIENT
Start: 2025-02-16

## 2025-02-16 RX ORDER — MORPHINE SULFATE 4 MG/ML
4 INJECTION, SOLUTION INTRAMUSCULAR; INTRAVENOUS
Refills: 0 | Status: COMPLETED | OUTPATIENT
Start: 2025-02-16 | End: 2025-02-16

## 2025-02-16 RX ADMIN — MORPHINE SULFATE 4 MG: 4 INJECTION INTRAVENOUS at 06:02

## 2025-02-16 RX ADMIN — SODIUM CHLORIDE 1000 ML: 9 INJECTION, SOLUTION INTRAVENOUS at 05:02

## 2025-02-16 RX ADMIN — ONDANSETRON 4 MG: 2 INJECTION INTRAMUSCULAR; INTRAVENOUS at 05:02

## 2025-02-16 RX ADMIN — FAMOTIDINE 20 MG: 10 INJECTION, SOLUTION INTRAVENOUS at 06:02

## 2025-02-16 NOTE — ED PROVIDER NOTES
Encounter Date: 2/16/2025       History     Chief Complaint   Patient presents with    Abdominal Pain    Vomiting    Diarrhea     Pt came in reporting N/V diarrhea and biatl lower abd quad pain now 7/10 that started last night.     80-year-old male complains of nausea, vomiting which started yesterday after eating stored food.  No blood in vomiting.  Lower abdominal pain.  No diarrhea.  No fever chills or rigors.  Feeling weak and fatigued with subjective shortness of breath.        Review of patient's allergies indicates:   Allergen Reactions    Ciprofloxacin Other (See Comments)     tendonitis     Past Medical History:   Diagnosis Date    Colon polyp 09/06/2018    Hyperlipidemia     Hypertension     Prostate cancer     Stage 3a chronic kidney disease 6/21/2023    Urinary tract infection with hematuria 11/18/2022     Past Surgical History:   Procedure Laterality Date    COLONOSCOPY  2010    COLONOSCOPY N/A 9/6/2018    Procedure: COLONOSCOPY Suprep PLEASE TEXT PATIENT WITH ARRIVAL TIME;  Surgeon: Niharika Arce MD;  Location: [facility];  Service: Endoscopy;  Laterality: N/A;    CYSTOSCOPY WITH URODYNAMIC TESTING N/A 1/31/2022    Procedure: CYSTOSCOPY, WITH URODYNAMIC TESTING FLOUROSCOPIC;  Surgeon: Anthony Maloney MD;  [address];  Service: Urology;  Laterality: N/A;  1hr    KNEE ARTHROSCOPY      ORCHIECTOMY Bilateral 2/15/2023    Procedure: Bilateral simple orchiectomy;  Surgeon: Helena Luis MD;  Location: [facility];  Service: Urology;  Laterality: Bilateral;    SPINE SURGERY      l-spine    TRANSURETHRAL RESECTION OF PROSTATE N/A 10/26/2022    Procedure: TURP (TRANSURETHRAL RESECTION OF PROSTATE);  Surgeon: Helena Luis MD;  Location: [facility];  Service: Urology;  Laterality: N/A;     Family History   Problem Relation Name Age of Onset    Hypertension Mother      Heart attack Father      Cirrhosis Father      No Known Problems Brother      No Known Problems Brother      Seizures Daughter       Liver disease Daughter      No Known Problems Son      Thyroid cancer Other      Prostate cancer Neg Hx      Breast cancer Neg Hx      Pancreatic cancer Neg Hx       Social History[1]  Review of Systems   Gastrointestinal:  Positive for abdominal pain, nausea and vomiting. Negative for anal bleeding, blood in stool, constipation and diarrhea.   All other systems reviewed and are negative.      Physical Exam     Initial Vitals [02/16/25 1637]   BP Pulse Resp Temp SpO2   (!) 203/91 75 (!) 24 98.2 °F (36.8 °C) 98 %      MAP       --         Physical Exam    Nursing note and vitals reviewed.  Constitutional: Vital signs are normal. He appears well-developed and well-nourished. He is active. No distress.   HENT:   Head: Normocephalic.   Nose: Nose normal. Mouth/Throat: Oropharynx is clear and moist and mucous membranes are normal.   Eyes: Conjunctivae, EOM and lids are normal.   Neck: Neck supple.   Normal range of motion.  Cardiovascular:  Normal rate, regular rhythm, S1 normal, S2 normal and normal heart sounds.           Pulmonary/Chest: Breath sounds normal. No respiratory distress. He has no wheezes. He has no rhonchi. He has no rales. He exhibits no tenderness.   Abdominal: Abdomen is soft. Bowel sounds are normal. He exhibits no distension. There is no abdominal tenderness. There is no rebound and no guarding.   Musculoskeletal:         General: Normal range of motion.      Right upper arm: Normal.      Left upper arm: Normal.      Cervical back: Normal range of motion and neck supple.      Right lower leg: Normal.      Left lower leg: Normal.     Neurological: He is alert and oriented to person, place, and time. He has normal strength. He displays normal reflexes. No cranial nerve deficit or sensory deficit. GCS score is 15. GCS eye subscore is 4. GCS verbal subscore is 5. GCS motor subscore is 6.   Skin: Skin is warm. Capillary refill takes less than 2 seconds.   Psychiatric: He has a normal mood and affect.  His speech is normal and behavior is normal. Thought content normal. Cognition and memory are normal.         ED Course   Procedures  Labs Reviewed   CBC W/ AUTO DIFFERENTIAL - Abnormal       Result Value    WBC 15.36 (*)     RBC 5.23      Hemoglobin 16.7      Hematocrit 48.8      MCV 93      MCH 31.9 (*)     MCHC 34.2      RDW 12.3      Platelets 316      MPV 10.0      Immature Granulocytes 0.3      Gran # (ANC) 13.5 (*)     Immature Grans (Abs) 0.05 (*)     Lymph # 1.1      Mono # 0.7      Eos # 0.0      Baso # 0.04      nRBC 0      Gran % 88.1 (*)     Lymph % 6.8 (*)     Mono % 4.5      Eosinophil % 0.0      Basophil % 0.3      Differential Method Automated     COMPREHENSIVE METABOLIC PANEL - Abnormal    Sodium 141      Potassium 4.0      Chloride 104      CO2 24      Glucose 166 (*)     BUN 22 (*)     Creatinine 1.66 (*)     Calcium 10.7 (*)     Total Protein 8.9 (*)     Albumin 4.6      Total Bilirubin 0.7      Alkaline Phosphatase 144 (*)     AST 75 (*)     ALT 39      Anion Gap 13      eGFR 41.7 (*)    URINALYSIS, REFLEX TO URINE CULTURE - Abnormal    Specimen UA Urine, Clean Catch      Color, UA Yellow      Appearance, UA Cloudy (*)     pH, UA 6.0      Specific Gravity, UA >=1.030 (*)     Protein, UA 2+ (*)     Glucose, UA Negative      Ketones, UA Trace (*)     Bilirubin (UA) Negative      Occult Blood UA 3+ (*)     Nitrite, UA Negative      Urobilinogen, UA Negative      Leukocytes, UA Negative      Narrative:     Preferred Collection Type->Urine, Clean Catch  Specimen Source->Urine   TROPONIN I - Abnormal    Troponin I 0.156 (*)    URINALYSIS MICROSCOPIC - Abnormal    RBC, UA 2      WBC, UA 1      Bacteria Rare      Hyaline Casts, UA 0      Granular Casts, UA 1 (*)     Ca Carb Liset, UA Many (*)     Microscopic Comment SEE COMMENT      Narrative:     Preferred Collection Type->Urine, Clean Catch  Specimen Source->Urine   CULTURE, BLOOD    Blood Culture, Routine No Growth to date      Blood Culture, Routine  No Growth to date      Blood Culture, Routine No Growth to date      Blood Culture, Routine No Growth to date      Narrative:     Aerobic and anaerobic   CULTURE, BLOOD    Blood Culture, Routine No Growth to date      Blood Culture, Routine No Growth to date      Blood Culture, Routine No Growth to date      Blood Culture, Routine No Growth to date      Narrative:     Aerobic and anaerobic   AMYLASE    Amylase 104     LIPASE    Lipase Result 155     LACTIC ACID, PLASMA    Lactate (Lactic Acid) 1.8       EKG Readings: (Independently Interpreted)   Initial Reading: No STEMI. Rhythm: Normal Sinus Rhythm. Heart Rate: 74. Ectopy: No Ectopy. Conduction: Normal. ST Segments: Normal ST Segments. T Waves: Normal. Clinical Impression: Normal Sinus Rhythm     ECG Results              EKG 12-lead (Final result)        Collection Time Result Time QRS Duration OHS QTC Calculation    02/16/25 17:48:26 02/17/25 19:10:17 90 435                     Final result by Interface, Lab In [facility] (02/17/25 19:10:27)                   Narrative:    Test Reason : Z13.6,    Vent. Rate :  74 BPM     Atrial Rate :  74 BPM     P-R Int : 182 ms          QRS Dur :  90 ms      QT Int : 392 ms       P-R-T Axes :  62  58  66 degrees    QTcB Int : 435 ms    Sinus rhythm with Premature atrial complexes  Otherwise normal ECG  When compared with ECG of 04-Oct-2022 10:51,  Premature atrial complexes are now Present  Confirmed by Singh Vasquez ([id number]) on 2/17/2025 7:10:14 PM    Referred By: AAAREFERRAL SELF           Confirmed By: Singh Cain                                  Imaging Results              CT Abdomen Pelvis  Without Contrast (Final result)  Result time 02/16/25 18:44:39      Final result by Ledy Leung MD (Timothy) (02/16/25 18:44:39)                   Impression:     Infiltrative mass involving the right pelvis compatible with local recurrence.    Multiple lung metastasis.    Sclerotic bone metastasis is suspected of the  left ischium and lytic bone metastasis involving the left superior pubic rami suspected associated pathologic fracture.    All CT scans at [this location] are performed using dose modulation techniques as appropriate to a performed exam including the following:  Automated exposure control; adjustment of the mA and/or kV according to patient size (this includes techniques or standardized protocols for targeted exams where dose is matched to indication / reason for exam; i.e. extremities or head); use of iterative reconstruction technique.    Finalized on: 2/16/2025 6:44 PM By:  Bobby Leung MD  [facility]# [id number]      2025-02-16 18:46:43.468     [facility]               Narrative:    EXAM:  CT ABDOMEN PELVIS WITHOUT CONTRAST    CLINICAL HISTORY: Acute abdominal pain    TECHNIQUE: Axial images were acquired of the abdomen and pelvis without IV contrast.    FINDINGS:    Comparison CT scan 01/30/2024.    Multiple bilateral lung nodules compatible with metastatic disease in this patient with known prostate cancer.  One the larger nodules on the right measures 1.4 cm.    No pleural effusions.    The liver appears normal.    The spleen appears normal.    The pancreas appears normal.    Gallstones.  No gallbladder wall thickening.    Multiple bilateral renal cysts.  No hydronephrosis.  No renal stones.    Atherosclerosis of the abdominal aorta demonstrates an aneurysm.    There are no acute bowel abnormalities.  Normal appendix.    No abnormal masses or fluid collections are identified in the pelvis.    The bladder appears normal.    There is a infiltrative recurrent mass identified involving the pelvis which appears to arise from the prostate gland and possibly involve the bladder base and the left pelvic side wall and possibly the rectum.  Mass measures 9.3 x 8.3 cm.    There is sclerotic changes involving the left ischium as well as lytic bone changes involving the left superior pubic ramus.  There appears be associated pathologic  fracture.                                         Medications   sodium chloride 0.9% bolus 1,000 mL 1,000 mL (0 mLs Intravenous Stopped 2/16/25 1821)   ondansetron injection 4 mg (4 mg Intravenous Given 2/16/25 1709)   morphine injection 4 mg (4 mg Intravenous Given 2/16/25 1820)   famotidine (PF) injection 20 mg (20 mg Intravenous Given 2/16/25 1821)     Medical Decision Making  Differential diagnosis include not limited to; gastritis, GERD, food poisoning, pancreatitis, dehydration,  Initiated labs, IV fluids, nausea medication.  Labs reviewed white cell count 15,000, creatinine 1.69, BUN 22, AST 75, alk-phos 144.  Possibility of infectious versus acute phase elevation in white cell count.  Dehydration with elevation in BUN and creatinine.  Initiated blood culture and lactic acid.  Continue IV fluids.  CT of abdomen and pelvis without contrast has been ordered.  Patient checked out to Dr. Calderón at shift change.    Amount and/or Complexity of Data Reviewed  Labs: ordered. Decision-making details documented in ED Course.  Radiology: ordered. Decision-making details documented in ED Course.    Risk  Prescription drug management.                                      Clinical Impression:  Final diagnoses:  [K52.9] Gastroenteritis (Primary)  [R11.2] Nausea and vomiting, unspecified vomiting type  [E86.0] Dehydration  [N42.89] Prostate mass          ED Disposition Condition    Discharge Stable          ED Prescriptions       Medication Sig Dispense Start Date End Date Auth. Provider    ondansetron (ZOFRAN-ODT) 4 MG TbDL Take 1 tablet (4 mg total) by mouth every 6 (six) hours as needed. 20 tablet 2/16/2025 -- Ignacio Calderón MD    oxyCODONE-acetaminophen (PERCOCET) 5-325 mg per tablet Take 1 tablet by mouth every 6 (six) hours as needed for Pain. 12 tablet 2/16/2025 -- Ignacio Calderón MD          Follow-up Information       Follow up With Specialties Details Why Contact Info    Anthony De La Torre MD Family Medicine  Schedule an appointment as soon as possible for a visit   [address] [address]  [phone]                 Jesus Inman MD  25 1985         [1]   Social History  Tobacco Use    Smoking status: Former     Current packs/day: 0.00     Average packs/day: 1.5 packs/day for 20.0 years (30.0 ttl pk-yrs)     Types: Cigarettes     Start date:      Quit date:      Years since quittin.1     Passive exposure: Never    Smokeless tobacco: Never    Tobacco comments:     Age 17 - 37. Up to 1.5 ppd.   Substance Use Topics    Alcohol use: Yes     Comment: Occasional. Few drinks a week.    Drug use: Yes     Types: Marijuana        Jesus Inman MD  25 [id number]

## 2025-02-17 ENCOUNTER — PATIENT MESSAGE (OUTPATIENT)
Dept: HEMATOLOGY/ONCOLOGY | Facility: CLINIC | Age: 80
End: 2025-02-17
Payer: MEDICARE

## 2025-02-17 ENCOUNTER — NURSE TRIAGE (OUTPATIENT)
Dept: ADMINISTRATIVE | Facility: CLINIC | Age: 80
End: 2025-02-17
Payer: MEDICARE

## 2025-02-17 ENCOUNTER — HOSPITAL ENCOUNTER (EMERGENCY)
Facility: HOSPITAL | Age: 80
Discharge: HOME | End: 2025-02-17
Attending: EMERGENCY MEDICINE
Payer: MEDICARE

## 2025-02-17 VITALS
WEIGHT: 230 LBS | OXYGEN SATURATION: 96 % | HEART RATE: 88 BPM | TEMPERATURE: 99 F | SYSTOLIC BLOOD PRESSURE: 156 MMHG | RESPIRATION RATE: 18 BRPM | DIASTOLIC BLOOD PRESSURE: 78 MMHG | HEIGHT: 73 IN | BODY MASS INDEX: 30.48 KG/M2

## 2025-02-17 DIAGNOSIS — R33.8 ACUTE URINARY RETENTION: Primary | ICD-10-CM

## 2025-02-17 LAB
ANION GAP SERPL CALC-SCNC: 7 MMOL/L (ref 8–16)
CALCIUM SERPL-MCNC: 10 MG/DL (ref 8.7–10.5)
CHLORIDE SERPL-SCNC: 106 MMOL/L (ref 95–110)
CO2 SERPL-SCNC: 27 MMOL/L (ref 23–29)
CREAT SERPL-MCNC: 1.51 MG/DL (ref 0.5–1.4)
EST. GFR  (NO RACE VARIABLE): 46.7 ML/MIN/1.73 M^2
GLUCOSE SERPL-MCNC: 139 MG/DL (ref 70–110)
OHS QRS DURATION: 90 MS
OHS QTC CALCULATION: 435 MS
POTASSIUM SERPL-SCNC: 4 MMOL/L (ref 3.5–5.1)
SODIUM SERPL-SCNC: 140 MMOL/L (ref 136–145)
UUN UR-MCNC: 19 MG/DL (ref 2–20)

## 2025-02-17 PROCEDURE — 99283 EMERGENCY DEPT VISIT LOW MDM: CPT | Mod: ER,25

## 2025-02-17 PROCEDURE — 80048 BASIC METABOLIC PNL TOTAL CA: CPT | Mod: ER | Performed by: EMERGENCY MEDICINE

## 2025-02-17 PROCEDURE — 51702 INSERT TEMP BLADDER CATH: CPT | Mod: ER

## 2025-02-17 NOTE — ED PROVIDER NOTES
Encounter Date: 2/17/2025       History     Chief Complaint   Patient presents with    Urinary Retention     Pt reports unable to urinate. Last urinated yesterday.     80 year old male with history of prostate cancer with pulmonary mets, HTN p/w complaint of inability to urinate. Pt says that after he was given IVF at the ED visit yesterday he has been unable to void. Says there has been a small amount of urine dribbling into his diaper.     The history is provided by the patient and the spouse.     Review of patient's allergies indicates:   Allergen Reactions    Ciprofloxacin Other (See Comments)     tendonitis     Past Medical History:   Diagnosis Date    Colon polyp 09/06/2018    Hyperlipidemia     Hypertension     Prostate cancer     Stage 3a chronic kidney disease 6/21/2023    Urinary tract infection with hematuria 11/18/2022     Past Surgical History:   Procedure Laterality Date    COLONOSCOPY  2010    COLONOSCOPY N/A 9/6/2018    Procedure: COLONOSCOPY Suprep PLEASE TEXT PATIENT WITH ARRIVAL TIME;  Surgeon: Niharika Arce MD;  Location: [facility];  Service: Endoscopy;  Laterality: N/A;    CYSTOSCOPY WITH URODYNAMIC TESTING N/A 1/31/2022    Procedure: CYSTOSCOPY, WITH URODYNAMIC TESTING FLOUROSCOPIC;  Surgeon: Anthony Maloney MD;  [address];  Service: Urology;  Laterality: N/A;  1hr    KNEE ARTHROSCOPY      ORCHIECTOMY Bilateral 2/15/2023    Procedure: Bilateral simple orchiectomy;  Surgeon: Helena Luis MD;  Location: [facility];  Service: Urology;  Laterality: Bilateral;    SPINE SURGERY      l-spine    TRANSURETHRAL RESECTION OF PROSTATE N/A 10/26/2022    Procedure: TURP (TRANSURETHRAL RESECTION OF PROSTATE);  Surgeon: Helena Luis MD;  Location: [facility];  Service: Urology;  Laterality: N/A;     Family History   Problem Relation Name Age of Onset    Hypertension Mother      Heart attack Father      Cirrhosis Father      No Known Problems Brother      No Known Problems Brother       Seizures Daughter      Liver disease Daughter      No Known Problems Son      Thyroid cancer Other      Prostate cancer Neg Hx      Breast cancer Neg Hx      Pancreatic cancer Neg Hx       Social History[1]  Review of Systems    Physical Exam     Initial Vitals [02/17/25 1054]   BP Pulse Resp Temp SpO2   (!) 178/81 100 18 98.2 °F (36.8 °C) 96 %      MAP       --         Physical Exam    Constitutional: He appears well-developed and well-nourished. No distress.   HENT:   Head: Normocephalic and atraumatic.   Eyes: EOM are normal.   Neck:   Normal range of motion.  Cardiovascular:  Normal rate.           Pulmonary/Chest: No respiratory distress.   Abdominal: Abdomen is soft. He exhibits no distension. There is abdominal tenderness (mild suprapubic).   Musculoskeletal:         General: Normal range of motion.      Cervical back: Normal range of motion.     Neurological: He is alert and oriented to person, place, and time.   Skin: Skin is warm and dry.   Psychiatric: He has a normal mood and affect.         ED Course   Procedures  Labs Reviewed   BASIC METABOLIC PANEL - Abnormal       Result Value    Sodium 140      Potassium 4.0      Chloride 106      CO2 27      Glucose 139 (*)     BUN 19      Creatinine 1.51 (*)     Calcium 10.0      Anion Gap 7 (*)     eGFR 46.7 (*)           Imaging Results    None          Medications - No data to display  Medical Decision Making  81 yo M w Pmhx including prostate cancer with a new infiltrate mass found on CT yesterday concerning for recurrence p/w complaint of acute urinary retention confirmed on bladder scan. Pt with 1.8L output after Jones placed. He was observed in the ED without any signs of post obstructive diuresis. Normal electrolytes, and KIKE improved since yesterday. UA from yesterday reviewed without e/o infection. Urology referral placed. Discharged with strict return precautions and outpatient f/u     No acute emergent medical condition has been identified. The  patient appears to be low risk for an emergent medical condition is appropriate for discharge with outpatient f/u as detailed in discharge instructions for reevaluation and possible continued outpatient workup and management. I have discussed the workup with the patient, who has verbalized understanding of the plan and need for outpatient follow-up.  This evaluation does not preclude the development of an emergent condition so in addition, I have advised the patient that they can return to the ED at any time with worsening or change of their symptoms, or with any other medical complaint.       Amount and/or Complexity of Data Reviewed  Independent Historian: spouse     Details: At bedside throughout ED stay   External Data Reviewed: labs and radiology.     Details: Reviewed labs and imaging from ED visit yesterday   Labs: ordered. Decision-making details documented in ED Course.    Risk  Decision regarding hospitalization.               ED Course as of 02/17/25 1417   Mon Feb 17, 2025   1056 CT A/P from yesterday:  Impression:   Infiltrative mass involving the right pelvis compatible with local recurrence.   Multiple lung metastasis.   Sclerotic bone metastasis is suspected of the left ischium and lytic bone metastasis involving the left superior pubic rami suspected associated pathologic fracture.   [AT]   1148 1.8 L output from bladder after Jones placed.  [AT]   1206 Creatinine(!): 1.51  Improved from yesterday [AT]   1207 Sodium: 140  Normal  [AT]      ED Course User Index  [AT] Mili Norton MD                           Clinical Impression:  Final diagnoses:  [R33.8] Acute urinary retention (Primary)          ED Disposition Condition    Discharge Stable          ED Prescriptions    None       Follow-up Information       Follow up With Specialties Details Why Contact Info Additional Information    [facility] Urology Schedule an appointment as soon as possible for a visit in 2 days  [address]  [place]  [address]  [facility] [address] [phone] Please park in surface lot and check in at [address].    Anthony De La Torre MD Family Medicine Schedule an appointment as soon as possible for a visit in 2 days  [address] [facility] [address] [phone]       [facility] - Emergency Dept Emergency Medicine  As needed, If symptoms worsen [address] [facility]  Emergency Department  [facility] [address] [phone]                [1]   Social History  Tobacco Use    Smoking status: Former     Current packs/day: 0.00     Average packs/day: 1.5 packs/day for 20.0 years (30.0 ttl pk-yrs)     Types: Cigarettes     Start date:      Quit date:      Years since quittin.1     Passive exposure: Never    Smokeless tobacco: Never    Tobacco comments:     Age 17 - 37. Up to 1.5 ppd.   Substance Use Topics    Alcohol use: Yes     Comment: Occasional. Few drinks a week.    Drug use: Yes     Types: Marijuana        Mili Norton MD  [phone]

## 2025-02-17 NOTE — TELEPHONE ENCOUNTER
Pts daughter called and stated she is calling from [place] on behalf of the pt and stated pt went to ER last night and is unable to urinate. Informed daughter that the pt has to be on the phone to be triaged. Daughter called pt and pt stated he is unable to urinate and feels like his bladder is very full. Pt also report he is leaking urine, but unable to void on his own. Care advice recommends go to ED now. Pt verbalized understanding.  Reason for Disposition   Unable to urinate (or only a few drops) > 4 hours and bladder feels very full (e.g., palpable bladder or strong urge to urinate)    Additional Information   Negative: Shock suspected (e.g., cold/pale/clammy skin, too weak to stand, low BP, rapid pulse)   Negative: Sounds like a life-threatening emergency to the triager    Protocols used: Urinary Symptoms-A-OH

## 2025-02-17 NOTE — PROVIDER PROGRESS NOTES - EMERGENCY DEPT.
"Encounter Date: 2/16/2025    ED Physician Progress Notes            Pt endorsed to me by Dr. LUL Hunt c/w very mild KIKE and very mild trop bump  Pt co lower abd pain and vomiting  He said he feels much better  He says he never had diarrhea  His CT scan shows a prostate mass, lung mets, and ischial and pubic rami lytic lesions c/w pathologic fx  This could certainly explain his lower abd pain    I reviewed all of these findings with him.  He said that he knows about prostate "I have terminal prostate cancer."  He said he is well connected with Dr. De La Torre and can follow up easily.    I offered him observation in the hospital.  He declines.    I'll rx percocet for the pain and zofran for nausea    D/w pt and his SO      Ronny Calderón MD, [facility], [facility]  Department of Emergency Medicine    "

## 2025-02-17 NOTE — DISCHARGE INSTRUCTIONS

## 2025-02-17 NOTE — PROGRESS NOTES
" Patient ID: Noah Lopez is a 80 y.o. male.     Chief Complaint: Colitis W/c.diff    80 year old male with history of prostate cancer with pulmonary mets, HTN, and recent c diff infection presents to clinic for follow up on diarrhea. Pt reports he went to the ED where he was given vancomycin. States he had a GI follow up though did not realize why. He cancelled. States since taking his last pill, diarrhea has decreased and normal bowel function resumed. Reports at first he noticed the "smell" from c diff, though that has resolved as well. Pt denies fever, CP, SOB, N/V/D. Has taken no further medication for this issue.          Review of Systems  Review of Systems   Constitutional:  Negative for fever.   HENT:  Negative for ear pain and sinus pain.    Eyes:  Negative for discharge.   Respiratory:  Negative for cough and wheezing.    Cardiovascular:  Negative for chest pain and leg swelling.   Gastrointestinal:  Positive for diarrhea. Negative for nausea and vomiting.   Genitourinary:  Negative for urgency.   Musculoskeletal:  Negative for myalgias.   Skin:  Negative for rash.   Neurological:  Negative for weakness and headaches.   Psychiatric/Behavioral:  Negative for depression.        Currently Medications  Medications Ordered Prior to Encounter[1]    Physical  Exam  Vitals:    02/14/25 0815   BP: 136/76   BP Location: Left arm   Patient Position: Sitting   Pulse: 100   Temp: 98.1 °F (36.7 °C)   SpO2: 98%   Weight: 103.4 kg (228 lb 1.1 oz)   Height: 6' 1" (1.854 m)      Body mass index is 30.09 kg/m².  Wt Readings from Last 3 Encounters:   02/16/25 104.3 kg (230 lb)   02/14/25 103.4 kg (228 lb 1.1 oz)   01/26/25 106.6 kg (235 lb)       Physical Exam  Vitals and nursing note reviewed.   Constitutional:       General: He is not in acute distress.     Appearance: He is not ill-appearing.   HENT:      Head: Normocephalic and atraumatic.      Right Ear: External ear normal.      Left Ear: External ear normal. "      Nose: Nose normal.      Mouth/Throat:      Mouth: Mucous membranes are moist.   Eyes:      Extraocular Movements: Extraocular movements intact.      Conjunctiva/sclera: Conjunctivae normal.   Cardiovascular:      Rate and Rhythm: Normal rate and regular rhythm.      Pulses: Normal pulses.      Heart sounds: No murmur heard.  Pulmonary:      Effort: Pulmonary effort is normal. No respiratory distress.      Breath sounds: No wheezing.   Abdominal:      General: There is no distension.      Palpations: Abdomen is soft. There is no mass.      Tenderness: There is no abdominal tenderness.   Musculoskeletal:         General: No swelling.      Cervical back: Normal range of motion.   Skin:     Coloration: Skin is not jaundiced.      Findings: No rash.   Neurological:      General: No focal deficit present.      Mental Status: He is alert and oriented to person, place, and time.   Psychiatric:         Mood and Affect: Mood normal.         Thought Content: Thought content normal.         Labs:    Complete Blood Count  Lab Results   Component Value Date    RBC 5.23 02/16/2025    HGB 16.7 02/16/2025    HCT 48.8 02/16/2025    MCV 93 02/16/2025    MCH 31.9 (H) 02/16/2025    MCHC 34.2 02/16/2025    RDW 12.3 02/16/2025     02/16/2025    MPV 10.0 02/16/2025    GRAN 13.5 (H) 02/16/2025    GRAN 88.1 (H) 02/16/2025    LYMPH 1.1 02/16/2025    LYMPH 6.8 (L) 02/16/2025    MONO 0.7 02/16/2025    MONO 4.5 02/16/2025    EOS 0.0 02/16/2025    BASO 0.04 02/16/2025    EOSINOPHIL 0.0 02/16/2025    BASOPHIL 0.3 02/16/2025    DIFFMETHOD Automated 02/16/2025       Comprehensive Metabolic Panel  Lab Results   Component Value Date     (H) 02/16/2025    BUN 22 (H) 02/16/2025    CREATININE 1.66 (H) 02/16/2025     02/16/2025    K 4.0 02/16/2025     02/16/2025    PROT 8.9 (H) 02/16/2025    ALBUMIN 4.6 02/16/2025    BILITOT 0.7 02/16/2025    AST 75 (H) 02/16/2025    ALKPHOS 144 (H) 02/16/2025    CO2 24 02/16/2025    ALT 39  "02/16/2025    ANIONGAP 13 02/16/2025       TSH  No results found for: "TSH"    Imaging:  CT Abdomen Pelvis  Without Contrast  Narrative: EXAM:  CT ABDOMEN PELVIS WITHOUT CONTRAST    CLINICAL HISTORY: Acute abdominal pain    TECHNIQUE: Axial images were acquired of the abdomen and pelvis without IV contrast.    FINDINGS:    Comparison CT scan 01/30/2024.    Multiple bilateral lung nodules compatible with metastatic disease in this patient with known prostate cancer.  One the larger nodules on the right measures 1.4 cm.    No pleural effusions.    The liver appears normal.    The spleen appears normal.    The pancreas appears normal.    Gallstones.  No gallbladder wall thickening.    Multiple bilateral renal cysts.  No hydronephrosis.  No renal stones.    Atherosclerosis of the abdominal aorta demonstrates an aneurysm.    There are no acute bowel abnormalities.  Normal appendix.    No abnormal masses or fluid collections are identified in the pelvis.    The bladder appears normal.    There is a infiltrative recurrent mass identified involving the pelvis which appears to arise from the prostate gland and possibly involve the bladder base and the left pelvic side wall and possibly the rectum.  Mass measures 9.3 x 8.3 cm.    There is sclerotic changes involving the left ischium as well as lytic bone changes involving the left superior pubic ramus.  There appears be associated pathologic fracture.  Impression:  Infiltrative mass involving the right pelvis compatible with local recurrence.    Multiple lung metastasis.    Sclerotic bone metastasis is suspected of the left ischium and lytic bone metastasis involving the left superior pubic rami suspected associated pathologic fracture.    All CT scans at [this location] are performed using dose modulation techniques as appropriate to a performed exam including the following:  Automated exposure control; adjustment of the mA and/or kV according to patient size (this includes " techniques or standardized protocols for targeted exams where dose is matched to indication / reason for exam; i.e. extremities or head); use of iterative reconstruction technique.    Finalized on: 2/16/2025 6:44 PM By:  Bobby Leung MD  [facility]# [id number]      2025-02-16 18:46:[id number]     [facility]      Assessment/Plan:    1. Prostate cancer  Overview:  Patient with de david high-volume metastatic prosatate adenocarcinoma including lung and osseous mets. Started ADT 11/18/23 with apalutamide 6/7/23. Completed RT to the left iliac met 2/7/23. Subsequent oligoprogression to left pubic tubercle. Underwent RT 01/2024 with good PSA response      2. Malignant neoplasm metastatic to lung, unspecified laterality  Overview:  Metastatic prostate cancer based on PSMA imaging        3. Diarrhea of presumed infectious origin  -     Cancel: Clostridium difficile EIA; Future; Expected date: 02/14/2025    4. C. difficile diarrhea  -     Cancel: Clostridium difficile EIA; Future; Expected date: 02/14/2025         Discussed how to stay healthy including: diet, exercise, refraining from smoking and discussed screening exams / tests needed for age, sex and family Hx.         RTC every 3-6 months with PCP, or PRN    Citlalli Tolbert PA-C             [1]   Current Outpatient Medications on File Prior to Visit   Medication Sig Dispense Refill    apalutamide (ERLEADA) 60 mg Tab TAKE 4 TABLETS BY MOUTH ONCE  DAILY 120 tablet 11    ascorbic acid, vitamin C, (VITAMIN C) 1000 MG tablet Take 1,000 mg by mouth once daily.      atorvastatin (LIPITOR) 40 MG tablet Take 1 tablet (40 mg total) by mouth every evening. 90 tablet 3    buPROPion (WELLBUTRIN) 75 MG tablet Take 1 tablet (75 mg total) by mouth Daily. 90 tablet 3    calcium carbonate (OS-TYREE) 500 mg calcium (1,250 mg) tablet Take 2 tablets (1,000 mg total) by mouth once daily. 60 tablet 11    cholecalciferol, vitamin D3, (VITAMIN D3) 25 mcg (1,000 unit) capsule Take 1 capsule (1,000 Units total) by mouth  once daily. 30 capsule 11    clonazePAM (KLONOPIN) 0.5 MG tablet Take 1 tablet (0.5 mg total) by mouth every evening. 30 tablet 2    clotrimazole-betamethasone 1-0.05% (LOTRISONE) cream Apply topically 2 (two) times daily. 60 g 3    cyclobenzaprine (FLEXERIL) 10 MG tablet Take 1 tablet (10 mg total) by mouth 3 (three) times daily as needed for Muscle spasms. 60 tablet 0    diltiaZEM (CARDIZEM CD) 300 MG 24 hr capsule Take 1 capsule (300 mg total) by mouth once daily. 90 capsule 3    docusate sodium (COLACE) 250 MG capsule Take 250 mg by mouth once daily.      finasteride (PROSCAR) 5 mg tablet TAKE 1 TABLET BY MOUTH ONCE  DAILY 90 tablet 2    GAS RELIEF 80, SIMETHICONE, ORAL Take by mouth.      lisinopriL (PRINIVIL,ZESTRIL) 40 MG tablet Take 1 tablet (40 mg total) by mouth once daily. 90 tablet 3    miconazole (MICOTIN) 2 % cream Apply topically 2 (two) times daily. Apply to tip of penis prn irritation 28 g 0    multivit-min-FA-lycopen-lutein (MEN 50 PLUS MULTIVITAMIN) 300-600-300 mcg Tab as directed Orally      multivitamin capsule Take 1 capsule by mouth once daily.      mupirocin (BACTROBAN) 2 % ointment Apply topically 3 (three) times daily. To healing areas 22 g 2    ofloxacin (FLOXIN) 0.3 % otic solution Apply 10 drops into right ear once a day for 10 days.  Please dispense generic.      omega-3 fatty acids/fish oil (FISH OIL-OMEGA-3 FATTY ACIDS) 300-1,000 mg capsule Take 1 capsule by mouth once daily.      ondansetron (ZOFRAN) 8 MG tablet Take 1 tablet (8 mg total) by mouth every 8 (eight) hours as needed for Nausea. 30 tablet 0    oxyCODONE (ROXICODONE) 5 MG immediate release tablet Take 1 tablet (5 mg total) by mouth every 4 (four) hours as needed for Pain. 42 tablet 0    tamsulosin (FLOMAX) 0.4 mg Cap Take 1 capsule by mouth once daily 90 capsule 3    traZODone (DESYREL) 100 MG tablet TAKE 2 TABLETS BY MOUTH EVERY DAY AT BEDTIME AS NEEDED FOR INSOMNIA 180 tablet 3    TURMERIC ORAL Take by mouth.      wheat  dextrin (BENEFIBER CLEAR SF, DEXTRIN, ORAL) Take by mouth.       No current facility-administered medications on file prior to visit.

## 2025-02-19 ENCOUNTER — TELEPHONE (OUTPATIENT)
Dept: FAMILY MEDICINE | Facility: CLINIC | Age: 80
End: 2025-02-19
Payer: MEDICARE

## 2025-02-19 NOTE — TELEPHONE ENCOUNTER
I have notified Lorena. She stated pt will not perform another stool test. Pt's stool is now formed.     FYI:  Lorena stated pt's cancer has came back. It is in his lungs. The tumor in prostate has double in size. Pt is going to see a urologist. Lorena stated if MD needs to know anything he can look in the ED notes.

## 2025-02-19 NOTE — TELEPHONE ENCOUNTER
----- Message from Rita sent at 2/19/2025  5:37 AM CST -----  Regarding: Lab Client Services  Contact: [phone]  Good Morning, My name is Rita Serrano, I work in the lab.   We received a specimen for Clostridium Difficile test. The test was unable to be performed due to the stool sample was formed stool.   If you have any questions regarding this, please contact Lab Client Services at [phone].  Thank you    Do you want him to recollect? We will need a new order

## 2025-02-19 NOTE — TELEPHONE ENCOUNTER
No. Let's just call it resolved. If further trouble with diarrhea I will put in a new order. Thanks

## 2025-02-20 ENCOUNTER — TELEPHONE (OUTPATIENT)
Dept: UROLOGY | Facility: CLINIC | Age: 80
End: 2025-02-20
Payer: MEDICARE

## 2025-02-20 NOTE — TELEPHONE ENCOUNTER
----- Message from Kristin sent at 2/20/2025 10:34 AM CST -----  Type:  CallWho Called:ptDoes the patient know what this is regarding?:ApptWould the patient rather a call back or a response via [employer]? callBest Call Back Number: [phone] Information:

## 2025-02-21 ENCOUNTER — HOSPITAL ENCOUNTER (INPATIENT)
Facility: HOSPITAL | Age: 80
LOS: 4 days | Discharge: HOME | DRG: 372 | End: 2025-02-26
Attending: EMERGENCY MEDICINE | Admitting: INTERNAL MEDICINE
Payer: MEDICARE

## 2025-02-21 DIAGNOSIS — I10 PRIMARY HYPERTENSION: ICD-10-CM

## 2025-02-21 DIAGNOSIS — F32.A DEPRESSION, UNSPECIFIED DEPRESSION TYPE: ICD-10-CM

## 2025-02-21 DIAGNOSIS — N18.31 STAGE 3A CHRONIC KIDNEY DISEASE: ICD-10-CM

## 2025-02-21 DIAGNOSIS — G89.3 CANCER ASSOCIATED PAIN: ICD-10-CM

## 2025-02-21 DIAGNOSIS — C79.51 MALIGNANT NEOPLASM METASTATIC TO BONE: ICD-10-CM

## 2025-02-21 DIAGNOSIS — E78.5 HYPERLIPIDEMIA, UNSPECIFIED HYPERLIPIDEMIA TYPE: ICD-10-CM

## 2025-02-21 DIAGNOSIS — E86.0 DEHYDRATION: ICD-10-CM

## 2025-02-21 DIAGNOSIS — G47.00 INSOMNIA, UNSPECIFIED TYPE: ICD-10-CM

## 2025-02-21 DIAGNOSIS — N17.9 AKI (ACUTE KIDNEY INJURY): ICD-10-CM

## 2025-02-21 DIAGNOSIS — A04.71 RECURRENT CLOSTRIDIOIDES DIFFICILE DIARRHEA: ICD-10-CM

## 2025-02-21 DIAGNOSIS — R97.20 ELEVATED PSA: ICD-10-CM

## 2025-02-21 DIAGNOSIS — A04.72 C. DIFFICILE DIARRHEA: Primary | ICD-10-CM

## 2025-02-21 DIAGNOSIS — E83.39 HYPOPHOSPHATEMIA: ICD-10-CM

## 2025-02-21 DIAGNOSIS — E87.6 HYPOKALEMIA: ICD-10-CM

## 2025-02-21 DIAGNOSIS — C61 PROSTATE CANCER: ICD-10-CM

## 2025-02-21 DIAGNOSIS — N42.89 PROSTATE MASS: ICD-10-CM

## 2025-02-21 DIAGNOSIS — E83.42 HYPOMAGNESEMIA: ICD-10-CM

## 2025-02-21 DIAGNOSIS — I10 HYPERTENSION, UNSPECIFIED TYPE: ICD-10-CM

## 2025-02-21 LAB
ALBUMIN SERPL BCP-MCNC: 2.8 G/DL (ref 3.5–5.2)
ALP SERPL-CCNC: 89 U/L (ref 40–150)
ALT SERPL W/O P-5'-P-CCNC: 22 U/L (ref 10–44)
ANION GAP SERPL CALC-SCNC: 13 MMOL/L (ref 8–16)
AST SERPL-CCNC: 35 U/L (ref 10–40)
BACTERIA #/AREA URNS HPF: ABNORMAL /HPF
BASOPHILS # BLD AUTO: 0.03 K/UL (ref 0–0.2)
BASOPHILS NFR BLD: 0.2 % (ref 0–1.9)
BILIRUB SERPL-MCNC: 0.8 MG/DL (ref 0.1–1)
BILIRUB UR QL STRIP: NEGATIVE
BUN SERPL-MCNC: 20 MG/DL (ref 8–23)
C DIFF GDH STL QL: POSITIVE
C DIFF TOX A+B STL QL IA: POSITIVE
CALCIUM SERPL-MCNC: 8.7 MG/DL (ref 8.7–10.5)
CHLORIDE SERPL-SCNC: 104 MMOL/L (ref 95–110)
CLARITY UR: CLEAR
CO2 SERPL-SCNC: 18 MMOL/L (ref 23–29)
COLOR UR: YELLOW
CREAT SERPL-MCNC: 1.6 MG/DL (ref 0.5–1.4)
DIFFERENTIAL METHOD BLD: ABNORMAL
EOSINOPHIL # BLD AUTO: 0 K/UL (ref 0–0.5)
EOSINOPHIL NFR BLD: 0.1 % (ref 0–8)
ERYTHROCYTE [DISTWIDTH] IN BLOOD BY AUTOMATED COUNT: 12.5 % (ref 11.5–14.5)
EST. GFR  (NO RACE VARIABLE): 44 ML/MIN/1.73 M^2
GLUCOSE SERPL-MCNC: 117 MG/DL (ref 70–110)
GLUCOSE UR QL STRIP: NEGATIVE
HCT VFR BLD AUTO: 41 % (ref 40–54)
HGB BLD-MCNC: 14 G/DL (ref 14–18)
HGB UR QL STRIP: ABNORMAL
HYALINE CASTS #/AREA URNS LPF: 0 /LPF
IMM GRANULOCYTES # BLD AUTO: 0.09 K/UL (ref 0–0.04)
IMM GRANULOCYTES NFR BLD AUTO: 0.6 % (ref 0–0.5)
KETONES UR QL STRIP: ABNORMAL
LACTATE SERPL-SCNC: 1.1 MMOL/L (ref 0.5–2.2)
LACTATE SERPL-SCNC: 1.2 MMOL/L (ref 0.5–2.2)
LEUKOCYTE ESTERASE UR QL STRIP: ABNORMAL
LYMPHOCYTES # BLD AUTO: 0.7 K/UL (ref 1–4.8)
LYMPHOCYTES NFR BLD: 4.4 % (ref 18–48)
MAGNESIUM SERPL-MCNC: 1.8 MG/DL (ref 1.6–2.6)
MCH RBC QN AUTO: 31.9 PG (ref 27–31)
MCHC RBC AUTO-ENTMCNC: 34.1 G/DL (ref 32–36)
MCV RBC AUTO: 93 FL (ref 82–98)
MICROSCOPIC COMMENT: ABNORMAL
MONOCYTES # BLD AUTO: 1.3 K/UL (ref 0.3–1)
MONOCYTES NFR BLD: 7.8 % (ref 4–15)
NEUTROPHILS # BLD AUTO: 14.1 K/UL (ref 1.8–7.7)
NEUTROPHILS NFR BLD: 86.9 % (ref 38–73)
NITRITE UR QL STRIP: NEGATIVE
NRBC BLD-RTO: 0 /100 WBC
PH UR STRIP: 6 [PH] (ref 5–8)
PLATELET # BLD AUTO: 284 K/UL (ref 150–450)
PMV BLD AUTO: 9.7 FL (ref 9.2–12.9)
POTASSIUM SERPL-SCNC: 3.4 MMOL/L (ref 3.5–5.1)
PROT SERPL-MCNC: 6.9 G/DL (ref 6–8.4)
PROT UR QL STRIP: ABNORMAL
RBC # BLD AUTO: 4.39 M/UL (ref 4.6–6.2)
RBC #/AREA URNS HPF: 84 /HPF (ref 0–4)
SODIUM SERPL-SCNC: 135 MMOL/L (ref 136–145)
SP GR UR STRIP: 1.02 (ref 1–1.03)
URN SPEC COLLECT METH UR: ABNORMAL
UROBILINOGEN UR STRIP-ACNC: NEGATIVE EU/DL
WBC # BLD AUTO: 16.2 K/UL (ref 3.9–12.7)
WBC #/AREA STL HPF: NORMAL /[HPF]
WBC #/AREA URNS HPF: 9 /HPF (ref 0–5)

## 2025-02-21 PROCEDURE — 25000003 PHARM REV CODE 250

## 2025-02-21 PROCEDURE — 87177 OVA AND PARASITES SMEARS: CPT | Performed by: EMERGENCY MEDICINE

## 2025-02-21 PROCEDURE — 83605 ASSAY OF LACTIC ACID: CPT | Performed by: EMERGENCY MEDICINE

## 2025-02-21 PROCEDURE — 96372 THER/PROPH/DIAG INJ SC/IM: CPT | Performed by: INTERNAL MEDICINE

## 2025-02-21 PROCEDURE — 83735 ASSAY OF MAGNESIUM: CPT | Performed by: EMERGENCY MEDICINE

## 2025-02-21 PROCEDURE — 87324 CLOSTRIDIUM AG IA: CPT | Performed by: EMERGENCY MEDICINE

## 2025-02-21 PROCEDURE — 83605 ASSAY OF LACTIC ACID: CPT | Mod: 91

## 2025-02-21 PROCEDURE — 87046 STOOL CULTR AEROBIC BACT EA: CPT | Performed by: EMERGENCY MEDICINE

## 2025-02-21 PROCEDURE — 63600175 PHARM REV CODE 636 W HCPCS

## 2025-02-21 PROCEDURE — 80053 COMPREHEN METABOLIC PANEL: CPT | Performed by: EMERGENCY MEDICINE

## 2025-02-21 PROCEDURE — 25000003 PHARM REV CODE 250: Performed by: EMERGENCY MEDICINE

## 2025-02-21 PROCEDURE — 87427 SHIGA-LIKE TOXIN AG IA: CPT | Mod: 59 | Performed by: EMERGENCY MEDICINE

## 2025-02-21 PROCEDURE — 63600175 PHARM REV CODE 636 W HCPCS: Performed by: EMERGENCY MEDICINE

## 2025-02-21 PROCEDURE — 87449 NOS EACH ORGANISM AG IA: CPT | Mod: 91 | Performed by: EMERGENCY MEDICINE

## 2025-02-21 PROCEDURE — 85025 COMPLETE CBC W/AUTO DIFF WBC: CPT | Performed by: EMERGENCY MEDICINE

## 2025-02-21 PROCEDURE — 87328 CRYPTOSPORIDIUM AG IA: CPT | Performed by: EMERGENCY MEDICINE

## 2025-02-21 PROCEDURE — 96374 THER/PROPH/DIAG INJ IV PUSH: CPT

## 2025-02-21 PROCEDURE — G0378 HOSPITAL OBSERVATION PER HR: HCPCS

## 2025-02-21 PROCEDURE — 87045 FECES CULTURE AEROBIC BACT: CPT | Performed by: EMERGENCY MEDICINE

## 2025-02-21 PROCEDURE — 63600175 PHARM REV CODE 636 W HCPCS: Performed by: INTERNAL MEDICINE

## 2025-02-21 PROCEDURE — 99285 EMERGENCY DEPT VISIT HI MDM: CPT | Mod: 25

## 2025-02-21 PROCEDURE — 89055 LEUKOCYTE ASSESSMENT FECAL: CPT | Performed by: EMERGENCY MEDICINE

## 2025-02-21 PROCEDURE — 81000 URINALYSIS NONAUTO W/SCOPE: CPT | Performed by: EMERGENCY MEDICINE

## 2025-02-21 PROCEDURE — 96361 HYDRATE IV INFUSION ADD-ON: CPT

## 2025-02-21 RX ORDER — ENOXAPARIN SODIUM 100 MG/ML
30 INJECTION SUBCUTANEOUS EVERY 24 HOURS
Status: DISCONTINUED | OUTPATIENT
Start: 2025-02-21 | End: 2025-02-21 | Stop reason: DRUGHIGH

## 2025-02-21 RX ORDER — BUPROPION HYDROCHLORIDE 75 MG/1
75 TABLET ORAL DAILY
Status: DISCONTINUED | OUTPATIENT
Start: 2025-02-22 | End: 2025-02-21

## 2025-02-21 RX ORDER — IBUPROFEN 200 MG
16 TABLET ORAL
Status: DISCONTINUED | OUTPATIENT
Start: 2025-02-21 | End: 2025-02-26 | Stop reason: HOSPADM

## 2025-02-21 RX ORDER — SODIUM CHLORIDE 9 MG/ML
1000 INJECTION, SOLUTION INTRAVENOUS
Status: COMPLETED | OUTPATIENT
Start: 2025-02-21 | End: 2025-02-21

## 2025-02-21 RX ORDER — FINASTERIDE 5 MG/1
5 TABLET, FILM COATED ORAL NIGHTLY
Status: DISCONTINUED | OUTPATIENT
Start: 2025-02-21 | End: 2025-02-26 | Stop reason: HOSPADM

## 2025-02-21 RX ORDER — FINASTERIDE 5 MG/1
5 TABLET, FILM COATED ORAL DAILY
Status: DISCONTINUED | OUTPATIENT
Start: 2025-02-22 | End: 2025-02-21

## 2025-02-21 RX ORDER — BUPROPION HYDROCHLORIDE 75 MG/1
75 TABLET ORAL NIGHTLY
Status: DISCONTINUED | OUTPATIENT
Start: 2025-02-21 | End: 2025-02-22

## 2025-02-21 RX ORDER — CYCLOBENZAPRINE HCL 10 MG
10 TABLET ORAL 3 TIMES DAILY PRN
Status: DISCONTINUED | OUTPATIENT
Start: 2025-02-21 | End: 2025-02-26 | Stop reason: HOSPADM

## 2025-02-21 RX ORDER — SODIUM CHLORIDE 0.9 % (FLUSH) 0.9 %
10 SYRINGE (ML) INJECTION EVERY 12 HOURS PRN
Status: DISCONTINUED | OUTPATIENT
Start: 2025-02-21 | End: 2025-02-26 | Stop reason: HOSPADM

## 2025-02-21 RX ORDER — GLUCAGON 1 MG
1 KIT INJECTION
Status: DISCONTINUED | OUTPATIENT
Start: 2025-02-21 | End: 2025-02-26 | Stop reason: HOSPADM

## 2025-02-21 RX ORDER — SODIUM CHLORIDE, SODIUM LACTATE, POTASSIUM CHLORIDE, CALCIUM CHLORIDE 600; 310; 30; 20 MG/100ML; MG/100ML; MG/100ML; MG/100ML
INJECTION, SOLUTION INTRAVENOUS CONTINUOUS
Status: ACTIVE | OUTPATIENT
Start: 2025-02-21 | End: 2025-02-22

## 2025-02-21 RX ORDER — TRAZODONE HYDROCHLORIDE 100 MG/1
200 TABLET ORAL NIGHTLY
Status: DISCONTINUED | OUTPATIENT
Start: 2025-02-21 | End: 2025-02-22

## 2025-02-21 RX ORDER — IBUPROFEN 200 MG
24 TABLET ORAL
Status: DISCONTINUED | OUTPATIENT
Start: 2025-02-21 | End: 2025-02-26 | Stop reason: HOSPADM

## 2025-02-21 RX ORDER — ATORVASTATIN CALCIUM 40 MG/1
40 TABLET, FILM COATED ORAL NIGHTLY
Status: DISCONTINUED | OUTPATIENT
Start: 2025-02-21 | End: 2025-02-26 | Stop reason: HOSPADM

## 2025-02-21 RX ORDER — NALOXONE HCL 0.4 MG/ML
0.02 VIAL (ML) INJECTION
Status: DISCONTINUED | OUTPATIENT
Start: 2025-02-21 | End: 2025-02-26 | Stop reason: HOSPADM

## 2025-02-21 RX ORDER — TRAZODONE HYDROCHLORIDE 100 MG/1
200 TABLET ORAL NIGHTLY PRN
Status: DISCONTINUED | OUTPATIENT
Start: 2025-02-21 | End: 2025-02-21

## 2025-02-21 RX ORDER — ONDANSETRON HYDROCHLORIDE 2 MG/ML
4 INJECTION, SOLUTION INTRAVENOUS
Status: COMPLETED | OUTPATIENT
Start: 2025-02-21 | End: 2025-02-21

## 2025-02-21 RX ORDER — MAGNESIUM SULFATE HEPTAHYDRATE 40 MG/ML
2 INJECTION, SOLUTION INTRAVENOUS ONCE
Status: COMPLETED | OUTPATIENT
Start: 2025-02-21 | End: 2025-02-21

## 2025-02-21 RX ORDER — ENOXAPARIN SODIUM 100 MG/ML
40 INJECTION SUBCUTANEOUS EVERY 24 HOURS
Status: DISCONTINUED | OUTPATIENT
Start: 2025-02-21 | End: 2025-02-26 | Stop reason: HOSPADM

## 2025-02-21 RX ORDER — ONDANSETRON 4 MG/1
4 TABLET, ORALLY DISINTEGRATING ORAL EVERY 6 HOURS PRN
Status: DISCONTINUED | OUTPATIENT
Start: 2025-02-21 | End: 2025-02-26 | Stop reason: HOSPADM

## 2025-02-21 RX ADMIN — CYCLOBENZAPRINE 10 MG: 10 TABLET, FILM COATED ORAL at 10:02

## 2025-02-21 RX ADMIN — MAGNESIUM SULFATE HEPTAHYDRATE 2 G: 40 INJECTION, SOLUTION INTRAVENOUS at 08:02

## 2025-02-21 RX ADMIN — BUPROPION HYDROCHLORIDE 75 MG: 75 TABLET, FILM COATED ORAL at 10:02

## 2025-02-21 RX ADMIN — FINASTERIDE 5 MG: 5 TABLET, FILM COATED ORAL at 10:02

## 2025-02-21 RX ADMIN — SODIUM CHLORIDE, POTASSIUM CHLORIDE, SODIUM LACTATE AND CALCIUM CHLORIDE: 600; 310; 30; 20 INJECTION, SOLUTION INTRAVENOUS at 08:02

## 2025-02-21 RX ADMIN — ENOXAPARIN SODIUM 40 MG: 40 INJECTION SUBCUTANEOUS at 08:02

## 2025-02-21 RX ADMIN — SODIUM CHLORIDE 1000 ML: 9 INJECTION, SOLUTION INTRAVENOUS at 11:02

## 2025-02-21 RX ADMIN — ONDANSETRON 4 MG: 2 INJECTION INTRAMUSCULAR; INTRAVENOUS at 11:02

## 2025-02-21 RX ADMIN — ATORVASTATIN CALCIUM 40 MG: 40 TABLET, FILM COATED ORAL at 08:02

## 2025-02-21 RX ADMIN — POTASSIUM BICARBONATE 50 MEQ: 978 TABLET, EFFERVESCENT ORAL at 08:02

## 2025-02-21 RX ADMIN — TRAZODONE HYDROCHLORIDE 200 MG: 100 TABLET ORAL at 10:02

## 2025-02-21 NOTE — ED PROVIDER NOTES
Encounter Date: 2/21/2025       History     Chief Complaint   Patient presents with    Diarrhea     Pt arrives with complaints of weakness and diarrhea around 1 week now. Pt also complains of generalized body pain. Pt arrives alert, in no distress.      Patient is a 80-year-old male with a history of prostate cancer, CKD 3, recurrent UTI, hypertension presents to the ED with complaint of diarrhea.  Patient reports non mucoid nonbloody diarrhea over the past 7 days.  He states he has had approximately 4-5 episodes of diarrhea on a daily basis.  He describes the diarrhea as having a foul smell typical of previous C diff infections.  He denies any fever.  He denies any blood or dark tarry stools.  H denies any recent travel or recent antibiotic use.  He does report mild diffuse abdominal pain described as cramping and intermittent.  He denies any chest pain shortness of breath.  He believes he is dehydrated and states he has not eaten anything for approximately 4-5 days.      Review of patient's allergies indicates:   Allergen Reactions    Ciprofloxacin Other (See Comments)     tendonitis     Past Medical History:   Diagnosis Date    Colon polyp 09/06/2018    Hyperlipidemia     Hypertension     Prostate cancer     Stage 3a chronic kidney disease 6/21/2023    Urinary tract infection with hematuria 11/18/2022     Past Surgical History:   Procedure Laterality Date    COLONOSCOPY  2010    COLONOSCOPY N/A 9/6/2018    Procedure: COLONOSCOPY Suprep PLEASE TEXT PATIENT WITH ARRIVAL TIME;  Surgeon: Niharika Arce MD;  Location: [facility];  Service: Endoscopy;  Laterality: N/A;    CYSTOSCOPY WITH URODYNAMIC TESTING N/A 1/31/2022    Procedure: CYSTOSCOPY, WITH URODYNAMIC TESTING FLOUROSCOPIC;  Surgeon: Anthony Maloney MD;  Location: [address];  Service: Urology;  Laterality: N/A;  1hr    KNEE ARTHROSCOPY      ORCHIECTOMY Bilateral 2/15/2023    Procedure: Bilateral simple orchiectomy;  Surgeon: Helena Luis MD;   Location: [facility] OR;  Service: Urology;  Laterality: Bilateral;    SPINE SURGERY      l-spine    TRANSURETHRAL RESECTION OF PROSTATE N/A 10/26/2022    Procedure: TURP (TRANSURETHRAL RESECTION OF PROSTATE);  Surgeon: Helena Luis MD;  Location: [facility] OR;  Service: Urology;  Laterality: N/A;     Family History   Problem Relation Name Age of Onset    Hypertension Mother      Heart attack Father      Cirrhosis Father      No Known Problems Brother      No Known Problems Brother      Seizures Daughter      Liver disease Daughter      No Known Problems Son      Thyroid cancer Other      Prostate cancer Neg Hx      Breast cancer Neg Hx      Pancreatic cancer Neg Hx       Social History[1]  Review of Systems   Constitutional:  Positive for activity change, appetite change and fatigue. Negative for fever and unexpected weight change.   Respiratory:  Negative for chest tightness and shortness of breath.    Cardiovascular:  Negative for chest pain, palpitations and leg swelling.   Gastrointestinal:  Positive for abdominal pain, diarrhea and nausea. Negative for vomiting.   Genitourinary:  Negative for dysuria.   Musculoskeletal:  Negative for gait problem and myalgias.   Neurological:  Negative for dizziness and headaches.       Physical Exam     Initial Vitals [02/21/25 1051]   BP Pulse Resp Temp SpO2   (!) 154/70 108 18 98.8 °F (37.1 °C) 98 %      MAP       --         Physical Exam    Nursing note and vitals reviewed.  Constitutional: He appears well-developed and well-nourished. No distress.   HENT:   Head: Normocephalic and atraumatic.   Dry oral mucosa   Neck: Neck supple.   Normal range of motion.  Cardiovascular:  Normal rate, regular rhythm and normal heart sounds.           Pulmonary/Chest: Breath sounds normal.   Abdominal: Abdomen is soft. Bowel sounds are normal. He exhibits distension.   Mild abdominal distention; mild tenderness diffusely; no rebound or guarding   Genitourinary:    Genitourinary Comments: Jones  catheter in place     Musculoskeletal:         General: Normal range of motion.      Cervical back: Normal range of motion and neck supple.     Neurological: He is alert and oriented to person, place, and time. GCS score is 15. GCS eye subscore is 4. GCS verbal subscore is 5. GCS motor subscore is 6.   Skin: Skin is warm. Capillary refill takes less than 2 seconds.         ED Course   Procedures  Labs Reviewed   CLOSTRIDIUM DIFFICILE - Abnormal       Result Value    C. diff Antigen Positive (*)     C difficile Toxins A+B, EIA Positive (*)     Narrative:     If specimen collected today, this will be a community onset  CDIFF case. If specimen cannot be collected by hospital day  3, discontinue test and follow Diarrhea Decision Tree to  determine if a LOI CDIFF order is appropriate. The order will  automatically discontinue if specimen not collected within  48 hours.  [email or web address]   CBC W/ AUTO DIFFERENTIAL - Abnormal    WBC 16.20 (*)     RBC 4.39 (*)     Hemoglobin 14.0      Hematocrit 41.0      MCV 93      MCH 31.9 (*)     MCHC 34.1      RDW 12.5      Platelets 284      MPV 9.7      Immature Granulocytes 0.6 (*)     Gran # (ANC) 14.1 (*)     Immature Grans (Abs) 0.09 (*)     Lymph # 0.7 (*)     Mono # 1.3 (*)     Eos # 0.0      Baso # 0.03      nRBC 0      Gran % 86.9 (*)     Lymph % 4.4 (*)     Mono % 7.8      Eosinophil % 0.1      Basophil % 0.2      Differential Method Automated     COMPREHENSIVE METABOLIC PANEL - Abnormal    Sodium 135 (*)     Potassium 3.4 (*)     Chloride 104      CO2 18 (*)     Glucose 117 (*)     BUN 20      Creatinine 1.6 (*)     Calcium 8.7      Total Protein 6.9      Albumin 2.8 (*)     Total Bilirubin 0.8      Alkaline Phosphatase 89      AST 35      ALT 22      eGFR 44 (*)     Anion Gap 13     URINALYSIS, REFLEX TO URINE CULTURE - Abnormal    Specimen UA Urine, Clean Catch      Color, UA Yellow      Appearance, UA Clear       pH, UA 6.0      Specific Gravity, UA 1.020      Protein, UA 1+ (*)     Glucose, UA Negative      Ketones, UA 3+ (*)     Bilirubin (UA) Negative      Occult Blood UA 2+ (*)     Nitrite, UA Negative      Urobilinogen, UA Negative      Leukocytes, UA Trace (*)     Narrative:     Specimen Source->Urine   URINALYSIS MICROSCOPIC - Abnormal    RBC, UA 84 (*)     WBC, UA 9 (*)     Bacteria None      Hyaline Casts, UA 0      Microscopic Comment SEE COMMENT      Narrative:     Specimen Source->Urine   CULTURE, STOOL   ENTEROHEMORRHAGIC E.COLI   MAGNESIUM    Magnesium 1.8     WBC, STOOL    Stool WBC No neutrophils seen     LACTIC ACID, PLASMA    Lactate (Lactic Acid) 1.2     STOOL EXAM-OVA,CYSTS,PARASITES   GIARDIA/CRYPTOSPORIDIUM (EIA)   LACTIC ACID, PLASMA          Imaging Results    None          Medications   atorvastatin tablet 40 mg (has no administration in time range)   buPROPion tablet 75 mg (has no administration in time range)   cyclobenzaprine tablet 10 mg (has no administration in time range)   finasteride tablet 5 mg (has no administration in time range)   traZODone tablet 200 mg (has no administration in time range)   sodium chloride 0.9% flush 10 mL (has no administration in time range)   naloxone 0.4 mg/mL injection 0.02 mg (has no administration in time range)   glucose chewable tablet 16 g (has no administration in time range)   glucose chewable tablet 24 g (has no administration in time range)   dextrose 50% injection 12.5 g (has no administration in time range)   dextrose 50% injection 25 g (has no administration in time range)   glucagon (human recombinant) injection 1 mg (has no administration in time range)   potassium bicarbonate disintegrating tablet 50 mEq (has no administration in time range)   magnesium sulfate 2g in water 50mL IVPB (premix) (has no administration in time range)   vancomycin 125 mg/5 mL oral solution 125 mg (has no administration in time range)   lactated ringers infusion (has  no administration in time range)   ondansetron disintegrating tablet 4 mg (has no administration in time range)   enoxaparin injection 40 mg (has no administration in time range)   0.9% NaCl infusion (0 mLs Intravenous Stopped 2/21/25 1502)   ondansetron injection 4 mg (4 mg Intravenous Given 2/21/25 1149)     Medical Decision Making  Amount and/or Complexity of Data Reviewed  Labs: ordered. Decision-making details documented in ED Course.    Risk  Prescription drug management.               ED Course as of 02/21/25 1942 Fri Feb 21, 2025   1212 WBC(!): 16.20 [LC]   1212 Hemoglobin: 14.0 [LC]   1212 Hematocrit: 41.0 [LC]   1235 Creatinine(!): 1.6 [LC]   1235 Glucose(!): 117 [LC]   1235 CO2(!): 18 [LC]   1235 Potassium(!): 3.4 [LC]   1235 Magnesium : 1.8 [LC]   1252 RBC, UA(!): 84 [LC]   1253 WBC, UA(!): 9 [LC]   1253 RBC, UA(!): 84  Indwelling Jones in place.  [LC]   1548 C. diff Antigen(!): Positive [LC]   1548 C difficile Toxins A+B, EIA(!): Positive [LC]   1615 Stool WBC: No neutrophils seen [LC]      ED Course User Index  [LC] Shantanu Edwards MD               Medical Decision Making:   Clinical Tests:   Lab Tests: Ordered and Reviewed  ED Management:  - the patient's routine laboratory analysis notable for white blood cell count of 16 K; his abdominal exam is largely unremarkable; C diff toxin and antigen both are positive; in light of patient's advanced age, repeat C diff infection, reported severe diarrhea, essentially outpatient antibiotic failure and his systemic response in the formula leukocytosis, I do feel he would benefit from admission at this point; case discussed with the [facility] Medicine resident who will admit to his service for further evaluation and management of recurrent C diff infection               Clinical Impression:  Final diagnoses:  [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 Up to 1.5 ppd.   Substance Use Topics    Alcohol use: Yes     Comment: Occasional. Few drinks a week.    Drug use: Yes     Types: Marijuana        Shantanu Edwards MD  25

## 2025-02-21 NOTE — PHARMACY MED REC
"  [facility]           Pharmacy  Admission Medication History     The home medication history was taken by Suzi Pa.      Medication history obtained from Medications listed below were obtained from: Patient/family    Based on information gathered for medication list, you may go to "Admission" then "Reconcile Home Medications" tabs to review and/or act upon those items.     The home medication list has been updated by the Pharmacy department.   Please read ALL comments highlighted in yellow.   Please address this information as you see fit.    Feel free to contact us if you have any questions or require assistance.    The medications listed below were removed from the home medication list.  Please reorder if appropriate:    Patient reports NOT TAKING the following medication(s):  Micotin cream  Floxin otic sol  Omega fish oil        No current facility-administered medications on file prior to encounter.     Current Outpatient Medications on File Prior to Encounter   Medication Sig Dispense Refill    apalutamide (ERLEADA) 60 mg Tab TAKE 4 TABLETS BY MOUTH ONCE  DAILY (Patient taking differently: Take 240 mg by mouth once daily.) 120 tablet 11    ascorbic acid, vitamin C, (VITAMIN C) 1000 MG tablet Take 1,000 mg by mouth once daily.      atorvastatin (LIPITOR) 40 MG tablet Take 1 tablet (40 mg total) by mouth every evening. 90 tablet 3    buPROPion (WELLBUTRIN) 75 MG tablet Take 1 tablet (75 mg total) by mouth Daily. 90 tablet 3    calcium carbonate (OS-TYREE) 500 mg calcium (1,250 mg) tablet Take 2 tablets (1,000 mg total) by mouth once daily. 60 tablet 11    cholecalciferol, vitamin D3, (VITAMIN D3) 25 mcg (1,000 unit) capsule Take 1 capsule (1,000 Units total) by mouth once daily. 30 capsule 11    clonazePAM (KLONOPIN) 0.5 MG tablet Take 1 tablet (0.5 mg total) by mouth every evening. 30 tablet 2    clotrimazole-betamethasone 1-0.05% (LOTRISONE) cream Apply topically 2 (two) times daily. " 60 g 3    cyclobenzaprine (FLEXERIL) 10 MG tablet Take 1 tablet (10 mg total) by mouth 3 (three) times daily as needed for Muscle spasms. 60 tablet 0    diltiaZEM (CARDIZEM CD) 300 MG 24 hr capsule Take 1 capsule (300 mg total) by mouth once daily. 90 capsule 3    docusate sodium (COLACE) 250 MG capsule Take 250 mg by mouth once daily.      finasteride (PROSCAR) 5 mg tablet TAKE 1 TABLET BY MOUTH ONCE  DAILY (Patient taking differently: Take 5 mg by mouth once daily.) 90 tablet 2    GAS RELIEF 80, SIMETHICONE, ORAL Take by mouth as needed.      lisinopriL (PRINIVIL,ZESTRIL) 40 MG tablet Take 1 tablet (40 mg total) by mouth once daily. 90 tablet 3    multivit-min-FA-lycopen-lutein (MEN 50 PLUS MULTIVITAMIN) 300-600-300 mcg Tab Take 1 tablet by mouth once daily.      ondansetron (ZOFRAN-ODT) 4 MG TbDL Take 1 tablet (4 mg total) by mouth every 6 (six) hours as needed. 20 tablet 0    oxyCODONE (ROXICODONE) 5 MG immediate release tablet Take 1 tablet (5 mg total) by mouth every 4 (four) hours as needed for Pain. 42 tablet 0    traZODone (DESYREL) 100 MG tablet TAKE 2 TABLETS BY MOUTH EVERY DAY AT BEDTIME AS NEEDED FOR INSOMNIA (Patient taking differently: Take 200 mg by mouth nightly as needed for Insomnia.) 180 tablet 3    TURMERIC ORAL Take by mouth.      wheat dextrin (BENEFIBER CLEAR SF, DEXTRIN, ORAL) Take by mouth.      mupirocin (BACTROBAN) 2 % ointment Apply topically 3 (three) times daily. To healing areas 22 g 2    oxyCODONE-acetaminophen (PERCOCET) 5-325 mg per tablet Take 1 tablet by mouth every 6 (six) hours as needed for Pain. 12 tablet 0       Please address this information as you see fit.  Feel free to contact us if you have any questions or require assistance.    Suzi Pa  [phone]                .

## 2025-02-22 PROBLEM — E83.42 HYPOMAGNESEMIA: Status: ACTIVE | Noted: 2025-02-22

## 2025-02-22 PROBLEM — F32.A DEPRESSION: Status: ACTIVE | Noted: 2025-02-22

## 2025-02-22 PROBLEM — N17.9 AKI (ACUTE KIDNEY INJURY): Status: ACTIVE | Noted: 2025-02-22

## 2025-02-22 PROBLEM — E87.6 HYPOKALEMIA: Status: ACTIVE | Noted: 2025-02-22

## 2025-02-22 LAB
ALBUMIN SERPL BCP-MCNC: 2.6 G/DL (ref 3.5–5.2)
ALP SERPL-CCNC: 86 U/L (ref 40–150)
ALT SERPL W/O P-5'-P-CCNC: 20 U/L (ref 10–44)
ANION GAP SERPL CALC-SCNC: 12 MMOL/L (ref 8–16)
AST SERPL-CCNC: 34 U/L (ref 10–40)
BACTERIA BLD CULT: NORMAL
BACTERIA BLD CULT: NORMAL
BASOPHILS # BLD AUTO: 0.04 K/UL (ref 0–0.2)
BASOPHILS NFR BLD: 0.3 % (ref 0–1.9)
BILIRUB SERPL-MCNC: 0.7 MG/DL (ref 0.1–1)
BUN SERPL-MCNC: 15 MG/DL (ref 8–23)
CALCIUM SERPL-MCNC: 8.5 MG/DL (ref 8.7–10.5)
CHLORIDE SERPL-SCNC: 101 MMOL/L (ref 95–110)
CO2 SERPL-SCNC: 20 MMOL/L (ref 23–29)
CREAT SERPL-MCNC: 1.4 MG/DL (ref 0.5–1.4)
DIFFERENTIAL METHOD BLD: ABNORMAL
EOSINOPHIL # BLD AUTO: 0.1 K/UL (ref 0–0.5)
EOSINOPHIL NFR BLD: 0.6 % (ref 0–8)
ERYTHROCYTE [DISTWIDTH] IN BLOOD BY AUTOMATED COUNT: 12.5 % (ref 11.5–14.5)
EST. GFR  (NO RACE VARIABLE): 51 ML/MIN/1.73 M^2
GLUCOSE SERPL-MCNC: 106 MG/DL (ref 70–110)
HCT VFR BLD AUTO: 38.8 % (ref 40–54)
HGB BLD-MCNC: 13.3 G/DL (ref 14–18)
IMM GRANULOCYTES # BLD AUTO: 0.05 K/UL (ref 0–0.04)
IMM GRANULOCYTES NFR BLD AUTO: 0.4 % (ref 0–0.5)
LYMPHOCYTES # BLD AUTO: 0.7 K/UL (ref 1–4.8)
LYMPHOCYTES NFR BLD: 5.4 % (ref 18–48)
MAGNESIUM SERPL-MCNC: 2.2 MG/DL (ref 1.6–2.6)
MCH RBC QN AUTO: 31.7 PG (ref 27–31)
MCHC RBC AUTO-ENTMCNC: 34.3 G/DL (ref 32–36)
MCV RBC AUTO: 93 FL (ref 82–98)
MONOCYTES # BLD AUTO: 1.4 K/UL (ref 0.3–1)
MONOCYTES NFR BLD: 10.2 % (ref 4–15)
NEUTROPHILS # BLD AUTO: 11.4 K/UL (ref 1.8–7.7)
NEUTROPHILS NFR BLD: 83.1 % (ref 38–73)
NRBC BLD-RTO: 0 /100 WBC
PHOSPHATE SERPL-MCNC: 1.1 MG/DL (ref 2.7–4.5)
PHOSPHATE SERPL-MCNC: 1.6 MG/DL (ref 2.7–4.5)
PLATELET # BLD AUTO: 291 K/UL (ref 150–450)
PMV BLD AUTO: 10.1 FL (ref 9.2–12.9)
POTASSIUM SERPL-SCNC: 3.8 MMOL/L (ref 3.5–5.1)
PROT SERPL-MCNC: 6.5 G/DL (ref 6–8.4)
RBC # BLD AUTO: 4.19 M/UL (ref 4.6–6.2)
SODIUM SERPL-SCNC: 133 MMOL/L (ref 136–145)
WBC # BLD AUTO: 13.67 K/UL (ref 3.9–12.7)

## 2025-02-22 PROCEDURE — 85025 COMPLETE CBC W/AUTO DIFF WBC: CPT

## 2025-02-22 PROCEDURE — 96372 THER/PROPH/DIAG INJ SC/IM: CPT | Performed by: INTERNAL MEDICINE

## 2025-02-22 PROCEDURE — 80053 COMPREHEN METABOLIC PANEL: CPT

## 2025-02-22 PROCEDURE — 21400001 HC TELEMETRY ROOM

## 2025-02-22 PROCEDURE — 84100 ASSAY OF PHOSPHORUS: CPT | Mod: 91

## 2025-02-22 PROCEDURE — 25000003 PHARM REV CODE 250

## 2025-02-22 PROCEDURE — 83735 ASSAY OF MAGNESIUM: CPT

## 2025-02-22 PROCEDURE — 36415 COLL VENOUS BLD VENIPUNCTURE: CPT

## 2025-02-22 PROCEDURE — G0425 INPT/ED TELECONSULT30: HCPCS | Mod: GT,GC,, | Performed by: PSYCHIATRY & NEUROLOGY

## 2025-02-22 PROCEDURE — 27000207 HC ISOLATION

## 2025-02-22 PROCEDURE — 63600175 PHARM REV CODE 636 W HCPCS

## 2025-02-22 PROCEDURE — 63600175 PHARM REV CODE 636 W HCPCS: Performed by: INTERNAL MEDICINE

## 2025-02-22 RX ORDER — SODIUM,POTASSIUM PHOSPHATES 280-250MG
1 POWDER IN PACKET (EA) ORAL ONCE
Status: COMPLETED | OUTPATIENT
Start: 2025-02-22 | End: 2025-02-22

## 2025-02-22 RX ORDER — ACETAMINOPHEN 500 MG
1000 TABLET ORAL EVERY 6 HOURS PRN
Status: DISCONTINUED | OUTPATIENT
Start: 2025-02-22 | End: 2025-02-22

## 2025-02-22 RX ORDER — LISINOPRIL 20 MG/1
40 TABLET ORAL DAILY
Status: DISCONTINUED | OUTPATIENT
Start: 2025-02-22 | End: 2025-02-26 | Stop reason: HOSPADM

## 2025-02-22 RX ORDER — ACETAMINOPHEN 325 MG/1
650 TABLET ORAL EVERY 6 HOURS PRN
Status: DISCONTINUED | OUTPATIENT
Start: 2025-02-22 | End: 2025-02-26 | Stop reason: HOSPADM

## 2025-02-22 RX ORDER — MIRTAZAPINE 7.5 MG/1
7.5 TABLET, FILM COATED ORAL NIGHTLY
Status: DISCONTINUED | OUTPATIENT
Start: 2025-02-22 | End: 2025-02-26 | Stop reason: HOSPADM

## 2025-02-22 RX ORDER — TRAZODONE HYDROCHLORIDE 100 MG/1
200 TABLET ORAL NIGHTLY PRN
Status: DISCONTINUED | OUTPATIENT
Start: 2025-02-22 | End: 2025-02-26 | Stop reason: HOSPADM

## 2025-02-22 RX ADMIN — POTASSIUM & SODIUM PHOSPHATES POWDER PACK 280-160-250 MG 1 PACKET: 280-160-250 PACK at 09:02

## 2025-02-22 RX ADMIN — VANCOMYCIN HYDROCHLORIDE 125 MG: KIT at 12:02

## 2025-02-22 RX ADMIN — VANCOMYCIN HYDROCHLORIDE 125 MG: KIT at 11:02

## 2025-02-22 RX ADMIN — POTASSIUM & SODIUM PHOSPHATES POWDER PACK 280-160-250 MG 1 PACKET: 280-160-250 PACK at 04:02

## 2025-02-22 RX ADMIN — LISINOPRIL 40 MG: 20 TABLET ORAL at 11:02

## 2025-02-22 RX ADMIN — SODIUM CHLORIDE, POTASSIUM CHLORIDE, SODIUM LACTATE AND CALCIUM CHLORIDE: 600; 310; 30; 20 INJECTION, SOLUTION INTRAVENOUS at 04:02

## 2025-02-22 RX ADMIN — SODIUM PHOSPHATE, MONOBASIC, MONOHYDRATE AND SODIUM PHOSPHATE, DIBASIC, ANHYDROUS 15 MMOL: 142; 276 INJECTION, SOLUTION INTRAVENOUS at 04:02

## 2025-02-22 RX ADMIN — SODIUM CHLORIDE, POTASSIUM CHLORIDE, SODIUM LACTATE AND CALCIUM CHLORIDE: 600; 310; 30; 20 INJECTION, SOLUTION INTRAVENOUS at 11:02

## 2025-02-22 RX ADMIN — SODIUM PHOSPHATE, MONOBASIC, MONOHYDRATE AND SODIUM PHOSPHATE, DIBASIC, ANHYDROUS 20.1 MMOL: 142; 276 INJECTION, SOLUTION INTRAVENOUS at 09:02

## 2025-02-22 RX ADMIN — MIRTAZAPINE 7.5 MG: 7.5 TABLET, FILM COATED ORAL at 08:02

## 2025-02-22 RX ADMIN — ACETAMINOPHEN 650 MG: 325 TABLET ORAL at 12:02

## 2025-02-22 RX ADMIN — VANCOMYCIN HYDROCHLORIDE 125 MG: KIT at 06:02

## 2025-02-22 RX ADMIN — ATORVASTATIN CALCIUM 40 MG: 40 TABLET, FILM COATED ORAL at 08:02

## 2025-02-22 RX ADMIN — FINASTERIDE 5 MG: 5 TABLET, FILM COATED ORAL at 08:02

## 2025-02-22 RX ADMIN — ENOXAPARIN SODIUM 40 MG: 40 INJECTION SUBCUTANEOUS at 04:02

## 2025-02-22 RX ADMIN — VANCOMYCIN HYDROCHLORIDE 125 MG: KIT at 05:02

## 2025-02-22 NOTE — PROGRESS NOTES
"[facility] Medicine Progress Note    Primary Team: [facility] Hospitalist Team A  Attending Physician: Key Whaley MD  Resident: Germán Vu  Intern: Efra Abernathy    Subjective/Interval History   NAEO. Still having multiple bowel movements overnight. Has some intermittent crampy abdominal pain. No fevers. Tolerating soft diet without emesis.      Objective   Last 24 Hour Vital Signs:  BP  Min: 136/72  Max: 166/79  Temp  Av.7 °F (37.1 °C)  Min: 98.3 °F (36.8 °C)  Max: 99.1 °F (37.3 °C)  Pulse  Av.5  Min: 93  Max: 108  Resp  Av.4  Min: 18  Max: 20  SpO2  Av.2 %  Min: 94 %  Max: 98 %  Height  Av' 2" (188 cm)  Min: 6' 2" (188 cm)  Max: 6' 2" (188 cm)  Weight  Av.6 kg (221 lb 12.4 oz)  Min: 99.8 kg (220 lb)  Max: 101.4 kg (223 lb 8.7 oz)  I/O last 3 completed shifts:  In: 2471.9 [I.V.:2471.9]  Out: 1200 [Urine:1200]    Physical Examination:  General: No acute distress. Appropriate behavior.   Head: normocephalic, atraumatic  Eyes: PERRL. EOMI. No conjunctival injection. No scleral icterus.  ENT: MMM, no rhinorrhea or epistaxis.   Neck: No lymphadenopathy. No accessory muscle use.   Cardiac: Tachycardic. Regular rhythm. No murmurs appreciated.  Pulmonary/Chest: CTAB. Normal work of breathing.   Abdomen: Soft, mild lower abdominal tenderness. normoactive bowel sounds. Jones in place with normal UOP.  Extremities: atraumatic, no deformities, no edema.  Skin: dry, warm, intact. No bruising or rashes.  Neuro: Alert. Oriented to person, place, time. Moving all extremities spontaneously. Normal  strength bilaterally.     Laboratory and Microbiology:  I have independently reviewed data.    Imaging and EKGs:  I have independently reviewed data.     Assessment & Plan   First Recurrence Severe C. Difficile infection  Diarrhea  - watery bowel movements x 5 days, c.diff toxin positive in ED  - completed PO vanc therapy 2025. No culture cure documented but reports improvement of sxs prior to " recurrence   - Tachycardic, otherwise hemodynamically stable, afebrile, leukocytosis at 16, Cr 1.6  - continue 250 mg PO vancomycin QID x 14 days  - KUB with acute abnormality    - continue IVF today   - avoid opiates, advance diet as tolerated     KIKE  - Cr 1.6 (BL 1.3)  - suspect pre-renal in setting of diarrhea, decreased po intake  - improving with IVFs, will continue today      Prostate cancer with bony and lung metastasis s/p radiation therapy c/b acute urinary retention  Hx of bilateral orchiectomy  - outpatient bedoya exchanged in ED, continue bedoya until urology follow up outpatient   - CT imaging 2/17 with enlarging prostate mass  - monitor for UTI   - continue finasteride, non-formulary androgen inhibitor apalutamide 240 mg nightly      Hypokalemia  Hypomagnesemia  Hypophosphatemia  - replace with PO and IV supplementation     HLD  - continue statin     HTN  - hold home meds: lisinopril 40 and cardizem 300  - add back lisinopril 40 today     Insomnia   - continue trazodone 200 mg nightly     Code Status: Full  DVT Prophylaxis: Lovenox  Diet: Soft  Disposition: pending clinical improvement     Case will be discussed with attending physician and attestation to follow, please appreciate.     Germán Vu MD  [facility] Internal Medicine Resident, PGY-3  [facility] Hospitalist Team A    \[facility]\"" Medicine Hospitalist Pager numbers:   [facility] Hospitalist Medicine Team A (Shahnaz/Judd): [phone]  \[facility]\"" Hospitalist Medicine Team B (Jody/Pascual):  [phone]

## 2025-02-22 NOTE — PLAN OF CARE
Problem: Adult Inpatient Plan of Care  Goal: Plan of Care Review  Outcome: Progressing     VIRTUAL NURSE:  Cued into patient's room.  Lights off; unable to view room.  Introduced as VN for night shift that will be working with floor nurse and nursing assistant.  Permission received per patient to review admit assessment questions.  Educated patient on VN's role in patient care and VIP model.  Plan of care reviewed with patient.  Education per flowsheet.   Informed patient that staff will round on them every 2 hours but to use call light for any other needs they may have; informed of fall risk and fall precautions.  Patient verbalized understanding.  Opportunity given for questions and questions answered.  Patient denies complaints or any needs at this time. Instructed to call for assistance.  Will cont to monitor and intervene as needed.     Labs, notes, orders, and careplan initiated.        02/21/25 6188   Patient Request   Patient Requested lights off; unable to view patient   Admission   Initial VN Admission Questions Complete   Communication Issues? Technical Issue   Shift   Virtual Nurse - Rounding Complete   Pain Management Interventions pain management plan reviewed with patient/caregiver   Virtual Nurse - Patient Verbalized Approval Of Camera Use;VN Rounding   Type of Frequent Check   Type Patient Rounds   Pain/Comfort/Sleep   Preferred Pain Scale number (Numeric Rating Pain Scale)   Comfort/Acceptable Pain Level 0   Pain Rating (0-10): Rest 0   Sleep/Rest/Relaxation no problem identified;awake

## 2025-02-22 NOTE — CARE UPDATE
Inpatient Upgrade Note    Noah Lopez has warranted treatment spanning two or more midnights of hospital level care for the management of  severe C. Difficile infection and KIKE . He continues to require IV fluids, daily labs, monitoring of vital signs, and advancing diet. His condition is also complicated by the following comorbidities: Hypertension and Malignancy.    Efra Abernathy MD

## 2025-02-22 NOTE — NURSING
Pt requesting apalutamide that he takes at home. Has the pills with him at bedside. MD notified, approved ordering medication as non-formulary. Medication brought to Pharmacy for barcoding.

## 2025-02-22 NOTE — PLAN OF CARE
Problem: Adult Inpatient Plan of Care  Goal: Plan of Care Review  Outcome: Progressing     Problem: Oral Intake Inadequate  Goal: Improved Oral Intake  Outcome: Progressing     Problem: Fatigue  Goal: Improved Activity Tolerance  Outcome: Progressing     Problem: Comorbidity Management  Goal: Blood Pressure in Desired Range  Outcome: Progressing     Problem: Pain Acute  Goal: Optimal Pain Control and Function  Outcome: Progressing     Problem: Nausea and Vomiting  Goal: Nausea and Vomiting Relief  Outcome: Progressing     Problem: Skin Injury Risk Increased  Goal: Skin Health and Integrity  Outcome: Progressing     Problem: Wound  Goal: Optimal Coping  Outcome: Progressing       Patient is Aox4, continues to c/o fatigue, educated to call for assistance with ambulating. Continues to have multiple BM with no new complaints of n/v, dermatitis noted d/t incontinence.  . Tele Psych consult completed this shift. Significant other at bedside

## 2025-02-22 NOTE — ED NOTES
Pt vocalized that he soiled himself, This RN and ERT Berta at bs, pt cleansed, and bed pan changed. ERT to transport pt to floor.

## 2025-02-22 NOTE — H&P
[facility] Medicine H&P Note     Admitting Team: [facility] Hospitalist Team A  Attending Physician: Key Whaley MD  Resident: Germán Vu    Date of Admit: 2/21/2025    Chief Complaint   Decreased PO intake    Subjective:      History of Present Illness:  Noah Lopez is a 80 y.o. male with PMHx of prostate cancer with bony and lung mets, recent c diff infection, who presented to [facility] on 2/21/2025 for decreased PO intake x 5 days. In usual state of health until about 5 days ago when he began having decreased UOP and lower abdominal pain. Present to ED on 2/17, was found to have enlarged prostate cancer mass, bedoya placed in ED and patient was discharged home with urology follow up. Since being home he has developed recurrent watery diarrhea over the last 4-5 days. He has also had decreased PO intake. Only tolerated some fluids. Denies any emesis. He was previously dx with c. Diff diarrhea in 01/2025. He was Rx PO vancomycin and completed 5 day course of Abx. He reports his stools had mostly returned to normal and his sxs resolved. He submitted a stool sample to the lab but reports it was denied because it was too formed. Therefore he is unsure if he ever cleared the infection. He denies any CP, SOB, hematuria, fevers, chills, cough.     Past Medical History:  Past Medical History:   Diagnosis Date    Colon polyp 09/06/2018    Hyperlipidemia     Hypertension     Prostate cancer     Stage 3a chronic kidney disease 6/21/2023    Urinary tract infection with hematuria 11/18/2022       Home Medications:  Current Outpatient Medications   Medication Instructions    apalutamide (ERLEADA) 60 mg Tab 4 tablets, Oral    ascorbic acid (vitamin C) (VITAMIN C) 1,000 mg, Daily    atorvastatin (LIPITOR) 40 mg, Oral, Nightly    buPROPion (WELLBUTRIN) 75 mg, Oral, Daily    calcium carbonate (OS-TYREE) 1,000 mg, Oral, Daily    cholecalciferol (vitamin D3) (VITAMIN D3) 1,000 Units, Oral, Daily     clonazePAM (KLONOPIN) 0.5 mg, Oral, Nightly    clotrimazole-betamethasone 1-0.05% (LOTRISONE) cream Topical (Top), 2 times daily    cyclobenzaprine (FLEXERIL) 10 mg, Oral, 3 times daily PRN    diltiaZEM (CARDIZEM CD) 300 mg, Oral, Daily    docusate sodium (COLACE) 250 mg, Daily    finasteride (PROSCAR) 5 mg, Oral    GAS RELIEF 80, SIMETHICONE, ORAL As needed (PRN)    lisinopriL (PRINIVIL,ZESTRIL) 40 mg, Oral, Daily    multivit-min-FA-lycopen-lutein (MEN 50 PLUS MULTIVITAMIN) 300-600-300 mcg Tab 1 tablet, Daily    mupirocin (BACTROBAN) 2 % ointment Topical (Top), 3 times daily, To healing areas    ondansetron (ZOFRAN-ODT) 4 mg, Oral, Every 6 hours PRN    oxyCODONE (ROXICODONE) 5 mg, Oral, Every 4 hours PRN    oxyCODONE-acetaminophen (PERCOCET) 5-325 mg per tablet 1 tablet, Oral, Every 6 hours PRN    tamsulosin (FLOMAX) 0.4 mg Cap Take 1 capsule by mouth once daily    traZODone (DESYREL) 100 MG tablet TAKE 2 TABLETS BY MOUTH EVERY DAY AT BEDTIME AS NEEDED FOR INSOMNIA    TURMERIC ORAL Take by mouth.    wheat dextrin (BENEFIBER CLEAR SF, DEXTRIN, ORAL) Take by mouth.       Past Surgical History:  Past Surgical History:   Procedure Laterality Date    COLONOSCOPY  2010    COLONOSCOPY N/A 9/6/2018    Procedure: COLONOSCOPY Suprep PLEASE TEXT PATIENT WITH ARRIVAL TIME;  Surgeon: Niharika Arce MD;  Location: [facility];  Service: Endoscopy;  Laterality: N/A;    CYSTOSCOPY WITH URODYNAMIC TESTING N/A 1/31/2022    Procedure: CYSTOSCOPY, WITH URODYNAMIC TESTING FLOUROSCOPIC;  Surgeon: Anthony Maloney MD;  [address];  Service: Urology;  Laterality: N/A;  1hr    KNEE ARTHROSCOPY      ORCHIECTOMY Bilateral 2/15/2023    Procedure: Bilateral simple orchiectomy;  Surgeon: Helena Luis MD;  Location: [facility];  Service: Urology;  Laterality: Bilateral;    SPINE SURGERY      l-spine    TRANSURETHRAL RESECTION OF PROSTATE N/A 10/26/2022    Procedure: TURP (TRANSURETHRAL RESECTION OF PROSTATE);  Surgeon: Helena KENDALL  "MD Toby;  Location: [facility] OR;  Service: Urology;  Laterality: N/A;       Family History:  Family History   Problem Relation Name Age of Onset    Hypertension Mother      Heart attack Father      Cirrhosis Father      No Known Problems Brother      No Known Problems Brother      Seizures Daughter      Liver disease Daughter      No Known Problems Son      Thyroid cancer Other      Prostate cancer Neg Hx      Breast cancer Neg Hx      Pancreatic cancer Neg Hx         Social History:  Social History     Socioeconomic History    Marital status:    Occupational History     Comment: retried    Tobacco Use    Smoking status: Former     Current packs/day: 0.00     Average packs/day: 1.5 packs/day for 20.0 years (30.0 ttl pk-yrs)     Types: Cigarettes     Start date:      Quit date:      Years since quittin.1     Passive exposure: Never    Smokeless tobacco: Never    Tobacco comments:     Age 17 - 37. Up to 1.5 ppd.   Substance and Sexual Activity    Alcohol use: Yes     Comment: Occasional. Few drinks a week.    Drug use: Yes     Types: Marijuana    Sexual activity: Not Currently   Social History Narrative    Lives in [place], [place]. Lives with long term partner, Anju. Accompanied by daughter LIZZY Carrillo. Daughter, Mara. Lorena is main point of medical contact.         Allergies:  Review of patient's allergies indicates:   Allergen Reactions    Ciprofloxacin Other (See Comments)     tendonitis     Health Maintaince :   Primary Care Physician: Anthony De La Torre MD     Review of Systems:  Pertinent items are noted in HPI. All other systems are reviewed and are negative.    Objective:   Last 24 Hour Vital Signs:  BP  Min: 140/60  Max: 166/79  Temp  Av.8 °F (37.1 °C)  Min: 98.8 °F (37.1 °C)  Max: 98.8 °F (37.1 °C)  Pulse  Av.6  Min: 96  Max: 108  Resp  Av  Min: 18  Max: 18  SpO2  Av.6 %  Min: 95 %  Max: 98 %  Height  Av' 2" (188 cm)  Min: 6' 2" (188 cm)  Max: 6' 2" (188 cm)  Weight  Avg: " 99.8 kg (220 lb)  Min: 99.8 kg (220 lb)  Max: 99.8 kg (220 lb)  Body mass index is 28.25 kg/m².  No intake/output data recorded.    Physical Examination:  General: No acute distress. Appropriate behavior.   Head: normocephalic, atraumatic  Eyes: PERRL. EOMI. No conjunctival injection. No scleral icterus.  ENT: MMM, no rhinorrhea or epistaxis.   Neck: No lymphadenopathy. No accessory muscle use.   Cardiac: Tachycardic. Regular rhythm. No murmurs appreciated.  Pulmonary/Chest: CTAB. Normal work of breathing.   Abdomen: Soft, mild lower abdominal tenderness. normoactive bowel sounds. Bedoya in place with normal UOP..   Extremities: atraumatic, no deformities, no edema.  Skin: dry, warm, intact. No bruising or rashes.  Neuro: Alert. Oriented to person, place, time. Moving all extremities spontaneously. Normal  strength bilaterally.     Laboratory and Microbiology:  I have independently reviewed data.    Imaging and EKGs:  I have independently reviewed data.     Assessment & Plan   First Recurrence Severe C. Difficile infection  Diarrhea  - watery bowel movements x 5 days, c.diff toxin positive in ED  - completed PO vanc therapy 01/2025. No culture cure documented but reports improvement of sxs prior to recurrence   - Tachycardic, otherwise hemodynamically stable, afebrile, leukocytosis at 16, Cr 1.6  - start 250 mg PO vancomycin QID x 14 days  - KUB ordered   - avoid opiates, advance diet as tolerated    KIKE  - Cr 1.6 (BL 1.3)  - suspect pre-renal in setting of diarrhea, decreased po intake  - supportive care with IVFs    Prostate cancer with bony and lung metastasis s/p radiation therapy c/b acute urinary retention  Hx of bilateral orchiectomy  - outpatient bedoya exchanged in ED, continue bedoya until urology follow up outpatient   - CT imaging 2/17 with enlarging prostate mass  - monitor for UTI   - continue finasteride, non-formulary androgen inhibitor apalutamide 240 mg nightly     Hypokalemia  Hypomagnesemia  -  replace with PO and IV supplementation    HLD  - continue statin    HTN  - hold home meds: lisinopril 40 and cardizem 300    Insomnia   - continue trazodone 200 mg nightly    Code Status: Full  DVT Prophylaxis: Lovenox  Diet: Soft, advance as tolerated  Disposition: pending clinical improvement     Case will be discussed with attending physician and attestation to follow, please appreciate.     Germán Vu MD  [facility] Internal Medicine Resident, PGY-3    [facility] Medicine Hospitalist Pager numbers:   [facility] Hospitalist Medicine Team A (Shahnaz/Judd): [phone]  [facility] Hospitalist Medicine Team B (Jody/Pascual):  [phone]

## 2025-02-22 NOTE — CONSULTS
"[facility]  Psychiatry  Telepsychiatry Consult Note    Please see previous notes:    Patient agreeable to consultation via telepsychiatry.    Tele-Consultation from Psychiatry started: 2/22/2025 at 1306  The chief complaint leading to psychiatric consultation is: depression  This consultation was requested by Jovanny Vu MD  The location of the consulting psychiatrist is  [place], [place] .  The patient location is  [facility] MEDICAL SURGICAL UNIT*   The patient arrived at the ED at: 2/21/25 at 1052    Also present with the patient at the time of the consultation: significant other    Patient Identification:   Noah Lopez is a 80 y.o. male.    Patient information was obtained from spouse/SO.  Patient presented voluntarily to the Emergency Department    Inpatient consult to Telemedicine - Psych  Consult performed by: Izzy Carter MD  Consult ordered by: Jovanny Vu MD  Reason for consult: depression        Consult Start Time: 02/22/2025 13:06 CST  Consult End Time: 02/22/2025 14:48 CST        Subjective:     History of Present Illness:  Patient is a 80yoM with past psychiatric history of depression who presented for diarrhea associated with c diff. Psychiatry is consulted due to depression. Patient states he has had depression concerns since after 5th year of cancer, 5 years ago. Has not been working with anybody on depression. He can deal with cancer, but then, when he got prostate infection and c diff. Does not even notice bupropion; does not take it every day. Has been on trazodone, 2 benadryl and 2 melatonin to sleep.    Patient endorses "depressed" mood and endorses anhedonia, psychomotor retardation, poor sleep (only sleeping 1-2 hours per night), low energy, decreased concentration and poor appetite. Had told daughter that when his life was no longer joyful, then, he did not want to be alive. Endorses passive but says if he would kill himself, he would overdose on meds. Denies access " "to meds to kill himself; significant other agrees that he does not have access to means. Patient denies any sober period of elevated mood, sleep deficit associated with grandiosity/irritability, distractibility, impulsivity, racing thoughts, increased activity and talkativeness. The patient denies any current or history of HI or any plan/intent to harm others. Denies AH/VH, paranoia. No vocalized delusions.    Psychiatric History:   Previous Psychiatric Hospitalizations: No  Previous Medication Trials: Yes  Previous Suicide Attempts: no  History of Violence: no  History of Depression: yes  History of Cristal: no  History of Auditory/Visual Hallucination no  History of Delusions: no vocalized delusions  Outpatient psychiatrist (current & past): No    Substance Abuse History:  Tobacco:No  Alcohol: yes-once every couple months, used to drink a lot  Illicit Substances:Yes, marijuana 2x per month  Detox/Rehab: No    Legal History: Past charges/incarcerations: No     Family Psychiatric History: no    Social History:  *Education: jet engine mechanic  Employment Status/Finances: retired-jet engine mechanic    Relationship Status/Sexual Orientation: Partnered  Children: 3  Housing Status:  in trailer in front of significant other     history:  YES: [employer] for 4 years, honorable discharge     Access to gun: NO    Psychiatric Mental Status Exam:  Arousal: alert  Sensorium/Orientation: oriented to grossly intact  Behavior/Cooperation: normal, cooperative   Speech: normal tone, normal rate, normal pitch, normal volume  Language: grossly intact  Mood: " depressed "   Affect:  mood-congruent  Thought Process: normal and logical  Thought Content:   Auditory hallucinations: NO  Visual hallucinations: NO  Paranoia: NO  Delusions:  NO  Suicidal ideation: YES: passive SI     Homicidal ideation: NO  Attention/Concentration:  intact  Memory:    Recent:  Intact   Remote: Intact  Insight: intact  Judgment: behavior is adequate to " circumstances      Past Medical History:   Past Medical History:   Diagnosis Date    Colon polyp 09/06/2018    Hyperlipidemia     Hypertension     Prostate cancer     Stage 3a chronic kidney disease 6/21/2023    Urinary tract infection with hematuria 11/18/2022      Laboratory Data:   Labs Reviewed   CLOSTRIDIUM DIFFICILE - Abnormal       Result Value    C. diff Antigen Positive (*)     C difficile Toxins A+B, EIA Positive (*)     Narrative:     If specimen collected today, this will be a community onset  CDIFF case. If specimen cannot be collected by hospital day  3, discontinue test and follow Diarrhea Decision Tree to  determine if a LOI CDIFF order is appropriate. The order will  automatically discontinue if specimen not collected within  48 hours.  [email or web address]   CBC W/ AUTO DIFFERENTIAL - Abnormal    WBC 16.20 (*)     RBC 4.39 (*)     Hemoglobin 14.0      Hematocrit 41.0      MCV 93      MCH 31.9 (*)     MCHC 34.1      RDW 12.5      Platelets 284      MPV 9.7      Immature Granulocytes 0.6 (*)     Gran # (ANC) 14.1 (*)     Immature Grans (Abs) 0.09 (*)     Lymph # 0.7 (*)     Mono # 1.3 (*)     Eos # 0.0      Baso # 0.03      nRBC 0      Gran % 86.9 (*)     Lymph % 4.4 (*)     Mono % 7.8      Eosinophil % 0.1      Basophil % 0.2      Differential Method Automated     COMPREHENSIVE METABOLIC PANEL - Abnormal    Sodium 135 (*)     Potassium 3.4 (*)     Chloride 104      CO2 18 (*)     Glucose 117 (*)     BUN 20      Creatinine 1.6 (*)     Calcium 8.7      Total Protein 6.9      Albumin 2.8 (*)     Total Bilirubin 0.8      Alkaline Phosphatase 89      AST 35      ALT 22      eGFR 44 (*)     Anion Gap 13     URINALYSIS, REFLEX TO URINE CULTURE - Abnormal    Specimen UA Urine, Clean Catch      Color, UA Yellow      Appearance, UA Clear      pH, UA 6.0      Specific Gravity, UA 1.020      Protein, UA 1+ (*)     Glucose, UA Negative      Ketones, UA  3+ (*)     Bilirubin (UA) Negative      Occult Blood UA 2+ (*)     Nitrite, UA Negative      Urobilinogen, UA Negative      Leukocytes, UA Trace (*)     Narrative:     Specimen Source->Urine   URINALYSIS MICROSCOPIC - Abnormal    RBC, UA 84 (*)     WBC, UA 9 (*)     Bacteria None      Hyaline Casts, UA 0      Microscopic Comment SEE COMMENT      Narrative:     Specimen Source->Urine   MAGNESIUM    Magnesium 1.8     WBC, STOOL    Stool WBC No neutrophils seen     LACTIC ACID, PLASMA    Lactate (Lactic Acid) 1.2     LACTIC ACID, PLASMA    Lactate (Lactic Acid) 1.1         Neurological History:  Seizures: No  Head trauma: No    Allergies:   Review of patient's allergies indicates:   Allergen Reactions    Ciprofloxacin Other (See Comments)     tendonitis       Medications in ER:   Medications   atorvastatin tablet 40 mg (40 mg Oral Given 2/21/25 2004)   cyclobenzaprine tablet 10 mg (10 mg Oral Given 2/21/25 2233)   sodium chloride 0.9% flush 10 mL (has no administration in time range)   naloxone 0.4 mg/mL injection 0.02 mg (has no administration in time range)   glucose chewable tablet 16 g (has no administration in time range)   glucose chewable tablet 24 g (has no administration in time range)   dextrose 50% injection 12.5 g (has no administration in time range)   dextrose 50% injection 25 g (has no administration in time range)   glucagon (human recombinant) injection 1 mg (has no administration in time range)   vancomycin 125 mg/5 mL oral solution 125 mg (125 mg Oral Given 2/22/25 1152)   lactated ringers infusion ( Intravenous New Bag 2/22/25 1156)   ondansetron disintegrating tablet 4 mg (has no administration in time range)   enoxaparin injection 40 mg (40 mg Subcutaneous Given 2/21/25 2005)   buPROPion tablet 75 mg (75 mg Oral Given 2/21/25 2206)   finasteride tablet 5 mg (5 mg Oral Given 2/21/25 2206)   traZODone tablet 200 mg (200 mg Oral Given 2/21/25 2206)   apalutamide Tab 240 mg (240 mg Oral Given 2/21/25  2230)   acetaminophen tablet 650 mg (650 mg Oral Given 2/22/25 0030)   lisinopriL tablet 40 mg (40 mg Oral Given 2/22/25 1119)   0.9% NaCl infusion (0 mLs Intravenous Stopped 2/21/25 1502)   ondansetron injection 4 mg (4 mg Intravenous Given 2/21/25 1149)   potassium bicarbonate disintegrating tablet 50 mEq (50 mEq Oral Given 2/21/25 2004)   magnesium sulfate 2g in water 50mL IVPB (premix) (0 g Intravenous Stopped 2/21/25 2252)   potassium, sodium phosphates 280-160-250 mg packet 1 packet (1 packet Oral Given 2/22/25 0904)   sodium phosphate 20.1 mmol in D5W 250 mL IVPB (20.1 mmol Intravenous New Bag 2/22/25 0903)       Medications at home:     Current Outpatient Medications on File Prior to Encounter   Medication Sig Dispense Refill Last Dose/Taking    apalutamide (ERLEADA) 60 mg Tab TAKE 4 TABLETS BY MOUTH ONCE  DAILY (Patient taking differently: Take 240 mg by mouth once daily.) 120 tablet 11 Taking Differently    ascorbic acid, vitamin C, (VITAMIN C) 1000 MG tablet Take 1,000 mg by mouth once daily.   2/20/2025    atorvastatin (LIPITOR) 40 MG tablet Take 1 tablet (40 mg total) by mouth every evening. 90 tablet 3 2/20/2025    buPROPion (WELLBUTRIN) 75 MG tablet Take 1 tablet (75 mg total) by mouth Daily. 90 tablet 3 2/20/2025    calcium carbonate (OS-TYREE) 500 mg calcium (1,250 mg) tablet Take 2 tablets (1,000 mg total) by mouth once daily. 60 tablet 11 2/20/2025    cholecalciferol, vitamin D3, (VITAMIN D3) 25 mcg (1,000 unit) capsule Take 1 capsule (1,000 Units total) by mouth once daily. 30 capsule 11 2/20/2025    clonazePAM (KLONOPIN) 0.5 MG tablet Take 1 tablet (0.5 mg total) by mouth every evening. 30 tablet 2 2/20/2025    clotrimazole-betamethasone 1-0.05% (LOTRISONE) cream Apply topically 2 (two) times daily. 60 g 3 Taking    cyclobenzaprine (FLEXERIL) 10 MG tablet Take 1 tablet (10 mg total) by mouth 3 (three) times daily as needed for Muscle spasms. 60 tablet 0 2/21/2025    diltiaZEM (CARDIZEM CD) 300 MG  24 hr capsule Take 1 capsule (300 mg total) by mouth once daily. 90 capsule 3 2/20/2025    docusate sodium (COLACE) 250 MG capsule Take 250 mg by mouth once daily.   Taking    finasteride (PROSCAR) 5 mg tablet TAKE 1 TABLET BY MOUTH ONCE  DAILY (Patient taking differently: Take 5 mg by mouth once daily.) 90 tablet 2 2/20/2025    GAS RELIEF 80, SIMETHICONE, ORAL Take by mouth as needed.   Taking As Needed    lisinopriL (PRINIVIL,ZESTRIL) 40 MG tablet Take 1 tablet (40 mg total) by mouth once daily. 90 tablet 3 2/20/2025    multivit-min-FA-lycopen-lutein (MEN 50 PLUS MULTIVITAMIN) 300-600-300 mcg Tab Take 1 tablet by mouth once daily.   2/20/2025    ondansetron (ZOFRAN-ODT) 4 MG TbDL Take 1 tablet (4 mg total) by mouth every 6 (six) hours as needed. 20 tablet 0 Taking As Needed    oxyCODONE (ROXICODONE) 5 MG immediate release tablet Take 1 tablet (5 mg total) by mouth every 4 (four) hours as needed for Pain. 42 tablet 0 Taking As Needed    traZODone (DESYREL) 100 MG tablet TAKE 2 TABLETS BY MOUTH EVERY DAY AT BEDTIME AS NEEDED FOR INSOMNIA (Patient taking differently: Take 200 mg by mouth nightly as needed for Insomnia.) 180 tablet 3 2/20/2025    TURMERIC ORAL Take by mouth.   2/20/2025    wheat dextrin (BENEFIBER CLEAR SF, DEXTRIN, ORAL) Take by mouth.   2/20/2025    mupirocin (BACTROBAN) 2 % ointment Apply topically 3 (three) times daily. To healing areas 22 g 2 Unknown    oxyCODONE-acetaminophen (PERCOCET) 5-325 mg per tablet Take 1 tablet by mouth every 6 (six) hours as needed for Pain. 12 tablet 0 Unknown    tamsulosin (FLOMAX) 0.4 mg Cap Take 1 capsule by mouth once daily (Patient not taking: Reported on 2/21/2025) 90 capsule 3 Not Taking           Assessment - Diagnosis - Goals:     Diagnosis/Impression: Patient is a 80yoM with past psychiatric history of depression who presented for diarrhea associated with c diff. Psychiatry is consulted due to depression. Patient was started on bupropion about a month ago  but has been inconsistently taking the medication at night. Trazodone does help him sleep. Will recommend discontinuing bupropion as likely worsening his appetite and not having good effect, in part due to non-adherence with dosing. Discussed starting mirtazapine to better target mood, anxiety, sleep and appetite. Due to sedative effect on mirtazapine, can make trazodone as needed; encouraged patient to only take trazodone if not asleep within one hour of taking mirtazapine.    Rec:   1. Dispo/Legal Status:  Pt does not meet criteria for PEC or inpt psych admit at this time. Pt is not currently an imminent danger to self or others and is not gravely disabled due to an acute psych illness.  2. Medication Recommendations:   Discontinue bupropion  Start mirtazapine 7.5mg at bedtime  Switch to trazodone 200mg at bedtime as needed for sleep, if not asleep within one hour of taking mirtazapine  3. Follow-up: pcp  4. Case Discussed With: Dr. Jovanny Vu via secure chat     Time with patient: 16 minutes  In total, 28 minutes were spent on chart review, discussion with team, patient interview and charting    Consulting clinician was informed of the encounter and consult note.    Consultation ended: 2/22/2025 at 6628    Izzy Carter MD   Psychiatry  [facility]

## 2025-02-22 NOTE — PROGRESS NOTES
Pharmacist Renal Dose Adjustment Note    Noah Lopez is a 80 y.o. male being treated with the medication enoxaparin    Patient Data:    Vital Signs (Most Recent):  Temp: 98.8 °F (37.1 °C) (Simultaneous filing. User may not have seen previous data.) (02/21/25 1051)  Pulse: 103 (02/21/25 1802)  Resp: 18 (Simultaneous filing. User may not have seen previous data.) (02/21/25 1051)  BP: (!) 147/67 (02/21/25 1802)  SpO2: 97 % (02/21/25 1802) Vital Signs (72h Range):  Temp:  [98.8 °F (37.1 °C)]   Pulse:  []   Resp:  [18]   BP: (140-166)/(60-79)   SpO2:  [95 %-98 %]      Recent Labs   Lab 02/16/25  1708 02/17/25  1141 02/21/25  1134   CREATININE 1.66* 1.51* 1.6*     Serum creatinine: 1.6 mg/dL (H) 02/21/25 1134  Estimated creatinine clearance: 47.2 mL/min (A)    Medication:enoxaparin dose: 30 mg frequency q24h will be changed to medication:enoxaparin dose:40 mg frequency:q24h    Pharmacist's Name: Michelle Jones  Pharmacist's Extension: [phone]

## 2025-02-22 NOTE — ED NOTES
Spoke with MD Abernathy with [facility] medicine. Per MD Abernathy, will consult with Dr. Vu, and then correct precautions to just contact plus

## 2025-02-22 NOTE — PLAN OF CARE
Problem: Adult Inpatient Plan of Care  Goal: Plan of Care Review  Outcome: Progressing  Goal: Absence of Hospital-Acquired Illness or Injury  Outcome: Progressing  Goal: Optimal Comfort and Wellbeing  Outcome: Progressing     Problem: Infection  Goal: Absence of Infection Signs and Symptoms  Outcome: Progressing     Problem: Diarrhea  Goal: Effective Diarrhea Management  Outcome: Progressing     Problem: Oral Intake Inadequate  Goal: Improved Oral Intake  Outcome: Progressing     Problem: Fatigue  Goal: Improved Activity Tolerance  Outcome: Progressing     Problem: Comorbidity Management  Goal: Blood Pressure in Desired Range  Outcome: Progressing     Problem: Fall Injury Risk  Goal: Absence of Fall and Fall-Related Injury  Outcome: Progressing     Problem: Pain Acute  Goal: Optimal Pain Control and Function  Outcome: Progressing     Problem: Nausea and Vomiting  Goal: Nausea and Vomiting Relief  Outcome: Progressing     Problem: Coping Ineffective  Goal: Effective Coping  Outcome: Progressing    AAOx4. On GI soft diet. On special contact precautions for C diff. Medications given per MAR. VSS. Safety maintained. Call light in reach.

## 2025-02-23 PROBLEM — E83.39 HYPOPHOSPHATEMIA: Status: ACTIVE | Noted: 2025-02-23

## 2025-02-23 LAB
ALBUMIN SERPL BCP-MCNC: 2.4 G/DL (ref 3.5–5.2)
ALP SERPL-CCNC: 83 U/L (ref 40–150)
ALT SERPL W/O P-5'-P-CCNC: 20 U/L (ref 10–44)
ANION GAP SERPL CALC-SCNC: 10 MMOL/L (ref 8–16)
ANION GAP SERPL CALC-SCNC: 13 MMOL/L (ref 8–16)
AST SERPL-CCNC: 32 U/L (ref 10–40)
BASOPHILS # BLD AUTO: 0.04 K/UL (ref 0–0.2)
BASOPHILS NFR BLD: 0.4 % (ref 0–1.9)
BILIRUB SERPL-MCNC: 0.5 MG/DL (ref 0.1–1)
BUN SERPL-MCNC: 11 MG/DL (ref 8–23)
BUN SERPL-MCNC: 8 MG/DL (ref 8–23)
CALCIUM SERPL-MCNC: 7.3 MG/DL (ref 8.7–10.5)
CALCIUM SERPL-MCNC: 7.9 MG/DL (ref 8.7–10.5)
CHLORIDE SERPL-SCNC: 103 MMOL/L (ref 95–110)
CHLORIDE SERPL-SCNC: 105 MMOL/L (ref 95–110)
CO2 SERPL-SCNC: 19 MMOL/L (ref 23–29)
CO2 SERPL-SCNC: 23 MMOL/L (ref 23–29)
CREAT SERPL-MCNC: 0.7 MG/DL (ref 0.5–1.4)
CREAT SERPL-MCNC: 1.2 MG/DL (ref 0.5–1.4)
CRYPTOSP AG STL QL IA: NEGATIVE
DIFFERENTIAL METHOD BLD: ABNORMAL
E COLI SXT1 STL QL IA: NEGATIVE
E COLI SXT2 STL QL IA: NEGATIVE
EOSINOPHIL # BLD AUTO: 0.1 K/UL (ref 0–0.5)
EOSINOPHIL NFR BLD: 1 % (ref 0–8)
ERYTHROCYTE [DISTWIDTH] IN BLOOD BY AUTOMATED COUNT: 12.6 % (ref 11.5–14.5)
EST. GFR  (NO RACE VARIABLE): >60 ML/MIN/1.73 M^2
EST. GFR  (NO RACE VARIABLE): >60 ML/MIN/1.73 M^2
G LAMBLIA AG STL QL IA: NEGATIVE
GLUCOSE SERPL-MCNC: 125 MG/DL (ref 70–110)
GLUCOSE SERPL-MCNC: 66 MG/DL (ref 70–110)
HCT VFR BLD AUTO: 39.2 % (ref 40–54)
HGB BLD-MCNC: 13.4 G/DL (ref 14–18)
IMM GRANULOCYTES # BLD AUTO: 0.04 K/UL (ref 0–0.04)
IMM GRANULOCYTES NFR BLD AUTO: 0.4 % (ref 0–0.5)
LYMPHOCYTES # BLD AUTO: 0.9 K/UL (ref 1–4.8)
LYMPHOCYTES NFR BLD: 8.4 % (ref 18–48)
MAGNESIUM SERPL-MCNC: 1.9 MG/DL (ref 1.6–2.6)
MCH RBC QN AUTO: 31.7 PG (ref 27–31)
MCHC RBC AUTO-ENTMCNC: 34.2 G/DL (ref 32–36)
MCV RBC AUTO: 93 FL (ref 82–98)
MONOCYTES # BLD AUTO: 1.1 K/UL (ref 0.3–1)
MONOCYTES NFR BLD: 9.7 % (ref 4–15)
NEUTROPHILS # BLD AUTO: 9 K/UL (ref 1.8–7.7)
NEUTROPHILS NFR BLD: 80.1 % (ref 38–73)
NRBC BLD-RTO: 0 /100 WBC
PHOSPHATE SERPL-MCNC: 1.5 MG/DL (ref 2.7–4.5)
PLATELET # BLD AUTO: 280 K/UL (ref 150–450)
PMV BLD AUTO: 10.1 FL (ref 9.2–12.9)
POTASSIUM SERPL-SCNC: 3.1 MMOL/L (ref 3.5–5.1)
POTASSIUM SERPL-SCNC: 4.2 MMOL/L (ref 3.5–5.1)
PROT SERPL-MCNC: 6.1 G/DL (ref 6–8.4)
RBC # BLD AUTO: 4.23 M/UL (ref 4.6–6.2)
SODIUM SERPL-SCNC: 136 MMOL/L (ref 136–145)
SODIUM SERPL-SCNC: 137 MMOL/L (ref 136–145)
WBC # BLD AUTO: 11.23 K/UL (ref 3.9–12.7)

## 2025-02-23 PROCEDURE — 63600175 PHARM REV CODE 636 W HCPCS: Performed by: INTERNAL MEDICINE

## 2025-02-23 PROCEDURE — 36415 COLL VENOUS BLD VENIPUNCTURE: CPT

## 2025-02-23 PROCEDURE — 83735 ASSAY OF MAGNESIUM: CPT

## 2025-02-23 PROCEDURE — 25000003 PHARM REV CODE 250: Performed by: INTERNAL MEDICINE

## 2025-02-23 PROCEDURE — 21400001 HC TELEMETRY ROOM

## 2025-02-23 PROCEDURE — 27000207 HC ISOLATION

## 2025-02-23 PROCEDURE — 25000003 PHARM REV CODE 250

## 2025-02-23 PROCEDURE — 80053 COMPREHEN METABOLIC PANEL: CPT

## 2025-02-23 PROCEDURE — 63600175 PHARM REV CODE 636 W HCPCS

## 2025-02-23 PROCEDURE — 85025 COMPLETE CBC W/AUTO DIFF WBC: CPT

## 2025-02-23 PROCEDURE — 80048 BASIC METABOLIC PNL TOTAL CA: CPT | Mod: XB

## 2025-02-23 PROCEDURE — 84100 ASSAY OF PHOSPHORUS: CPT

## 2025-02-23 RX ORDER — OXYCODONE AND ACETAMINOPHEN 5; 325 MG/1; MG/1
1 TABLET ORAL EVERY 6 HOURS PRN
Refills: 0 | Status: DISCONTINUED | OUTPATIENT
Start: 2025-02-23 | End: 2025-02-26 | Stop reason: HOSPADM

## 2025-02-23 RX ORDER — SODIUM CHLORIDE, SODIUM LACTATE, POTASSIUM CHLORIDE, CALCIUM CHLORIDE 600; 310; 30; 20 MG/100ML; MG/100ML; MG/100ML; MG/100ML
INJECTION, SOLUTION INTRAVENOUS CONTINUOUS
Status: ACTIVE | OUTPATIENT
Start: 2025-02-23 | End: 2025-02-23

## 2025-02-23 RX ORDER — POTASSIUM CHLORIDE 20 MEQ/1
40 TABLET, EXTENDED RELEASE ORAL 2 TIMES DAILY
Status: COMPLETED | OUTPATIENT
Start: 2025-02-23 | End: 2025-02-23

## 2025-02-23 RX ORDER — MAGNESIUM SULFATE HEPTAHYDRATE 40 MG/ML
2 INJECTION, SOLUTION INTRAVENOUS ONCE
Status: COMPLETED | OUTPATIENT
Start: 2025-02-23 | End: 2025-02-23

## 2025-02-23 RX ORDER — SODIUM,POTASSIUM PHOSPHATES 280-250MG
1 POWDER IN PACKET (EA) ORAL
Status: COMPLETED | OUTPATIENT
Start: 2025-02-23 | End: 2025-02-24

## 2025-02-23 RX ORDER — OXYCODONE HYDROCHLORIDE 5 MG/1
5 TABLET ORAL EVERY 4 HOURS PRN
Refills: 0 | Status: DISCONTINUED | OUTPATIENT
Start: 2025-02-23 | End: 2025-02-26 | Stop reason: HOSPADM

## 2025-02-23 RX ADMIN — MAGNESIUM SULFATE HEPTAHYDRATE 2 G: 40 INJECTION, SOLUTION INTRAVENOUS at 08:02

## 2025-02-23 RX ADMIN — LISINOPRIL 40 MG: 20 TABLET ORAL at 08:02

## 2025-02-23 RX ADMIN — POTASSIUM CHLORIDE 20 MEQ: 1500 TABLET, EXTENDED RELEASE ORAL at 08:02

## 2025-02-23 RX ADMIN — SODIUM CHLORIDE, POTASSIUM CHLORIDE, SODIUM LACTATE AND CALCIUM CHLORIDE: 600; 310; 30; 20 INJECTION, SOLUTION INTRAVENOUS at 10:02

## 2025-02-23 RX ADMIN — VANCOMYCIN HYDROCHLORIDE 125 MG: KIT at 06:02

## 2025-02-23 RX ADMIN — ACETAMINOPHEN 650 MG: 325 TABLET ORAL at 08:02

## 2025-02-23 RX ADMIN — POTASSIUM CHLORIDE 40 MEQ: 1500 TABLET, EXTENDED RELEASE ORAL at 08:02

## 2025-02-23 RX ADMIN — TRAZODONE HYDROCHLORIDE 200 MG: 100 TABLET ORAL at 09:02

## 2025-02-23 RX ADMIN — FINASTERIDE 5 MG: 5 TABLET, FILM COATED ORAL at 09:02

## 2025-02-23 RX ADMIN — ATORVASTATIN CALCIUM 40 MG: 40 TABLET, FILM COATED ORAL at 08:02

## 2025-02-23 RX ADMIN — VANCOMYCIN HYDROCHLORIDE 125 MG: KIT at 05:02

## 2025-02-23 RX ADMIN — ENOXAPARIN SODIUM 40 MG: 40 INJECTION SUBCUTANEOUS at 05:02

## 2025-02-23 RX ADMIN — POTASSIUM & SODIUM PHOSPHATES POWDER PACK 280-160-250 MG 1 PACKET: 280-160-250 PACK at 05:02

## 2025-02-23 RX ADMIN — OXYCODONE 5 MG: 5 TABLET ORAL at 09:02

## 2025-02-23 RX ADMIN — MIRTAZAPINE 7.5 MG: 7.5 TABLET, FILM COATED ORAL at 08:02

## 2025-02-23 RX ADMIN — POTASSIUM & SODIUM PHOSPHATES POWDER PACK 280-160-250 MG 1 PACKET: 280-160-250 PACK at 11:02

## 2025-02-23 RX ADMIN — POTASSIUM & SODIUM PHOSPHATES POWDER PACK 280-160-250 MG 1 PACKET: 280-160-250 PACK at 08:02

## 2025-02-23 RX ADMIN — VANCOMYCIN HYDROCHLORIDE 125 MG: KIT at 11:02

## 2025-02-23 NOTE — PROGRESS NOTES
[facility] Hospital Medicine Progress Note    Primary Team: [facility] Hospitalist Team A  Attending Physician: Key Whaley MD  Resident: Germán Vu  Intern: Efra Abernathy    Subjective/Interval History     NAEO. Still having multiple about 3-4 BM overnight. Has some intermittent crampy abdominal pain.AF, VSS.   Leukocytosis improving. Repleting lytes prn.  Will speak to pharmacy and possibly ID about best course of action for recurrence of c diff.      Objective   Last 24 Hour Vital Signs:  BP  Min: 118/69  Max: 144/78  Temp  Av.6 °F (37 °C)  Min: 97.9 °F (36.6 °C)  Max: 99.9 °F (37.7 °C)  Pulse  Av.2  Min: 84  Max: 108  Resp  Av.7  Min: 18  Max: 20  SpO2  Av.2 %  Min: 94 %  Max: 96 %  I/O last 3 completed shifts:  In: 4064.3 [P.O.:1080; I.V.:2662.6; IV Piggyback:321.6]  Out: 3250 [Urine:3250]    Physical Examination:  General: No acute distress. Appropriate behavior.   Head: normocephalic, atraumatic  Eyes: PERRL. EOMI. No conjunctival injection. No scleral icterus.  ENT: MMM, no rhinorrhea or epistaxis.   Neck: No lymphadenopathy. No accessory muscle use.   Cardiac: Tachycardic. Regular rhythm. No murmurs appreciated.  Pulmonary/Chest: CTAB. Normal work of breathing.   Abdomen: Soft, mild lower abdominal tenderness. normoactive bowel sounds. Jones in place with normal UOP.  Extremities: atraumatic, no deformities, no edema.  Skin: dry, warm, intact. No bruising or rashes.  Neuro: Alert. Oriented to person, place, time. Moving all extremities spontaneously. Normal  strength bilaterally.     Laboratory and Microbiology:  I have independently reviewed data.    Imaging and EKGs:  I have independently reviewed data.     Assessment & Plan   First Recurrence Severe C. Difficile infection  Diarrhea  - watery bowel movements x 5 days, c.diff toxin positive in ED  - completed PO vanc therapy 2025. No culture cure documented but reports improvement of sxs prior to recurrence   - Tachycardic, otherwise  hemodynamically stable, afebrile, leukocytosis at 16, Cr 1.6  - KUB with acute abnormality    Plan:  - continue 250 mg PO vancomycin QID x 14 days  - continue IVF today   - avoid opiates, advance diet as tolerated     KIKE, resolved  - Cr 1.6 (BL 1.3)  - suspect pre-renal in setting of diarrhea, decreased po intake  - improving with IVFs     Prostate cancer with bony and lung metastasis s/p radiation therapy c/b acute urinary retention  Hx of bilateral orchiectomy  - outpatient bedoya exchanged in ED, continue bedoya until urology follow up outpatient   - CT imaging 2/17 with enlarging prostate mass  - monitor for UTI   - continue finasteride, non-formulary androgen inhibitor apalutamide 240 mg nightly   - consider urology consult on Monday if still here     Hypokalemia  Hypomagnesemia  Hypophosphatemia  - replace with PO and IV supplementation     Depression   -concern for feelings depressed mood, no SI/HI  -telepsych evaluated, no PEC  -started on mirtazapine at bedside, trazodone prn qhs  -stop bupropion    HLD  - continue statin     HTN  - home meds: lisinopril 40 and cardizem 300  - continue lisinopril     Insomnia   - continue trazodone 200 mg nightly     Code Status: Full  DVT Prophylaxis: Lovenox  Diet: Soft, adv as cooper  Disposition: pending clinical improvement     Case will be discussed with attending physician and attestation to follow, please appreciate.     Leeann Velez, DO  [facility] EMIM PGY II   [facility] Hospitalist Team A    [facility] Hospitalist Pager numbers:   [facility] Hospitalist Medicine Team A (Shahnaz/Judd): [phone]  [facility] Hospitalist Medicine Team B (Jody/Pascual):  [phone]

## 2025-02-23 NOTE — PLAN OF CARE
Problem: Adult Inpatient Plan of Care  Goal: Patient-Specific Goal (Individualized)  Outcome: Progressing  Goal: Absence of Hospital-Acquired Illness or Injury  Outcome: Progressing     Problem: Infection  Goal: Absence of Infection Signs and Symptoms  Outcome: Progressing     Problem: Diarrhea  Goal: Effective Diarrhea Management  Outcome: Not Progressing     Problem: Oral Intake Inadequate  Goal: Improved Oral Intake  Outcome: Progressing     Problem: Pain Acute  Goal: Optimal Pain Control and Function  Outcome: Progressing     Problem: Skin Injury Risk Increased  Goal: Skin Health and Integrity  Outcome: Progressing

## 2025-02-23 NOTE — PLAN OF CARE
Problem: Adult Inpatient Plan of Care  Goal: Plan of Care Review  Outcome: Progressing       VIRTUAL NURSE:  Labs, notes, orders, and careplan reviewed.

## 2025-02-23 NOTE — PLAN OF CARE
Problem: Adult Inpatient Plan of Care  Goal: Plan of Care Review  Outcome: Progressing     Problem: Oral Intake Inadequate  Goal: Improved Oral Intake  Outcome: Progressing     Problem: Comorbidity Management  Goal: Blood Pressure in Desired Range  Outcome: Progressing     Problem: Pain Acute  Goal: Optimal Pain Control and Function  Outcome: Progressing     Problem: Nausea and Vomiting  Goal: Nausea and Vomiting Relief  Outcome: Progressing     Problem: Coping Ineffective  Goal: Effective Coping  Outcome: Progressing     Problem: Wound  Goal: Optimal Coping  Outcome: Progressing     Problem: Acute Kidney Injury/Impairment  Goal: Fluid and Electrolyte Balance  Outcome: Progressing     Problem: Depression  Goal: Improved Mood  Outcome: Progressing      Patient is Aox4 with no complaints of abdominal pain or discomfort. Reports multiple BM this shift. No new c/o of n/v. Patient was encouraged to sit on edge of bed today but refused d/t history of back pain. Patient tolerating meal trays well.

## 2025-02-24 LAB
ALBUMIN SERPL BCP-MCNC: 2.5 G/DL (ref 3.5–5.2)
ALP SERPL-CCNC: 101 U/L (ref 40–150)
ALT SERPL W/O P-5'-P-CCNC: 21 U/L (ref 10–44)
ANION GAP SERPL CALC-SCNC: 10 MMOL/L (ref 8–16)
AST SERPL-CCNC: 37 U/L (ref 10–40)
BACTERIA STL CULT: NORMAL
BASOPHILS # BLD AUTO: 0.06 K/UL (ref 0–0.2)
BASOPHILS NFR BLD: 0.6 % (ref 0–1.9)
BILIRUB SERPL-MCNC: 0.6 MG/DL (ref 0.1–1)
BUN SERPL-MCNC: 11 MG/DL (ref 8–23)
CALCIUM SERPL-MCNC: 8.2 MG/DL (ref 8.7–10.5)
CHLORIDE SERPL-SCNC: 104 MMOL/L (ref 95–110)
CO2 SERPL-SCNC: 22 MMOL/L (ref 23–29)
CREAT SERPL-MCNC: 1.1 MG/DL (ref 0.5–1.4)
DIFFERENTIAL METHOD BLD: ABNORMAL
EOSINOPHIL # BLD AUTO: 0.2 K/UL (ref 0–0.5)
EOSINOPHIL NFR BLD: 1.5 % (ref 0–8)
ERYTHROCYTE [DISTWIDTH] IN BLOOD BY AUTOMATED COUNT: 12.6 % (ref 11.5–14.5)
EST. GFR  (NO RACE VARIABLE): >60 ML/MIN/1.73 M^2
GLUCOSE SERPL-MCNC: 119 MG/DL (ref 70–110)
HCT VFR BLD AUTO: 43.1 % (ref 40–54)
HGB BLD-MCNC: 14.6 G/DL (ref 14–18)
IMM GRANULOCYTES # BLD AUTO: 0.15 K/UL (ref 0–0.04)
IMM GRANULOCYTES NFR BLD AUTO: 1.4 % (ref 0–0.5)
LYMPHOCYTES # BLD AUTO: 0.9 K/UL (ref 1–4.8)
LYMPHOCYTES NFR BLD: 8.3 % (ref 18–48)
MAGNESIUM SERPL-MCNC: 2.1 MG/DL (ref 1.6–2.6)
MCH RBC QN AUTO: 31.9 PG (ref 27–31)
MCHC RBC AUTO-ENTMCNC: 33.9 G/DL (ref 32–36)
MCV RBC AUTO: 94 FL (ref 82–98)
MONOCYTES # BLD AUTO: 0.8 K/UL (ref 0.3–1)
MONOCYTES NFR BLD: 7.2 % (ref 4–15)
NEUTROPHILS # BLD AUTO: 8.5 K/UL (ref 1.8–7.7)
NEUTROPHILS NFR BLD: 81 % (ref 38–73)
NRBC BLD-RTO: 0 /100 WBC
PHOSPHATE SERPL-MCNC: 1.5 MG/DL (ref 2.7–4.5)
PLATELET # BLD AUTO: 318 K/UL (ref 150–450)
PMV BLD AUTO: 10.1 FL (ref 9.2–12.9)
POTASSIUM SERPL-SCNC: 4.2 MMOL/L (ref 3.5–5.1)
PROT SERPL-MCNC: 6.7 G/DL (ref 6–8.4)
RBC # BLD AUTO: 4.58 M/UL (ref 4.6–6.2)
SODIUM SERPL-SCNC: 136 MMOL/L (ref 136–145)
WBC # BLD AUTO: 10.49 K/UL (ref 3.9–12.7)

## 2025-02-24 PROCEDURE — 27000207 HC ISOLATION

## 2025-02-24 PROCEDURE — 36415 COLL VENOUS BLD VENIPUNCTURE: CPT

## 2025-02-24 PROCEDURE — 21400001 HC TELEMETRY ROOM

## 2025-02-24 PROCEDURE — 97165 OT EVAL LOW COMPLEX 30 MIN: CPT

## 2025-02-24 PROCEDURE — 63600175 PHARM REV CODE 636 W HCPCS: Performed by: INTERNAL MEDICINE

## 2025-02-24 PROCEDURE — 84100 ASSAY OF PHOSPHORUS: CPT

## 2025-02-24 PROCEDURE — 83735 ASSAY OF MAGNESIUM: CPT

## 2025-02-24 PROCEDURE — 97162 PT EVAL MOD COMPLEX 30 MIN: CPT

## 2025-02-24 PROCEDURE — 25000003 PHARM REV CODE 250

## 2025-02-24 PROCEDURE — 80053 COMPREHEN METABOLIC PANEL: CPT

## 2025-02-24 PROCEDURE — 25000003 PHARM REV CODE 250: Performed by: INTERNAL MEDICINE

## 2025-02-24 PROCEDURE — 85025 COMPLETE CBC W/AUTO DIFF WBC: CPT

## 2025-02-24 RX ADMIN — VANCOMYCIN HYDROCHLORIDE 125 MG: KIT at 12:02

## 2025-02-24 RX ADMIN — TRAZODONE HYDROCHLORIDE 200 MG: 100 TABLET ORAL at 08:02

## 2025-02-24 RX ADMIN — CYCLOBENZAPRINE 10 MG: 10 TABLET, FILM COATED ORAL at 10:02

## 2025-02-24 RX ADMIN — OXYCODONE 5 MG: 5 TABLET ORAL at 08:02

## 2025-02-24 RX ADMIN — ATORVASTATIN CALCIUM 40 MG: 40 TABLET, FILM COATED ORAL at 08:02

## 2025-02-24 RX ADMIN — VANCOMYCIN HYDROCHLORIDE 125 MG: KIT at 11:02

## 2025-02-24 RX ADMIN — VANCOMYCIN HYDROCHLORIDE 125 MG: KIT at 05:02

## 2025-02-24 RX ADMIN — ONDANSETRON 4 MG: 4 TABLET, ORALLY DISINTEGRATING ORAL at 05:02

## 2025-02-24 RX ADMIN — OXYCODONE 5 MG: 5 TABLET ORAL at 10:02

## 2025-02-24 RX ADMIN — OXYCODONE 5 MG: 5 TABLET ORAL at 06:02

## 2025-02-24 RX ADMIN — CYCLOBENZAPRINE 10 MG: 10 TABLET, FILM COATED ORAL at 09:02

## 2025-02-24 RX ADMIN — OXYCODONE 5 MG: 5 TABLET ORAL at 03:02

## 2025-02-24 RX ADMIN — LISINOPRIL 40 MG: 20 TABLET ORAL at 09:02

## 2025-02-24 RX ADMIN — ENOXAPARIN SODIUM 40 MG: 40 INJECTION SUBCUTANEOUS at 05:02

## 2025-02-24 RX ADMIN — POTASSIUM & SODIUM PHOSPHATES POWDER PACK 280-160-250 MG 1 PACKET: 280-160-250 PACK at 05:02

## 2025-02-24 RX ADMIN — MIRTAZAPINE 7.5 MG: 7.5 TABLET, FILM COATED ORAL at 08:02

## 2025-02-24 RX ADMIN — OXYCODONE HYDROCHLORIDE AND ACETAMINOPHEN 1 TABLET: 5; 325 TABLET ORAL at 02:02

## 2025-02-24 RX ADMIN — FINASTERIDE 5 MG: 5 TABLET, FILM COATED ORAL at 08:02

## 2025-02-24 NOTE — PLAN OF CARE
Problem: Physical Therapy  Goal: Physical Therapy Goal  Description: Goals to be met by: 3/24/25     Patient will increase functional independence with mobility by performin. Supine to sit with Modified [place]  2. Sit to supine with Modified [place]  3. Sit to stand transfer with Modified [place]  4. Bed to chair transfer with Modified [place] using Single-point Cane   5. Gait  x 50 feet with Modified [place] using Single-point Cane .   6. Ascend/descend 3 stair with bilateral Handrails Modified [place].    Outcome: Progressing    PT/OT co-evaluation completed due to anticipated complexity of pt's presentation. Pt only agreeable to participate in bed mobility. Therapy will continue to progress pt as able.

## 2025-02-24 NOTE — PLAN OF CARE
Medicare Message     Important Message from Medicare regarding Discharge Appeal Rights Explained to patient/caregiver; Signed/date by patient/caregiver   Date IMM was signed 2/21/2025   Time IMM was signed 1152

## 2025-02-24 NOTE — PLAN OF CARE
The sw met with the pt to complete the assessment. The pt answered some questions but gave the sw permission to speak with his dtr Lorena Ortez [phone] to complete the assessment.The sw spoke to her via phone to complete the assessment.  The pt's dtr is arriving Wednesday from [place]. The pt lives in [place] with Anju Wagoner(s/o)[phone] for over 35 years. The pt's independent with his ADL's and he doesn't use dme. The pt still drives but Lorena will transport the pt home at d/c. The sw completed the white board in the pt's room with her name and contact info. The sw left a d/c brochure with her contact info on it at bedside for the pt. The sw encouraged them to call if they have any further questions or concerns. The sw will continue to follow the pt throughout his transitions of care and will assist with any d/c needs. Lorena is requesting hh for the pt at d/c. The sw sent the pt's info to [facility] via [employer] b/c they didn't have a preference.    [facility] - Med Surg  Initial Discharge Assessment       Primary Care Provider: Anthony De La Torre MD    Admission Diagnosis: Dehydration [E86.0]  C. 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]    Admission Date: 2/21/2025  Expected Discharge Date: 2/25/2025    Transition of Care Barriers: (P) None    [facility] / Plan: [employer] CHOICES / Product Type: Medicare Advantage /     Extended Emergency Contact Information  Primary Emergency Contact: CiaranAnju  Address: [address] [place] [address] [place]  Home Phone: [phone]  Relation: Significant other  Secondary Emergency Contact: Lorena Ortez  Address: [address] [place] [address] [place]  Mobile Phone: [phone]  Relation: Daughter    Discharge Plan A: (P) Home Health  Discharge Plan B: (P) Other (TBD)      [facility] - [place], [place] - [address]  [address]  [facility] [address]  Phone: [phone] Fax: [phone]    [facility] - [place], [place] - [address]  [address]  [address]  [facility] [address]  Phone: [phone] Fax: [phone]    [facility] - [place], [place] - [address]  [address]  [facility] [address]  Phone: [phone] Fax: [phone]    [facility] - [place] - [address]  [address]  [place] [address]  Phone: [phone] Fax: [phone]      Initial Assessment (most recent)       Adult Discharge Assessment - 02/24/25 1359          Discharge Assessment    Assessment Type Discharge Planning Assessment     Confirmed/corrected address, phone number and insurance Yes     Confirmed Demographics Correct on Facesheet     Source of Information family;patient     If unable to respond/provide information was family/caregiver contacted? Yes     Contact Name/Number Lorena Ortez(dtr)[phone]     When was your last doctors appointment? 02/14/25     Communicated ELIA with patient/caregiver Yes     Reason For Admission C. difficile diarrhea (P)      People in Home significant other (P)      Do you expect to return to your current living situation? Yes (P)      Do you have help at home or someone to help you manage your care at home? Yes (P)      Who are your caregiver(s) and their phone number(s)? Anju Wagoner(s/o)[phone]/Lorena Ortez(dtr)[phone] (P)      Prior to hospitilization cognitive status: Alert/Oriented (P)      Current cognitive status: Alert/Oriented (P)      Walking or Climbing Stairs Difficulty no (P)      Dressing/Bathing Difficulty no (P)      Home Accessibility wheelchair accessible (P)      Home Layout Able to live on 1st floor (P)      Equipment Currently Used at Home none (P)      Readmission within 30 days? No (P)      Patient currently being followed by outpatient case management? No (P)      Do you currently  have service(s) that help you manage your care at home? No (P)      Do you take prescription medications? Yes (P)      Do you have prescription coverage? Yes (P)      Coverage PHN (P)      Do you have any problems affording any of your prescribed medications? No (P)      Is the patient taking medications as prescribed? yes (P)      Who is going to help you get home at discharge? Lorena Ortez(dtr)[phone] (P)      How do you get to doctors appointments? car, drives self (P)      Are you on dialysis? No (P)      Do you take coumadin? No (P)      Discharge Plan A Home Health (P)      Discharge Plan B Other (P)    TBD    DME Needed Upon Discharge  other (see comments) (P)    TBD    Discharge Plan discussed with: Patient;Adult children (P)      Transition of Care Barriers None (P)         Physical Activity    On average, how many days per week do you engage in moderate to strenuous exercise (like a brisk walk)? Patient unable to answer (P)      On average, how many minutes do you engage in exercise at this level? Patient unable to answer (P)         Financial Resource Strain    How hard is it for you to pay for the very basics like food, housing, medical care, and heating? Patient unable to answer (P)         Housing Stability    In the last 12 months, was there a time when you were not able to pay the mortgage or rent on time? Patient unable to answer (P)      At any time in the past 12 months, were you homeless or living in a shelter (including now)? Patient unable to answer (P)         Transportation Needs    Has the lack of transportation kept you from medical appointments, meetings, work or from getting things needed for daily living? Patient unable to answer (P)         Food Insecurity    Within the past 12 months, you worried that your food would run out before you got the money to buy more. Patient unable to answer (P)      Within the past 12 months, the food you bought just didn't last and you didn't have  money to get more. Patient unable to answer (P)         Stress    Do you feel stress - tense, restless, nervous, or anxious, or unable to sleep at night because your mind is troubled all the time - these days? Patient unable to answer (P)         Social Isolation    How often do you feel lonely or isolated from those around you?  Patient unable to answer (P)         Alcohol Use    Q1: How often do you have a drink containing alcohol? Patient unable to answer (P)      Q2: How many drinks containing alcohol do you have on a typical day when you are drinking? Patient unable to answer (P)      Q3: How often do you have six or more drinks on one occasion? Patient unable to answer (P)         Utilities    In the past 12 months has the electric, gas, oil, or water company threatened to shut off services in your home? Patient unable to answer (P)         Health Literacy    How often do you need to have someone help you when you read instructions, pamphlets, or other written material from your doctor or pharmacy? Patient unable to respond (P)         OTHER    Name(s) of People in Home Anju Wagoner(s/o)[phone] (P)

## 2025-02-24 NOTE — PT/OT/SLP EVAL
"Occupational Therapy   Evaluation    Name: Noah Lopez  MRN: [id number]  Admitting Diagnosis: C. difficile diarrhea  Recent Surgery: * No surgery found *      Recommendations:     Discharge Recommendations:  (TBD)  Discharge Equipment Recommendations:  other (see comments) (TBD)  Barriers to discharge:  Other (Comment) (Pt requires increased level of assist)    Assessment:     Noah Lopez is a 80 y.o. male with a medical diagnosis of C. difficile diarrhea.  He presents with The primary encounter diagnosis was C. difficile diarrhea. Diagnoses of Recurrent Clostridioides difficile diarrhea, Dehydration, Depression, unspecified depression type, KIKE (acute kidney injury), Stage 3a chronic kidney disease, Hypokalemia, Hypomagnesemia, Insomnia, unspecified type, Primary hypertension, Hypophosphatemia, Cancer associated pain, and Prostate cancer were also pertinent to this visit. Performance deficits affecting function: weakness, impaired endurance, gait instability, impaired self care skills, decreased safety awareness.      Rehab Prognosis: Good and Fair; patient would benefit from acute skilled OT services to address these deficits and reach maximum level of function.       Plan:     Patient to be seen 3 x/week to address the above listed problems via self-care/home management, therapeutic activities, therapeutic exercises  Plan of Care Expires: 03/24/25  Plan of Care Reviewed with: patient    Subjective     Chief Complaint: "I just want to be left alone"  Patient/Family Comments/goals: agreeable to sitting EOB then returns self to supine    Occupational Profile:  Lives alone in  with 3 steps to enter with BHR; has tub/shower and WIS   Prior to admission, patients level of function was MOD I with use of SPC.  Equipment used at home: cane, straight, rollator, walker, rolling, bedside commode.  DME owned (not currently used): none.    Upon discharge, patient will have assistance from daughter (unsure of " 24/7 assistance available).    Pain/Comfort:  Pain Rating 1: 6/10  Location - Orientation 1: lower  Location 1: back  Pain Addressed 1: Reposition, Distraction, Cessation of Activity, Nurse notified  Pain Rating Post-Intervention 1: other (see comments) (not rated)    Patients cultural, spiritual, Anabaptist conflicts given the current situation: no    Objective:     Communicated with: nsg prior to session.  Patient found HOB elevated with bed alarm upon OT entry to room.    General Precautions: Standard, fall  Orthopedic Precautions: N/A  Braces: N/A  Respiratory Status: Room air    Occupational Performance:    Bed Mobility:    Patient completed Scooting/Bridging with stand by assistance  Patient completed Supine to Sit with stand by assistance  Patient completed Sit to Supine with stand by assistance    Functional Mobility/Transfers:  Pt deferring mobility, states 2/2 back pain    Cognitive/Visual Perceptual:  Cognitive/Psychosocial Skills:     -       Oriented to: Person, Place, and Situation   -       Safety awareness/insight to disability: impaired   -       Mood/Affect/Coping skills/emotional control: Cooperative    Physical Exam:  Sensation:    -       Intact  light/touch BUE  Upper Extremity Range of Motion:     -       Right Upper Extremity: WFL  -       Left Upper Extremity: WFL  Upper Extremity Strength:    -       Right Upper Extremity: WFL  -       Left Upper Extremity: WFL   Strength:    -       Right Upper Extremity: WFL  -       Left Upper Extremity: WFL    AMPAC 6 Click ADL:  AMPAC Total Score: 19    Treatment & Education:  Pt would benefit from cont OT services in order to maximize functional independence.   Limited angelaal performed this date.   Pt agreeable to EOB ax, deferring OOB ax 2/2 c/o back pain.   Pt then asking to bed left alone.   Will progress as able.     Patient left HOB elevated with all lines intact, call button in reach, bed alarm on, and nsg notified    GOALS:    Multidisciplinary Problems       Occupational Therapy Goals       Not on file                    History:     Past Medical History:   Diagnosis Date    Colon polyp 09/06/2018    Hyperlipidemia     Hypertension     Prostate cancer     Stage 3a chronic kidney disease 6/21/2023    Urinary tract infection with hematuria 11/18/2022         Past Surgical History:   Procedure Laterality Date    COLONOSCOPY  2010    COLONOSCOPY N/A 9/6/2018    Procedure: COLONOSCOPY Suprep PLEASE TEXT PATIENT WITH ARRIVAL TIME;  Surgeon: Niharika Arce MD;  Location: [facility];  Service: Endoscopy;  Laterality: N/A;    CYSTOSCOPY WITH URODYNAMIC TESTING N/A 1/31/2022    Procedure: CYSTOSCOPY, WITH URODYNAMIC TESTING FLOUROSCOPIC;  Surgeon: Anthony Maloney MD;  [address];  Service: Urology;  Laterality: N/A;  1hr    KNEE ARTHROSCOPY      ORCHIECTOMY Bilateral 2/15/2023    Procedure: Bilateral simple orchiectomy;  Surgeon: Helena Luis MD;  Location: [facility];  Service: Urology;  Laterality: Bilateral;    SPINE SURGERY      l-spine    TRANSURETHRAL RESECTION OF PROSTATE N/A 10/26/2022    Procedure: TURP (TRANSURETHRAL RESECTION OF PROSTATE);  Surgeon: Helena Luis MD;  Location: [facility];  Service: Urology;  Laterality: N/A;       Time Tracking:     OT Date of Treatment: 02/24/25  OT Start Time: 1033  OT Stop Time: 1048  OT Total Time (min): 15 min    Billable Minutes:Evaluation 15 w/PT    2/24/2025

## 2025-02-24 NOTE — PLAN OF CARE
Problem: Adult Inpatient Plan of Care  Goal: Plan of Care Review  Outcome: Progressing  Goal: Patient-Specific Goal (Individualized)  Outcome: Progressing  Goal: Absence of Hospital-Acquired Illness or Injury  Outcome: Progressing  Goal: Optimal Comfort and Wellbeing  Outcome: Progressing  Goal: Readiness for Transition of Care  Outcome: Progressing     Problem: Infection  Goal: Absence of Infection Signs and Symptoms  Outcome: Progressing     Problem: Diarrhea  Goal: Effective Diarrhea Management  Outcome: Progressing     Problem: Oral Intake Inadequate  Goal: Improved Oral Intake  Outcome: Progressing     Problem: Pain Acute  Goal: Optimal Pain Control and Function  Outcome: Progressing     Problem: Skin Injury Risk Increased  Goal: Skin Health and Integrity  Outcome: Progressing

## 2025-02-24 NOTE — PLAN OF CARE
Problem: Adult Inpatient Plan of Care  Goal: Patient-Specific Goal (Individualized)  Outcome: Progressing  Goal: Absence of Hospital-Acquired Illness or Injury  Outcome: Progressing  Goal: Readiness for Transition of Care  Outcome: Progressing     Problem: Diarrhea  Goal: Effective Diarrhea Management  Outcome: Progressing     Problem: Fatigue  Goal: Improved Activity Tolerance  Outcome: Progressing     Problem: Pain Acute  Goal: Optimal Pain Control and Function  Outcome: Progressing     Problem: Nausea and Vomiting  Goal: Nausea and Vomiting Relief  Outcome: Progressing     Problem: Coping Ineffective  Goal: Effective Coping  Outcome: Progressing

## 2025-02-24 NOTE — PROGRESS NOTES
[facility] Medicine Progress Note    Primary Team: [facility] Hospitalist Team A  Attending Physician: Key Whaley MD  Resident: Dr Montes  Intern: Dr Abernathy    Subjective/Interval History     Patient complains about being woken up multiple times this AM, said he was not able to sleep well. No more diarrhea since yesterday, no episodes overnight. Still having intermittent abdominal pain. Has been feeling weak, has not attempted to get out of bed for past few days.     Objective   Last 24 Hour Vital Signs:  BP  Min: 111/65  Max: 173/84  Temp  Av.3 °F (36.8 °C)  Min: 97.9 °F (36.6 °C)  Max: 98.8 °F (37.1 °C)  Pulse  Av  Min: 78  Max: 88  Resp  Av.4  Min: 18  Max: 20  SpO2  Av.7 %  Min: 94 %  Max: 97 %  I/O last 3 completed shifts:  In: 1698.9 [P.O.:720; I.V.:978.9]  Out: 2650 [Urine:2650]    Physical Examination:  General: No acute distress. Appropriate behavior.   Head: normocephalic, atraumatic  Eyes: PERRL. EOMI. No conjunctival injection. No scleral icterus.  ENT: MMM, no rhinorrhea or epistaxis.   Neck: No lymphadenopathy. No accessory muscle use.   Cardiac: Tachycardic. Regular rhythm. No murmurs appreciated.  Pulmonary/Chest: CTAB. Normal work of breathing.   Abdomen: Soft, mild lower abdominal tenderness. normoactive bowel sounds. Jones in place   Extremities: atraumatic, no deformities, no edema.  Skin: dry, warm, intact. No bruising or rashes.  Neuro: Alert. Oriented to person, place, time. Moving all extremities spontaneously. Normal  strength bilaterally.     Laboratory and Microbiology:  I have independently reviewed data.    Imaging and EKGs:  I have independently reviewed data.     Assessment & Plan   First Recurrence Severe C. Difficile infection  Diarrhea  - watery bowel movements x 5 days, c.diff toxin positive in ED  - completed PO vanc therapy 2025. No culture cure documented but reports improvement of sxs prior to recurrence   - Tachycardic, otherwise hemodynamically  stable, afebrile, leukocytosis at 16, Cr 1.6  - KUB with acute abnormality    - No diarrhea x 24 hours  Plan:  - continue 250 mg PO vancomycin QID x 14 days  - avoid opiates, advance diet as tolerated     KIKE, resolved  - Cr 1.6 (BL 1.3)  - suspect pre-renal in setting of diarrhea, decreased po intake  - improved with IVFs     Prostate cancer with bony and lung metastasis s/p radiation therapy c/b acute urinary retention  Hx of bilateral orchiectomy  - outpatient bedoya exchanged in ED, continue bedoya until urology follow up outpatient   - CT imaging 2/17 with enlarging prostate mass  - monitor for UTI   - continue finasteride, non-formulary androgen inhibitor apalutamide 240 mg nightly   - consider urology consult   - Patient requesting to speak with palliative care, requested to be DNR 2/23/25     Hypokalemia  Hypomagnesemia  Hypophosphatemia  - replace with PO and IV supplementation     Depression   -depressed mood, no SI/HI  -telepsych evaluated  -stop bupropion  -continue mirtazapine, trazodone prn qhs        HLD  - continue statin     HTN  - home meds: lisinopril 40 and cardizem 300  - continue lisinopril     Insomnia   - continue trazodone 200 mg nightly     Code Status: DNR  DVT Prophylaxis: Lovenox  Diet: Soft, adv as tolerated  Disposition: pending clinical improvement, PT/OT eval    Case will be discussed with attending physician and attestation to follow, please appreciate.     Jose Montes DO MPH  [facility] IM PGY III  [facility] Hospitalist Team A    [facility] Hospitalist Pager numbers:   [facility] Hospitalist Medicine Team A (Shahnaz/Judd): [phone]  [facility] Hospitalist Medicine Team B (Jody/Pascual):  [phone]

## 2025-02-24 NOTE — PT/OT/SLP EVAL
Physical Therapy Evaluation    Patient Name:  Noah Lopez   MRN:  [id number]    Recommendations:     Discharge Recommendations:  (TBD pending progress with therapy)   Discharge Equipment Recommendations: none   Barriers to discharge:  limited functional endurance/independence    Assessment:     Noah Lopze is a 80 y.o. male admitted with a medical diagnosis of C. difficile diarrhea.  He presents with the following impairments/functional limitations: weakness, impaired endurance, impaired self care skills, impaired functional mobility, gait instability, impaired balance, pain, decreased ROM.    PT/OT co-evaluation completed due to anticipated complexity of pt's presentation. Pt only agreeable to participate in bed mobility. Therapy will continue to progress pt as able.     Rehab Prognosis: Good; patient would benefit from acute skilled PT services to address these deficits and reach maximum level of function.    Recent Surgery: * No surgery found *      Plan:     During this hospitalization, patient to be seen 3 x/week to address the identified rehab impairments via gait training, therapeutic activities, therapeutic exercises, neuromuscular re-education and progress toward the following goals:    Plan of Care Expires:  03/24/25    Subjective     Chief Complaint: pain/fatigue  Patient/Family Comments/goals: not stated  Pain/Comfort:  Pain Rating 1: 6/10  Location 1: back  Pain Addressed 1: Reposition, Cessation of Activity, Nurse notified  Pain Rating Post-Intervention 1:  (not rated)    Patients cultural, spiritual, Scientology conflicts given the current situation: no    Living Environment:  Lives alone in  with 3 steps to enter with BHR; has tub/shower and WIS   Prior to admission, patients level of function was MOD I with use of SPC.  Equipment used at home: cane, straight, rollator, walker, rolling, bedside commode.  DME owned (not currently used): none.  Upon discharge, patient will have  assistance from daughter (unsure of  assistance available).    Objective:     Communicated with Nurse prior to session.  Patient found HOB elevated with bed alarm  upon PT entry to room.    General Precautions: Standard, fall, special contact  Orthopedic Precautions:N/A   Braces: N/A  Respiratory Status: Room air    Exams:  Cognitive Exam:  Patient is oriented to Person, Place, Time, and Situation  Sensation:    -       Intact  light/touch to BLE  RLE ROM: WFL except knee extension due to muscular tightness  RLE Strength: WFL except 2+/5 knee extension  LLE ROM: WFL except knee extension due to muscular tightness  LLE Strength: WFL except 2+/5 knee extension    Functional Mobility:  Bed Mobility:     Scooting: stand by assistance  Supine to Sit: stand by assistance  Sit to Supine: stand by assistance      AM-PAC 6 CLICK MOBILITY  Total Score:12       Treatment & Education:  Pt educated on role of PT.   Pt agreeable to sit up EOB but then abruptly returns to laying in bed; stating he needs to rest right now.   Pt educated on use of call button; pt understanding.     Patient left HOB elevated with all lines intact, call button in reach, bed alarm on, and Nurse notified.    GOALS:   Multidisciplinary Problems       Physical Therapy Goals          Problem: Physical Therapy    Goal Priority Disciplines Outcome Interventions   Physical Therapy Goal     PT, PT/OT Progressing    Description: Goals to be met by: 3/24/25     Patient will increase functional independence with mobility by performin. Supine to sit with Modified [place]  2. Sit to supine with Modified [place]  3. Sit to stand transfer with Modified [place]  4. Bed to chair transfer with Modified [place] using Single-point Cane   5. Gait  x 50 feet with Modified [place] using Single-point Cane .   6. Ascend/descend 3 stair with bilateral Handrails Modified [place].                         History:     Past Medical History:    Diagnosis Date    Colon polyp 09/06/2018    Hyperlipidemia     Hypertension     Prostate cancer     Stage 3a chronic kidney disease 6/21/2023    Urinary tract infection with hematuria 11/18/2022       Past Surgical History:   Procedure Laterality Date    COLONOSCOPY  2010    COLONOSCOPY N/A 9/6/2018    Procedure: COLONOSCOPY Suprep PLEASE TEXT PATIENT WITH ARRIVAL TIME;  Surgeon: Niharika Arce MD;  Location: [facility];  Service: Endoscopy;  Laterality: N/A;    CYSTOSCOPY WITH URODYNAMIC TESTING N/A 1/31/2022    Procedure: CYSTOSCOPY, WITH URODYNAMIC TESTING FLOUROSCOPIC;  Surgeon: Anthony Maloney MD;  [address];  Service: Urology;  Laterality: N/A;  1hr    KNEE ARTHROSCOPY      ORCHIECTOMY Bilateral 2/15/2023    Procedure: Bilateral simple orchiectomy;  Surgeon: Helena Luis MD;  Location: [facility];  Service: Urology;  Laterality: Bilateral;    SPINE SURGERY      l-spine    TRANSURETHRAL RESECTION OF PROSTATE N/A 10/26/2022    Procedure: TURP (TRANSURETHRAL RESECTION OF PROSTATE);  Surgeon: Helena Luis MD;  Location: [facility];  Service: Urology;  Laterality: N/A;       Time Tracking:     PT Received On: 02/24/25  PT Start Time: 1032     PT Stop Time: 1046  PT Total Time (min): 14 min With OT    Billable Minutes: Evaluation 14      02/24/2025

## 2025-02-24 NOTE — PLAN OF CARE
Pt would benefit from cont OT services in order to maximize functional independence. Recommending TBD pending progress, limited eval performed this date. Pt agreeable to EOB ax, deferring OOB ax 2/2 c/o back pain. Pt then asking to bed left alone. Will progress as able.     Problem: Occupational Therapy  Goal: Occupational Therapy Goal  Description: Goals to be met by: 03/24/2025     Patient will increase functional independence with ADLs by performing:    UE Dressing with Modified [place].  LE Dressing with Modified [place].  Toileting from toilet with Modified [place] for hygiene and clothing management.   Step transfer with Modified [place]  Toilet transfer to toilet with Modified [place].    Outcome: Progressing

## 2025-02-24 NOTE — PLAN OF CARE
The pt has been accepted by [facility] via [facility].        02/24/25 5077   Post-Acute Status   Post-Acute Authorization Home Health   Home Health Status Set-up Complete/Auth obtained   Discharge Plan   Discharge Plan A Home Health   Discharge Plan B Other  (TBD)

## 2025-02-24 NOTE — CONSULTS
Palliative Medicine  Consult Note       Patient Name: Noah Lopez   MRN: [id number]   Admission Date: 2/21/2025   Hospital Length of Stay: 2   Attending Provider: Key Whaley MD   Consulting Provider: Coco Zavaleta NP  Primary Care Physician: Anthony De La Torre MD   Principal Problem: C. difficile diarrhea     Patient information was obtained from patient, past medical records, ER records, and primary team.        Inpatient consult to Palliative Care  Consult performed by: Coco Zavaleta NP  Consult ordered by: Massiel Mcfarland MD  Reason for consult: goals of care/ advanced care planning      Assessment/Plan:      Palliative Care Encounter:  Impression:  Noah Lopez is a 80 y.o. male with PMHx of prostate cancer with bony and lung mets, recent c diff infection, who presented on 2/21/2025 for decreased PO intake x 5 days and recurrent diarrhea.   Pt was also seen in the  ED on 2/17, was found to have enlarged prostate cancer mass, bedoya placed in ED and patient was discharged home with urology/ oncology follow up. Since then he developed recurrent watery diarrhea with decreased PO intake. Previously dx with c. Diff diarrhea in 01/2025 treated with PO vancomycin. He reports his stools had mostly returned to normal and his sxs resolved.  He is unsure if he ever cleared the infection fully. He denies any CP, SOB, hematuria, fevers, chills, cough.     Palliative care consulted for goals of care/ advanced care planning.      -At time of initial encounter, pt agreeable to palliative care visit however requests encounter be kept short as he is tired and wants to rest.   -Pt reports decrease in diarrhea, however still with significant fatigue. Encouraged pt to work with PT/OT today.  -Reports that pain is well controlled with current dose of Oxy IR 5mg.  Discussed recent addition of mirtazipine nightly and changing trazodone to PRN per psychiatry. Pt in agreement with plan.  -Overall goal is to follow  Recommended observation. up with oncologist (appointment scheduled for 2/27) and discuss additional treatment options. Pt has clear desire for continued cancer directed treatments.   -Pt reports his daughter Lorena helps him with medical decision making.  -Agreeable to follow up in the palliative care clinic for symptom management and continued goals of care. Would also recommend discussion of ACP documents.  Pt declined to create MPOA or LaPOST this morning.   -Current code status is DNR.     Advance Care Planning   Advance Directives:   Living Will: No    LaPOST: No    Do Not Resuscitate Status: Yes      Decision Making:  Patient answered questions  Goals of Care: The patient endorses that what is most important right now is to focus on remaining as independent as possible, symptom/pain control, extending life as long as possible, even it it means sacrificing quality, curative/life-prolongation (regardless of treatment burdens), and improvement in condition but with limits to invasive therapies    Accordingly, we have decided that the best plan to meet the patient's goals includes continuing with treatment       - Prior experience with serious illness: yes  -The patient has not previously engaged in advance care planning or GOC discussions  - Insight/Understanding of illness: adequate     Life Limiting Diagnosis:  Metastatic cancer prostate, lungs, bone   -Functional status: fair.  Pt was able to manage ADLs  -Dementia diagnosis no    Symptom Management:  -Pain: yes  -Dyspnea no  -Anxiety/Depression yes  -Constipation: no  -Anorexia:yes     Summary of recommendations and follow up plan:  -Most important goals at this time: life prolonging treatment options    -Code status: DNR  -Disposition: TBD    The above recommendations communicated directly to primary team on 2/24/2025    Thank you for your consult.     Subjective:     Chief Complaint:   Chief Complaint   Patient presents with    Diarrhea     Pt arrives with complaints of weakness and  diarrhea around 1 week now. Pt also complains of generalized body pain. Pt arrives alert, in no distress.      HPI:   Noah Lopez is a 80 y.o. male with PMHx of prostate cancer with bony and lung mets, recent c diff infection, who presented to [facility] on 2/21/2025 for decreased PO intake x 5 days. In usual state of health until about 5 days ago when he began having decreased UOP and lower abdominal pain. Present to ED on 2/17, was found to have enlarged prostate cancer mass, bedoya placed in ED and patient was discharged home with urology follow up. Since being home he has developed recurrent watery diarrhea over the last 4-5 days. He has also had decreased PO intake. Only tolerated some fluids. Denies any emesis. He was previously dx with c. Diff diarrhea in 01/2025. He was Rx PO vancomycin and completed 5 day course of Abx. He reports his stools had mostly returned to normal and his sxs resolved. He submitted a stool sample to the lab but reports it was denied because it was too formed. Therefore he is unsure if he ever cleared the infection. He denies any CP, SOB, hematuria, fevers, chills, cough.     Hospital Course:  Psychiatry consulted due to depression, medication adjustments made.     Review of Symptoms      Symptom Assessment (ESAS 0-10 Scale)  Pain:  0  Dyspnea:  0  Anxiety:  0  Nausea:  0  Depression:  3  Anorexia:  0  Fatigue:  5  Insomnia:  0  Restlessness:  0  Agitation:  1         Performance Status:  60    Living Arrangements:  Lives with friend    Psychosocial/Cultural:   See Palliative Psychosocial Note: No  Social Issues Identified:   Bereavement Risk: No  Caregiver Needs Discussed. Caregiver Distress: Yes:   Cultural: none identified   **Primary  to Follow**  Palliative Care  Consult: No     Time-Based Charting:  Yes  Chart Review: 26 minutes  Face to Face: 13 minutes  Symptom Assessment: 10 minutes  Coordination of Care: 16  minutes  Discharge Plannin minutes  Advance Care Plannin minutes  Goals of Care: 19 minutes    Total Time Spent: 102 minutes      ROS:  Review of Systems   Constitutional:  Positive for activity change, appetite change and fatigue.   Gastrointestinal:  Positive for diarrhea (improved).   Genitourinary:  Positive for difficulty urinating (bedoya in place).   Psychiatric/Behavioral:  Positive for depressed mood.      Past Medical History:   Diagnosis Date    Colon polyp 2018    Hyperlipidemia     Hypertension     Prostate cancer     Stage 3a chronic kidney disease 2023    Urinary tract infection with hematuria 2022     Past Surgical History:   Procedure Laterality Date    COLONOSCOPY      COLONOSCOPY N/A 2018    Procedure: COLONOSCOPY Suprep PLEASE TEXT PATIENT WITH ARRIVAL TIME;  Surgeon: Niharika Arce MD;  Location: [facility];  Service: Endoscopy;  Laterality: N/A;    CYSTOSCOPY WITH URODYNAMIC TESTING N/A 2022    Procedure: CYSTOSCOPY, WITH URODYNAMIC TESTING FLOUROSCOPIC;  Surgeon: Anthony Maloney MD;  Location: [address];  Service: Urology;  Laterality: N/A;  1hr    KNEE ARTHROSCOPY      ORCHIECTOMY Bilateral 2/15/2023    Procedure: Bilateral simple orchiectomy;  Surgeon: Helena Luis MD;  Location: [facility];  Service: Urology;  Laterality: Bilateral;    SPINE SURGERY      l-spine    TRANSURETHRAL RESECTION OF PROSTATE N/A 10/26/2022    Procedure: TURP (TRANSURETHRAL RESECTION OF PROSTATE);  Surgeon: Helena Luis MD;  Location: [facility];  Service: Urology;  Laterality: N/A;     Family History   Problem Relation Name Age of Onset    Hypertension Mother      Heart attack Father      Cirrhosis Father      No Known Problems Brother      No Known Problems Brother      Seizures Daughter      Liver disease Daughter      No Known Problems Son      Thyroid cancer Other      Prostate cancer Neg Hx      Breast cancer Neg Hx      Pancreatic cancer Neg Hx         Review of  patient's allergies indicates:   Allergen Reactions    Ciprofloxacin Other (See Comments)     tendonitis     Medications:  Current Medications[1]    Objective:      Physical Exam:  Vitals: Temp: 97.7 °F (36.5 °C) (02/24/25 0752)  Pulse: 87 (02/24/25 0752)  Resp: 18 (02/24/25 0752)  BP: (!) 155/79 (02/24/25 0752)  SpO2: 96 % (02/24/25 0752)    Physical Exam  Vitals and nursing note reviewed.   Constitutional:       General: He is not in acute distress.     Appearance: He is overweight. He is ill-appearing (chronically).   HENT:      Head: Normocephalic.      Mouth/Throat:      Mouth: Mucous membranes are moist.   Cardiovascular:      Rate and Rhythm: Normal rate.      Pulses: Normal pulses.   Pulmonary:      Effort: Pulmonary effort is normal.   Skin:     General: Skin is warm and dry.   Neurological:      General: No focal deficit present.      Mental Status: He is alert, oriented to person, place, and time and easily aroused.   Psychiatric:         Mood and Affect: Mood normal. Affect is blunt.         Speech: Speech normal.         Behavior: Behavior normal. Behavior is cooperative.         Thought Content: Thought content normal.         Judgment: Judgment normal.      Comments: cranky           Labs:   Creatinine   Date Value Ref Range Status   02/24/2025 1.1 0.5 - 1.4 mg/dL Final      Hemoglobin   Date Value Ref Range Status   02/24/2025 14.6 14.0 - 18.0 g/dL Final      Albumin   Date Value Ref Range Status   02/24/2025 2.5 (L) 3.5 - 5.2 g/dL Final   02/23/2025 2.4 (L) 3.5 - 5.2 g/dL Final   02/22/2025 2.6 (L) 3.5 - 5.2 g/dL Final      Imaging: reviewed     Thank you for the opportunity to care for this patient and family.       Coco Zavaleta NP         [1]   Current Facility-Administered Medications:     acetaminophen tablet 650 mg, 650 mg, Oral, Q6H PRN, Stacey Thomas MD, 650 mg at 02/23/25 2028    apalutamide Tab 240 mg, 240 mg, Oral, QHS, Key Whaley MD, 240 mg at 02/23/25 2030    atorvastatin  tablet 40 mg, 40 mg, Oral, QHS, Jovanny Vu MD, 40 mg at 02/23/25 2029    cyclobenzaprine tablet 10 mg, 10 mg, Oral, TID PRN, Jovanny Vu MD, 10 mg at 02/21/25 2233    dextrose 50% injection 12.5 g, 12.5 g, Intravenous, PRN, Jovanny Vu MD    dextrose 50% injection 25 g, 25 g, Intravenous, PRN, Jovanny Vu MD    enoxaparin injection 40 mg, 40 mg, Subcutaneous, Q24H (prophylaxis, 1700), Key Whaley MD, 40 mg at 02/23/25 1710    finasteride tablet 5 mg, 5 mg, Oral, QHS, Jovanny Vu MD, 5 mg at 02/23/25 2134    glucagon (human recombinant) injection 1 mg, 1 mg, Intramuscular, PRN, Jovanny Vu MD    glucose chewable tablet 16 g, 16 g, Oral, PRN, Jovanny Vu MD    glucose chewable tablet 24 g, 24 g, Oral, PRN, Jovanny Vu MD    lisinopriL tablet 40 mg, 40 mg, Oral, Daily, Jovanny Vu MD, 40 mg at 02/24/25 0908    mirtazapine tablet 7.5 mg, 7.5 mg, Oral, QHS, Jovanny Vu MD, 7.5 mg at 02/23/25 2028    naloxone 0.4 mg/mL injection 0.02 mg, 0.02 mg, Intravenous, PRN, Jovanny Vu MD    ondansetron disintegrating tablet 4 mg, 4 mg, Oral, Q6H PRN, Jovanny Vu MD, 4 mg at 02/24/25 0530    oxyCODONE immediate release tablet 5 mg, 5 mg, Oral, Q4H PRN, Zachariah Lund MD, 5 mg at 02/24/25 0627    oxyCODONE-acetaminophen 5-325 mg per tablet 1 tablet, 1 tablet, Oral, Q6H PRN, Zachariah Lund MD, 1 tablet at 02/24/25 0243    sodium chloride 0.9% flush 10 mL, 10 mL, Intravenous, Q12H PRN, Jovanny Vu MD    traZODone tablet 200 mg, 200 mg, Oral, Nightly PRN, Key Whaley MD, 200 mg at 02/23/25 2120    vancomycin 125 mg/5 mL oral solution 125 mg, 125 mg, Oral, Q6H, Jovanny Vu MD, 125 mg at 02/24/25 8227

## 2025-02-25 PROBLEM — Z51.5 PALLIATIVE CARE ENCOUNTER: Status: ACTIVE | Noted: 2025-02-25

## 2025-02-25 LAB
ALBUMIN SERPL BCP-MCNC: 2.4 G/DL (ref 3.5–5.2)
ALP SERPL-CCNC: 87 U/L (ref 40–150)
ALT SERPL W/O P-5'-P-CCNC: 16 U/L (ref 10–44)
ANION GAP SERPL CALC-SCNC: 9 MMOL/L (ref 8–16)
AST SERPL-CCNC: 29 U/L (ref 10–40)
BASOPHILS # BLD AUTO: 0.06 K/UL (ref 0–0.2)
BASOPHILS NFR BLD: 0.6 % (ref 0–1.9)
BILIRUB SERPL-MCNC: 0.6 MG/DL (ref 0.1–1)
BUN SERPL-MCNC: 13 MG/DL (ref 8–23)
CALCIUM SERPL-MCNC: 8.4 MG/DL (ref 8.7–10.5)
CHLORIDE SERPL-SCNC: 106 MMOL/L (ref 95–110)
CO2 SERPL-SCNC: 22 MMOL/L (ref 23–29)
CREAT SERPL-MCNC: 1.1 MG/DL (ref 0.5–1.4)
DIFFERENTIAL METHOD BLD: ABNORMAL
EOSINOPHIL # BLD AUTO: 0.3 K/UL (ref 0–0.5)
EOSINOPHIL NFR BLD: 2.9 % (ref 0–8)
ERYTHROCYTE [DISTWIDTH] IN BLOOD BY AUTOMATED COUNT: 12.8 % (ref 11.5–14.5)
EST. GFR  (NO RACE VARIABLE): >60 ML/MIN/1.73 M^2
GLUCOSE SERPL-MCNC: 104 MG/DL (ref 70–110)
HCT VFR BLD AUTO: 44.2 % (ref 40–54)
HGB BLD-MCNC: 14.7 G/DL (ref 14–18)
IMM GRANULOCYTES # BLD AUTO: 0.15 K/UL (ref 0–0.04)
IMM GRANULOCYTES NFR BLD AUTO: 1.5 % (ref 0–0.5)
LYMPHOCYTES # BLD AUTO: 1.1 K/UL (ref 1–4.8)
LYMPHOCYTES NFR BLD: 11.5 % (ref 18–48)
MAGNESIUM SERPL-MCNC: 1.9 MG/DL (ref 1.6–2.6)
MCH RBC QN AUTO: 31.5 PG (ref 27–31)
MCHC RBC AUTO-ENTMCNC: 33.3 G/DL (ref 32–36)
MCV RBC AUTO: 95 FL (ref 82–98)
MONOCYTES # BLD AUTO: 0.9 K/UL (ref 0.3–1)
MONOCYTES NFR BLD: 8.6 % (ref 4–15)
NEUTROPHILS # BLD AUTO: 7.4 K/UL (ref 1.8–7.7)
NEUTROPHILS NFR BLD: 74.9 % (ref 38–73)
NRBC BLD-RTO: 0 /100 WBC
PHOSPHATE SERPL-MCNC: 1.3 MG/DL (ref 2.7–4.5)
PLATELET # BLD AUTO: 313 K/UL (ref 150–450)
PMV BLD AUTO: 9.2 FL (ref 9.2–12.9)
POTASSIUM SERPL-SCNC: 4.8 MMOL/L (ref 3.5–5.1)
PROT SERPL-MCNC: 6.7 G/DL (ref 6–8.4)
RBC # BLD AUTO: 4.66 M/UL (ref 4.6–6.2)
SODIUM SERPL-SCNC: 137 MMOL/L (ref 136–145)
WBC # BLD AUTO: 9.9 K/UL (ref 3.9–12.7)

## 2025-02-25 PROCEDURE — 63600175 PHARM REV CODE 636 W HCPCS: Performed by: INTERNAL MEDICINE

## 2025-02-25 PROCEDURE — 36415 COLL VENOUS BLD VENIPUNCTURE: CPT

## 2025-02-25 PROCEDURE — 27000207 HC ISOLATION

## 2025-02-25 PROCEDURE — 80053 COMPREHEN METABOLIC PANEL: CPT

## 2025-02-25 PROCEDURE — 21400001 HC TELEMETRY ROOM

## 2025-02-25 PROCEDURE — 25000003 PHARM REV CODE 250

## 2025-02-25 PROCEDURE — 84100 ASSAY OF PHOSPHORUS: CPT

## 2025-02-25 PROCEDURE — 85025 COMPLETE CBC W/AUTO DIFF WBC: CPT

## 2025-02-25 PROCEDURE — 83735 ASSAY OF MAGNESIUM: CPT

## 2025-02-25 RX ORDER — SIMETHICONE 125 MG
125 TABLET,CHEWABLE ORAL EVERY 6 HOURS PRN
Status: DISCONTINUED | OUTPATIENT
Start: 2025-02-25 | End: 2025-02-26 | Stop reason: HOSPADM

## 2025-02-25 RX ORDER — POLYETHYLENE GLYCOL 3350 17 G/17G
17 POWDER, FOR SOLUTION ORAL NIGHTLY
Status: DISCONTINUED | OUTPATIENT
Start: 2025-02-25 | End: 2025-02-26 | Stop reason: HOSPADM

## 2025-02-25 RX ADMIN — POLYETHYLENE GLYCOL 3350 17 G: 17 POWDER, FOR SOLUTION ORAL at 09:02

## 2025-02-25 RX ADMIN — OXYCODONE 5 MG: 5 TABLET ORAL at 04:02

## 2025-02-25 RX ADMIN — SODIUM PHOSPHATE, MONOBASIC, MONOHYDRATE AND SODIUM PHOSPHATE, DIBASIC, ANHYDROUS 15 MMOL: 142; 276 INJECTION, SOLUTION INTRAVENOUS at 09:02

## 2025-02-25 RX ADMIN — ATORVASTATIN CALCIUM 40 MG: 40 TABLET, FILM COATED ORAL at 09:02

## 2025-02-25 RX ADMIN — VANCOMYCIN HYDROCHLORIDE 125 MG: KIT at 11:02

## 2025-02-25 RX ADMIN — VANCOMYCIN HYDROCHLORIDE 125 MG: KIT at 06:02

## 2025-02-25 RX ADMIN — CYCLOBENZAPRINE 10 MG: 10 TABLET, FILM COATED ORAL at 09:02

## 2025-02-25 RX ADMIN — FINASTERIDE 5 MG: 5 TABLET, FILM COATED ORAL at 09:02

## 2025-02-25 RX ADMIN — OXYCODONE 5 MG: 5 TABLET ORAL at 09:02

## 2025-02-25 RX ADMIN — ONDANSETRON 4 MG: 4 TABLET, ORALLY DISINTEGRATING ORAL at 09:02

## 2025-02-25 RX ADMIN — ENOXAPARIN SODIUM 40 MG: 40 INJECTION SUBCUTANEOUS at 05:02

## 2025-02-25 RX ADMIN — SIMETHICONE 125 MG: 125 TABLET, CHEWABLE ORAL at 01:02

## 2025-02-25 RX ADMIN — OXYCODONE 5 MG: 5 TABLET ORAL at 03:02

## 2025-02-25 RX ADMIN — OXYCODONE 5 MG: 5 TABLET ORAL at 08:02

## 2025-02-25 RX ADMIN — MIRTAZAPINE 7.5 MG: 7.5 TABLET, FILM COATED ORAL at 09:02

## 2025-02-25 RX ADMIN — LISINOPRIL 40 MG: 20 TABLET ORAL at 08:02

## 2025-02-25 RX ADMIN — VANCOMYCIN HYDROCHLORIDE 125 MG: KIT at 05:02

## 2025-02-25 NOTE — SUBJECTIVE & OBJECTIVE
Interval History: No acute events reported overnight, pt reports ambulating in room this am, pain well controlled.     Medications:  Continuous Infusions:  Scheduled Meds:   apalutamide  240 mg Oral QHS    atorvastatin  40 mg Oral QHS    enoxparin  40 mg Subcutaneous Q24H (prophylaxis, 1700)    finasteride  5 mg Oral QHS    lisinopriL  40 mg Oral Daily    mirtazapine  7.5 mg Oral QHS    sodium phosphate 15 mmol in D5W 250 mL IVPB  15 mmol Intravenous Once    vancomycin  125 mg Oral Q6H     PRN Meds:  Current Facility-Administered Medications:     acetaminophen, 650 mg, Oral, Q6H PRN    cyclobenzaprine, 10 mg, Oral, TID PRN    dextrose 50%, 12.5 g, Intravenous, PRN    dextrose 50%, 25 g, Intravenous, PRN    glucagon (human recombinant), 1 mg, Intramuscular, PRN    glucose, 16 g, Oral, PRN    glucose, 24 g, Oral, PRN    naloxone, 0.02 mg, Intravenous, PRN    ondansetron, 4 mg, Oral, Q6H PRN    oxyCODONE, 5 mg, Oral, Q4H PRN    oxyCODONE-acetaminophen, 1 tablet, Oral, Q6H PRN    sodium chloride 0.9%, 10 mL, Intravenous, Q12H PRN    traZODone, 200 mg, Oral, Nightly PRN    Objective:     Vital Signs (Most Recent):  Temp: 98.1 °F (36.7 °C) (02/25/25 1010)  Pulse: 94 (02/25/25 1010)  Resp: 20 (02/25/25 1010)  BP: 135/67 (02/25/25 1010)  SpO2: 95 % (02/25/25 1010) Vital Signs (24h Range):  Temp:  [98 °F (36.7 °C)-98.9 °F (37.2 °C)] 98.1 °F (36.7 °C)  Pulse:  [82-94] 94  Resp:  [16-20] 20  SpO2:  [95 %-97 %] 95 %  BP: (135-152)/(67-82) 135/67     Weight: 101.4 kg (223 lb 8.7 oz)  Body mass index is 28.7 kg/m².     Physical Exam  Vitals and nursing note reviewed.   Constitutional:       General: He is not in acute distress.     Appearance: He is overweight. He is ill-appearing (chronically). More interactive today.  HENT:      Head: Normocephalic.      Mouth/Throat:      Mouth: Mucous membranes are moist.   Cardiovascular:      Rate and Rhythm: Normal rate.      Pulses: Normal pulses.   Pulmonary:      Effort: Pulmonary  effort is normal.   Skin:     General: Skin is warm and dry.   Neurological:      General: No focal deficit present.      Mental Status: He is alert, oriented to person, place, and time and easily aroused.   Psychiatric:         Mood and Affect: Mood normal. Affect is blunt.         Speech: Speech normal.         Behavior: Behavior normal. Behavior is cooperative.         Thought Content: Thought content normal.         Judgment: Judgment normal.           Review of Symptoms      Symptom Assessment (ESAS 0-10 Scale)  Pain:  0  Dyspnea:  0  Anxiety:  0  Nausea:  0  Depression:  3  Anorexia:  0  Fatigue:  5  Insomnia:  0  Restlessness:  0  Agitation:  0         Performance Status:  70    Living Arrangements:  Lives alone    Psychosocial/Cultural:   See Palliative Psychosocial Note: No  Social Issues Identified: Coping deficit pt/family and New Diagnosis/Trauma  Bereavement Risk: No  Caregiver Needs Discussed. Caregiver Distress: Yes: Issues of guilt  Cultural: none identified   **Primary  to Follow**  Palliative Care  Consult: No     Time-Based Charting:  Yes  Chart Review: 16 minutes  Face to Face: 13 minutes  Symptom Assessment: 9 minutes  Coordination of Care: 8 minutes  Discharge Plannin minutes  Advance Care Plannin minutes  Goals of Care: 17 minutes    Total Time Spent: 74 minutes        Advance Care Planning   Advance Directives:   Living Will: No    LaPOST: No    Do Not Resuscitate Status: Yes      Decision Making:  Patient answered questions  Goals of Care: What is most important right now is to focus on remaining as independent as possible, symptom/pain control, extending life as long as possible, even it it means sacrificing quality, curative/life-prolongation (regardless of treatment burdens), improvement in condition but with limits to invasive therapies. Accordingly, we have decided that the best plan to meet the patient's goals includes continuing with treatment.          Significant Labs: All pertinent labs within the past 24 hours have been reviewed.  CBC:   Recent Labs   Lab 02/25/25  0605   WBC 9.90   HGB 14.7   HCT 44.2   MCV 95        BMP:  Recent Labs   Lab 02/25/25  0605         K 4.8      CO2 22*   BUN 13   CREATININE 1.1   CALCIUM 8.4*   MG 1.9     LFT:  Lab Results   Component Value Date    AST 29 02/25/2025    ALKPHOS 87 02/25/2025    BILITOT 0.6 02/25/2025     Albumin:   Albumin   Date Value Ref Range Status   02/25/2025 2.4 (L) 3.5 - 5.2 g/dL Final     Protein:   Total Protein   Date Value Ref Range Status   02/25/2025 6.7 6.0 - 8.4 g/dL Final     Lactic acid:   Lab Results   Component Value Date    LACTATE 1.1 02/21/2025    LACTATE 1.2 02/21/2025       Significant Imaging: I have reviewed all pertinent imaging results/findings within the past 24 hours.

## 2025-02-25 NOTE — PROGRESS NOTES
[facility]  Palliative Medicine  Progress Note    Patient Name: Noah Lopez  MRN: [id number]  Admission Date: 2/21/2025  Hospital Length of Stay: 3 days  Code Status: DNR   Attending Provider: Key Whaley MD  Consulting Provider: Coco Zavaleta NP  Primary Care Physician: Anthony De La Torre MD  Principal Problem:C. difficile diarrhea    Patient information was obtained from patient, past medical records, and primary team.      Assessment/Plan:     Palliative Care  Palliative care encounter  Noah Lopez is a 80 y.o. male with PMHx of prostate cancer with bony and lung mets, recent c diff infection, who presented on 2/21/2025 for decreased PO intake x 5 days and recurrent diarrhea.   Pt was also seen in the  ED on 2/17, was found to have enlarged prostate cancer mass, bedoya placed in ED and patient was discharged home with urology/ oncology follow up. Since then he developed recurrent watery diarrhea with decreased PO intake. Previously dx with c. Diff diarrhea in 01/2025 treated with PO vancomycin. He reports his stools had mostly returned to normal and his sxs resolved.  He is unsure if he ever cleared the infection fully. He denies any CP, SOB, hematuria, fevers, chills, cough.      Palliative care consulted for goals of care/ advanced care planning.       2/25/2024  -Followed up with pt today at the bedside.  More more interactive today, reports he was able to ambulate to the restroom independently.  Encouraged to eat lunch in recliner at the bedside today.    -Reports that pain remains well controlled on current medication.   -Daughter coming to town tomorrow, will be present for pts upcoming oncology appointment. Pt shares that eh was really down when he learned about the progression on his cancer, was not sure what he would do.  He is now feeling better and wants to continue to meet with oncology to discuss next and available options.   -Remains agreeable for palliative care follow  up post oncology appointment.      2/24/2025  -At time of initial encounter, pt agreeable to palliative care visit however requests encounter be kept short as he is tired and wants to rest.   -Pt reports decrease in diarrhea, however still with significant fatigue. Encouraged pt to work with PT/OT today.  -Reports that pain is well controlled with current dose of Oxy IR 5mg.  Discussed recent addition of mirtazipine nightly and changing trazodone to PRN per psychiatry. Pt in agreement with plan.  -Overall goal is to follow up with oncologist (appointment scheduled for 2/27) and discuss additional treatment options. Pt has clear desire for continued cancer directed treatments.   -Pt reports his daughter Lorena helps him with medical decision making.  -Agreeable to follow up in the palliative care clinic for symptom management and continued goals of care. Would also recommend discussion of ACP documents.  Pt declined to create MPOA or LaPOST this morning.   -Current code status is DNR.       Subjective:     Chief Complaint:   Chief Complaint   Patient presents with    Diarrhea     Pt arrives with complaints of weakness and diarrhea around 1 week now. Pt also complains of generalized body pain. Pt arrives alert, in no distress.        HPI:   Noah Lopez is a 80 y.o. male with PMHx of prostate cancer with bony and lung mets, recent c diff infection, who presented to [facility] on 2/21/2025 for decreased PO intake x 5 days. In usual state of health until about 5 days ago when he began having decreased UOP and lower abdominal pain. Present to ED on 2/17, was found to have enlarged prostate cancer mass, bedoya placed in ED and patient was discharged home with urology follow up. Since being home he has developed recurrent watery diarrhea over the last 4-5 days. He has also had decreased PO intake. Only tolerated some fluids. Denies any emesis. He was previously dx with c. Diff diarrhea in 01/2025.  He was Rx PO vancomycin and completed 5 day course of Abx. He reports his stools had mostly returned to normal and his sxs resolved. He submitted a stool sample to the lab but reports it was denied because it was too formed. Therefore he is unsure if he ever cleared the infection. He denies any CP, SOB, hematuria, fevers, chills, cough.      Hospital Course:  Psychiatry consulted due to depression, medication adjustments made.     Hospital Course:  No notes on file    Interval History: No acute events reported overnight, pt reports ambulating in room this am, pain well controlled.     Medications:  Continuous Infusions:  Scheduled Meds:   apalutamide  240 mg Oral QHS    atorvastatin  40 mg Oral QHS    enoxparin  40 mg Subcutaneous Q24H (prophylaxis, 1700)    finasteride  5 mg Oral QHS    lisinopriL  40 mg Oral Daily    mirtazapine  7.5 mg Oral QHS    sodium phosphate 15 mmol in D5W 250 mL IVPB  15 mmol Intravenous Once    vancomycin  125 mg Oral Q6H     PRN Meds:  Current Facility-Administered Medications:     acetaminophen, 650 mg, Oral, Q6H PRN    cyclobenzaprine, 10 mg, Oral, TID PRN    dextrose 50%, 12.5 g, Intravenous, PRN    dextrose 50%, 25 g, Intravenous, PRN    glucagon (human recombinant), 1 mg, Intramuscular, PRN    glucose, 16 g, Oral, PRN    glucose, 24 g, Oral, PRN    naloxone, 0.02 mg, Intravenous, PRN    ondansetron, 4 mg, Oral, Q6H PRN    oxyCODONE, 5 mg, Oral, Q4H PRN    oxyCODONE-acetaminophen, 1 tablet, Oral, Q6H PRN    sodium chloride 0.9%, 10 mL, Intravenous, Q12H PRN    traZODone, 200 mg, Oral, Nightly PRN    Objective:     Vital Signs (Most Recent):  Temp: 98.1 °F (36.7 °C) (02/25/25 1010)  Pulse: 94 (02/25/25 1010)  Resp: 20 (02/25/25 1010)  BP: 135/67 (02/25/25 1010)  SpO2: 95 % (02/25/25 1010) Vital Signs (24h Range):  Temp:  [98 °F (36.7 °C)-98.9 °F (37.2 °C)] 98.1 °F (36.7 °C)  Pulse:  [82-94] 94  Resp:  [16-20] 20  SpO2:  [95 %-97 %] 95 %  BP: (135-152)/(67-82) 135/67     Weight: 101.4  kg (223 lb 8.7 oz)  Body mass index is 28.7 kg/m².     Physical Exam  Vitals and nursing note reviewed.   Constitutional:       General: He is not in acute distress.     Appearance: He is overweight. He is ill-appearing (chronically). More interactive today.  HENT:      Head: Normocephalic.      Mouth/Throat:      Mouth: Mucous membranes are moist.   Cardiovascular:      Rate and Rhythm: Normal rate.      Pulses: Normal pulses.   Pulmonary:      Effort: Pulmonary effort is normal.   Skin:     General: Skin is warm and dry.   Neurological:      General: No focal deficit present.      Mental Status: He is alert, oriented to person, place, and time and easily aroused.   Psychiatric:         Mood and Affect: Mood normal. Affect is blunt.         Speech: Speech normal.         Behavior: Behavior normal. Behavior is cooperative.         Thought Content: Thought content normal.         Judgment: Judgment normal.           Review of Symptoms      Symptom Assessment (ESAS 0-10 Scale)  Pain:  0  Dyspnea:  0  Anxiety:  0  Nausea:  0  Depression:  3  Anorexia:  0  Fatigue:  5  Insomnia:  0  Restlessness:  0  Agitation:  0         Performance Status:  70    Living Arrangements:  Lives alone    Psychosocial/Cultural:   See Palliative Psychosocial Note: No  Social Issues Identified: Coping deficit pt/family and New Diagnosis/Trauma  Bereavement Risk: No  Caregiver Needs Discussed. Caregiver Distress: Yes: Issues of guilt  Cultural: none identified   **Primary  to Follow**  Palliative Care  Consult: No     Time-Based Charting:  Yes  Chart Review: 16 minutes  Face to Face: 13 minutes  Symptom Assessment: 9 minutes  Coordination of Care: 8 minutes  Discharge Plannin minutes  Advance Care Plannin minutes  Goals of Care: 17 minutes    Total Time Spent: 74 minutes        Advance Care Planning  Advance Directives:   Living Will: No    LaPOST: No    Do Not Resuscitate Status: Yes      Decision Making:   Patient answered questions  Goals of Care: What is most important right now is to focus on remaining as independent as possible, symptom/pain control, extending life as long as possible, even it it means sacrificing quality, curative/life-prolongation (regardless of treatment burdens), improvement in condition but with limits to invasive therapies. Accordingly, we have decided that the best plan to meet the patient's goals includes continuing with treatment.         Significant Labs: All pertinent labs within the past 24 hours have been reviewed.  CBC:   Recent Labs   Lab 02/25/25  0605   WBC 9.90   HGB 14.7   HCT 44.2   MCV 95        BMP:  Recent Labs   Lab 02/25/25 0605         K 4.8      CO2 22*   BUN 13   CREATININE 1.1   CALCIUM 8.4*   MG 1.9     LFT:  Lab Results   Component Value Date    AST 29 02/25/2025    ALKPHOS 87 02/25/2025    BILITOT 0.6 02/25/2025     Albumin:   Albumin   Date Value Ref Range Status   02/25/2025 2.4 (L) 3.5 - 5.2 g/dL Final     Protein:   Total Protein   Date Value Ref Range Status   02/25/2025 6.7 6.0 - 8.4 g/dL Final     Lactic acid:   Lab Results   Component Value Date    LACTATE 1.1 02/21/2025    LACTATE 1.2 02/21/2025       Significant Imaging: I have reviewed all pertinent imaging results/findings within the past 24 hours.    Coco Zavaleta NP  Palliative Medicine  [facility]

## 2025-02-25 NOTE — PROGRESS NOTES
[facility] Medicine Progress Note    Primary Team: [facility] Hospitalist Team A  Attending Physician: Key Whaley MD  Resident: Dr Montes  Intern: Dr Abernathy    Subjective/Interval History     Patient in better spirits today, more conversational. Endorses back pain which he attributes to laying in bed constantly. Also has pain when he sits up, would like to get up and walk around but worried about the pain, says he knows he will feel better once he gets moving. No further episodes of diarrhea, but has not had normal BM since resolution of diarrhea. Denies SI today. Says he does not want to talk to another psychiatrist until he meets with his oncologist out-patient.     Objective   Last 24 Hour Vital Signs:  BP  Min: 144/78  Max: 155/79  Temp  Av.2 °F (36.8 °C)  Min: 97.7 °F (36.5 °C)  Max: 98.9 °F (37.2 °C)  Pulse  Av  Min: 82  Max: 93  Resp  Av  Min: 16  Max: 20  SpO2  Av.8 %  Min: 95 %  Max: 97 %  I/O last 3 completed shifts:  In: 480 [P.O.:480]  Out: 500 [Urine:500]    Physical Examination:  General: No acute distress. Appropriate behavior.   Head: normocephalic, atraumatic  Eyes: PERRL. EOMI. No conjunctival injection. No scleral icterus.  ENT: MMM, no rhinorrhea or epistaxis.   Neck: No lymphadenopathy. No accessory muscle use.   Cardiac: Tachycardic. Regular rhythm. No murmurs appreciated.  Pulmonary/Chest: CTAB. Normal work of breathing.   Abdomen: Soft, mild lower abdominal tenderness. normoactive bowel sounds. Jones in place   Extremities: atraumatic, no deformities, no edema.  Skin: dry, warm, intact. No bruising or rashes.  Neuro: Alert. Oriented to person, place, time. Moving all extremities spontaneously. Normal  strength bilaterally.     Laboratory and Microbiology:  I have independently reviewed data.    Imaging and EKGs:  I have independently reviewed data.     Assessment & Plan   First Recurrence Severe C. Difficile infection  Diarrhea  - watery bowel movements x 5 days, c.diff  toxin positive in ED  - completed PO vanc therapy 01/2025 but may have missed doses. No culture cure documented but reports improvement of sxs prior to recurrence   - Tachycardic, otherwise hemodynamically stable, afebrile, leukocytosis at 16, Cr 1.6  - KUB with acute abnormality    - No diarrhea x 24 hours  Plan:  - continue 250 mg PO vancomycin QID x 14 days  - avoid opiates, advance diet as tolerated     KIKE, resolved  - Cr 1.6 (BL 1.3)  - suspect pre-renal in setting of diarrhea, decreased po intake  - Resolved with IVFs     Prostate cancer with bony and lung metastasis s/p radiation therapy c/b acute urinary retention  Hx of bilateral orchiectomy  - outpatient bedoya exchanged in ED, continue bedoya until urology follow up outpatient   - CT imaging 2/17 with enlarging prostate mass, concern for tx failure  - monitor for UTI   - continue finasteride, non-formulary androgen inhibitor apalutamide 240 mg nightly   - Seen by palliative, he will follow up out-patient for GOC, advanced planning, symptom management after seeing heme/onc     Hypokalemia  Hypomagnesemia  Hypophosphatemia  - replace with PO and IV supplementation     Depression   -depressed mood, no SI/HI  -telepsych evaluated  -stopped bupropion  -continue mirtazapine, trazodone prn qhs    HLD  - continue statin     HTN  - home meds: lisinopril 40 and cardizem 300  - continue lisinopril     Insomnia   - continue trazodone 200 mg nightly     Code Status: DNR  DVT Prophylaxis: Lovenox  Diet: Soft, adv as tolerated  Disposition: pending clinical improvement, PT/OT recs    Case will be discussed with attending physician and attestation to follow, please appreciate.     Jose Montes, DO MPH  [employer] IM PGY III  [employer] Hospitalist Team A    [facility] Medicine Hospitalist Pager numbers:   [facility] Hospitalist Medicine Team A (Shahnaz/Judd): [phone]  [facility] Hospitalist Medicine Team B (Jody/Pascual):  [phone]

## 2025-02-25 NOTE — HPI
Noah Lopez is a 80 y.o. male with PMHx of prostate cancer with bony and lung mets, recent c diff infection, who presented to [facility] on 2/21/2025 for decreased PO intake x 5 days. In usual state of health until about 5 days ago when he began having decreased UOP and lower abdominal pain. Present to ED on 2/17, was found to have enlarged prostate cancer mass, bedoya placed in ED and patient was discharged home with urology follow up. Since being home he has developed recurrent watery diarrhea over the last 4-5 days. He has also had decreased PO intake. Only tolerated some fluids. Denies any emesis. He was previously dx with c. Diff diarrhea in 01/2025. He was Rx PO vancomycin and completed 5 day course of Abx. He reports his stools had mostly returned to normal and his sxs resolved. He submitted a stool sample to the lab but reports it was denied because it was too formed. Therefore he is unsure if he ever cleared the infection. He denies any CP, SOB, hematuria, fevers, chills, cough.      Hospital Course:  Psychiatry consulted due to depression, medication adjustments made.

## 2025-02-25 NOTE — PLAN OF CARE
Problem: Adult Inpatient Plan of Care  Goal: Plan of Care Review  Outcome: Progressing  Goal: Patient-Specific Goal (Individualized)  Outcome: Progressing  Goal: Absence of Hospital-Acquired Illness or Injury  Outcome: Progressing  Goal: Optimal Comfort and Wellbeing  Outcome: Progressing  Goal: Readiness for Transition of Care  Outcome: Progressing     Problem: Infection  Goal: Absence of Infection Signs and Symptoms  Outcome: Progressing     Problem: Diarrhea  Goal: Effective Diarrhea Management  Outcome: Progressing     Problem: Oral Intake Inadequate  Goal: Improved Oral Intake  Outcome: Progressing     Problem: Pain Acute  Goal: Optimal Pain Control and Function  Outcome: Progressing     Problem: Nausea and Vomiting  Goal: Nausea and Vomiting Relief  Outcome: Progressing     Problem: Skin Injury Risk Increased  Goal: Skin Health and Integrity  Outcome: Progressing

## 2025-02-25 NOTE — PLAN OF CARE
Problem: Adult Inpatient Plan of Care  Goal: Patient-Specific Goal (Individualized)  Outcome: Progressing     Problem: Infection  Goal: Absence of Infection Signs and Symptoms  Outcome: Progressing     Problem: Diarrhea  Goal: Effective Diarrhea Management  Outcome: Progressing     Problem: Oral Intake Inadequate  Goal: Improved Oral Intake  Outcome: Progressing     Problem: Fatigue  Goal: Improved Activity Tolerance  Outcome: Progressing     Problem: Comorbidity Management  Goal: Blood Pressure in Desired Range  Outcome: Progressing     Aaox4. Medications administered per MAR. Jones in place. Special contact precautions maintained. Safety precautions in place. Call bell within reach.

## 2025-02-25 NOTE — ASSESSMENT & PLAN NOTE
Noah Lopez is a 80 y.o. male with PMHx of prostate cancer with bony and lung mets, recent c diff infection, who presented on 2/21/2025 for decreased PO intake x 5 days and recurrent diarrhea.   Pt was also seen in the  ED on 2/17, was found to have enlarged prostate cancer mass, bedoya placed in ED and patient was discharged home with urology/ oncology follow up. Since then he developed recurrent watery diarrhea with decreased PO intake. Previously dx with c. Diff diarrhea in 01/2025 treated with PO vancomycin. He reports his stools had mostly returned to normal and his sxs resolved.  He is unsure if he ever cleared the infection fully. He denies any CP, SOB, hematuria, fevers, chills, cough.      Palliative care consulted for goals of care/ advanced care planning.       2/25/2024  -Followed up with pt today at the bedside.  More more interactive today, reports he was able to ambulate to the restroom independently.  Encouraged to eat lunch in recliner at the bedside today.    -Reports that pain remains well controlled on current medication.   -Daughter coming to town tomorrow, will be present for pts upcoming oncology appointment. Pt shares that eh was really down when he learned about the progression on his cancer, was not sure what he would do.  He is now feeling better and wants to continue to meet with oncology to discuss next and available options.   -Remains agreeable for palliative care follow up post oncology appointment.      2/24/2025  -At time of initial encounter, pt agreeable to palliative care visit however requests encounter be kept short as he is tired and wants to rest.   -Pt reports decrease in diarrhea, however still with significant fatigue. Encouraged pt to work with PT/OT today.  -Reports that pain is well controlled with current dose of Oxy IR 5mg.  Discussed recent addition of mirtazipine nightly and changing trazodone to PRN per psychiatry. Pt in agreement with plan.  -Overall goal  is to follow up with oncologist (appointment scheduled for 2/27) and discuss additional treatment options. Pt has clear desire for continued cancer directed treatments.   -Pt reports his daughter Lorena helps him with medical decision making.  -Agreeable to follow up in the palliative care clinic for symptom management and continued goals of care. Would also recommend discussion of ACP documents.  Pt declined to create MPOA or LaPOST this morning.   -Current code status is DNR.

## 2025-02-25 NOTE — PT/OT/SLP PROGRESS
Occupational Therapy      Patient Name:  Noah Lopez   MRN:  [id number]    Attempted PT/OT session with pt today to further mobility; pt states he has been up to bathroom already today and did it all on his own; states he does not need or want therapy while in the hospital but would like HH PT/OT once d/c home. D/c acute OT orders at this time per pt request.     2/25/2025

## 2025-02-25 NOTE — PT/OT/SLP DISCHARGE
Physical Therapy Discharge Summary    Name: Noah Lopez  MRN: [id number]   Principal Problem: C. difficile diarrhea     Patient Discharged from acute Physical Therapy on 25.  Please refer to prior PT noted date on 25 for functional status.     Assessment:     Attempted PT/OT session with pt today to further mobility; pt states he has been up to bathroom already today and did it all on his own; states he does not need or want therapy while in the hospital but would like HH PT/OT once d/c home. D/c acute PT orders at this time per pt request.     Objective:     GOALS:   Multidisciplinary Problems       Physical Therapy Goals          Problem: Physical Therapy    Goal Priority Disciplines Outcome Interventions   Physical Therapy Goal     PT, PT/OT Progressing    Description: Goals to be met by: 3/24/25     Patient will increase functional independence with mobility by performin. Supine to sit with Modified [place]  2. Sit to supine with Modified [place]  3. Sit to stand transfer with Modified [place]  4. Bed to chair transfer with Modified [place] using Single-point Cane   5. Gait  x 50 feet with Modified [place] using Single-point Cane .   6. Ascend/descend 3 stair with bilateral Handrails Modified [place].                         Reasons for Discontinuation of Therapy Services  Pt request d/c of PT/OT orders while in hospital.     Plan:     Patient Discharged to: Home with Home Health Service.      2025

## 2025-02-25 NOTE — NURSING
Pt refused morning labs, stating that he will allow labs to be taken after 6am. Lab made aware, stated they will return after 6am for pt lab draws.

## 2025-02-26 VITALS
DIASTOLIC BLOOD PRESSURE: 67 MMHG | OXYGEN SATURATION: 96 % | TEMPERATURE: 99 F | SYSTOLIC BLOOD PRESSURE: 138 MMHG | BODY MASS INDEX: 28.69 KG/M2 | WEIGHT: 223.56 LBS | HEART RATE: 86 BPM | RESPIRATION RATE: 18 BRPM | HEIGHT: 74 IN

## 2025-02-26 PROBLEM — Z51.5 PALLIATIVE CARE ENCOUNTER: Status: RESOLVED | Noted: 2025-02-25 | Resolved: 2025-02-26

## 2025-02-26 PROBLEM — N17.9 AKI (ACUTE KIDNEY INJURY): Status: RESOLVED | Noted: 2025-02-22 | Resolved: 2025-02-26

## 2025-02-26 PROBLEM — E87.6 HYPOKALEMIA: Status: RESOLVED | Noted: 2025-02-22 | Resolved: 2025-02-26

## 2025-02-26 PROBLEM — E83.42 HYPOMAGNESEMIA: Status: RESOLVED | Noted: 2025-02-22 | Resolved: 2025-02-26

## 2025-02-26 PROBLEM — A04.72 C. DIFFICILE DIARRHEA: Status: RESOLVED | Noted: 2025-02-21 | Resolved: 2025-02-26

## 2025-02-26 LAB
ALBUMIN SERPL BCP-MCNC: 2.4 G/DL (ref 3.5–5.2)
ALP SERPL-CCNC: 90 U/L (ref 40–150)
ALT SERPL W/O P-5'-P-CCNC: 19 U/L (ref 10–44)
ANION GAP SERPL CALC-SCNC: 10 MMOL/L (ref 8–16)
AST SERPL-CCNC: 40 U/L (ref 10–40)
BASOPHILS # BLD AUTO: 0.07 K/UL (ref 0–0.2)
BASOPHILS NFR BLD: 0.7 % (ref 0–1.9)
BILIRUB SERPL-MCNC: 0.5 MG/DL (ref 0.1–1)
BUN SERPL-MCNC: 12 MG/DL (ref 8–23)
CALCIUM SERPL-MCNC: 8.7 MG/DL (ref 8.7–10.5)
CHLORIDE SERPL-SCNC: 106 MMOL/L (ref 95–110)
CO2 SERPL-SCNC: 22 MMOL/L (ref 23–29)
CREAT SERPL-MCNC: 1 MG/DL (ref 0.5–1.4)
DIFFERENTIAL METHOD BLD: ABNORMAL
EOSINOPHIL # BLD AUTO: 0.5 K/UL (ref 0–0.5)
EOSINOPHIL NFR BLD: 5.4 % (ref 0–8)
ERYTHROCYTE [DISTWIDTH] IN BLOOD BY AUTOMATED COUNT: 12.5 % (ref 11.5–14.5)
EST. GFR  (NO RACE VARIABLE): >60 ML/MIN/1.73 M^2
GLUCOSE SERPL-MCNC: 91 MG/DL (ref 70–110)
HCT VFR BLD AUTO: 43.5 % (ref 40–54)
HGB BLD-MCNC: 14.5 G/DL (ref 14–18)
IMM GRANULOCYTES # BLD AUTO: 0.16 K/UL (ref 0–0.04)
IMM GRANULOCYTES NFR BLD AUTO: 1.6 % (ref 0–0.5)
LYMPHOCYTES # BLD AUTO: 1.3 K/UL (ref 1–4.8)
LYMPHOCYTES NFR BLD: 13 % (ref 18–48)
MAGNESIUM SERPL-MCNC: 2 MG/DL (ref 1.6–2.6)
MCH RBC QN AUTO: 31.6 PG (ref 27–31)
MCHC RBC AUTO-ENTMCNC: 33.3 G/DL (ref 32–36)
MCV RBC AUTO: 95 FL (ref 82–98)
MONOCYTES # BLD AUTO: 1 K/UL (ref 0.3–1)
MONOCYTES NFR BLD: 10.1 % (ref 4–15)
NEUTROPHILS # BLD AUTO: 6.8 K/UL (ref 1.8–7.7)
NEUTROPHILS NFR BLD: 69.2 % (ref 38–73)
NRBC BLD-RTO: 0 /100 WBC
PHOSPHATE SERPL-MCNC: 2.1 MG/DL (ref 2.7–4.5)
PLATELET # BLD AUTO: 345 K/UL (ref 150–450)
PMV BLD AUTO: 9.6 FL (ref 9.2–12.9)
POTASSIUM SERPL-SCNC: 4.9 MMOL/L (ref 3.5–5.1)
PROT SERPL-MCNC: 6.8 G/DL (ref 6–8.4)
RBC # BLD AUTO: 4.59 M/UL (ref 4.6–6.2)
SODIUM SERPL-SCNC: 138 MMOL/L (ref 136–145)
WBC # BLD AUTO: 9.85 K/UL (ref 3.9–12.7)

## 2025-02-26 PROCEDURE — 36415 COLL VENOUS BLD VENIPUNCTURE: CPT

## 2025-02-26 PROCEDURE — 80053 COMPREHEN METABOLIC PANEL: CPT

## 2025-02-26 PROCEDURE — 25000003 PHARM REV CODE 250: Performed by: INTERNAL MEDICINE

## 2025-02-26 PROCEDURE — 63600175 PHARM REV CODE 636 W HCPCS: Performed by: INTERNAL MEDICINE

## 2025-02-26 PROCEDURE — 85025 COMPLETE CBC W/AUTO DIFF WBC: CPT

## 2025-02-26 PROCEDURE — 25000003 PHARM REV CODE 250

## 2025-02-26 PROCEDURE — 83735 ASSAY OF MAGNESIUM: CPT

## 2025-02-26 PROCEDURE — 84100 ASSAY OF PHOSPHORUS: CPT

## 2025-02-26 RX ORDER — MUPIROCIN 20 MG/G
OINTMENT TOPICAL 2 TIMES DAILY
Status: DISCONTINUED | OUTPATIENT
Start: 2025-02-26 | End: 2025-02-26 | Stop reason: HOSPADM

## 2025-02-26 RX ORDER — TRAZODONE HYDROCHLORIDE 100 MG/1
200 TABLET ORAL NIGHTLY PRN
Qty: 30 TABLET | Refills: 11 | Status: SHIPPED | OUTPATIENT
Start: 2025-02-26 | End: 2026-02-26

## 2025-02-26 RX ORDER — POLYETHYLENE GLYCOL 3350 17 G/17G
17 POWDER, FOR SOLUTION ORAL NIGHTLY
Qty: 510 G | Refills: 0 | Status: SHIPPED | OUTPATIENT
Start: 2025-02-26

## 2025-02-26 RX ORDER — OXYCODONE AND ACETAMINOPHEN 5; 325 MG/1; MG/1
1 TABLET ORAL EVERY 6 HOURS PRN
Qty: 24 TABLET | Refills: 0 | Status: CANCELLED | OUTPATIENT
Start: 2025-02-26 | End: 2025-03-05

## 2025-02-26 RX ORDER — SODIUM,POTASSIUM PHOSPHATES 280-250MG
2 POWDER IN PACKET (EA) ORAL
Qty: 100 PACKET | Refills: 0 | Status: SHIPPED | OUTPATIENT
Start: 2025-02-26

## 2025-02-26 RX ORDER — VANCOMYCIN HYDROCHLORIDE 125 MG/1
125 CAPSULE ORAL EVERY 6 HOURS
Qty: 24 CAPSULE | Refills: 0 | Status: SHIPPED | OUTPATIENT
Start: 2025-02-26 | End: 2025-03-04

## 2025-02-26 RX ORDER — SIMETHICONE 80 MG
80 TABLET,CHEWABLE ORAL EVERY 6 HOURS PRN
Qty: 100 TABLET | Refills: 0 | Status: SHIPPED | OUTPATIENT
Start: 2025-02-26 | End: 2025-03-28

## 2025-02-26 RX ORDER — LISINOPRIL 40 MG/1
40 TABLET ORAL DAILY
Qty: 30 TABLET | Refills: 11 | Status: SHIPPED | OUTPATIENT
Start: 2025-02-26

## 2025-02-26 RX ORDER — MIRTAZAPINE 7.5 MG/1
7.5 TABLET, FILM COATED ORAL NIGHTLY
Qty: 30 TABLET | Refills: 11 | Status: SHIPPED | OUTPATIENT
Start: 2025-02-26 | End: 2026-02-26

## 2025-02-26 RX ORDER — OXYCODONE AND ACETAMINOPHEN 5; 325 MG/1; MG/1
1 TABLET ORAL EVERY 6 HOURS PRN
Qty: 12 TABLET | Refills: 0 | Status: SHIPPED | OUTPATIENT
Start: 2025-02-26 | End: 2025-03-05

## 2025-02-26 RX ORDER — FINASTERIDE 5 MG/1
5 TABLET, FILM COATED ORAL DAILY
Qty: 30 TABLET | Refills: 11 | Status: SHIPPED | OUTPATIENT
Start: 2025-02-26 | End: 2026-02-26

## 2025-02-26 RX ORDER — ATORVASTATIN CALCIUM 40 MG/1
40 TABLET, FILM COATED ORAL NIGHTLY
Qty: 30 TABLET | Refills: 11 | Status: SHIPPED | OUTPATIENT
Start: 2025-02-26 | End: 2025-02-27

## 2025-02-26 RX ORDER — SODIUM,POTASSIUM PHOSPHATES 280-250MG
2 POWDER IN PACKET (EA) ORAL
Status: DISCONTINUED | OUTPATIENT
Start: 2025-02-26 | End: 2025-02-26 | Stop reason: HOSPADM

## 2025-02-26 RX ORDER — CYCLOBENZAPRINE HCL 10 MG
10 TABLET ORAL 3 TIMES DAILY PRN
Qty: 60 TABLET | Refills: 0 | Status: SHIPPED | OUTPATIENT
Start: 2025-02-26

## 2025-02-26 RX ADMIN — POTASSIUM & SODIUM PHOSPHATES POWDER PACK 280-160-250 MG 2 PACKET: 280-160-250 PACK at 05:02

## 2025-02-26 RX ADMIN — LISINOPRIL 40 MG: 20 TABLET ORAL at 09:02

## 2025-02-26 RX ADMIN — OXYCODONE 5 MG: 5 TABLET ORAL at 05:02

## 2025-02-26 RX ADMIN — ENOXAPARIN SODIUM 40 MG: 40 INJECTION SUBCUTANEOUS at 05:02

## 2025-02-26 RX ADMIN — VANCOMYCIN HYDROCHLORIDE 125 MG: KIT at 12:02

## 2025-02-26 RX ADMIN — OXYCODONE HYDROCHLORIDE AND ACETAMINOPHEN 1 TABLET: 5; 325 TABLET ORAL at 02:02

## 2025-02-26 RX ADMIN — VANCOMYCIN HYDROCHLORIDE 125 MG: KIT at 05:02

## 2025-02-26 RX ADMIN — VANCOMYCIN HYDROCHLORIDE 125 MG: KIT at 06:02

## 2025-02-26 RX ADMIN — OXYCODONE 5 MG: 5 TABLET ORAL at 09:02

## 2025-02-26 RX ADMIN — MUPIROCIN: 20 OINTMENT TOPICAL at 09:02

## 2025-02-26 RX ADMIN — ONDANSETRON 4 MG: 4 TABLET, ORALLY DISINTEGRATING ORAL at 02:02

## 2025-02-26 RX ADMIN — POTASSIUM & SODIUM PHOSPHATES POWDER PACK 280-160-250 MG 2 PACKET: 280-160-250 PACK at 12:02

## 2025-02-26 NOTE — CARE UPDATE
[facility]      HOME HEALTH ORDERS  FACE TO FACE ENCOUNTER    Patient Name: Noah Lopez  YOB: 1945    PCP: Anthony De La Torre MD   PCP Address: [address] / [place] [address]  PCP Phone Number: [phone]  PCP Fax: [phone]    Encounter Date: 2/21/25    Admit to Home Health    Diagnoses:  Active Hospital Problems    Diagnosis  POA    Hypophosphatemia [E83.39]  Yes    Depression [F32.A]  Yes      Resolved Hospital Problems    Diagnosis Date Resolved POA    *C. difficile diarrhea [A04.72] 02/26/2025 Yes    Palliative care encounter [Z51.5] 02/26/2025 Not Applicable    KIKE (acute kidney injury) [N17.9] 02/26/2025 Yes    Hypokalemia [E87.6] 02/26/2025 Yes    Hypomagnesemia [E83.42] 02/26/2025 Yes       Follow Up Appointments:  Future Appointments   Date Time Provider Department Center   2/27/2025  8:30 AM Laine Payan FNP [facility] UROLOGY [facility]   2/27/2025 12:00 PM LAB, HEMONC CANCER BLDG [facility] LAB HO [facility]   2/27/2025  1:20 PM Darren Carter MD [facility] HEMONC2 [facility]   2/28/2025  9:00 AM Barbra Pickett MD [facility] PALLMED [facility]   3/5/2025  1:30 PM Noy De Jesus MD [facility] IMPRI [facility]   3/6/2025 10:30 AM [facility] PET CT LIMIT 500 LBS [facility] PET CT [facility]   3/6/2025 11:20 AM [facility], DEXA1 [facility] BMD [address]   3/21/2025 10:45 AM Ryan Cotton MD [facility] UROLOGY [facility]   7/14/2025  2:00 PM Anthony De La Torre MD [facility]       Allergies:  Review of patient's allergies indicates:   Allergen Reactions    Ciprofloxacin Other (See Comments)     tendonitis       Medications: Review discharge medications with patient and family and provide education.    Current Medications[1]     Medication List        START taking these medications      mirtazapine 7.5 MG Tab  Commonly known as: REMERON  Take 1 tablet (7.5 mg total) by mouth every evening.     polyethylene glycol 17 gram/dose powder  Commonly known as: GLYCOLAX  Take 17 g by mouth every  evening.     potassium, sodium phosphates 280-160-250 mg Pwpk  Commonly known as: PHOS-NAK  Take 2 packets by mouth 4 (four) times daily with meals and nightly.     vancomycin 125 MG capsule  Commonly known as: VANCOCIN  Take 1 capsule (125 mg total) by mouth every 6 (six) hours. for 6 days            CHANGE how you take these medications      simethicone 80 MG chewable tablet  Commonly known as: GAS RELIEF 80 (SIMETHICONE)  Take 1 tablet (80 mg total) by mouth every 6 (six) hours as needed for Flatulence.  What changed:   medication strength  how much to take  when to take this  reasons to take this            CONTINUE taking these medications      apalutamide 60 mg Tab  Commonly known as: ERLEADA  TAKE 4 TABLETS BY MOUTH ONCE  DAILY     ascorbic acid (vitamin C) 1000 MG tablet  Commonly known as: VITAMIN C  Take 1,000 mg by mouth once daily.     atorvastatin 40 MG tablet  Commonly known as: LIPITOR  Take 1 tablet (40 mg total) by mouth every evening.     BENEFIBER CLEAR SF (DEXTRIN) ORAL  Take by mouth.     calcium carbonate 500 mg calcium (1,250 mg) tablet  Commonly known as: OS-TYREE  Take 2 tablets (1,000 mg total) by mouth once daily.     cholecalciferol (vitamin D3) 25 mcg (1,000 unit) capsule  Commonly known as: VITAMIN D3  Take 1 capsule (1,000 Units total) by mouth once daily.     clotrimazole-betamethasone 1-0.05% cream  Commonly known as: LOTRISONE  Apply topically 2 (two) times daily.     cyclobenzaprine 10 MG tablet  Commonly known as: FLEXERIL  Take 1 tablet (10 mg total) by mouth 3 (three) times daily as needed for Muscle spasms.     finasteride 5 mg tablet  Commonly known as: PROSCAR  Take 1 tablet (5 mg total) by mouth once daily.     lisinopriL 40 MG tablet  Commonly known as: PRINIVIL,ZESTRIL  Take 1 tablet (40 mg total) by mouth once daily.     MEN 50 PLUS MULTIVITAMIN 553--300 mcg Tab  Generic drug: mv-min-folic-K1-lycopen-lutein  Take 1 tablet by mouth once daily.     mupirocin 2 %  ointment  Commonly known as: BACTROBAN  Apply topically 3 (three) times daily. To healing areas     ondansetron 4 MG Tbdl  Commonly known as: ZOFRAN-ODT  Take 1 tablet (4 mg total) by mouth every 6 (six) hours as needed.     oxyCODONE 5 MG immediate release tablet  Commonly known as: ROXICODONE  Take 1 tablet (5 mg total) by mouth every 4 (four) hours as needed for Pain.     oxyCODONE-acetaminophen 5-325 mg per tablet  Commonly known as: PERCOCET  Take 1 tablet by mouth every 6 (six) hours as needed for Pain.     traZODone 100 MG tablet  Commonly known as: DESYREL  Take 2 tablets (200 mg total) by mouth nightly as needed for Insomnia.     TURMERIC ORAL  Take by mouth.            STOP taking these medications      buPROPion 75 MG tablet  Commonly known as: WELLBUTRIN     clonazePAM 0.5 MG tablet  Commonly known as: KlonoPIN     diltiaZEM 300 MG 24 hr capsule  Commonly known as: CARDIZEM CD     docusate sodium 250 MG capsule  Commonly known as: COLACE     tamsulosin 0.4 mg Cap  Commonly known as: FLOMAX                I have seen and examined this patient within the last 30 days. My clinical findings that support the need for the home health skilled services and home bound status are the following:no   Weakness/numbness causing balance and gait disturbance due to Malignancy/Cancer making it taxing to leave home.  Patient with medication mismanagement issues requiring home bound status as evidenced by  Poor adherence to medication regimen/dosage.     Diet:   regular diet    Labs:  N/A    Referrals/ Consults  Physical Therapy to evaluate and treat. Evaluate for home safety and equipment needs; Establish/upgrade home exercise program. Perform / instruct on therapeutic exercises, gait training, transfer training, and Range of Motion.  Occupational Therapy to evaluate and treat. Evaluate home environment for safety and equipment needs. Perform/Instruct on transfers, ADL training, ROM, and therapeutic  exercises.    Activities:   activity as tolerated    Nursing:   Agency to admit patient within 24 hours of hospital discharge unless specified on physician order or at patient request    SN to complete comprehensive assessment including routine vital signs. Instruct on disease process and s/s of complications to report to MD. Review/verify medication list sent home with the patient at time of discharge  and instruct patient/caregiver as needed. Frequency may be adjusted depending on start of care date.     Skilled nurse to perform up to 3 visits PRN for symptoms related to diagnosis    Notify MD if SBP > 160 or < 90; DBP > 90 or < 50; HR > 120 or < 50; Temp > 101; O2 < 88%    Ok to schedule additional visits based on staff availability and patient request on consecutive days within the home health episode.    When multiple disciplines ordered:    Start of Care occurs on Sunday - Wednesday schedule remaining discipline evaluations as ordered on separate consecutive days following the start of care.    Thursday SOC -schedule subsequent evaluations Friday and Monday the following week.     Friday - Saturday SOC - schedule subsequent discipline evaluations on consecutive days starting Monday of the following week.    For all post-discharge communication and subsequent orders please contact patient's primary care physician. If unable to reach primary care physician or do not receive response within 30 minutes, please contact PCP for clinical staff order clarification    Miscellaneous   Jones Care: Instruct patient/caregiver to empty Jones bag.  Change Jones every month.    Home Health Aide:  Physical Therapy Twice weekly and Occupational Therapy Twice weekly    Wound Care Orders  no    I certify that this patient is confined to his home and needs physical therapy and occupational therapy.                [1]   Current Facility-Administered Medications   Medication Dose Route Frequency Provider Last Rate Last Admin     acetaminophen tablet 650 mg  650 mg Oral Q6H PRN Stacey Thomas MD   650 mg at 02/23/25 2028    apalutamide Tab 240 mg  240 mg Oral QHS Key Whaley MD   240 mg at 02/23/25 2030    atorvastatin tablet 40 mg  40 mg Oral QHS Jovanny Vu MD   40 mg at 02/25/25 2104    cyclobenzaprine tablet 10 mg  10 mg Oral TID PRN Jovanny Vu MD   10 mg at 02/25/25 2104    dextrose 50% injection 12.5 g  12.5 g Intravenous PRN Jovanny Vu MD        dextrose 50% injection 25 g  25 g Intravenous PRN Jovanny Vu MD        enoxaparin injection 40 mg  40 mg Subcutaneous Q24H (prophylaxis, 1700) Key Whaley MD   40 mg at 02/25/25 1726    finasteride tablet 5 mg  5 mg Oral QHS Jovanny Vu MD   5 mg at 02/25/25 2103    glucagon (human recombinant) injection 1 mg  1 mg Intramuscular PRN Jovanny Vu MD        glucose chewable tablet 16 g  16 g Oral PRN Jovanny Vu MD        glucose chewable tablet 24 g  24 g Oral PRN Jovanny Vu MD        lisinopriL tablet 40 mg  40 mg Oral Daily Jovanny Vu MD   40 mg at 02/26/25 0916    mirtazapine tablet 7.5 mg  7.5 mg Oral QHS Jovanny Vu MD   7.5 mg at 02/25/25 2104    mupirocin 2 % ointment   Nasal BID Key Whaley MD   Given at 02/26/25 0917    naloxone 0.4 mg/mL injection 0.02 mg  0.02 mg Intravenous PRN Jovanny Vu MD        ondansetron disintegrating tablet 4 mg  4 mg Oral Q6H PRN Jovanny Vu MD   4 mg at 02/26/25 0254    oxyCODONE immediate release tablet 5 mg  5 mg Oral Q4H PRN Zachariah Lund MD   5 mg at 02/26/25 0916    oxyCODONE-acetaminophen 5-325 mg per tablet 1 tablet  1 tablet Oral Q6H PRN Zachariah Lund MD   1 tablet at 02/26/25 0254    polyethylene glycol packet 17 g  17 g Oral QHS Jose Montes DO   17 g at 02/25/25 2104    potassium, sodium phosphates 280-160-250 mg packet 2 packet  2 packet Oral QID (WM & HS) Jose Montes DO        simethicone chewable tablet 125 mg  125 mg Oral Q6H PRN Jose Montes DO    125 mg at 02/25/25 1332    sodium chloride 0.9% flush 10 mL  10 mL Intravenous Q12H PRN Jovanny Vu MD        traZODone tablet 200 mg  200 mg Oral Nightly PRN Key Whaley MD   200 mg at 02/24/25 4595    vancomycin 125 mg/5 mL oral solution 125 mg  125 mg Oral Q6H Jovanny Vu MD   125 mg at 02/26/25 0642

## 2025-02-26 NOTE — PLAN OF CARE
Pt is set to dc home later today. Pt will have his drt [place] [phone] help transport him home later today. Pt was approved to receive [facility]. Pt has f/u appts listed on AVS. Rounds completed on pt. All questions addressed. Bedside nurse to discuss d/c medications. Discussed importance to attend all f/u appts and take medications as prescribed. Verbalized understanding. Case Management to sign off.     Fredrick Hernandez, MSW  [phone]    Future Appointments   Date Time Provider Department Center   2/27/2025  8:30 AM Laine Payan FNP [facility] UROLOGY [facility]   2/27/2025 12:00 PM LAB, HEMON CANCER BLDG [facility]   2/27/2025  1:20 PM Darren Carter MD [facility] HEMONC2 [facility]   2/28/2025  9:00 AM Barbra Pickett MD [facility] PALLMED [facility]   3/5/2025  1:30 PM Noy De Jesus MD [facility] IMPRI [facility]   3/6/2025 10:30 AM NOM PET CT LIMIT 500 LBS [facility] CT [facility]   3/6/2025 11:20 AM [facility], DEXA1 [facility]   3/21/2025 10:45 AM Ryan Cotton MD [facility] UROLOGY [facility]   7/14/2025  2:00 PM Anthony De La Torre MD [facility] MED [facility]        02/26/25 1226   Final Note   Assessment Type Final Discharge Note   Anticipated Discharge Disposition Home-Health   What phone number can be called within the next 1-3 days to see how you are doing after discharge? [phone]   Post-Acute Status   Home Health Status Set-up Complete/Auth obtained   Coverage PHN   Discharge Delays None known at this time

## 2025-02-26 NOTE — PLAN OF CARE
Pt AAOx4. Slightly anxious/angry at start of shift, but once plan of care reviewed and pt assured care would be clustered to allow him to get some sleep, mood changed and pt more amenable to interventions. Intermittent pain and nausea reported throughout shift, both alleviated with PRN medications. All medications administered per MAR. Call light functions reviewed with pt. Pt verbalized and demonstrated understanding of call light. Jones catheter in place. Catheter care performed per protocol. Pt encouraged to call with any needs.     Problem: Adult Inpatient Plan of Care  Goal: Optimal Comfort and Wellbeing  Outcome: Progressing  Goal: Readiness for Transition of Care  Outcome: Progressing     Problem: Diarrhea  Goal: Effective Diarrhea Management  Outcome: Progressing     Problem: Pain Acute  Goal: Optimal Pain Control and Function  Outcome: Progressing     Problem: Nausea and Vomiting  Goal: Nausea and Vomiting Relief  Outcome: Progressing     Problem: Coping Ineffective  Goal: Effective Coping  Outcome: Progressing

## 2025-02-26 NOTE — NURSING
"Called to bedside by CNA. Pt had 8 pill boxes full of unamed medications on his lap. This nurse educated pt on hospital protocol regarding home meds. Pt then stated that he needed to take the grey pill for his cancer. This nurse then explained that we will have to inform the med team to get order placed on his MAR. Pt then proceeded to stop this nurse from taking medications and stated he needed "to have them destroyed". When this nurse asked for clarification, pt stated that "his daughter was not going to allow him to take medications" and that he "needs her to wash them off with hot water". Pt allowed this nurse to take meds to store properly. Team was notified of concern for new onset confusion.   "

## 2025-02-26 NOTE — NURSING
Dr. Montes to bedside to assess pt. According to MD pt  was afraid of cdiff spores being on medications. This is why he wanted them disposed of. Pt still cleared for d/c later on today.

## 2025-02-26 NOTE — PLAN OF CARE
VN note: VN completed AVS and attachments and notified bedside nurseMaki. Will cont to be available and intervene prn.

## 2025-02-26 NOTE — PROGRESS NOTES
[facility] Medicine Progress Note    Primary Team: [facility] Hospitalist Team A  Attending Physician: Key Whaley MD  Resident: Dr Montes  Intern: Dr Abernathy    Subjective/Interval History     Patient feeling well today. Looking forward to going home. No further diarrhea, still has not had normal bowel movement >24 hrs.     Objective   Last 24 Hour Vital Signs:  BP  Min: 135/67  Max: 156/81  Temp  Av.7 °F (37.1 °C)  Min: 98.1 °F (36.7 °C)  Max: 99.8 °F (37.7 °C)  Pulse  Av.3  Min: 87  Max: 94  Resp  Av.4  Min: 16  Max: 20  SpO2  Av.8 %  Min: 95 %  Max: 97 %  I/O last 3 completed shifts:  In: 710 [P.O.:710]  Out: 1600 [Urine:1600]    Physical Examination:  General: No acute distress. Appropriate behavior.   Head: normocephalic, atraumatic  Eyes: PERRL. EOMI. No conjunctival injection. No scleral icterus.  ENT: MMM, no rhinorrhea or epistaxis.   Neck: No lymphadenopathy. No accessory muscle use.   Cardiac: Tachycardic. Regular rhythm. No murmurs appreciated.  Pulmonary/Chest: CTAB. Normal work of breathing.   Abdomen: Soft, mild lower abdominal tenderness. normoactive bowel sounds. Jones in place   Extremities: atraumatic, no deformities, no edema.  Skin: dry, warm, intact. No bruising or rashes.  Neuro: Alert. Oriented to person, place, time. Moving all extremities spontaneously. Normal  strength bilaterally.     Laboratory and Microbiology:  I have independently reviewed data.    Imaging and EKGs:  I have independently reviewed data.     Assessment & Plan   First Recurrence Severe C. Difficile infection  Diarrhea  - watery bowel movements x 5 days, c.diff toxin positive in ED  - completed PO vanc therapy 2025 but may have missed doses. 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 c/b acute urinary retention  Hx of bilateral orchiectomy  - outpatient bedoya exchanged in ED, continue bedoya until urology follow up outpatient   - CT imaging 2/17 with enlarging prostate mass, concern for tx failure  - monitor for UTI   - continue finasteride, non-formulary androgen inhibitor apalutamide 240 mg nightly   - Seen by palliative, he will follow up out-patient for GOC, advanced planning, symptom management after seeing heme/onc     Hypokalemia  Hypomagnesemia  Hypophosphatemia  - replace with PO and IV supplementation     Depression   -depressed mood, no SI/HI  -telepsych evaluated  -stopped bupropion  -continue mirtazapine, trazodone prn qhs    HLD  - continue statin     HTN  - home meds: lisinopril 40 and cardizem 300  - continue lisinopril     Insomnia   - continue trazodone 200 mg nightly     Code Status: DNR  DVT Prophylaxis: Lovenox  Diet: Soft, adv as tolerated  Disposition: discharge today    Case will be discussed with attending physician and attestation to follow, please appreciate.     Jose Montes DO MPH  [employer] IM PGY III  [facility] Hospitalist Team A    [facility] Hospitalist Pager numbers:   [facility] Hospitalist Medicine Team A (Shahnaz/Judd): [phone]  [facility] Hospitalist Medicine Team B (Jody/Pascual):  [phone]

## 2025-02-27 ENCOUNTER — LAB VISIT (OUTPATIENT)
Dept: LAB | Facility: HOSPITAL | Age: 80
End: 2025-02-27
Attending: HOSPITALIST
Payer: MEDICARE

## 2025-02-27 ENCOUNTER — OFFICE VISIT (OUTPATIENT)
Dept: HEMATOLOGY/ONCOLOGY | Facility: CLINIC | Age: 80
End: 2025-02-27
Payer: MEDICARE

## 2025-02-27 ENCOUNTER — PATIENT OUTREACH (OUTPATIENT)
Dept: ADMINISTRATIVE | Facility: CLINIC | Age: 80
End: 2025-02-27
Payer: MEDICARE

## 2025-02-27 ENCOUNTER — PATIENT MESSAGE (OUTPATIENT)
Dept: UROLOGY | Facility: CLINIC | Age: 80
End: 2025-02-27

## 2025-02-27 ENCOUNTER — OFFICE VISIT (OUTPATIENT)
Dept: UROLOGY | Facility: CLINIC | Age: 80
End: 2025-02-27
Payer: MEDICARE

## 2025-02-27 VITALS
HEART RATE: 120 BPM | DIASTOLIC BLOOD PRESSURE: 84 MMHG | WEIGHT: 217.81 LBS | BODY MASS INDEX: 27.95 KG/M2 | HEIGHT: 74 IN | SYSTOLIC BLOOD PRESSURE: 133 MMHG

## 2025-02-27 VITALS
HEIGHT: 74 IN | BODY MASS INDEX: 27.95 KG/M2 | DIASTOLIC BLOOD PRESSURE: 84 MMHG | SYSTOLIC BLOOD PRESSURE: 133 MMHG | WEIGHT: 217.81 LBS | HEART RATE: 120 BPM

## 2025-02-27 DIAGNOSIS — C79.51 MALIGNANT NEOPLASM METASTATIC TO BONE: ICD-10-CM

## 2025-02-27 DIAGNOSIS — C61 PROSTATE CANCER: ICD-10-CM

## 2025-02-27 DIAGNOSIS — C78.02 MALIGNANT NEOPLASM METASTATIC TO BOTH LUNGS: ICD-10-CM

## 2025-02-27 DIAGNOSIS — G47.00 INSOMNIA, UNSPECIFIED TYPE: ICD-10-CM

## 2025-02-27 DIAGNOSIS — R33.8 ACUTE URINARY RETENTION: ICD-10-CM

## 2025-02-27 DIAGNOSIS — C78.01 MALIGNANT NEOPLASM METASTATIC TO BOTH LUNGS: ICD-10-CM

## 2025-02-27 DIAGNOSIS — C61 PROSTATE CANCER: Primary | ICD-10-CM

## 2025-02-27 LAB
ALBUMIN SERPL BCP-MCNC: 2.6 G/DL (ref 3.5–5.2)
ALP SERPL-CCNC: 91 U/L (ref 40–150)
ALT SERPL W/O P-5'-P-CCNC: 21 U/L (ref 10–44)
ANION GAP SERPL CALC-SCNC: 8 MMOL/L (ref 8–16)
AST SERPL-CCNC: 48 U/L (ref 10–40)
BILIRUB SERPL-MCNC: 0.5 MG/DL (ref 0.1–1)
BUN SERPL-MCNC: 15 MG/DL (ref 8–23)
CALCIUM SERPL-MCNC: 9.4 MG/DL (ref 8.7–10.5)
CHLORIDE SERPL-SCNC: 106 MMOL/L (ref 95–110)
CO2 SERPL-SCNC: 23 MMOL/L (ref 23–29)
COMPLEXED PSA SERPL-MCNC: 3.4 NG/ML (ref 0–4)
CREAT SERPL-MCNC: 1.3 MG/DL (ref 0.5–1.4)
ERYTHROCYTE [DISTWIDTH] IN BLOOD BY AUTOMATED COUNT: 12.5 % (ref 11.5–14.5)
EST. GFR  (NO RACE VARIABLE): 55.9 ML/MIN/1.73 M^2
GLUCOSE SERPL-MCNC: 153 MG/DL (ref 70–110)
HCT VFR BLD AUTO: 45.1 % (ref 40–54)
HGB BLD-MCNC: 14.9 G/DL (ref 14–18)
IMM GRANULOCYTES # BLD AUTO: 0.28 K/UL (ref 0–0.04)
MCH RBC QN AUTO: 31.7 PG (ref 27–31)
MCHC RBC AUTO-ENTMCNC: 33 G/DL (ref 32–36)
MCV RBC AUTO: 96 FL (ref 82–98)
NEUTROPHILS # BLD AUTO: 8.4 K/UL (ref 1.8–7.7)
PLATELET # BLD AUTO: 393 K/UL (ref 150–450)
PMV BLD AUTO: 9.5 FL (ref 9.2–12.9)
POTASSIUM SERPL-SCNC: 4.5 MMOL/L (ref 3.5–5.1)
PROT SERPL-MCNC: 7.1 G/DL (ref 6–8.4)
RBC # BLD AUTO: 4.7 M/UL (ref 4.6–6.2)
SODIUM SERPL-SCNC: 137 MMOL/L (ref 136–145)
WBC # BLD AUTO: 10.88 K/UL (ref 3.9–12.7)

## 2025-02-27 PROCEDURE — 99215 OFFICE O/P EST HI 40 MIN: CPT | Mod: S$GLB,,, | Performed by: HOSPITALIST

## 2025-02-27 PROCEDURE — 1125F AMNT PAIN NOTED PAIN PRSNT: CPT | Mod: CPTII,S$GLB,, | Performed by: HOSPITALIST

## 2025-02-27 PROCEDURE — G2211 COMPLEX E/M VISIT ADD ON: HCPCS | Mod: S$GLB,,, | Performed by: HOSPITALIST

## 2025-02-27 PROCEDURE — 1159F MED LIST DOCD IN RCRD: CPT | Mod: CPTII,S$GLB,, | Performed by: HOSPITALIST

## 2025-02-27 PROCEDURE — 1160F RVW MEDS BY RX/DR IN RCRD: CPT | Mod: CPTII,S$GLB,,

## 2025-02-27 PROCEDURE — 1159F MED LIST DOCD IN RCRD: CPT | Mod: CPTII,S$GLB,,

## 2025-02-27 PROCEDURE — 36415 COLL VENOUS BLD VENIPUNCTURE: CPT | Performed by: HOSPITALIST

## 2025-02-27 PROCEDURE — 84153 ASSAY OF PSA TOTAL: CPT | Performed by: HOSPITALIST

## 2025-02-27 PROCEDURE — 3079F DIAST BP 80-89 MM HG: CPT | Mod: CPTII,S$GLB,, | Performed by: HOSPITALIST

## 2025-02-27 PROCEDURE — 1111F DSCHRG MED/CURRENT MED MERGE: CPT | Mod: CPTII,S$GLB,,

## 2025-02-27 PROCEDURE — 99999 PR PBB SHADOW E&M-EST. PATIENT-LVL V: CPT | Mod: PBBFAC,,,

## 2025-02-27 PROCEDURE — 99214 OFFICE O/P EST MOD 30 MIN: CPT | Mod: S$GLB,,,

## 2025-02-27 PROCEDURE — 3079F DIAST BP 80-89 MM HG: CPT | Mod: CPTII,S$GLB,,

## 2025-02-27 PROCEDURE — 1125F AMNT PAIN NOTED PAIN PRSNT: CPT | Mod: CPTII,S$GLB,,

## 2025-02-27 PROCEDURE — 3075F SYST BP GE 130 - 139MM HG: CPT | Mod: CPTII,S$GLB,, | Performed by: HOSPITALIST

## 2025-02-27 PROCEDURE — 85027 COMPLETE CBC AUTOMATED: CPT | Performed by: HOSPITALIST

## 2025-02-27 PROCEDURE — 3288F FALL RISK ASSESSMENT DOCD: CPT | Mod: CPTII,S$GLB,, | Performed by: HOSPITALIST

## 2025-02-27 PROCEDURE — 99999 PR PBB SHADOW E&M-EST. PATIENT-LVL III: CPT | Mod: PBBFAC,,, | Performed by: HOSPITALIST

## 2025-02-27 PROCEDURE — 1101F PT FALLS ASSESS-DOCD LE1/YR: CPT | Mod: CPTII,S$GLB,, | Performed by: HOSPITALIST

## 2025-02-27 PROCEDURE — 80053 COMPREHEN METABOLIC PANEL: CPT | Performed by: HOSPITALIST

## 2025-02-27 PROCEDURE — 3075F SYST BP GE 130 - 139MM HG: CPT | Mod: CPTII,S$GLB,,

## 2025-02-27 PROCEDURE — 1111F DSCHRG MED/CURRENT MED MERGE: CPT | Mod: CPTII,S$GLB,, | Performed by: HOSPITALIST

## 2025-02-27 RX ORDER — LORAZEPAM 0.5 MG/1
0.5 TABLET ORAL NIGHTLY
Qty: 30 TABLET | Refills: 0 | Status: SHIPPED | OUTPATIENT
Start: 2025-02-27 | End: 2025-03-29

## 2025-02-27 NOTE — PLAN OF CARE
Problem: Adult Inpatient Plan of Care  Goal: Plan of Care Review  Outcome: Progressing  Goal: Patient-Specific Goal (Individualized)  Outcome: Progressing     Problem: Infection  Goal: Absence of Infection Signs and Symptoms  Outcome: Progressing     Problem: Diarrhea  Goal: Effective Diarrhea Management  Outcome: Progressing     Problem: Oral Intake Inadequate  Goal: Improved Oral Intake  Outcome: Progressing     Problem: Pain Acute  Goal: Optimal Pain Control and Function  Outcome: Progressing

## 2025-02-27 NOTE — PROGRESS NOTES
[facility]VANCED PROST[facility]INIC - FOLLOW UP VISIT     Best Contact Phone Number(s): [phone] (home)       Cancer/Stage/TNM:    Cancer Staging   Prostate cancer  Staging form: Prostate, AJCC 8th Edition  - Clinical: Stage IVB (cT3, cNX, cM1c, Grade Group: 5) - Signed by Darren Carter MD on 11/18/2022        Reason for visit: De david metastatic prostate cancer with lung and osseous mets sp orchiectomy and on apalutamide.    Molecular:  Tempus xF: TP53  Germline genetics 3/28/23 - VUS in APC and BMPR1A    Densitometry:  02/23/23 - Normal    Treatment:  11/2022 -    ADT  06/2023 -   Apalutamide  02/2023   SBRT to left iliac  02/2023   Orichiectomy  01/2024   SBRT to left pubic ramus    HPI: Noah Lopez is a 80 y.o. 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 He started first line ADT on 11/18/22 with addition of apalutamide 06/2023. He completed SBRT to the left iliac lesion 02/2023 and underwent bilateral orchiectomy 2/15/23. He was found to have rising PSA c/f mCRPC 12/2023 with oligoprogessino to the left left pubic ramus on PSMA PET. He underwent palliative RT to the left pubic ramus 1/18/24. He presents to medical oncology clinic for routine follow up.    History has been obtained by chart review and discussion with the patient.    Interval Events:     Developed new onset diarrhea mid-January 2025. Diagnosed with Cdiff with assocaited fatigue. Multiple ED visits during this time. Also with acute onset urinary reteiont requring bedoya catheterization since 02/17/25. Recently discharged last night foloowing admission at [facility] 02/21/25 - 02/26/25 with low appetite, abdominal pain, and recurrent Cdiff. Continues on vancomycin every 6 hours. CT imaging shows signficant progression of his pelvic tumor along with osseous and pulmonary metastases.     Currently feels very tired. Associated pain  about the coccyx is chronic but more bothersome. Has bedoya in place that is very distressing to him. Remains very concerned about quality of life.        Oncology History   Prostate cancer   12/8/2021 Tumor Markers    PSA 11.7     1/2022 Notable Event    ED visit for urinary retention found on abodminal US. Bedoya placed.     3/2022 Notable Event    Seen in ED for fall. Incidentally found to have suspicious thyroid nodule     4/20/2022 Biopsy    FNA of thyroid nodule nondiagnostic. Also found to have new SHANNAN lung nodules.     5/16/2022 Imaging Significant Findings    FDG PET-CT in setting of pulmonary nodules:  1.  Two solid left upper lobe pulmonary nodules with no appreciable FDG uptake.  Malignancy is not excluded, and the larger nodule is likely amenable to percutaneous biopsy. Alternatively, continued surveillance by dedicated chest CT alone, i.e., without FDG PET, may be considered.     2.  Focal FDG uptake in the left prostate gland with extracapsular extension.  These findings are concerning for malignancy.  Correlation with PSA and urology evaluation recommended.  MRI of the prostate may also be helpful for further evaluation.     3.  Abnormal appearance of the left symphysis pubis with hypermetabolic activity.  Differential considerations include fibrous dysplasia, Paget's disease, and metastasis.   MRI or bone algorithm CT may be informative.     4.  Incidental focal uptake in the right cerebellum on at least 2 PET slices.  Recommend brain MRI for further evaluation.     5.  2.2 cm left inferior thyroid lobe nodule.  This nodule was recently biopsied with abnormal results.  Please refer to pathology report.     5/25/2022 Tumor Markers    PSA 17.3     7/6/2022 Biopsy    Attempted SHANNAN nodule biopsy     8/11/2022 Biopsy    TRUS biopsy: Prostate adenocarcinoma up to 4+3 disease in 10/18 cores all from the left side. Associated cribriform glands and PNI.       8/31/2022 Imaging Significant Findings    Bone  scan:  1. Uptake within the anterior right 5th and 6th ribs is most likely related to degenerative changes.  Correlation with plain film or chest CT scan recommended.     2.  Intense uptake within the medial left pubic bone corresponds to an area of sclerosis seen on the comparison PET-CT scan.  Differential considerations remain to be fibrous dysplasia, Paget's disease or metastasis.     3.  Negative for metastatic pattern of uptake otherwise.  Degenerative pattern of uptake and other incidental findings as noted above.     9/29/2022 Imaging Significant Findings    MRI prostate: Left-sided PI-RADS 5 lesion with extraprostatic extension involving the left-sided neurovascular bundles and seminal vesicles.  Extension of abnormal signal adjacent to the left levator ani, cannot rule out involvement.  Abnormal signal also closely approximates with the anterior rectal wall without definite involvement.     Left pubic lesion concerning for metastasis.     10/17/2022 Notable Event    Declined pubic bone biopsy to potentially confirm metastatic prostate cancer     10/26/2022 Procedure    TURPT - pathology review with up to 4+5 disease     11/9/2022 Imaging Significant Findings    PSMA PET-CT shows evidence of osseous and pulmonary metastatic disease     11/18/2022 -  Hormone Therapy    Start Lupron 22.5mg IM; plan to start apalutamide     2/1/2023 - 2/7/2023 Radiation Therapy    Treating physician: Jaswinder Doyle    Course: C1 Pelvis 2023    Treatment Site Ref. ID Energy Dose/Fx (Gy) #Fx Dose Correction (Gy) Total Dose (Gy) Start Date End Date Elapsed Days   3D Iliac_Lt Lt Iliac 18X 4 5 / 5 0 20 2/1/2023 2/7/2023 6        3/28/2023 Genetic Testing    Germline genetic testing. No pathogenic mutations. VUS in APC and BMPR1A     1/2/2024 Imaging Significant Findings     PSMA PET CT   Impression:  - Interval treatment response.  Decreased size and radiotracer uptake of the prostate remnant.  Previously described pulmonary  nodules have either resolved or decreased in size compared to prior exam.  - Mixed interval changes within the left pubic bone lesion with decreased involvement of the inferior pubic ramus and enlarged lytic component within the superior pubic ramus.  Interval resolution of prior uptake within the left ischial tuberosity.  - Stable nodule within the superior mediastinum projecting inferiorly from the left thyroid.  Recommend correlation with prior biopsy.  - No new tracer avid lesion.     1/18/2024 - 1/31/2024 Radiation Therapy    Treating physician: lisa childs  Course: C2 Pelvis 2024    Treatment Site Ref. ID Energy Dose/Fx (Gy) #Fx Dose Correction (Gy) Total Dose (Gy) Start Date End Date Elapsed Days   3D Pelvis NormEZCalc 18X 3 10 / 10 0 30 1/18/2024 1/31/2024 13            1/30/2024 Imaging Significant Findings    CT a/p 1/30/24  1. Surgical changes of TURP and bilateral orchiectomy.  There is remnant prostate with residual disease better assessed by PSMA or MRI.  2. *Enlarging metastasis in the left pubic/superior pubic ramus bone.  There is cortical breakthrough involving both the anterior and posterior cortex with tumoral replacement.  Correlation advised.  Cholelithiasis.     8/8/2024 Imaging Significant Findings    PSMA PET CT 8/8/24  Impression:  1. In this patient with metastatic prostate cancer there is mild soft tissue thickening and increased radiotracer uptake in the left aspect of the prostate, possibly corresponding to seminal vesicle.  Attention on follow-up.  2. Interval decreased radiotracer uptake within metastatic lesion within the left pubic bone.  3. Additional findings, as above.           Past Medical History:   Diagnosis Date    Colon polyp 09/06/2018    Hyperlipidemia     Hypertension     Prostate cancer     Stage 3a chronic kidney disease 6/21/2023    Urinary tract infection with hematuria 11/18/2022         Past Surgical History:   Procedure Laterality Date    COLONOSCOPY  2010     COLONOSCOPY N/A 9/6/2018    Procedure: COLONOSCOPY Suprep PLEASE TEXT PATIENT WITH ARRIVAL TIME;  Surgeon: Niharika Arce MD;  Location: [facility] ENDO;  Service: Endoscopy;  Laterality: N/A;    CYSTOSCOPY WITH URODYNAMIC TESTING N/A 1/31/2022    Procedure: CYSTOSCOPY, WITH URODYNAMIC TESTING FLOUROSCOPIC;  Surgeon: Anthony Maloney MD;  Location: [facility] OR 1ST FLR;  Service: Urology;  Laterality: N/A;  1hr    KNEE ARTHROSCOPY      ORCHIECTOMY Bilateral 2/15/2023    Procedure: Bilateral simple orchiectomy;  Surgeon: Helena Luis MD;  Location: [facility] OR;  Service: Urology;  Laterality: Bilateral;    SPINE SURGERY      l-spine    TRANSURETHRAL RESECTION OF PROSTATE N/A 10/26/2022    Procedure: TURP (TRANSURETHRAL RESECTION OF PROSTATE);  Surgeon: Helena Luis MD;  Location: [facility] OR;  Service: Urology;  Laterality: N/A;         I have reviewed and updated the patient's past medical, surgical, family and social histories.     Review of patient's allergies indicates:   Allergen Reactions    Ciprofloxacin Other (See Comments)     tendonitis         Current Outpatient Medications   Medication Sig Dispense Refill    apalutamide (ERLEADA) 60 mg Tab TAKE 4 TABLETS BY MOUTH ONCE  DAILY (Patient taking differently: Take 240 mg by mouth once daily.) 120 tablet 11    ascorbic acid, vitamin C, (VITAMIN C) 1000 MG tablet Take 1,000 mg by mouth once daily.      calcium carbonate (OS-TYREE) 500 mg calcium (1,250 mg) tablet Take 2 tablets (1,000 mg total) by mouth once daily. 60 tablet 11    cholecalciferol, vitamin D3, (VITAMIN D3) 25 mcg (1,000 unit) capsule Take 1 capsule (1,000 Units total) by mouth once daily. 30 capsule 11    clotrimazole-betamethasone 1-0.05% (LOTRISONE) cream Apply topically 2 (two) times daily. 60 g 3    cyclobenzaprine (FLEXERIL) 10 MG tablet Take 1 tablet (10 mg total) by mouth 3 (three) times daily as needed for Muscle spasms. 60 tablet 0    finasteride (PROSCAR) 5 mg tablet Take 1 tablet (5 mg total)  "by mouth once daily. 30 tablet 11    lisinopriL (PRINIVIL,ZESTRIL) 40 MG tablet Take 1 tablet (40 mg total) by mouth once daily. 30 tablet 11    LORazepam (ATIVAN) 0.5 MG tablet Take 1 tablet (0.5 mg total) by mouth every evening. 30 tablet 0    mirtazapine (REMERON) 7.5 MG Tab Take 1 tablet (7.5 mg total) by mouth every evening. 30 tablet 11    multivit-min-FA-lycopen-lutein (MEN 50 PLUS MULTIVITAMIN) 300-600-300 mcg Tab Take 1 tablet by mouth once daily.      mupirocin (BACTROBAN) 2 % ointment Apply topically 3 (three) times daily. To healing areas 22 g 2    ondansetron (ZOFRAN-ODT) 4 MG TbDL Take 1 tablet (4 mg total) by mouth every 6 (six) hours as needed. 20 tablet 0    oxyCODONE (ROXICODONE) 5 MG immediate release tablet Take 1 tablet (5 mg total) by mouth every 4 (four) hours as needed for Pain. 42 tablet 0    oxyCODONE-acetaminophen (PERCOCET) 5-325 mg per tablet Take 1 tablet by mouth every 6 (six) hours as needed for Pain. 12 tablet 0    polyethylene glycol (GLYCOLAX) 17 gram/dose powder Take 17 g by mouth every evening. 510 g 0    potassium, sodium phosphates (PHOS-NAK) 280-160-250 mg PwPk Take 2 packets by mouth 4 (four) times daily with meals and nightly. 100 packet 0    simethicone (GAS RELIEF 80, SIMETHICONE,) 80 MG chewable tablet Take 1 tablet (80 mg total) by mouth every 6 (six) hours as needed for Flatulence. 100 tablet 0    traZODone (DESYREL) 100 MG tablet Take 2 tablets (200 mg total) by mouth nightly as needed for Insomnia. 30 tablet 11    TURMERIC ORAL Take by mouth.      vancomycin (VANCOCIN) 125 MG capsule Take 1 capsule (125 mg total) by mouth every 6 (six) hours. for 6 days 24 capsule 0    wheat dextrin (BENEFIBER CLEAR SF, DEXTRIN, ORAL) Take by mouth.       No current facility-administered medications for this visit.        Objective:      Physical Exam:   /84 (BP Location: Left arm, Patient Position: Sitting)   Pulse (!) 120   Ht 6' 2" (1.88 m)   Wt 98.8 kg (217 lb 13 oz)   " BMI 27.97 kg/m²       ECOG PS: 0             Physical Exam  Constitutional:       General: He is not in acute distress.     Appearance: Normal appearance.   HENT:      Head: Normocephalic.   Eyes:      General: No scleral icterus.     Extraocular Movements: Extraocular movements intact.      Conjunctiva/sclera: Conjunctivae normal.   Cardiovascular:      Rate and Rhythm: Normal rate.   Pulmonary:      Effort: Pulmonary effort is normal. No respiratory distress.   Abdominal:      General: There is no distension.      Palpations: Abdomen is soft.   Musculoskeletal:      Right lower leg: No edema.      Left lower leg: No edema.   Skin:     General: Skin is warm and dry.   Neurological:      Mental Status: He is alert and oriented to person, place, and time.      Motor: No weakness.   Psychiatric:         Mood and Affect: Mood normal.         Behavior: Behavior normal.         Thought Content: Thought content normal.          Recent Labs:   Lab Results   Component Value Date    WBC 10.88 02/27/2025    RBC 4.70 02/27/2025    HGB 14.9 02/27/2025    HCT 45.1 02/27/2025    MCV 96 02/27/2025    MCH 31.7 (H) 02/27/2025    MCHC 33.0 02/27/2025    RDW 12.5 02/27/2025     02/27/2025    MPV 9.5 02/27/2025    IMMGR 1.6 (H) 02/26/2025    GRAN 8.4 (H) 02/27/2025    IGABS 0.28 (H) 02/27/2025    LYMPH 1.3 02/26/2025    LYMPH 13.0 (L) 02/26/2025    MONO 1.0 02/26/2025    MONO 10.1 02/26/2025    EOS 0.5 02/26/2025    BASO 0.07 02/26/2025    NRBC 0 02/26/2025    EOSINOPHIL 5.4 02/26/2025    BASOPHIL 0.7 02/26/2025    DIFFMETHOD Automated 02/26/2025       Lab Results   Component Value Date     02/27/2025    K 4.5 02/27/2025     02/27/2025    CO2 23 02/27/2025     (H) 02/27/2025    BUN 15 02/27/2025    CREATININE 1.3 02/27/2025    CALCIUM 9.4 02/27/2025    PROT 7.1 02/27/2025    ALBUMIN 2.6 (L) 02/27/2025    BILITOT 0.5 02/27/2025    ALKPHOS 91 02/27/2025    AST 48 (H) 02/27/2025    ALT 21 02/27/2025    ANIONGAP 8  02/27/2025    EGFRNORACEVR 55.9 (A) 02/27/2025        Lab Results   Component Value Date    PSADIAG 3.4 02/27/2025    PSADIAG 2.4 11/12/2024    PSADIAG 1.8 08/12/2024    PSADIAG 1.6 07/01/2024    PSADIAG 1.2 05/21/2024    PSADIAG 1.2 04/29/2024    PSADIAG 1.1 03/15/2024    PSADIAG 1.4 02/20/2024    PSADIAG 2.3 01/31/2024    PSADIAG 2.2 01/02/2024    PSATOTAL 15.8 (H) 07/05/2022    PSA 11.7 (H) 12/08/2021        Cardiovascular Screening:  Primary care physician: Anthony De La Torre MD      The 10-year ASCVD risk score (Echo ROSE, et al., 2019) is: 38%    Values used to calculate the score:      Age: 80 years      Sex: Male      Is Non- : No      Diabetic: No      Tobacco smoker: No      Systolic Blood Pressure: 133 mmHg      Is BP treated: Yes      HDL Cholesterol: 50 mg/dL      Total Cholesterol: 224 mg/dL    ASCVD Risk Level: High risk >= 20%    EKG:   Results for orders placed or performed during the hospital encounter of 02/16/25   EKG 12-lead    Collection Time: 02/16/25  5:48 PM   Result Value Ref Range    QRS Duration 90 ms    OHS QTC Calculation 435 ms    Narrative    Test Reason : Z13.6,    Vent. Rate :  74 BPM     Atrial Rate :  74 BPM     P-R Int : 182 ms          QRS Dur :  90 ms      QT Int : 392 ms       P-R-T Axes :  62  58  66 degrees    QTcB Int : 435 ms    Sinus rhythm with Premature atrial complexes  Otherwise normal ECG  When compared with ECG of 04-Oct-2022 10:51,  Premature atrial complexes are now Present  Confirmed by Singh Vasquez ([id number]) on 2/17/2025 7:10:14 PM    Referred By: AAAREFERRAL SELF           Confirmed By: Singh Cain       High blood pressure = Yes  Antihypertensive agents: lisinopriL - 40 MG   Comments:     DM2: No  Antidiabetic agents: This patient does not have an active medication from one of the medication groupers.   Comments:     Hyperlipidemia: Yes  Lipid lowering agents: atorvastatin - 40 MG   Comments:     Antiplatelet therapy:  No  Agent: This patient does not have an active medication from one of the medication groupers.   Comment:     Body mass index is 27.97 kg/m².  If greater than 30, referral to nutrition    Lab Results   Component Value Date    CHOL 224 (H) 08/07/2023    LDLCALC 150.4 08/07/2023    HDL 50 08/07/2023    TRIG 118 08/07/2023    HGBA1C 5.4 08/07/2023        Bone Health    Lab Results   Component Value Date    [id number] 62 08/07/2023        Results for orders placed during the hospital encounter of 02/23/23    DXA Bone Density with Vertebral Fracture    Impression  Normal bone mineral density.    Consider FDA approved medical therapies in postmenopausal women and men aged 50 years and older, based on the following:    *A hip or vertebral (clinical or morphometric) fracture  *T score less than or equal to -2.5 at the femoral neck or spine after appropriate evaluation to exclude secondary causes.  *Low bone mass -- also known as osteopenia (T score between -1.0 and -2.5 at the femoral neck or spine) and a 10 year probability of hip fracture greater than or equal to 3% or a 10 year probability of major osteoporosis-related fracture greater than or equal to 20% based on the [place]-adapted WHO algorithm.  *Clinicians judgment and/or patient preference may indicate treatment for people with 10 year fracture probabilities is above or below these levels.      Electronically signed by: Anshu Stephens  Date:    02/23/2023  Time:    12:08       Vitamin D: 1000 IU daily  Calcium: Recommend 4 servings of dairy daily     Osteopenia/Osteoporosis: None    Bone strengthening agent: None     Staging Imaging     Results for orders placed during the hospital encounter of 11/09/22    NM PET CT F 18 PYL PSMA, Midthigh to Vertex    Impression  1. Evidence of prostate neoplasm with osseous and pulmonary metastasis.  Detrimental changes noted when comparison is made to 05/16/2022 in particular with progression of several pulmonary nodules and  with development of 1 or 2 small pulmonary nodules.  2. A mass is noted inferior to the left lobe of the thyroid gland entirely specific but demonstrating increased uptake similar since the prior  3. A prostate based mass appears to be present with involvement of the bladder and a distended bladder suggestive outlet obstruction      Electronically signed by: John Steele MD  Date:    11/09/2022  Time:    15:31       Results for orders placed during the hospital encounter of 08/31/22    NM Bone Scan Whole Body    Impression  1. Uptake within the anterior right 5th and 6th ribs is most likely related to degenerative changes.  Correlation with plain film or chest CT scan recommended.    2.  Intense uptake within the medial left pubic bone corresponds to an area of sclerosis seen on the comparison PET-CT scan.  Differential considerations remain to be fibrous dysplasia, Paget's disease or metastasis.    3.  Negative for metastatic pattern of uptake otherwise.  Degenerative pattern of uptake and other incidental findings as noted above.      Electronically signed by: Antonio Mckeon MD  Date:    08/31/2022  Time:    12:35      Results for orders placed during the hospital encounter of 09/29/22    MRI Prostate W W/O Contrast    Impression  Left-sided PI-RADS 5 lesion with extraprostatic extension involving the left-sided neurovascular bundles and seminal vesicles.  Extension of abnormal signal adjacent to the left levator ani, cannot rule out involvement.  Abnormal signal also closely approximates with the anterior rectal wall without definite involvement.    Left pubic lesion concerning for metastasis.    Diffuse heterogeneous bone marrow.    Overall Assessment: PI-RADS 5 - Very high (clinically significant cancer is highly likely to be present)    Number of targets created for potential MR/US fusion biopsy    Transitional/Peripheral zone: 1    This report was flagged in Epic as abnormal.    Electronically signed by  resident: Al Castrejon  Date:    09/29/2022  Time:    16:12    Electronically signed by: Shekhar Rodríguez MD  Date:    09/29/2022  Time:    19:08       No results found for this or any previous visit.       I have personally reviewed the above imaging.     Path:   Reviewed pathology as documented above.    Genomic testing:     Germline genetic testing: 3/28/23 - VUS in APC and BMPR1A    Results for orders placed or performed in visit on 03/28/23   Genetic Misc Sendout Test, Blood    Collection Time: 03/28/23 11:11 AM   Result Value Ref Range    Miscellaneous Genetic Test Name [facility] BRCA + 84/RNA     Genso Specimen Type Blood     Genetic counseling? Yes     Parental or Sibling Testing? No     Test Result See result image under hyperlink     Reference Lab SEE COMMENT         Somatic tumor genotyping: None      ctDNA genotyping:           Diagnoses:     1. Prostate cancer    2. Malignant neoplasm metastatic to both lungs    3. Bone metastases    4. Insomnia, unspecified type                    Assessment and Plan:     1. Prostate cancer  Overview:  Patient with de david high-volume metastatic prosatate adenocarcinoma including lung and osseous mets. Started ADT 11/18/23 with apalutamide 6/7/23. Completed RT to the left iliac met 2/7/23. Subsequent oligoprogression to left pubic tubercle. Underwent RT 01/2024 with good PSA response. Widespread mets noted 02/2025 despite low PSA.    Assessment & Plan:  Extensive radilogic progression despite flat PSA. Will obtain lung biopsy along with scheduled PSMA PET CT. If confirmed PSMA avid prostate cancer would favor next line Pluvicto. I worry cytotoxic chemotherapy would be risky given recent recurrent Cdiff and debility. Patient is also very concerned about bedoya and is considering TURP. Would like to hold off and evalute response to initial treatment options.  -- Con't ADT + Apaluatmide  -- ADT via orchiectomy  -- If confirmed prostate cancer progression can stop  apaltuamide  -- Pulm and IR consult to discuss lung biopsy  -- PSMA PET scheduled next week  -- FU with me to discuss next steops after PSMA PET - favoring Pluvicto at present  -- Due for zometa      2. Malignant neoplasm metastatic to both lungs  Overview:  Metastatic prostate cancer based on PSMA imaging      Orders:  -     E-Consult to Pulmonary  -     E-Consult to Interventional Radiology    3. Bone metastases  Overview:  Has left pelvic turbercle metastases noted on PSMA PET CT with associated discomfort. Completed RT on 2/3/23.    Assessment & Plan:  - Due for zometa      4. Insomnia, unspecified type  -     LORazepam (ATIVAN) 0.5 MG tablet; Take 1 tablet (0.5 mg total) by mouth every evening.  Dispense: 30 tablet; Refill: 0                    Follow up:   Route Chart for Scheduling    Med Onc Chart Routing      Follow up with physician . 3/10/25   Follow up with JENIFFER    Infusion scheduling note   zometa   Injection scheduling note    Labs CBC, CMP and PSA   Scheduling:  Preferred lab:  Lab interval:     Imaging    Pharmacy appointment    Other referrals                     Therapy Plan Information  ZOLEDRONIC ACID (ZOMETA) IV for Malignant neoplasm metastatic to bone, noted on 12/22/2022  ZOLEDRONIC ACID (ZOMETA) IV for Prostate cancer, noted on 10/26/2022  Medications  zoledronic 4 mg/100 mL infusion 4 mg  4 mg, Intravenous, Every 4 weeks  Flushes  0.9% NaCl 250 mL flush bag  Intravenous, Every 4 weeks  sodium chloride 0.9% flush 10 mL  10 mL, Intravenous, Every 4 weeks  heparin, porcine (PF) 100 unit/mL injection flush 500 Units  500 Units, Intravenous, Every 4 weeks  alteplase injection 2 mg  2 mg, Intra-Catheter, Every 4 weeks      No therapy plan of the specified type found.    No therapy plan of the specified type found.         The above information has been reviewed with the patient and all questions have been answered to their apparent satisfaction.  They understand that they can call the clinic with  any questions.    Darren Carter

## 2025-02-27 NOTE — DISCHARGE SUMMARY
[facility] Hospital Medicine Discharge Summary    Primary Team: [facility] Hospitalist Team A  Attending Physician: Dr Whaley  Resident: Dr. Montes  Intern: Dr. Abernathy    Date of Admit: 2/21/2025  Date of Discharge: 2/26/2025    Discharge to: Home with home health  Condition: Stable    Discharge Diagnoses     Problem List[1]    Consultants and Procedures     Consultants:  Palliative  Psychiatry    Procedures:   Bedoya exchanged    Brief History of Present Illness      Noah Lopez is a 80 y.o. male with PMHx of prostate cancer with bony and lung mets, recent c diff infection, who presented to [facility] on 2/21/2025 for decreased PO intake x 5 days. In usual state of health until about 5 days ago when he began having decreased UOP and lower abdominal pain. Present to ED on 2/17, was found to have enlarged prostate cancer mass, bedoya placed in ED and patient was discharged home with urology follow up. Since being home he has developed recurrent watery diarrhea over the last 4-5 days. He has also had decreased PO intake. Only tolerated some fluids. Denies any emesis. He was previously dx with c. Diff diarrhea in 01/2025. He was Rx PO vancomycin and completed 5 day course of Abx. He reports his stools had mostly returned to normal and his sxs resolved. He submitted a stool sample to the lab but reports it was denied because it was too formed. Therefore he is unsure if he ever cleared the infection. He denies any CP, SOB, hematuria, fevers, chills, cough.     Patient admitted for recurrent C diff infection. Started on PO vanco, diarrhea resolved. Patient endorsed passive SI, was evaluated by psych this admission, did not meet criteria for PEC. Patient improved clinically and was discharged with close urology, heme/onc, palliative follow up.    For the full HPI please refer to the History & Physical from this admission.    Hospital Course By Problem with Pertinent Findings     First Recurrence Severe C.  "Difficile infection  Diarrhea  - watery bowel movements x 5 days, c.diff toxin positive in ED  - completed PO vanc therapy 01/2025 but may have missed doses. No culture cure documented but reports improvement of sxs prior to recurrence   - Tachycardic, otherwise hemodynamically stable, afebrile, leukocytosis at 16, Cr 1.6  - KUB with acute abnormality    - No diarrhea x 48 hours  Plan:  - Complete 250 mg PO vancomycin QID x 14 days       KIKE, resolved  - Cr 1.6 (BL 1.3)  - suspect pre-renal in setting of diarrhea, decreased po intake  - Resolved with IVFs     Prostate cancer with bony and lung metastasis s/p radiation therapy c/b acute urinary retention  Hx of bilateral orchiectomy  - outpatient bedoya exchanged in ED, continue bedoya until urology follow up outpatient   - CT imaging 2/17 with enlarging prostate mass, concern for tx failure  - monitor for UTI   - continue finasteride, non-formulary androgen inhibitor apalutamide 240 mg nightly   - Seen by palliative, he will follow up out-patient for GOC, advanced planning, symptom management after seeing heme/onc     Hypokalemia  Hypomagnesemia  Hypophosphatemia  - replace with PO and IV supplementation     Depression   -depressed mood, no SI/HI  -telepsych evaluated  -stopped bupropion  -continue mirtazapine, trazodone prn qhs     HLD  - continue statin     HTN  - home meds: lisinopril 40 and cardizem 300  - continue lisinopril      Insomnia   - continue mirtazipine nightly,  trazodone 200 mg nightly PRN      Discharge vital signs     /67   Pulse 86   Temp 98.6 °F (37 °C)   Resp 18   Ht 6' 2" (1.88 m)   Wt 101.4 kg (223 lb 8.7 oz)   SpO2 96%   BMI 28.70 kg/m²      Discharge Medications        Medication List        START taking these medications      mirtazapine 7.5 MG Tab  Commonly known as: REMERON  Take 1 tablet (7.5 mg total) by mouth every evening.     PHOS--160-250 mg Pwpk  Generic drug: potassium, sodium phosphates  Take 2 packets by mouth " 4 (four) times daily with meals and nightly.     polyethylene glycol 17 gram/dose powder  Commonly known as: GLYCOLAX  Take 17 g by mouth every evening.     vancomycin 125 MG capsule  Commonly known as: VANCOCIN  Take 1 capsule (125 mg total) by mouth every 6 (six) hours. for 6 days            CHANGE how you take these medications      GAS RELIEF (SIMETHICONE) 80 mg chewable tablet  Generic drug: simethicone  Take 1 tablet (80 mg total) by mouth every 6 (six) hours as needed for Flatulence.  What changed:   medication strength  how much to take  when to take this  reasons to take this            CONTINUE taking these medications      apalutamide 60 mg Tab  Commonly known as: ERLEADA  TAKE 4 TABLETS BY MOUTH ONCE  DAILY     ascorbic acid (vitamin C) 1000 MG tablet  Commonly known as: VITAMIN C     atorvastatin 40 MG tablet  Commonly known as: LIPITOR  Take 1 tablet (40 mg total) by mouth every evening.     BENEFIBER CLEAR SF (DEXTRIN) ORAL     calcium carbonate 500 mg calcium (1,250 mg) tablet  Commonly known as: OS-TYREE  Take 2 tablets (1,000 mg total) by mouth once daily.     cholecalciferol (vitamin D3) 25 mcg (1,000 unit) capsule  Commonly known as: VITAMIN D3  Take 1 capsule (1,000 Units total) by mouth once daily.     clotrimazole-betamethasone 1-0.05% cream  Commonly known as: LOTRISONE  Apply topically 2 (two) times daily.     cyclobenzaprine 10 MG tablet  Commonly known as: FLEXERIL  Take 1 tablet (10 mg total) by mouth 3 (three) times daily as needed for Muscle spasms.     finasteride 5 mg tablet  Commonly known as: PROSCAR  Take 1 tablet (5 mg total) by mouth once daily.     lisinopriL 40 MG tablet  Commonly known as: PRINIVIL,ZESTRIL  Take 1 tablet (40 mg total) by mouth once daily.     MEN 50 PLUS MULTIVITAMIN 674--300 mcg Tab  Generic drug: mv-min-folic-K1-lycopen-lutein     mupirocin 2 % ointment  Commonly known as: BACTROBAN  Apply topically 3 (three) times daily. To healing areas     ondansetron  4 MG Tbdl  Commonly known as: ZOFRAN-ODT  Take 1 tablet (4 mg total) by mouth every 6 (six) hours as needed.     oxyCODONE 5 MG immediate release tablet  Commonly known as: ROXICODONE  Take 1 tablet (5 mg total) by mouth every 4 (four) hours as needed for Pain.     oxyCODONE-acetaminophen 5-325 mg per tablet  Commonly known as: PERCOCET  Take 1 tablet by mouth every 6 (six) hours as needed for Pain.     traZODone 100 MG tablet  Commonly known as: DESYREL  Take 2 tablets (200 mg total) by mouth nightly as needed for Insomnia.     TURMERIC ORAL            STOP taking these medications      buPROPion 75 MG tablet  Commonly known as: WELLBUTRIN     clonazePAM 0.5 MG tablet  Commonly known as: KlonoPIN     diltiaZEM 300 MG 24 hr capsule  Commonly known as: CARDIZEM CD     docusate sodium 250 MG capsule  Commonly known as: COLACE     tamsulosin 0.4 mg Cap  Commonly known as: FLOMAX               Where to Get Your Medications        These medications were sent to [facility] [address], [place] [address]      Hours: Mon-Fri, 8a-5:30p Phone: [phone]   atorvastatin 40 MG tablet  cyclobenzaprine 10 MG tablet  finasteride 5 mg tablet  GAS RELIEF (SIMETHICONE) 80 mg chewable tablet  lisinopriL 40 MG tablet  mirtazapine 7.5 MG Tab  oxyCODONE-acetaminophen 5-325 mg per tablet  PHOS--160-250 mg Pwpk  polyethylene glycol 17 gram/dose powder  traZODone 100 MG tablet  vancomycin 125 MG capsule          Discharge Information:   Diet:  Regular diet    Physical Activity:  As tolerated             Instructions:  1. Take all medications as prescribed  2. Keep all follow-up appointments  3. Return to the hospital or call your primary care physicians if any worsening symptoms such as fever, chest pain, shortness of breath, return of symptoms, or any other concerns.    Follow-Up Appointments:  Palliative  Heme Onc  Urology      Jose Montes DO MPH  [facility]  02/27/2025 11:02 AM         [1]   Patient Active Problem List  Diagnosis    Hypertension    Screening for malignant neoplasm of colon    Hyperlipidemia    Colon polyp    Hypocontractile bladder    Neck arthritis    Thyroid nodule    Insomnia    Pulmonary metastases    Prostate cancer    Bone metastases    Cannabis abuse    Moderate recurrent major depression    Posttraumatic stress disorder    Anomaly of cerebellum    Aortic atherosclerosis    Stage 3a chronic kidney disease    Androgen deprivation therapy    Infected sebaceous cyst    Anxiety    Cancer associated pain    Depression    Hypophosphatemia    Acute urinary retention

## 2025-02-27 NOTE — PROGRESS NOTES
Subjective:       Patient ID: Noah Lopez is a 80 y.o. male.    Chief Complaint: urinary retention     This is a 80 y.o.  male patient that is new to me.  The patient was referred to me by Dr. Norton for urinary retention. History of prostate cancer with pulmonary mets. He follows hemotology/oncology, Dr. Carter. He went to the ED on 2/17/25 w/ c/o being unable to void. Bedoya catheter placed and 1.8L of urinary output after bedoya placed.  Here today with daughter. Both patient and daughter report that they are hesitant to remove bedoya catheter because they are concerned he will not be able to urinate. Daughter reports they would like an outlet procedure but to perform least invasive procedures as well as the least amount of testing as possible. Daughter reports treatment is more palliative at this point and she wants her dad to be as comfortable as possible..    LAST PSA  Lab Results   Component Value Date    PSA 11.7 (H) 12/08/2021    PSADIAG 2.4 11/12/2024    PSADIAG 1.8 08/12/2024    PSADIAG 1.6 07/01/2024    PSADIAG 1.2 05/21/2024    PSADIAG 1.2 04/29/2024    PSADIAG 1.1 03/15/2024    PSADIAG 1.4 02/20/2024    PSADIAG 2.3 01/31/2024    PSADIAG 2.2 01/02/2024    PSADIAG 1.7 12/04/2023    PSADIAG 1.5 11/07/2023    PSADIAG 0.67 08/07/2023    PSADIAG 0.76 07/11/2023    PSADIAG 1.2 05/05/2023    PSADIAG 1.3 04/10/2023    PSADIAG 1.7 03/16/2023    PSADIAG 5.2 (H) 01/27/2023    PSADIAG 6.7 (H) 12/22/2022    PSADIAG 19.6 (H) 11/18/2022    PSADIAG 17.3 (H) 05/25/2022    PSATOTAL 15.8 (H) 07/05/2022    PSAFREE 2.83 (H) 07/05/2022       Lab Results   Component Value Date    CREATININE 1.0 02/26/2025       ---  PMH/PSH/Medications/Allergies/Social history reviewed and as in chart.    Review of Systems   Constitutional:  Negative for activity change, chills and fever.   Respiratory:  Negative for shortness of breath.    Cardiovascular:  Negative for chest pain and palpitations.   Gastrointestinal:  Negative for  abdominal pain and constipation.   Genitourinary:  Positive for difficulty urinating. Negative for dysuria, flank pain, frequency, hematuria and urgency.   Neurological:  Negative for dizziness and light-headedness.     Objective:      Physical Exam  HENT:      Head: Normocephalic.   Pulmonary:      Effort: Pulmonary effort is normal.   Musculoskeletal:         General: Normal range of motion.      Cervical back: Normal range of motion.   Skin:     General: Skin is warm and dry.   Neurological:      Mental Status: He is alert and oriented to person, place, and time.       Assessment:     No problems updated.    Plan:      Discussion on removing bedoya catheter vs leaving the catheter in. Expressed my concerns that he will likely be unable to void on his own due prostate cancer causing outlet obstruction. Informed daughter and patient that an outlet procedure is likely needed in order for him to void on his own. Discussed ordering an MRI of the prostate and following-up with Dr. Cotton. Patient wishes to hold off on MRI. Agreed to see Dr. Cotton to discuss next steps.  Will discuss with Dr. Cotton if MRI is necessary for outlet procedure. Will order prior to visit if this is needed.  Bedoya catheter bag changed to leg bag and given overnight bag with instructions on how to change each night. Will need catheter change at follow-up with Dr. Cotton.  Follow-up Dr. Cotton 3/21/25.      CRAIG Roy    I spent a total of 30 minutes on the day of the visit.This includes face to face time and non-face to face time preparing to see the patient (eg, review of tests), obtaining and/or reviewing separately obtained history, documenting clinical information in the electronic or other health record, independently interpreting results and communicating results to the patient/family/caregiver, or care coordinator.

## 2025-02-27 NOTE — PLAN OF CARE
AVS and educational attachments shared with patient and daughter via [employer]. Discussed thoroughly. Patient and daughter verbalized clear understanding using teach back method. Notified bedside nurse of completion of education. At present no distress noted. Patient to be discharged via w/c with escort service and family with all of patient's belonings. Will cont to be available to patient and family and intervene prn.

## 2025-02-27 NOTE — Clinical Note
Hey, I discussed this patient with you briefly. He has prostate cancer with mets. He follows hem/onc, Dr. Carter. He agreed to keep the catheter in since he will unlikely be able to urinate. He has a follow-up with on 3/21/25 to discuss an outlet procedure. Daughter wants the least invasive things done. She was not wanting the MRI of the prostate. I told her I would talk to you about this and see if it was necessary. If so let me know and I will order it and have it done prior to the follow-up with you. Thanks, Laine

## 2025-02-28 ENCOUNTER — E-CONSULT (OUTPATIENT)
Dept: INTERVENTIONAL RADIOLOGY/VASCULAR | Facility: HOSPITAL | Age: 80
End: 2025-02-28
Payer: MEDICARE

## 2025-02-28 ENCOUNTER — E-CONSULT (OUTPATIENT)
Dept: PULMONOLOGY | Facility: HOSPITAL | Age: 80
End: 2025-02-28
Payer: MEDICARE

## 2025-02-28 ENCOUNTER — OFFICE VISIT (OUTPATIENT)
Dept: PALLIATIVE MEDICINE | Facility: CLINIC | Age: 80
End: 2025-02-28
Payer: MEDICARE

## 2025-02-28 ENCOUNTER — PATIENT MESSAGE (OUTPATIENT)
Dept: PALLIATIVE MEDICINE | Facility: CLINIC | Age: 80
End: 2025-02-28

## 2025-02-28 VITALS
WEIGHT: 217 LBS | TEMPERATURE: 98 F | RESPIRATION RATE: 19 BRPM | HEIGHT: 74 IN | OXYGEN SATURATION: 96 % | DIASTOLIC BLOOD PRESSURE: 83 MMHG | SYSTOLIC BLOOD PRESSURE: 146 MMHG | HEART RATE: 130 BPM | BODY MASS INDEX: 27.85 KG/M2

## 2025-02-28 DIAGNOSIS — C79.51 MALIGNANT NEOPLASM METASTATIC TO BONE: ICD-10-CM

## 2025-02-28 DIAGNOSIS — C78.02 MALIGNANT NEOPLASM METASTATIC TO BOTH LUNGS: ICD-10-CM

## 2025-02-28 DIAGNOSIS — C61 PROSTATE CANCER: Primary | ICD-10-CM

## 2025-02-28 DIAGNOSIS — C78.01 MALIGNANT NEOPLASM METASTATIC TO BOTH LUNGS: Primary | ICD-10-CM

## 2025-02-28 DIAGNOSIS — G89.3 CANCER ASSOCIATED PAIN: ICD-10-CM

## 2025-02-28 DIAGNOSIS — C61 PROSTATE CANCER: ICD-10-CM

## 2025-02-28 DIAGNOSIS — C78.02 MALIGNANT NEOPLASM METASTATIC TO BOTH LUNGS: Primary | ICD-10-CM

## 2025-02-28 DIAGNOSIS — C78.01 MALIGNANT NEOPLASM METASTATIC TO BOTH LUNGS: ICD-10-CM

## 2025-02-28 LAB — O+P STL MICRO: NORMAL

## 2025-02-28 PROCEDURE — 99999 PR PBB SHADOW E&M-EST. PATIENT-LVL IV: CPT | Mod: PBBFAC,,, | Performed by: STUDENT IN AN ORGANIZED HEALTH CARE EDUCATION/TRAINING PROGRAM

## 2025-02-28 RX ORDER — BISACODYL 10 MG/1
10 SUPPOSITORY RECTAL DAILY PRN
Qty: 5 SUPPOSITORY | Refills: 0 | Status: SHIPPED | OUTPATIENT
Start: 2025-02-28

## 2025-02-28 RX ORDER — HEPARIN 100 UNIT/ML
500 SYRINGE INTRAVENOUS
OUTPATIENT
Start: 2025-02-28

## 2025-02-28 RX ORDER — SODIUM CHLORIDE 0.9 % (FLUSH) 0.9 %
10 SYRINGE (ML) INJECTION
OUTPATIENT
Start: 2025-02-28

## 2025-02-28 NOTE — CONSULTS
[facility] - 2nd Fl  Response for E-Consult     Patient Name: Noah Lopez  MRN: [id number]  Primary Care Provider: Anthony De La Torre MD   Requesting Provider: Darren Carter MD  Consults    Recommendation: Obtain full chest CT    Additional future steps to consider: This will likely be amendable to percutaneous biopsy. Please place .    Total time of Consultation: 5 minute    I did communicate this to the requesting provider.     *This eConsult is based on the clinical data available to me and is furnished without benefit of a physical examination. The eConsult will need to be interpreted in light of any clinical issues or changes in patient status not available to me at the time of filing this eConsults. Significant changes in patient condition or level of acuity should result in immediate formal consultation and reevaluation. Please alert me if you have further questions.    Thank you for this eConsult referral.     MD [facility] - [facility]

## 2025-02-28 NOTE — PROGRESS NOTES
"Palliative Medicine Clinic Note - [facility]    Consult Requested By: Coco Zavaleta    Primary Care Physician:   Anthony De La Torre MD    Reason for Consult: Advance care planning and symptom management in the setting of metastatic prostate cancer    ASSESSMENT/PLAN:     Mr. Noah Lopez is a 80 year old man with relevant history of prostate cancer with mets to bone, lung, CKD3, HTN, HLD, PTSD, MDD presenting to palliative care clinic for management of symptoms and advance care planning.     He follows with Dr. Darren Carter for onc care, most recent visit 2/27/2025 where it was discussed that he has had "extensive radiologic progression despite flat PSA". Planning for lung biopsy, if positive for prostate adeno considering lutetium treatment (Pluvicto). Notably, he was recently hospitalized for Cdiff colitis at [facility] from 2/21-2/26. Symptom burden per notes has been high, including urinary retention for which he has Jones catheter in place and is established with urology, next visit with Dr. Cotton scheduled 3/21/2025. Considering outlet procedure to allow urination without catheter assistance.    He presents to clinic today accompanied by his daughter, Lorena.  Medical power of  completed and uploaded to chart.  Long discussion held regarding symptom burden and how he wants to approach treating this new progression of his cancer.  Details of discussion below, ultimately he decided to pursue referral to hospice care services and to decline further workup of these additional mets.  He has significant family support in his daughter who is a former hospice nurse and will be staying with him.    Plan/Recommendations:    #Pain - Nociceptive somatic, aching pain in elbows/knees from recent falls and catching himself. Nociceptive somatic dull aching pain in sacrum likely from pelvic tumor burden. Sharp, stabbing lower rib pain bilaterally, unclear cause, may be pleuritic pain vs MSK pain from falls. " Oxycodone 5mg dose allows him to have mild pain relief for 3-4 hours, but he does have to take several doses in a day to tolerate pain, is still in pain most of the time. Family asks about potential for long-acting medication for more consistent pain control.  We discussed options for medication, and with referral to hospice we will go with either extended-release morphine or fentanyl patch as these are on their formulary.  - recommended initiating morphine extended release 15 mg twice daily, [employer] will get this to their home by tonight.  - continue oxycodone 5 mg q6h PRN for breakthrough pain  - narcan offered as we are starting a long-acting for him, explained rationale for offering as recommending to initiate a long acting medication, patient declines today.     #Existential distress - Mr. Lopez today is expressing significant existential distress in response to the loss of function he has experienced since being diagnosed with C diff colitis and being found to have this new progression of his malignancy.  He expresses frustration and a wish that he could pursue medical assistance in dying, which is not available to him in the [facility].  We discussed the reasons for this wish, he states that he is thinking this way not due to uncontrolled symptom burden, but because he anticipates further loss of function and does not want to prolong suffering that he knows is coming.  Long discussion held with him and his daughter regarding these statements.  He is understandably upset from this recent change in his health, I do not believe that his wish to not prolong his suffering is reflective of suicidal ideation related to psychiatric reason.  He expresses that his wishes are to be home, taking what dontrell he can in his day-to-day life, and to not have terrible symptom burden without relief at the end of his life.  We discussed that the most effective way to reach these goals would be the involvement of a hospice  care company, he and his daughter agree.  She actually did nursing for a hospice company as part of her career and will be staying with him for the duration of his illness.  She has [facility] paperwork that she will sent to me for completion.    #Medical technology - as indwelling Jones catheter for urinary retention related to tumor burden and prostate.  Had been considering outlet procedure with urology, however instead pursuing hospice referral as above.    Advance Care Planning   Advance Directives:   Living Will: No    Medical Power of : Yes (Completed today.)    Agent's Name:  Daughter Lorena Ortez   Agent's Contact Number:  [phone]    Decision Making:  Patient answered questions and Family answered questions  Goals of Care: The patient and family endorses that what is most important right now is to focus on spending time at home, avoiding the hospital, symptom/pain control, and comfort and QOL.    Long discussion held about potential options in front of us for additional workup and treatment of this spread of his cancer.  Patient and family have a very clear understanding of the diagnostic and treatment options in front of them that they discussed with Oncology recently.  Ultimately, he states that he does not want to undergo additional treatments or diagnostic measures.  He expresses a wish for comfort, staying at home, and being supported at the natural end of his life with symptom control.  He states that he does not want to prolong suffering from his terminal illness.  As above, he expresses wish for medical aid in dying, however this is not an available measure for him in the state [facility].    Accordingly, we have decided that the best plan to meet the patient's goals includes enrolling in hospice care. Consult sent to [facility], they will meet with family at 1PM today at Mr. Lopez home.    Understanding of Disease and Illness Trajectory: Patient  has  good understanding of his illness,  they can benefit from continued education on what to expect in the future.     Follow up: With me PRN, initiating hospice care services    Plan discussed with patient and daughter, Lorena    SUBJECTIVE:     History of Present Illness / Interval History:  Discussed the role of palliative care, in that we can be helpful in helping patients feel better, aid in communication, and planning for future difficult decisions.     Review of Symptoms      Symptom Assessment (ESAS 0-10 Scale)  Pain:  8  Dyspnea:  8  Anxiety:  7  Nausea:  0  Depression:  0  Anorexia:  0  Fatigue:  10  Insomnia:  9  Restlessness:  6  Agitation:  10     CAM / Delirium:  Negative  Constipation:  Positive  Diarrhea:  Positive (Recently CDiff has given him bouts of diarrhea, usually struggles more with constipation.)      Bowel Management Plan (BMP):  Yes      Pain Assessment:    Location(s): none (See above for pain description and management)      ECOG Performance Status thGthrthathdtheth:th th4th Living Arrangements:  Lives with family    Psychosocial/Cultural:   See Palliative Psychosocial Note: No  Social Issues Identified: Coping deficit pt/family and New Diagnosis/Trauma  Bereavement Risk: No  Caregiver Needs Discussed. Caregiver Distress: No: Caregiver support and community resources discussed.    Cultural: no needs identified today.  **Primary  to Follow**  Palliative Care  Consult: No     Time-Based Charting:  Yes  Chart Review: 20 minutes  Face to Face: 15 minutes  Coordination of Care: 20 minutes  Advance Care Plannin minutes    Total Time Spent: 75 minutes      Active Diagnoses & Disease History:  CKD3A  Prostate cancer - complicated by metastases to bone, lung, urinary retention requiring Jones    Previous experience or exposure to a serious illness: Yes    Medications:  Current Medications[1]    External  database queried on 25 by Barbra Pickett.   The results reviewed and considered with the clinical data in the  decision whether or not to prescribe a controlled substance.    Past Medical History:   Diagnosis Date    Colon polyp 09/06/2018    Hyperlipidemia     Hypertension     Prostate cancer     Stage 3a chronic kidney disease 6/21/2023    Urinary tract infection with hematuria 11/18/2022     Past Surgical History:   Procedure Laterality Date    COLONOSCOPY  2010    COLONOSCOPY N/A 9/6/2018    Procedure: COLONOSCOPY Suprep PLEASE TEXT PATIENT WITH ARRIVAL TIME;  Surgeon: Niharika Arce MD;  Location: [facility];  Service: Endoscopy;  Laterality: N/A;    CYSTOSCOPY WITH URODYNAMIC TESTING N/A 1/31/2022    Procedure: CYSTOSCOPY, WITH URODYNAMIC TESTING FLOUROSCOPIC;  Surgeon: Anthony Maloney MD;  Location: [facility] OR 1ST FLR;  Service: Urology;  Laterality: N/A;  1hr    KNEE ARTHROSCOPY      ORCHIECTOMY Bilateral 2/15/2023    Procedure: Bilateral simple orchiectomy;  Surgeon: Helena Luis MD;  Location: [facility] OR;  Service: Urology;  Laterality: Bilateral;    SPINE SURGERY      l-spine    TRANSURETHRAL RESECTION OF PROSTATE N/A 10/26/2022    Procedure: TURP (TRANSURETHRAL RESECTION OF PROSTATE);  Surgeon: Helena Luis MD;  Location: [facility] OR;  Service: Urology;  Laterality: N/A;     Family History   Problem Relation Name Age of Onset    Hypertension Mother      Heart attack Father      Cirrhosis Father      No Known Problems Brother      No Known Problems Brother      Seizures Daughter      Liver disease Daughter      No Known Problems Son      Thyroid cancer Other      Prostate cancer Neg Hx      Breast cancer Neg Hx      Pancreatic cancer Neg Hx       Review of patient's allergies indicates:   Allergen Reactions    Ciprofloxacin Other (See Comments)     tendonitis     OBJECTIVE:     Vitals: Temp: 97.5 °F (36.4 °C) (02/28/25 0921)  Pulse: (!) 130 (02/28/25 0921)  Resp: 19 (02/28/25 0921)  BP: (!) 146/83 (02/28/25 0921)  SpO2: 96 % (02/28/25 0921)  Physical Exam  Constitutional:       General: He is in acute distress.       Appearance: He is ill-appearing.   HENT:      Head: Normocephalic and atraumatic.      Mouth/Throat:      Mouth: Mucous membranes are moist.      Pharynx: No oropharyngeal exudate.   Eyes:      General: No scleral icterus.     Extraocular Movements: Extraocular movements intact.   Pulmonary:      Comments: Breathing comfortably on room air.  Genitourinary:     Comments: Jones in place.  Skin:     General: Skin is warm and dry.      Coloration: Skin is not jaundiced.      Findings: Bruising present.      Comments: Bruising evident on elbows.   Neurological:      General: No focal deficit present.      Mental Status: He is alert.      Comments: Able to discuss risks and benefits of complex medical decisions with me today, including hospice referral and forgoing additional cancer treatment/workup.         Labs:  CBC:   WBC   Date Value Ref Range Status   02/27/2025 10.88 3.90 - 12.70 K/uL Final     Hemoglobin   Date Value Ref Range Status   02/27/2025 14.9 14.0 - 18.0 g/dL Final     Hematocrit   Date Value Ref Range Status   02/27/2025 45.1 40.0 - 54.0 % Final     MCV   Date Value Ref Range Status   02/27/2025 96 82 - 98 fL Final     Platelets   Date Value Ref Range Status   02/27/2025 393 150 - 450 K/uL Final     Lab Results   Component Value Date    CREATININE 1.3 02/27/2025    BUN 15 02/27/2025     02/27/2025    K 4.5 02/27/2025     02/27/2025    CO2 23 02/27/2025     LFT:   Lab Results   Component Value Date    AST 48 (H) 02/27/2025    ALKPHOS 91 02/27/2025    BILITOT 0.5 02/27/2025     Albumin:   Albumin   Date Value Ref Range Status   02/27/2025 2.6 (L) 3.5 - 5.2 g/dL Final     Protein:   Total Protein   Date Value Ref Range Status   02/27/2025 7.1 6.0 - 8.4 g/dL Final     Radiology:  X-Ray KUB  Narrative: EXAMINATION:  XR KUB    CLINICAL HISTORY:  abdominal distension, pain setting of C diff;Enterocolitis due to Clostridium difficile, not specified as recurrent    TECHNIQUE:  Single AP supine view  of the abdomen (KUB) was performed    COMPARISON:  CT abdomen and pelvis from 02/16/2025    FINDINGS:  There is a normal, nonobstructive bowel gas pattern. Free air cannot be excluded on this supine radiograph, though none is seen.  There is no abnormal calcification. The visualized osseous structures demonstrate degenerative changes.  There is a nonspecific lucent lesion in the left superior pubic ramus, previously described pathologic fracture of the left pubic ramus is better detailed on prior cross-sectional imaging.  The visualized lung bases are clear.  Impression: No acute abnormality.    Electronically signed by: Dominik Whelan  Date:    02/22/2025  Time:    00:41     Margie Pickett MD  Hospice and Palliative Medicine  [facility]         [1]   Current Outpatient Medications:     apalutamide (ERLEADA) 60 mg Tab, TAKE 4 TABLETS BY MOUTH ONCE  DAILY (Patient taking differently: Take 240 mg by mouth once daily.), Disp: 120 tablet, Rfl: 11    ascorbic acid, vitamin C, (VITAMIN C) 1000 MG tablet, Take 1,000 mg by mouth once daily., Disp: , Rfl:     calcium carbonate (OS-TYREE) 500 mg calcium (1,250 mg) tablet, Take 2 tablets (1,000 mg total) by mouth once daily., Disp: 60 tablet, Rfl: 11    cholecalciferol, vitamin D3, (VITAMIN D3) 25 mcg (1,000 unit) capsule, Take 1 capsule (1,000 Units total) by mouth once daily., Disp: 30 capsule, Rfl: 11    clotrimazole-betamethasone 1-0.05% (LOTRISONE) cream, Apply topically 2 (two) times daily., Disp: 60 g, Rfl: 3    cyclobenzaprine (FLEXERIL) 10 MG tablet, Take 1 tablet (10 mg total) by mouth 3 (three) times daily as needed for Muscle spasms., Disp: 60 tablet, Rfl: 0    finasteride (PROSCAR) 5 mg tablet, Take 1 tablet (5 mg total) by mouth once daily., Disp: 30 tablet, Rfl: 11    lisinopriL (PRINIVIL,ZESTRIL) 40 MG tablet, Take 1 tablet (40 mg total) by mouth once daily., Disp: 30 tablet, Rfl: 11    LORazepam (ATIVAN) 0.5 MG tablet, Take 1 tablet (0.5 mg total) by  mouth every evening., Disp: 30 tablet, Rfl: 0    mirtazapine (REMERON) 7.5 MG Tab, Take 1 tablet (7.5 mg total) by mouth every evening., Disp: 30 tablet, Rfl: 11    multivit-min-FA-lycopen-lutein (MEN 50 PLUS MULTIVITAMIN) 300-600-300 mcg Tab, Take 1 tablet by mouth once daily., Disp: , Rfl:     mupirocin (BACTROBAN) 2 % ointment, Apply topically 3 (three) times daily. To healing areas, Disp: 22 g, Rfl: 2    ondansetron (ZOFRAN-ODT) 4 MG TbDL, Take 1 tablet (4 mg total) by mouth every 6 (six) hours as needed., Disp: 20 tablet, Rfl: 0    oxyCODONE (ROXICODONE) 5 MG immediate release tablet, Take 1 tablet (5 mg total) by mouth every 4 (four) hours as needed for Pain., Disp: 42 tablet, Rfl: 0    oxyCODONE-acetaminophen (PERCOCET) 5-325 mg per tablet, Take 1 tablet by mouth every 6 (six) hours as needed for Pain., Disp: 12 tablet, Rfl: 0    polyethylene glycol (GLYCOLAX) 17 gram/dose powder, Take 17 g by mouth every evening., Disp: 510 g, Rfl: 0    potassium, sodium phosphates (PHOS-NAK) 280-160-250 mg PwPk, Take 2 packets by mouth 4 (four) times daily with meals and nightly., Disp: 100 packet, Rfl: 0    simethicone (GAS RELIEF 80, SIMETHICONE,) 80 MG chewable tablet, Take 1 tablet (80 mg total) by mouth every 6 (six) hours as needed for Flatulence., Disp: 100 tablet, Rfl: 0    traZODone (DESYREL) 100 MG tablet, Take 2 tablets (200 mg total) by mouth nightly as needed for Insomnia., Disp: 30 tablet, Rfl: 11    TURMERIC ORAL, Take by mouth., Disp: , Rfl:     vancomycin (VANCOCIN) 125 MG capsule, Take 1 capsule (125 mg total) by mouth every 6 (six) hours. for 6 days, Disp: 24 capsule, Rfl: 0    wheat dextrin (BENEFIBER CLEAR SF, DEXTRIN, ORAL), Take by mouth., Disp: , Rfl:     bisacodyL (DULCOLAX) 10 mg Supp, Place 1 suppository (10 mg total) rectally daily as needed (for refractory constipation)., Disp: 5 suppository, Rfl: 0

## 2025-02-28 NOTE — CONSULTS
[facility]  Response for E-Consult     Patient Name: Naoh Lopez  MRN: [id number]  Primary Care Provider: Anthony De La Torre MD   Requesting Provider: Darren Carter MD  E-Consult to Pulmonary  Consult performed by: Debi Peace MD  Consult ordered by: Darren Carter MD  Reason for consult: Pulmonary metastastis  Assessment/Recommendations: Needs diagnostic procedure.  Will get into to clinic to review with patient.           After evaluation of your eConsult clinical questions, I believe the patient should be scheduled for an office visit in our specialty due to Need to evaluate for bronchoscopy .Will get scheduled ASAP    Total time of Consultation: 5 minute    *This eConsult is based on the clinical data available to me and is furnished without benefit of a physician examination.  The eConsult will need to be interpreted in light of any clinical issues of changes in patient status not available to me at the time rime of filing this eConsults.  Significant changes in patient condition of level of acuity should result in a referral for in person consultation and reevaluation.  Please alert me if you have any furth questions.     Thank you for this eConsult referral.     Debi Peace MD  [facility]

## 2025-02-28 NOTE — ASSESSMENT & PLAN NOTE
Extensive radilogic progression despite flat PSA. Will obtain lung biopsy along with scheduled PSMA PET CT. If confirmed PSMA avid prostate cancer would favor next line Pluvicto. I worry cytotoxic chemotherapy would be risky given recent recurrent Cdiff and debility. Patient is also very concerned about bedoya and is considering TURP. Would like to hold off and evalute response to initial treatment options.  -- Con't ADT + Apaluatmide  -- ADT via orchiectomy  -- If confirmed prostate cancer progression can stop apaltuamide  -- Pulm and IR consult to discuss lung biopsy  -- PSMA PET scheduled next week  -- FU with me to discuss next steops after PSMA PET - favoring Pluvicto at present  -- Due for zometa

## 2025-03-24 DIAGNOSIS — Z00.00 ENCOUNTER FOR MEDICARE ANNUAL WELLNESS EXAM: ICD-10-CM

## (undated) DEVICE — SUT 2/0 30IN SILK BLK BRAI

## (undated) DEVICE — TOWEL OR DISP STRL BLUE 4/PK

## (undated) DEVICE — SEE MEDLINE ITEM 154981

## (undated) DEVICE — GAUZE SPONGE 4X4 12PLY

## (undated) DEVICE — DRAPE LAP T SHT W/ INSTR PAD

## (undated) DEVICE — GLOVE SURGICAL LATEX SZ 6.5

## (undated) DEVICE — SUT MCRYL PLUS 4-0 PS2 27IN

## (undated) DEVICE — SOL NACL IRR 3000ML

## (undated) DEVICE — COVER OVERHEAD SURG LT BLUE

## (undated) DEVICE — SPONGE DERMACEA GAUZE 4X4

## (undated) DEVICE — PACK SURGERY START

## (undated) DEVICE — JELLY LUBRICANT STERILE 4 OZ

## (undated) DEVICE — GLOVE 7.0 PROTEXIS PI BLUE

## (undated) DEVICE — DRAPE T CYSTOSCOPY STERILE

## (undated) DEVICE — ELECTRODE REM PLYHSV RETURN 9

## (undated) DEVICE — ELECTRODE RESECTSCP HI 24FR

## (undated) DEVICE — NDL HYPO REG 25G X 1 1/2

## (undated) DEVICE — GAUZE AVANT SPNG 4PLY STRL 4X4

## (undated) DEVICE — GOWN POLY REINF BRTH SLV XL

## (undated) DEVICE — PACK BASIC

## (undated) DEVICE — CONTAINER SPECIMEN STRL 4OZ

## (undated) DEVICE — BAG LINGEMAN DRAIN UROLOGY

## (undated) DEVICE — GOWN POLY REINF BRTH SLV LG

## (undated) DEVICE — MANIFOLD 4 PORT